# Patient Record
Sex: MALE | Race: WHITE | NOT HISPANIC OR LATINO | Employment: FULL TIME | URBAN - METROPOLITAN AREA
[De-identification: names, ages, dates, MRNs, and addresses within clinical notes are randomized per-mention and may not be internally consistent; named-entity substitution may affect disease eponyms.]

---

## 2017-09-14 ENCOUNTER — OFFICE VISIT (OUTPATIENT)
Dept: FAMILY MEDICINE CLINIC | Facility: CLINIC | Age: 32
End: 2017-09-14

## 2017-09-14 VITALS
TEMPERATURE: 98.7 F | RESPIRATION RATE: 16 BRPM | DIASTOLIC BLOOD PRESSURE: 98 MMHG | HEIGHT: 70 IN | BODY MASS INDEX: 32.21 KG/M2 | WEIGHT: 225 LBS | SYSTOLIC BLOOD PRESSURE: 150 MMHG | HEART RATE: 99 BPM

## 2017-09-14 DIAGNOSIS — K27.9 PEPTIC ULCER: ICD-10-CM

## 2017-09-14 DIAGNOSIS — J01.00 ACUTE NON-RECURRENT MAXILLARY SINUSITIS: ICD-10-CM

## 2017-09-14 DIAGNOSIS — I10 ESSENTIAL HYPERTENSION: Primary | ICD-10-CM

## 2017-09-14 PROCEDURE — 99203 OFFICE O/P NEW LOW 30 MIN: CPT | Performed by: FAMILY MEDICINE

## 2017-09-14 RX ORDER — AMLODIPINE BESYLATE 5 MG/1
5 TABLET ORAL DAILY
Qty: 30 TABLET | Refills: 5 | Status: SHIPPED | OUTPATIENT
Start: 2017-09-14 | End: 2018-03-12 | Stop reason: SDUPTHER

## 2017-09-14 RX ORDER — IBUPROFEN 800 MG/1
800 TABLET ORAL
Status: ON HOLD | COMMUNITY
Start: 2017-09-10 | End: 2022-06-14 | Stop reason: SDDI

## 2017-09-14 RX ORDER — AMOXICILLIN AND CLAVULANATE POTASSIUM 875; 125 MG/1; MG/1
1 TABLET, FILM COATED ORAL 2 TIMES DAILY
Qty: 20 TABLET | Refills: 0 | Status: SHIPPED | OUTPATIENT
Start: 2017-09-14 | End: 2018-03-12

## 2017-09-14 RX ORDER — AMLODIPINE BESYLATE 5 MG/1
5 TABLET ORAL
COMMUNITY
Start: 2017-03-15 | End: 2017-09-14 | Stop reason: SDUPTHER

## 2017-09-14 NOTE — PROGRESS NOTES
"Subjective   Tramaine Gates is a 32 y.o. male.     CC: Establishment of Care for HTN/Hospital F/U for Abdominal Pain    History of Present Illness     Chief Complaint:   Chief Complaint   Patient presents with   • Hypertension     out of meds   • Sinusitis   • Nasal Congestion   • right ear pressure   • Nausea   • rt abd pain     [facility] er follow up - records requested        Tramaine Gates 32 y.o. male who presents today to establish care for Medical Management of the below listed issues and medication refills.  he has a problem list of   Patient Active Problem List   Diagnosis   • Hypertension   • Peptic ulcer   .  Since the last visit with his prior MD, he has been to the [facility] ED for some abdominal pain. Had CT Scan and then a HIDA (negative) and then saw GI for EGD that showed Peptic Ulcer and was treated for H.Pylori (per pt). Got some better but is out of PPI.  he has NOT been compliant with his BP meds due to finances.   Current Outpatient Prescriptions:   •  amLODIPine (NORVASC) 5 MG tablet, Take 1 tablet by mouth Daily., Disp: 30 tablet, Rfl: 5  •  ibuprofen (ADVIL,MOTRIN) 800 MG tablet, Take 800 mg by mouth., Disp: , Rfl:   •  amoxicillin-clavulanate (AUGMENTIN) 875-125 MG per tablet, Take 1 tablet by mouth 2 (Two) Times a Day., Disp: 20 tablet, Rfl: 0.  he denies medication side effects.    He reports ST, congestion, right ear pressure worsening x 9 days. Went to an [facility] Sunday and had negative Strep.screen and was otherwise not treated.    All of the chronic condition(s) listed above are stable w/o issues.    /98  Pulse 99  Temp 98.7 °F (37.1 °C) (Oral)   Resp 16  Ht 70\" (177.8 cm)  Wt 225 lb (102 kg)  BMI 32.28 kg/m2    No results found for this or any previous visit.      The following portions of the patient's history were reviewed and updated as appropriate: allergies, current medications, past family history, past medical history, past social history, past surgical " "history and problem list.    Review of Systems   Constitutional: Negative for activity change, chills, fatigue and fever.   HENT: Positive for congestion, ear pain and sinus pressure.    Respiratory: Positive for cough. Negative for shortness of breath.    Cardiovascular: Negative for chest pain and palpitations.   Gastrointestinal: Positive for abdominal pain.   Endocrine: Negative for cold intolerance.   Psychiatric/Behavioral: Negative for behavioral problems and dysphoric mood. The patient is not nervous/anxious.      /98  Pulse 99  Temp 98.7 °F (37.1 °C) (Oral)   Resp 16  Ht 70\" (177.8 cm)  Wt 225 lb (102 kg)  BMI 32.28 kg/m2    Objective   Physical Exam   Constitutional: He appears well-developed and well-nourished.   HENT:   Right Ear: Tympanic membrane is injected.   Neck: Neck supple. No thyromegaly present.   Cardiovascular: Normal rate and regular rhythm.    No murmur heard.  Pulmonary/Chest: Effort normal and breath sounds normal.   Abdominal: Bowel sounds are normal.   Psychiatric: He has a normal mood and affect. His behavior is normal.   Nursing note and vitals reviewed.  Hospital records reviewed with pt confirming HPI.      Assessment/Plan   Tramaine was seen today for hypertension, sinusitis, nasal congestion, right ear pressure, nausea and rt abd pain.    Diagnoses and all orders for this visit:    Essential hypertension  Comments:  uncontrolled  Orders:  -     Comprehensive metabolic panel  -     Lipid panel  -     CBC and Differential  -     TSH  -     amLODIPine (NORVASC) 5 MG tablet; Take 1 tablet by mouth Daily.    Acute non-recurrent maxillary sinusitis  -     amoxicillin-clavulanate (AUGMENTIN) 875-125 MG per tablet; Take 1 tablet by mouth 2 (Two) Times a Day.    Peptic ulcer    Pt finished the Abx treatment of this and is to get some Omeprazole 20mg QD and take for 3-6 months.           "

## 2018-03-12 ENCOUNTER — OFFICE VISIT (OUTPATIENT)
Dept: FAMILY MEDICINE CLINIC | Facility: CLINIC | Age: 33
End: 2018-03-12

## 2018-03-12 VITALS
SYSTOLIC BLOOD PRESSURE: 152 MMHG | BODY MASS INDEX: 32.35 KG/M2 | RESPIRATION RATE: 18 BRPM | DIASTOLIC BLOOD PRESSURE: 100 MMHG | HEIGHT: 70 IN | WEIGHT: 226 LBS | HEART RATE: 96 BPM | TEMPERATURE: 98.4 F

## 2018-03-12 DIAGNOSIS — I10 ESSENTIAL HYPERTENSION: ICD-10-CM

## 2018-03-12 PROCEDURE — 99213 OFFICE O/P EST LOW 20 MIN: CPT | Performed by: FAMILY MEDICINE

## 2018-03-12 RX ORDER — AMLODIPINE BESYLATE 5 MG/1
5 TABLET ORAL DAILY
Qty: 30 TABLET | Refills: 5 | Status: ON HOLD | OUTPATIENT
Start: 2018-03-12 | End: 2022-06-14

## 2018-03-12 RX ORDER — LISINOPRIL 10 MG/1
10 TABLET ORAL DAILY
Qty: 30 TABLET | Refills: 5 | Status: ON HOLD | OUTPATIENT
Start: 2018-03-12 | End: 2022-06-14 | Stop reason: SDDI

## 2018-03-12 NOTE — PROGRESS NOTES
"Subjective   Tramaine Gates is a 32 y.o. male.     History of Present Illness     Chief Complaint:   Chief Complaint   Patient presents with   • Hypertension     med refill - SMOKER - NO LABS        Tramaine Gates 32 y.o. male who presents today for Medical Management of the below listed issues and medication refills.  he has a problem list of   Patient Active Problem List   Diagnosis   • Hypertension   • Peptic ulcer   .  Since the last visit, he has overall felt well, although BP continues to run high and pt continues to smoke.  he has been compliant with   Current Outpatient Prescriptions:   •  amLODIPine (NORVASC) 5 MG tablet, Take 1 tablet by mouth Daily., Disp: 30 tablet, Rfl: 5  •  ibuprofen (ADVIL,MOTRIN) 800 MG tablet, Take 800 mg by mouth., Disp: , Rfl:   •  lisinopril (PRINIVIL,ZESTRIL) 10 MG tablet, Take 1 tablet by mouth Daily., Disp: 30 tablet, Rfl: 5.  he denies medication side effects.    All of the chronic condition(s) listed above are stable w/o issues.    /100   Pulse 96   Temp 98.4 °F (36.9 °C) (Oral)   Resp 18   Ht 177.8 cm (70\")   Wt 103 kg (226 lb)   BMI 32.43 kg/m²     No results found for this or any previous visit.        The following portions of the patient's history were reviewed and updated as appropriate: allergies, current medications, past family history, past medical history, past social history, past surgical history and problem list.    Review of Systems   Constitutional: Negative for activity change, chills, fatigue and fever.   Respiratory: Negative for cough and shortness of breath.    Cardiovascular: Negative for chest pain and palpitations.   Gastrointestinal: Negative for abdominal pain.   Endocrine: Negative for cold intolerance.   Psychiatric/Behavioral: Negative for behavioral problems and dysphoric mood. The patient is not nervous/anxious.        Objective   Physical Exam   Constitutional: He appears well-developed and well-nourished.   Neck: Neck supple. " No thyromegaly present.   Cardiovascular: Normal rate and regular rhythm.    No murmur heard.  Pulmonary/Chest: Effort normal and breath sounds normal.   Abdominal: Bowel sounds are normal.   Psychiatric: He has a normal mood and affect. His behavior is normal.   Nursing note and vitals reviewed.      Assessment/Plan   Tramaine was seen today for hypertension.    Diagnoses and all orders for this visit:    Essential hypertension  Comments:  uncontrolled  Orders:  -     amLODIPine (NORVASC) 5 MG tablet; Take 1 tablet by mouth Daily.  -     lisinopril (PRINIVIL,ZESTRIL) 10 MG tablet; Take 1 tablet by mouth Daily.

## 2018-10-26 ENCOUNTER — OFFICE (OUTPATIENT)
Dept: URBAN - METROPOLITAN AREA CLINIC 64 | Facility: CLINIC | Age: 33
End: 2018-10-26

## 2018-10-26 VITALS
DIASTOLIC BLOOD PRESSURE: 110 MMHG | HEIGHT: 70 IN | SYSTOLIC BLOOD PRESSURE: 155 MMHG | HEART RATE: 79 BPM | WEIGHT: 239 LBS

## 2018-10-26 DIAGNOSIS — K62.5 HEMORRHAGE OF ANUS AND RECTUM: ICD-10-CM

## 2018-10-26 DIAGNOSIS — Z72.0 TOBACCO USE: ICD-10-CM

## 2018-10-26 DIAGNOSIS — R19.7 DIARRHEA, UNSPECIFIED: ICD-10-CM

## 2018-10-26 DIAGNOSIS — Z87.11 PERSONAL HISTORY OF PEPTIC ULCER DISEASE: ICD-10-CM

## 2018-10-26 DIAGNOSIS — R10.31 RIGHT LOWER QUADRANT PAIN: ICD-10-CM

## 2018-10-26 DIAGNOSIS — R11.2 NAUSEA WITH VOMITING, UNSPECIFIED: ICD-10-CM

## 2018-10-26 PROCEDURE — 99203 OFFICE O/P NEW LOW 30 MIN: CPT | Performed by: NURSE PRACTITIONER

## 2018-10-26 RX ORDER — OMEPRAZOLE 40 MG/1
40 CAPSULE, DELAYED RELEASE ORAL
Qty: 90 | Refills: 3 | Status: ACTIVE
Start: 2018-10-26

## 2018-10-26 RX ORDER — SUCRALFATE 1 G/10ML
800 SUSPENSION ORAL
Qty: 1600 | Refills: 0 | Status: ACTIVE
Start: 2018-10-26

## 2018-10-26 RX ORDER — FAMOTIDINE 20 MG/1
TABLET, FILM COATED ORAL
Qty: 60 | Refills: 0 | Status: COMPLETED
End: 2018-10-26

## 2018-12-31 ENCOUNTER — ON CAMPUS - OUTPATIENT (OUTPATIENT)
Dept: URBAN - METROPOLITAN AREA HOSPITAL 85 | Facility: HOSPITAL | Age: 33
End: 2018-12-31

## 2018-12-31 DIAGNOSIS — Z87.11 PERSONAL HISTORY OF PEPTIC ULCER DISEASE: ICD-10-CM

## 2018-12-31 DIAGNOSIS — R11.0 NAUSEA: ICD-10-CM

## 2018-12-31 DIAGNOSIS — R10.11 RIGHT UPPER QUADRANT PAIN: ICD-10-CM

## 2018-12-31 DIAGNOSIS — R93.3 ABNORMAL FINDINGS ON DIAGNOSTIC IMAGING OF OTHER PARTS OF DI: ICD-10-CM

## 2018-12-31 PROCEDURE — 99213 OFFICE O/P EST LOW 20 MIN: CPT | Performed by: NURSE PRACTITIONER

## 2022-01-07 ENCOUNTER — APPOINTMENT (OUTPATIENT)
Dept: CT IMAGING | Facility: HOSPITAL | Age: 37
End: 2022-01-07

## 2022-01-07 ENCOUNTER — APPOINTMENT (OUTPATIENT)
Dept: GENERAL RADIOLOGY | Facility: HOSPITAL | Age: 37
End: 2022-01-07

## 2022-01-07 ENCOUNTER — HOSPITAL ENCOUNTER (EMERGENCY)
Facility: HOSPITAL | Age: 37
Discharge: HOME | End: 2022-01-07
Admitting: EMERGENCY MEDICINE

## 2022-01-07 VITALS
SYSTOLIC BLOOD PRESSURE: 157 MMHG | WEIGHT: 258.6 LBS | BODY MASS INDEX: 37.02 KG/M2 | HEART RATE: 76 BPM | HEIGHT: 70 IN | TEMPERATURE: 99.1 F | DIASTOLIC BLOOD PRESSURE: 101 MMHG | OXYGEN SATURATION: 99 % | RESPIRATION RATE: 20 BRPM

## 2022-01-07 DIAGNOSIS — R05.9 COUGH: ICD-10-CM

## 2022-01-07 DIAGNOSIS — R06.02 SHORTNESS OF BREATH: ICD-10-CM

## 2022-01-07 DIAGNOSIS — U07.1 COVID-19: Primary | ICD-10-CM

## 2022-01-07 LAB
ALBUMIN SERPL-MCNC: 4.4 G/DL (ref 3.5–5.2)
ALBUMIN/GLOB SERPL: 1.5 G/DL
ALP SERPL-CCNC: 94 U/L (ref 39–117)
ALT SERPL W P-5'-P-CCNC: 27 U/L (ref 1–41)
ANION GAP SERPL CALCULATED.3IONS-SCNC: 15 MMOL/L (ref 5–15)
APTT PPP: 34.8 SECONDS (ref 24–31)
AST SERPL-CCNC: 25 U/L (ref 1–40)
B PARAPERT DNA SPEC QL NAA+PROBE: NOT DETECTED
B PERT DNA SPEC QL NAA+PROBE: NOT DETECTED
BASOPHILS # BLD AUTO: 0 10*3/MM3 (ref 0–0.2)
BASOPHILS NFR BLD AUTO: 0.6 % (ref 0–1.5)
BILIRUB SERPL-MCNC: 0.2 MG/DL (ref 0–1.2)
BUN SERPL-MCNC: 9 MG/DL (ref 6–20)
BUN/CREAT SERPL: 9.9 (ref 7–25)
C PNEUM DNA NPH QL NAA+NON-PROBE: NOT DETECTED
CALCIUM SPEC-SCNC: 9 MG/DL (ref 8.6–10.5)
CHLORIDE SERPL-SCNC: 99 MMOL/L (ref 98–107)
CO2 SERPL-SCNC: 22 MMOL/L (ref 22–29)
CREAT SERPL-MCNC: 0.91 MG/DL (ref 0.76–1.27)
D DIMER PPP FEU-MCNC: 1.35 MG/L (FEU) (ref 0–0.59)
DEPRECATED RDW RBC AUTO: 39.8 FL (ref 37–54)
EOSINOPHIL # BLD AUTO: 0 10*3/MM3 (ref 0–0.4)
EOSINOPHIL NFR BLD AUTO: 0.1 % (ref 0.3–6.2)
ERYTHROCYTE [DISTWIDTH] IN BLOOD BY AUTOMATED COUNT: 13.2 % (ref 12.3–15.4)
FLUAV SUBTYP SPEC NAA+PROBE: NOT DETECTED
FLUBV RNA ISLT QL NAA+PROBE: NOT DETECTED
GFR SERPL CREATININE-BSD FRML MDRD: 93 ML/MIN/1.73
GLOBULIN UR ELPH-MCNC: 2.9 GM/DL
GLUCOSE SERPL-MCNC: 86 MG/DL (ref 65–99)
HADV DNA SPEC NAA+PROBE: NOT DETECTED
HCOV 229E RNA SPEC QL NAA+PROBE: NOT DETECTED
HCOV HKU1 RNA SPEC QL NAA+PROBE: NOT DETECTED
HCOV NL63 RNA SPEC QL NAA+PROBE: NOT DETECTED
HCOV OC43 RNA SPEC QL NAA+PROBE: NOT DETECTED
HCT VFR BLD AUTO: 42.8 % (ref 37.5–51)
HGB BLD-MCNC: 15.2 G/DL (ref 13–17.7)
HMPV RNA NPH QL NAA+NON-PROBE: NOT DETECTED
HOLD SPECIMEN: NORMAL
HPIV1 RNA ISLT QL NAA+PROBE: NOT DETECTED
HPIV2 RNA SPEC QL NAA+PROBE: NOT DETECTED
HPIV3 RNA NPH QL NAA+PROBE: NOT DETECTED
HPIV4 P GENE NPH QL NAA+PROBE: NOT DETECTED
INR PPP: 1.03 (ref 0.93–1.1)
LYMPHOCYTES # BLD AUTO: 1.3 10*3/MM3 (ref 0.7–3.1)
LYMPHOCYTES NFR BLD AUTO: 31.6 % (ref 19.6–45.3)
M PNEUMO IGG SER IA-ACNC: NOT DETECTED
MCH RBC QN AUTO: 29.5 PG (ref 26.6–33)
MCHC RBC AUTO-ENTMCNC: 35.5 G/DL (ref 31.5–35.7)
MCV RBC AUTO: 83.1 FL (ref 79–97)
MONOCYTES # BLD AUTO: 0.4 10*3/MM3 (ref 0.1–0.9)
MONOCYTES NFR BLD AUTO: 9.2 % (ref 5–12)
NEUTROPHILS NFR BLD AUTO: 2.3 10*3/MM3 (ref 1.7–7)
NEUTROPHILS NFR BLD AUTO: 58.5 % (ref 42.7–76)
NRBC BLD AUTO-RTO: 0.2 /100 WBC (ref 0–0.2)
NT-PROBNP SERPL-MCNC: 88.1 PG/ML (ref 0–450)
PLATELET # BLD AUTO: 214 10*3/MM3 (ref 140–450)
PMV BLD AUTO: 7.1 FL (ref 6–12)
POTASSIUM SERPL-SCNC: 3.5 MMOL/L (ref 3.5–5.2)
PROT SERPL-MCNC: 7.3 G/DL (ref 6–8.5)
PROTHROMBIN TIME: 11.4 SECONDS (ref 9.6–11.7)
RBC # BLD AUTO: 5.15 10*6/MM3 (ref 4.14–5.8)
RHINOVIRUS RNA SPEC NAA+PROBE: NOT DETECTED
RSV RNA NPH QL NAA+NON-PROBE: NOT DETECTED
SARS-COV-2 RNA NPH QL NAA+NON-PROBE: DETECTED
SODIUM SERPL-SCNC: 136 MMOL/L (ref 136–145)
TROPONIN T SERPL-MCNC: <0.01 NG/ML (ref 0–0.03)
WBC NRBC COR # BLD: 4 10*3/MM3 (ref 3.4–10.8)

## 2022-01-07 PROCEDURE — 0202U NFCT DS 22 TRGT SARS-COV-2: CPT | Performed by: PHYSICIAN ASSISTANT

## 2022-01-07 PROCEDURE — 83880 ASSAY OF NATRIURETIC PEPTIDE: CPT | Performed by: PHYSICIAN ASSISTANT

## 2022-01-07 PROCEDURE — 93005 ELECTROCARDIOGRAM TRACING: CPT

## 2022-01-07 PROCEDURE — 99284 EMERGENCY DEPT VISIT MOD MDM: CPT

## 2022-01-07 PROCEDURE — 85610 PROTHROMBIN TIME: CPT | Performed by: PHYSICIAN ASSISTANT

## 2022-01-07 PROCEDURE — 80053 COMPREHEN METABOLIC PANEL: CPT | Performed by: PHYSICIAN ASSISTANT

## 2022-01-07 PROCEDURE — 0 IOPAMIDOL PER 1 ML: Performed by: PHYSICIAN ASSISTANT

## 2022-01-07 PROCEDURE — 85025 COMPLETE CBC W/AUTO DIFF WBC: CPT | Performed by: PHYSICIAN ASSISTANT

## 2022-01-07 PROCEDURE — 85730 THROMBOPLASTIN TIME PARTIAL: CPT | Performed by: PHYSICIAN ASSISTANT

## 2022-01-07 PROCEDURE — 84484 ASSAY OF TROPONIN QUANT: CPT | Performed by: PHYSICIAN ASSISTANT

## 2022-01-07 PROCEDURE — 71045 X-RAY EXAM CHEST 1 VIEW: CPT

## 2022-01-07 PROCEDURE — 71275 CT ANGIOGRAPHY CHEST: CPT

## 2022-01-07 PROCEDURE — 93005 ELECTROCARDIOGRAM TRACING: CPT | Performed by: PHYSICIAN ASSISTANT

## 2022-01-07 PROCEDURE — 85379 FIBRIN DEGRADATION QUANT: CPT | Performed by: PHYSICIAN ASSISTANT

## 2022-01-07 RX ORDER — IBUPROFEN 400 MG/1
800 TABLET ORAL ONCE
Status: COMPLETED | OUTPATIENT
Start: 2022-01-07 | End: 2022-01-07

## 2022-01-07 RX ORDER — SODIUM CHLORIDE 0.9 % (FLUSH) 0.9 %
10 SYRINGE (ML) INJECTION AS NEEDED
Status: DISCONTINUED | OUTPATIENT
Start: 2022-01-07 | End: 2022-01-08 | Stop reason: HOSPADM

## 2022-01-07 RX ADMIN — IOPAMIDOL 100 ML: 755 INJECTION, SOLUTION INTRAVENOUS at 22:00

## 2022-01-07 RX ADMIN — IBUPROFEN 800 MG: 400 TABLET, FILM COATED ORAL at 19:36

## 2022-01-07 NOTE — ED PROVIDER NOTES
Subjective     Patient is a 36-year-old male comes in complaining of shortness of breath fever and cough for the past 2 days.  Patient states he has had body aches and headache.  Patient states that he is not vaccinated for COVID-19.  Patient states he has not been around anyone sick.  Patient reports his cough is mildly productive with clear sputum.  Patient states he feels short of breath especially with exertion better with rest.  Patient states he did not take any Tylenol or ibuprofen today.  Patient reports he has chest tightness and pain when he coughs or takes a deep breath.  Patient otherwise denies any chest pain currently or abdominal pain.  Patient denies any vomiting, diarrhea, urinary symptoms or swelling in his legs bilaterally.          Review of Systems   Constitutional: Positive for chills, fatigue and fever.   HENT: Negative for congestion, sore throat, tinnitus and trouble swallowing.    Eyes: Negative for photophobia, discharge and visual disturbance.   Respiratory: Positive for cough, chest tightness and shortness of breath. Negative for wheezing.    Cardiovascular: Positive for chest pain (only with cough and deep breathing). Negative for palpitations and leg swelling.   Gastrointestinal: Positive for nausea. Negative for abdominal pain, blood in stool, diarrhea and vomiting.   Genitourinary: Negative for dysuria, flank pain, frequency and urgency.   Musculoskeletal: Negative for arthralgias and myalgias.   Skin: Negative for rash.   Neurological: Positive for light-headedness. Negative for dizziness, syncope and headaches.   Psychiatric/Behavioral: Negative for confusion.       Past Medical History:   Diagnosis Date   • Allergic    • Hx of complete eye exam 2016   • Hypertension    • Peptic ulcer 9/14/2017       Allergies   Allergen Reactions   • Codeine Itching   • Hydrocodone Itching       Past Surgical History:   Procedure Laterality Date   • MANDIBLE SURGERY         History reviewed. No  pertinent family history.    Social History     Socioeconomic History   • Marital status: Single   Tobacco Use   • Smoking status: Current Every Day Smoker   • Smokeless tobacco: Never Used           Objective   Physical Exam  Vitals and nursing note reviewed.   Constitutional:       General: He is not in acute distress.     Appearance: He is well-developed. He is not diaphoretic.   HENT:      Head: Normocephalic and atraumatic.      Nose: Nose normal.      Mouth/Throat:      Pharynx: No oropharyngeal exudate.   Eyes:      Extraocular Movements: Extraocular movements intact.      Conjunctiva/sclera: Conjunctivae normal.      Pupils: Pupils are equal, round, and reactive to light.   Cardiovascular:      Rate and Rhythm: Normal rate and regular rhythm.      Heart sounds: Normal heart sounds.      Comments: S1, S2 audible.  Pulmonary:      Effort: Pulmonary effort is normal. No respiratory distress.      Breath sounds: Normal breath sounds. No decreased breath sounds, wheezing, rhonchi or rales.      Comments: On room air.  Abdominal:      General: Bowel sounds are normal. There is no distension.      Palpations: Abdomen is soft.      Tenderness: There is no abdominal tenderness.   Musculoskeletal:         General: No tenderness or deformity. Normal range of motion.      Cervical back: Normal range of motion.      Right lower leg: No edema.      Left lower leg: No edema.   Skin:     General: Skin is warm.      Capillary Refill: Capillary refill takes less than 2 seconds.      Findings: No erythema or rash.   Neurological:      Mental Status: He is alert and oriented to person, place, and time.      Cranial Nerves: No cranial nerve deficit.   Psychiatric:         Mood and Affect: Mood normal.         Behavior: Behavior normal.         Procedures           ED Course  ED Course as of 01/07/22 2256 Fri Jan 07, 2022 2234 Patient was given fact sheet regarding monoclonal antibody therapy and patient did not want this  "therapy at this time.  All questions were answered thoroughly regarding this. [RL]      ED Course User Index  [RL] Ike Matos PA      BP (!) 145/102 (BP Location: Left arm, Patient Position: Lying)   Pulse 73   Temp 99.1 °F (37.3 °C) (Oral)   Resp 14   Ht 177.8 cm (70\")   Wt 117 kg (258 lb 9.6 oz)   SpO2 98%   BMI 37.11 kg/m²   Labs Reviewed   RESPIRATORY PANEL PCR W/ COVID-19 (SARS-COV-2) FLASH/GONZALEZ/KELSEY/PAD/COR/MAD/YA IN-HOUSE, NP SWAB IN [place], 3-4 HR TAT - Abnormal; Notable for the following components:       Result Value    COVID19 Detected (*)     All other components within normal limits    Narrative:     In the setting of a positive respiratory panel with a viral infection PLUS a negative procalcitonin without other underlying concern for bacterial infection, consider observing off antibiotics or discontinuation of antibiotics and continue supportive care. If the respiratory panel is positive for atypical bacterial infection (Bordetella pertussis, Chlamydophila pneumoniae, or Mycoplasma pneumoniae), consider antibiotic de-escalation to target atypical bacterial infection.   APTT - Abnormal; Notable for the following components:    PTT 34.8 (*)     All other components within normal limits   D-DIMER, QUANTITATIVE - Abnormal; Notable for the following components:    D-Dimer, Quantitative 1.35 (*)     All other components within normal limits    Narrative:     Reference Range  --------------------------------------------------------------------     < 0.50   Negative Predictive Value  0.50-0.59   Indeterminate    >= 0.60   Probable VTE             A very low percentage of patients with DVT may yield D-Dimer results   below the cut-off of 0.50 mg/L FEU.  This is known to be more   prevalent in patients with distal DVT.             Results of this test should always be interpreted in conjunction with   the patient's medical history, clinical presentation and other   findings.  Clinical diagnosis should not " be based on the result of   INNOVANCE D-Dimer alone.   CBC WITH AUTO DIFFERENTIAL - Abnormal; Notable for the following components:    Eosinophil % 0.1 (*)     All other components within normal limits   BNP (IN-HOUSE) - Normal    Narrative:     Among patients with dyspnea, NT-proBNP is highly sensitive for the detection of acute congestive heart failure. In addition NT-proBNP of <300 pg/ml effectively rules out acute congestive heart failure with 99% negative predictive value.    Results may be falsely decreased if patient taking Biotin.     TROPONIN (IN-HOUSE) - Normal    Narrative:     Troponin T Reference Range:  <= 0.03 ng/mL-   Negative for AMI  >0.03 ng/mL-     Abnormal for myocardial necrosis.  Clinicians would have to utilize clinical acumen, EKG, Troponin and serial changes to determine if it is an Acute Myocardial Infarction or myocardial injury due to an underlying chronic condition.       Results may be falsely decreased if patient taking Biotin.     PROTIME-INR - Normal   COMPREHENSIVE METABOLIC PANEL    Narrative:     GFR Normal >60  Chronic Kidney Disease <60  Kidney Failure <15     CBC AND DIFFERENTIAL    Narrative:     The following orders were created for panel order CBC & Differential.  Procedure                               Abnormality         Status                     ---------                               -----------         ------                     CBC Auto Differential[[id number]]        Abnormal            Final result                 Please view results for these tests on the individual orders.   EXTRA TUBES    Narrative:     The following orders were created for panel order Extra Tubes.  Procedure                               Abnormality         Status                     ---------                               -----------         ------                     Gold Top - SST[[id number]]                                   Final result                 Please view results for these tests on  the individual orders.   [facility]     XR Chest 1 View    Result Date: 1/7/2022  No active cardiac pulmonary disease  Electronically Signed ByÁlvaro Juarez On:1/7/2022 7:11 PM This report was finalized on [id number] by  Tere Gann    CT Chest Pulmonary Embolism    Result Date: 1/7/2022   1. No evidence of pulmonary embolism 2. No pulmonary infiltrate 3. Trace bilateral pleural effusions 4. Multiple pulmonary nodules up to 7 mm. Recommend initial follow-up chest CT in 3-6 months  Electronically Signed ByÁlvaro Juarez On:1/7/2022 10:22 PM This report was finalized on [id number] by  Ozzie Juarez .                                               MDM  Number of Diagnoses or Management Options     Amount and/or Complexity of Data Reviewed  Clinical lab tests: reviewed  Tests in the radiology section of CPT®: reviewed  Tests in the medicine section of CPT®: reviewed    Risk of Complications, Morbidity, and/or Mortality  Presenting problems: low  Diagnostic procedures: low  Management options: low    Patient Progress  Patient progress: stable       Chart review:   EKG: EKG reviewed by myself interpreted by Dr. Staples shows sinus rhythm 71 bpm, no ST elevation apparent.  Repeat EKG was obtained that showed similar findings.    Imaging:    CT Chest Pulmonary Embolism   Final Result       1. No evidence of pulmonary embolism   2. No pulmonary infiltrate   3. Trace bilateral pleural effusions   4. Multiple pulmonary nodules up to 7 mm. Recommend initial follow-up   chest CT in 3-6 months       Electronically Signed ByÁlvaro Juarez On:1/7/2022 10:22 PM   This report was finalized on [id number] by  Tere Gann      XR Chest 1 View   Final Result   No active cardiac pulmonary disease       Electronically Signed ByÁlvaro Juarez On:1/7/2022 7:11 PM   This report was finalized on [id number] by  Ozzie Juarez, Tere          Labs: Patient tested positive for COVID-19 otherwise unremarkable  "respiratory viral panel.  Initial troponin negative, BNP normal, D-dimer elevated 1.35, coags otherwise unremarkable.  CBC and CMP largely unremarkable.    Vitals:  BP (!) 145/102 (BP Location: Left arm, Patient Position: Lying)   Pulse 73   Temp 99.1 °F (37.3 °C) (Oral)   Resp 14   Ht 177.8 cm (70\")   Wt 117 kg (258 lb 9.6 oz)   SpO2 98%   BMI 37.11 kg/m²     Medications given:    Medications   sodium chloride 0.9 % flush 10 mL (has no administration in time range)   ibuprofen (ADVIL,MOTRIN) tablet 800 mg (800 mg Oral Given 1/7/22 1936)   iopamidol (ISOVUE-370) 76 % injection 100 mL (100 mL Intravenous Given 1/7/22 2200)       Procedures:    MDM: Patient is a 36-year-old male comes in complaining of cough, chest pain shortness of breath.  Patient is unvaccinated for COVID-19.Patient tested positive for COVID-19 otherwise unremarkable respiratory viral panel.  Initial troponin negative, BNP normal, D-dimer elevated 1.35, coags otherwise unremarkable.  CBC and CMP largely unremarkable.  CT PE protocol is negative for pulmonary embolism.  This did show multiple pulmonary nodules up to 7 mm.  Recommended CT follow-up in 3 to 6 months.  When I discussed this with patient, patient is aware that he has had nodule in the past.  Patient also has a family history of lung cancer in the family.  Chest x-ray unremarkable for acute findings.  I spoke at length regarding monoclonal antibody therapy but patient declined this.  See full discharge instructions for further details.  Results and plan discussed with patient and is agreeable with plan.    Final diagnoses:   COVID-19   Shortness of breath   Cough       ED Disposition  ED Disposition     None          No follow-up provider specified.       Medication List      No changes were made to your prescriptions during this visit.          Ike Matos PA  01/07/22 1806    "

## 2022-01-08 NOTE — ED NOTES
Pt reports having body aches, headache as a cap on his head, cough, and soa at times for 2 days. Pt works at [employer].      Verna Seo RN  01/07/22 1942

## 2022-01-08 NOTE — DISCHARGE INSTRUCTIONS
Please follow quarantine precautions above.  Please monitor your oxygen at home by purchasing a pulse oximeter at [facility], [facility], etc. and if you are oxygen falls below 90% this would be reason to come back to the ER.  Please come back to the ER if you have severe chest pain or shortness of breath as you will need reevaluation at time.  Please follow-up with your primary care provider in 1 week's time.  Please follow-up with oncology regarding multiple pulmonary nodules as you will at the very least need a repeat CT scan of your chest in 3 to 6 months but may require further evaluation prior to this.  Please call  above.

## 2022-01-09 LAB
QT INTERVAL: 353 MS
QT INTERVAL: 354 MS

## 2022-01-18 ENCOUNTER — TELEPHONE (OUTPATIENT)
Dept: ONCOLOGY | Facility: CLINIC | Age: 37
End: 2022-01-18

## 2022-01-18 ENCOUNTER — TELEPHONE (OUTPATIENT)
Dept: ONCOLOGY | Facility: OTHER | Age: 37
End: 2022-01-18

## 2022-01-18 NOTE — TELEPHONE ENCOUNTER
PATIENT STATES HE WAS IN [facility] ER 11 DAYS AGO.  CT SCAN REVEALED SEVERAL NODULES.  ER  RECOMMENDED PATIENT FOLLOWING UP WITH OUR OFFICE.  PATIENT STATES HE IS GETTING OVER COVID.  APPT SCHEDULED FOR 1-27-22 AT 2:45PM.  PATIENT V/U.

## 2022-01-18 NOTE — TELEPHONE ENCOUNTER
PATIENT CALLED WANTING TO SCHEDULE AN APPT.  HE WAS IN THE ER APPROXIMATELY 11 DAYS AGO WITH COVID AND HAD A CT SCAN TO CHECK HIS LUNGS.  SEVERAL NODULES WERE FOUND AND ER DOC RECOMMENDED FU WITH ONCOLOGY AS PATIENT HAS A FAMILY HISTORY OF LUNCH CANCER. PATIENT DOES NOT CURRENTLY HAVE A PCT AND HAS JUST REC'D NEW INSURANCE.  SPOKE WITH CASANDRA WHO WILL CONTACT PATIENT TO DISCUSS PROTOCOL FOR GETTING HIM SCHEDULED.

## 2022-01-25 NOTE — PROGRESS NOTES
HEMATOLOGY ONCOLOGY OUTPATIENT CONSULTATION       Patient name: Tramaine Gates  : 1985  MRN: [id number]  Primary Care Physician: Provider, No Known  Referring Physician: Provider, No Known  Reason For Consult:     Chief Complaint   Patient presents with   • Appointment     lung nodules          HPI:   History of Present Illness:  Tramaine Gates is 36 y.o. male who presented to our office on 22 for consultation regarding lung nodules    2022 Mr. Gates was referred to the office after he was noted to have small nodules in both lungs, incidentally, on a CT scan that was done when he presented with SARS-COV 2 pneumonia.  He recalled that in the past he had been told that he had lung nodules that did not seem to represent a significant problem.  They had been stable for some time.  Following the above mentioned infection he had continued to have some dyspnea and cough, but he was free of hemoptysis.  He had not lost any weight.  He had been afebrile.  He was eating well and his appetite was adequate.  He admitted to being a heavy smoker and had been smoking cigarettes since his early teenage years.  The scans were compared with the available scans and the images were reviewed with radiology.  He was felt to have stable nodules and only follow-up was suggested.    Subjective:  • 22.  Mr. Gates is in the office for initial evaluation.  He reports he has been feeling poorly.  His main complaint is fatigue but is also dyspneic with some exertion and he coughs frequently.  While he has had these symptoms for some time, they became more intense after he developed SARS-COV 2 infection and they have not returned to their baseline.  He continues to work however and has not had major limitations.  He reports he walks long distances at work.  He has been eating well and his weight is stable.  He has no loss of appetite.  He has been afebrile.  He has maintained regular bowel activity  and has not noted melena or hematochezia.  No dysuria or hematuria and no peripheral edema.  No skin rash.  No neurologic symptoms including seizures or headaches.    The following portions of the patient's history were reviewed and updated as appropriate: allergies, current medications, past family history, past medical history, past social history, past surgical history and problem list.    Past Medical History:   Diagnosis Date   • Allergic    • Asthma 1/27/2022   • Gastroesophageal reflux disease 1/27/2022   • Hx of complete eye exam 2016   • Hyperlipidemia 1/27/2022   • Hypertension    • Migraine headache 1/27/2022   • Panic attack 1/27/2022   • Peptic ulcer 9/14/2017     Past Surgical History:   Procedure Laterality Date   • CHOLECYSTECTOMY  2019   • MANDIBLE SURGERY         Current Outpatient Medications:   •  omeprazole (priLOSEC) 40 MG capsule, omeprazole 40 mg capsule,delayed release  Take 1 capsule every day by oral route., Disp: , Rfl:   •  amLODIPine (NORVASC) 5 MG tablet, Take 1 tablet by mouth Daily., Disp: 30 tablet, Rfl: 5  •  atorvastatin (LIPITOR) 20 MG tablet, atorvastatin 20 mg tablet  Take 1 tablet every day by oral route., Disp: , Rfl:   •  erythromycin (ROMYCIN) 5 MG/GM ophthalmic ointment, 0.5 inches., Disp: , Rfl:   •  ibuprofen (ADVIL,MOTRIN) 800 MG tablet, Take 800 mg by mouth., Disp: , Rfl:   •  lisinopril (PRINIVIL,ZESTRIL) 10 MG tablet, Take 1 tablet by mouth Daily., Disp: 30 tablet, Rfl: 5    Allergies   Allergen Reactions   • Shellfish-Derived Products Hives   • Codeine Itching   • Hydrocodone Itching   • Hydrocodone-Acetaminophen Other (See Comments)     Family History   Problem Relation Age of Onset   • Heart disease Mother    • Heart failure Father      Cancer-related family history is not on file.    Social History     Tobacco Use   • Smoking status: Current Every Day Smoker     Packs/day: 1.00     Types: Cigarettes     Start date: 1999   • Smokeless tobacco: Never Used   Vaping  Use   • Vaping Use: Never used   Substance Use Topics   • Alcohol use: Not Currently   • Drug use: Yes     Frequency: 7.0 times per week     Types: Marijuana     Social History     Social History Narrative   • Not on file      ROS:     Review of Systems   Constitutional: Positive for fatigue. Negative for activity change, appetite change, chills, diaphoresis, fever and unexpected weight change.   HENT: Negative for congestion, dental problem, drooling, ear discharge, ear pain, facial swelling, hearing loss, mouth sores, nosebleeds, postnasal drip, rhinorrhea, sinus pressure, sinus pain, sneezing, sore throat, tinnitus, trouble swallowing and voice change.    Eyes: Negative for photophobia, pain, discharge, redness, itching and visual disturbance.   Respiratory: Positive for cough, shortness of breath and wheezing. Negative for apnea, choking, chest tightness and stridor.    Cardiovascular: Negative for chest pain, palpitations and leg swelling.   Gastrointestinal: Negative for abdominal distention, abdominal pain, anal bleeding, blood in stool, constipation, diarrhea, nausea, rectal pain and vomiting.   Endocrine: Negative for cold intolerance, heat intolerance, polydipsia and polyuria.   Genitourinary: Negative for decreased urine volume, difficulty urinating, dysuria, flank pain, frequency, genital sores, hematuria and urgency.   Musculoskeletal: Negative for arthralgias, back pain, gait problem, joint swelling, myalgias, neck pain and neck stiffness.   Skin: Negative for color change, pallor and rash.   Neurological: Negative for dizziness, tremors, seizures, syncope, facial asymmetry, speech difficulty, weakness, light-headedness, numbness and headaches.   Hematological: Negative for adenopathy. Does not bruise/bleed easily.   Psychiatric/Behavioral: Negative for agitation, behavioral problems, confusion, decreased concentration, hallucinations, self-injury, sleep disturbance and suicidal ideas. The patient is  "not nervous/anxious.                Objective:    Vitals:    01/27/22 1448   BP: 149/100   Pulse: 116   Resp: 20   Temp: 98.8 °F (37.1 °C)   Weight: 122 kg (268 lb)   Height: 177.8 cm (70\")   PainSc:   6   PainLoc: Chest  Comment: right side and lungs     Body mass index is 38.45 kg/m².  ECOG  (1) Restricted in physically strenuous activity, ambulatory and able to do work of light nature    Physical Exam:     Physical Exam  Constitutional:       General: He is not in acute distress.     Appearance: He is obese. He is ill-appearing. He is not toxic-appearing or diaphoretic.   HENT:      Head: Normocephalic and atraumatic.      Right Ear: External ear normal.      Left Ear: External ear normal.      Nose: Congestion present. No rhinorrhea.      Mouth/Throat:      Mouth: Mucous membranes are moist.      Pharynx: Oropharynx is clear. No oropharyngeal exudate or posterior oropharyngeal erythema.   Eyes:      General: No scleral icterus.        Right eye: No discharge.         Left eye: No discharge.      Conjunctiva/sclera: Conjunctivae normal.      Pupils: Pupils are equal, round, and reactive to light.   Cardiovascular:      Rate and Rhythm: Normal rate and regular rhythm.      Pulses: Normal pulses.      Heart sounds: Normal heart sounds. No murmur heard.  No friction rub. No gallop.    Pulmonary:      Effort: No respiratory distress.      Breath sounds: No stridor. No wheezing, rhonchi or rales.      Comments: The lungs are markedly diminished bilaterally.  There are fine crackles in both bases.  Chest:      Chest wall: No tenderness.   Abdominal:      General: Abdomen is flat. Bowel sounds are normal. There is no distension.      Palpations: Abdomen is soft. There is no mass.      Tenderness: There is no abdominal tenderness. There is no right CVA tenderness, left CVA tenderness, guarding or rebound.   Musculoskeletal:         General: No swelling, tenderness, deformity or signs of injury.      Cervical back: No " rigidity.      Right lower leg: No edema.      Left lower leg: No edema.   Lymphadenopathy:      Cervical: No cervical adenopathy.   Skin:     General: Skin is warm and dry.      Coloration: Skin is not jaundiced or pale.      Findings: No bruising, erythema or rash.   Neurological:      General: No focal deficit present.      Mental Status: He is alert and oriented to person, place, and time.      Cranial Nerves: No cranial nerve deficit.      Coordination: Coordination normal.      Gait: Gait normal.   Psychiatric:         Mood and Affect: Mood normal.         Behavior: Behavior normal.         Thought Content: Thought content normal.         Judgment: Judgment normal.     Mango Plata MD performed a physical exam on January 27, 2022 as documented above    Lab Results - Last 18 Months   Lab Units 01/07/22  1935   WBC 10*3/mm3 4.00   HEMOGLOBIN g/dL 15.2   HEMATOCRIT % 42.8   PLATELETS 10*3/mm3 214   MCV fL 83.1     Lab Results - Last 18 Months   Lab Units 01/07/22  1935   SODIUM mmol/L 136   POTASSIUM mmol/L 3.5   CHLORIDE mmol/L 99   CO2 mmol/L 22.0   BUN mg/dL 9   CREATININE mg/dL 0.91   CALCIUM mg/dL 9.0   BILIRUBIN mg/dL 0.2   ALK PHOS U/L 94   ALT (SGPT) U/L 27   AST (SGOT) U/L 25   GLUCOSE mg/dL 86     Lab Results   Component Value Date    GLUCOSE 86 01/07/2022    BUN 9 01/07/2022    CREATININE 0.91 01/07/2022    EGFRIFNONA 93 01/07/2022    BCR 9.9 01/07/2022    K 3.5 01/07/2022    CO2 22.0 01/07/2022    CALCIUM 9.0 01/07/2022    ALBUMIN 4.40 01/07/2022    LABIL2 1.1 01/01/2019    AST 25 01/07/2022    ALT 27 01/07/2022     Lab Results - Last 18 Months   Lab Units 01/07/22  1935   INR  1.03   APTT seconds 34.8*     Lab Results   Component Value Date    SEDRATE 10 01/01/2019     No results found for: FIBRINOGEN, HAPTOGLOBIN  Lab Results   Component Value Date    PTT 34.8 (H) 01/07/2022    INR 1.03 01/07/2022     Assessment/Plan     Assessment:  1. Lung nodules I have carefully reviewed the images of the  scans done most recently with a CT scan of the abdomen that was done in 2018.  The nodules that can be compared are essentially the same.  I had a telephone conversation with Dr. Selma Jones in radiology.  We compared the scans together and she came to the same conclusion.  An addendum will be placed on the report of the most recent scan.  On the basis of this I believe the nodules Mr. Gates has are benign and probably granulomatous.  No intervention is necessary at this time.  I have asked him to let me see him again with another scan in about 3 months.  This mainly because some of the nodules were not comparable on the scan of the abdomen of 2018.  The images of previous scans were not available for comparison.  If indeed there is no change in the nodules then I will probably see him a year later with another scan.  And if at that time there is no change then I would not need to follow him anymore.  Discussed at length with him.  2. Cigarette smoker Mr. Delgado has been a smoker for a long time and he consumes a large amount of cigarettes.  At times, he tells me, he has smoked upwards of 3 packs of cigarettes per day.  Discussed with him carefully the importance of cessation.  The many diseases that are associated to chronic cigarette smoking were described.  At his age, he still has a long time to create a lot of damage to his body and cessation would be in his best interest.  Offered him treatment with any clean or nicotine replacement.  He declined both.  3. SARS-COV 2 vaccination Mr. Gates has not received any vaccination.  Explained to him the known safety of the vaccines and the potentially serious complications that can arise as a result of infection with a virus in unvaccinated people.  The natural infection does not seem to come for adequate immunity and repeated infections are possible.  Some reports have suggested that subsequent infections resulted in more severe symptoms than the initial 1.  He was  not convinced.  4. I will see him again in approximately 3 months with another scan.    Plan:  1. As above    Mango Plata MD on January 27, 2022 at 5 PM

## 2022-01-27 ENCOUNTER — APPOINTMENT (OUTPATIENT)
Dept: LAB | Facility: HOSPITAL | Age: 37
End: 2022-01-27

## 2022-01-27 ENCOUNTER — CONSULT (OUTPATIENT)
Dept: ONCOLOGY | Facility: CLINIC | Age: 37
End: 2022-01-27

## 2022-01-27 VITALS
DIASTOLIC BLOOD PRESSURE: 100 MMHG | BODY MASS INDEX: 38.37 KG/M2 | HEART RATE: 116 BPM | HEIGHT: 70 IN | SYSTOLIC BLOOD PRESSURE: 149 MMHG | RESPIRATION RATE: 20 BRPM | WEIGHT: 268 LBS | TEMPERATURE: 98.8 F

## 2022-01-27 DIAGNOSIS — R91.8 LUNG NODULES: ICD-10-CM

## 2022-01-27 DIAGNOSIS — Z85.118 HX OF CANCER OF LUNG: Primary | ICD-10-CM

## 2022-01-27 PROBLEM — F41.0 PANIC ATTACK: Status: ACTIVE | Noted: 2022-01-27

## 2022-01-27 PROBLEM — Z20.822 CONTACT WITH AND (SUSPECTED) EXPOSURE TO COVID-19: Status: ACTIVE | Noted: 2021-01-10

## 2022-01-27 PROBLEM — M94.0 COSTOCHONDRITIS: Status: ACTIVE | Noted: 2021-01-10

## 2022-01-27 PROBLEM — H01.009 BLEPHARITIS: Status: ACTIVE | Noted: 2020-09-22

## 2022-01-27 PROBLEM — H16.009 CORNEAL ULCER: Status: ACTIVE | Noted: 2020-09-22

## 2022-01-27 PROBLEM — R07.9 CHEST PAIN: Status: ACTIVE | Noted: 2020-09-22

## 2022-01-27 PROBLEM — E78.5 HYPERLIPIDEMIA: Status: ACTIVE | Noted: 2022-01-27

## 2022-01-27 PROBLEM — R05.9 COUGH: Status: ACTIVE | Noted: 2021-01-10

## 2022-01-27 PROBLEM — K21.9 GASTROESOPHAGEAL REFLUX DISEASE: Status: ACTIVE | Noted: 2022-01-27

## 2022-01-27 PROBLEM — J45.909 ASTHMA: Status: ACTIVE | Noted: 2022-01-27

## 2022-01-27 PROBLEM — K81.1 CHRONIC CHOLECYSTITIS: Status: ACTIVE | Noted: 2019-01-16

## 2022-01-27 PROBLEM — G43.909 MIGRAINE HEADACHE: Status: ACTIVE | Noted: 2022-01-27

## 2022-01-27 PROBLEM — E07.9 DISORDER OF THYROID: Status: ACTIVE | Noted: 2022-01-27

## 2022-01-27 PROCEDURE — 99214 OFFICE O/P EST MOD 30 MIN: CPT | Performed by: INTERNAL MEDICINE

## 2022-01-27 RX ORDER — OMEPRAZOLE 40 MG/1
CAPSULE, DELAYED RELEASE ORAL
Status: ON HOLD | COMMUNITY
End: 2022-06-14

## 2022-01-27 RX ORDER — ERYTHROMYCIN 5 MG/G
0.5 OINTMENT OPHTHALMIC
Status: ON HOLD | COMMUNITY
Start: 2020-09-22 | End: 2022-06-14 | Stop reason: SDDI

## 2022-01-27 RX ORDER — ATORVASTATIN CALCIUM 20 MG/1
TABLET, FILM COATED ORAL
Status: ON HOLD | COMMUNITY
End: 2022-06-14 | Stop reason: SDDI

## 2022-03-19 PROBLEM — E66.01 SEVERE OBESITY (HCC): Status: ACTIVE | Noted: 2021-05-11

## 2022-03-19 PROBLEM — N52.9 ERECTILE DYSFUNCTION: Status: ACTIVE | Noted: 2021-12-16

## 2022-04-28 ENCOUNTER — APPOINTMENT (OUTPATIENT)
Dept: LAB | Facility: HOSPITAL | Age: 37
End: 2022-04-28

## 2022-05-26 DIAGNOSIS — F90.9 ATTENTION DEFICIT HYPERACTIVITY DISORDER (ADHD), UNSPECIFIED ADHD TYPE: Primary | ICD-10-CM

## 2022-05-26 RX ORDER — DEXTROAMPHETAMINE SACCHARATE, AMPHETAMINE ASPARTATE, DEXTROAMPHETAMINE SULFATE AND AMPHETAMINE SULFATE 2.5; 2.5; 2.5; 2.5 MG/1; MG/1; MG/1; MG/1
10 TABLET ORAL 2 TIMES DAILY
Qty: 60 TABLET | Refills: 0 | Status: SHIPPED | OUTPATIENT
Start: 2022-05-26 | End: 2022-07-06 | Stop reason: SDUPTHER

## 2022-05-26 NOTE — TELEPHONE ENCOUNTER
Last OV 12-16-21  Follow up 6-16-22  Last refill 4/26/22  I have reviewed the patients controlled substance prescription history, as maintained in the [place] prescription monitoring program, so that the prescription(s) for a  controlled substance can be given.

## 2022-05-31 ENCOUNTER — TRANSCRIBE ORDERS (OUTPATIENT)
Dept: ADMINISTRATIVE | Facility: HOSPITAL | Age: 37
End: 2022-05-31

## 2022-05-31 DIAGNOSIS — R06.02 SHORTNESS OF BREATH: ICD-10-CM

## 2022-05-31 DIAGNOSIS — R07.9 CHEST PAIN, UNSPECIFIED TYPE: Primary | ICD-10-CM

## 2022-06-07 PROCEDURE — 96374 THER/PROPH/DIAG INJ IV PUSH: CPT

## 2022-06-07 PROCEDURE — 93005 ELECTROCARDIOGRAM TRACING: CPT

## 2022-06-07 PROCEDURE — 96376 TX/PRO/DX INJ SAME DRUG ADON: CPT

## 2022-06-07 PROCEDURE — 93005 ELECTROCARDIOGRAM TRACING: CPT | Performed by: EMERGENCY MEDICINE

## 2022-06-07 PROCEDURE — 99283 EMERGENCY DEPT VISIT LOW MDM: CPT

## 2022-06-08 ENCOUNTER — APPOINTMENT (OUTPATIENT)
Dept: GENERAL RADIOLOGY | Facility: HOSPITAL | Age: 37
End: 2022-06-08

## 2022-06-08 ENCOUNTER — HOSPITAL ENCOUNTER (EMERGENCY)
Facility: HOSPITAL | Age: 37
Discharge: HOME | End: 2022-06-08
Attending: EMERGENCY MEDICINE | Admitting: EMERGENCY MEDICINE

## 2022-06-08 VITALS
WEIGHT: 268 LBS | DIASTOLIC BLOOD PRESSURE: 80 MMHG | RESPIRATION RATE: 19 BRPM | BODY MASS INDEX: 38.37 KG/M2 | SYSTOLIC BLOOD PRESSURE: 116 MMHG | HEART RATE: 73 BPM | HEIGHT: 70 IN | OXYGEN SATURATION: 97 % | TEMPERATURE: 98 F

## 2022-06-08 DIAGNOSIS — R07.9 CHEST PAIN, UNSPECIFIED TYPE: Primary | ICD-10-CM

## 2022-06-08 LAB
ANION GAP SERPL CALCULATED.3IONS-SCNC: 13 MMOL/L (ref 5–15)
BASOPHILS # BLD AUTO: 0.1 10*3/MM3 (ref 0–0.2)
BASOPHILS NFR BLD AUTO: 1.3 % (ref 0–1.5)
BUN SERPL-MCNC: 9 MG/DL (ref 6–20)
BUN/CREAT SERPL: 10 (ref 7–25)
CALCIUM SPEC-SCNC: 9.1 MG/DL (ref 8.6–10.5)
CHLORIDE SERPL-SCNC: 99 MMOL/L (ref 98–107)
CO2 SERPL-SCNC: 21 MMOL/L (ref 22–29)
CREAT SERPL-MCNC: 0.9 MG/DL (ref 0.76–1.27)
DEPRECATED RDW RBC AUTO: 38.9 FL (ref 37–54)
EGFRCR SERPLBLD CKD-EPI 2021: 112.1 ML/MIN/1.73
EOSINOPHIL # BLD AUTO: 0.1 10*3/MM3 (ref 0–0.4)
EOSINOPHIL NFR BLD AUTO: 0.8 % (ref 0.3–6.2)
ERYTHROCYTE [DISTWIDTH] IN BLOOD BY AUTOMATED COUNT: 13.1 % (ref 12.3–15.4)
GLUCOSE SERPL-MCNC: 107 MG/DL (ref 65–99)
HCT VFR BLD AUTO: 47.6 % (ref 37.5–51)
HGB BLD-MCNC: 16.4 G/DL (ref 13–17.7)
HOLD SPECIMEN: NORMAL
HOLD SPECIMEN: NORMAL
LYMPHOCYTES # BLD AUTO: 2.5 10*3/MM3 (ref 0.7–3.1)
LYMPHOCYTES NFR BLD AUTO: 28.9 % (ref 19.6–45.3)
MCH RBC QN AUTO: 29.3 PG (ref 26.6–33)
MCHC RBC AUTO-ENTMCNC: 34.5 G/DL (ref 31.5–35.7)
MCV RBC AUTO: 84.8 FL (ref 79–97)
MONOCYTES # BLD AUTO: 0.4 10*3/MM3 (ref 0.1–0.9)
MONOCYTES NFR BLD AUTO: 4.5 % (ref 5–12)
NEUTROPHILS NFR BLD AUTO: 5.7 10*3/MM3 (ref 1.7–7)
NEUTROPHILS NFR BLD AUTO: 64.5 % (ref 42.7–76)
NRBC BLD AUTO-RTO: 0 /100 WBC (ref 0–0.2)
PLATELET # BLD AUTO: 338 10*3/MM3 (ref 140–450)
PMV BLD AUTO: 6.5 FL (ref 6–12)
POTASSIUM SERPL-SCNC: 3.7 MMOL/L (ref 3.5–5.2)
RBC # BLD AUTO: 5.61 10*6/MM3 (ref 4.14–5.8)
SODIUM SERPL-SCNC: 133 MMOL/L (ref 136–145)
TROPONIN T SERPL-MCNC: <0.01 NG/ML (ref 0–0.03)
WBC NRBC COR # BLD: 8.8 10*3/MM3 (ref 3.4–10.8)
WHOLE BLOOD HOLD COAG: NORMAL
WHOLE BLOOD HOLD SPECIMEN: NORMAL

## 2022-06-08 PROCEDURE — 84484 ASSAY OF TROPONIN QUANT: CPT | Performed by: EMERGENCY MEDICINE

## 2022-06-08 PROCEDURE — 71045 X-RAY EXAM CHEST 1 VIEW: CPT

## 2022-06-08 PROCEDURE — 25010000002 KETOROLAC TROMETHAMINE PER 15 MG: Performed by: EMERGENCY MEDICINE

## 2022-06-08 PROCEDURE — 85025 COMPLETE CBC W/AUTO DIFF WBC: CPT | Performed by: EMERGENCY MEDICINE

## 2022-06-08 PROCEDURE — 80048 BASIC METABOLIC PNL TOTAL CA: CPT | Performed by: EMERGENCY MEDICINE

## 2022-06-08 RX ORDER — SODIUM CHLORIDE 0.9 % (FLUSH) 0.9 %
10 SYRINGE (ML) INJECTION AS NEEDED
Status: DISCONTINUED | OUTPATIENT
Start: 2022-06-08 | End: 2022-06-08 | Stop reason: HOSPADM

## 2022-06-08 RX ORDER — KETOROLAC TROMETHAMINE 15 MG/ML
15 INJECTION, SOLUTION INTRAMUSCULAR; INTRAVENOUS ONCE
Status: COMPLETED | OUTPATIENT
Start: 2022-06-08 | End: 2022-06-08

## 2022-06-08 RX ORDER — NAPROXEN 375 MG/1
375 TABLET ORAL 2 TIMES DAILY PRN
Qty: 14 TABLET | Refills: 0 | Status: SHIPPED | OUTPATIENT
Start: 2022-06-08 | End: 2022-06-22

## 2022-06-08 RX ADMIN — KETOROLAC TROMETHAMINE 15 MG: 15 INJECTION, SOLUTION INTRAMUSCULAR; INTRAVENOUS at 02:49

## 2022-06-08 RX ADMIN — KETOROLAC TROMETHAMINE 15 MG: 15 INJECTION, SOLUTION INTRAMUSCULAR; INTRAVENOUS at 01:24

## 2022-06-08 NOTE — ED PROVIDER NOTES
Subjective   Patient is a 36-year-old male complaining of sharp chest pain for the past several hours.  The pain is constant and moderate in intensity without radiation.  The pain is worse on deep inspiration coughing and certain motions.          Review of Systems   Constitutional: Negative for chills and fever.   HENT: Negative for congestion.    Eyes: Negative.    Respiratory: Negative for cough, chest tightness and shortness of breath.    Cardiovascular: Positive for chest pain. Negative for palpitations.   Gastrointestinal: Negative for abdominal pain, diarrhea and vomiting.   Endocrine: Negative for polyuria.   Genitourinary: Negative for dysuria and flank pain.   Musculoskeletal: Negative for back pain.   Neurological: Negative for dizziness and headaches.     A complete review of system was obtained and is otherwise negative  Past Medical History:   Diagnosis Date   • Allergic    • Asthma 1/27/2022   • Gastroesophageal reflux disease 1/27/2022   • Hx of complete eye exam 2016   • Hyperlipidemia 1/27/2022   • Hypertension    • Migraine headache 1/27/2022   • Panic attack 1/27/2022   • Peptic ulcer 9/14/2017       Allergies   Allergen Reactions   • Shellfish-Derived Products Hives   • Codeine Itching   • Hydrocodone Itching   • Hydrocodone-Acetaminophen Other (See Comments)       Past Surgical History:   Procedure Laterality Date   • CHOLECYSTECTOMY  2019   • MANDIBLE SURGERY         Family History   Problem Relation Age of Onset   • Heart disease Mother    • Heart failure Father        Social History     Socioeconomic History   • Marital status: Single   Tobacco Use   • Smoking status: Current Every Day Smoker     Packs/day: 1.00     Types: Cigarettes     Start date: 1999   • Smokeless tobacco: Never Used   Vaping Use   • Vaping Use: Never used   Substance and Sexual Activity   • Alcohol use: Not Currently   • Drug use: Yes     Frequency: 7.0 times per week     Types: Marijuana           Objective   Physical  Exam  HEENT exam shows TMs to be clear.  Oropharynx comers but sclera is nonicteric.  Neck has no adenopathy JVD or bruits.  Lungs are clear.  Heart has regular rhythm without murmur rub or gallop.  Chest is tender on palpation of his bilateral chest wall laterally.  There is no crepitus or subcu air.  Abdomen is soft nontender.  Patient has normal bowel sounds without rebound or guarding.  Back has no CVA tenderness.  Extremity exam is unremarkable.  Procedures     My EKG interpretation shows normal sinus rhythm at a rate of 100 with no acute ST change      ED Course      Results for orders placed or performed during the hospital encounter of 06/08/22   Basic Metabolic Panel    Specimen: Blood   Result Value Ref Range    Glucose 107 (H) 65 - 99 mg/dL    BUN 9 6 - 20 mg/dL    Creatinine 0.90 0.76 - 1.27 mg/dL    Sodium 133 (L) 136 - 145 mmol/L    Potassium 3.7 3.5 - 5.2 mmol/L    Chloride 99 98 - 107 mmol/L    CO2 21.0 (L) 22.0 - 29.0 mmol/L    Calcium 9.1 8.6 - 10.5 mg/dL    BUN/Creatinine Ratio 10.0 7.0 - 25.0    Anion Gap 13.0 5.0 - 15.0 mmol/L    eGFR 112.1 >60.0 mL/min/1.73   Troponin    Specimen: Blood   Result Value Ref Range    Troponin T <0.010 0.000 - 0.030 ng/mL   CBC Auto Differential    Specimen: Blood   Result Value Ref Range    WBC 8.80 3.40 - 10.80 10*3/mm3    RBC 5.61 4.14 - 5.80 10*6/mm3    Hemoglobin 16.4 13.0 - 17.7 g/dL    Hematocrit 47.6 37.5 - 51.0 %    MCV 84.8 79.0 - 97.0 fL    MCH 29.3 26.6 - 33.0 pg    MCHC 34.5 31.5 - 35.7 g/dL    RDW 13.1 12.3 - 15.4 %    RDW-SD 38.9 37.0 - 54.0 fl    MPV 6.5 6.0 - 12.0 fL    Platelets 338 140 - 450 10*3/mm3    Neutrophil % 64.5 42.7 - 76.0 %    Lymphocyte % 28.9 19.6 - 45.3 %    Monocyte % 4.5 (L) 5.0 - 12.0 %    Eosinophil % 0.8 0.3 - 6.2 %    Basophil % 1.3 0.0 - 1.5 %    Neutrophils, Absolute 5.70 1.70 - 7.00 10*3/mm3    Lymphocytes, Absolute 2.50 0.70 - 3.10 10*3/mm3    Monocytes, Absolute 0.40 0.10 - 0.90 10*3/mm3    Eosinophils, Absolute 0.10 0.00  - 0.40 10*3/mm3    Basophils, Absolute 0.10 0.00 - 0.20 10*3/mm3    nRBC 0.0 0.0 - 0.2 /100 WBC   ECG 12 Lead   Result Value Ref Range    QT Interval 338 ms     No radiology results for the last day                                             MDM  Number of Diagnoses or Management Options  Diagnosis management comments: My chest x-ray interpretation shows no cardiomegaly effusion or infiltrate.  Patient has no evidence of acute coronary syndrome based on EKG and troponin.  Metabolic panel is at baseline.  There is no evidence infectious process.  Patient does have reproducible pain on palpation.  He was given Toradol IV with partial relief.  Will be discharged with a prescription for Naprosyn.  Use a heating pad and see his MD for recheck as needed.       Amount and/or Complexity of Data Reviewed  Clinical lab tests: reviewed  Tests in the medicine section of CPT®: reviewed    Risk of Complications, Morbidity, and/or Mortality  Presenting problems: high  Diagnostic procedures: high  Management options: high    Patient Progress  Patient progress: stable      Final diagnoses:   Chest pain, unspecified type       ED Disposition  ED Disposition     ED Disposition   Discharge    Condition   Stable    Comment   --             No follow-up provider specified.       Medication List      New Prescriptions    naproxen 375 MG tablet  Commonly known as: NAPROSYN  Take 1 tablet by mouth 2 (Two) Times a Day As Needed for Moderate Pain .           Where to Get Your Medications      Information about where to get these medications is not yet available    Ask your nurse or doctor about these medications  · naproxen 375 MG tablet          Marcus Mandujano MD  06/08/22 0204

## 2022-06-10 LAB — QT INTERVAL: 338 MS

## 2022-06-14 ENCOUNTER — HOSPITAL ENCOUNTER (INPATIENT)
Facility: HOSPITAL | Age: 37
LOS: 3 days | Discharge: HOME | End: 2022-06-17
Attending: INTERNAL MEDICINE | Admitting: INTERNAL MEDICINE

## 2022-06-14 DIAGNOSIS — F41.1 GENERALIZED ANXIETY DISORDER WITH PANIC ATTACKS: Chronic | ICD-10-CM

## 2022-06-14 DIAGNOSIS — F41.0 GENERALIZED ANXIETY DISORDER WITH PANIC ATTACKS: Chronic | ICD-10-CM

## 2022-06-14 DIAGNOSIS — I25.10 CAD, MULTIPLE VESSEL: Primary | ICD-10-CM

## 2022-06-14 PROBLEM — I21.19 ACUTE INFERIOR MYOCARDIAL INFARCTION: Status: ACTIVE | Noted: 2022-06-14

## 2022-06-14 LAB
ACT BLD: 150 SECONDS (ref 89–137)
ALBUMIN SERPL-MCNC: 4 G/DL (ref 3.5–5.2)
ALBUMIN/GLOB SERPL: 1.4 G/DL
ALP SERPL-CCNC: 101 U/L (ref 39–117)
ALT SERPL W P-5'-P-CCNC: 38 U/L (ref 1–41)
ANION GAP SERPL CALCULATED.3IONS-SCNC: 14 MMOL/L (ref 5–15)
APTT PPP: 138 SECONDS (ref 61–76.5)
AST SERPL-CCNC: 24 U/L (ref 1–40)
BASOPHILS # BLD AUTO: 0 10*3/MM3 (ref 0–0.2)
BASOPHILS # BLD AUTO: NORMAL 10*3/UL
BASOPHILS NFR BLD AUTO: 0.1 % (ref 0–1.5)
BASOPHILS NFR BLD AUTO: NORMAL %
BILIRUB SERPL-MCNC: 0.3 MG/DL (ref 0–1.2)
BUN SERPL-MCNC: 14 MG/DL (ref 6–20)
BUN/CREAT SERPL: 11.8 (ref 7–25)
CALCIUM SPEC-SCNC: 8.5 MG/DL (ref 8.6–10.5)
CHLORIDE SERPL-SCNC: 100 MMOL/L (ref 98–107)
CO2 SERPL-SCNC: 18 MMOL/L (ref 22–29)
CREAT SERPL-MCNC: 1.19 MG/DL (ref 0.76–1.27)
DEPRECATED RDW RBC AUTO: 39.4 FL (ref 37–54)
DEPRECATED RDW RBC AUTO: 40.7 FL (ref 37–54)
DEPRECATED RDW RBC AUTO: NORMAL FL
EGFRCR SERPLBLD CKD-EPI 2021: 80.2 ML/MIN/1.73
EOSINOPHIL # BLD AUTO: 0 10*3/MM3 (ref 0–0.4)
EOSINOPHIL # BLD AUTO: NORMAL 10*3/UL
EOSINOPHIL NFR BLD AUTO: 0.1 % (ref 0.3–6.2)
EOSINOPHIL NFR BLD AUTO: NORMAL %
ERYTHROCYTE [DISTWIDTH] IN BLOOD BY AUTOMATED COUNT: 13.1 % (ref 12.3–15.4)
ERYTHROCYTE [DISTWIDTH] IN BLOOD BY AUTOMATED COUNT: 13.5 % (ref 12.3–15.4)
ERYTHROCYTE [DISTWIDTH] IN BLOOD BY AUTOMATED COUNT: NORMAL %
GLOBULIN UR ELPH-MCNC: 2.8 GM/DL
GLUCOSE SERPL-MCNC: 173 MG/DL (ref 65–99)
HCT VFR BLD AUTO: 42.9 % (ref 37.5–51)
HCT VFR BLD AUTO: 45.3 % (ref 37.5–51)
HCT VFR BLD AUTO: NORMAL %
HGB BLD-MCNC: 14.6 G/DL (ref 13–17.7)
HGB BLD-MCNC: 15.2 G/DL (ref 13–17.7)
HGB BLD-MCNC: NORMAL G/DL
INR PPP: 1.08 (ref 0.93–1.1)
LYMPHOCYTES # BLD AUTO: 0.6 10*3/MM3 (ref 0.7–3.1)
LYMPHOCYTES # BLD AUTO: NORMAL 10*3/UL
LYMPHOCYTES NFR BLD AUTO: 5.2 % (ref 19.6–45.3)
LYMPHOCYTES NFR BLD AUTO: NORMAL %
MAGNESIUM SERPL-MCNC: 1.8 MG/DL (ref 1.6–2.6)
MCH RBC QN AUTO: 28.8 PG (ref 26.6–33)
MCH RBC QN AUTO: 29.1 PG (ref 26.6–33)
MCH RBC QN AUTO: NORMAL PG
MCHC RBC AUTO-ENTMCNC: 33.5 G/DL (ref 31.5–35.7)
MCHC RBC AUTO-ENTMCNC: 34.1 G/DL (ref 31.5–35.7)
MCHC RBC AUTO-ENTMCNC: NORMAL G/DL
MCV RBC AUTO: 85.3 FL (ref 79–97)
MCV RBC AUTO: 85.9 FL (ref 79–97)
MCV RBC AUTO: NORMAL FL
MONOCYTES # BLD AUTO: 0.1 10*3/MM3 (ref 0.1–0.9)
MONOCYTES # BLD AUTO: NORMAL 10*3/UL
MONOCYTES NFR BLD AUTO: 0.8 % (ref 5–12)
MONOCYTES NFR BLD AUTO: NORMAL %
NEUTROPHILS NFR BLD AUTO: 11.6 10*3/MM3 (ref 1.7–7)
NEUTROPHILS NFR BLD AUTO: 93.8 % (ref 42.7–76)
NEUTROPHILS NFR BLD AUTO: NORMAL %
NEUTROPHILS NFR BLD AUTO: NORMAL %
NRBC BLD AUTO-RTO: 0.1 /100 WBC (ref 0–0.2)
PHOSPHATE SERPL-MCNC: 2.8 MG/DL (ref 2.5–4.5)
PLATELET # BLD AUTO: 328 10*3/MM3 (ref 140–450)
PLATELET # BLD AUTO: 358 10*3/MM3 (ref 140–450)
PLATELET # BLD AUTO: NORMAL 10*3/UL
PMV BLD AUTO: 6.6 FL (ref 6–12)
PMV BLD AUTO: 6.8 FL (ref 6–12)
PMV BLD AUTO: NORMAL FL
POTASSIUM SERPL-SCNC: 4.6 MMOL/L (ref 3.5–5.2)
PROT SERPL-MCNC: 6.8 G/DL (ref 6–8.5)
PROTHROMBIN TIME: 11.1 SECONDS (ref 9.6–11.7)
RBC # BLD AUTO: 5.03 10*6/MM3 (ref 4.14–5.8)
RBC # BLD AUTO: 5.27 10*6/MM3 (ref 4.14–5.8)
RBC # BLD AUTO: NORMAL 10*6/UL
SARS-COV-2 AG RESP QL IA.RAPID: NORMAL
SARS-COV-2 RNA RESP QL NAA+PROBE: DETECTED
SODIUM SERPL-SCNC: 132 MMOL/L (ref 136–145)
TROPONIN T SERPL-MCNC: 0.05 NG/ML (ref 0–0.03)
TROPONIN T SERPL-MCNC: 0.06 NG/ML (ref 0–0.03)
WBC NRBC COR # BLD: 10.7 10*3/MM3 (ref 3.4–10.8)
WBC NRBC COR # BLD: 12.3 10*3/MM3 (ref 3.4–10.8)
WBC NRBC COR # BLD: NORMAL 10*3/UL

## 2022-06-14 PROCEDURE — 25010000002 EPTIFIBATIDE PER 5 MG: Performed by: INTERNAL MEDICINE

## 2022-06-14 PROCEDURE — 85610 PROTHROMBIN TIME: CPT | Performed by: INTERNAL MEDICINE

## 2022-06-14 PROCEDURE — 85025 COMPLETE CBC W/AUTO DIFF WBC: CPT | Performed by: INTERNAL MEDICINE

## 2022-06-14 PROCEDURE — C1769 GUIDE WIRE: HCPCS | Performed by: INTERNAL MEDICINE

## 2022-06-14 PROCEDURE — 25010000002 FENTANYL CITRATE (PF) 100 MCG/2ML SOLUTION: Performed by: INTERNAL MEDICINE

## 2022-06-14 PROCEDURE — 93005 ELECTROCARDIOGRAM TRACING: CPT

## 2022-06-14 PROCEDURE — 93458 L HRT ARTERY/VENTRICLE ANGIO: CPT | Performed by: INTERNAL MEDICINE

## 2022-06-14 PROCEDURE — 80053 COMPREHEN METABOLIC PANEL: CPT | Performed by: INTERNAL MEDICINE

## 2022-06-14 PROCEDURE — C1874 STENT, COATED/COV W/DEL SYS: HCPCS | Performed by: INTERNAL MEDICINE

## 2022-06-14 PROCEDURE — 92929 PR PRQ TRLUML CORONARY STENT W/ANGIO ADDL ART/BRNCH: CPT | Performed by: INTERNAL MEDICINE

## 2022-06-14 PROCEDURE — 4A023N7 MEASUREMENT OF CARDIAC SAMPLING AND PRESSURE, LEFT HEART, PERCUTANEOUS APPROACH: ICD-10-PCS | Performed by: INTERNAL MEDICINE

## 2022-06-14 PROCEDURE — 80053 COMPREHEN METABOLIC PANEL: CPT

## 2022-06-14 PROCEDURE — C1887 CATHETER, GUIDING: HCPCS | Performed by: INTERNAL MEDICINE

## 2022-06-14 PROCEDURE — B2111ZZ FLUOROSCOPY OF MULTIPLE CORONARY ARTERIES USING LOW OSMOLAR CONTRAST: ICD-10-PCS | Performed by: INTERNAL MEDICINE

## 2022-06-14 PROCEDURE — 93005 ELECTROCARDIOGRAM TRACING: CPT | Performed by: INTERNAL MEDICINE

## 2022-06-14 PROCEDURE — C1894 INTRO/SHEATH, NON-LASER: HCPCS | Performed by: INTERNAL MEDICINE

## 2022-06-14 PROCEDURE — 25010000002 DOPAMINE PER 40 MG: Performed by: INTERNAL MEDICINE

## 2022-06-14 PROCEDURE — 027036Z DILATION OF CORONARY ARTERY, ONE ARTERY WITH THREE DRUG-ELUTING INTRALUMINAL DEVICES, PERCUTANEOUS APPROACH: ICD-10-PCS | Performed by: INTERNAL MEDICINE

## 2022-06-14 PROCEDURE — 92941 PRQ TRLML REVSC TOT OCCL AMI: CPT | Performed by: INTERNAL MEDICINE

## 2022-06-14 PROCEDURE — C9601 PERC DRUG-EL COR STENT BRAN: HCPCS | Performed by: INTERNAL MEDICINE

## 2022-06-14 PROCEDURE — U0003 INFECTIOUS AGENT DETECTION BY NUCLEIC ACID (DNA OR RNA); SEVERE ACUTE RESPIRATORY SYNDROME CORONAVIRUS 2 (SARS-COV-2) (CORONAVIRUS DISEASE [COVID-19]), AMPLIFIED PROBE TECHNIQUE, MAKING USE OF HIGH THROUGHPUT TECHNOLOGIES AS DESCRIBED BY CMS-2020-01-R: HCPCS | Performed by: INTERNAL MEDICINE

## 2022-06-14 PROCEDURE — 87426 SARSCOV CORONAVIRUS AG IA: CPT | Performed by: NURSE PRACTITIONER

## 2022-06-14 PROCEDURE — 84484 ASSAY OF TROPONIN QUANT: CPT | Performed by: INTERNAL MEDICINE

## 2022-06-14 PROCEDURE — 99152 MOD SED SAME PHYS/QHP 5/>YRS: CPT | Performed by: INTERNAL MEDICINE

## 2022-06-14 PROCEDURE — C1725 CATH, TRANSLUMIN NON-LASER: HCPCS | Performed by: INTERNAL MEDICINE

## 2022-06-14 PROCEDURE — 85027 COMPLETE CBC AUTOMATED: CPT | Performed by: INTERNAL MEDICINE

## 2022-06-14 PROCEDURE — C9606 PERC D-E COR REVASC W AMI S: HCPCS | Performed by: INTERNAL MEDICINE

## 2022-06-14 PROCEDURE — 85730 THROMBOPLASTIN TIME PARTIAL: CPT | Performed by: INTERNAL MEDICINE

## 2022-06-14 PROCEDURE — B2151ZZ FLUOROSCOPY OF LEFT HEART USING LOW OSMOLAR CONTRAST: ICD-10-PCS | Performed by: INTERNAL MEDICINE

## 2022-06-14 PROCEDURE — 83735 ASSAY OF MAGNESIUM: CPT

## 2022-06-14 PROCEDURE — 63710000001 DIPHENHYDRAMINE PER 50 MG: Performed by: INTERNAL MEDICINE

## 2022-06-14 PROCEDURE — 93010 ELECTROCARDIOGRAM REPORT: CPT | Performed by: INTERNAL MEDICINE

## 2022-06-14 PROCEDURE — 85347 COAGULATION TIME ACTIVATED: CPT

## 2022-06-14 PROCEDURE — 99153 MOD SED SAME PHYS/QHP EA: CPT | Performed by: INTERNAL MEDICINE

## 2022-06-14 PROCEDURE — 84100 ASSAY OF PHOSPHORUS: CPT | Performed by: NURSE PRACTITIONER

## 2022-06-14 PROCEDURE — 99222 1ST HOSP IP/OBS MODERATE 55: CPT | Performed by: INTERNAL MEDICINE

## 2022-06-14 PROCEDURE — 25010000002 ONDANSETRON PER 1 MG: Performed by: INTERNAL MEDICINE

## 2022-06-14 PROCEDURE — 25010000002 DIPHENHYDRAMINE PER 50 MG: Performed by: INTERNAL MEDICINE

## 2022-06-14 PROCEDURE — 25010000002 METHYLPREDNISOLONE PER 125 MG: Performed by: INTERNAL MEDICINE

## 2022-06-14 PROCEDURE — 25010000002 HEPARIN (PORCINE) PER 1000 UNITS: Performed by: INTERNAL MEDICINE

## 2022-06-14 PROCEDURE — 25010000002 PHENYLEPHRINE 10 MG/ML SOLUTION: Performed by: INTERNAL MEDICINE

## 2022-06-14 PROCEDURE — 0 IOPAMIDOL PER 1 ML: Performed by: INTERNAL MEDICINE

## 2022-06-14 PROCEDURE — 25010000002 EPTIFIBATIDE 20 MG/10ML SOLUTION: Performed by: INTERNAL MEDICINE

## 2022-06-14 PROCEDURE — 84100 ASSAY OF PHOSPHORUS: CPT

## 2022-06-14 PROCEDURE — 83735 ASSAY OF MAGNESIUM: CPT | Performed by: NURSE PRACTITIONER

## 2022-06-14 PROCEDURE — 84484 ASSAY OF TROPONIN QUANT: CPT

## 2022-06-14 PROCEDURE — 25010000002 MIDAZOLAM PER 1 MG: Performed by: INTERNAL MEDICINE

## 2022-06-14 DEVICE — XIENCE SKYPOINT™ EVEROLIMUS ELUTING CORONARY STENT SYSTEM 2.25 MM X 15 MM / RAPID-EXCHANGE
Type: IMPLANTABLE DEVICE | Site: CORONARY | Status: FUNCTIONAL
Brand: XIENCE SKYPOINT™

## 2022-06-14 DEVICE — STNT CORNRY RESOLUTE ONYX RX 2X22MM: Type: IMPLANTABLE DEVICE | Site: CORONARY | Status: FUNCTIONAL

## 2022-06-14 DEVICE — STNT CORNRY RESOLUTE ONYX RX 2X12MM: Type: IMPLANTABLE DEVICE | Site: CORONARY | Status: FUNCTIONAL

## 2022-06-14 DEVICE — XIENCE SKYPOINT™ EVEROLIMUS ELUTING CORONARY STENT SYSTEM 2.50 MM X 23 MM / RAPID-EXCHANGE
Type: IMPLANTABLE DEVICE | Site: CORONARY | Status: FUNCTIONAL
Brand: XIENCE SKYPOINT™

## 2022-06-14 DEVICE — XIENCE SKYPOINT™ EVEROLIMUS ELUTING CORONARY STENT SYSTEM 2.25 MM X 12 MM / RAPID-EXCHANGE
Type: IMPLANTABLE DEVICE | Site: CORONARY | Status: FUNCTIONAL
Brand: XIENCE SKYPOINT™

## 2022-06-14 RX ORDER — PHENYLEPHRINE HYDROCHLORIDE 10 MG/ML
INJECTION INTRAVENOUS AS NEEDED
Status: DISCONTINUED | OUTPATIENT
Start: 2022-06-14 | End: 2022-06-14 | Stop reason: HOSPADM

## 2022-06-14 RX ORDER — ATORVASTATIN CALCIUM 40 MG/1
40 TABLET, FILM COATED ORAL DAILY
Status: DISCONTINUED | OUTPATIENT
Start: 2022-06-14 | End: 2022-06-15

## 2022-06-14 RX ORDER — ALUMINA, MAGNESIA, AND SIMETHICONE 2400; 2400; 240 MG/30ML; MG/30ML; MG/30ML
15 SUSPENSION ORAL EVERY 6 HOURS PRN
Status: DISCONTINUED | OUTPATIENT
Start: 2022-06-14 | End: 2022-06-17 | Stop reason: HOSPADM

## 2022-06-14 RX ORDER — LIDOCAINE HYDROCHLORIDE 20 MG/ML
INJECTION, SOLUTION INFILTRATION; PERINEURAL AS NEEDED
Status: DISCONTINUED | OUTPATIENT
Start: 2022-06-14 | End: 2022-06-14 | Stop reason: HOSPADM

## 2022-06-14 RX ORDER — NITROGLYCERIN 0.4 MG/1
0.4 TABLET SUBLINGUAL
Status: DISCONTINUED | OUTPATIENT
Start: 2022-06-14 | End: 2022-06-17 | Stop reason: HOSPADM

## 2022-06-14 RX ORDER — BUDESONIDE 0.5 MG/2ML
1 INHALANT ORAL
Status: DISCONTINUED | OUTPATIENT
Start: 2022-06-14 | End: 2022-06-16

## 2022-06-14 RX ORDER — ONDANSETRON 2 MG/ML
INJECTION INTRAMUSCULAR; INTRAVENOUS AS NEEDED
Status: DISCONTINUED | OUTPATIENT
Start: 2022-06-14 | End: 2022-06-14 | Stop reason: HOSPADM

## 2022-06-14 RX ORDER — CLOPIDOGREL BISULFATE 75 MG/1
75 TABLET ORAL DAILY
Status: DISCONTINUED | OUTPATIENT
Start: 2022-06-15 | End: 2022-06-17 | Stop reason: HOSPADM

## 2022-06-14 RX ORDER — SODIUM CHLORIDE 0.9 % (FLUSH) 0.9 %
10 SYRINGE (ML) INJECTION AS NEEDED
Status: DISCONTINUED | OUTPATIENT
Start: 2022-06-14 | End: 2022-06-17 | Stop reason: HOSPADM

## 2022-06-14 RX ORDER — NITROGLYCERIN 5 MG/ML
INJECTION, SOLUTION INTRAVENOUS AS NEEDED
Status: DISCONTINUED | OUTPATIENT
Start: 2022-06-14 | End: 2022-06-14 | Stop reason: HOSPADM

## 2022-06-14 RX ORDER — ASPIRIN 81 MG/1
81 TABLET ORAL DAILY
Status: DISCONTINUED | OUTPATIENT
Start: 2022-06-14 | End: 2022-06-17 | Stop reason: HOSPADM

## 2022-06-14 RX ORDER — ACETAMINOPHEN 650 MG/1
650 SUPPOSITORY RECTAL EVERY 4 HOURS PRN
Status: DISCONTINUED | OUTPATIENT
Start: 2022-06-14 | End: 2022-06-17 | Stop reason: HOSPADM

## 2022-06-14 RX ORDER — DIPHENHYDRAMINE HYDROCHLORIDE 50 MG/ML
INJECTION INTRAMUSCULAR; INTRAVENOUS AS NEEDED
Status: DISCONTINUED | OUTPATIENT
Start: 2022-06-14 | End: 2022-06-14 | Stop reason: HOSPADM

## 2022-06-14 RX ORDER — CLOPIDOGREL 300 MG/1
600 TABLET, FILM COATED ORAL ONCE
Status: COMPLETED | OUTPATIENT
Start: 2022-06-14 | End: 2022-06-14

## 2022-06-14 RX ORDER — NITROGLYCERIN 20 MG/100ML
5-200 INJECTION INTRAVENOUS
Status: DISCONTINUED | OUTPATIENT
Start: 2022-06-14 | End: 2022-06-17 | Stop reason: HOSPADM

## 2022-06-14 RX ORDER — ONDANSETRON 2 MG/ML
4 INJECTION INTRAMUSCULAR; INTRAVENOUS EVERY 6 HOURS PRN
Status: DISCONTINUED | OUTPATIENT
Start: 2022-06-14 | End: 2022-06-14 | Stop reason: SDUPTHER

## 2022-06-14 RX ORDER — DOPAMINE HYDROCHLORIDE 160 MG/100ML
3 INJECTION, SOLUTION INTRAVENOUS CONTINUOUS
Status: DISCONTINUED | OUTPATIENT
Start: 2022-06-14 | End: 2022-06-17 | Stop reason: HOSPADM

## 2022-06-14 RX ORDER — SODIUM CHLORIDE 9 MG/ML
50 INJECTION, SOLUTION INTRAVENOUS CONTINUOUS
Status: DISCONTINUED | OUTPATIENT
Start: 2022-06-14 | End: 2022-06-15

## 2022-06-14 RX ORDER — SODIUM CHLORIDE 0.9 % (FLUSH) 0.9 %
10 SYRINGE (ML) INJECTION EVERY 12 HOURS SCHEDULED
Status: DISCONTINUED | OUTPATIENT
Start: 2022-06-14 | End: 2022-06-17 | Stop reason: HOSPADM

## 2022-06-14 RX ORDER — ACETAMINOPHEN 325 MG/1
650 TABLET ORAL EVERY 4 HOURS PRN
Status: DISCONTINUED | OUTPATIENT
Start: 2022-06-14 | End: 2022-06-17 | Stop reason: HOSPADM

## 2022-06-14 RX ORDER — SODIUM CHLORIDE 9 MG/ML
250 INJECTION, SOLUTION INTRAVENOUS ONCE AS NEEDED
Status: DISCONTINUED | OUTPATIENT
Start: 2022-06-14 | End: 2022-06-17 | Stop reason: HOSPADM

## 2022-06-14 RX ORDER — ONDANSETRON 4 MG/1
4 TABLET, FILM COATED ORAL EVERY 6 HOURS PRN
Status: DISCONTINUED | OUTPATIENT
Start: 2022-06-14 | End: 2022-06-17 | Stop reason: HOSPADM

## 2022-06-14 RX ORDER — ARFORMOTEROL TARTRATE 15 UG/2ML
15 SOLUTION RESPIRATORY (INHALATION)
Status: DISCONTINUED | OUTPATIENT
Start: 2022-06-14 | End: 2022-06-16

## 2022-06-14 RX ORDER — DOPAMINE HYDROCHLORIDE 160 MG/100ML
INJECTION, SOLUTION INTRAVENOUS CONTINUOUS PRN
Status: COMPLETED | OUTPATIENT
Start: 2022-06-14 | End: 2022-06-14

## 2022-06-14 RX ORDER — CLOPIDOGREL BISULFATE 75 MG/1
TABLET ORAL AS NEEDED
Status: DISCONTINUED | OUTPATIENT
Start: 2022-06-14 | End: 2022-06-14 | Stop reason: HOSPADM

## 2022-06-14 RX ORDER — ONDANSETRON 4 MG/1
4 TABLET, FILM COATED ORAL EVERY 6 HOURS PRN
Status: DISCONTINUED | OUTPATIENT
Start: 2022-06-14 | End: 2022-06-14 | Stop reason: SDUPTHER

## 2022-06-14 RX ORDER — EPTIFIBATIDE 0.75 MG/ML
2 INJECTION, SOLUTION INTRAVENOUS CONTINUOUS
Status: DISPENSED | OUTPATIENT
Start: 2022-06-14 | End: 2022-06-15

## 2022-06-14 RX ORDER — EPTIFIBATIDE 2 MG/ML
INJECTION, SOLUTION INTRAVENOUS AS NEEDED
Status: DISCONTINUED | OUTPATIENT
Start: 2022-06-14 | End: 2022-06-14 | Stop reason: HOSPADM

## 2022-06-14 RX ORDER — HEPARIN SODIUM 1000 [USP'U]/ML
INJECTION, SOLUTION INTRAVENOUS; SUBCUTANEOUS AS NEEDED
Status: DISCONTINUED | OUTPATIENT
Start: 2022-06-14 | End: 2022-06-14 | Stop reason: HOSPADM

## 2022-06-14 RX ORDER — DIPHENHYDRAMINE HCL 25 MG
25 CAPSULE ORAL EVERY 6 HOURS PRN
Status: DISCONTINUED | OUTPATIENT
Start: 2022-06-14 | End: 2022-06-17 | Stop reason: HOSPADM

## 2022-06-14 RX ORDER — METHYLPREDNISOLONE SODIUM SUCCINATE 125 MG/2ML
INJECTION, POWDER, LYOPHILIZED, FOR SOLUTION INTRAMUSCULAR; INTRAVENOUS AS NEEDED
Status: DISCONTINUED | OUTPATIENT
Start: 2022-06-14 | End: 2022-06-14 | Stop reason: HOSPADM

## 2022-06-14 RX ORDER — MIDAZOLAM HYDROCHLORIDE 1 MG/ML
INJECTION INTRAMUSCULAR; INTRAVENOUS AS NEEDED
Status: DISCONTINUED | OUTPATIENT
Start: 2022-06-14 | End: 2022-06-14 | Stop reason: HOSPADM

## 2022-06-14 RX ORDER — EPTIFIBATIDE 0.75 MG/ML
INJECTION, SOLUTION INTRAVENOUS CONTINUOUS PRN
Status: COMPLETED | OUTPATIENT
Start: 2022-06-14 | End: 2022-06-14

## 2022-06-14 RX ORDER — ONDANSETRON 2 MG/ML
4 INJECTION INTRAMUSCULAR; INTRAVENOUS EVERY 6 HOURS PRN
Status: DISCONTINUED | OUTPATIENT
Start: 2022-06-14 | End: 2022-06-17 | Stop reason: HOSPADM

## 2022-06-14 RX ORDER — FENTANYL CITRATE 50 UG/ML
INJECTION, SOLUTION INTRAMUSCULAR; INTRAVENOUS AS NEEDED
Status: DISCONTINUED | OUTPATIENT
Start: 2022-06-14 | End: 2022-06-14 | Stop reason: HOSPADM

## 2022-06-14 RX ADMIN — ONDANSETRON 4 MG: 2 INJECTION INTRAMUSCULAR; INTRAVENOUS at 14:41

## 2022-06-14 RX ADMIN — CLOPIDOGREL BISULFATE 600 MG: 300 TABLET, FILM COATED ORAL at 17:18

## 2022-06-14 RX ADMIN — EPTIFIBATIDE 2 MCG/KG/MIN: 0.75 INJECTION INTRAVENOUS at 18:02

## 2022-06-14 RX ADMIN — ATORVASTATIN CALCIUM 40 MG: 40 TABLET, FILM COATED ORAL at 17:10

## 2022-06-14 RX ADMIN — DIPHENHYDRAMINE HYDROCHLORIDE 25 MG: 25 CAPSULE ORAL at 23:59

## 2022-06-14 RX ADMIN — NITROGLYCERIN 5 MCG/MIN: 20 INJECTION INTRAVENOUS at 14:05

## 2022-06-14 RX ADMIN — Medication 10 ML: at 20:24

## 2022-06-14 RX ADMIN — EPTIFIBATIDE 2 MCG/KG/MIN: 0.75 INJECTION INTRAVENOUS at 14:05

## 2022-06-14 RX ADMIN — EPTIFIBATIDE 2 MCG/KG/MIN: 0.75 INJECTION INTRAVENOUS at 23:22

## 2022-06-14 RX ADMIN — ASPIRIN 81 MG: 81 TABLET, COATED ORAL at 17:10

## 2022-06-14 RX ADMIN — MORPHINE SULFATE 4 MG: 4 INJECTION INTRAVENOUS at 23:59

## 2022-06-14 RX ADMIN — METOPROLOL TARTRATE 12.5 MG: 25 TABLET, FILM COATED ORAL at 20:05

## 2022-06-14 NOTE — CONSULTS
Acknowledge Cardiac Rehab evaluation. AMI/stent. Information left. Will follow and call after discharge.

## 2022-06-14 NOTE — CONSULTS
Referring Provider: Dr. Scanlon    Attending: Deanna Braun MD    Reason for Consultation:    Chest pain  Shortness of breath  Acute inferior myocardial infarction  Hypertension  Hyperlipidemia    Chief complaint:    Chest pain  Shortness of breath       History of present illness:     Patient is 36-year-old white gentleman whose past medical history is significant for hypertension, hyperlipidemia, who is admitted with acute inferior myocardial infarction.    Patient started having sudden chest pain.  He called 911.  He was noted to have ST elevation of inferior leads.  Patient was brought to the [facility].  Patient was brought to the Cath Lab directly from the ER.  No intravenous IV was placed by EMS.  Patient was having intense chest pain 10 out of 10 chest pain.  Blood pressure initially was stable but subsequently decreased to 90 mmHg.    Patient does not have previous history of CAD, PCI or MI.  He had a chest pain at the start of the month and was evaluated in the emergency room but was discharged.        Review of Systems  Review of Systems   Constitutional: Negative for chills and fever.   HENT: Negative for ear discharge and nosebleeds.    Eyes: Negative for discharge and redness.   Cardiovascular: Positive for chest pain. Negative for orthopnea, palpitations, paroxysmal nocturnal dyspnea and syncope.   Respiratory: Positive for shortness of breath. Negative for cough and wheezing.    Endocrine: Negative for heat intolerance.   Skin: Negative for rash.   Musculoskeletal: Negative for arthritis and myalgias.   Gastrointestinal: Negative for abdominal pain, melena, nausea and vomiting.   Genitourinary: Negative for dysuria and hematuria.   Neurological: Negative for dizziness, light-headedness, numbness and tremors.   Psychiatric/Behavioral: Negative for depression. The patient is not nervous/anxious.        Past Medical History  Past Medical History:   Diagnosis Date   • Acute inferior  "myocardial infarction (HCC) 6/14/2022   • Allergic    • Asthma 1/27/2022   • Gastroesophageal reflux disease 1/27/2022   • Hx of complete eye exam 2016   • Hyperlipidemia 1/27/2022   • Hypertension    • Migraine headache 1/27/2022   • Panic attack 1/27/2022   • Peptic ulcer 9/14/2017    and   Past Surgical History:   Procedure Laterality Date   • CHOLECYSTECTOMY  2019   • MANDIBLE SURGERY         Family History  Family History   Problem Relation Age of Onset   • Heart disease Mother    • Heart failure Father        Social History  Social History     Socioeconomic History   • Marital status: Single   Tobacco Use   • Smoking status: Current Every Day Smoker     Packs/day: 1.00     Types: Cigarettes     Start date: 1999   • Smokeless tobacco: Never Used   Vaping Use   • Vaping Use: Never used   Substance and Sexual Activity   • Alcohol use: Not Currently   • Drug use: Yes     Frequency: 7.0 times per week     Types: Marijuana       Objective     Physical Exam:    Vital Signs  Vitals:    06/14/22 1233 06/14/22 1245 06/14/22 1300 06/14/22 1500   BP: 127/69 116/64 118/67    BP Location: Right arm      Patient Position: Lying      Pulse: 84 85 84    Resp: 20  24    Temp: 97.1 °F (36.2 °C)  98.2 °F (36.8 °C) 95.2 °F (35.1 °C)   TempSrc: Oral  Oral    SpO2: 93% 91% 94%    Weight: 123 kg (270 lb 1 oz)      Height: 177.8 cm (70\")          Weight       Constitutional:       Appearance: Well-developed.   Eyes:      General: No scleral icterus.        Right eye: No discharge.   HENT:      Head: Normocephalic and atraumatic.   Neck:      Thyroid: No thyromegaly.      Lymphadenopathy: No cervical adenopathy.   Pulmonary:      Effort: Pulmonary effort is normal. No respiratory distress.      Breath sounds: Normal breath sounds. No wheezing. No rales.   Cardiovascular:      Normal rate. Regular rhythm.      No gallop.   Abdominal:      Tenderness: There is no abdominal tenderness.   Skin:     Findings: No erythema or rash. "   Neurological:      Mental Status: Alert and oriented to person, place, and time.         Results Review:  Lab Results (last 24 hours)     Procedure Component Value Units Date/Time    POC Activated Clotting Time [[id number]]  (Abnormal) Collected: 06/14/22 1447    Specimen: Arterial Blood Updated: 06/14/22 1457     Activated Clotting Time  150 Seconds      Comment: Serial Number: [id number]:  [id number]       Comprehensive Metabolic Panel [[id number]] Updated: 06/14/22 1242    Specimen: Blood     Troponin [[id number]] Updated: 06/14/22 1242    Specimen: Blood     Magnesium [[id number]]  (Normal) Collected: 06/14/22 1158    Specimen: Blood Updated: 06/14/22 1239     Magnesium 1.8 mg/dL     Phosphorus [[id number]]  (Normal) Collected: 06/14/22 1158    Specimen: Blood Updated: 06/14/22 1239     Phosphorus 2.8 mg/dL     aPTT [[id number]]  (Abnormal) Collected: 06/14/22 1158    Specimen: Blood Updated: 06/14/22 1237     .0 seconds     Protime-INR [[id number]]  (Normal) Collected: 06/14/22 1158    Specimen: Blood Updated: 06/14/22 1237     Protime 11.1 Seconds      INR 1.08    CBC & Differential [[id number]] Collected: 06/14/22 1121    Specimen: Blood Updated: 06/14/22 1217    Narrative:      The following orders were created for panel order CBC & Differential.  Procedure                               Abnormality         Status                     ---------                               -----------         ------                     CBC Auto Differential[[id number]]                            Edited Result - FINAL        Please view results for these tests on the individual orders.    CBC Auto Differential [[id number]] Collected: 06/14/22 1121    Specimen: Blood Updated: 06/14/22 1217     WBC --     Comment: Previous results were contaminated. CBC auto validated before it was caught. Notified Frank in cath lab.  Corrected result. Previous result was 6.50 10*3/mm3 on 6/14/2022 at 1130 EDT.        RBC --     Comment: Previous  results were contaminated. CBC auto validated before it was caught. Notified Frank in cath lab.  Corrected result. Previous result was 3.21 10*6/mm3 on 6/14/2022 at 1130 EDT.        Hemoglobin --     Comment: Previous results were contaminated. CBC auto validated before it was caught. Notified Frank in cath lab.  Corrected result. Previous result was 9.2 g/dL on 6/14/2022 at 1130 EDT.        Hematocrit --     Comment: Previous results were contaminated. CBC auto validated before it was caught. Notified Frank in cath lab.  Corrected result. Previous result was 27.1 % on 6/14/2022 at 1130 EDT.        MCV --     Comment: Previous results were contaminated. CBC auto validated before it was caught. Notified Frank in cath lab.  Corrected result. Previous result was 84.5 fL on 6/14/2022 at 1130 EDT.        MCH --     Comment: Previous results were contaminated. CBC auto validated before it was caught. Notified Frank in cath lab.  Corrected result. Previous result was 28.6 pg on 6/14/2022 at 1130 EDT.        MCHC --     Comment: Previous results were contaminated. CBC auto validated before it was caught. Notified Frank in cath lab.  Corrected result. Previous result was 33.8 g/dL on 6/14/2022 at 1130 EDT.        RDW --     Comment: Previous results were contaminated. CBC auto validated before it was caught. Notified Frank in cath lab.  Corrected result. Previous result was 12.9 % on 6/14/2022 at 1130 EDT.        RDW-SD --     Comment: Previous results were contaminated. CBC auto validated before it was caught. Notified Frank in cath lab.  Corrected result. Previous result was 38.5 fl on 6/14/2022 at 1130 EDT.        MPV --     Comment: Previous results were contaminated. CBC auto validated before it was caught. Notified Frank in cath lab.  Corrected result. Previous result was 6.7 fL on 6/14/2022 at 1130 EDT.        Platelets --     Comment: Previous results were contaminated. CBC auto validated before it was caught.  Notified Frank in cath lab.  Corrected result. Previous result was 243 10*3/mm3 on 6/14/2022 at 1130 EDT.        Neutrophil % --     Comment: Previous results were contaminated. CBC auto validated before it was caught. Notified Frank in cath lab.  Corrected result. Previous result was 55.4 % on 6/14/2022 at 1130 EDT.        Lymphocyte % --     Comment: Previous results were contaminated. CBC auto validated before it was caught. Notified Frank in cath lab.  Corrected result. Previous result was 35.8 % on 6/14/2022 at 1130 EDT.        Monocyte % --     Comment: Previous results were contaminated. CBC auto validated before it was caught. Notified Frank in cath lab.  Corrected result. Previous result was 6.5 % on 6/14/2022 at 1130 EDT.        Eosinophil % --     Comment: Previous results were contaminated. CBC auto validated before it was caught. Notified Frank in cath lab.  Corrected result. Previous result was 1.6 % on 6/14/2022 at 1130 EDT.        Basophil % --     Comment: Previous results were contaminated. CBC auto validated before it was caught. Notified Frank in cath lab.  Corrected result. Previous result was 0.7 % on 6/14/2022 at 1130 EDT.        Neutrophils, Absolute --     Comment: Previous results were contaminated. CBC auto validated before it was caught. Notified Frank in cath lab.  Corrected result. Previous result was 3.60 10*3/mm3 on 6/14/2022 at 1130 EDT.        Lymphocytes, Absolute --     Comment: Previous results were contaminated. CBC auto validated before it was caught. Notified Frank in cath lab.  Corrected result. Previous result was 2.30 10*3/mm3 on 6/14/2022 at 1130 EDT.        Monocytes, Absolute --     Comment: Previous results were contaminated. CBC auto validated before it was caught. Notified Frank in cath lab.  Corrected result. Previous result was 0.40 10*3/mm3 on 6/14/2022 at 1130 EDT.        Eosinophils, Absolute --     Comment: Previous results were contaminated. CBC auto validated  before it was caught. Notified Frank in cath lab.  Corrected result. Previous result was 0.10 10*3/mm3 on 6/14/2022 at 1130 EDT.        Basophils, Absolute --     Comment: Previous results were contaminated. CBC auto validated before it was caught. Notified Frank in cath lab.  Corrected result. Previous result was 0.00 10*3/mm3 on 6/14/2022 at 1130 EDT.       Narrative:      The previously reported component NRBC is no longer being reported. Previous result was 0.3 /100 WBC (Reference Range: 0.0-0.2 /100 WBC) on 6/14/2022 at 1130 EDT.    COVID PRE-OP / PRE-PROCEDURE SCREENING ORDER (NO ISOLATION) - Swab, Nasopharynx [[id number]]  (Abnormal) Collected: 06/14/22 1052    Specimen: Swab from Nasopharynx Updated: 06/14/22 1215    Narrative:      The following orders were created for panel order COVID PRE-OP / PRE-PROCEDURE SCREENING ORDER (NO ISOLATION) - Swab, Nasopharynx.  Procedure                               Abnormality         Status                     ---------                               -----------         ------                     COVID-19,CEPHEID/JESSE,CO...[[id number]]  Abnormal            Final result                 Please view results for these tests on the individual orders.    COVID-19,CEPHEID/JESSE,COR/KELSEY/PAD/CLARISA IN-HOUSE(OR EMERGENT/ADD-ON),NP SWAB IN TRANSPORT MEDIA 3-4 HR TAT, RT-PCR - Swab, Nasopharynx [[id number]]  (Abnormal) Collected: 06/14/22 1052    Specimen: Swab from Nasopharynx Updated: 06/14/22 1215     COVID19 Detected    Narrative:      Fact sheet for providers: https://www.fda.gov/media/570877/download     Fact sheet for patients: https://www.fda.gov/media/491062/download  Fact sheet for providers: https://www.fda.gov/media/248116/download     Fact sheet for patients: https://www.fda.gov/media/080547/download    CBC (No Diff) [[id number]]  (Normal) Collected: 06/14/22 1158    Specimen: Blood Updated: 06/14/22 1210     WBC 10.70 10*3/mm3      RBC 5.03 10*6/mm3      Hemoglobin 14.6 g/dL       Comment: Result checked        Hematocrit 42.9 %      Comment: Result checked        MCV 85.3 fL      MCH 29.1 pg      MCHC 34.1 g/dL      RDW 13.1 %      RDW-SD 39.4 fl      MPV 6.8 fL      Platelets 328 10*3/mm3         Imaging Results (Last 72 Hours)     ** No results found for the last 72 hours. **        ECG 12 Lead   Preliminary Result   HEART RATE= 81  bpm   RR Interval= 736  ms   NE Interval= 123  ms   P Horizontal Axis= 18  deg   P Front Axis= 74  deg   QRSD Interval= 92  ms   QT Interval= 374  ms   QRS Axis= 29  deg   T Wave Axis= 48  deg   - NORMAL ECG -   Sinus rhythm   Electronically Signed By:    Date and Time of Study: 2022-06-14 12:25:46      ECG 12 Lead    (Results Pending)     CBC    Results from last 7 days   Lab Units 06/14/22  1158 06/08/22  0121   WBC 10*3/mm3 10.70 8.80   HEMOGLOBIN g/dL 14.6 16.4   PLATELETS 10*3/mm3 328 338     BMP   Results from last 7 days   Lab Units 06/14/22  1158 06/08/22  0121   SODIUM mmol/L  --  133*   POTASSIUM mmol/L  --  3.7   CHLORIDE mmol/L  --  99   CO2 mmol/L  --  21.0*   BUN mg/dL  --  9   CREATININE mg/dL  --  0.90   GLUCOSE mg/dL  --  107*   MAGNESIUM mg/dL 1.8  --    PHOSPHORUS mg/dL 2.8  --      CMP   Results from last 7 days   Lab Units 06/08/22  0121   SODIUM mmol/L 133*   POTASSIUM mmol/L 3.7   CHLORIDE mmol/L 99   CO2 mmol/L 21.0*   BUN mg/dL 9   CREATININE mg/dL 0.90   GLUCOSE mg/dL 107*     Medication Review  Scheduled Meds:aspirin, 81 mg, Oral, Daily  atorvastatin, 40 mg, Oral, Daily  clopidogrel, 600 mg, Oral, Once  [START ON 6/15/2022] clopidogrel, 75 mg, Oral, Daily  metoprolol tartrate, 12.5 mg, Oral, Q12H      Continuous Infusions:DOPamine, 3 mcg/kg/min, Last Rate: 3 mcg/kg/min (06/14/22 1416)  eptifibatide, 2 mcg/kg/min, Last Rate: 2 mcg/kg/min (06/14/22 1416)  nitroglycerin, 5-200 mcg/min, Last Rate: 10 mcg/min (06/14/22 5613)  sodium chloride, 50 mL/hr, Last Rate: 50 mL/hr (06/14/22 1417)      PRN Meds:.atropine  •  diphenhydrAMINE  •   ondansetron **OR** ondansetron  •  sodium chloride    Assessment:      Acute inferior myocardial infarction (HCC)        Plan:    MDM:    1.  Acute inferior myocardial infarction    Patient underwent cardiac catheterization and was noted to have 80% stenosis of mid LAD and 80% stenosis of proximal RCA followed by 99% stenosis of mid RCA and 90 to 95% stenosis of PDA.  Patient underwent multiple stenting of RCA.  Desirable results were achieved.  Patient would need intervention of LAD in future which can be done as an outpatient.    2.  CAD:    Patient had multivessel disease.  Patient will need LAD stenting in future.    3.  Hypertension:    Blood pressure is stable.  At present lisinopril would be on hold.  Start Lopressor 12.5 mg twice daily    4.  Hyperlipidemia:    Start Lipitor.    I discussed the patients findings and my recommendations with patient    Matthieu Del Toro MD  06/14/22  17:14 EDT

## 2022-06-14 NOTE — H&P
History and physical    PATIENT IDENTIFICATION:  Name: Tramaine Gates  MRN: [id number]  :  1985     Age: 36 y.o.  Sex: male      DATE OF PROCEDURE:  2022                   CHIEF COMPLAINT: Chest pain    History of Present Illness:   Tramaine Gates is a 36 y.o. male who has history of heavy smoking, moderate daily obesity, most likely underlying struct sleep apnea, was presented to the emergency room with secondary complaint from sharp chest pain this time was started like few hours ago and is still in the middle of the chest constant dull 6 out of 10 was evaluated for possible ST elevation MI he did have a cardiac cath today required multiple stents  Post cardiac cath patient still having some discomfort in his chest feeling weak tired and anxious no nausea no vomiting no fever no chills      Review of Systems:   Constitutional:  As above   Eyes: negative   ENT/oropharynx: negative   Cardiovascular:  As above   Respiratory:  As above   Gastrointestinal: negative   Genitourinary: negative   Neurological: negative   Musculoskeletal: negative   Integument/breast: negative   Endocrine: negative   Allergic/Immunologic: negative     Past Medical History:  Past Medical History:   Diagnosis Date   • Acute inferior myocardial infarction (HCC) 2022   • Allergic    • Asthma 2022   • Gastroesophageal reflux disease 2022   • Hx of complete eye exam 2016   • Hyperlipidemia 2022   • Hypertension    • Migraine headache 2022   • Panic attack 2022   • Peptic ulcer 2017       Past Surgical History:  Past Surgical History:   Procedure Laterality Date   • CHOLECYSTECTOMY  2019   • MANDIBLE SURGERY          Family History:  Family History   Problem Relation Age of Onset   • Heart disease Mother    • Heart failure Father         Social History:   Social History     Tobacco Use   • Smoking status: Current Every Day Smoker     Packs/day: 1.00     Types: Cigarettes     Start date:    •  "Smokeless tobacco: Never Used   Substance Use Topics   • Alcohol use: Not Currently        Allergies:  Allergies   Allergen Reactions   • Codeine Itching   • Hydrocodone Itching   • Hydrocodone-Acetaminophen Other (See Comments)   • Shellfish-Derived Products Unknown - High Severity       Home Meds:  Medications Prior to Admission   Medication Sig Dispense Refill Last Dose   • naproxen (NAPROSYN) 375 MG tablet Take 1 tablet by mouth 2 (Two) Times a Day As Needed for Moderate Pain . 14 tablet 0        Objective:  tMax 24 hrs: Temp (24hrs), Av.8 °F (36 °C), Min:95.2 °F (35.1 °C), Max:98.2 °F (36.8 °C)      Vitals Ranges:   Temp:  [95.2 °F (35.1 °C)-98.2 °F (36.8 °C)] 95.2 °F (35.1 °C)  Heart Rate:  [78-94] 84  Resp:  [16-24] 24  BP: (116-145)/(62-97) 118/67  Arterial Line BP: (117-125)/(60-64) 125/64    Intake and Output Last 3 Shifts:   No intake/output data recorded.    Exam:  /67   Pulse 84   Temp 95.2 °F (35.1 °C)   Resp 24   Ht 177.8 cm (70\")   Wt 123 kg (270 lb 1 oz)   SpO2 94%   BMI 38.75 kg/m²     General Appearance: Alert awake and anxious  HEENT:  Normocephalic, without obvious abnormality, atraumatic.  Conjunctivae/corneas clear,.  Normal external ear canals. Nares normal, no drainage  Neck:  Supple, symmetrical, trachea midline. No JVD.  Lungs /Chest wall:   Bilateral basal rhonchi, respirations unlabored, symmetrical wall movement.     Heart:  Regular rate and rhythm, systolic murmur PMI left sternal border  Abdomen: Soft, nontender, no masses, no organomegaly.    Extremities: Trace edema, no clubbing or cyanosis        Data Review:  All labs (24hrs):   Recent Results (from the past 24 hour(s))   COVID-19,CEPHEID/JESSE,COR/KELSEY/PAD/[facility]ISA IN-HOUSE(OR EMERGENT/ADD-ON),NP SWAB IN TRANSPORT MEDIA 3-4 HR TAT, RT-PCR - Swab, Nasopharynx    Collection Time: 22 10:52 AM    Specimen: Nasopharynx; Swab   Result Value Ref Range    COVID19 Detected (C) Not Detected - Ref. Range   CBC Auto " Differential    Collection Time: 06/14/22 11:21 AM    Specimen: Blood   Result Value Ref Range    WBC      RBC      Hemoglobin      Hematocrit      MCV      MCH      MCHC      RDW      RDW-SD      MPV      Platelets      Neutrophil %      Lymphocyte %      Monocyte %      Eosinophil %      Basophil %      Neutrophils, Absolute      Lymphocytes, Absolute      Monocytes, Absolute      Eosinophils, Absolute      Basophils, Absolute     Protime-INR    Collection Time: 06/14/22 11:58 AM    Specimen: Blood   Result Value Ref Range    Protime 11.1 9.6 - 11.7 Seconds    INR 1.08 0.93 - 1.10   aPTT    Collection Time: 06/14/22 11:58 AM    Specimen: Blood   Result Value Ref Range    .0 (C) 61.0 - 76.5 seconds   CBC (No Diff)    Collection Time: 06/14/22 11:58 AM    Specimen: Blood   Result Value Ref Range    WBC 10.70 3.40 - 10.80 10*3/mm3    RBC 5.03 4.14 - 5.80 10*6/mm3    Hemoglobin 14.6 13.0 - 17.7 g/dL    Hematocrit 42.9 37.5 - 51.0 %    MCV 85.3 79.0 - 97.0 fL    MCH 29.1 26.6 - 33.0 pg    MCHC 34.1 31.5 - 35.7 g/dL    RDW 13.1 12.3 - 15.4 %    RDW-SD 39.4 37.0 - 54.0 fl    MPV 6.8 6.0 - 12.0 fL    Platelets 328 140 - 450 10*3/mm3   Magnesium    Collection Time: 06/14/22 11:58 AM    Specimen: Blood   Result Value Ref Range    Magnesium 1.8 1.6 - 2.6 mg/dL   Phosphorus    Collection Time: 06/14/22 11:58 AM    Specimen: Blood   Result Value Ref Range    Phosphorus 2.8 2.5 - 4.5 mg/dL   ECG 12 Lead    Collection Time: 06/14/22 12:25 PM   Result Value Ref Range    QT Interval 374 ms   POC Activated Clotting Time    Collection Time: 06/14/22  2:47 PM    Specimen: Arterial Blood   Result Value Ref Range    Activated Clotting Time  150 (H) 89 - 137 Seconds        Imaging:  Cardiac Catheterization/Vascular Study  Cardiac catheterization/PCI report    DATE OF PROCEDURE: 6/14/2022    INDICATION FOR PROCEDURE:    Acute inferior myocardial infarction  Hypertension  Hyperlipidemia    PROCEDURE PERFORMED:    Left heart  catheterization  coronary angiography  left ventriculography  Balloon angioplasty and stenting of RCA coronary artery    FINDINGS:    1. HEMODYNAMICS:      Aortic pressure: 90/60 mmHg    LVEDP: 5 to 10 mmHg    Gradient across aortic valve on pullback: No gradient across aortic valve    2. LEFT VENTRICULOGRAPHY: 65%    3. CORONARY ANGIOGRAPHY:           A: Left main coronary artery: Normal           B: Left anterior descending artery: 80% to 90% proximal LAD   stenosis           C: Left circumflex coronary artery: 40 to 50% distal LCx/OM plaque.    20 to 25% plaque in the proximal and mid segment of LCx           D: Right coronary artery: 70 to 80% stenosis in the proximal   segment of LCx, 99% stenosis in mid segment of RCA and 90 to 95% stenosis   in the proximal PDA extending into the midsegment.  DARRYL I flow was   present prior to the intervention and DARRYL-3 flow was present after the   intervention.  RCA was dominant vessel    PROCEDURE COMMENTS:     After informed consent was obtained, the patient was prepped and draped in   the usual sterile manner.  Mild to moderate sedation was administered.    Right femoral artery was accessed without difficulty and 6 Faroese arterial   sheath was inserted.  Sheath was flushed with heparinized saline.    Using 6 Faroese Cristal catheters, first left coronary artery and the right   coronary was electively engaged and appropriate views were taken.  A 6   Faroese JR4 catheter was used to cross aortic valve and left heart   catheterization was performed.  Left ventriculography was done in a   review.    After diagnostic catheterization, we proceeded to the intervention of the   RCA coronary artery.    We advanced a 6 Faroese JR4 with sideholes interventional guide and   selective engagement of the right coronary artery was achieved.  We   advanced interventional guidewire 0.014 BMW and crossed the lesion and   placed in the distal part of the PDA branch of RCA coronary  artery.    We advanced 2.0x12 compliant balloon and the pre-dilatation at nominal   pressure.  After the predilatation, this balloon was removed and we   advanced 2.25x12 drug-eluting stent and advanced and placed over the   lesion in the mid segment of RCA and deployed at nominal pressure.  We   advanced 2.25x18 stent and covering the first and deployed in the proximal   segment.  We took 2.5x23 stent covering the second stent all the way up to   the ostium and deployed at nominal pressure.  Stenosis decreased from 99%   to less than 10%.  No dissection, perforation or no reflow phenomena was   noted.    After intervention of mid and proximal segment of RCA we saw there was a   high-grade stenosis of PDA.  We did predilatation with 2.0x20 NC balloon   and followed by 2.0x22 Nicholson stent and deployed in the mid and proximal   segment of the PDA.  We had a second stent 2.0x12 Nicholson stent and deployed   covering the first stent in the proximal segment of PDA and distal RCA.    Stenosis decreased from 90 to 95% to less than 10%.  DARRYL-3 flow was   present.    Patient was given aspirin, Plavix and heparin during the procedure.  ACT   at the end of procedure was 243    Patient tolerated the procedure well.    SUMMARY:     1.  Three-vessel coronary artery disease  2.  High-grade stenosis of LAD and RCA  3.  Successful stenting of RCA with multiple drug-eluting stent which was   because of acute inferior myocardial infarction    RECOMMENDATIONS:     DAPT with aspirin and Plavix.  Patient would need LAD stenting in future.       ASSESSMENT:    Acute inferior myocardial infarction (HCC)  Coronary artery disease  COVID-19 positive  Chronic obstructive airway disease  Obstructive sleep apnea  Morbid obesity  Hypertension  Gastroesophageal flux disorder  Hyperlipidemia    PLAN:  Continue antiplatelets  Beta-blocker  Record his positive COVID-19 we are going to monitor for now no signs of pulmonary  involvement  Bronchodilator  Inhaled corticosteroids  Steroids  Electrolytes/ glycemic control  DVT and GI prophylaxis.    Total Critical care time in direct medical management (  33 ) minutes. This time specifically excludes time spent performing procedures.    Deanna Braun MD   6/14/2022  17:17 EDT

## 2022-06-15 ENCOUNTER — APPOINTMENT (OUTPATIENT)
Dept: GENERAL RADIOLOGY | Facility: HOSPITAL | Age: 37
End: 2022-06-15

## 2022-06-15 ENCOUNTER — APPOINTMENT (OUTPATIENT)
Dept: CARDIOLOGY | Facility: HOSPITAL | Age: 37
End: 2022-06-15

## 2022-06-15 PROBLEM — I25.10 CAD, MULTIPLE VESSEL: Status: ACTIVE | Noted: 2022-06-14

## 2022-06-15 LAB
ALBUMIN SERPL-MCNC: 3.9 G/DL (ref 3.5–5.2)
ALBUMIN SERPL-MCNC: 4 G/DL (ref 3.5–5.2)
ALBUMIN/GLOB SERPL: 1.5 G/DL
ALBUMIN/GLOB SERPL: 1.6 G/DL
ALP SERPL-CCNC: 94 U/L (ref 39–117)
ALP SERPL-CCNC: 97 U/L (ref 39–117)
ALT SERPL W P-5'-P-CCNC: 35 U/L (ref 1–41)
ALT SERPL W P-5'-P-CCNC: 37 U/L (ref 1–41)
ANION GAP SERPL CALCULATED.3IONS-SCNC: 11 MMOL/L (ref 5–15)
ANION GAP SERPL CALCULATED.3IONS-SCNC: 15 MMOL/L (ref 5–15)
APTT PPP: 28.8 SECONDS (ref 24–31)
AST SERPL-CCNC: 25 U/L (ref 1–40)
AST SERPL-CCNC: 27 U/L (ref 1–40)
BASOPHILS # BLD AUTO: 0.1 10*3/MM3 (ref 0–0.2)
BASOPHILS NFR BLD AUTO: 0.8 % (ref 0–1.5)
BILIRUB SERPL-MCNC: 0.3 MG/DL (ref 0–1.2)
BILIRUB SERPL-MCNC: 0.4 MG/DL (ref 0–1.2)
BUN SERPL-MCNC: 10 MG/DL (ref 6–20)
BUN SERPL-MCNC: 11 MG/DL (ref 6–20)
BUN/CREAT SERPL: 10.2 (ref 7–25)
BUN/CREAT SERPL: 10.2 (ref 7–25)
CALCIUM SPEC-SCNC: 8.5 MG/DL (ref 8.6–10.5)
CALCIUM SPEC-SCNC: 8.9 MG/DL (ref 8.6–10.5)
CHLORIDE SERPL-SCNC: 101 MMOL/L (ref 98–107)
CHLORIDE SERPL-SCNC: 101 MMOL/L (ref 98–107)
CHOLEST SERPL-MCNC: 283 MG/DL (ref 0–200)
CO2 SERPL-SCNC: 19 MMOL/L (ref 22–29)
CO2 SERPL-SCNC: 21 MMOL/L (ref 22–29)
CREAT SERPL-MCNC: 0.98 MG/DL (ref 0.76–1.27)
CREAT SERPL-MCNC: 1.08 MG/DL (ref 0.76–1.27)
DEPRECATED RDW RBC AUTO: 39.8 FL (ref 37–54)
EGFRCR SERPLBLD CKD-EPI 2021: 101.2 ML/MIN/1.73
EGFRCR SERPLBLD CKD-EPI 2021: 90.1 ML/MIN/1.73
EOSINOPHIL # BLD AUTO: 0 10*3/MM3 (ref 0–0.4)
EOSINOPHIL NFR BLD AUTO: 0 % (ref 0.3–6.2)
ERYTHROCYTE [DISTWIDTH] IN BLOOD BY AUTOMATED COUNT: 13.3 % (ref 12.3–15.4)
GLOBULIN UR ELPH-MCNC: 2.4 GM/DL
GLOBULIN UR ELPH-MCNC: 2.7 GM/DL
GLUCOSE SERPL-MCNC: 119 MG/DL (ref 65–99)
GLUCOSE SERPL-MCNC: 126 MG/DL (ref 65–99)
HCT VFR BLD AUTO: 40.7 % (ref 37.5–51)
HDLC SERPL-MCNC: 40 MG/DL (ref 40–60)
HGB BLD-MCNC: 13.6 G/DL (ref 13–17.7)
INR PPP: 1 (ref 0.93–1.1)
LDLC SERPL CALC-MCNC: 219 MG/DL (ref 0–100)
LDLC/HDLC SERPL: 5.43 {RATIO}
LV EF 2D ECHO EST: 60 %
LYMPHOCYTES # BLD AUTO: 1 10*3/MM3 (ref 0.7–3.1)
LYMPHOCYTES NFR BLD AUTO: 8.8 % (ref 19.6–45.3)
MAGNESIUM SERPL-MCNC: 2 MG/DL (ref 1.6–2.6)
MAGNESIUM SERPL-MCNC: 2 MG/DL (ref 1.6–2.6)
MAXIMAL PREDICTED HEART RATE: 182 BPM
MCH RBC QN AUTO: 28.5 PG (ref 26.6–33)
MCHC RBC AUTO-ENTMCNC: 33.3 G/DL (ref 31.5–35.7)
MCV RBC AUTO: 85.6 FL (ref 79–97)
MONOCYTES # BLD AUTO: 0.4 10*3/MM3 (ref 0.1–0.9)
MONOCYTES NFR BLD AUTO: 3.5 % (ref 5–12)
NEUTROPHILS NFR BLD AUTO: 10.1 10*3/MM3 (ref 1.7–7)
NEUTROPHILS NFR BLD AUTO: 86.9 % (ref 42.7–76)
NRBC BLD AUTO-RTO: 0 /100 WBC (ref 0–0.2)
PHOSPHATE SERPL-MCNC: 1.9 MG/DL (ref 2.5–4.5)
PHOSPHATE SERPL-MCNC: 2.4 MG/DL (ref 2.5–4.5)
PHOSPHATE SERPL-MCNC: 3 MG/DL (ref 2.5–4.5)
PLATELET # BLD AUTO: 336 10*3/MM3 (ref 140–450)
PMV BLD AUTO: 6.5 FL (ref 6–12)
POTASSIUM SERPL-SCNC: 3.9 MMOL/L (ref 3.5–5.2)
POTASSIUM SERPL-SCNC: 4.3 MMOL/L (ref 3.5–5.2)
POTASSIUM SERPL-SCNC: 4.7 MMOL/L (ref 3.5–5.2)
PROT SERPL-MCNC: 6.3 G/DL (ref 6–8.5)
PROT SERPL-MCNC: 6.7 G/DL (ref 6–8.5)
PROTHROMBIN TIME: 10.3 SECONDS (ref 9.6–11.7)
RBC # BLD AUTO: 4.76 10*6/MM3 (ref 4.14–5.8)
SARS-COV-2 AG RESP QL IA.RAPID: NORMAL
SODIUM SERPL-SCNC: 133 MMOL/L (ref 136–145)
SODIUM SERPL-SCNC: 135 MMOL/L (ref 136–145)
STRESS TARGET HR: 155 BPM
TRIGL SERPL-MCNC: 130 MG/DL (ref 0–150)
TROPONIN T SERPL-MCNC: 0.08 NG/ML (ref 0–0.03)
VLDLC SERPL-MCNC: 24 MG/DL (ref 5–40)
WBC NRBC COR # BLD: 11.6 10*3/MM3 (ref 3.4–10.8)

## 2022-06-15 PROCEDURE — 93325 DOPPLER ECHO COLOR FLOW MAPG: CPT | Performed by: INTERNAL MEDICINE

## 2022-06-15 PROCEDURE — 99223 1ST HOSP IP/OBS HIGH 75: CPT | Performed by: INTERNAL MEDICINE

## 2022-06-15 PROCEDURE — 80053 COMPREHEN METABOLIC PANEL: CPT | Performed by: INTERNAL MEDICINE

## 2022-06-15 PROCEDURE — 93308 TTE F-UP OR LMTD: CPT

## 2022-06-15 PROCEDURE — 83735 ASSAY OF MAGNESIUM: CPT | Performed by: NURSE PRACTITIONER

## 2022-06-15 PROCEDURE — 80061 LIPID PANEL: CPT | Performed by: INTERNAL MEDICINE

## 2022-06-15 PROCEDURE — 84100 ASSAY OF PHOSPHORUS: CPT | Performed by: INTERNAL MEDICINE

## 2022-06-15 PROCEDURE — 99232 SBSQ HOSP IP/OBS MODERATE 35: CPT | Performed by: INTERNAL MEDICINE

## 2022-06-15 PROCEDURE — 85730 THROMBOPLASTIN TIME PARTIAL: CPT

## 2022-06-15 PROCEDURE — 93010 ELECTROCARDIOGRAM REPORT: CPT | Performed by: INTERNAL MEDICINE

## 2022-06-15 PROCEDURE — 84132 ASSAY OF SERUM POTASSIUM: CPT | Performed by: INTERNAL MEDICINE

## 2022-06-15 PROCEDURE — 25010000002 ONDANSETRON PER 1 MG: Performed by: NURSE PRACTITIONER

## 2022-06-15 PROCEDURE — 93325 DOPPLER ECHO COLOR FLOW MAPG: CPT

## 2022-06-15 PROCEDURE — 93005 ELECTROCARDIOGRAM TRACING: CPT | Performed by: INTERNAL MEDICINE

## 2022-06-15 PROCEDURE — 71045 X-RAY EXAM CHEST 1 VIEW: CPT

## 2022-06-15 PROCEDURE — 93308 TTE F-UP OR LMTD: CPT | Performed by: INTERNAL MEDICINE

## 2022-06-15 PROCEDURE — 85025 COMPLETE CBC W/AUTO DIFF WBC: CPT | Performed by: INTERNAL MEDICINE

## 2022-06-15 PROCEDURE — 87426 SARSCOV CORONAVIRUS AG IA: CPT | Performed by: NURSE PRACTITIONER

## 2022-06-15 PROCEDURE — 84484 ASSAY OF TROPONIN QUANT: CPT | Performed by: INTERNAL MEDICINE

## 2022-06-15 PROCEDURE — 25010000002 EPTIFIBATIDE PER 5 MG: Performed by: INTERNAL MEDICINE

## 2022-06-15 PROCEDURE — 25010000002 MORPHINE PER 10 MG: Performed by: INTERNAL MEDICINE

## 2022-06-15 PROCEDURE — 63710000001 PREDNISONE PER 5 MG: Performed by: NURSE PRACTITIONER

## 2022-06-15 PROCEDURE — 84100 ASSAY OF PHOSPHORUS: CPT | Performed by: NURSE PRACTITIONER

## 2022-06-15 PROCEDURE — 85610 PROTHROMBIN TIME: CPT

## 2022-06-15 RX ORDER — DIPHENHYDRAMINE HCL 25 MG
50 CAPSULE ORAL ONCE
Status: COMPLETED | OUTPATIENT
Start: 2022-06-16 | End: 2022-06-16

## 2022-06-15 RX ORDER — PREDNISONE 50 MG/1
50 TABLET ORAL EVERY 6 HOURS
Status: COMPLETED | OUTPATIENT
Start: 2022-06-15 | End: 2022-06-16

## 2022-06-15 RX ORDER — LEVOTHYROXINE SODIUM 0.05 MG/1
50 TABLET ORAL DAILY
Status: ON HOLD | COMMUNITY
End: 2023-03-01

## 2022-06-15 RX ORDER — LEVOTHYROXINE SODIUM 0.05 MG/1
50 TABLET ORAL
Status: DISCONTINUED | OUTPATIENT
Start: 2022-06-16 | End: 2022-06-17 | Stop reason: HOSPADM

## 2022-06-15 RX ORDER — ALBUTEROL SULFATE 90 UG/1
2 AEROSOL, METERED RESPIRATORY (INHALATION) EVERY 4 HOURS PRN
COMMUNITY

## 2022-06-15 RX ORDER — ATORVASTATIN CALCIUM 40 MG/1
80 TABLET, FILM COATED ORAL NIGHTLY
Status: DISCONTINUED | OUTPATIENT
Start: 2022-06-15 | End: 2022-06-17 | Stop reason: HOSPADM

## 2022-06-15 RX ORDER — AMLODIPINE BESYLATE 5 MG/1
5 TABLET ORAL DAILY
COMMUNITY
End: 2022-07-04 | Stop reason: HOSPADM

## 2022-06-15 RX ORDER — ATORVASTATIN CALCIUM 40 MG/1
40 TABLET, FILM COATED ORAL NIGHTLY
COMMUNITY
End: 2022-10-14 | Stop reason: SDUPTHER

## 2022-06-15 RX ORDER — NICOTINE 21 MG/24HR
1 PATCH, TRANSDERMAL 24 HOURS TRANSDERMAL EVERY 24 HOURS
Status: DISCONTINUED | OUTPATIENT
Start: 2022-06-15 | End: 2022-06-17 | Stop reason: HOSPADM

## 2022-06-15 RX ORDER — LISINOPRIL 20 MG/1
20 TABLET ORAL DAILY
COMMUNITY
End: 2022-07-04 | Stop reason: HOSPADM

## 2022-06-15 RX ADMIN — ASPIRIN 81 MG: 81 TABLET, COATED ORAL at 08:45

## 2022-06-15 RX ADMIN — METOPROLOL TARTRATE 25 MG: 25 TABLET, FILM COATED ORAL at 08:45

## 2022-06-15 RX ADMIN — ONDANSETRON 4 MG: 2 INJECTION INTRAMUSCULAR; INTRAVENOUS at 00:16

## 2022-06-15 RX ADMIN — CLOPIDOGREL BISULFATE 75 MG: 75 TABLET ORAL at 08:45

## 2022-06-15 RX ADMIN — SODIUM PHOSPHATE, MONOBASIC, MONOHYDRATE 30 MMOL: 276; 142 INJECTION, SOLUTION INTRAVENOUS at 00:38

## 2022-06-15 RX ADMIN — METOPROLOL TARTRATE 25 MG: 25 TABLET, FILM COATED ORAL at 20:22

## 2022-06-15 RX ADMIN — ATORVASTATIN CALCIUM 80 MG: 40 TABLET, FILM COATED ORAL at 20:22

## 2022-06-15 RX ADMIN — Medication 10 ML: at 20:23

## 2022-06-15 RX ADMIN — EPTIFIBATIDE 2 MCG/KG/MIN: 0.75 INJECTION INTRAVENOUS at 04:37

## 2022-06-15 RX ADMIN — NICOTINE 1 PATCH: 14 PATCH, EXTENDED RELEASE TRANSDERMAL at 04:39

## 2022-06-15 RX ADMIN — ACETAMINOPHEN 650 MG: 325 TABLET ORAL at 03:29

## 2022-06-15 RX ADMIN — PREDNISONE 50 MG: 50 TABLET ORAL at 20:22

## 2022-06-15 RX ADMIN — Medication 10 ML: at 08:46

## 2022-06-15 RX ADMIN — SODIUM CHLORIDE 50 ML/HR: 9 INJECTION, SOLUTION INTRAVENOUS at 00:03

## 2022-06-15 NOTE — PROGRESS NOTES
CARDIOLOGY FOLLOW-UP PROGRESS NOTE      Reason for follow-up:    STEMI  CAD  S/p PC to RCA      Attending: Deanna Braun MD      Subjective .     C/o aching and sore all over chest but improved from yesterday     Review of Systems   Unable to perform ROS: acuity of condition       Allergies: Codeine, Hydrocodone, Hydrocodone-acetaminophen, and Shellfish-derived products    Scheduled Meds:arformoterol, 15 mcg, Nebulization, BID - RT  aspirin, 81 mg, Oral, Daily  atorvastatin, 80 mg, Oral, Nightly  budesonide, 1 mg, Nebulization, BID - RT  clopidogrel, 75 mg, Oral, Daily  [START ON 6/16/2022] diphenhydrAMINE, 50 mg, Oral, Once  [START ON 6/16/2022] levothyroxine, 50 mcg, Oral, Q AM  metoprolol tartrate, 25 mg, Oral, Q12H  nicotine, 1 patch, Transdermal, Q24H  predniSONE, 50 mg, Oral, Q6H  sodium chloride, 1,000 mL, Intravenous, Once  sodium chloride, 10 mL, Intravenous, Q12H        Continuous Infusions:DOPamine, 3 mcg/kg/min, Last Rate: Stopped (06/14/22 2130)  nitroglycerin, 5-200 mcg/min, Last Rate: Stopped (06/15/22 0914)        PRN Meds:.•  acetaminophen **OR** acetaminophen  •  aluminum-magnesium hydroxide-simethicone  •  atropine  •  diphenhydrAMINE  •  nitroglycerin  •  ondansetron **OR** ondansetron  •  sodium chloride  •  sodium chloride    Objective     VITAL SIGNS  Patient Vitals for the past 24 hrs:   BP Temp Temp src Pulse Resp SpO2   06/15/22 1700 -- 98.7 °F (37.1 °C) Axillary -- 15 --   06/15/22 1600 98/54 -- -- 65 -- 93 %   06/15/22 1400 138/96 -- -- 83 -- 94 %   06/15/22 1300 112/75 -- -- 65 -- 96 %   06/15/22 1200 94/54 -- -- 70 -- 94 %   06/15/22 1100 118/63 98 °F (36.7 °C) Axillary 68 13 95 %   06/15/22 1000 122/75 -- -- 82 -- 94 %   06/15/22 0900 124/71 -- -- 71 -- 96 %   06/15/22 0800 133/81 -- -- 78 -- 95 %   06/15/22 0700 103/67 94.1 °F (34.5 °C) Axillary 76 18 90 %   06/15/22 0030 118/81 -- -- 82 -- 92 %   06/15/22 0000 123/85 96.6 °F (35.9 °C) Axillary 89 20 92 %   06/14/22 2330 124/79 --  "-- 81 -- 94 %   06/14/22 2300 120/78 -- -- 73 -- 92 %   06/14/22 2230 122/80 -- -- 76 -- 94 %   06/14/22 2200 121/65 -- -- 86 -- 91 %   06/14/22 2130 126/81 -- -- 97 -- 92 %   06/14/22 2100 107/63 -- -- 82 -- (!) 88 %   06/14/22 2030 125/64 -- -- 89 -- 90 %   06/14/22 2000 108/59 -- -- 82 -- (!) 88 %   06/14/22 1930 102/52 95.7 °F (35.4 °C) Axillary 92 18 93 %   06/14/22 1900 104/54 -- -- 82 -- --        Flowsheet Rows    Flowsheet Row First Filed Value   Admission Height 177.8 cm (70\") Documented at 06/14/2022 1233   Admission Weight 123 kg (270 lb 1 oz) Documented at 06/14/2022 1233          Body mass index is 38.75 kg/m².      Intake/Output Summary (Last 24 hours) at 6/15/2022 1800  Last data filed at 6/15/2022 1600  Gross per 24 hour   Intake 3703.6 ml   Output 5250 ml   Net -1546.4 ml        TELEMETRY:     SR    Physical Exam:  Physical Exam    Physical exam is deferred to primary team because of COVID isolation      Results Review:   I reviewed the patient's new clinical results.    CBC    Results from last 7 days   Lab Units 06/15/22  0340 06/14/22 1845 06/14/22  1158   WBC 10*3/mm3 11.60* 12.30* 10.70   HEMOGLOBIN g/dL 13.6 15.2 14.6   PLATELETS 10*3/mm3 336 358 328     BMP   Results from last 7 days   Lab Units 06/15/22  0340 06/14/22  2310 06/14/22 1845 06/14/22  1158   SODIUM mmol/L 133* 135* 132*  --    POTASSIUM mmol/L 4.3 4.7 4.6  --    CHLORIDE mmol/L 101 101 100  --    CO2 mmol/L 21.0* 19.0* 18.0*  --    BUN mg/dL 10 11 14  --    CREATININE mg/dL 0.98 1.08 1.19  --    GLUCOSE mg/dL 119* 126* 173*  --    MAGNESIUM mg/dL 2.0 2.0  --  1.8   PHOSPHORUS mg/dL 3.0 1.9*  --  2.8     Cr Clearance Estimated Creatinine Clearance: 134.4 mL/min (by C-G formula based on SCr of 0.98 mg/dL).  Coag   Results from last 7 days   Lab Units 06/15/22  0340 06/14/22  1158   INR  1.00 1.08   APTT seconds 28.8 138.0*     HbA1C No results found for: HGBA1C  Blood Glucose No results found for: POCGLU  Infection     CMP "   Results from last 7 days   Lab Units 06/15/22  0340 06/14/22  2310 06/14/22  1845   SODIUM mmol/L 133* 135* 132*   POTASSIUM mmol/L 4.3 4.7 4.6   CHLORIDE mmol/L 101 101 100   CO2 mmol/L 21.0* 19.0* 18.0*   BUN mg/dL 10 11 14   CREATININE mg/dL 0.98 1.08 1.19   GLUCOSE mg/dL 119* 126* 173*   ALBUMIN g/dL 3.90 4.00 4.00   BILIRUBIN mg/dL 0.4 0.3 0.3   ALK PHOS U/L 94 97 101   AST (SGOT) U/L 27 25 24   ALT (SGPT) U/L 35 37 38     ABG      UA      FABIOLA  No results found for: POCMETH, POCAMPHET, POCBARBITUR, POCBENZO, POCCOCAINE, POCOPIATES, POCOXYCODO, POCPHENCYC, POCPROPOXY, POCTHC, POCTRICYC  Lysis Labs   Results from last 7 days   Lab Units 06/15/22  0340 06/14/22  2310 06/14/22  1845 06/14/22  1158   INR  1.00  --   --  1.08   APTT seconds 28.8  --   --  138.0*   HEMOGLOBIN g/dL 13.6  --  15.2 14.6   PLATELETS 10*3/mm3 336  --  358 328   CREATININE mg/dL 0.98 1.08 1.19  --      Radiology(recent) XR Chest 1 View    Result Date: 6/15/2022  No acute disease in the chest and no significant change since previous study performed on June 8, 2022 Electronically Signed: Nael Goodman MD 6/15/2022 8:56 EDT      Imaging Results (Last 24 Hours)     Procedure Component Value Units Date/Time    XR Chest 1 View [[id number]] Collected: 06/15/22 0854     Updated: 06/15/22 0858    Narrative:      Examination: XR CHEST 1 VW    Date of Exam: 6/15/2022 1:35 EDT    Indication: Pain with respirations..  Chest pain today     Comparison: Single frontal view chest performed on June 8, 2022    Technique: Single frontal view chest    Findings:  Today's study reveals heart and mediastinum are normal. No evidence of infiltrate or effusion or pneumothorax or mass the right or left lungs. There is mild dextroscoliosis in the thoracic spine      Impression:      No acute disease in the chest and no significant change since previous study performed on June 8, 2022    Electronically Signed: Nael Goodman MD 6/15/2022 8:56 EDT           Results from last 7 days   Lab Units 06/15/22  0340   TROPONIN T ng/mL 0.081*       EKG              I personally viewed and interpreted the patient's EKG/Telemetry data:        ECHOCARDIOGRAM:     Results for orders placed during the hospital encounter of 06/14/22    Adult Transthoracic Echo Limited W/ Cont if Necessary Per Protocol    Interpretation Summary  · Estimated left ventricular EF = 60% Left ventricular systolic function is normal.    Indications  Recent ACS    Technically difficult study.  (COVID protocol)  Mitral valve is structurally normal.  Tricuspid valve is structurally normal.  Aortic valve is structurally normal.  Pulmonic valve could not be well visualized.  No evidence for mitral tricuspid or aortic regurgitation is seen by Doppler study.  Left atrium is normal in size.  Right atrium is normal in size.  Left ventricle is normal in size and contractility with ejection fraction of 60%.  Right ventricle is normal in size.  No pericardial effusion or intracardiac thrombus is seen.    Impression  Technically difficult and limited study (COVID protocol.  In the views obtained,  Structurally and functionally normal cardiac valves.  Left ventricular size and contractility is normal with ejection fraction of 60%.  No pericardial effusion or intracardiac thrombus is present.        STRESS MYOVIEW:      CARDIAC CATHETERIZATION:    Cardiac catheterization/PCI report     DATE OF PROCEDURE: 6/14/2022     INDICATION FOR PROCEDURE:     Acute inferior myocardial infarction  Hypertension  Hyperlipidemia     PROCEDURE PERFORMED:     Left heart catheterization  coronary angiography  left ventriculography  Balloon angioplasty and stenting of RCA coronary artery     FINDINGS:     1. HEMODYNAMICS:       Aortic pressure: 90/60 mmHg     LVEDP: 5 to 10 mmHg     Gradient across aortic valve on pullback: No gradient across aortic valve        2. LEFT VENTRICULOGRAPHY: 65%        3. CORONARY ANGIOGRAPHY:             A: Left main coronary artery: Normal            B: Left anterior descending artery: 80% to 90% proximal LAD stenosis            C: Left circumflex coronary artery: 40 to 50% distal LCx/OM plaque.  20 to 25% plaque in the proximal and mid segment of LCx            D: Right coronary artery: 70 to 80% stenosis in the proximal segment of LCx, 99% stenosis in mid segment of RCA and 90 to 95% stenosis in the proximal PDA extending into the midsegment.  DARRYL I flow was present prior to the intervention and DARRYL-3 flow was present after the intervention.  RCA was dominant vessel            OTHER:         Assessment & Plan            CAD, multiple vessel    Acute inferior myocardial infarction (HCC)        ASSESSMENT:    STEMI  CAD: s/p PCI to RCA; Residual disease LAD 80-90%, LCx 40-50% mid, 20-25% prox  EF 65%  HTN  Dyslipidemia  Covid-19 Postive  Suspected BUZZ  Tobacco abuse    PLAN:    Continue dual antiplatelet therapy  Continue beta-blocker, statin, echocardiogram is pending  Does not appear to have any pulmonary involvement from his COVID-19 positive status.    Continue current medications and supportive care.    Patient can transfer to PCU    Timing of PCI to LAD to be determined    Additional recommendations per Dr. Del Toro    Patient is seen and examined and findings are verified.  All data is reviewed by me personally.  Assessment and plan formulated by APC was done after discussion with attending.  I spent more than 50% of time in taking care of the patient.    Patient was complaining of chest pain he was on IV nitro.    Hemodynamics are stable    Patient has minimal elevation of troponins after the RCA intervention.  Patient complain of chest pain.  He has significant lesion of the LAD.  Patient is still in isolation because of COVID-19 infection.  At this stage, I would recommend that patient should continue supportive therapy I would increase Lopressor.  Patient may need intervention of LAD prior to discharge.   However because of his COVID 19 isolation that will be complicated.  We will reevaluate the patient in the morning.        I discussed the patients findings and my recommendations with patient and RN    Electronically signed by Matthieu Del Toro MD, 06/15/22, 6:00 PM EDT.          Matthieu Del Toro MD  06/15/22  18:00 EDT

## 2022-06-15 NOTE — NURSING NOTE
Patient complaining of 8/10 crushing chest pain at 2253. YOUSIF Parsons made aware. Stat troponin and EKG ordered. EKG revealing ST elevation. Cardiologist on call paged. MD Carvajal made aware of patient condition and increasing chest pain since heart cath. MD compared EKGs. IV morphine ordered and given per MAR. Nitro gtt increased, patient beginning to have slight relief of chest pain

## 2022-06-15 NOTE — CONSULTS
[facility]      Patient Name: Tramaine Gates  : 1985  MRN: [id number]  Primary Care Physician:  Daniela Hernandez FNP  Date of admission: 2022      Subjective      Chief Complaint: Chest pain    History of Present Illness: Tramaine Gates is a 36 y.o. male HTN, HLD, tobacco abuse who presented to [facility] on 2022 complaining of chest pain, found to have a STEMI, underwent cardiac cath with stent placements.  He incidentally was found to be COVID-positive.      Currently he reports nausea, generalized fatigue.  No shortness of breath or cough.  No fever or chills.  Plan is for another cardiac cath for stent to LAD tomorrow.      ROS full review of systems was performed, pertinent as noted above, other systems negative    Personal History     Past Medical History:   Diagnosis Date   • Acute inferior myocardial infarction (HCC) 2022   • Allergic    • Asthma 2022   • CAD, multiple vessel 2022   • Gastroesophageal reflux disease 2022   • Hx of complete eye exam    • Hyperlipidemia 2022   • Hypertension    • Migraine headache 2022   • Panic attack 2022   • Peptic ulcer 2017       Past Surgical History:   Procedure Laterality Date   • CARDIAC CATHETERIZATION N/A 2022    Procedure: Left Heart Cath;  Surgeon: Matthieu Del Toro MD;  Location: [facility] INVASIVE LOCATION;  Service: Cardiology;  Laterality: N/A;   • CHOLECYSTECTOMY     • MANDIBLE SURGERY         Family History: family history includes Heart disease in his mother; Heart failure in his father. Otherwise pertinent FHx was reviewed and not pertinent to current issue.    Social History:  reports that he has been smoking cigarettes. He started smoking about 23 years ago. He has been smoking about 1.00 pack per day. He has never used smokeless tobacco. He reports previous alcohol use. He reports current drug use. Frequency: 7.00 times per  week. Drug: Marijuana.    Home Medications:  Prior to Admission Medications     Prescriptions Last Dose Informant Patient Reported? Taking?    albuterol sulfate  (90 Base) MCG/ACT inhaler   Yes No    Inhale 2 puffs Every 4 (Four) Hours As Needed for Wheezing.    amLODIPine (NORVASC) 5 MG tablet  Pharmacy Yes No    Take 5 mg by mouth Daily.    atorvastatin (LIPITOR) 40 MG tablet  Pharmacy Yes No    Take 40 mg by mouth Every Night.    levothyroxine (SYNTHROID, LEVOTHROID) 50 MCG tablet   Yes No    Take 50 mcg by mouth Daily.    lisinopril (PRINIVIL,ZESTRIL) 20 MG tablet  Pharmacy Yes No    Take 20 mg by mouth Daily.    naproxen (NAPROSYN) 375 MG tablet   No Yes    Take 1 tablet by mouth 2 (Two) Times a Day As Needed for Moderate Pain .            Allergies:  Allergies   Allergen Reactions   • Codeine Itching   • Hydrocodone Itching   • Hydrocodone-Acetaminophen Other (See Comments)   • Shellfish-Derived Products Unknown - High Severity       Objective      Vitals:   Temp:  [94.1 °F (34.5 °C)-98 °F (36.7 °C)] 98 °F (36.7 °C)  Heart Rate:  [65-97] 65  Resp:  [13-20] 13  BP: ()/(52-96) 98/54  Flow (L/min):  [2-4] 2    Physical Exam     Constitutional:      Awake, alert, no acute distress  HENT:      Normocephalic and atraumatic. Nose normal. Mucous membranes are moist. Oropharynx is clear.   Eyes:      No icterus, EOM intact. Pupils are equal, round, and reactive to light.   Neck:     No LAD.  No JVD.  No thyromegaly  Cardiovascular:      Regular rate and regular rhythm.  No murmur.  No edema  Pulmonary:      Clear to auscultation bilaterally.  Normal effort  Abdominal:      Soft, NTND.  BS + all 4 quadrants  Musculoskeletal:         No joint effusions.  No atrophy   Skin:     Warm, dry, no rashes.  No lesions.  Neurological:      Alert and oriented x3, no focal neurologic deficits could be appreciated  Psychiatric:         Normal mood and affect, normal thought process and judgment      Result Review     Result Review:  I have personally reviewed the results from the time of this admission to 6/15/2022 17:32 EDT and agree with these findings:  [x]  Laboratory  []  Microbiology  [x]  Radiology  [x]  EKG/Telemetry   [x]  Cardiology/Vascular   []  Pathology  []  Old records  []  Other:  Most notable findings include: Echo with preserved EF, WBC 11.6      Assessment & Plan        Active Hospital Problems:  Active Hospital Problems    Diagnosis    • **CAD, multiple vessel      Added automatically from request for surgery [id number]     • Acute inferior myocardial infarction (HCC)    COVID-19 positive  Essential hypertension  Hyperlipidemia  Tobacco abuse    Plan:     1.  CAD with STEMI on admission  -Management per cardiology, plan for cardiac cath with stent to LAD tomorrow per nursing  -ASA, Plavix, Lipitor, Lopressor    2.  COVID-19 positive  -Possibly contributing to underlying nausea but otherwise no pulmonary involvement at this time, continue to monitor    3.  COPD  -On oral steroids, inhalers per pulmonology    4.  Essential hypertension  -Blood pressure currently stable on current medications, continue to monitor    5.  Hyperlipidemia  -Continue statin    6.  Tobacco abuse  -Nicotine patch    DVT prophylaxis:  Mechanical DVT prophylaxis orders are present.    CODE STATUS:    Level Of Support Discussed With: Patient  Code Status (Patient has no pulse and is not breathing): CPR (Attempt to Resuscitate)  Medical Interventions (Patient has pulse or is breathing): Full Support      I discussed the patient's findings and my recommendations with patient and nursing staff.    This patient has been examined wearing appropriate Personal Protective Equipment . 06/15/22      Signature: Electronically signed by Janna Nicholas DO, 06/15/22, 5:58 PM EDT.

## 2022-06-15 NOTE — PAYOR COMM NOTE
"AUTHORIZATION PENDING:   PLEASE CALL OR FAX DETERMINATION TO CONTACT BELOW. THANK YOU.        Sri Kemp RN MSN  /UR  [facility]  [phone] office  [phone] fax  [email or web address]    [facility]  NPI: [id number]  Tax: [phone]              Tramaine Gates (36 y.o. Male)             Date of Birth   1985    Social Security Number       Address   [address] [place] [address] [place] [address]    Home Phone   [phone]    MRN   [id number]       Confucianism   None    Marital Status   Single                            Admission Date   6/14/22    Admission Type   Urgent    Admitting Provider   Deanna Braun MD    Attending Provider   Deanna Braun MD    Department, Room/Bed   [facility] INTENSIVE CARE UNIT, 2301/1       Discharge Date       Discharge Disposition       Discharge Destination                               Attending Provider: Deanna Braun MD    Allergies: Codeine, Hydrocodone, Hydrocodone-acetaminophen, Shellfish-derived Products    Isolation: Enh Drop/Con   Infection: COVID (confirmed) (06/14/22)   Code Status: CPR   Advance Care Planning Activity    Ht: 177.8 cm (70\")   Wt: 123 kg (270 lb 1 oz)    Admission Cmt: None   Principal Problem: None                Active Insurance as of 6/14/2022     Primary Coverage     Payor Plan Insurance Group Employer/Plan Group    [facility] MEDICAID [facility] [id number]     Payor Plan Address Payor Plan Phone Number Payor Plan Fax Number Effective Dates    MAIL STOP:   3/1/2022 - None Entered    [address] [address]       [facility] [id number]       Subscriber Name Subscriber Birth Date Member ID       TRAMAINE GATES 1985 [id number]                 Emergency Contacts      (Rel.) Home Phone Work Phone Mobile Phone    Magi Oliver (Mother) [phone] -- [phone]    ZACHARYCATRACHO (Significant Other) [phone] -- --        06/14/22 1148  " Inpatient Admission  Once     Completed     Level of Care: Critical Care    Diagnosis: Acute inferior myocardial infarction (HCC) [097334]    Admitting Physician: DEANNA RIVERA [[id number]]    Isolate for COVID?: No    Certification: I Certify That Inpatient Hospital Services Are Medically Necessary For Greater Than 2 Midnights                   History & Physical      Deanna Rivera MD at [phone]          History and physical    PATIENT IDENTIFICATION:  Name: Tramaine Gates  MRN: [id number]  :  1985     Age: 36 y.o.  Sex: male      DATE OF PROCEDURE:  2022                   CHIEF COMPLAINT: Chest pain    History of Present Illness:   Tramaine Gates is a 36 y.o. male who has history of heavy smoking, moderate daily obesity, most likely underlying struct sleep apnea, was presented to the emergency room with secondary complaint from sharp chest pain this time was started like few hours ago and is still in the middle of the chest constant dull 6 out of 10 was evaluated for possible ST elevation MI he did have a cardiac cath today required multiple stents  Post cardiac cath patient still having some discomfort in his chest feeling weak tired and anxious no nausea no vomiting no fever no chills      Review of Systems:   Constitutional:  As above   Eyes: negative   ENT/oropharynx: negative   Cardiovascular:  As above   Respiratory:  As above   Gastrointestinal: negative   Genitourinary: negative   Neurological: negative   Musculoskeletal: negative   Integument/breast: negative   Endocrine: negative   Allergic/Immunologic: negative     Past Medical History:  Past Medical History:   Diagnosis Date   • Acute inferior myocardial infarction (HCC) 2022   • Allergic    • Asthma 2022   • Gastroesophageal reflux disease 2022   • Hx of complete eye exam 2016   • Hyperlipidemia 2022   • Hypertension    • Migraine headache 2022   • Panic attack 2022   • Peptic ulcer 2017  "      Past Surgical History:  Past Surgical History:   Procedure Laterality Date   • CHOLECYSTECTOMY  2019   • MANDIBLE SURGERY          Family History:  Family History   Problem Relation Age of Onset   • Heart disease Mother    • Heart failure Father         Social History:   Social History     Tobacco Use   • Smoking status: Current Every Day Smoker     Packs/day: 1.00     Types: Cigarettes     Start date:    • Smokeless tobacco: Never Used   Substance Use Topics   • Alcohol use: Not Currently        Allergies:  Allergies   Allergen Reactions   • Codeine Itching   • Hydrocodone Itching   • Hydrocodone-Acetaminophen Other (See Comments)   • Shellfish-Derived Products Unknown - High Severity       Home Meds:  Medications Prior to Admission   Medication Sig Dispense Refill Last Dose   • naproxen (NAPROSYN) 375 MG tablet Take 1 tablet by mouth 2 (Two) Times a Day As Needed for Moderate Pain . 14 tablet 0        Objective:  tMax 24 hrs: Temp (24hrs), Av.8 °F (36 °C), Min:95.2 °F (35.1 °C), Max:98.2 °F (36.8 °C)      Vitals Ranges:   Temp:  [95.2 °F (35.1 °C)-98.2 °F (36.8 °C)] 95.2 °F (35.1 °C)  Heart Rate:  [78-94] 84  Resp:  [16-24] 24  BP: (116-145)/(62-97) 118/67  Arterial Line BP: (117-125)/(60-64) 125/64    Intake and Output Last 3 Shifts:   No intake/output data recorded.    Exam:  /67   Pulse 84   Temp 95.2 °F (35.1 °C)   Resp 24   Ht 177.8 cm (70\")   Wt 123 kg (270 lb 1 oz)   SpO2 94%   BMI 38.75 kg/m²     General Appearance: Alert awake and anxious  HEENT:  Normocephalic, without obvious abnormality, atraumatic.  Conjunctivae/corneas clear,.  Normal external ear canals. Nares normal, no drainage  Neck:  Supple, symmetrical, trachea midline. No JVD.  Lungs /Chest wall:   Bilateral basal rhonchi, respirations unlabored, symmetrical wall movement.     Heart:  Regular rate and rhythm, systolic murmur PMI left sternal border  Abdomen: Soft, nontender, no masses, no organomegaly.  "   Extremities: Trace edema, no clubbing or cyanosis        Data Review:  All labs (24hrs):   Recent Results (from the past 24 hour(s))   COVID-19,CEPHEID/JESSE,COR/KELSEY/PAD/CLARISA IN-HOUSE(OR EMERGENT/ADD-ON),NP SWAB IN TRANSPORT MEDIA 3-4 HR TAT, RT-PCR - Swab, Nasopharynx    Collection Time: 06/14/22 10:52 AM    Specimen: Nasopharynx; Swab   Result Value Ref Range    COVID19 Detected (C) Not Detected - Ref. Range   CBC Auto Differential    Collection Time: 06/14/22 11:21 AM    Specimen: Blood   Result Value Ref Range    WBC      RBC      Hemoglobin      Hematocrit      MCV      MCH      MCHC      RDW      RDW-SD      MPV      Platelets      Neutrophil %      Lymphocyte %      Monocyte %      Eosinophil %      Basophil %      Neutrophils, Absolute      Lymphocytes, Absolute      Monocytes, Absolute      Eosinophils, Absolute      Basophils, Absolute     Protime-INR    Collection Time: 06/14/22 11:58 AM    Specimen: Blood   Result Value Ref Range    Protime 11.1 9.6 - 11.7 Seconds    INR 1.08 0.93 - 1.10   aPTT    Collection Time: 06/14/22 11:58 AM    Specimen: Blood   Result Value Ref Range    .0 (C) 61.0 - 76.5 seconds   CBC (No Diff)    Collection Time: 06/14/22 11:58 AM    Specimen: Blood   Result Value Ref Range    WBC 10.70 3.40 - 10.80 10*3/mm3    RBC 5.03 4.14 - 5.80 10*6/mm3    Hemoglobin 14.6 13.0 - 17.7 g/dL    Hematocrit 42.9 37.5 - 51.0 %    MCV 85.3 79.0 - 97.0 fL    MCH 29.1 26.6 - 33.0 pg    MCHC 34.1 31.5 - 35.7 g/dL    RDW 13.1 12.3 - 15.4 %    RDW-SD 39.4 37.0 - 54.0 fl    MPV 6.8 6.0 - 12.0 fL    Platelets 328 140 - 450 10*3/mm3   Magnesium    Collection Time: 06/14/22 11:58 AM    Specimen: Blood   Result Value Ref Range    Magnesium 1.8 1.6 - 2.6 mg/dL   Phosphorus    Collection Time: 06/14/22 11:58 AM    Specimen: Blood   Result Value Ref Range    Phosphorus 2.8 2.5 - 4.5 mg/dL   ECG 12 Lead    Collection Time: 06/14/22 12:25 PM   Result Value Ref Range    QT Interval 374 ms   POC Activated  Clotting Time    Collection Time: 06/14/22  2:47 PM    Specimen: Arterial Blood   Result Value Ref Range    Activated Clotting Time  150 (H) 89 - 137 Seconds        Imaging:  Cardiac Catheterization/Vascular Study  Cardiac catheterization/PCI report    DATE OF PROCEDURE: 6/14/2022    INDICATION FOR PROCEDURE:    Acute inferior myocardial infarction  Hypertension  Hyperlipidemia    PROCEDURE PERFORMED:    Left heart catheterization  coronary angiography  left ventriculography  Balloon angioplasty and stenting of RCA coronary artery    FINDINGS:    1. HEMODYNAMICS:      Aortic pressure: 90/60 mmHg    LVEDP: 5 to 10 mmHg    Gradient across aortic valve on pullback: No gradient across aortic valve    2. LEFT VENTRICULOGRAPHY: 65%    3. CORONARY ANGIOGRAPHY:           A: Left main coronary artery: Normal           B: Left anterior descending artery: 80% to 90% proximal LAD   stenosis           C: Left circumflex coronary artery: 40 to 50% distal LCx/OM plaque.    20 to 25% plaque in the proximal and mid segment of LCx           D: Right coronary artery: 70 to 80% stenosis in the proximal   segment of LCx, 99% stenosis in mid segment of RCA and 90 to 95% stenosis   in the proximal PDA extending into the midsegment.  DARRYL I flow was   present prior to the intervention and DARRYL-3 flow was present after the   intervention.  RCA was dominant vessel    PROCEDURE COMMENTS:     After informed consent was obtained, the patient was prepped and draped in   the usual sterile manner.  Mild to moderate sedation was administered.    Right femoral artery was accessed without difficulty and 6 Dutch arterial   sheath was inserted.  Sheath was flushed with heparinized saline.    Using 6 Dutch Cristal catheters, first left coronary artery and the right   coronary was electively engaged and appropriate views were taken.  A 6   Dutch JR4 catheter was used to cross aortic valve and left heart   catheterization was performed.  Left  ventriculography was done in a   review.    After diagnostic catheterization, we proceeded to the intervention of the   RCA coronary artery.    We advanced a 6 Romansh JR4 with sideholes interventional guide and   selective engagement of the right coronary artery was achieved.  We   advanced interventional guidewire 0.014 BMW and crossed the lesion and   placed in the distal part of the PDA branch of RCA coronary artery.    We advanced 2.0x12 compliant balloon and the pre-dilatation at nominal   pressure.  After the predilatation, this balloon was removed and we   advanced 2.25x12 drug-eluting stent and advanced and placed over the   lesion in the mid segment of RCA and deployed at nominal pressure.  We   advanced 2.25x18 stent and covering the first and deployed in the proximal   segment.  We took 2.5x23 stent covering the second stent all the way up to   the ostium and deployed at nominal pressure.  Stenosis decreased from 99%   to less than 10%.  No dissection, perforation or no reflow phenomena was   noted.    After intervention of mid and proximal segment of RCA we saw there was a   high-grade stenosis of PDA.  We did predilatation with 2.0x20 NC balloon   and followed by 2.0x22 Rosendo stent and deployed in the mid and proximal   segment of the PDA.  We had a second stent 2.0x12 Sawyer stent and deployed   covering the first stent in the proximal segment of PDA and distal RCA.    Stenosis decreased from 90 to 95% to less than 10%.  DARRYL-3 flow was   present.    Patient was given aspirin, Plavix and heparin during the procedure.  ACT   at the end of procedure was 243    Patient tolerated the procedure well.    SUMMARY:     1.  Three-vessel coronary artery disease  2.  High-grade stenosis of LAD and RCA  3.  Successful stenting of RCA with multiple drug-eluting stent which was   because of acute inferior myocardial infarction    RECOMMENDATIONS:     DAPT with aspirin and Plavix.  Patient would need LAD stenting in  future.       ASSESSMENT:    Acute inferior myocardial infarction (HCC)  Coronary artery disease  COVID-19 positive  Chronic obstructive airway disease  Obstructive sleep apnea  Morbid obesity  Hypertension  Gastroesophageal flux disorder  Hyperlipidemia    PLAN:  Continue antiplatelets  Beta-blocker  Record his positive COVID-19 we are going to monitor for now no signs of pulmonary involvement  Bronchodilator  Inhaled corticosteroids  Steroids  Electrolytes/ glycemic control  DVT and GI prophylaxis.    Total Critical care time in direct medical management (  33 ) minutes. This time specifically excludes time spent performing procedures.    Deanna Braun MD   6/14/2022  17:17 EDT       Electronically signed by Deanna Braun MD at 06/14/22 6588       Emergency Department Notes    No notes of this type exist for this encounter.         Physician Progress Notes (all)    No notes of this type exist for this encounter.            Consult Notes (all)      Matthieu Del Toro MD at 06/14/22 1218          Referring Provider: Dr. Scanlon    Attending: Deanna Braun MD    Reason for Consultation:    Chest pain  Shortness of breath  Acute inferior myocardial infarction  Hypertension  Hyperlipidemia    Chief complaint:    Chest pain  Shortness of breath       History of present illness:     Patient is 36-year-old white gentleman whose past medical history is significant for hypertension, hyperlipidemia, who is admitted with acute inferior myocardial infarction.    Patient started having sudden chest pain.  He called 911.  He was noted to have ST elevation of inferior leads.  Patient was brought to the [facility].  Patient was brought to the Cath Lab directly from the ER.  No intravenous IV was placed by EMS.  Patient was having intense chest pain 10 out of 10 chest pain.  Blood pressure initially was stable but subsequently decreased to 90 mmHg.    Patient does not have previous history of CAD, PCI or MI.  He had a  chest pain at the start of the month and was evaluated in the emergency room but was discharged.        Review of Systems  Review of Systems   Constitutional: Negative for chills and fever.   HENT: Negative for ear discharge and nosebleeds.    Eyes: Negative for discharge and redness.   Cardiovascular: Positive for chest pain. Negative for orthopnea, palpitations, paroxysmal nocturnal dyspnea and syncope.   Respiratory: Positive for shortness of breath. Negative for cough and wheezing.    Endocrine: Negative for heat intolerance.   Skin: Negative for rash.   Musculoskeletal: Negative for arthritis and myalgias.   Gastrointestinal: Negative for abdominal pain, melena, nausea and vomiting.   Genitourinary: Negative for dysuria and hematuria.   Neurological: Negative for dizziness, light-headedness, numbness and tremors.   Psychiatric/Behavioral: Negative for depression. The patient is not nervous/anxious.        Past Medical History  Past Medical History:   Diagnosis Date   • Acute inferior myocardial infarction (HCC) 6/14/2022   • Allergic    • Asthma 1/27/2022   • Gastroesophageal reflux disease 1/27/2022   • Hx of complete eye exam 2016   • Hyperlipidemia 1/27/2022   • Hypertension    • Migraine headache 1/27/2022   • Panic attack 1/27/2022   • Peptic ulcer 9/14/2017    and   Past Surgical History:   Procedure Laterality Date   • CHOLECYSTECTOMY  2019   • MANDIBLE SURGERY         Family History  Family History   Problem Relation Age of Onset   • Heart disease Mother    • Heart failure Father        Social History  Social History     Socioeconomic History   • Marital status: Single   Tobacco Use   • Smoking status: Current Every Day Smoker     Packs/day: 1.00     Types: Cigarettes     Start date: 1999   • Smokeless tobacco: Never Used   Vaping Use   • Vaping Use: Never used   Substance and Sexual Activity   • Alcohol use: Not Currently   • Drug use: Yes     Frequency: 7.0 times per week     Types: Marijuana  "      Objective     Physical Exam:    Vital Signs  Vitals:    06/14/22 1233 06/14/22 1245 06/14/22 1300 06/14/22 1500   BP: 127/69 116/64 118/67    BP Location: Right arm      Patient Position: Lying      Pulse: 84 85 84    Resp: 20  24    Temp: 97.1 °F (36.2 °C)  98.2 °F (36.8 °C) 95.2 °F (35.1 °C)   TempSrc: Oral  Oral    SpO2: 93% 91% 94%    Weight: 123 kg (270 lb 1 oz)      Height: 177.8 cm (70\")          Weight       Constitutional:       Appearance: Well-developed.   Eyes:      General: No scleral icterus.        Right eye: No discharge.   HENT:      Head: Normocephalic and atraumatic.   Neck:      Thyroid: No thyromegaly.      Lymphadenopathy: No cervical adenopathy.   Pulmonary:      Effort: Pulmonary effort is normal. No respiratory distress.      Breath sounds: Normal breath sounds. No wheezing. No rales.   Cardiovascular:      Normal rate. Regular rhythm.      No gallop.   Abdominal:      Tenderness: There is no abdominal tenderness.   Skin:     Findings: No erythema or rash.   Neurological:      Mental Status: Alert and oriented to person, place, and time.         Results Review:  Lab Results (last 24 hours)     Procedure Component Value Units Date/Time    POC Activated Clotting Time [[id number]]  (Abnormal) Collected: 06/14/22 1447    Specimen: Arterial Blood Updated: 06/14/22 1457     Activated Clotting Time  150 Seconds      Comment: Serial Number: [id number]:  [id number]       Comprehensive Metabolic Panel [[id number]] Updated: 06/14/22 1242    Specimen: Blood     Troponin [[id number]] Updated: 06/14/22 1242    Specimen: Blood     Magnesium [[id number]]  (Normal) Collected: 06/14/22 1158    Specimen: Blood Updated: 06/14/22 1239     Magnesium 1.8 mg/dL     Phosphorus [[id number]]  (Normal) Collected: 06/14/22 1158    Specimen: Blood Updated: 06/14/22 1239     Phosphorus 2.8 mg/dL     aPTT [[id number]]  (Abnormal) Collected: 06/14/22 1158    Specimen: Blood Updated: 06/14/22 1237     .0 seconds     " Protime-INR [[id number]]  (Normal) Collected: 06/14/22 1158    Specimen: Blood Updated: 06/14/22 1237     Protime 11.1 Seconds      INR 1.08    CBC & Differential [[id number]] Collected: 06/14/22 1121    Specimen: Blood Updated: 06/14/22 1217    Narrative:      The following orders were created for panel order CBC & Differential.  Procedure                               Abnormality         Status                     ---------                               -----------         ------                     CBC Auto Differential[[id number]]                            Edited Result - FINAL        Please view results for these tests on the individual orders.    CBC Auto Differential [[id number]] Collected: 06/14/22 1121    Specimen: Blood Updated: 06/14/22 1217     WBC --     Comment: Previous results were contaminated. CBC auto validated before it was caught. Notified Frank in cath lab.  Corrected result. Previous result was 6.50 10*3/mm3 on 6/14/2022 at 1130 EDT.        RBC --     Comment: Previous results were contaminated. CBC auto validated before it was caught. Notified Frank in cath lab.  Corrected result. Previous result was 3.21 10*6/mm3 on 6/14/2022 at 1130 EDT.        Hemoglobin --     Comment: Previous results were contaminated. CBC auto validated before it was caught. Notified Frank in cath lab.  Corrected result. Previous result was 9.2 g/dL on 6/14/2022 at 1130 EDT.        Hematocrit --     Comment: Previous results were contaminated. CBC auto validated before it was caught. Notified Frank in cath lab.  Corrected result. Previous result was 27.1 % on 6/14/2022 at 1130 EDT.        MCV --     Comment: Previous results were contaminated. CBC auto validated before it was caught. Notified Frank in cath lab.  Corrected result. Previous result was 84.5 fL on 6/14/2022 at 1130 EDT.        MCH --     Comment: Previous results were contaminated. CBC auto validated before it was caught. Notified Frank in cath  lab.  Corrected result. Previous result was 28.6 pg on 6/14/2022 at 1130 EDT.        MCHC --     Comment: Previous results were contaminated. CBC auto validated before it was caught. Notified Frank in cath lab.  Corrected result. Previous result was 33.8 g/dL on 6/14/2022 at 1130 EDT.        RDW --     Comment: Previous results were contaminated. CBC auto validated before it was caught. Notified Frank in cath lab.  Corrected result. Previous result was 12.9 % on 6/14/2022 at 1130 EDT.        RDW-SD --     Comment: Previous results were contaminated. CBC auto validated before it was caught. Notified Frank in cath lab.  Corrected result. Previous result was 38.5 fl on 6/14/2022 at 1130 EDT.        MPV --     Comment: Previous results were contaminated. CBC auto validated before it was caught. Notified Frank in cath lab.  Corrected result. Previous result was 6.7 fL on 6/14/2022 at 1130 EDT.        Platelets --     Comment: Previous results were contaminated. CBC auto validated before it was caught. Notified Frank in cath lab.  Corrected result. Previous result was 243 10*3/mm3 on 6/14/2022 at 1130 EDT.        Neutrophil % --     Comment: Previous results were contaminated. CBC auto validated before it was caught. Notified Frank in cath lab.  Corrected result. Previous result was 55.4 % on 6/14/2022 at 1130 EDT.        Lymphocyte % --     Comment: Previous results were contaminated. CBC auto validated before it was caught. Notified Frank in cath lab.  Corrected result. Previous result was 35.8 % on 6/14/2022 at 1130 EDT.        Monocyte % --     Comment: Previous results were contaminated. CBC auto validated before it was caught. Notified Frank in cath lab.  Corrected result. Previous result was 6.5 % on 6/14/2022 at 1130 EDT.        Eosinophil % --     Comment: Previous results were contaminated. CBC auto validated before it was caught. Notified Frank in cath lab.  Corrected result. Previous result was 1.6 % on  6/14/2022 at 1130 EDT.        Basophil % --     Comment: Previous results were contaminated. CBC auto validated before it was caught. Notified Frank in cath lab.  Corrected result. Previous result was 0.7 % on 6/14/2022 at 1130 EDT.        Neutrophils, Absolute --     Comment: Previous results were contaminated. CBC auto validated before it was caught. Notified Frank in cath lab.  Corrected result. Previous result was 3.60 10*3/mm3 on 6/14/2022 at 1130 EDT.        Lymphocytes, Absolute --     Comment: Previous results were contaminated. CBC auto validated before it was caught. Notified Frank in cath lab.  Corrected result. Previous result was 2.30 10*3/mm3 on 6/14/2022 at 1130 EDT.        Monocytes, Absolute --     Comment: Previous results were contaminated. CBC auto validated before it was caught. Notified Frank in cath lab.  Corrected result. Previous result was 0.40 10*3/mm3 on 6/14/2022 at 1130 EDT.        Eosinophils, Absolute --     Comment: Previous results were contaminated. CBC auto validated before it was caught. Notified Frank in cath lab.  Corrected result. Previous result was 0.10 10*3/mm3 on 6/14/2022 at 1130 EDT.        Basophils, Absolute --     Comment: Previous results were contaminated. CBC auto validated before it was caught. Notified Frank in cath lab.  Corrected result. Previous result was 0.00 10*3/mm3 on 6/14/2022 at 1130 EDT.       Narrative:      The previously reported component NRBC is no longer being reported. Previous result was 0.3 /100 WBC (Reference Range: 0.0-0.2 /100 WBC) on 6/14/2022 at 1130 EDT.    COVID PRE-OP / PRE-PROCEDURE SCREENING ORDER (NO ISOLATION) - Swab, Nasopharynx [[id number]]  (Abnormal) Collected: 06/14/22 1052    Specimen: Swab from Nasopharynx Updated: 06/14/22 1215    Narrative:      The following orders were created for panel order COVID PRE-OP / PRE-PROCEDURE SCREENING ORDER (NO ISOLATION) - Swab, Nasopharynx.  Procedure                                Abnormality         Status                     ---------                               -----------         ------                     COVID-19,CEPHEID/JESSE,CO...[[id number]]  Abnormal            Final result                 Please view results for these tests on the individual orders.    COVID-19,CEPHEID/JESSE,COR/KELSEY/PAD/CLARISA IN-HOUSE(OR EMERGENT/ADD-ON),NP SWAB IN TRANSPORT MEDIA 3-4 HR TAT, RT-PCR - Swab, Nasopharynx [[id number]]  (Abnormal) Collected: 06/14/22 1052    Specimen: Swab from Nasopharynx Updated: 06/14/22 1215     COVID19 Detected    Narrative:      Fact sheet for providers: https://www.fda.gov/media/042643/download     Fact sheet for patients: https://www.fda.gov/media/146011/download  Fact sheet for providers: https://www.fda.gov/media/161677/download     Fact sheet for patients: https://www.fda.gov/media/758959/download    CBC (No Diff) [[id number]]  (Normal) Collected: 06/14/22 1158    Specimen: Blood Updated: 06/14/22 1210     WBC 10.70 10*3/mm3      RBC 5.03 10*6/mm3      Hemoglobin 14.6 g/dL      Comment: Result checked        Hematocrit 42.9 %      Comment: Result checked        MCV 85.3 fL      MCH 29.1 pg      MCHC 34.1 g/dL      RDW 13.1 %      RDW-SD 39.4 fl      MPV 6.8 fL      Platelets 328 10*3/mm3         Imaging Results (Last 72 Hours)     ** No results found for the last 72 hours. **        ECG 12 Lead   Preliminary Result   HEART RATE= 81  bpm   RR Interval= 736  ms   NE Interval= 123  ms   P Horizontal Axis= 18  deg   P Front Axis= 74  deg   QRSD Interval= 92  ms   QT Interval= 374  ms   QRS Axis= 29  deg   T Wave Axis= 48  deg   - NORMAL ECG -   Sinus rhythm   Electronically Signed By:    Date and Time of Study: 2022-06-14 12:25:46      ECG 12 Lead    (Results Pending)     CBC    Results from last 7 days   Lab Units 06/14/22  1158 06/08/22  0121   WBC 10*3/mm3 10.70 8.80   HEMOGLOBIN g/dL 14.6 16.4   PLATELETS 10*3/mm3 328 338     BMP   Results from last 7 days   Lab Units  06/14/22  1158 06/08/22  0121   SODIUM mmol/L  --  133*   POTASSIUM mmol/L  --  3.7   CHLORIDE mmol/L  --  99   CO2 mmol/L  --  21.0*   BUN mg/dL  --  9   CREATININE mg/dL  --  0.90   GLUCOSE mg/dL  --  107*   MAGNESIUM mg/dL 1.8  --    PHOSPHORUS mg/dL 2.8  --      CMP   Results from last 7 days   Lab Units 06/08/22  0121   SODIUM mmol/L 133*   POTASSIUM mmol/L 3.7   CHLORIDE mmol/L 99   CO2 mmol/L 21.0*   BUN mg/dL 9   CREATININE mg/dL 0.90   GLUCOSE mg/dL 107*     Medication Review  Scheduled Meds:aspirin, 81 mg, Oral, Daily  atorvastatin, 40 mg, Oral, Daily  clopidogrel, 600 mg, Oral, Once  [START ON 6/15/2022] clopidogrel, 75 mg, Oral, Daily  metoprolol tartrate, 12.5 mg, Oral, Q12H      Continuous Infusions:DOPamine, 3 mcg/kg/min, Last Rate: 3 mcg/kg/min (06/14/22 1416)  eptifibatide, 2 mcg/kg/min, Last Rate: 2 mcg/kg/min (06/14/22 1416)  nitroglycerin, 5-200 mcg/min, Last Rate: 10 mcg/min (06/14/22 1713)  sodium chloride, 50 mL/hr, Last Rate: 50 mL/hr (06/14/22 1417)      PRN Meds:.atropine  •  diphenhydrAMINE  •  ondansetron **OR** ondansetron  •  sodium chloride    Assessment:      Acute inferior myocardial infarction (HCC)        Plan:    MDM:    1.  Acute inferior myocardial infarction    Patient underwent cardiac catheterization and was noted to have 80% stenosis of mid LAD and 80% stenosis of proximal RCA followed by 99% stenosis of mid RCA and 90 to 95% stenosis of PDA.  Patient underwent multiple stenting of RCA.  Desirable results were achieved.  Patient would need intervention of LAD in future which can be done as an outpatient.    2.  CAD:    Patient had multivessel disease.  Patient will need LAD stenting in future.    3.  Hypertension:    Blood pressure is stable.  At present lisinopril would be on hold.  Start Lopressor 12.5 mg twice daily    4.  Hyperlipidemia:    Start Lipitor.    I discussed the patients findings and my recommendations with patient    Matthieu Del Toro MD  06/14/22  17:14  EDT              Electronically signed by Matthieu Del Toro MD at 06/14/22 2544

## 2022-06-15 NOTE — CASE MANAGEMENT/SOCIAL WORK
Discharge Planning Assessment  [facility]     Patient Name: Tramaine Gates  MRN: [id number]  Today's Date: 6/15/2022    Admit Date: 6/14/2022     Discharge Needs Assessment     Row Name 06/15/22 1640       Living Environment    People in Home significant other    Name(s) of People in Home S.O. - Ariana Wolf    Current Living Arrangements home    Primary Care Provided by self    Provides Primary Care For no one    Family Caregiver if Needed significant other    Family Caregiver Names Ariana Wolf    Quality of Family Relationships involved;helpful;supportive    Able to Return to Prior Arrangements yes       Resource/Environmental Concerns    Resource/Environmental Concerns other (see comments)  inhaler medication cost    Transportation Concerns none       Transition Planning    Patient/Family Anticipates Transition to home with family    Patient/Family Anticipated Services at Transition none    Transportation Anticipated family or friend will provide       Discharge Needs Assessment    Readmission Within the Last 30 Days no previous admission in last 30 days    Equipment Currently Used at Home none    Concerns to be Addressed financial/insurance    Concerns Comments Patient unable to afford certain medications secondary to [employer] insurance still active and [place] Medicaid interfering with eachother.    Anticipated Changes Related to Illness none    Equipment Needed After Discharge none    Provided Post Acute Provider List? N/A    Provided Post Acute Provider Quality & Resource List? N/A               Discharge Plan     Row Name 06/15/22 1643       Plan    Plan DC Plan: Anticipate Routine Home with Family    Patient/Family in Agreement with Plan yes    Provided Post Acute Provider List? N/A    Provided Post Acute Provider Quality & Resource List? N/A    Plan Comments ILDEFONSO spoke with patient significant other Ariana via telephone at bedside to discuss admission assessment and discharge planning. Ariana garcia  PCP and pharmacy. PCP updated in Epic. CM informed Ariana of meds to bed program and Ariana would like for patient to be enrolled. Pharmacy updated in [facility]. Ariana states they do have some difficulty affording one of his needed inhalers but is unable to recall the name of it at this time. CM requested for Ariana to find out what it is and inform CM and we may be able to assist with cost until insurance difficulties are resolved. Ariana denies any additional needs for services or DME at this time.CM will continue to follow for any additional needs that may develop and adjust discharge plan accordingly. DC Barriers: Enhanced Precautions, elevated troponin with chest pain post heart catheterization on 6/14/22.                 Demographic Summary     Row Name 06/15/22 1639       General Information    Admission Type inpatient    Arrived From emergency department;home    Referral Source admission list    Reason for Consult discharge planning    Preferred Language English       Contact Information    Permission Granted to Share Info With                Functional Status     Row Name 06/15/22 1639       Functional Status    Usual Activity Tolerance excellent    Current Activity Tolerance moderate    Functional Status Comments All information provided by significant other Ariana via telephone from patient room. Patient in Enhanced precautions.       Assessment of Health Literacy    How often do you have someone help you read hospital materials? Never    How often do you have problems learning about your medical condition because of difficulty understanding written information? Never    How often do you have a problem understanding what is told to you about your medical condition? Never    How confident are you filling out medical forms by yourself? Extremely    Health Literacy Good       Functional Status, IADL    Medications independent    Meal Preparation independent    Housekeeping independent     Laundry independent    Shopping independent       Mental Status    General Appearance WDL WDL       Mental Status Summary    Recent Changes in Mental Status/Cognitive Functioning no changes       Employment/    Employment Status unemployed    Current or Previous Occupation other (see comments)  previously employed at [employer] until January of this year.              Phone communication or documentation only- no physical contact with patient or family.        Amaris Welch RN      Office Phone: [phone]  Office Cell:     [phone]

## 2022-06-15 NOTE — PROGRESS NOTES
"PULMONARY CRITICAL CARE PROGRESS  NOTE      PATIENT IDENTIFICATION:  Name: Tramaine Gates  MRN: [id number]  :  1985     Age: 36 y.o.  Sex: male    DATE OF Note:  6/15/2022   Referring Physician: Deanna Braun MD                  Subjective:   Feeling better, on 2l , no SOB,   Off nitro no chest pain, no nausea or vomiting, no change in bowel habit, no dysuria,  no new  skin rash or itching.      Objective:  tMax 24 hrs: Temp (24hrs), Av.2 °F (35.7 °C), Min:94.1 °F (34.5 °C), Max:98.2 °F (36.8 °C)      Vitals Ranges:   Temp:  [94.1 °F (34.5 °C)-98.2 °F (36.8 °C)] 94.1 °F (34.5 °C)  Heart Rate:  [71-97] 71  Resp:  [16-24] 18  BP: (102-145)/(52-97) 124/71  Arterial Line BP: (117-125)/(60-64) 125/64    Intake and Output Last 3 Shifts:   I/O last 3 completed shifts:  In: 3181.6 [P.O.:2040; I.V.:1141.6]  Out: 4000 [Urine:3600; Emesis/NG output:400]    Exam:  /71   Pulse 71   Temp 94.1 °F (34.5 °C) (Axillary)   Resp 18   Ht 177.8 cm (70\")   Wt 123 kg (270 lb 1 oz)   SpO2 96%   BMI 38.75 kg/m²     General Appearance:   AA  HEENT:  Normocephalic, without obvious abnormality. Conjunctivae/corneas clear.  Normal external ear canals. Nares normal, no drainage     Neck:  Supple, symmetrical, trachea midline. No JVD.  Lungs /Chest wall:   Bilateral basal rhonchi, respirations unlabored, symmetrical wall movement.     Heart:  Regular rate and rhythm, systolic murmur PMI left sternal border  Abdomen: Soft, nontender, no masses, no organomegaly.    Extremities: Trace edema, no clubbing or cyanosis        Medications:    Current Facility-Administered Medications:   •  acetaminophen (TYLENOL) tablet 650 mg, 650 mg, Oral, Q4H PRN, 650 mg at 06/15/22 0329 **OR** acetaminophen (TYLENOL) suppository 650 mg, 650 mg, Rectal, Q4H PRN, Nathaniel Dean APRN  •  aluminum-magnesium hydroxide-simethicone (MAALOX MAX) 400-400-40 MG/5ML suspension 15 mL, 15 mL, Oral, Q6H PRN, Nathaniel Dean APRN  •  arformoterol " (BROVANA) nebulizer solution 15 mcg, 15 mcg, Nebulization, BID - RT, Deanna Braun MD  •  aspirin EC tablet 81 mg, 81 mg, Oral, Daily, Matthieu Del Toro MD, 81 mg at 06/15/22 0845  •  atorvastatin (LIPITOR) tablet 80 mg, 80 mg, Oral, Nightly, Matthieu Del Toro MD  •  atropine sulfate injection 0.5-1 mg, 0.5-1 mg, Intravenous, Q5 Min PRN, Matthieu Del Toro MD  •  budesonide (PULMICORT) nebulizer solution 1 mg, 1 mg, Nebulization, BID - RT, Deanna Braun MD  •  clopidogrel (PLAVIX) tablet 75 mg, 75 mg, Oral, Daily, Matthieu Del Toro MD, 75 mg at 06/15/22 0845  •  diphenhydrAMINE (BENADRYL) capsule 25 mg, 25 mg, Oral, Q6H PRN, Matthieu Del Toro MD, 25 mg at 06/14/22 2359  •  DOPamine 400 mg in 250 mL D5W infusion, 3 mcg/kg/min, Intravenous, Continuous, Matthieu Del Toro MD, Held at 06/14/22 2130  •  metoprolol tartrate (LOPRESSOR) tablet 25 mg, 25 mg, Oral, Q12H, Matthieu Del Toro MD, 25 mg at 06/15/22 0845  •  nicotine (NICODERM CQ) 14 MG/24HR patch 1 patch, 1 patch, Transdermal, Q24H, Sonia Parsons APRN, 1 patch at 06/15/22 0439  •  nitroglycerin (NITROSTAT) SL tablet 0.4 mg, 0.4 mg, Sublingual, Q5 Min PRN, Nathaniel Dean APRN  •  nitroglycerin (TRIDIL) 200 mcg/ml infusion, 5-200 mcg/min, Intravenous, Titrated, Pat Ruiz APRN, Stopped at 06/15/22 0914  •  ondansetron (ZOFRAN) tablet 4 mg, 4 mg, Oral, Q6H PRN **OR** ondansetron (ZOFRAN) injection 4 mg, 4 mg, Intravenous, Q6H PRN, Nathaniel Dean APRN, 4 mg at 06/15/22 0016  •  sodium chloride 0.9 % flush 10 mL, 10 mL, Intravenous, Q12H, Nathaniel Dean APRN, 10 mL at 06/15/22 0846  •  sodium chloride 0.9 % flush 10 mL, 10 mL, Intravenous, PRN, Nathaniel Dean, APRN  •  sodium chloride 0.9 % infusion 250 mL, 250 mL, Intravenous, Once PRN, Matthieu Del Toro MD    Data Review:  All labs (24hrs):   Recent Results (from the past 24 hour(s))   COVID-19,CEPHEID/JESSE,COR/KELSEY/PAD/CLARISA IN-HOUSE(OR EMERGENT/ADD-ON),NP SWAB IN TRANSPORT MEDIA 3-4 HR TAT, RT-PCR - Swab, Nasopharynx     Collection Time: 06/14/22 10:52 AM    Specimen: Nasopharynx; Swab   Result Value Ref Range    COVID19 Detected (C) Not Detected - Ref. Range   CBC Auto Differential    Collection Time: 06/14/22 11:21 AM    Specimen: Blood   Result Value Ref Range    WBC      RBC      Hemoglobin      Hematocrit      MCV      MCH      MCHC      RDW      RDW-SD      MPV      Platelets      Neutrophil %      Lymphocyte %      Monocyte %      Eosinophil %      Basophil %      Neutrophils, Absolute      Lymphocytes, Absolute      Monocytes, Absolute      Eosinophils, Absolute      Basophils, Absolute     Protime-INR    Collection Time: 06/14/22 11:58 AM    Specimen: Blood   Result Value Ref Range    Protime 11.1 9.6 - 11.7 Seconds    INR 1.08 0.93 - 1.10   aPTT    Collection Time: 06/14/22 11:58 AM    Specimen: Blood   Result Value Ref Range    .0 (C) 61.0 - 76.5 seconds   CBC (No Diff)    Collection Time: 06/14/22 11:58 AM    Specimen: Blood   Result Value Ref Range    WBC 10.70 3.40 - 10.80 10*3/mm3    RBC 5.03 4.14 - 5.80 10*6/mm3    Hemoglobin 14.6 13.0 - 17.7 g/dL    Hematocrit 42.9 37.5 - 51.0 %    MCV 85.3 79.0 - 97.0 fL    MCH 29.1 26.6 - 33.0 pg    MCHC 34.1 31.5 - 35.7 g/dL    RDW 13.1 12.3 - 15.4 %    RDW-SD 39.4 37.0 - 54.0 fl    MPV 6.8 6.0 - 12.0 fL    Platelets 328 140 - 450 10*3/mm3   Magnesium    Collection Time: 06/14/22 11:58 AM    Specimen: Blood   Result Value Ref Range    Magnesium 1.8 1.6 - 2.6 mg/dL   Phosphorus    Collection Time: 06/14/22 11:58 AM    Specimen: Blood   Result Value Ref Range    Phosphorus 2.8 2.5 - 4.5 mg/dL   ECG 12 Lead    Collection Time: 06/14/22 12:25 PM   Result Value Ref Range    QT Interval 374 ms   POC Activated Clotting Time    Collection Time: 06/14/22  2:47 PM    Specimen: Arterial Blood   Result Value Ref Range    Activated Clotting Time  150 (H) 89 - 137 Seconds   COVID-19 RAPID AG,VERITOR,COR/KELSEY/PAD/GONZALEZ/MAD/FLASH/LAG/YA/ IN-HOUSE,DRY SWAB, 1-2 HR TAT - Swab, Nasal Cavity     Collection Time: 06/14/22  5:31 PM    Specimen: Nasal Cavity; Swab   Result Value Ref Range    COVID19 Presumptive Negative Presumptive Negative - Ref. Range   Comprehensive Metabolic Panel    Collection Time: 06/14/22  6:45 PM    Specimen: Blood   Result Value Ref Range    Glucose 173 (H) 65 - 99 mg/dL    BUN 14 6 - 20 mg/dL    Creatinine 1.19 0.76 - 1.27 mg/dL    Sodium 132 (L) 136 - 145 mmol/L    Potassium 4.6 3.5 - 5.2 mmol/L    Chloride 100 98 - 107 mmol/L    CO2 18.0 (L) 22.0 - 29.0 mmol/L    Calcium 8.5 (L) 8.6 - 10.5 mg/dL    Total Protein 6.8 6.0 - 8.5 g/dL    Albumin 4.00 3.50 - 5.20 g/dL    ALT (SGPT) 38 1 - 41 U/L    AST (SGOT) 24 1 - 40 U/L    Alkaline Phosphatase 101 39 - 117 U/L    Total Bilirubin 0.3 0.0 - 1.2 mg/dL    Globulin 2.8 gm/dL    A/G Ratio 1.4 g/dL    BUN/Creatinine Ratio 11.8 7.0 - 25.0    Anion Gap 14.0 5.0 - 15.0 mmol/L    eGFR 80.2 >60.0 mL/min/1.73   Troponin    Collection Time: 06/14/22  6:45 PM    Specimen: Blood   Result Value Ref Range    Troponin T 0.050 (C) 0.000 - 0.030 ng/mL   CBC Auto Differential    Collection Time: 06/14/22  6:45 PM    Specimen: Blood   Result Value Ref Range    WBC 12.30 (H) 3.40 - 10.80 10*3/mm3    RBC 5.27 4.14 - 5.80 10*6/mm3    Hemoglobin 15.2 13.0 - 17.7 g/dL    Hematocrit 45.3 37.5 - 51.0 %    MCV 85.9 79.0 - 97.0 fL    MCH 28.8 26.6 - 33.0 pg    MCHC 33.5 31.5 - 35.7 g/dL    RDW 13.5 12.3 - 15.4 %    RDW-SD 40.7 37.0 - 54.0 fl    MPV 6.6 6.0 - 12.0 fL    Platelets 358 140 - 450 10*3/mm3    Neutrophil % 93.8 (H) 42.7 - 76.0 %    Lymphocyte % 5.2 (L) 19.6 - 45.3 %    Monocyte % 0.8 (L) 5.0 - 12.0 %    Eosinophil % 0.1 (L) 0.3 - 6.2 %    Basophil % 0.1 0.0 - 1.5 %    Neutrophils, Absolute 11.60 (H) 1.70 - 7.00 10*3/mm3    Lymphocytes, Absolute 0.60 (L) 0.70 - 3.10 10*3/mm3    Monocytes, Absolute 0.10 0.10 - 0.90 10*3/mm3    Eosinophils, Absolute 0.00 0.00 - 0.40 10*3/mm3    Basophils, Absolute 0.00 0.00 - 0.20 10*3/mm3    nRBC 0.1 0.0 - 0.2 /100 WBC    ECG 12 Lead    Collection Time: 06/14/22 11:08 PM   Result Value Ref Range    QT Interval 370 ms   Troponin    Collection Time: 06/14/22 11:10 PM    Specimen: Blood   Result Value Ref Range    Troponin T 0.063 (C) 0.000 - 0.030 ng/mL   Comprehensive Metabolic Panel    Collection Time: 06/14/22 11:10 PM    Specimen: Blood   Result Value Ref Range    Glucose 126 (H) 65 - 99 mg/dL    BUN 11 6 - 20 mg/dL    Creatinine 1.08 0.76 - 1.27 mg/dL    Sodium 135 (L) 136 - 145 mmol/L    Potassium 4.7 3.5 - 5.2 mmol/L    Chloride 101 98 - 107 mmol/L    CO2 19.0 (L) 22.0 - 29.0 mmol/L    Calcium 8.9 8.6 - 10.5 mg/dL    Total Protein 6.7 6.0 - 8.5 g/dL    Albumin 4.00 3.50 - 5.20 g/dL    ALT (SGPT) 37 1 - 41 U/L    AST (SGOT) 25 1 - 40 U/L    Alkaline Phosphatase 97 39 - 117 U/L    Total Bilirubin 0.3 0.0 - 1.2 mg/dL    Globulin 2.7 gm/dL    A/G Ratio 1.5 g/dL    BUN/Creatinine Ratio 10.2 7.0 - 25.0    Anion Gap 15.0 5.0 - 15.0 mmol/L    eGFR 90.1 >60.0 mL/min/1.73   Magnesium    Collection Time: 06/14/22 11:10 PM    Specimen: Blood   Result Value Ref Range    Magnesium 2.0 1.6 - 2.6 mg/dL   Phosphorus    Collection Time: 06/14/22 11:10 PM    Specimen: Blood   Result Value Ref Range    Phosphorus 1.9 (C) 2.5 - 4.5 mg/dL   Lipid Panel    Collection Time: 06/15/22  3:40 AM    Specimen: Blood   Result Value Ref Range    Total Cholesterol 283 (H) 0 - 200 mg/dL    Triglycerides 130 0 - 150 mg/dL    HDL Cholesterol 40 40 - 60 mg/dL    LDL Cholesterol  219 (H) 0 - 100 mg/dL    VLDL Cholesterol 24 5 - 40 mg/dL    LDL/HDL Ratio 5.43    Magnesium    Collection Time: 06/15/22  3:40 AM    Specimen: Blood   Result Value Ref Range    Magnesium 2.0 1.6 - 2.6 mg/dL   Phosphorus    Collection Time: 06/15/22  3:40 AM    Specimen: Blood   Result Value Ref Range    Phosphorus 3.0 2.5 - 4.5 mg/dL   Troponin    Collection Time: 06/15/22  3:40 AM    Specimen: Blood   Result Value Ref Range    Troponin T 0.081 (C) 0.000 - 0.030 ng/mL   Comprehensive  Metabolic Panel    Collection Time: 06/15/22  3:40 AM    Specimen: Blood   Result Value Ref Range    Glucose 119 (H) 65 - 99 mg/dL    BUN 10 6 - 20 mg/dL    Creatinine 0.98 0.76 - 1.27 mg/dL    Sodium 133 (L) 136 - 145 mmol/L    Potassium 4.3 3.5 - 5.2 mmol/L    Chloride 101 98 - 107 mmol/L    CO2 21.0 (L) 22.0 - 29.0 mmol/L    Calcium 8.5 (L) 8.6 - 10.5 mg/dL    Total Protein 6.3 6.0 - 8.5 g/dL    Albumin 3.90 3.50 - 5.20 g/dL    ALT (SGPT) 35 1 - 41 U/L    AST (SGOT) 27 1 - 40 U/L    Alkaline Phosphatase 94 39 - 117 U/L    Total Bilirubin 0.4 0.0 - 1.2 mg/dL    Globulin 2.4 gm/dL    A/G Ratio 1.6 g/dL    BUN/Creatinine Ratio 10.2 7.0 - 25.0    Anion Gap 11.0 5.0 - 15.0 mmol/L    eGFR 101.2 >60.0 mL/min/1.73   CBC Auto Differential    Collection Time: 06/15/22  3:40 AM    Specimen: Blood   Result Value Ref Range    WBC 11.60 (H) 3.40 - 10.80 10*3/mm3    RBC 4.76 4.14 - 5.80 10*6/mm3    Hemoglobin 13.6 13.0 - 17.7 g/dL    Hematocrit 40.7 37.5 - 51.0 %    MCV 85.6 79.0 - 97.0 fL    MCH 28.5 26.6 - 33.0 pg    MCHC 33.3 31.5 - 35.7 g/dL    RDW 13.3 12.3 - 15.4 %    RDW-SD 39.8 37.0 - 54.0 fl    MPV 6.5 6.0 - 12.0 fL    Platelets 336 140 - 450 10*3/mm3    Neutrophil % 86.9 (H) 42.7 - 76.0 %    Lymphocyte % 8.8 (L) 19.6 - 45.3 %    Monocyte % 3.5 (L) 5.0 - 12.0 %    Eosinophil % 0.0 (L) 0.3 - 6.2 %    Basophil % 0.8 0.0 - 1.5 %    Neutrophils, Absolute 10.10 (H) 1.70 - 7.00 10*3/mm3    Lymphocytes, Absolute 1.00 0.70 - 3.10 10*3/mm3    Monocytes, Absolute 0.40 0.10 - 0.90 10*3/mm3    Eosinophils, Absolute 0.00 0.00 - 0.40 10*3/mm3    Basophils, Absolute 0.10 0.00 - 0.20 10*3/mm3    nRBC 0.0 0.0 - 0.2 /100 WBC   Protime-INR    Collection Time: 06/15/22  3:40 AM    Specimen: Blood   Result Value Ref Range    Protime 10.3 9.6 - 11.7 Seconds    INR 1.00 0.93 - 1.10   aPTT    Collection Time: 06/15/22  3:40 AM    Specimen: Blood   Result Value Ref Range    PTT 28.8 24.0 - 31.0 seconds   ECG 12 Lead    Collection Time:  06/15/22  7:39 AM   Result Value Ref Range    QT Interval 384 ms        Imaging:  Cardiac Catheterization/Vascular Study  Cardiac catheterization/PCI report    DATE OF PROCEDURE: 6/14/2022    INDICATION FOR PROCEDURE:    Acute inferior myocardial infarction  Hypertension  Hyperlipidemia    PROCEDURE PERFORMED:    Left heart catheterization  coronary angiography  left ventriculography  Balloon angioplasty and stenting of RCA coronary artery    FINDINGS:    1. HEMODYNAMICS:      Aortic pressure: 90/60 mmHg    LVEDP: 5 to 10 mmHg    Gradient across aortic valve on pullback: No gradient across aortic valve    2. LEFT VENTRICULOGRAPHY: 65%    3. CORONARY ANGIOGRAPHY:           A: Left main coronary artery: Normal           B: Left anterior descending artery: 80% to 90% proximal LAD   stenosis           C: Left circumflex coronary artery: 40 to 50% distal LCx/OM plaque.    20 to 25% plaque in the proximal and mid segment of LCx           D: Right coronary artery: 70 to 80% stenosis in the proximal   segment of LCx, 99% stenosis in mid segment of RCA and 90 to 95% stenosis   in the proximal PDA extending into the midsegment.  DARRYL I flow was   present prior to the intervention and DARRYL-3 flow was present after the   intervention.  RCA was dominant vessel    PROCEDURE COMMENTS:     After informed consent was obtained, the patient was prepped and draped in   the usual sterile manner.  Mild to moderate sedation was administered.    Right femoral artery was accessed without difficulty and 6 [place] arterial   sheath was inserted.  Sheath was flushed with heparinized saline.    Using 6 [place] Cristal catheters, first left coronary artery and the right   coronary was electively engaged and appropriate views were taken.  A 6   [place]acedonian JR4 catheter was used to cross aortic valve and left heart   catheterization was performed.  Left ventriculography was done in a   review.    After diagnostic catheterization, we proceeded to the  intervention of the   RCA coronary artery.    We advanced a 6 [place] JR4 with sideholes interventional guide and   selective engagement of the right coronary artery was achieved.  We   advanced interventional guidewire 0.014 [employer] and crossed the lesion and   placed in the distal part of the PDA branch of RCA coronary artery.    We advanced 2.0x12 compliant balloon and the pre-dilatation at nominal   pressure.  After the predilatation, this balloon was removed and we   advanced 2.25x12 drug-eluting stent and advanced and placed over the   lesion in the mid segment of RCA and deployed at nominal pressure.  We   advanced 2.25x18 stent and covering the first and deployed in the proximal   segment.  We took 2.5x23 stent covering the second stent all the way up to   the ostium and deployed at nominal pressure.  Stenosis decreased from 99%   to less than 10%.  No dissection, perforation or no reflow phenomena was   noted.    After intervention of mid and proximal segment of RCA we saw there was a   high-grade stenosis of PDA.  We did predilatation with 2.0x20 NC balloon   and followed by 2.0x22 [place] stent and deployed in the mid and proximal   segment of the PDA.  We had a second stent 2.0x12 [place] stent and deployed   covering the first stent in the proximal segment of PDA and distal RCA.    Stenosis decreased from 90 to 95% to less than 10%.  DARRYL-3 flow was   present.    Patient was given aspirin, Plavix and heparin during the procedure.  ACT   at the end of procedure was 243    Patient tolerated the procedure well.    SUMMARY:     1.  Three-vessel coronary artery disease  2.  High-grade stenosis of LAD and RCA  3.  Successful stenting of RCA with multiple drug-eluting stent which was   because of acute inferior myocardial infarction    RECOMMENDATIONS:     DAPT with aspirin and Plavix.  Patient would need LAD stenting in future.  XR Chest 1 View  Narrative: Examination: XR CHEST 1 VW    Date of Exam: 6/15/2022 1:35  EDT    Indication: Pain with respirations..  Chest pain today     Comparison: Single frontal view chest performed on June 8, 2022    Technique: Single frontal view chest    Findings:  Today's study reveals heart and mediastinum are normal. No evidence of infiltrate or effusion or pneumothorax or mass the right or left lungs. There is mild dextroscoliosis in the thoracic spine  Impression: No acute disease in the chest and no significant change since previous study performed on June 8, 2022    Electronically Signed: Nael Goodman MD 6/15/2022 8:56 EDT       ASSESSMENT:  Acute inferior myocardial infarction (HCC)  Coronary artery disease  COVID-19 positive  Chronic obstructive airway disease  Obstructive sleep apnea  Morbid obesity  Hypertension  Gastroesophageal flux disorder  Hyperlipidemia     PLAN:  Continue antiplatelets  Beta-blocker  Record his positive COVID-19 we are going to monitor for now no signs of pulmonary involvement  Bronchodilator  Inhaled corticosteroids  Electrolytes/ glycemic control  DVT and GI prophylaxis.       Total Critical care time in direct medical management (   ) minutes. This time specifically excludes time spent performing procedures.  Deanna Braun MD. D, ABSM.     6/15/2022  10:15 EDT

## 2022-06-16 ENCOUNTER — APPOINTMENT (OUTPATIENT)
Dept: GENERAL RADIOLOGY | Facility: HOSPITAL | Age: 37
End: 2022-06-16

## 2022-06-16 PROBLEM — F69 RAGE ATTACKS: Chronic | Status: ACTIVE | Noted: 2022-06-16

## 2022-06-16 PROBLEM — F41.0 GENERALIZED ANXIETY DISORDER WITH PANIC ATTACKS: Chronic | Status: ACTIVE | Noted: 2022-06-16

## 2022-06-16 PROBLEM — F31.81 BIPOLAR II DISORDER: Chronic | Status: ACTIVE | Noted: 2022-06-16

## 2022-06-16 PROBLEM — F41.1 GENERALIZED ANXIETY DISORDER WITH PANIC ATTACKS: Chronic | Status: ACTIVE | Noted: 2022-06-16

## 2022-06-16 PROBLEM — Z86.59: Chronic | Status: ACTIVE | Noted: 2022-06-16

## 2022-06-16 LAB
ACT BLD: 132 SECONDS (ref 89–137)
ALBUMIN SERPL-MCNC: 4.3 G/DL (ref 3.5–5.2)
ALBUMIN/GLOB SERPL: 1.5 G/DL
ALP SERPL-CCNC: 89 U/L (ref 39–117)
ALT SERPL W P-5'-P-CCNC: 42 U/L (ref 1–41)
ANION GAP SERPL CALCULATED.3IONS-SCNC: 13 MMOL/L (ref 5–15)
AST SERPL-CCNC: 26 U/L (ref 1–40)
BASOPHILS # BLD AUTO: 0.1 10*3/MM3 (ref 0–0.2)
BASOPHILS NFR BLD AUTO: 0.7 % (ref 0–1.5)
BILIRUB SERPL-MCNC: 0.3 MG/DL (ref 0–1.2)
BUN SERPL-MCNC: 14 MG/DL (ref 6–20)
BUN/CREAT SERPL: 14.4 (ref 7–25)
CALCIUM SPEC-SCNC: 8.9 MG/DL (ref 8.6–10.5)
CHLORIDE SERPL-SCNC: 103 MMOL/L (ref 98–107)
CO2 SERPL-SCNC: 20 MMOL/L (ref 22–29)
CREAT SERPL-MCNC: 0.97 MG/DL (ref 0.76–1.27)
DEPRECATED RDW RBC AUTO: 40.7 FL (ref 37–54)
EGFRCR SERPLBLD CKD-EPI 2021: 102.5 ML/MIN/1.73
EOSINOPHIL # BLD AUTO: 0.1 10*3/MM3 (ref 0–0.4)
EOSINOPHIL NFR BLD AUTO: 0.9 % (ref 0.3–6.2)
ERYTHROCYTE [DISTWIDTH] IN BLOOD BY AUTOMATED COUNT: 13.4 % (ref 12.3–15.4)
GLOBULIN UR ELPH-MCNC: 2.8 GM/DL
GLUCOSE SERPL-MCNC: 106 MG/DL (ref 65–99)
HCT VFR BLD AUTO: 43.5 % (ref 37.5–51)
HGB BLD-MCNC: 14.6 G/DL (ref 13–17.7)
INR PPP: 0.99 (ref 0.93–1.1)
LYMPHOCYTES # BLD AUTO: 1.4 10*3/MM3 (ref 0.7–3.1)
LYMPHOCYTES NFR BLD AUTO: 11.6 % (ref 19.6–45.3)
MAGNESIUM SERPL-MCNC: 2.2 MG/DL (ref 1.6–2.6)
MCH RBC QN AUTO: 28.8 PG (ref 26.6–33)
MCHC RBC AUTO-ENTMCNC: 33.5 G/DL (ref 31.5–35.7)
MCV RBC AUTO: 85.9 FL (ref 79–97)
MONOCYTES # BLD AUTO: 0.3 10*3/MM3 (ref 0.1–0.9)
MONOCYTES NFR BLD AUTO: 2.7 % (ref 5–12)
NEUTROPHILS NFR BLD AUTO: 10.2 10*3/MM3 (ref 1.7–7)
NEUTROPHILS NFR BLD AUTO: 84.1 % (ref 42.7–76)
NRBC BLD AUTO-RTO: 0.3 /100 WBC (ref 0–0.2)
PHOSPHATE SERPL-MCNC: 2 MG/DL (ref 2.5–4.5)
PLATELET # BLD AUTO: 329 10*3/MM3 (ref 140–450)
PMV BLD AUTO: 7.2 FL (ref 6–12)
POTASSIUM SERPL-SCNC: 4.6 MMOL/L (ref 3.5–5.2)
PROT SERPL-MCNC: 7.1 G/DL (ref 6–8.5)
PROTHROMBIN TIME: 10.2 SECONDS (ref 9.6–11.7)
RBC # BLD AUTO: 5.06 10*6/MM3 (ref 4.14–5.8)
SODIUM SERPL-SCNC: 136 MMOL/L (ref 136–145)
WBC NRBC COR # BLD: 12.1 10*3/MM3 (ref 3.4–10.8)

## 2022-06-16 PROCEDURE — 85025 COMPLETE CBC W/AUTO DIFF WBC: CPT | Performed by: INTERNAL MEDICINE

## 2022-06-16 PROCEDURE — 63710000001 DIPHENHYDRAMINE PER 50 MG: Performed by: NURSE PRACTITIONER

## 2022-06-16 PROCEDURE — C1894 INTRO/SHEATH, NON-LASER: HCPCS | Performed by: INTERNAL MEDICINE

## 2022-06-16 PROCEDURE — C1769 GUIDE WIRE: HCPCS | Performed by: INTERNAL MEDICINE

## 2022-06-16 PROCEDURE — 99153 MOD SED SAME PHYS/QHP EA: CPT | Performed by: INTERNAL MEDICINE

## 2022-06-16 PROCEDURE — 85610 PROTHROMBIN TIME: CPT | Performed by: NURSE PRACTITIONER

## 2022-06-16 PROCEDURE — 99152 MOD SED SAME PHYS/QHP 5/>YRS: CPT | Performed by: INTERNAL MEDICINE

## 2022-06-16 PROCEDURE — 25010000002 ONDANSETRON PER 1 MG: Performed by: INTERNAL MEDICINE

## 2022-06-16 PROCEDURE — 25010000002 FENTANYL CITRATE (PF) 50 MCG/ML SOLUTION: Performed by: INTERNAL MEDICINE

## 2022-06-16 PROCEDURE — C1874 STENT, COATED/COV W/DEL SYS: HCPCS | Performed by: INTERNAL MEDICINE

## 2022-06-16 PROCEDURE — C1725 CATH, TRANSLUMIN NON-LASER: HCPCS | Performed by: INTERNAL MEDICINE

## 2022-06-16 PROCEDURE — C9600 PERC DRUG-EL COR STENT SING: HCPCS | Performed by: INTERNAL MEDICINE

## 2022-06-16 PROCEDURE — 63710000001 PREDNISONE PER 5 MG: Performed by: NURSE PRACTITIONER

## 2022-06-16 PROCEDURE — C1887 CATHETER, GUIDING: HCPCS | Performed by: INTERNAL MEDICINE

## 2022-06-16 PROCEDURE — 99221 1ST HOSP IP/OBS SF/LOW 40: CPT

## 2022-06-16 PROCEDURE — 99233 SBSQ HOSP IP/OBS HIGH 50: CPT | Performed by: INTERNAL MEDICINE

## 2022-06-16 PROCEDURE — 92928 PRQ TCAT PLMT NTRAC ST 1 LES: CPT | Performed by: INTERNAL MEDICINE

## 2022-06-16 PROCEDURE — 84100 ASSAY OF PHOSPHORUS: CPT | Performed by: NURSE PRACTITIONER

## 2022-06-16 PROCEDURE — 71045 X-RAY EXAM CHEST 1 VIEW: CPT

## 2022-06-16 PROCEDURE — 93005 ELECTROCARDIOGRAM TRACING: CPT | Performed by: INTERNAL MEDICINE

## 2022-06-16 PROCEDURE — 80053 COMPREHEN METABOLIC PANEL: CPT | Performed by: INTERNAL MEDICINE

## 2022-06-16 PROCEDURE — 027034Z DILATION OF CORONARY ARTERY, ONE ARTERY WITH DRUG-ELUTING INTRALUMINAL DEVICE, PERCUTANEOUS APPROACH: ICD-10-PCS | Performed by: INTERNAL MEDICINE

## 2022-06-16 PROCEDURE — 25010000002 HEPARIN (PORCINE) PER 1000 UNITS: Performed by: INTERNAL MEDICINE

## 2022-06-16 PROCEDURE — 25010000002 LORAZEPAM PER 2 MG: Performed by: INTERNAL MEDICINE

## 2022-06-16 PROCEDURE — 99232 SBSQ HOSP IP/OBS MODERATE 35: CPT | Performed by: INTERNAL MEDICINE

## 2022-06-16 PROCEDURE — 85347 COAGULATION TIME ACTIVATED: CPT

## 2022-06-16 PROCEDURE — 0 IOPAMIDOL PER 1 ML: Performed by: INTERNAL MEDICINE

## 2022-06-16 PROCEDURE — 25010000002 MIDAZOLAM PER 1 MG: Performed by: INTERNAL MEDICINE

## 2022-06-16 PROCEDURE — 83735 ASSAY OF MAGNESIUM: CPT | Performed by: NURSE PRACTITIONER

## 2022-06-16 DEVICE — XIENCE SKYPOINT™ EVEROLIMUS ELUTING CORONARY STENT SYSTEM 2.50 MM X 28 MM / RAPID-EXCHANGE
Type: IMPLANTABLE DEVICE | Site: HEART | Status: FUNCTIONAL
Brand: XIENCE SKYPOINT™

## 2022-06-16 RX ORDER — SODIUM CHLORIDE 9 MG/ML
250 INJECTION, SOLUTION INTRAVENOUS ONCE AS NEEDED
Status: DISCONTINUED | OUTPATIENT
Start: 2022-06-16 | End: 2022-06-17 | Stop reason: HOSPADM

## 2022-06-16 RX ORDER — CLOPIDOGREL BISULFATE 75 MG/1
TABLET ORAL AS NEEDED
Status: DISCONTINUED | OUTPATIENT
Start: 2022-06-16 | End: 2022-06-16 | Stop reason: HOSPADM

## 2022-06-16 RX ORDER — ONDANSETRON 2 MG/ML
INJECTION INTRAMUSCULAR; INTRAVENOUS AS NEEDED
Status: DISCONTINUED | OUTPATIENT
Start: 2022-06-16 | End: 2022-06-16 | Stop reason: HOSPADM

## 2022-06-16 RX ORDER — SODIUM CHLORIDE 9 MG/ML
INJECTION, SOLUTION INTRAVENOUS CONTINUOUS PRN
Status: COMPLETED | OUTPATIENT
Start: 2022-06-16 | End: 2022-06-16

## 2022-06-16 RX ORDER — NITROGLYCERIN 5 MG/ML
INJECTION, SOLUTION INTRAVENOUS AS NEEDED
Status: DISCONTINUED | OUTPATIENT
Start: 2022-06-16 | End: 2022-06-16 | Stop reason: HOSPADM

## 2022-06-16 RX ORDER — HEPARIN SODIUM 1000 [USP'U]/ML
INJECTION, SOLUTION INTRAVENOUS; SUBCUTANEOUS AS NEEDED
Status: DISCONTINUED | OUTPATIENT
Start: 2022-06-16 | End: 2022-06-16 | Stop reason: HOSPADM

## 2022-06-16 RX ORDER — LIDOCAINE HYDROCHLORIDE 20 MG/ML
INJECTION, SOLUTION INFILTRATION; PERINEURAL AS NEEDED
Status: DISCONTINUED | OUTPATIENT
Start: 2022-06-16 | End: 2022-06-16 | Stop reason: HOSPADM

## 2022-06-16 RX ORDER — LISINOPRIL 5 MG/1
10 TABLET ORAL
Status: DISCONTINUED | OUTPATIENT
Start: 2022-06-16 | End: 2022-06-17 | Stop reason: HOSPADM

## 2022-06-16 RX ORDER — MIDAZOLAM HYDROCHLORIDE 1 MG/ML
INJECTION INTRAMUSCULAR; INTRAVENOUS AS NEEDED
Status: DISCONTINUED | OUTPATIENT
Start: 2022-06-16 | End: 2022-06-16 | Stop reason: HOSPADM

## 2022-06-16 RX ORDER — LORAZEPAM 2 MG/ML
INJECTION INTRAMUSCULAR AS NEEDED
Status: DISCONTINUED | OUTPATIENT
Start: 2022-06-16 | End: 2022-06-16 | Stop reason: HOSPADM

## 2022-06-16 RX ORDER — FAMOTIDINE 20 MG/1
20 TABLET, FILM COATED ORAL
Status: DISCONTINUED | OUTPATIENT
Start: 2022-06-16 | End: 2022-06-17 | Stop reason: HOSPADM

## 2022-06-16 RX ORDER — DIAZEPAM 5 MG/1
5 TABLET ORAL EVERY 6 HOURS PRN
Status: DISCONTINUED | OUTPATIENT
Start: 2022-06-16 | End: 2022-06-17 | Stop reason: HOSPADM

## 2022-06-16 RX ORDER — BUSPIRONE HYDROCHLORIDE 5 MG/1
10 TABLET ORAL EVERY 12 HOURS SCHEDULED
Status: DISCONTINUED | OUTPATIENT
Start: 2022-06-16 | End: 2022-06-17

## 2022-06-16 RX ORDER — FENTANYL CITRATE 50 UG/ML
INJECTION, SOLUTION INTRAMUSCULAR; INTRAVENOUS AS NEEDED
Status: DISCONTINUED | OUTPATIENT
Start: 2022-06-16 | End: 2022-06-16 | Stop reason: HOSPADM

## 2022-06-16 RX ADMIN — METOPROLOL TARTRATE 25 MG: 25 TABLET, FILM COATED ORAL at 08:11

## 2022-06-16 RX ADMIN — ATORVASTATIN CALCIUM 80 MG: 40 TABLET, FILM COATED ORAL at 21:34

## 2022-06-16 RX ADMIN — CLOPIDOGREL BISULFATE 75 MG: 75 TABLET ORAL at 08:09

## 2022-06-16 RX ADMIN — PREDNISONE 50 MG: 50 TABLET ORAL at 04:16

## 2022-06-16 RX ADMIN — PREDNISONE 50 MG: 50 TABLET ORAL at 08:10

## 2022-06-16 RX ADMIN — ASPIRIN 81 MG: 81 TABLET, COATED ORAL at 08:10

## 2022-06-16 RX ADMIN — Medication 10 ML: at 08:10

## 2022-06-16 RX ADMIN — LISINOPRIL 10 MG: 5 TABLET ORAL at 12:18

## 2022-06-16 RX ADMIN — METOPROLOL TARTRATE 25 MG: 25 TABLET, FILM COATED ORAL at 21:34

## 2022-06-16 RX ADMIN — FAMOTIDINE 20 MG: 20 TABLET ORAL at 17:56

## 2022-06-16 RX ADMIN — DIPHENHYDRAMINE HYDROCHLORIDE 50 MG: 25 CAPSULE ORAL at 08:10

## 2022-06-16 RX ADMIN — DIAZEPAM 5 MG: 5 TABLET ORAL at 22:01

## 2022-06-16 RX ADMIN — Medication 10 ML: at 21:34

## 2022-06-16 RX ADMIN — NICOTINE 1 PATCH: 14 PATCH, EXTENDED RELEASE TRANSDERMAL at 06:44

## 2022-06-16 RX ADMIN — BUSPIRONE HYDROCHLORIDE 10 MG: 5 TABLET ORAL at 21:34

## 2022-06-16 RX ADMIN — ALUMINA, MAGNESIA, AND SIMETHICONE: 2400; 2400; 240 SUSPENSION ORAL at 17:56

## 2022-06-16 NOTE — CASE MANAGEMENT/SOCIAL WORK
Continued Stay Note  [facility]     Patient Name: Tramaine Gates  MRN: [id number]  Today's Date: 6/16/2022    Admit Date: 6/14/2022     Discharge Plan     Row Name 06/16/22 1300       Plan    Plan DC Plan: Anticipate Routine Home with Family    Patient/Family in Agreement with Plan yes    Provided Post Acute Provider List? N/A    Provided Post Acute Provider Quality & Resource List? N/A    Plan Comments CM spoke with patient’s nurse and Intensivist NP to obtain clinical updates. Plan to downgrade to PCU. Patient continues to have chest pain. DC Barrier: Heart Catheterization 6/16/22 with plan for stent to LAD.              Phone communication or documentation only- no physical contact with patient or family.        Amaris Welch RN     Office Phone: [phone]  Office Cell:     [phone]

## 2022-06-16 NOTE — PLAN OF CARE
Goal Outcome Evaluation:      Pt came from cath lab, post cath. Pt is stable at this time, awaiting sheath pull for ACT levels to come down within acceptable range to pull arterial sheath. Pt still nauseous at this time.   Problem: Adult Inpatient Plan of Care  Goal: Plan of Care Review  Outcome: Ongoing, Progressing  Goal: Patient-Specific Goal (Individualized)  Outcome: Ongoing, Progressing  Goal: Absence of Hospital-Acquired Illness or Injury  Outcome: Ongoing, Progressing  Intervention: Prevent and Manage VTE (Venous Thromboembolism) Risk  Goal: Optimal Comfort and Wellbeing  Outcome: Ongoing, Progressing  Intervention: Provide Person-Centered Care  Goal: Readiness for Transition of Care  Outcome: Ongoing, Progressing     Problem: Skin Injury Risk Increased  Goal: Skin Health and Integrity  Outcome: Ongoing, Progressing

## 2022-06-16 NOTE — PLAN OF CARE
Problem: Adult Inpatient Plan of Care  Goal: Plan of Care Review  Outcome: Ongoing, Progressing  Flowsheets (Taken 6/16/2022 0228)  Progress: improving  Plan of Care Reviewed With: patient  Goal: Patient-Specific Goal (Individualized)  Outcome: Ongoing, Progressing  Goal: Absence of Hospital-Acquired Illness or Injury  Outcome: Ongoing, Progressing  Intervention: Identify and Manage Fall Risk  Recent Flowsheet Documentation  Taken 6/16/2022 0000 by Ashley Gibbons RN  Safety Promotion/Fall Prevention:   activity supervised   assistive device/personal items within reach   clutter free environment maintained   patient off unit   safety round/check completed  Taken 6/15/2022 2000 by Ashley Gibbons RN  Safety Promotion/Fall Prevention:   activity supervised   assistive device/personal items within reach   clutter free environment maintained   nonskid shoes/slippers when out of bed   safety round/check completed   toileting scheduled  Intervention: Prevent Skin Injury  Recent Flowsheet Documentation  Taken 6/16/2022 0000 by Ashley Gibbons RN  Body Position: position changed independently  Skin Protection: tubing/devices free from skin contact  Taken 6/15/2022 2000 by Ashley Gibbons RN  Body Position: position changed independently  Skin Protection: tubing/devices free from skin contact  Intervention: Prevent and Manage VTE (Venous Thromboembolism) Risk  Recent Flowsheet Documentation  Taken 6/16/2022 0000 by Ashley Gibbons RN  Activity Management: bedrest  Taken 6/15/2022 2000 by Ashley Gibbons RN  Activity Management: bedrest  Range of Motion: active ROM (range of motion) encouraged  Intervention: Prevent Infection  Recent Flowsheet Documentation  Taken 6/16/2022 0000 by Ashley Gibbons RN  Infection Prevention: equipment surfaces disinfected  Taken 6/15/2022 2000 by Ashley Gibbons RN  Infection Prevention: equipment surfaces disinfected  Goal: Optimal Comfort and Wellbeing  Outcome: Ongoing, Progressing  Goal:  Readiness for Transition of Care  Outcome: Ongoing, Progressing     Problem: Skin Injury Risk Increased  Goal: Skin Health and Integrity  Outcome: Ongoing, Progressing  Intervention: Optimize Skin Protection  Recent Flowsheet Documentation  Taken 6/16/2022 0000 by Ashley Gibbons RN  Pressure Reduction Techniques: frequent weight shift encouraged  Head of Bed (HOB) Positioning: HOB at 30-45 degrees  Pressure Reduction Devices: specialty bed utilized  Skin Protection: tubing/devices free from skin contact  Taken 6/15/2022 2000 by Ashley Gibbons RN  Pressure Reduction Techniques: frequent weight shift encouraged  Head of Bed (HOB) Positioning: HOB at 30-45 degrees  Pressure Reduction Devices: specialty bed utilized  Skin Protection: tubing/devices free from skin contact   Goal Outcome Evaluation:  Plan of Care Reviewed With: patient        Progress: improving    Patients V/S stable throughout shift, patient is able to get up and ambulate safely in room.  Patient continues to complain of left sided chest pain, MD is aware, patient is scheduled to go to cath lab today for stent procedure, patient is aware.

## 2022-06-16 NOTE — PAYOR COMM NOTE
"CLINICAL UPDATE 06/16/2022:        Tramaine Gates (36 y.o. Male) 1985  AUTH # [id number]            AUTHORIZATION PENDING:   PLEASE CALL OR FAX DETERMINATION TO CONTACT BELOW. THANK YOU.        Sri Kemp, RN MSN  /UR  [facility]  [phone] office  [phone] fax  [email or web address]    [facility]  NPI: [id number]  Tax: [id number]            Tramaine Gates (36 y.o. Male)             Date of Birth   1985    Social Security Number       Address   [address] [place] [address] [place] [facility]    Home Phone   [phone]    MRN   [id number]       Yazidi   None    Marital Status   Single                            Admission Date   6/14/22    Admission Type   Urgent    Admitting Provider   Deanna Braun MD    Attending Provider   Janna Nicholas DO    Department, Room/Bed   [facility] INTENSIVE CARE UNIT, 2301/1       Discharge Date       Discharge Disposition       Discharge Destination                               Attending Provider: Janna Nicholas DO    Allergies: Codeine, Hydrocodone, Hydrocodone-acetaminophen, Shellfish-derived Products    Isolation: None   Infection: None   Code Status: CPR   Advance Care Planning Activity    Ht: 177.8 cm (70\")   Wt: 123 kg (270 lb 1 oz)    Admission Cmt: None   Principal Problem: CAD, multiple vessel [I25.10] More...                 Active Insurance as of 6/14/2022     Primary Coverage     Payor Plan Insurance Group Employer/Plan Group    [id number]     Payor Plan Address Payor Plan Phone Number Payor Plan Fax Number Effective Dates    MAIL STOP:   3/1/2022 - None Entered    [address] [address]       [facility] [address]       Subscriber Name Subscriber Birth Date Member ID       TRAMAINE GATES 1985 [id number]                 Emergency Contacts      (Rel.) Home Phone Work Phone Mobile Phone    BenitoMagi (Mother) " [phone] -- [phone]    CATRACHO SHARMA (Significant Other) [phone] -- --               Physician Progress Notes (all)      Matthieu Del Toro MD at 06/15/22 1046          CARDIOLOGY FOLLOW-UP PROGRESS NOTE      Reason for follow-up:    STEMI  CAD  S/p PC to RCA      Attending: Deanna Braun MD      Subjective .     C/o aching and sore all over chest but improved from yesterday     Review of Systems   Unable to perform ROS: acuity of condition       Allergies: Codeine, Hydrocodone, Hydrocodone-acetaminophen, and Shellfish-derived products    Scheduled Meds:arformoterol, 15 mcg, Nebulization, BID - RT  aspirin, 81 mg, Oral, Daily  atorvastatin, 80 mg, Oral, Nightly  budesonide, 1 mg, Nebulization, BID - RT  clopidogrel, 75 mg, Oral, Daily  [START ON 6/16/2022] diphenhydrAMINE, 50 mg, Oral, Once  [START ON 6/16/2022] levothyroxine, 50 mcg, Oral, Q AM  metoprolol tartrate, 25 mg, Oral, Q12H  nicotine, 1 patch, Transdermal, Q24H  predniSONE, 50 mg, Oral, Q6H  sodium chloride, 1,000 mL, Intravenous, Once  sodium chloride, 10 mL, Intravenous, Q12H        Continuous Infusions:DOPamine, 3 mcg/kg/min, Last Rate: Stopped (06/14/22 2130)  nitroglycerin, 5-200 mcg/min, Last Rate: Stopped (06/15/22 0914)        PRN Meds:.•  acetaminophen **OR** acetaminophen  •  aluminum-magnesium hydroxide-simethicone  •  atropine  •  diphenhydrAMINE  •  nitroglycerin  •  ondansetron **OR** ondansetron  •  sodium chloride  •  sodium chloride    Objective     VITAL SIGNS  Patient Vitals for the past 24 hrs:   BP Temp Temp src Pulse Resp SpO2   06/15/22 1700 -- 98.7 °F (37.1 °C) Axillary -- 15 --   06/15/22 1600 98/54 -- -- 65 -- 93 %   06/15/22 1400 138/96 -- -- 83 -- 94 %   06/15/22 1300 112/75 -- -- 65 -- 96 %   06/15/22 1200 94/54 -- -- 70 -- 94 %   06/15/22 1100 118/63 98 °F (36.7 °C) Axillary 68 13 95 %   06/15/22 1000 122/75 -- -- 82 -- 94 %   06/15/22 0900 124/71 -- -- 71 -- 96 %   06/15/22 0800 133/81 -- -- 78 -- 95 %  "  06/15/22 0700 103/67 94.1 °F (34.5 °C) Axillary 76 18 90 %   06/15/22 0030 118/81 -- -- 82 -- 92 %   06/15/22 0000 123/85 96.6 °F (35.9 °C) Axillary 89 20 92 %   06/14/22 2330 124/79 -- -- 81 -- 94 %   06/14/22 2300 120/78 -- -- 73 -- 92 %   06/14/22 2230 122/80 -- -- 76 -- 94 %   06/14/22 2200 121/65 -- -- 86 -- 91 %   06/14/22 2130 126/81 -- -- 97 -- 92 %   06/14/22 2100 107/63 -- -- 82 -- (!) 88 %   06/14/22 2030 125/64 -- -- 89 -- 90 %   06/14/22 2000 108/59 -- -- 82 -- (!) 88 %   06/14/22 1930 102/52 95.7 °F (35.4 °C) Axillary 92 18 93 %   06/14/22 1900 104/54 -- -- 82 -- --        Flowsheet Rows    Flowsheet Row First Filed Value   Admission Height 177.8 cm (70\") Documented at 06/14/2022 1233   Admission Weight 123 kg (270 lb 1 oz) Documented at 06/14/2022 1233          Body mass index is 38.75 kg/m².      Intake/Output Summary (Last 24 hours) at 6/15/2022 1800  Last data filed at 6/15/2022 1600  Gross per 24 hour   Intake 3703.6 ml   Output 5250 ml   Net -1546.4 ml        TELEMETRY:     SR    Physical Exam:  Physical Exam    Physical exam is deferred to primary team because of COVID isolation      Results Review:   I reviewed the patient's new clinical results.    CBC    Results from last 7 days   Lab Units 06/15/22  0340 06/14/22  1845 06/14/22  1158   WBC 10*3/mm3 11.60* 12.30* 10.70   HEMOGLOBIN g/dL 13.6 15.2 14.6   PLATELETS 10*3/mm3 336 358 328     BMP   Results from last 7 days   Lab Units 06/15/22  0340 06/14/22  2310 06/14/22  1845 06/14/22  1158   SODIUM mmol/L 133* 135* 132*  --    POTASSIUM mmol/L 4.3 4.7 4.6  --    CHLORIDE mmol/L 101 101 100  --    CO2 mmol/L 21.0* 19.0* 18.0*  --    BUN mg/dL 10 11 14  --    CREATININE mg/dL 0.98 1.08 1.19  --    GLUCOSE mg/dL 119* 126* 173*  --    MAGNESIUM mg/dL 2.0 2.0  --  1.8   PHOSPHORUS mg/dL 3.0 1.9*  --  2.8     Cr Clearance Estimated Creatinine Clearance: 134.4 mL/min (by C-G formula based on SCr of 0.98 mg/dL).  Coag   Results from last 7 days "   Lab Units 06/15/22  0340 06/14/22  1158   INR  1.00 1.08   APTT seconds 28.8 138.0*     HbA1C No results found for: HGBA1C  Blood Glucose No results found for: POCGLU  Infection     CMP   Results from last 7 days   Lab Units 06/15/22  0340 06/14/22 2310 06/14/22  1845   SODIUM mmol/L 133* 135* 132*   POTASSIUM mmol/L 4.3 4.7 4.6   CHLORIDE mmol/L 101 101 100   CO2 mmol/L 21.0* 19.0* 18.0*   BUN mg/dL 10 11 14   CREATININE mg/dL 0.98 1.08 1.19   GLUCOSE mg/dL 119* 126* 173*   ALBUMIN g/dL 3.90 4.00 4.00   BILIRUBIN mg/dL 0.4 0.3 0.3   ALK PHOS U/L 94 97 101   AST (SGOT) U/L 27 25 24   ALT (SGPT) U/L 35 37 38     ABG      UA      FABIOLA  No results found for: POCMETH, POCAMPHET, POCBARBITUR, POCBENZO, POCCOCAINE, POCOPIATES, POCOXYCODO, POCPHENCYC, POCPROPOXY, POCTHC, POCTRICYC  Lysis Labs   Results from last 7 days   Lab Units 06/15/22  0340 06/14/22 2310 06/14/22  1845 06/14/22  1158   INR  1.00  --   --  1.08   APTT seconds 28.8  --   --  138.0*   HEMOGLOBIN g/dL 13.6  --  15.2 14.6   PLATELETS 10*3/mm3 336  --  358 328   CREATININE mg/dL 0.98 1.08 1.19  --      Radiology(recent) XR Chest 1 View    Result Date: 6/15/2022  No acute disease in the chest and no significant change since previous study performed on June 8, 2022 Electronically Signed: Nael Goodman MD 6/15/2022 8:56 EDT      Imaging Results (Last 24 Hours)     Procedure Component Value Units Date/Time    XR Chest 1 View [[id number]] Collected: 06/15/22 0854     Updated: 06/15/22 0858    Narrative:      Examination: XR CHEST 1 VW    Date of Exam: 6/15/2022 1:35 EDT    Indication: Pain with respirations..  Chest pain today     Comparison: Single frontal view chest performed on June 8, 2022    Technique: Single frontal view chest    Findings:  Today's study reveals heart and mediastinum are normal. No evidence of infiltrate or effusion or pneumothorax or mass the right or left lungs. There is mild dextroscoliosis in the thoracic spine       Impression:      No acute disease in the chest and no significant change since previous study performed on June 8, 2022    Electronically Signed: Nael Goodman MD 6/15/2022 8:56 EDT          Results from last 7 days   Lab Units 06/15/22  0340   TROPONIN T ng/mL 0.081*       EKG              I personally viewed and interpreted the patient's EKG/Telemetry data:        ECHOCARDIOGRAM:     Results for orders placed during the hospital encounter of 06/14/22    Adult Transthoracic Echo Limited W/ Cont if Necessary Per Protocol    Interpretation Summary  · Estimated left ventricular EF = 60% Left ventricular systolic function is normal.    Indications  Recent ACS    Technically difficult study.  (COVID protocol)  Mitral valve is structurally normal.  Tricuspid valve is structurally normal.  Aortic valve is structurally normal.  Pulmonic valve could not be well visualized.  No evidence for mitral tricuspid or aortic regurgitation is seen by Doppler study.  Left atrium is normal in size.  Right atrium is normal in size.  Left ventricle is normal in size and contractility with ejection fraction of 60%.  Right ventricle is normal in size.  No pericardial effusion or intracardiac thrombus is seen.    Impression  Technically difficult and limited study (COVID protocol.  In the views obtained,  Structurally and functionally normal cardiac valves.  Left ventricular size and contractility is normal with ejection fraction of 60%.  No pericardial effusion or intracardiac thrombus is present.        STRESS MYOVIEW:      CARDIAC CATHETERIZATION:    Cardiac catheterization/PCI report     DATE OF PROCEDURE: 6/14/2022     INDICATION FOR PROCEDURE:     Acute inferior myocardial infarction  Hypertension  Hyperlipidemia     PROCEDURE PERFORMED:     Left heart catheterization  coronary angiography  left ventriculography  Balloon angioplasty and stenting of RCA coronary artery     FINDINGS:     1. HEMODYNAMICS:       Aortic pressure:  90/60 mmHg     LVEDP: 5 to 10 mmHg     Gradient across aortic valve on pullback: No gradient across aortic valve        2. LEFT VENTRICULOGRAPHY: 65%        3. CORONARY ANGIOGRAPHY:            A: Left main coronary artery: Normal            B: Left anterior descending artery: 80% to 90% proximal LAD stenosis            C: Left circumflex coronary artery: 40 to 50% distal LCx/OM plaque.  20 to 25% plaque in the proximal and mid segment of LCx            D: Right coronary artery: 70 to 80% stenosis in the proximal segment of LCx, 99% stenosis in mid segment of RCA and 90 to 95% stenosis in the proximal PDA extending into the midsegment.  DARRYL I flow was present prior to the intervention and DARRYL-3 flow was present after the intervention.  RCA was dominant vessel            OTHER:         Assessment & Plan            CAD, multiple vessel    Acute inferior myocardial infarction (HCC)        ASSESSMENT:    STEMI  CAD: s/p PCI to RCA; Residual disease LAD 80-90%, LCx 40-50% mid, 20-25% prox  EF 65%  HTN  Dyslipidemia  Covid-19 Postive  Suspected BUZZ  Tobacco abuse    PLAN:    Continue dual antiplatelet therapy  Continue beta-blocker, statin, echocardiogram is pending  Does not appear to have any pulmonary involvement from his COVID-19 positive status.    Continue current medications and supportive care.    Patient can transfer to PCU    Timing of PCI to LAD to be determined    Additional recommendations per Dr. Del Toro    Patient is seen and examined and findings are verified.  All data is reviewed by me personally.  Assessment and plan formulated by APC was done after discussion with attending.  I spent more than 50% of time in taking care of the patient.    Patient was complaining of chest pain he was on IV nitro.    Hemodynamics are stable    Patient has minimal elevation of troponins after the RCA intervention.  Patient complain of chest pain.  He has significant lesion of the LAD.  Patient is still in isolation because  "of COVID-19 infection.  At this stage, I would recommend that patient should continue supportive therapy I would increase Lopressor.  Patient may need intervention of LAD prior to discharge.  However because of his COVID 19 isolation that will be complicated.  We will reevaluate the patient in the morning.        I discussed the patients findings and my recommendations with patient and RN    Electronically signed by Matthieu Del Toro MD, 06/15/22, 6:00 PM EDT.          Matthieu Del Toro MD  06/15/22  18:00 EDT              Electronically signed by Matthieu Del Toro MD at 06/15/22 1800     Deanna Braun MD at 06/15/22 1015          PULMONARY CRITICAL CARE PROGRESS  NOTE      PATIENT IDENTIFICATION:  Name: Tramaine Gates  MRN: [id number]  :  1985     Age: 36 y.o.  Sex: male    DATE OF Note:  6/15/2022   Referring Physician: Deanna Braun MD                  Subjective:   Feeling better, on 2l , no SOB,   Off nitro no chest pain, no nausea or vomiting, no change in bowel habit, no dysuria,  no new  skin rash or itching.      Objective:  tMax 24 hrs: Temp (24hrs), Av.2 °F (35.7 °C), Min:94.1 °F (34.5 °C), Max:98.2 °F (36.8 °C)      Vitals Ranges:   Temp:  [94.1 °F (34.5 °C)-98.2 °F (36.8 °C)] 94.1 °F (34.5 °C)  Heart Rate:  [71-97] 71  Resp:  [16-24] 18  BP: (102-145)/(52-97) 124/71  Arterial Line BP: (117-125)/(60-64) 125/64    Intake and Output Last 3 Shifts:   I/O last 3 completed shifts:  In: 3181.6 [P.O.:2040; I.V.:1141.6]  Out: 4000 [Urine:3600; Emesis/NG output:400]    Exam:  /71   Pulse 71   Temp 94.1 °F (34.5 °C) (Axillary)   Resp 18   Ht 177.8 cm (70\")   Wt 123 kg (270 lb 1 oz)   SpO2 96%   BMI 38.75 kg/m²     General Appearance:   AA  HEENT:  Normocephalic, without obvious abnormality. Conjunctivae/corneas clear.  Normal external ear canals. Nares normal, no drainage     Neck:  Supple, symmetrical, trachea midline. No JVD.  Lungs /Chest wall:   Bilateral basal rhonchi, respirations " unlabored, symmetrical wall movement.     Heart:  Regular rate and rhythm, systolic murmur PMI left sternal border  Abdomen: Soft, nontender, no masses, no organomegaly.    Extremities: Trace edema, no clubbing or cyanosis        Medications:    Current Facility-Administered Medications:   •  acetaminophen (TYLENOL) tablet 650 mg, 650 mg, Oral, Q4H PRN, 650 mg at 06/15/22 0329 **OR** acetaminophen (TYLENOL) suppository 650 mg, 650 mg, Rectal, Q4H PRN, Nathaniel Dean APRN  •  aluminum-magnesium hydroxide-simethicone (MAALOX MAX) 400-400-40 MG/5ML suspension 15 mL, 15 mL, Oral, Q6H PRN, Nathaniel Dean APRN  •  arformoterol (BROVANA) nebulizer solution 15 mcg, 15 mcg, Nebulization, BID - RT, Deanna Braun MD  •  aspirin EC tablet 81 mg, 81 mg, Oral, Daily, Matthieu Del Toro MD, 81 mg at 06/15/22 0845  •  atorvastatin (LIPITOR) tablet 80 mg, 80 mg, Oral, Nightly, Matthieu Del Toro MD  •  atropine sulfate injection 0.5-1 mg, 0.5-1 mg, Intravenous, Q5 Min PRN, Matthieu Del Toro MD  •  budesonide (PULMICORT) nebulizer solution 1 mg, 1 mg, Nebulization, BID - RT, Deanna Braun MD  •  clopidogrel (PLAVIX) tablet 75 mg, 75 mg, Oral, Daily, Matthieu Del Toro MD, 75 mg at 06/15/22 0845  •  diphenhydrAMINE (BENADRYL) capsule 25 mg, 25 mg, Oral, Q6H PRN, Matthieu Del Toro MD, 25 mg at 06/14/22 1749  •  DOPamine 400 mg in 250 mL D5W infusion, 3 mcg/kg/min, Intravenous, Continuous, Matthieu Del Toro MD, Held at 06/14/22 2130  •  metoprolol tartrate (LOPRESSOR) tablet 25 mg, 25 mg, Oral, Q12H, Matthieu Del Toro MD, 25 mg at 06/15/22 0845  •  nicotine (NICODERM CQ) 14 MG/24HR patch 1 patch, 1 patch, Transdermal, Q24H, Sonia Parsons APRN, 1 patch at 06/15/22 0439  •  nitroglycerin (NITROSTAT) SL tablet 0.4 mg, 0.4 mg, Sublingual, Q5 Min PRN, Nathaniel Dean APRN  •  nitroglycerin (TRIDIL) 200 mcg/ml infusion, 5-200 mcg/min, Intravenous, Titrated, Pat Ruiz APRN, Stopped at 06/15/22 0914  •  ondansetron (ZOFRAN) tablet 4 mg, 4 mg, Oral,  Q6H PRN **OR** ondansetron (ZOFRAN) injection 4 mg, 4 mg, Intravenous, Q6H PRN, Nathaniel eDan E, APRN, 4 mg at 06/15/22 0016  •  sodium chloride 0.9 % flush 10 mL, 10 mL, Intravenous, Q12H, Dianne Nathaniel E, APRN, 10 mL at 06/15/22 0846  •  sodium chloride 0.9 % flush 10 mL, 10 mL, Intravenous, PRN, Love, Nathaniel E, APRN  •  sodium chloride 0.9 % infusion 250 mL, 250 mL, Intravenous, Once PRN, Matthieu Del Toro MD    Data Review:  All labs (24hrs):   Recent Results (from the past 24 hour(s))   COVID-19,CEPHEID/JESSE,COR/KELSEY/PAD/CLARISA IN-HOUSE(OR EMERGENT/ADD-ON),NP SWAB IN TRANSPORT MEDIA 3-4 HR TAT, RT-PCR - Swab, Nasopharynx    Collection Time: 06/14/22 10:52 AM    Specimen: Nasopharynx; Swab   Result Value Ref Range    COVID19 Detected (C) Not Detected - Ref. Range   CBC Auto Differential    Collection Time: 06/14/22 11:21 AM    Specimen: Blood   Result Value Ref Range    WBC      RBC      Hemoglobin      Hematocrit      MCV      MCH      MCHC      RDW      RDW-SD      MPV      Platelets      Neutrophil %      Lymphocyte %      Monocyte %      Eosinophil %      Basophil %      Neutrophils, Absolute      Lymphocytes, Absolute      Monocytes, Absolute      Eosinophils, Absolute      Basophils, Absolute     Protime-INR    Collection Time: 06/14/22 11:58 AM    Specimen: Blood   Result Value Ref Range    Protime 11.1 9.6 - 11.7 Seconds    INR 1.08 0.93 - 1.10   aPTT    Collection Time: 06/14/22 11:58 AM    Specimen: Blood   Result Value Ref Range    .0 (C) 61.0 - 76.5 seconds   CBC (No Diff)    Collection Time: 06/14/22 11:58 AM    Specimen: Blood   Result Value Ref Range    WBC 10.70 3.40 - 10.80 10*3/mm3    RBC 5.03 4.14 - 5.80 10*6/mm3    Hemoglobin 14.6 13.0 - 17.7 g/dL    Hematocrit 42.9 37.5 - 51.0 %    MCV 85.3 79.0 - 97.0 fL    MCH 29.1 26.6 - 33.0 pg    MCHC 34.1 31.5 - 35.7 g/dL    RDW 13.1 12.3 - 15.4 %    RDW-SD 39.4 37.0 - 54.0 fl    MPV 6.8 6.0 - 12.0 fL    Platelets 328 140 - 450 10*3/mm3   Magnesium     Collection Time: 06/14/22 11:58 AM    Specimen: Blood   Result Value Ref Range    Magnesium 1.8 1.6 - 2.6 mg/dL   Phosphorus    Collection Time: 06/14/22 11:58 AM    Specimen: Blood   Result Value Ref Range    Phosphorus 2.8 2.5 - 4.5 mg/dL   ECG 12 Lead    Collection Time: 06/14/22 12:25 PM   Result Value Ref Range    QT Interval 374 ms   POC Activated Clotting Time    Collection Time: 06/14/22  2:47 PM    Specimen: Arterial Blood   Result Value Ref Range    Activated Clotting Time  150 (H) 89 - 137 Seconds   COVID-19 RAPID AG,VERITOR,COR/KELSEY/PAD/GONZALEZ/MAD/FLASH/LAG/YA/ IN-HOUSE,DRY SWAB, 1-2 HR TAT - Swab, Nasal Cavity    Collection Time: 06/14/22  5:31 PM    Specimen: Nasal Cavity; Swab   Result Value Ref Range    COVID19 Presumptive Negative Presumptive Negative - Ref. Range   Comprehensive Metabolic Panel    Collection Time: 06/14/22  6:45 PM    Specimen: Blood   Result Value Ref Range    Glucose 173 (H) 65 - 99 mg/dL    BUN 14 6 - 20 mg/dL    Creatinine 1.19 0.76 - 1.27 mg/dL    Sodium 132 (L) 136 - 145 mmol/L    Potassium 4.6 3.5 - 5.2 mmol/L    Chloride 100 98 - 107 mmol/L    CO2 18.0 (L) 22.0 - 29.0 mmol/L    Calcium 8.5 (L) 8.6 - 10.5 mg/dL    Total Protein 6.8 6.0 - 8.5 g/dL    Albumin 4.00 3.50 - 5.20 g/dL    ALT (SGPT) 38 1 - 41 U/L    AST (SGOT) 24 1 - 40 U/L    Alkaline Phosphatase 101 39 - 117 U/L    Total Bilirubin 0.3 0.0 - 1.2 mg/dL    Globulin 2.8 gm/dL    A/G Ratio 1.4 g/dL    BUN/Creatinine Ratio 11.8 7.0 - 25.0    Anion Gap 14.0 5.0 - 15.0 mmol/L    eGFR 80.2 >60.0 mL/min/1.73   Troponin    Collection Time: 06/14/22  6:45 PM    Specimen: Blood   Result Value Ref Range    Troponin T 0.050 (C) 0.000 - 0.030 ng/mL   CBC Auto Differential    Collection Time: 06/14/22  6:45 PM    Specimen: Blood   Result Value Ref Range    WBC 12.30 (H) 3.40 - 10.80 10*3/mm3    RBC 5.27 4.14 - 5.80 10*6/mm3    Hemoglobin 15.2 13.0 - 17.7 g/dL    Hematocrit 45.3 37.5 - 51.0 %    MCV 85.9 79.0 - 97.0 fL    MCH 28.8 26.6  - 33.0 pg    MCHC 33.5 31.5 - 35.7 g/dL    RDW 13.5 12.3 - 15.4 %    RDW-SD 40.7 37.0 - 54.0 fl    MPV 6.6 6.0 - 12.0 fL    Platelets 358 140 - 450 10*3/mm3    Neutrophil % 93.8 (H) 42.7 - 76.0 %    Lymphocyte % 5.2 (L) 19.6 - 45.3 %    Monocyte % 0.8 (L) 5.0 - 12.0 %    Eosinophil % 0.1 (L) 0.3 - 6.2 %    Basophil % 0.1 0.0 - 1.5 %    Neutrophils, Absolute 11.60 (H) 1.70 - 7.00 10*3/mm3    Lymphocytes, Absolute 0.60 (L) 0.70 - 3.10 10*3/mm3    Monocytes, Absolute 0.10 0.10 - 0.90 10*3/mm3    Eosinophils, Absolute 0.00 0.00 - 0.40 10*3/mm3    Basophils, Absolute 0.00 0.00 - 0.20 10*3/mm3    nRBC 0.1 0.0 - 0.2 /100 WBC   ECG 12 Lead    Collection Time: 06/14/22 11:08 PM   Result Value Ref Range    QT Interval 370 ms   Troponin    Collection Time: 06/14/22 11:10 PM    Specimen: Blood   Result Value Ref Range    Troponin T 0.063 (C) 0.000 - 0.030 ng/mL   Comprehensive Metabolic Panel    Collection Time: 06/14/22 11:10 PM    Specimen: Blood   Result Value Ref Range    Glucose 126 (H) 65 - 99 mg/dL    BUN 11 6 - 20 mg/dL    Creatinine 1.08 0.76 - 1.27 mg/dL    Sodium 135 (L) 136 - 145 mmol/L    Potassium 4.7 3.5 - 5.2 mmol/L    Chloride 101 98 - 107 mmol/L    CO2 19.0 (L) 22.0 - 29.0 mmol/L    Calcium 8.9 8.6 - 10.5 mg/dL    Total Protein 6.7 6.0 - 8.5 g/dL    Albumin 4.00 3.50 - 5.20 g/dL    ALT (SGPT) 37 1 - 41 U/L    AST (SGOT) 25 1 - 40 U/L    Alkaline Phosphatase 97 39 - 117 U/L    Total Bilirubin 0.3 0.0 - 1.2 mg/dL    Globulin 2.7 gm/dL    A/G Ratio 1.5 g/dL    BUN/Creatinine Ratio 10.2 7.0 - 25.0    Anion Gap 15.0 5.0 - 15.0 mmol/L    eGFR 90.1 >60.0 mL/min/1.73   Magnesium    Collection Time: 06/14/22 11:10 PM    Specimen: Blood   Result Value Ref Range    Magnesium 2.0 1.6 - 2.6 mg/dL   Phosphorus    Collection Time: 06/14/22 11:10 PM    Specimen: Blood   Result Value Ref Range    Phosphorus 1.9 (C) 2.5 - 4.5 mg/dL   Lipid Panel    Collection Time: 06/15/22  3:40 AM    Specimen: Blood   Result Value Ref Range     Total Cholesterol 283 (H) 0 - 200 mg/dL    Triglycerides 130 0 - 150 mg/dL    HDL Cholesterol 40 40 - 60 mg/dL    LDL Cholesterol  219 (H) 0 - 100 mg/dL    VLDL Cholesterol 24 5 - 40 mg/dL    LDL/HDL Ratio 5.43    Magnesium    Collection Time: 06/15/22  3:40 AM    Specimen: Blood   Result Value Ref Range    Magnesium 2.0 1.6 - 2.6 mg/dL   Phosphorus    Collection Time: 06/15/22  3:40 AM    Specimen: Blood   Result Value Ref Range    Phosphorus 3.0 2.5 - 4.5 mg/dL   Troponin    Collection Time: 06/15/22  3:40 AM    Specimen: Blood   Result Value Ref Range    Troponin T 0.081 (C) 0.000 - 0.030 ng/mL   Comprehensive Metabolic Panel    Collection Time: 06/15/22  3:40 AM    Specimen: Blood   Result Value Ref Range    Glucose 119 (H) 65 - 99 mg/dL    BUN 10 6 - 20 mg/dL    Creatinine 0.98 0.76 - 1.27 mg/dL    Sodium 133 (L) 136 - 145 mmol/L    Potassium 4.3 3.5 - 5.2 mmol/L    Chloride 101 98 - 107 mmol/L    CO2 21.0 (L) 22.0 - 29.0 mmol/L    Calcium 8.5 (L) 8.6 - 10.5 mg/dL    Total Protein 6.3 6.0 - 8.5 g/dL    Albumin 3.90 3.50 - 5.20 g/dL    ALT (SGPT) 35 1 - 41 U/L    AST (SGOT) 27 1 - 40 U/L    Alkaline Phosphatase 94 39 - 117 U/L    Total Bilirubin 0.4 0.0 - 1.2 mg/dL    Globulin 2.4 gm/dL    A/G Ratio 1.6 g/dL    BUN/Creatinine Ratio 10.2 7.0 - 25.0    Anion Gap 11.0 5.0 - 15.0 mmol/L    eGFR 101.2 >60.0 mL/min/1.73   CBC Auto Differential    Collection Time: 06/15/22  3:40 AM    Specimen: Blood   Result Value Ref Range    WBC 11.60 (H) 3.40 - 10.80 10*3/mm3    RBC 4.76 4.14 - 5.80 10*6/mm3    Hemoglobin 13.6 13.0 - 17.7 g/dL    Hematocrit 40.7 37.5 - 51.0 %    MCV 85.6 79.0 - 97.0 fL    MCH 28.5 26.6 - 33.0 pg    MCHC 33.3 31.5 - 35.7 g/dL    RDW 13.3 12.3 - 15.4 %    RDW-SD 39.8 37.0 - 54.0 fl    MPV 6.5 6.0 - 12.0 fL    Platelets 336 140 - 450 10*3/mm3    Neutrophil % 86.9 (H) 42.7 - 76.0 %    Lymphocyte % 8.8 (L) 19.6 - 45.3 %    Monocyte % 3.5 (L) 5.0 - 12.0 %    Eosinophil % 0.0 (L) 0.3 - 6.2 %     Basophil % 0.8 0.0 - 1.5 %    Neutrophils, Absolute 10.10 (H) 1.70 - 7.00 10*3/mm3    Lymphocytes, Absolute 1.00 0.70 - 3.10 10*3/mm3    Monocytes, Absolute 0.40 0.10 - 0.90 10*3/mm3    Eosinophils, Absolute 0.00 0.00 - 0.40 10*3/mm3    Basophils, Absolute 0.10 0.00 - 0.20 10*3/mm3    nRBC 0.0 0.0 - 0.2 /100 WBC   Protime-INR    Collection Time: 06/15/22  3:40 AM    Specimen: Blood   Result Value Ref Range    Protime 10.3 9.6 - 11.7 Seconds    INR 1.00 0.93 - 1.10   aPTT    Collection Time: 06/15/22  3:40 AM    Specimen: Blood   Result Value Ref Range    PTT 28.8 24.0 - 31.0 seconds   ECG 12 Lead    Collection Time: 06/15/22  7:39 AM   Result Value Ref Range    QT Interval 384 ms        Imaging:  Cardiac Catheterization/Vascular Study  Cardiac catheterization/PCI report    DATE OF PROCEDURE: 6/14/2022    INDICATION FOR PROCEDURE:    Acute inferior myocardial infarction  Hypertension  Hyperlipidemia    PROCEDURE PERFORMED:    Left heart catheterization  coronary angiography  left ventriculography  Balloon angioplasty and stenting of RCA coronary artery    FINDINGS:    1. HEMODYNAMICS:      Aortic pressure: 90/60 mmHg    LVEDP: 5 to 10 mmHg    Gradient across aortic valve on pullback: No gradient across aortic valve    2. LEFT VENTRICULOGRAPHY: 65%    3. CORONARY ANGIOGRAPHY:           A: Left main coronary artery: Normal           B: Left anterior descending artery: 80% to 90% proximal LAD   stenosis           C: Left circumflex coronary artery: 40 to 50% distal LCx/OM plaque.    20 to 25% plaque in the proximal and mid segment of LCx           D: Right coronary artery: 70 to 80% stenosis in the proximal   segment of LCx, 99% stenosis in mid segment of RCA and 90 to 95% stenosis   in the proximal PDA extending into the midsegment.  DARRYL I flow was   present prior to the intervention and DARRYL-3 flow was present after the   intervention.  RCA was dominant vessel    PROCEDURE COMMENTS:     After informed consent was  obtained, the patient was prepped and draped in   the usual sterile manner.  Mild to moderate sedation was administered.    Right femoral artery was accessed without difficulty and 6 Turkish arterial   sheath was inserted.  Sheath was flushed with heparinized saline.    Using 6 Turkish Cristal catheters, first left coronary artery and the right   coronary was electively engaged and appropriate views were taken.  A 6   Turkish JR4 catheter was used to cross aortic valve and left heart   catheterization was performed.  Left ventriculography was done in a   review.    After diagnostic catheterization, we proceeded to the intervention of the   RCA coronary artery.    We advanced a 6 Turkish JR4 with sideholes interventional guide and   selective engagement of the right coronary artery was achieved.  We   advanced interventional guidewire 0.014 BMW and crossed the lesion and   placed in the distal part of the PDA branch of RCA coronary artery.    We advanced 2.0x12 compliant balloon and the pre-dilatation at nominal   pressure.  After the predilatation, this balloon was removed and we   advanced 2.25x12 drug-eluting stent and advanced and placed over the   lesion in the mid segment of RCA and deployed at nominal pressure.  We   advanced 2.25x18 stent and covering the first and deployed in the proximal   segment.  We took 2.5x23 stent covering the second stent all the way up to   the ostium and deployed at nominal pressure.  Stenosis decreased from 99%   to less than 10%.  No dissection, perforation or no reflow phenomena was   noted.    After intervention of mid and proximal segment of RCA we saw there was a   high-grade stenosis of PDA.  We did predilatation with 2.0x20 NC balloon   and followed by 2.0x22 [place] stent and deployed in the mid and proximal   segment of the PDA.  We had a second stent 2.0x12 [place] stent and deployed   covering the first stent in the proximal segment of PDA and distal RCA.    Stenosis decreased  from 90 to 95% to less than 10%.  DARRYL-3 flow was   present.    Patient was given aspirin, Plavix and heparin during the procedure.  ACT   at the end of procedure was 243    Patient tolerated the procedure well.    SUMMARY:     1.  Three-vessel coronary artery disease  2.  High-grade stenosis of LAD and RCA  3.  Successful stenting of RCA with multiple drug-eluting stent which was   because of acute inferior myocardial infarction    RECOMMENDATIONS:     DAPT with aspirin and Plavix.  Patient would need LAD stenting in future.  XR Chest 1 View  Narrative: Examination: XR CHEST 1 VW    Date of Exam: 6/15/2022 1:35 EDT    Indication: Pain with respirations..  Chest pain today     Comparison: Single frontal view chest performed on June 8, 2022    Technique: Single frontal view chest    Findings:  Today's study reveals heart and mediastinum are normal. No evidence of infiltrate or effusion or pneumothorax or mass the right or left lungs. There is mild dextroscoliosis in the thoracic spine  Impression: No acute disease in the chest and no significant change since previous study performed on June 8, 2022    Electronically Signed: Nael Goodman MD 6/15/2022 8:56 EDT       ASSESSMENT:  Acute inferior myocardial infarction (HCC)  Coronary artery disease  COVID-19 positive  Chronic obstructive airway disease  Obstructive sleep apnea  Morbid obesity  Hypertension  Gastroesophageal flux disorder  Hyperlipidemia     PLAN:  Continue antiplatelets  Beta-blocker  Record his positive COVID-19 we are going to monitor for now no signs of pulmonary involvement  Bronchodilator  Inhaled corticosteroids  Steroids  Electrolytes/ glycemic control  DVT and GI prophylaxis.       Total Critical care time in direct medical management (   ) minutes. This time specifically excludes time spent performing procedures.  Deanna Braun MD. D, ABSM.     6/15/2022  10:15 EDT     Electronically signed by Deanna Braun MD at 06/15/22 2518           Consult Notes (all)      Janna Nicholas DO at 06/15/22 1732      Consult Orders    1. Inpatient Hospitalist Consult [[id number]] ordered by Dolores Bryant APRN at 06/15/22 1600                   [facility] Medicine Services      Patient Name: Tramaine Gates  : 1985  MRN: [id number]  Primary Care Physician:  Daniela Hernandez FNP  Date of admission: 2022      Subjective      Chief Complaint: Chest pain    History of Present Illness: Tramaine Gates is a 36 y.o. male HTN, HLD, tobacco abuse who presented to [facility] on 2022 complaining of chest pain, found to have a STEMI, underwent cardiac cath with stent placements.  He incidentally was found to be COVID-positive.      Currently he reports nausea, generalized fatigue.  No shortness of breath or cough.  No fever or chills.  Plan is for another cardiac cath for stent to LAD tomorrow.      ROS full review of systems was performed, pertinent as noted above, other systems negative    Personal History     Past Medical History:   Diagnosis Date   • Acute inferior myocardial infarction (HCC) 2022   • Allergic    • Asthma 2022   • CAD, multiple vessel 2022   • Gastroesophageal reflux disease 2022   • Hx of complete eye exam    • Hyperlipidemia 2022   • Hypertension    • Migraine headache 2022   • Panic attack 2022   • Peptic ulcer 2017       Past Surgical History:   Procedure Laterality Date   • CARDIAC CATHETERIZATION N/A 2022    Procedure: Left Heart Cath;  Surgeon: Matthieu Del Toro MD;  Location: [facility] INVASIVE LOCATION;  Service: Cardiology;  Laterality: N/A;   • CHOLECYSTECTOMY     • MANDIBLE SURGERY         Family History: family history includes Heart disease in his mother; Heart failure in his father. Otherwise pertinent FHx was reviewed and not pertinent to current issue.    Social History:  reports that he has been smoking cigarettes. He started smoking  about 23 years ago. He has been smoking about 1.00 pack per day. He has never used smokeless tobacco. He reports previous alcohol use. He reports current drug use. Frequency: 7.00 times per week. Drug: Marijuana.    Home Medications:  Prior to Admission Medications     Prescriptions Last Dose Informant Patient Reported? Taking?    albuterol sulfate  (90 Base) MCG/ACT inhaler   Yes No    Inhale 2 puffs Every 4 (Four) Hours As Needed for Wheezing.    amLODIPine (NORVASC) 5 MG tablet  Pharmacy Yes No    Take 5 mg by mouth Daily.    atorvastatin (LIPITOR) 40 MG tablet  Pharmacy Yes No    Take 40 mg by mouth Every Night.    levothyroxine (SYNTHROID, LEVOTHROID) 50 MCG tablet   Yes No    Take 50 mcg by mouth Daily.    lisinopril (PRINIVIL,ZESTRIL) 20 MG tablet  Pharmacy Yes No    Take 20 mg by mouth Daily.    naproxen (NAPROSYN) 375 MG tablet   No Yes    Take 1 tablet by mouth 2 (Two) Times a Day As Needed for Moderate Pain .            Allergies:  Allergies   Allergen Reactions   • Codeine Itching   • Hydrocodone Itching   • Hydrocodone-Acetaminophen Other (See Comments)   • Shellfish-Derived Products Unknown - High Severity       Objective      Vitals:   Temp:  [94.1 °F (34.5 °C)-98 °F (36.7 °C)] 98 °F (36.7 °C)  Heart Rate:  [65-97] 65  Resp:  [13-20] 13  BP: ()/(52-96) 98/54  Flow (L/min):  [2-4] 2    Physical Exam     Constitutional:      Awake, alert, no acute distress  HENT:      Normocephalic and atraumatic. Nose normal. Mucous membranes are moist. Oropharynx is clear.   Eyes:      No icterus, EOM intact. Pupils are equal, round, and reactive to light.   Neck:     No LAD.  No JVD.  No thyromegaly  Cardiovascular:      Regular rate and regular rhythm.  No murmur.  No edema  Pulmonary:      Clear to auscultation bilaterally.  Normal effort  Abdominal:      Soft, NTND.  BS + all 4 quadrants  Musculoskeletal:         No joint effusions.  No atrophy   Skin:     Warm, dry, no rashes.  No  lesions.  Neurological:      Alert and oriented x3, no focal neurologic deficits could be appreciated  Psychiatric:         Normal mood and affect, normal thought process and judgment      Result Review    Result Review:  I have personally reviewed the results from the time of this admission to 6/15/2022 17:32 EDT and agree with these findings:  [x]  Laboratory  []  Microbiology  [x]  Radiology  [x]  EKG/Telemetry   [x]  Cardiology/Vascular   []  Pathology  []  Old records  []  Other:  Most notable findings include: Echo with preserved EF, WBC 11.6      Assessment & Plan        Active Hospital Problems:  Active Hospital Problems    Diagnosis    • **CAD, multiple vessel      Added automatically from request for surgery [id number]     • Acute inferior myocardial infarction (HCC)    COVID-19 positive  Essential hypertension  Hyperlipidemia  Tobacco abuse    Plan:     1.  CAD with STEMI on admission  -Management per cardiology, plan for cardiac cath with stent to LAD tomorrow per nursing  -ASA, Plavix, Lipitor, Lopressor    2.  COVID-19 positive  -Possibly contributing to underlying nausea but otherwise no pulmonary involvement at this time, continue to monitor    3.  COPD  -On oral steroids, inhalers per pulmonology    4.  Essential hypertension  -Blood pressure currently stable on current medications, continue to monitor    5.  Hyperlipidemia  -Continue statin    6.  Tobacco abuse  -Nicotine patch    DVT prophylaxis:  Mechanical DVT prophylaxis orders are present.    CODE STATUS:    Level Of Support Discussed With: Patient  Code Status (Patient has no pulse and is not breathing): CPR (Attempt to Resuscitate)  Medical Interventions (Patient has pulse or is breathing): Full Support      I discussed the patient's findings and my recommendations with patient and nursing staff.    This patient has been examined wearing appropriate Personal Protective Equipment . 06/15/22      Signature: Electronically signed by Janna Nicholas  , 06/15/22, 5:58 PM EDT.        Electronically signed by Janna Nicholas DO at 06/15/22 6338     Matthieu Del Toro MD at 06/14/22 1213          Referring Provider: Dr. Scanlon    Attending: Deanna Braun MD    Reason for Consultation:    Chest pain  Shortness of breath  Acute inferior myocardial infarction  Hypertension  Hyperlipidemia    Chief complaint:    Chest pain  Shortness of breath       History of present illness:     Patient is 36-year-old white gentleman whose past medical history is significant for hypertension, hyperlipidemia, who is admitted with acute inferior myocardial infarction.    Patient started having sudden chest pain.  He called 911.  He was noted to have ST elevation of inferior leads.  Patient was brought to the [facility].  Patient was brought to the Cath Lab directly from the ER.  No intravenous IV was placed by EMS.  Patient was having intense chest pain 10 out of 10 chest pain.  Blood pressure initially was stable but subsequently decreased to 90 mmHg.    Patient does not have previous history of CAD, PCI or MI.  He had a chest pain at the start of the month and was evaluated in the emergency room but was discharged.        Review of Systems  Review of Systems   Constitutional: Negative for chills and fever.   HENT: Negative for ear discharge and nosebleeds.    Eyes: Negative for discharge and redness.   Cardiovascular: Positive for chest pain. Negative for orthopnea, palpitations, paroxysmal nocturnal dyspnea and syncope.   Respiratory: Positive for shortness of breath. Negative for cough and wheezing.    Endocrine: Negative for heat intolerance.   Skin: Negative for rash.   Musculoskeletal: Negative for arthritis and myalgias.   Gastrointestinal: Negative for abdominal pain, melena, nausea and vomiting.   Genitourinary: Negative for dysuria and hematuria.   Neurological: Negative for dizziness, light-headedness, numbness and tremors.   Psychiatric/Behavioral: Negative for  "depression. The patient is not nervous/anxious.        Past Medical History  Past Medical History:   Diagnosis Date   • Acute inferior myocardial infarction (HCC) 6/14/2022   • Allergic    • Asthma 1/27/2022   • Gastroesophageal reflux disease 1/27/2022   • Hx of complete eye exam 2016   • Hyperlipidemia 1/27/2022   • Hypertension    • Migraine headache 1/27/2022   • Panic attack 1/27/2022   • Peptic ulcer 9/14/2017    and   Past Surgical History:   Procedure Laterality Date   • CHOLECYSTECTOMY  2019   • MANDIBLE SURGERY         Family History  Family History   Problem Relation Age of Onset   • Heart disease Mother    • Heart failure Father        Social History  Social History     Socioeconomic History   • Marital status: Single   Tobacco Use   • Smoking status: Current Every Day Smoker     Packs/day: 1.00     Types: Cigarettes     Start date: 1999   • Smokeless tobacco: Never Used   Vaping Use   • Vaping Use: Never used   Substance and Sexual Activity   • Alcohol use: Not Currently   • Drug use: Yes     Frequency: 7.0 times per week     Types: Marijuana       Objective     Physical Exam:    Vital Signs  Vitals:    06/14/22 1233 06/14/22 1245 06/14/22 1300 06/14/22 1500   BP: 127/69 116/64 118/67    BP Location: Right arm      Patient Position: Lying      Pulse: 84 85 84    Resp: 20  24    Temp: 97.1 °F (36.2 °C)  98.2 °F (36.8 °C) 95.2 °F (35.1 °C)   TempSrc: Oral  Oral    SpO2: 93% 91% 94%    Weight: 123 kg (270 lb 1 oz)      Height: 177.8 cm (70\")          Weight       Constitutional:       Appearance: Well-developed.   Eyes:      General: No scleral icterus.        Right eye: No discharge.   HENT:      Head: Normocephalic and atraumatic.   Neck:      Thyroid: No thyromegaly.      Lymphadenopathy: No cervical adenopathy.   Pulmonary:      Effort: Pulmonary effort is normal. No respiratory distress.      Breath sounds: Normal breath sounds. No wheezing. No rales.   Cardiovascular:      Normal rate. Regular " rhythm.      No gallop.   Abdominal:      Tenderness: There is no abdominal tenderness.   Skin:     Findings: No erythema or rash.   Neurological:      Mental Status: Alert and oriented to person, place, and time.         Results Review:  Lab Results (last 24 hours)     Procedure Component Value Units Date/Time    POC Activated Clotting Time [[id number]]  (Abnormal) Collected: 06/14/22 1447    Specimen: Arterial Blood Updated: 06/14/22 1457     Activated Clotting Time  150 Seconds      Comment: Serial Number: [id number]:  [id number]       Comprehensive Metabolic Panel [[id number]] Updated: 06/14/22 1242    Specimen: Blood     Troponin [[id number]] Updated: 06/14/22 1242    Specimen: Blood     Magnesium [[id number]]  (Normal) Collected: 06/14/22 1158    Specimen: Blood Updated: 06/14/22 1239     Magnesium 1.8 mg/dL     Phosphorus [[id number]]  (Normal) Collected: 06/14/22 1158    Specimen: Blood Updated: 06/14/22 1239     Phosphorus 2.8 mg/dL     aPTT [[id number]]  (Abnormal) Collected: 06/14/22 1158    Specimen: Blood Updated: 06/14/22 1237     .0 seconds     Protime-INR [[id number]]  (Normal) Collected: 06/14/22 1158    Specimen: Blood Updated: 06/14/22 1237     Protime 11.1 Seconds      INR 1.08    CBC & Differential [[id number]] Collected: 06/14/22 1121    Specimen: Blood Updated: 06/14/22 1217    Narrative:      The following orders were created for panel order CBC & Differential.  Procedure                               Abnormality         Status                     ---------                               -----------         ------                     CBC Auto Differential[[id number]]                            Edited Result - FINAL        Please view results for these tests on the individual orders.    CBC Auto Differential [[id number]] Collected: 06/14/22 1121    Specimen: Blood Updated: 06/14/22 1217     WBC --     Comment: Previous results were contaminated. CBC auto validated before it was caught. Notified  Frank in cath lab.  Corrected result. Previous result was 6.50 10*3/mm3 on 6/14/2022 at 1130 EDT.        RBC --     Comment: Previous results were contaminated. CBC auto validated before it was caught. Notified Frank in cath lab.  Corrected result. Previous result was 3.21 10*6/mm3 on 6/14/2022 at 1130 EDT.        Hemoglobin --     Comment: Previous results were contaminated. CBC auto validated before it was caught. Notified Frank in cath lab.  Corrected result. Previous result was 9.2 g/dL on 6/14/2022 at 1130 EDT.        Hematocrit --     Comment: Previous results were contaminated. CBC auto validated before it was caught. Notified Frank in cath lab.  Corrected result. Previous result was 27.1 % on 6/14/2022 at 1130 EDT.        MCV --     Comment: Previous results were contaminated. CBC auto validated before it was caught. Notified Frank in cath lab.  Corrected result. Previous result was 84.5 fL on 6/14/2022 at 1130 EDT.        MCH --     Comment: Previous results were contaminated. CBC auto validated before it was caught. Notified Frank in cath lab.  Corrected result. Previous result was 28.6 pg on 6/14/2022 at 1130 EDT.        MCHC --     Comment: Previous results were contaminated. CBC auto validated before it was caught. Notified Frank in cath lab.  Corrected result. Previous result was 33.8 g/dL on 6/14/2022 at 1130 EDT.        RDW --     Comment: Previous results were contaminated. CBC auto validated before it was caught. Notified Frank in cath lab.  Corrected result. Previous result was 12.9 % on 6/14/2022 at 1130 EDT.        RDW-SD --     Comment: Previous results were contaminated. CBC auto validated before it was caught. Notified Frank in cath lab.  Corrected result. Previous result was 38.5 fl on 6/14/2022 at 1130 EDT.        MPV --     Comment: Previous results were contaminated. CBC auto validated before it was caught. Notified Frank in cath lab.  Corrected result. Previous result was 6.7 fL on  6/14/2022 at 1130 EDT.        Platelets --     Comment: Previous results were contaminated. CBC auto validated before it was caught. Notified Frank in cath lab.  Corrected result. Previous result was 243 10*3/mm3 on 6/14/2022 at 1130 EDT.        Neutrophil % --     Comment: Previous results were contaminated. CBC auto validated before it was caught. Notified Frank in cath lab.  Corrected result. Previous result was 55.4 % on 6/14/2022 at 1130 EDT.        Lymphocyte % --     Comment: Previous results were contaminated. CBC auto validated before it was caught. Notified Frank in cath lab.  Corrected result. Previous result was 35.8 % on 6/14/2022 at 1130 EDT.        Monocyte % --     Comment: Previous results were contaminated. CBC auto validated before it was caught. Notified Frank in cath lab.  Corrected result. Previous result was 6.5 % on 6/14/2022 at 1130 EDT.        Eosinophil % --     Comment: Previous results were contaminated. CBC auto validated before it was caught. Notified Frank in cath lab.  Corrected result. Previous result was 1.6 % on 6/14/2022 at 1130 EDT.        Basophil % --     Comment: Previous results were contaminated. CBC auto validated before it was caught. Notified Frank in cath lab.  Corrected result. Previous result was 0.7 % on 6/14/2022 at 1130 EDT.        Neutrophils, Absolute --     Comment: Previous results were contaminated. CBC auto validated before it was caught. Notified Frank in cath lab.  Corrected result. Previous result was 3.60 10*3/mm3 on 6/14/2022 at 1130 EDT.        Lymphocytes, Absolute --     Comment: Previous results were contaminated. CBC auto validated before it was caught. Notified Frank in cath lab.  Corrected result. Previous result was 2.30 10*3/mm3 on 6/14/2022 at 1130 EDT.        Monocytes, Absolute --     Comment: Previous results were contaminated. CBC auto validated before it was caught. Notified Frank in cath lab.  Corrected result. Previous result was 0.40  10*3/mm3 on 6/14/2022 at 1130 EDT.        Eosinophils, Absolute --     Comment: Previous results were contaminated. CBC auto validated before it was caught. Notified Frank in cath lab.  Corrected result. Previous result was 0.10 10*3/mm3 on 6/14/2022 at 1130 EDT.        Basophils, Absolute --     Comment: Previous results were contaminated. CBC auto validated before it was caught. Notified Frank in cath lab.  Corrected result. Previous result was 0.00 10*3/mm3 on 6/14/2022 at 1130 EDT.       Narrative:      The previously reported component NRBC is no longer being reported. Previous result was 0.3 /100 WBC (Reference Range: 0.0-0.2 /100 WBC) on 6/14/2022 at 1130 EDT.    COVID PRE-OP / PRE-PROCEDURE SCREENING ORDER (NO ISOLATION) - Swab, Nasopharynx [[id number]]  (Abnormal) Collected: 06/14/22 1052    Specimen: Swab from Nasopharynx Updated: 06/14/22 1215    Narrative:      The following orders were created for panel order COVID PRE-OP / PRE-PROCEDURE SCREENING ORDER (NO ISOLATION) - Swab, Nasopharynx.  Procedure                               Abnormality         Status                     ---------                               -----------         ------                     COVID-19,CEPHEID/JESSE,CO...[[id number]]  Abnormal            Final result                 Please view results for these tests on the individual orders.    COVID-19,CEPHEID/JESSE,COR/KELSEY/PAD/CLARISA IN-HOUSE(OR EMERGENT/ADD-ON),NP SWAB IN TRANSPORT MEDIA 3-4 HR TAT, RT-PCR - Swab, Nasopharynx [[id number]]  (Abnormal) Collected: 06/14/22 1052    Specimen: Swab from Nasopharynx Updated: 06/14/22 1215     COVID19 Detected    Narrative:      Fact sheet for providers: https://www.fda.gov/media/824701/download     Fact sheet for patients: https://www.fda.gov/media/148805/download  Fact sheet for providers: https://www.fda.gov/media/736201/download     Fact sheet for patients: https://www.fda.gov/media/192195/download    CBC (No Diff) [[id number]]  (Normal)  Collected: 06/14/22 1158    Specimen: Blood Updated: 06/14/22 1210     WBC 10.70 10*3/mm3      RBC 5.03 10*6/mm3      Hemoglobin 14.6 g/dL      Comment: Result checked        Hematocrit 42.9 %      Comment: Result checked        MCV 85.3 fL      MCH 29.1 pg      MCHC 34.1 g/dL      RDW 13.1 %      RDW-SD 39.4 fl      MPV 6.8 fL      Platelets 328 10*3/mm3         Imaging Results (Last 72 Hours)     ** No results found for the last 72 hours. **        ECG 12 Lead   Preliminary Result   HEART RATE= 81  bpm   RR Interval= 736  ms   MN Interval= 123  ms   P Horizontal Axis= 18  deg   P Front Axis= 74  deg   QRSD Interval= 92  ms   QT Interval= 374  ms   QRS Axis= 29  deg   T Wave Axis= 48  deg   - NORMAL ECG -   Sinus rhythm   Electronically Signed By:    Date and Time of Study: 2022-06-14 12:25:46      ECG 12 Lead    (Results Pending)     CBC    Results from last 7 days   Lab Units 06/14/22  1158 06/08/22  0121   WBC 10*3/mm3 10.70 8.80   HEMOGLOBIN g/dL 14.6 16.4   PLATELETS 10*3/mm3 328 338     BMP   Results from last 7 days   Lab Units 06/14/22  1158 06/08/22  0121   SODIUM mmol/L  --  133*   POTASSIUM mmol/L  --  3.7   CHLORIDE mmol/L  --  99   CO2 mmol/L  --  21.0*   BUN mg/dL  --  9   CREATININE mg/dL  --  0.90   GLUCOSE mg/dL  --  107*   MAGNESIUM mg/dL 1.8  --    PHOSPHORUS mg/dL 2.8  --      CMP   Results from last 7 days   Lab Units 06/08/22  0121   SODIUM mmol/L 133*   POTASSIUM mmol/L 3.7   CHLORIDE mmol/L 99   CO2 mmol/L 21.0*   BUN mg/dL 9   CREATININE mg/dL 0.90   GLUCOSE mg/dL 107*     Medication Review  Scheduled Meds:aspirin, 81 mg, Oral, Daily  atorvastatin, 40 mg, Oral, Daily  clopidogrel, 600 mg, Oral, Once  [START ON 6/15/2022] clopidogrel, 75 mg, Oral, Daily  metoprolol tartrate, 12.5 mg, Oral, Q12H      Continuous Infusions:DOPamine, 3 mcg/kg/min, Last Rate: 3 mcg/kg/min (06/14/22 1416)  eptifibatide, 2 mcg/kg/min, Last Rate: 2 mcg/kg/min (06/14/22 1416)  nitroglycerin, 5-200 mcg/min, Last Rate:  10 mcg/min (06/14/22 4558)  sodium chloride, 50 mL/hr, Last Rate: 50 mL/hr (06/14/22 1417)      PRN Meds:.atropine  •  diphenhydrAMINE  •  ondansetron **OR** ondansetron  •  sodium chloride    Assessment:      Acute inferior myocardial infarction (HCC)        Plan:    MDM:    1.  Acute inferior myocardial infarction    Patient underwent cardiac catheterization and was noted to have 80% stenosis of mid LAD and 80% stenosis of proximal RCA followed by 99% stenosis of mid RCA and 90 to 95% stenosis of PDA.  Patient underwent multiple stenting of RCA.  Desirable results were achieved.  Patient would need intervention of LAD in future which can be done as an outpatient.    2.  CAD:    Patient had multivessel disease.  Patient will need LAD stenting in future.    3.  Hypertension:    Blood pressure is stable.  At present lisinopril would be on hold.  Start Lopressor 12.5 mg twice daily    4.  Hyperlipidemia:    Start Lipitor.    I discussed the patients findings and my recommendations with patient    Matthieu CARR. MD [place]  06/14/22  17:14 EDT              Electronically signed by Matthieu Del Toro MD at 06/14/22 7526

## 2022-06-16 NOTE — CONSULTS
"Referring Provider: Janna Nicholas DO  Reason for Consultation: Situational Depression    Chief Complaint: Depression and irritability    Subjective .     History of Present Illness:  The patient is a 36 y.o. male who was admitted secondary to STEMI with 5 stints placed earlier today due to discovered CAD. Psychiatry was consulted due to concern for situational depression. Nursing reports a 22 year smoking hx with no sign of anx/dep since he was transferred to the unit. The pt presents with his wife who reports concern for depression and anxiety not otherwise being addressed. Pt reports significant family hx of mental illness. Including known bipolar d/o and schizophrenia in his grandmother and uncle's family, along with mental health concerns with uncertain bipolar d/o in more immediate family. Pt reports depression and anxiety going back to childhood. Trauma from being \"locked up\" in a psychiatric facility by his mother at a young age, where he remembers being treated with thorazine and PRN xanax for his episodes of rage he had then and hallucinations. He still reports episodes of explosive anger, and says he found xanax useful for his rage in adolescence, but has not had any benzodiazepines in years. Does remember once being on clonazepam and not liking it because it took a very long time to kick in and it left him feeling hung over the next day. The pt reports a hx of alcohol misuse that has not continued for the past 3 years, but admits to using cigarettes to cope with anxiety. Has not been on medication of any kind in many years, but remember trying Zoloft and it worsening his anger in the past. He has a bipolar pattern, admits to mood variations that are largely depressed with few significantly elevated periods. Reports an episode of hallucinations in adolescence where he saw images in textbook dancing on the page, and that led to him being placed in psychiatric facility. Denies continued hallucinations since " that time. Currently reports unknown problem with anxiety and depression, but admits to low energy, hypersomnia, low motivation, apathy, anhedonia, irritability, mind racing, episodes of crying and hostility. Denies SI/HI.    Review of Systems   Pertinent items are noted in HPI, all other systems reviewed and negative except for fatigue.    History  -Past Psychiatric History: Unclear diagnoses in adulthood.  -Extensive family hx of mental illness including bipolar and schizophrenia.  -Psychiatric Hospitalizations: Once in adolescence.  -Suicide Attempts: Unknown  Past Medical History:   Diagnosis Date   • Acute inferior myocardial infarction (HCC) 6/14/2022   • Allergic    • Asthma 1/27/2022   • CAD, multiple vessel 6/14/2022   • Gastroesophageal reflux disease 1/27/2022   • Hx of complete eye exam 2016   • Hyperlipidemia 1/27/2022   • Hypertension    • Migraine headache 1/27/2022   • Panic attack 1/27/2022   • Peptic ulcer 9/14/2017     Family History   Problem Relation Age of Onset   • Heart disease Mother    • Heart failure Father      Social History     Tobacco Use   • Smoking status: Current Every Day Smoker     Packs/day: 1.00     Types: Cigarettes     Start date: 1999   • Smokeless tobacco: Never Used   Vaping Use   • Vaping Use: Never used   Substance Use Topics   • Alcohol use: Not Currently   • Drug use: Yes     Frequency: 7.0 times per week     Types: Marijuana     Medications Prior to Admission   Medication Sig Dispense Refill Last Dose   • naproxen (NAPROSYN) 375 MG tablet Take 1 tablet by mouth 2 (Two) Times a Day As Needed for Moderate Pain . 14 tablet 0    • albuterol sulfate  (90 Base) MCG/ACT inhaler Inhale 2 puffs Every 4 (Four) Hours As Needed for Wheezing.      • amLODIPine (NORVASC) 5 MG tablet Take 5 mg by mouth Daily.      • atorvastatin (LIPITOR) 40 MG tablet Take 40 mg by mouth Every Night.      • levothyroxine (SYNTHROID, LEVOTHROID) 50 MCG tablet Take 50 mcg by mouth Daily.      •  "lisinopril (PRINIVIL,ZESTRIL) 20 MG tablet Take 20 mg by mouth Daily.         Scheduled Meds:  aspirin, 81 mg, Oral, Daily  atorvastatin, 80 mg, Oral, Nightly  busPIRone, 10 mg, Oral, Q12H  [START ON 6/17/2022] Cariprazine HCl, 1.5 mg, Oral, QAM  clopidogrel, 75 mg, Oral, Daily  famotidine, 20 mg, Oral, BID AC  levothyroxine, 50 mcg, Oral, Q AM  lisinopril, 10 mg, Oral, Q24H  metoprolol tartrate, 25 mg, Oral, Q12H  nicotine, 1 patch, Transdermal, Q24H  sodium chloride, 1,000 mL, Intravenous, Once  sodium chloride, 10 mL, Intravenous, Q12H      Continuous Infusions:  DOPamine, 3 mcg/kg/min, Last Rate: Stopped (06/14/22 2130)  nitroglycerin, 5-200 mcg/min, Last Rate: Stopped (06/15/22 0914)      PRN Meds:  •  acetaminophen **OR** acetaminophen  •  aluminum-magnesium hydroxide-simethicone  •  atropine  •  diazePAM  •  diphenhydrAMINE  •  nitroglycerin  •  ondansetron **OR** ondansetron  •  sodium chloride  •  sodium chloride  •  sodium chloride   Allergies:  Codeine, Hydrocodone, Hydrocodone-acetaminophen, and Shellfish-derived products  Objective     Physical Exam:  Vital Signs:   /79 (BP Location: Right arm, Patient Position: Sitting)   Pulse 76   Temp 97.2 °F (36.2 °C) (Axillary)   Resp 18   Ht 177.8 cm (70\")   Wt 123 kg (270 lb 1 oz)   SpO2 97%   BMI 38.75 kg/m²     General Appearance:  Well-developed, well-nourished, obese, with NAD, but appears depressed, mildly labile, and tearful.   Head:  Normocephalic, without obvious deformity, atraumatic.   Eyes:          Lids and lashes normal, conjunctivae and sclerae normal, no   icterus, no pallor, corneas clear.   Skin:  Neurologic:  Musculoskeletal: No bleeding, bruising or rash.  Cranial nerves 2 - 12 grossly intact, sensation intact.  Muscle strength/tone: WNL  Abnormal Movements: No tremors or abnormal involuntary movements  Gait: Deferred, in bed     Mental Status Exam:   Orientation:  To person, Place, Time and Situation  Memory: Recent and remote " memory Intact  Mood/Affect: Depressed, Anxious, Labile and Restricted  Hopelessness: 5  Suicidal Ideations: None  Homicidal Ideations:  None  Hallucinations: None, with hx.  Delusions:  None  Obsessions: None  Behavior and Psychomotor Activity: Appropriate  Speech:  Normal and occasional rambling.  Thought Process: Goal directed, Linear and Circum  Thought Content: Normal  Associations: Intact  Language: name objects and repeat phrases  Concentration and computation: Fair  Attention Span: Fair  Fund of Knowledge: Fair  Reliability: fair  Insight into mental health: Poor  Judgement: Poor  Impulse Control:  Poor  Hygiene: fair  Cooperation:  Guarded  Eye Contact:  Fair  Physical/Medical Issues:  Yes CAD, STEMI, tobacco abuse     Medications and allergies reviewed.    Lab Results   Component Value Date    GLUCOSE 106 (H) 06/16/2022    CALCIUM 8.9 06/16/2022     06/16/2022    K 4.6 06/16/2022    CO2 20.0 (L) 06/16/2022     06/16/2022    BUN 14 06/16/2022    CREATININE 0.97 06/16/2022    EGFRIFNONA 93 01/07/2022    BCR 14.4 06/16/2022    ANIONGAP 13.0 06/16/2022     Lab Results   Component Value Date     06/16/2022    BUN 14 06/16/2022    CREATININE 0.97 06/16/2022    WBC 12.10 (H) 06/16/2022     Assessment & Plan     CAD, multiple vessel    Acute inferior myocardial infarction (HCC)    Bipolar II disorder (HCC)    Hx of brief reactive psychosis    Generalized anxiety disorder with panic attacks    Rage attacks    EKG Results: Reviewed.  QTc: 428 (6/15/22)    LABS: Reviewed.  Na: 136 (6/16/22)    Assessment:   Bipolar II Disorder; Depressed, Severe  Generalized Anxiety Disorder  Panic Disorder with rage attacks    Treatment Plan: Prior rxn to Zoloft, pattern of depression with mood variation, episodes of irritability and impulsivity, and strong fmhx and likely last psychotic episode that has not persisted are supportive of bipolar dx. Pattern currently seems to be depressive or at least since  adolescence, and suggests bipolar II, but bipolar I remains on the differential, including intermittent explosive d/o instead of panic disorder. Severe anxiety and depression are apparent. Pt is willing to start medication to get better, and is agreeable to tx plans discussed.    -Start 1.5mg Vraylar for at least 2 weeks, before increasing to 3mg. 1 month may be more appropriate depending on tolerability per EKG given recent STEMI and stent placements.  -AM EKG for after Vraylar administration.  -Start 10mg busprione BID for improved anxiety control.  -Start 5mg diazepam q6hrs PRN for anxiety or rage.  -Will consider adding lamotrigine, but may be best to wait until outpatient setting to assure knowing complete effects of monotherapy first.  -My card was given to the pt to establish outpatient psychiatric management. PCP is at [facility], and counseling was recommended in outpatient setting to help with medication efficacy.  -Will follow and provide support.    Treatment Plan discussed with: Patient, Family and nursing    I discussed the patients findings and my recommendations with patient, family and nursing staff    I have reviewed and approved the behavioral health treatment plans and problem list. Yes    Thank you for the consult  Referring MD has access to consult report and progress notes in EMR  Patient was examined wearing appropriate PPE.    Moises Vasquez PA-C  06/16/22  22:58 EDT    EMR Dragon transcription disclaimer:  Part of this note may be an electronic transcription/translation of spoken language to printed text using the Dragon Dictation System.

## 2022-06-16 NOTE — PROGRESS NOTES
LOS: 2 days   Admiting Physician- Janna Nicholas DO    Reason For Followup:    Acute inferior myocardial infarction  CAD  Hyperlipidemia  Covid 19 infection    Subjective     Patient is lying comfortably.  No complaint    Objective     Patient has been off isolation.    Review of Systems:   Review of Systems   Constitutional: Negative for chills and fever.   HENT: Negative for ear discharge and nosebleeds.    Eyes: Negative for discharge and redness.   Cardiovascular: Positive for chest pain. Negative for orthopnea, palpitations, paroxysmal nocturnal dyspnea and syncope.   Respiratory: Negative for cough, shortness of breath and wheezing.    Endocrine: Negative for heat intolerance.   Skin: Negative for rash.   Musculoskeletal: Negative for arthritis and myalgias.   Gastrointestinal: Negative for abdominal pain, melena, nausea and vomiting.   Genitourinary: Negative for dysuria and hematuria.   Neurological: Negative for dizziness, light-headedness, numbness and tremors.   Psychiatric/Behavioral: Negative for depression. The patient is nervous/anxious.          Vital Signs  Vitals:    06/16/22 0300 06/16/22 0400 06/16/22 0700 06/16/22 0800   BP: 143/76 145/90 143/85 145/78   BP Location:  Right arm     Patient Position:  Lying     Pulse: 76 65 65 62   Resp:  20     Temp:  97.5 °F (36.4 °C)  96.3 °F (35.7 °C)   TempSrc:  Axillary  Axillary   SpO2: 95% 96% 96% 95%   Weight:       Height:         Wt Readings from Last 1 Encounters:   06/14/22 123 kg (270 lb 1 oz)       Intake/Output Summary (Last 24 hours) at 6/16/2022 0946  Last data filed at 6/15/2022 1600  Gross per 24 hour   Intake 862 ml   Output 850 ml   Net 12 ml     Physical Exam:  Constitutional:       Appearance: Well-developed.   Eyes:      General: No scleral icterus.        Right eye: No discharge.   HENT:      Head: Normocephalic and atraumatic.   Neck:      Thyroid: No thyromegaly.      Lymphadenopathy: No cervical adenopathy.   Pulmonary:       Effort: Pulmonary effort is normal. No respiratory distress.      Breath sounds: Normal breath sounds. No wheezing. No rales.   Cardiovascular:      Normal rate. Regular rhythm.      No gallop.   Edema:     Peripheral edema absent.   Abdominal:      Tenderness: There is no abdominal tenderness.   Skin:     Findings: No erythema or rash.   Neurological:      Mental Status: Alert and oriented to person, place, and time.         Results Review:   Lab Results (last 24 hours)     Procedure Component Value Units Date/Time    Comprehensive Metabolic Panel [[id number]]  (Abnormal) Collected: 06/16/22 0414    Specimen: Blood Updated: 06/16/22 0501     Glucose 106 mg/dL      BUN 14 mg/dL      Creatinine 0.97 mg/dL      Sodium 136 mmol/L      Potassium 4.6 mmol/L      Chloride 103 mmol/L      CO2 20.0 mmol/L      Calcium 8.9 mg/dL      Total Protein 7.1 g/dL      Albumin 4.30 g/dL      ALT (SGPT) 42 U/L      AST (SGOT) 26 U/L      Alkaline Phosphatase 89 U/L      Total Bilirubin 0.3 mg/dL      Globulin 2.8 gm/dL      A/G Ratio 1.5 g/dL      BUN/Creatinine Ratio 14.4     Anion Gap 13.0 mmol/L      eGFR 102.5 mL/min/1.73      Comment: National Kidney Foundation and American Society of Nephrology (ASN) Task Force recommended calculation based on the Chronic Kidney Disease Epidemiology Collaboration (CKD-EPI) equation refit without adjustment for race.       Narrative:      GFR Normal >60  Chronic Kidney Disease <60  Kidney Failure <15      Magnesium [[id number]]  (Normal) Collected: 06/16/22 0414    Specimen: Blood Updated: 06/16/22 0501     Magnesium 2.2 mg/dL     Phosphorus [[id number]]  (Abnormal) Collected: 06/16/22 0414    Specimen: Blood Updated: 06/16/22 0501     Phosphorus 2.0 mg/dL     Protime-INR [[id number]]  (Normal) Collected: 06/16/22 0414    Specimen: Blood Updated: 06/16/22 0450     Protime 10.2 Seconds      INR 0.99    CBC & Differential [[id number]]  (Abnormal) Collected: 06/16/22 0414    Specimen: Blood Updated:  06/16/22 0448    Narrative:      The following orders were created for panel order CBC & Differential.  Procedure                               Abnormality         Status                     ---------                               -----------         ------                     CBC Auto Differential[[id number]]        Abnormal            Final result                 Please view results for these tests on the individual orders.    CBC Auto Differential [[id number]]  (Abnormal) Collected: 06/16/22 0414    Specimen: Blood Updated: 06/16/22 0448     WBC 12.10 10*3/mm3      RBC 5.06 10*6/mm3      Hemoglobin 14.6 g/dL      Hematocrit 43.5 %      MCV 85.9 fL      MCH 28.8 pg      MCHC 33.5 g/dL      RDW 13.4 %      RDW-SD 40.7 fl      MPV 7.2 fL      Platelets 329 10*3/mm3      Neutrophil % 84.1 %      Lymphocyte % 11.6 %      Monocyte % 2.7 %      Eosinophil % 0.9 %      Basophil % 0.7 %      Neutrophils, Absolute 10.20 10*3/mm3      Lymphocytes, Absolute 1.40 10*3/mm3      Monocytes, Absolute 0.30 10*3/mm3      Eosinophils, Absolute 0.10 10*3/mm3      Basophils, Absolute 0.10 10*3/mm3      nRBC 0.3 /100 WBC     Potassium [[id number]]  (Normal) Collected: 06/15/22 2046    Specimen: Blood Updated: 06/15/22 2110     Potassium 3.9 mmol/L     Phosphorus [[id number]]  (Abnormal) Collected: 06/15/22 2046    Specimen: Blood Updated: 06/15/22 2110     Phosphorus 2.4 mg/dL     COVID-19 RAPID AG,VERITOR,COR/KELSEY/PAD/GONZALEZ/MAD/FLASH/LAG/YA/ IN-HOUSE,DRY SWAB, 1-2 HR TAT - Swab, Nasal Cavity [[id number]]  (Normal) Collected: 06/15/22 1733    Specimen: Swab from Nasal Cavity Updated: 06/15/22 1755     COVID19 Presumptive Negative    Narrative:      Fact sheets for providers: https://www.fda.gov/media/946833/download    Fact sheets for patients: https://www.fda.gov/media/202005/download        Imaging Results (Last 72 Hours)     Procedure Component Value Units Date/Time    XR Chest 1 View [[id number]] Collected: 06/16/22 0818     Updated:  06/16/22 0821    Narrative:      DATE OF EXAM:  6/16/2022 5:56 AM     PROCEDURE:  XR CHEST 1 VW-     INDICATIONS:  Shortness of breath; I25.10-Atherosclerotic heart disease of native  coronary artery without angina pectoris     COMPARISON:  6/15/2022     TECHNIQUE:   Single radiographic view of the chest was obtained.     FINDINGS:  Lungs are normally expanded. Heart size is normal. No pneumothorax or  pleural effusion or focal pulmonary parenchymal opacity. Pulmonary  vessels are distinct. Bones and soft tissues are normal.       Impression:      No acute cardiopulmonary abnormality.     Electronically Signed By-Leobardo Watson MD On:6/16/2022 8:19 AM  This report was finalized on [id number] by  Leobardo Watson MD.    XR Chest 1 View [[id number]] Collected: 06/15/22 0854     Updated: 06/15/22 0858    Narrative:      Examination: XR CHEST 1 VW    Date of Exam: 6/15/2022 1:35 EDT    Indication: Pain with respirations..  Chest pain today     Comparison: Single frontal view chest performed on June 8, 2022    Technique: Single frontal view chest    Findings:  Today's study reveals heart and mediastinum are normal. No evidence of infiltrate or effusion or pneumothorax or mass the right or left lungs. There is mild dextroscoliosis in the thoracic spine      Impression:      No acute disease in the chest and no significant change since previous study performed on June 8, 2022    Electronically Signed: Nael Goodman MD 6/15/2022 8:56 EDT        ECG/EMG Results (most recent)     Procedure Component Value Units Date/Time    ECG 12 Lead [[id number]] Collected: 06/14/22 1225     Updated: 06/14/22 1227     QT Interval 374 ms     Narrative:      HEART RATE= 81  bpm  RR Interval= 736  ms  MA Interval= 123  ms  P Horizontal Axis= 18  deg  P Front Axis= 74  deg  QRSD Interval= 92  ms  QT Interval= 374  ms  QRS Axis= 29  deg  T Wave Axis= 48  deg  - NORMAL ECG -  Sinus rhythm  Electronically Signed By:   Date and Time of Study:  2022-06-14 12:25:46    ECG 12 Lead [[id number]] Collected: 06/14/22 2308     Updated: 06/14/22 2309     QT Interval 370 ms     Narrative:      HEART RATE= 70  bpm  RR Interval= 856  ms  UT Interval= 131  ms  P Horizontal Axis= 17  deg  P Front Axis= 47  deg  QRSD Interval= 84  ms  QT Interval= 370  ms  QRS Axis= 6  deg  T Wave Axis= 24  deg  - NORMAL ECG -  Sinus rhythm  ST elev, probable normal early repol pattern  When compared with ECG of 14-Jun-2022 12:25:46,  No significant change  Electronically Signed By:   Date and Time of Study: 2022-06-14 23:08:45    ECG 12 Lead [[id number]] Collected: 06/15/22 0739     Updated: 06/15/22 0740     QT Interval 384 ms     Narrative:      HEART RATE= 74  bpm  RR Interval= 804  ms  UT Interval= 133  ms  P Horizontal Axis= 44  deg  P Front Axis= 39  deg  QRSD Interval= 92  ms  QT Interval= 384  ms  QRS Axis= 8  deg  T Wave Axis= 14  deg  - NORMAL ECG -  Sinus rhythm  ST elev, probable normal early repol pattern  Electronically Signed By:   Date and Time of Study: 2022-06-15 07:39:52    Adult Transthoracic Echo Limited W/ Cont if Necessary Per Protocol [[id number]] Resulted: 06/15/22 1111     Updated: 06/15/22 1114     Target HR (85%) 155 bpm      Max. Pred. HR (100%) 182 bpm      Echo EF Estimated 60 %     Narrative:      · Estimated left ventricular EF = 60% Left ventricular systolic function   is normal.     Indications  Recent ACS    Technically difficult study.  (COVID protocol)  Mitral valve is structurally normal.  Tricuspid valve is structurally normal.  Aortic valve is structurally normal.  Pulmonic valve could not be well visualized.  No evidence for mitral tricuspid or aortic regurgitation is seen by   Doppler study.  Left atrium is normal in size.  Right atrium is normal in size.  Left ventricle is normal in size and contractility with ejection fraction   of 60%.  Right ventricle is normal in size.  No pericardial effusion or intracardiac thrombus is  seen.    Impression  Technically difficult and limited study (COVID protocol.  In the views obtained,  Structurally and functionally normal cardiac valves.  Left ventricular size and contractility is normal with ejection fraction   of 60%.  No pericardial effusion or intracardiac thrombus is present.        ECG 12 Lead [[id number]] Collected: 06/15/22 1432     Updated: 06/16/22 0830     QT Interval 396 ms     Narrative:      HEART RATE= 65  bpm  RR Interval= 924  ms  WA Interval= 129  ms  P Horizontal Axis= 14  deg  P Front Axis= 24  deg  QRSD Interval= 92  ms  QT Interval= 396  ms  QRS Axis= -6  deg  T Wave Axis= 1  deg  - BORDERLINE ECG -  Incomplete analysis due to missing data in precordial lead(s)  Sinus rhythm  Borderline ST elevation, lateral leads  Baseline wander in lead(s) V2  Missing lead(s): V6  Electronically Signed By:   Date and Time of Study: 2022-06-15 14:32:12        CBC    Results from last 7 days   Lab Units 06/16/22  0414 06/15/22  0340 06/14/22  1845 06/14/22  1158   WBC 10*3/mm3 12.10* 11.60* 12.30* 10.70   HEMOGLOBIN g/dL 14.6 13.6 15.2 14.6   PLATELETS 10*3/mm3 329 336 358 328     BMP   Results from last 7 days   Lab Units 06/16/22  0414 06/15/22  2046 06/15/22  0340 06/14/22  2310 06/14/22 1845 06/14/22  1158   SODIUM mmol/L 136  --  133* 135* 132*  --    POTASSIUM mmol/L 4.6 3.9 4.3 4.7 4.6  --    CHLORIDE mmol/L 103  --  101 101 100  --    CO2 mmol/L 20.0*  --  21.0* 19.0* 18.0*  --    BUN mg/dL 14  --  10 11 14  --    CREATININE mg/dL 0.97  --  0.98 1.08 1.19  --    GLUCOSE mg/dL 106*  --  119* 126* 173*  --    MAGNESIUM mg/dL 2.2  --  2.0 2.0  --  1.8   PHOSPHORUS mg/dL 2.0* 2.4* 3.0 1.9*  --  2.8     CMP   Results from last 7 days   Lab Units 06/16/22 0414 06/15/22  2046 06/15/22  0340 06/14/22  2310 06/14/22  1845   SODIUM mmol/L 136  --  133* 135* 132*   POTASSIUM mmol/L 4.6 3.9 4.3 4.7 4.6   CHLORIDE mmol/L 103  --  101 101 100   CO2 mmol/L 20.0*  --  21.0* 19.0* 18.0*   BUN mg/dL  14  --  10 11 14   CREATININE mg/dL 0.97  --  0.98 1.08 1.19   GLUCOSE mg/dL 106*  --  119* 126* 173*   ALBUMIN g/dL 4.30  --  3.90 4.00 4.00   BILIRUBIN mg/dL 0.3  --  0.4 0.3 0.3   ALK PHOS U/L 89  --  94 97 101   AST (SGOT) U/L 26  --  27 25 24   ALT (SGPT) U/L 42*  --  35 37 38     Cardiac Studies:  Echo- Results for orders placed during the hospital encounter of 06/14/22    Adult Transthoracic Echo Limited W/ Cont if Necessary Per Protocol    Interpretation Summary  · Estimated left ventricular EF = 60% Left ventricular systolic function is normal.    Indications  Recent ACS    Technically difficult study.  (COVID protocol)  Mitral valve is structurally normal.  Tricuspid valve is structurally normal.  Aortic valve is structurally normal.  Pulmonic valve could not be well visualized.  No evidence for mitral tricuspid or aortic regurgitation is seen by Doppler study.  Left atrium is normal in size.  Right atrium is normal in size.  Left ventricle is normal in size and contractility with ejection fraction of 60%.  Right ventricle is normal in size.  No pericardial effusion or intracardiac thrombus is seen.    Impression  Technically difficult and limited study (COVID protocol.  In the views obtained,  Structurally and functionally normal cardiac valves.  Left ventricular size and contractility is normal with ejection fraction of 60%.  No pericardial effusion or intracardiac thrombus is present.    Stress Myoview-  Cath-      Medication Review:   Scheduled Meds:aspirin, 81 mg, Oral, Daily  atorvastatin, 80 mg, Oral, Nightly  clopidogrel, 75 mg, Oral, Daily  levothyroxine, 50 mcg, Oral, Q AM  metoprolol tartrate, 25 mg, Oral, Q12H  nicotine, 1 patch, Transdermal, Q24H  sodium chloride, 1,000 mL, Intravenous, Once  sodium chloride, 10 mL, Intravenous, Q12H      Continuous Infusions:DOPamine, 3 mcg/kg/min, Last Rate: Stopped (06/14/22 2130)  nitroglycerin, 5-200 mcg/min, Last Rate: Stopped (06/15/22 0914)      PRN  Meds:.•  acetaminophen **OR** acetaminophen  •  aluminum-magnesium hydroxide-simethicone  •  atropine  •  diphenhydrAMINE  •  nitroglycerin  •  ondansetron **OR** ondansetron  •  sodium chloride  •  sodium chloride      Assessment & Plan   Patient Active Problem List   Diagnosis   • Hypertension   • Peptic ulcer   • Asthma   • Blepharitis   • Chest pain   • Chronic cholecystitis   • Contact with and (suspected) exposure to covid-19   • Corneal ulcer   • Costochondritis   • Cough   • Disorder of thyroid   • Gastroesophageal reflux disease   • Hyperlipidemia   • Migraine headache   • Panic attack   • Acute inferior myocardial infarction (HCC)   • CAD, multiple vessel     MDM:    1.  Acute inferior myocardial infarction:    Patient underwent successful stenting of RCA.  Continue aspirin and Plavix    2.  CAD:    Patient had high-grade stenosis of LAD I would proceed with intervention today    3.  Hypertension:    I would recommend to start lisinopril back.  Blood pressure is elevated    4.  Hyperlipidemia:    Patient is on Lipitor.    Matthieu Del Toro MD  06/16/22  09:46 EDT

## 2022-06-16 NOTE — CASE MANAGEMENT/SOCIAL WORK
Social Work Assessment  [facility]     Patient Name: Tramaine Gates  MRN: [id number]  Today's Date: 6/16/2022    Admit Date: 6/14/2022     Substance Abuse     Row Name 06/16/22 1010       Substance Use    Substance Use Status current tobacco use    Substance Use Comment LATE ENTRY FROM 06.15.2022: KELL reviewed chart and saw pt continues to smoke 1ppd in the setting of MI, CAD, Asthma, COPD and HTN. Pt is COVID (+), so SW called into room 2301 to discuss further. Pt stated he has been smoking for 22 years, but hasn’t had a cig for 2 days (since his admission). SW provided education on nicotine’s effect on the cardiopulmonary systems, and offered a free smoking cessation resource. Pt stated he isn’t sure if he is ready to quit yet, but accepted the resource for future use. KELL provided a Quit Now card to pt’s bedside RN to be given to pt. Pt denies further needs at this time.              Phone communication or documentation only - no physical contact with patient or family.    YING Hua, [facility]  Medical Social Worker  Ph [phone]  Fax [phone]  [email or web address]

## 2022-06-16 NOTE — PROGRESS NOTES
"    [facility] Medicine Services Daily Progress Note    Patient Name: Tramaine Gates  : 1985  MRN: [id number]  Primary Care Physician:  Daniela Hernandez FNP  Date of admission: 2022      Subjective      Chief Complaint: Chest pain      Patient Reports significant nausea after cardiac cath due to the requirement of laying down flat.  He also states he takes antacids at home and feels his reflux is out of control.  He denies any chest pain or shortness of breath currently.  Shortness of breath.  Significant other is at bedside and reports patient noted \"blood clots\" in his stool yesterday.  His hemoglobin remained stable.  No bowel movements or bright red blood per rectum today.    ROS for review of systems was performed, pertinent as noted above, other systems negative      Objective      Vitals:   Temp:  [96.3 °F (35.7 °C)-98.7 °F (37.1 °C)] 96.3 °F (35.7 °C)  Heart Rate:  [62-96] 69  Resp:  [15-20] 17  BP: ()/() 137/94  Flow (L/min):  [2] 2    Physical Exam     Constitutional:      Awake, alert, no acute distress  HENT:      Normocephalic and atraumatic. Nose normal. Mucous membranes are moist. Oropharynx is clear.   Eyes:      No icterus, EOM intact. Pupils are equal, round, and reactive to light.   Neck:     No LAD.  No JVD.  No thyromegaly  Cardiovascular:      Regular rate and regular rhythm.  No murmur.  No edema  Pulmonary:      Clear to auscultation bilaterally.  Normal effort  Abdominal:      Soft, NTND.  BS + all 4 quadrants  Musculoskeletal:         No joint effusions.  No atrophy   Skin:     Warm, dry, no rashes.  No lesions.  Neurological:      Alert and oriented x3, no focal neurologic deficits could be appreciated  Psychiatric:         Normal mood and affect, normal thought process and judgment         Result Review    Result Review:  I have personally reviewed the results from the time of this admission to 2022 13:50 EDT and agree with these " findings:  [x]  Laboratory  []  Microbiology  []  Radiology  []  EKG/Telemetry   []  Cardiology/Vascular   []  Pathology  []  Old records  []  Other:  Most notable findings include: Hemoglobin 14.6, glucose 106          Assessment & Plan      Brief Patient Summary:  Tramaine Gates is a 36 y.o. male with a past medical history ofHTN, HLD, tobacco abuse who [facility]14[facility]22 complaining of chest pain, found to have a STEMI, underwent cardiac cath with stent placements.  He incidentally was found to be COVID-positive.  He has undergone a second cardiac cath for stenting of his LAD.  He remains asymptomatic from his COVID positive PCR      [MAR Hold] aspirin, 81 mg, Oral, Daily  [MAR Hold] atorvastatin, 80 mg, Oral, Nightly  [MAR Hold] clopidogrel, 75 mg, Oral, Daily  famotidine, 20 mg, Oral, BID AC  GI cocktail, , Oral, Once  [MAR Hold] levothyroxine, 50 mcg, Oral, Q AM  lisinopril, 10 mg, Oral, Q24H  metoprolol tartrate, 25 mg, Oral, Q12H  [MAR Hold] nicotine, 1 patch, Transdermal, Q24H  [MAR Hold] sodium chloride, 1,000 mL, Intravenous, Once  [MAR Hold] sodium chloride, 10 mL, Intravenous, Q12H       DOPamine, 3 mcg/kg/min, Last Rate: Stopped (06/14/22 2130)  nitroglycerin, 5-200 mcg/min, Last Rate: Stopped (06/15/22 0914)         Active Hospital Problems:  Active Hospital Problems    Diagnosis    • **CAD, multiple vessel      Added automatically from request for surgery [id number]     • Acute inferior myocardial infarction (HCC)      Plan:     1.  CAD with STEMI on admission  -Management per cardiology,  s/p 2 separate cardiac cath with stent placements  -ASA, Plavix, Lipitor, Lopressor     2.  COVID-19 positive  -Possibly contributing to underlying nausea but otherwise no pulmonary involvement at this time, continue to monitor     3.  COPD  -On oral steroids, inhalers per pulmonology     4.  Essential hypertension  -Blood pressure currently stable on current medications, continue to  monitor     5.  Hyperlipidemia  -Continue statin     6.  Tobacco abuse  -Nicotine patch, counseled on smoking cessation at length with patient, spouse in the room.  Total time for counseling approximately 4 minutes    DVT prophylaxis:  Mechanical DVT prophylaxis orders are present.    CODE STATUS:    Level Of Support Discussed With: Patient  Code Status (Patient has no pulse and is not breathing): CPR (Attempt to Resuscitate)  Medical Interventions (Patient has pulse or is breathing): Full Support      Disposition:  I expect patient to be discharged when medically stable.    This patient has been examined wearing appropriate Personal Protective Equipment . 06/16/22      I have discussed the plan of care with the patient, significant other, nurse.  Answered all questions    Electronically signed by Janna Nicholas DO, 06/16/22, 13:50 EDT.  [facility] Hospitalist Team

## 2022-06-16 NOTE — PROGRESS NOTES
"PULMONARY CRITICAL CARE PROGRESS  NOTE      PATIENT IDENTIFICATION:  Name: Tramaine Gates  MRN: [id number]  :  1985     Age: 36 y.o.  Sex: male    DATE OF Note:  2022   Referring Physician: Deanna Braun MD                  Subjective:     on 2l , no SOB,   Off nitro no chest pain, no nausea or vomiting, no change in bowel habit, no dysuria,  no new  skin rash or itching.      Objective:  tMax 24 hrs: Temp (24hrs), Av.5 °F (36.4 °C), Min:96.3 °F (35.7 °C), Max:98.7 °F (37.1 °C)      Vitals Ranges:   Temp:  [96.3 °F (35.7 °C)-98.7 °F (37.1 °C)] 96.3 °F (35.7 °C)  Heart Rate:  [62-83] 62  Resp:  [13-20] 20  BP: ()/(54-96) 145/78    Intake and Output Last 3 Shifts:   I/O last 3 completed shifts:  In: 3408.6 [P.O.:1700; I.V.:1708.6]  Out: 5250 [Urine:5250]    Exam:  /78   Pulse 62   Temp 96.3 °F (35.7 °C) (Axillary)   Resp 20   Ht 177.8 cm (70\")   Wt 123 kg (270 lb 1 oz)   SpO2 95%   BMI 38.75 kg/m²     General Appearance:   AA  HEENT:  Normocephalic, without obvious abnormality. Conjunctivae/corneas clear.  Normal external ear canals. Nares normal, no drainage     Neck:  Supple, symmetrical, trachea midline. No JVD.  Lungs /Chest wall:   Bilateral basal rhonchi, respirations unlabored, symmetrical wall movement.     Heart:  Regular rate and rhythm, systolic murmur PMI left sternal border  Abdomen: Soft, nontender, no masses, no organomegaly.    Extremities: Trace edema, no clubbing or cyanosis        Medications:    Current Facility-Administered Medications:   •  acetaminophen (TYLENOL) tablet 650 mg, 650 mg, Oral, Q4H PRN, 650 mg at 06/15/22 0329 **OR** acetaminophen (TYLENOL) suppository 650 mg, 650 mg, Rectal, Q4H PRN, Nathaniel Dean APRN  •  aluminum-magnesium hydroxide-simethicone (MAALOX MAX) 400-400-40 MG/5ML suspension 15 mL, 15 mL, Oral, Q6H PRN, Nathaniel Dean APRN  •  aspirin EC tablet 81 mg, 81 mg, Oral, Daily, Matthieu Del Toro MD, 81 mg at 22 0810  •  " atorvastatin (LIPITOR) tablet 80 mg, 80 mg, Oral, Nightly, Matthieu Del Toro MD, 80 mg at 06/15/22 2022  •  atropine sulfate injection 0.5-1 mg, 0.5-1 mg, Intravenous, Q5 Min PRN, Matthieu Del Toro MD  •  clopidogrel (PLAVIX) tablet 75 mg, 75 mg, Oral, Daily, Matthieu Del Toro MD, 75 mg at 06/16/22 0809  •  diphenhydrAMINE (BENADRYL) capsule 25 mg, 25 mg, Oral, Q6H PRN, Matthieu Del Toro MD, 25 mg at 06/14/22 2359  •  DOPamine 400 mg in 250 mL D5W infusion, 3 mcg/kg/min, Intravenous, Continuous, Matthieu Del Toro MD, Held at 06/14/22 2130  •  levothyroxine (SYNTHROID, LEVOTHROID) tablet 50 mcg, 50 mcg, Oral, Q AM, Day, Dolores, APRN  •  metoprolol tartrate (LOPRESSOR) tablet 25 mg, 25 mg, Oral, Q12H, Matthieu Del Toro MD, 25 mg at 06/16/22 0811  •  nicotine (NICODERM CQ) 14 MG/24HR patch 1 patch, 1 patch, Transdermal, Q24H, Sonia Parsons APRN, 1 patch at 06/16/22 0644  •  nitroglycerin (NITROSTAT) SL tablet 0.4 mg, 0.4 mg, Sublingual, Q5 Min PRN, Nathaniel Dean APRN  •  nitroglycerin (TRIDIL) 200 mcg/ml infusion, 5-200 mcg/min, Intravenous, Titrated, Pat Ruiz APRN, Stopped at 06/15/22 0914  •  ondansetron (ZOFRAN) tablet 4 mg, 4 mg, Oral, Q6H PRN **OR** ondansetron (ZOFRAN) injection 4 mg, 4 mg, Intravenous, Q6H PRN, Nathaniel Dean APRN, 4 mg at 06/15/22 0016  •  sodium chloride 0.9 % bolus 1,000 mL, 1,000 mL, Intravenous, Once, Day, Dolores, APRN  •  sodium chloride 0.9 % flush 10 mL, 10 mL, Intravenous, Q12H, Nathaniel Dean, APRN, 10 mL at 06/16/22 0810  •  sodium chloride 0.9 % flush 10 mL, 10 mL, Intravenous, PRN, Nathaniel Dean, APRN  •  sodium chloride 0.9 % infusion 250 mL, 250 mL, Intravenous, Once PRN, Matthieu Del Toro MD    Data Review:  All labs (24hrs):   Recent Results (from the past 24 hour(s))   Adult Transthoracic Echo Limited W/ Cont if Necessary Per Protocol    Collection Time: 06/15/22 10:57 AM   Result Value Ref Range    Target HR (85%) 155 bpm    Max. Pred. HR (100%) 182 bpm    Echo EF Estimated 60 %   ECG  12 Lead    Collection Time: 06/15/22  2:32 PM   Result Value Ref Range    QT Interval 396 ms   COVID-19 RAPID AG,VERITOR,COR/KELSEY/PAD/GONZALEZ/MAD/FLASH/LAG/YA/ IN-HOUSE,DRY SWAB, 1-2 HR TAT - Swab, Nasal Cavity    Collection Time: 06/15/22  5:33 PM    Specimen: Nasal Cavity; Swab   Result Value Ref Range    COVID19 Presumptive Negative Presumptive Negative - Ref. Range   Phosphorus    Collection Time: 06/15/22  8:46 PM    Specimen: Blood   Result Value Ref Range    Phosphorus 2.4 (L) 2.5 - 4.5 mg/dL   Potassium    Collection Time: 06/15/22  8:46 PM    Specimen: Blood   Result Value Ref Range    Potassium 3.9 3.5 - 5.2 mmol/L   Magnesium    Collection Time: 06/16/22  4:14 AM    Specimen: Blood   Result Value Ref Range    Magnesium 2.2 1.6 - 2.6 mg/dL   Phosphorus    Collection Time: 06/16/22  4:14 AM    Specimen: Blood   Result Value Ref Range    Phosphorus 2.0 (L) 2.5 - 4.5 mg/dL   Comprehensive Metabolic Panel    Collection Time: 06/16/22  4:14 AM    Specimen: Blood   Result Value Ref Range    Glucose 106 (H) 65 - 99 mg/dL    BUN 14 6 - 20 mg/dL    Creatinine 0.97 0.76 - 1.27 mg/dL    Sodium 136 136 - 145 mmol/L    Potassium 4.6 3.5 - 5.2 mmol/L    Chloride 103 98 - 107 mmol/L    CO2 20.0 (L) 22.0 - 29.0 mmol/L    Calcium 8.9 8.6 - 10.5 mg/dL    Total Protein 7.1 6.0 - 8.5 g/dL    Albumin 4.30 3.50 - 5.20 g/dL    ALT (SGPT) 42 (H) 1 - 41 U/L    AST (SGOT) 26 1 - 40 U/L    Alkaline Phosphatase 89 39 - 117 U/L    Total Bilirubin 0.3 0.0 - 1.2 mg/dL    Globulin 2.8 gm/dL    A/G Ratio 1.5 g/dL    BUN/Creatinine Ratio 14.4 7.0 - 25.0    Anion Gap 13.0 5.0 - 15.0 mmol/L    eGFR 102.5 >60.0 mL/min/1.73   Protime-INR    Collection Time: 06/16/22  4:14 AM    Specimen: Blood   Result Value Ref Range    Protime 10.2 9.6 - 11.7 Seconds    INR 0.99 0.93 - 1.10   CBC Auto Differential    Collection Time: 06/16/22  4:14 AM    Specimen: Blood   Result Value Ref Range    WBC 12.10 (H) 3.40 - 10.80 10*3/mm3    RBC 5.06 4.14 - 5.80  10*6/mm3    Hemoglobin 14.6 13.0 - 17.7 g/dL    Hematocrit 43.5 37.5 - 51.0 %    MCV 85.9 79.0 - 97.0 fL    MCH 28.8 26.6 - 33.0 pg    MCHC 33.5 31.5 - 35.7 g/dL    RDW 13.4 12.3 - 15.4 %    RDW-SD 40.7 37.0 - 54.0 fl    MPV 7.2 6.0 - 12.0 fL    Platelets 329 140 - 450 10*3/mm3    Neutrophil % 84.1 (H) 42.7 - 76.0 %    Lymphocyte % 11.6 (L) 19.6 - 45.3 %    Monocyte % 2.7 (L) 5.0 - 12.0 %    Eosinophil % 0.9 0.3 - 6.2 %    Basophil % 0.7 0.0 - 1.5 %    Neutrophils, Absolute 10.20 (H) 1.70 - 7.00 10*3/mm3    Lymphocytes, Absolute 1.40 0.70 - 3.10 10*3/mm3    Monocytes, Absolute 0.30 0.10 - 0.90 10*3/mm3    Eosinophils, Absolute 0.10 0.00 - 0.40 10*3/mm3    Basophils, Absolute 0.10 0.00 - 0.20 10*3/mm3    nRBC 0.3 (H) 0.0 - 0.2 /100 WBC        Imaging:  XR Chest 1 View  Narrative: DATE OF EXAM:  6/16/2022 5:56 AM     PROCEDURE:  XR CHEST 1 VW-     INDICATIONS:  Shortness of breath; I25.10-Atherosclerotic heart disease of native  coronary artery without angina pectoris     COMPARISON:  6/15/2022     TECHNIQUE:   Single radiographic view of the chest was obtained.     FINDINGS:  Lungs are normally expanded. Heart size is normal. No pneumothorax or  pleural effusion or focal pulmonary parenchymal opacity. Pulmonary  vessels are distinct. Bones and soft tissues are normal.     Impression: No acute cardiopulmonary abnormality.     Electronically Signed By-Leobardo Watson MD On:6/16/2022 8:19 AM  This report was finalized on [id number] by  Leobardo Watson MD.       ASSESSMENT:  Acute inferior myocardial infarction (HCC)  Coronary artery disease  COVID-19 positive  Chronic obstructive airway disease  Obstructive sleep apnea  Morbid obesity  Hypertension  Gastroesophageal flux disorder  Hyperlipidemia     PLAN:  Continue antiplatelets  Beta-blocker  Record his positive COVID-19 we are going to monitor for now no signs of pulmonary involvement  Bronchodilator  Inhaled corticosteroids  Electrolytes/ glycemic control  DVT and GI  prophylaxis.       Total Critical care time in direct medical management (   ) minutes. This time specifically excludes time spent performing procedures.  Deanna Braun MD. D, ABSM.     6/16/2022  09:25 EDT

## 2022-06-17 VITALS
TEMPERATURE: 98.5 F | RESPIRATION RATE: 18 BRPM | SYSTOLIC BLOOD PRESSURE: 119 MMHG | HEIGHT: 70 IN | BODY MASS INDEX: 38.98 KG/M2 | OXYGEN SATURATION: 98 % | WEIGHT: 272.27 LBS | HEART RATE: 69 BPM | DIASTOLIC BLOOD PRESSURE: 77 MMHG

## 2022-06-17 LAB
ACT BLD: 219 SECONDS (ref 89–137)
ACT BLD: 468 SECONDS (ref 89–137)
ALBUMIN SERPL-MCNC: 3.9 G/DL (ref 3.5–5.2)
ALBUMIN/GLOB SERPL: 1.3 G/DL
ALP SERPL-CCNC: 85 U/L (ref 39–117)
ALT SERPL W P-5'-P-CCNC: 31 U/L (ref 1–41)
ANION GAP SERPL CALCULATED.3IONS-SCNC: 13 MMOL/L (ref 5–15)
AST SERPL-CCNC: 19 U/L (ref 1–40)
BASOPHILS # BLD AUTO: 0.1 10*3/MM3 (ref 0–0.2)
BASOPHILS NFR BLD AUTO: 1 % (ref 0–1.5)
BILIRUB SERPL-MCNC: 0.4 MG/DL (ref 0–1.2)
BUN SERPL-MCNC: 12 MG/DL (ref 6–20)
BUN/CREAT SERPL: 11.7 (ref 7–25)
CALCIUM SPEC-SCNC: 8.5 MG/DL (ref 8.6–10.5)
CHLORIDE SERPL-SCNC: 101 MMOL/L (ref 98–107)
CHOLEST SERPL-MCNC: 251 MG/DL (ref 0–200)
CO2 SERPL-SCNC: 20 MMOL/L (ref 22–29)
CREAT SERPL-MCNC: 1.03 MG/DL (ref 0.76–1.27)
DEPRECATED RDW RBC AUTO: 39.4 FL (ref 37–54)
EGFRCR SERPLBLD CKD-EPI 2021: 95.4 ML/MIN/1.73
EOSINOPHIL # BLD AUTO: 0 10*3/MM3 (ref 0–0.4)
EOSINOPHIL NFR BLD AUTO: 0.1 % (ref 0.3–6.2)
ERYTHROCYTE [DISTWIDTH] IN BLOOD BY AUTOMATED COUNT: 13.3 % (ref 12.3–15.4)
GLOBULIN UR ELPH-MCNC: 3 GM/DL
GLUCOSE SERPL-MCNC: 92 MG/DL (ref 65–99)
HCT VFR BLD AUTO: 42.6 % (ref 37.5–51)
HDLC SERPL-MCNC: 37 MG/DL (ref 40–60)
HGB BLD-MCNC: 14.3 G/DL (ref 13–17.7)
LDLC SERPL CALC-MCNC: 172 MG/DL (ref 0–100)
LDLC/HDLC SERPL: 4.58 {RATIO}
LYMPHOCYTES # BLD AUTO: 3.2 10*3/MM3 (ref 0.7–3.1)
LYMPHOCYTES NFR BLD AUTO: 25.5 % (ref 19.6–45.3)
MAGNESIUM SERPL-MCNC: 2.3 MG/DL (ref 1.6–2.6)
MCH RBC QN AUTO: 28.8 PG (ref 26.6–33)
MCHC RBC AUTO-ENTMCNC: 33.5 G/DL (ref 31.5–35.7)
MCV RBC AUTO: 86.1 FL (ref 79–97)
MONOCYTES # BLD AUTO: 0.6 10*3/MM3 (ref 0.1–0.9)
MONOCYTES NFR BLD AUTO: 5 % (ref 5–12)
NEUTROPHILS NFR BLD AUTO: 68.4 % (ref 42.7–76)
NEUTROPHILS NFR BLD AUTO: 8.6 10*3/MM3 (ref 1.7–7)
NRBC BLD AUTO-RTO: 0 /100 WBC (ref 0–0.2)
PHOSPHATE SERPL-MCNC: 2.4 MG/DL (ref 2.5–4.5)
PLATELET # BLD AUTO: 369 10*3/MM3 (ref 140–450)
PMV BLD AUTO: 6.8 FL (ref 6–12)
POTASSIUM SERPL-SCNC: 3.8 MMOL/L (ref 3.5–5.2)
PROT SERPL-MCNC: 6.9 G/DL (ref 6–8.5)
QT INTERVAL: 396 MS
RBC # BLD AUTO: 4.95 10*6/MM3 (ref 4.14–5.8)
SODIUM SERPL-SCNC: 134 MMOL/L (ref 136–145)
TRIGL SERPL-MCNC: 223 MG/DL (ref 0–150)
VLDLC SERPL-MCNC: 42 MG/DL (ref 5–40)
WBC NRBC COR # BLD: 12.6 10*3/MM3 (ref 3.4–10.8)

## 2022-06-17 PROCEDURE — 80061 LIPID PANEL: CPT | Performed by: INTERNAL MEDICINE

## 2022-06-17 PROCEDURE — 85025 COMPLETE CBC W/AUTO DIFF WBC: CPT | Performed by: INTERNAL MEDICINE

## 2022-06-17 PROCEDURE — 93010 ELECTROCARDIOGRAM REPORT: CPT | Performed by: INTERNAL MEDICINE

## 2022-06-17 PROCEDURE — 25010000002 ONDANSETRON PER 1 MG: Performed by: INTERNAL MEDICINE

## 2022-06-17 PROCEDURE — 84100 ASSAY OF PHOSPHORUS: CPT | Performed by: INTERNAL MEDICINE

## 2022-06-17 PROCEDURE — 99232 SBSQ HOSP IP/OBS MODERATE 35: CPT | Performed by: INTERNAL MEDICINE

## 2022-06-17 PROCEDURE — 99239 HOSP IP/OBS DSCHRG MGMT >30: CPT | Performed by: INTERNAL MEDICINE

## 2022-06-17 PROCEDURE — 83735 ASSAY OF MAGNESIUM: CPT | Performed by: INTERNAL MEDICINE

## 2022-06-17 PROCEDURE — 80053 COMPREHEN METABOLIC PANEL: CPT | Performed by: INTERNAL MEDICINE

## 2022-06-17 PROCEDURE — 93005 ELECTROCARDIOGRAM TRACING: CPT | Performed by: INTERNAL MEDICINE

## 2022-06-17 PROCEDURE — 93005 ELECTROCARDIOGRAM TRACING: CPT

## 2022-06-17 RX ORDER — BUSPIRONE HYDROCHLORIDE 5 MG/1
5 TABLET ORAL DAILY
Status: DISCONTINUED | OUTPATIENT
Start: 2022-06-18 | End: 2022-06-17 | Stop reason: HOSPADM

## 2022-06-17 RX ORDER — CLOPIDOGREL BISULFATE 75 MG/1
75 TABLET ORAL DAILY
Qty: 30 TABLET | Refills: 1 | Status: SHIPPED | OUTPATIENT
Start: 2022-06-18 | End: 2022-07-04 | Stop reason: HOSPADM

## 2022-06-17 RX ORDER — DIAZEPAM 5 MG/1
5 TABLET ORAL EVERY 6 HOURS PRN
Qty: 10 TABLET | Refills: 0 | Status: SHIPPED | OUTPATIENT
Start: 2022-06-17 | End: 2022-07-04 | Stop reason: HOSPADM

## 2022-06-17 RX ORDER — BUSPIRONE HYDROCHLORIDE 5 MG/1
5 TABLET ORAL DAILY
Qty: 30 TABLET | Refills: 0 | Status: SHIPPED | OUTPATIENT
Start: 2022-06-18 | End: 2022-07-14

## 2022-06-17 RX ORDER — ASPIRIN 81 MG/1
81 TABLET ORAL DAILY
Qty: 160 TABLET | Refills: 0 | Status: SHIPPED | OUTPATIENT
Start: 2022-06-18 | End: 2022-10-14 | Stop reason: SDUPTHER

## 2022-06-17 RX ADMIN — DIAZEPAM 5 MG: 5 TABLET ORAL at 03:55

## 2022-06-17 RX ADMIN — METOPROLOL TARTRATE 25 MG: 25 TABLET, FILM COATED ORAL at 09:19

## 2022-06-17 RX ADMIN — NICOTINE 1 PATCH: 14 PATCH, EXTENDED RELEASE TRANSDERMAL at 05:21

## 2022-06-17 RX ADMIN — ASPIRIN 81 MG: 81 TABLET, COATED ORAL at 09:19

## 2022-06-17 RX ADMIN — ONDANSETRON 4 MG: 2 INJECTION INTRAMUSCULAR; INTRAVENOUS at 12:35

## 2022-06-17 RX ADMIN — FAMOTIDINE 20 MG: 20 TABLET ORAL at 09:19

## 2022-06-17 RX ADMIN — LEVOTHYROXINE SODIUM 50 MCG: 0.05 TABLET ORAL at 05:20

## 2022-06-17 RX ADMIN — ONDANSETRON 4 MG: 2 INJECTION INTRAMUSCULAR; INTRAVENOUS at 03:51

## 2022-06-17 RX ADMIN — CLOPIDOGREL BISULFATE 75 MG: 75 TABLET ORAL at 09:19

## 2022-06-17 RX ADMIN — BUSPIRONE HYDROCHLORIDE 10 MG: 5 TABLET ORAL at 09:19

## 2022-06-17 RX ADMIN — LISINOPRIL 10 MG: 5 TABLET ORAL at 09:19

## 2022-06-17 RX ADMIN — Medication 10 ML: at 09:19

## 2022-06-17 RX ADMIN — ACETAMINOPHEN 650 MG: 325 TABLET ORAL at 10:39

## 2022-06-17 RX ADMIN — CARIPRAZINE 1.5 MG: 1.5 CAPSULE, GELATIN COATED ORAL at 09:18

## 2022-06-17 NOTE — DISCHARGE SUMMARY
[facility] Medicine Services  DISCHARGE SUMMARY  Patient Name: Tramaine Gates  : 1985  MRN: [id number]    Date of Admission: 2022  Discharge Diagnosis:  1.  Chest pain, resolved  2.Inferior myocardial infarction, status post LHC () with stent placement to RCA/LAD  3.  Coronary disease  4.  Depression/bipolar disorder  5.  Hypertensive cardiovascular disease  6.  Hyperlipidemia  7.  Tobacco abuse  8.  COPD  9.  COVID-19 positive, mostly asymptomatic during this admission    Date of Discharge:  2022  Primary Care Physician: Daniela Hernandez FNP      Presenting Problem:   Acute inferior myocardial infarction (HCC) [I21.19]    Active and Resolved Hospital Problems:  Active Hospital Problems    Diagnosis POA   • **CAD, multiple vessel [I25.10] Unknown   • Bipolar II disorder (HCC) [F31.81] Yes   • Hx of brief reactive psychosis [Z86.59] Not Applicable   • Generalized anxiety disorder with panic attacks [F41.1, F41.0] Yes   • Rage attacks [F69] Yes   • Acute inferior myocardial infarction (HCC) [I21.19] Yes      Resolved Hospital Problems   No resolved problems to display.         Hospital Course     Hospital Course:    36-year-old male with past medical history significant for hypertension, CAD, hyperlipidemia, depression/bipolar and tobacco abuse.  Patient was admitted on 2022, presented to [facility] ED, complaining of chest pain, rated 6/10.  EKG concerning for ST elevation involving the inferior wall.  Patient was seen in consultation by cardiologist and underwent LHC () with successful PCI/stent placement to RCA/LAD.  He is  maintained on dual antiplatelet with aspirin/Plavix and continue on statin as recommended.  Patient was also seen in consultation by psychiatrist regarding bipolar/depression and started on BuSpar 10 mg twice daily. However, patient reported excessive sedation and medication was decreased to 5 mg daily for now, and  to follow-up  with his psychiatrist for further titration.  The rest of his hospital course was unremarkable.  Prior to discharge home, he is chest pain-free with stable hemodynamics and oxygenating well on room air.  Recommended smoking cessation and patient was offered nicotine patch.  Poorly controlled hypertension is also much improved and to continue on cardiac medication as outlined in discharge summary.      DISCHARGE Follow Up Recommendations for labs and diagnostics:   1.  Follow with PCP, psychiatrist, and cardiologist as scheduled    Reasons For Change In Medications and Indications for New Medications:      Day of Discharge     Vital Signs:  Temp:  [97 °F (36.1 °C)-98.5 °F (36.9 °C)] 98.5 °F (36.9 °C)  Heart Rate:  [69-85] 69  Resp:  [17-18] 18  BP: (118-142)/() 119/77    Physical Exam   Constitutional:      Awake, alert, no acute distress  HENT:      Normocephalic and atraumatic. Nose normal. Mucous membranes are moist. Oropharynx is clear.   Eyes:      No icterus, EOM intact. Pupils are equal, round, and reactive to light.   Neck:     No LAD.  No JVD.  No thyromegaly  Cardiovascular:      Regular rate and regular rhythm.  No murmur.  No edema  Pulmonary:      Clear to auscultation bilaterally.  Normal effort  Abdominal:      Soft, NTND.  BS + all 4 quadrants  Musculoskeletal:         No joint effusions.  No atrophy   Skin:     Warm, dry, no rashes.  No lesions.  Neurological:      Alert and oriented x3, no focal neurologic deficits could be appreciated  Psychiatric:         Normal mood and affect, normal thought process and judgment       Pertinent  and/or Most Recent Results     LAB RESULTS:      Lab 06/17/22  0333 06/16/22  0414 06/15/22  0340 06/14/22  1845 06/14/22  1158   WBC 12.60* 12.10* 11.60* 12.30* 10.70   HEMOGLOBIN 14.3 14.6 13.6 15.2 14.6   HEMATOCRIT 42.6 43.5 40.7 45.3 42.9   PLATELETS 369 329 336 358 328   NEUTROS ABS 8.60* 10.20* 10.10* 11.60*  --    LYMPHS ABS 3.20* 1.40 1.00 0.60*  --     MONOS ABS 0.60 0.30 0.40 0.10  --    EOS ABS 0.00 0.10 0.00 0.00  --    MCV 86.1 85.9 85.6 85.9 85.3   PROTIME  --  10.2 10.3  --  11.1   APTT  --   --  28.8  --  138.0*         Lab 06/17/22  0333 06/16/22  0414 06/15/22  2046 06/15/22  0340 06/14/22  2310 06/14/22  1845 06/14/22  1158 06/14/22  1158   SODIUM 134* 136  --  133* 135* 132*  --   --    POTASSIUM 3.8 4.6 3.9 4.3 4.7 4.6   < >  --    CHLORIDE 101 103  --  101 101 100  --   --    CO2 20.0* 20.0*  --  21.0* 19.0* 18.0*  --   --    ANION GAP 13.0 13.0  --  11.0 15.0 14.0  --   --    BUN 12 14  --  10 11 14  --   --    CREATININE 1.03 0.97  --  0.98 1.08 1.19  --   --    EGFR 95.4 102.5  --  101.2 90.1 80.2  --   --    GLUCOSE 92 106*  --  119* 126* 173*  --   --    CALCIUM 8.5* 8.9  --  8.5* 8.9 8.5*  --   --    MAGNESIUM 2.3 2.2  --  2.0 2.0  --   --  1.8   PHOSPHORUS 2.4* 2.0* 2.4* 3.0 1.9*  --   --  2.8    < > = values in this interval not displayed.         Lab 06/17/22  0333 06/16/22  0414 06/15/22  0340 06/14/22  2310 06/14/22  1845   TOTAL PROTEIN 6.9 7.1 6.3 6.7 6.8   ALBUMIN 3.90 4.30 3.90 4.00 4.00   GLOBULIN 3.0 2.8 2.4 2.7 2.8   ALT (SGPT) 31 42* 35 37 38   AST (SGOT) 19 26 27 25 24   BILIRUBIN 0.4 0.3 0.4 0.3 0.3   ALK PHOS 85 89 94 97 101         Lab 06/16/22  0414 06/15/22  0340 06/14/22  2310 06/14/22  1845 06/14/22  1158   TROPONIN T  --  0.081* 0.063* 0.050*  --    PROTIME 10.2 10.3  --   --  11.1   INR 0.99 1.00  --   --  1.08         Lab 06/17/22  0333 06/15/22  0340   CHOLESTEROL 251* 283*   LDL CHOL 172* 219*   HDL CHOL 37* 40   TRIGLYCERIDES 223* 130             Brief Urine Lab Results     None        Microbiology Results (last 10 days)     Procedure Component Value - Date/Time    COVID-19 RAPID AG,VERITOR,COR/KELSEY/PAD/GONZALEZ/MAD/FLASH/LAG/YA/ IN-HOUSE,DRY SWAB, 1-2 HR TAT - Swab, Nasal Cavity [[id number]]  (Normal) Collected: 06/15/22 1739    Lab Status: Final result Specimen: Swab from Nasal Cavity Updated: 06/15/22 7468     COVID19  Presumptive Negative    Narrative:      Fact sheets for providers: https://www.fda.gov/media/293648/download    Fact sheets for patients: https://www.fda.gov/media/790140/download    COVID-19 RAPID AG,VERITOR,COR/KELSEY/PAD/GONZALEZ/MAD/FLASH/LAG/YA/ IN-HOUSE,DRY SWAB, 1-2 HR TAT - Swab, Nasal Cavity [[id number]]  (Normal) Collected: 06/14/22 1731    Lab Status: Final result Specimen: Swab from Nasal Cavity Updated: 06/14/22 1858     COVID19 Presumptive Negative    Narrative:      Fact sheets for providers: https://www.fda.gov/media/085362/download    Fact sheets for patients: https://www.fda.gov/media/498911/download    COVID PRE-OP / PRE-PROCEDURE SCREENING ORDER (NO ISOLATION) - Swab, Nasopharynx [[id number]]  (Abnormal) Collected: 06/14/22 1052    Lab Status: Final result Specimen: Swab from Nasopharynx Updated: 06/14/22 1215    Narrative:      The following orders were created for panel order COVID PRE-OP / PRE-PROCEDURE SCREENING ORDER (NO ISOLATION) - Swab, Nasopharynx.  Procedure                               Abnormality         Status                     ---------                               -----------         ------                     COVID-19,CEPHEID/JESSE,CO...[[id number]]  Abnormal            Final result                 Please view results for these tests on the individual orders.    COVID-19,CEPHEID/JESSE,COR/KELSEY/PAD/CLARISA IN-HOUSE(OR EMERGENT/ADD-ON),NP SWAB IN TRANSPORT MEDIA 3-4 HR TAT, RT-PCR - Swab, Nasopharynx [[id number]]  (Abnormal) Collected: 06/14/22 1052    Lab Status: Final result Specimen: Swab from Nasopharynx Updated: 06/14/22 1215     COVID19 Detected    Narrative:      Fact sheet for providers: https://www.fda.gov/media/057515/download     Fact sheet for patients: https://www.fda.gov/media/293138/download  Fact sheet for providers: https://www.fda.gov/media/670431/download     Fact sheet for patients: https://www.fda.gov/media/168871/download          XR Chest 1 View    Result Date:  6/16/2022  Impression: No acute cardiopulmonary abnormality.  Electronically Signed By-Leobardo Watson MD On:6/16/2022 8:19 AM This report was finalized on [id number] by  Leobardo Watson MD.    XR Chest 1 View    Result Date: 6/15/2022  Impression: No acute disease in the chest and no significant change since previous study performed on June 8, 2022 Electronically Signed: Nael Goodman MD 6/15/2022 8:56 EDT    XR Chest 1 View    Result Date: 6/8/2022  Impression: No acute process.  Electronically Signed By-Oli Gaffney MD On:6/8/2022 7:32 AM This report was finalized on [id number] by  Oli Gaffney MD.          Results for orders placed during the hospital encounter of 06/14/22    Adult Transthoracic Echo Limited W/ Cont if Necessary Per Protocol    Interpretation Summary  · Estimated left ventricular EF = 60% Left ventricular systolic function is normal.    Indications  Recent ACS    Technically difficult study.  (COVID protocol)  Mitral valve is structurally normal.  Tricuspid valve is structurally normal.  Aortic valve is structurally normal.  Pulmonic valve could not be well visualized.  No evidence for mitral tricuspid or aortic regurgitation is seen by Doppler study.  Left atrium is normal in size.  Right atrium is normal in size.  Left ventricle is normal in size and contractility with ejection fraction of 60%.  Right ventricle is normal in size.  No pericardial effusion or intracardiac thrombus is seen.    Impression  Technically difficult and limited study (COVID protocol.  In the views obtained,  Structurally and functionally normal cardiac valves.  Left ventricular size and contractility is normal with ejection fraction of 60%.  No pericardial effusion or intracardiac thrombus is present.      Labs Pending at Discharge:      Procedures Performed  Procedure(s):  Percutaneous Coronary Intervention         Consults:   Consults     Date and Time Order Name Status Description    6/15/2022  4:14 PM  Inpatient Psychiatrist Consult Completed     6/15/2022  4:03 PM Inpatient Hospitalist Consult Completed             Discharge Details        Discharge Medications      New Medications      Instructions Start Date   aspirin 81 MG EC tablet   81 mg, Oral, Daily   Start Date: June 18, 2022     busPIRone 5 MG tablet  Commonly known as: BUSPAR   5 mg, Oral, Daily, Follow up with PCP/Psychiatrist to titrate up   Start Date: June 18, 2022     Cariprazine HCl 1.5 MG capsule capsule  Commonly known as: VRAYLAR   1.5 mg, Oral, Every Morning   Start Date: June 18, 2022     clopidogrel 75 MG tablet  Commonly known as: PLAVIX   75 mg, Oral, Daily   Start Date: June 18, 2022     diazePAM 5 MG tablet  Commonly known as: VALIUM   5 mg, Oral, Every 6 Hours PRN      metoprolol tartrate 25 MG tablet  Commonly known as: LOPRESSOR   25 mg, Oral, Every 12 Hours Scheduled         Continue These Medications      Instructions Start Date   albuterol sulfate  (90 Base) MCG/ACT inhaler  Commonly known as: PROVENTIL HFA;VENTOLIN HFA;PROAIR HFA   2 puffs, Inhalation, Every 4 Hours PRN      amLODIPine 5 MG tablet  Commonly known as: NORVASC   5 mg, Oral, Daily      atorvastatin 40 MG tablet  Commonly known as: LIPITOR   40 mg, Oral, Nightly      levothyroxine 50 MCG tablet  Commonly known as: SYNTHROID, LEVOTHROID   50 mcg, Oral, Daily      lisinopril 20 MG tablet  Commonly known as: PRINIVIL,ZESTRIL   20 mg, Oral, Daily      naproxen 375 MG tablet  Commonly known as: NAPROSYN   375 mg, Oral, 2 Times Daily PRN             Allergies   Allergen Reactions   • Codeine Itching   • Hydrocodone Itching   • Hydrocodone-Acetaminophen Other (See Comments)   • Shellfish-Derived Products Unknown - High Severity         Discharge Disposition:   Home or Self Care    Diet:  Hospital:  Diet Order   Procedures   • Diet Cardiac; Healthy Heart         Discharge Activity: Ad harley. as tolerated        CODE STATUS:  Code Status and Medical Interventions:    Ordered at: 06/14/22 1954     Level Of Support Discussed With:    Patient     Code Status (Patient has no pulse and is not breathing):    CPR (Attempt to Resuscitate)     Medical Interventions (Patient has pulse or is breathing):    Full Support         Future Appointments   Date Time Provider Department Center   6/29/2022  1:00 PM [facility] INJECTION [facility]  [facility]   6/29/2022  1:45 PM [facility] CAMERA ROOM 1  [facility]   6/29/2022  2:00 PM [facility] TREADMILL 1  [facility]   6/29/2022  3:00 PM [facility] CAMERA ROOM 1  [facility]   6/29/2022  3:15 PM [facility] ECHO 1/OUTPATIENT  [facility]   7/7/2022  2:00 PM Thelma Welsh, APRN [facility]           Time spent on Discharge including face to face service:  > 30 min

## 2022-06-17 NOTE — PROGRESS NOTES
LOS: 3 days   Admiting Physician- Sorin Yap MD    Reason For Followup:    Acute inferior myocardial infarction  CAD  S/p RCA stenting  S/p LAD stenting  Hyperlipidemia    Subjective     Patient complaining of right groin pain.  No hematoma noted.    Objective     Hemodynamics are stable blood pressure is improved with addition of lisinopril    Review of Systems:   Review of Systems   Constitutional: Negative for chills and fever.   HENT: Negative for ear discharge and nosebleeds.    Eyes: Negative for discharge and redness.   Cardiovascular: Negative for chest pain, orthopnea, palpitations, paroxysmal nocturnal dyspnea and syncope.   Respiratory: Negative for cough, shortness of breath and wheezing.    Endocrine: Negative for heat intolerance.   Skin: Negative for rash.   Musculoskeletal: Negative for arthritis and myalgias.   Gastrointestinal: Negative for abdominal pain, melena, nausea and vomiting.   Genitourinary: Negative for dysuria and hematuria.   Neurological: Negative for dizziness, light-headedness, numbness and tremors.   Psychiatric/Behavioral: Negative for depression. The patient is not nervous/anxious.          Vital Signs  Vitals:    06/16/22 1656 06/16/22 2126 06/17/22 0213 06/17/22 0518   BP: (!) 142/101 125/79 118/68 121/69   BP Location:  Right arm Right arm Right arm   Patient Position:  Sitting Lying Lying   Pulse: 80 76 72 85   Resp: 17 18 18 18   Temp: 97 °F (36.1 °C) 97.2 °F (36.2 °C) 98.2 °F (36.8 °C) 98.5 °F (36.9 °C)   TempSrc: Oral Axillary Oral Oral   SpO2: 98% 97% 98% 97%   Weight:   124 kg (272 lb 4.3 oz) 124 kg (272 lb 4.3 oz)   Height:         Wt Readings from Last 1 Encounters:   06/17/22 124 kg (272 lb 4.3 oz)       Intake/Output Summary (Last 24 hours) at 6/17/2022 0804  Last data filed at 6/17/2022 0407  Gross per 24 hour   Intake 720 ml   Output 800 ml   Net -80 ml     Physical Exam:  Constitutional:       Appearance: Well-developed.   Eyes:      General: No  scleral icterus.        Right eye: No discharge.   HENT:      Head: Normocephalic and atraumatic.   Neck:      Thyroid: No thyromegaly.      Lymphadenopathy: No cervical adenopathy.   Pulmonary:      Effort: Pulmonary effort is normal. No respiratory distress.      Breath sounds: Normal breath sounds. No wheezing. No rales.   Cardiovascular:      Normal rate. Regular rhythm.      No gallop.   Edema:     Peripheral edema absent.   Abdominal:      Tenderness: There is no abdominal tenderness.   Skin:     Findings: No erythema or rash.   Neurological:      Mental Status: Alert and oriented to person, place, and time.         Results Review:   Lab Results (last 24 hours)     Procedure Component Value Units Date/Time    Phosphorus [[id number]]  (Abnormal) Collected: 06/17/22 0333    Specimen: Blood Updated: 06/17/22 0418     Phosphorus 2.4 mg/dL     Comprehensive Metabolic Panel [[id number]]  (Abnormal) Collected: 06/17/22 0333    Specimen: Blood Updated: 06/17/22 0418     Glucose 92 mg/dL      BUN 12 mg/dL      Creatinine 1.03 mg/dL      Sodium 134 mmol/L      Potassium 3.8 mmol/L      Chloride 101 mmol/L      CO2 20.0 mmol/L      Calcium 8.5 mg/dL      Total Protein 6.9 g/dL      Albumin 3.90 g/dL      ALT (SGPT) 31 U/L      AST (SGOT) 19 U/L      Alkaline Phosphatase 85 U/L      Total Bilirubin 0.4 mg/dL      Globulin 3.0 gm/dL      A/G Ratio 1.3 g/dL      BUN/Creatinine Ratio 11.7     Anion Gap 13.0 mmol/L      eGFR 95.4 mL/min/1.73      Comment: National Kidney Foundation and American Society of Nephrology (ASN) Task Force recommended calculation based on the Chronic Kidney Disease Epidemiology Collaboration (CKD-EPI) equation refit without adjustment for race.       Narrative:      GFR Normal >60  Chronic Kidney Disease <60  Kidney Failure <15      Magnesium [[id number]]  (Normal) Collected: 06/17/22 0333    Specimen: Blood Updated: 06/17/22 0418     Magnesium 2.3 mg/dL     Lipid Panel [[id number]]  (Abnormal)  Collected: 06/17/22 0333    Specimen: Blood Updated: 06/17/22 0418     Total Cholesterol 251 mg/dL      Triglycerides 223 mg/dL      HDL Cholesterol 37 mg/dL      LDL Cholesterol  172 mg/dL      VLDL Cholesterol 42 mg/dL      LDL/HDL Ratio 4.58    Narrative:      Cholesterol Reference Ranges  (U.S. Department of Health and Human Services ATP III Classifications)    Desirable          <200 mg/dL  Borderline High    200-239 mg/dL  High Risk          >240 mg/dL      Triglyceride Reference Ranges  (U.S. Department of Health and Human Services ATP III Classifications)    Normal           <150 mg/dL  Borderline High  150-199 mg/dL  High             200-499 mg/dL  Very High        >500 mg/dL    HDL Reference Ranges  (U.S. Department of Health and Human Services ATP III Classifications)    Low     <40 mg/dl (major risk factor for CHD)  High    >60 mg/dl ('negative' risk factor for CHD)        LDL Reference Ranges  (U.S. Department of Health and Human Services ATP III Classifications)    Optimal          <100 mg/dL  Near Optimal     100-129 mg/dL  Borderline High  130-159 mg/dL  High             160-189 mg/dL  Very High        >189 mg/dL    CBC & Differential [[id number]]  (Abnormal) Collected: 06/17/22 0333    Specimen: Blood Updated: 06/17/22 0346    Narrative:      The following orders were created for panel order CBC & Differential.  Procedure                               Abnormality         Status                     ---------                               -----------         ------                     CBC Auto Differential[[id number]]        Abnormal            Final result                 Please view results for these tests on the individual orders.    CBC Auto Differential [[id number]]  (Abnormal) Collected: 06/17/22 0333    Specimen: Blood Updated: 06/17/22 0346     WBC 12.60 10*3/mm3      RBC 4.95 10*6/mm3      Hemoglobin 14.3 g/dL      Hematocrit 42.6 %      MCV 86.1 fL      MCH 28.8 pg      MCHC 33.5 g/dL      RDW  13.3 %      RDW-SD 39.4 fl      MPV 6.8 fL      Platelets 369 10*3/mm3      Neutrophil % 68.4 %      Lymphocyte % 25.5 %      Monocyte % 5.0 %      Eosinophil % 0.1 %      Basophil % 1.0 %      Neutrophils, Absolute 8.60 10*3/mm3      Lymphocytes, Absolute 3.20 10*3/mm3      Monocytes, Absolute 0.60 10*3/mm3      Eosinophils, Absolute 0.00 10*3/mm3      Basophils, Absolute 0.10 10*3/mm3      nRBC 0.0 /100 WBC     POC Activated Clotting Time [[id number]]  (Normal) Collected: 06/16/22 1341    Specimen: Arterial Blood Updated: 06/16/22 1412     Activated Clotting Time  132 Seconds      Comment: Serial Number: [id number]:  [id number]           Imaging Results (Last 72 Hours)     Procedure Component Value Units Date/Time    XR Chest 1 View [[id number]] Collected: 06/16/22 0818     Updated: 06/16/22 0821    Narrative:      DATE OF EXAM:  6/16/2022 5:56 AM     PROCEDURE:  XR CHEST 1 VW-     INDICATIONS:  Shortness of breath; I25.10-Atherosclerotic heart disease of native  coronary artery without angina pectoris     COMPARISON:  6/15/2022     TECHNIQUE:   Single radiographic view of the chest was obtained.     FINDINGS:  Lungs are normally expanded. Heart size is normal. No pneumothorax or  pleural effusion or focal pulmonary parenchymal opacity. Pulmonary  vessels are distinct. Bones and soft tissues are normal.       Impression:      No acute cardiopulmonary abnormality.     Electronically Signed By-Leobardo Watson MD On:6/16/2022 8:19 AM  This report was finalized on [id number] by  Leobardo Watson MD.    XR Chest 1 View [[id number]] Collected: 06/15/22 0854     Updated: 06/15/22 0858    Narrative:      Examination: XR CHEST 1 VW    Date of Exam: 6/15/2022 1:35 EDT    Indication: Pain with respirations..  Chest pain today     Comparison: Single frontal view chest performed on June 8, 2022    Technique: Single frontal view chest    Findings:  Today's study reveals heart and mediastinum are normal. No evidence of infiltrate or  effusion or pneumothorax or mass the right or left lungs. There is mild dextroscoliosis in the thoracic spine      Impression:      No acute disease in the chest and no significant change since previous study performed on June 8, 2022    Electronically Signed: Nael Goodman MD 6/15/2022 8:56 EDT        ECG/EMG Results (most recent)     Procedure Component Value Units Date/Time    ECG 12 Lead [[id number]] Collected: 06/14/22 1225     Updated: 06/14/22 1227     QT Interval 374 ms     Narrative:      HEART RATE= 81  bpm  RR Interval= 736  ms  OK Interval= 123  ms  P Horizontal Axis= 18  deg  P Front Axis= 74  deg  QRSD Interval= 92  ms  QT Interval= 374  ms  QRS Axis= 29  deg  T Wave Axis= 48  deg  - NORMAL ECG -  Sinus rhythm  Electronically Signed By:   Date and Time of Study: 2022-06-14 12:25:46    ECG 12 Lead [[id number]] Collected: 06/14/22 2308     Updated: 06/14/22 2309     QT Interval 370 ms     Narrative:      HEART RATE= 70  bpm  RR Interval= 856  ms  OK Interval= 131  ms  P Horizontal Axis= 17  deg  P Front Axis= 47  deg  QRSD Interval= 84  ms  QT Interval= 370  ms  QRS Axis= 6  deg  T Wave Axis= 24  deg  - NORMAL ECG -  Sinus rhythm  ST elev, probable normal early repol pattern  When compared with ECG of 14-Jun-2022 12:25:46,  No significant change  Electronically Signed By:   Date and Time of Study: 2022-06-14 23:08:45    ECG 12 Lead [[id number]] Collected: 06/15/22 0739     Updated: 06/15/22 0740     QT Interval 384 ms     Narrative:      HEART RATE= 74  bpm  RR Interval= 804  ms  OK Interval= 133  ms  P Horizontal Axis= 44  deg  P Front Axis= 39  deg  QRSD Interval= 92  ms  QT Interval= 384  ms  QRS Axis= 8  deg  T Wave Axis= 14  deg  - NORMAL ECG -  Sinus rhythm  ST elev, probable normal early repol pattern  Electronically Signed By:   Date and Time of Study: 2022-06-15 07:39:52    Adult Transthoracic Echo Limited W/ Cont if Necessary Per Protocol [[id number]] Resulted: 06/15/22 1111     Updated:  06/15/22 1114     Target HR (85%) 155 bpm      Max. Pred. HR (100%) 182 bpm      Echo EF Estimated 60 %     Narrative:      · Estimated left ventricular EF = 60% Left ventricular systolic function   is normal.     Indications  Recent ACS    Technically difficult study.  (COVID protocol)  Mitral valve is structurally normal.  Tricuspid valve is structurally normal.  Aortic valve is structurally normal.  Pulmonic valve could not be well visualized.  No evidence for mitral tricuspid or aortic regurgitation is seen by   Doppler study.  Left atrium is normal in size.  Right atrium is normal in size.  Left ventricle is normal in size and contractility with ejection fraction   of 60%.  Right ventricle is normal in size.  No pericardial effusion or intracardiac thrombus is seen.    Impression  Technically difficult and limited study (COVID protocol.  In the views obtained,  Structurally and functionally normal cardiac valves.  Left ventricular size and contractility is normal with ejection fraction   of 60%.  No pericardial effusion or intracardiac thrombus is present.        ECG 12 Lead [[id number]] Collected: 06/15/22 1432     Updated: 06/16/22 0830     QT Interval 396 ms     Narrative:      HEART RATE= 65  bpm  RR Interval= 924  ms  NM Interval= 129  ms  P Horizontal Axis= 14  deg  P Front Axis= 24  deg  QRSD Interval= 92  ms  QT Interval= 396  ms  QRS Axis= -6  deg  T Wave Axis= 1  deg  - BORDERLINE ECG -  Incomplete analysis due to missing data in precordial lead(s)  Sinus rhythm  Borderline ST elevation, lateral leads  Baseline wander in lead(s) V2  Missing lead(s): V6  Electronically Signed By:   Date and Time of Study: 2022-06-15 14:32:12    ECG 12 Lead [[id number]] Collected: 06/16/22 1633     Updated: 06/16/22 1633     QT Interval 370 ms     Narrative:      HEART RATE= 76  bpm  RR Interval= 792  ms  NM Interval= 141  ms  P Horizontal Axis= 22  deg  P Front Axis= 26  deg  QRSD Interval= 91  ms  QT Interval= 370   ms  QRS Axis= 4  deg  T Wave Axis= -4  deg  - NORMAL ECG -  Sinus rhythm  When compared with ECG of 15-Leon-2022 14:32:12,  Significant repolarization change  Electronically Signed By:   Date and Time of Study: 2022-06-16 16:33:00    ECG 12 Lead [[id number]] Collected: 06/17/22 0544     Updated: 06/17/22 0545     QT Interval 386 ms     Narrative:      HEART RATE= 62  bpm  RR Interval= 968  ms  FL Interval= 128  ms  P Horizontal Axis= 49  deg  P Front Axis= 30  deg  QRSD Interval= 94  ms  QT Interval= 386  ms  QRS Axis= 6  deg  T Wave Axis= 4  deg  - NORMAL ECG -  Sinus rhythm  ST elev, probable normal early repol pattern  Electronically Signed By:   Date and Time of Study: 2022-06-17 05:44:38    SCANNED - TELEMETRY   [[id number]] Resulted: 06/14/22     Updated: 06/17/22 0719        CBC    Results from last 7 days   Lab Units 06/17/22  0333 06/16/22 0414 06/15/22  0340 06/14/22  1845 06/14/22  1158   WBC 10*3/mm3 12.60* 12.10* 11.60* 12.30* 10.70   HEMOGLOBIN g/dL 14.3 14.6 13.6 15.2 14.6   PLATELETS 10*3/mm3 369 329 336 358 328     BMP   Results from last 7 days   Lab Units 06/17/22  0333 06/16/22  0414 06/15/22  2046 06/15/22  0340 06/14/22  2310 06/14/22  1845 06/14/22  1158   SODIUM mmol/L 134* 136  --  133* 135* 132*  --    POTASSIUM mmol/L 3.8 4.6 3.9 4.3 4.7 4.6  --    CHLORIDE mmol/L 101 103  --  101 101 100  --    CO2 mmol/L 20.0* 20.0*  --  21.0* 19.0* 18.0*  --    BUN mg/dL 12 14  --  10 11 14  --    CREATININE mg/dL 1.03 0.97  --  0.98 1.08 1.19  --    GLUCOSE mg/dL 92 106*  --  119* 126* 173*  --    MAGNESIUM mg/dL 2.3 2.2  --  2.0 2.0  --  1.8   PHOSPHORUS mg/dL 2.4* 2.0* 2.4* 3.0 1.9*  --  2.8     CMP   Results from last 7 days   Lab Units 06/17/22  0333 06/16/22  0414 06/15/22  2046 06/15/22  0340 06/14/22  2310 06/14/22  1845   SODIUM mmol/L 134* 136  --  133* 135* 132*   POTASSIUM mmol/L 3.8 4.6 3.9 4.3 4.7 4.6   CHLORIDE mmol/L 101 103  --  101 101 100   CO2 mmol/L 20.0* 20.0*  --  21.0* 19.0* 18.0*    BUN mg/dL 12 14  --  10 11 14   CREATININE mg/dL 1.03 0.97  --  0.98 1.08 1.19   GLUCOSE mg/dL 92 106*  --  119* 126* 173*   ALBUMIN g/dL 3.90 4.30  --  3.90 4.00 4.00   BILIRUBIN mg/dL 0.4 0.3  --  0.4 0.3 0.3   ALK PHOS U/L 85 89  --  94 97 101   AST (SGOT) U/L 19 26  --  27 25 24   ALT (SGPT) U/L 31 42*  --  35 37 38     Cardiac Studies:  Echo- Results for orders placed during the hospital encounter of 06/14/22    Adult Transthoracic Echo Limited W/ Cont if Necessary Per Protocol    Interpretation Summary  · Estimated left ventricular EF = 60% Left ventricular systolic function is normal.    Indications  Recent ACS    Technically difficult study.  (COVID protocol)  Mitral valve is structurally normal.  Tricuspid valve is structurally normal.  Aortic valve is structurally normal.  Pulmonic valve could not be well visualized.  No evidence for mitral tricuspid or aortic regurgitation is seen by Doppler study.  Left atrium is normal in size.  Right atrium is normal in size.  Left ventricle is normal in size and contractility with ejection fraction of 60%.  Right ventricle is normal in size.  No pericardial effusion or intracardiac thrombus is seen.    Impression  Technically difficult and limited study (COVID protocol.  In the views obtained,  Structurally and functionally normal cardiac valves.  Left ventricular size and contractility is normal with ejection fraction of 60%.  No pericardial effusion or intracardiac thrombus is present.    Stress Myoview-  Cath-      Medication Review:   Scheduled Meds:aspirin, 81 mg, Oral, Daily  atorvastatin, 80 mg, Oral, Nightly  busPIRone, 10 mg, Oral, Q12H  Cariprazine HCl, 1.5 mg, Oral, QAM  clopidogrel, 75 mg, Oral, Daily  famotidine, 20 mg, Oral, BID AC  levothyroxine, 50 mcg, Oral, Q AM  lisinopril, 10 mg, Oral, Q24H  metoprolol tartrate, 25 mg, Oral, Q12H  nicotine, 1 patch, Transdermal, Q24H  sodium chloride, 1,000 mL, Intravenous, Once  sodium chloride, 10 mL,  Intravenous, Q12H      Continuous Infusions:DOPamine, 3 mcg/kg/min, Last Rate: Stopped (06/14/22 2130)  nitroglycerin, 5-200 mcg/min, Last Rate: Stopped (06/15/22 0914)      PRN Meds:.•  acetaminophen **OR** acetaminophen  •  aluminum-magnesium hydroxide-simethicone  •  atropine  •  diazePAM  •  diphenhydrAMINE  •  nitroglycerin  •  ondansetron **OR** ondansetron  •  sodium chloride  •  sodium chloride  •  sodium chloride      Assessment & Plan   Patient Active Problem List   Diagnosis   • Hypertension   • Peptic ulcer   • Asthma   • Blepharitis   • Chest pain   • Chronic cholecystitis   • Contact with and (suspected) exposure to covid-19   • Corneal ulcer   • Costochondritis   • Cough   • Disorder of thyroid   • Gastroesophageal reflux disease   • Hyperlipidemia   • Migraine headache   • Panic attack   • Acute inferior myocardial infarction (HCC)   • CAD, multiple vessel   • Bipolar II disorder (HCC)   • Hx of brief reactive psychosis   • Generalized anxiety disorder with panic attacks   • Rage attacks     MDM:    1.  Inferior myocardial infarction:    Patient is doing well.    2.  CAD:    Patient underwent RCA and LAD stenting.  Continue aspirin and Plavix    3.  Hyperlipidemia:    Patient is on Lipitor continue that.  Diet modification discussed    4.  Hypertension:    Blood pressure has been better with addition of lisinopril.    Matthieu Del Toro MD  06/17/22  08:04 EDT

## 2022-06-17 NOTE — CASE MANAGEMENT/SOCIAL WORK
Continued Stay Note  [facility]     Patient Name: Tramaine Gates  MRN: [id number]  Today's Date: 6/17/2022    Admit Date: 6/14/2022     Discharge Plan     Row Name 06/17/22 1056       Plan    Plan D/C Plan: Anticipate routine home with family.    Plan Comments Barrier to D/C: anticipate d/c once cleared by cardiology, s/p heart cath and PCI.                    Expected Discharge Date and Time     Expected Discharge Date Expected Discharge Time    Jun 17, 2022         Phone communication or documentation only - no physical contact with patient or family.    HODA AllredN, RN    [address] [address]    Office: [phone]  Fax: [phone]

## 2022-06-17 NOTE — SIGNIFICANT NOTE
06/17/22 1138   Discharge of Care   Discharge Mode wheel chair   Discharged Accompanied by family member/friend   Discharge Contact Information if Applicable Magi Oliver [phone]   Discharge Teaching Done  Yes   Learning Method Explanation

## 2022-06-17 NOTE — PAYOR COMM NOTE
"This is discharge notification for Tramaine Gates   Reference/Auth # [id number]  Pt discharged routine to home on 6/17/22.    Mary Gutierrez RN, BSN  Utilization Review Nurse  [facility]  Direct & confidential phone # [phone]  Fax # [phone]      Tramaine Gates (36 y.o. Male)             Date of Birth   1985    Social Security Number       Address   [address] [place] [address] [place] [address]    Home Phone   [phone]    MRN   [id number]       [place]   None    Marital Status   Single                            Admission Date   6/14/22    Admission Type   Urgent    Admitting Provider   Deanna Braun MD    Attending Provider       Department, Room/Bed   [facility], 2119/1       Discharge Date   6/17/2022    Discharge Disposition   Home or Self Care    Discharge Destination                               Attending Provider: (none)   Allergies: Codeine, Hydrocodone, Hydrocodone-acetaminophen, Shellfish-derived Products    Isolation: Enh Drop/Con   Infection: COVID (confirmed) (06/16/22)   Code Status: CPR   Advance Care Planning Activity    Ht: 177.8 cm (70\")   Wt: 124 kg (272 lb 4.3 oz)    Admission Cmt: None   Principal Problem: CAD, multiple vessel [I25.10] More...                 Active Insurance as of 6/14/2022     Primary Coverage     Payor Plan Insurance Group Employer/Plan Group    [id number]     Payor Plan Address Payor Plan Phone Number Payor Plan Fax Number Effective Dates    MAIL STOP:   3/1/2022 - None Entered    [address] [address]       [facility] [address]       Subscriber Name Subscriber Birth Date Member ID       TRAMAINE GATES 1985 [id number]                 Emergency Contacts      (Rel.) Home Phone Work Phone Mobile Phone    Magi Oliver (Mother) [phone] -- [phone]    CATRACHO SHARMA (Significant Other) [phone] -- --               Discharge Summary    "   Sorin Yap MD at 22 1108                       [facility]  DISCHARGE SUMMARY  Patient Name: Tramaine Gates  : 1985  MRN: [id number]    Date of Admission: 2022  Discharge Diagnosis:  1.  Chest pain, resolved  2.Inferior myocardial infarction, status post LHC () with stent placement to RCA/LAD  3.  Coronary disease  4.  Depression/bipolar disorder  5.  Hypertensive cardiovascular disease  6.  Hyperlipidemia  7.  Tobacco abuse  8.  COPD  9.  COVID-19 positive, mostly asymptomatic during this admission    Date of Discharge:  2022  Primary Care Physician: Daniela Hernandez FNP      Presenting Problem:   Acute inferior myocardial infarction (HCC) [I21.19]    Active and Resolved Hospital Problems:  Active Hospital Problems    Diagnosis POA   • **CAD, multiple vessel [I25.10] Unknown   • Bipolar II disorder (HCC) [F31.81] Yes   • Hx of brief reactive psychosis [Z86.59] Not Applicable   • Generalized anxiety disorder with panic attacks [F41.1, F41.0] Yes   • Rage attacks [F69] Yes   • Acute inferior myocardial infarction (HCC) [I21.19] Yes      Resolved Hospital Problems   No resolved problems to display.         Hospital Course     Hospital Course:    36-year-old male with past medical history significant for hypertension, CAD, hyperlipidemia, depression/bipolar and tobacco abuse.  Patient was admitted on 2022, presented to [facility] ED, complaining of chest pain, rated 6/10.  EKG concerning for ST elevation involving the inferior wall.  Patient was seen in consultation by cardiologist and underwent LHC () with successful PCI/stent placement to RCA/LAD.  He is  maintained on dual antiplatelet with aspirin/Plavix and continue on statin as recommended.  Patient was also seen in consultation by psychiatrist regarding bipolar/depression and started on BuSpar 10 mg twice daily. However, patient reported excessive sedation and medication  was decreased to 5 mg daily for now, and  to follow-up with his psychiatrist for further titration.  The rest of his hospital course was unremarkable.  Prior to discharge home, he is chest pain-free with stable hemodynamics and oxygenating well on room air.  Recommended smoking cessation and patient was offered nicotine patch.  Poorly controlled hypertension is also much improved and to continue on cardiac medication as outlined in discharge summary.      DISCHARGE Follow Up Recommendations for labs and diagnostics:   1.  Follow with PCP, psychiatrist, and cardiologist as scheduled    Reasons For Change In Medications and Indications for New Medications:      Day of Discharge     Vital Signs:  Temp:  [97 °F (36.1 °C)-98.5 °F (36.9 °C)] 98.5 °F (36.9 °C)  Heart Rate:  [69-85] 69  Resp:  [17-18] 18  BP: (118-142)/() 119/77    Physical Exam   Constitutional:      Awake, alert, no acute distress  HENT:      Normocephalic and atraumatic. Nose normal. Mucous membranes are moist. Oropharynx is clear.   Eyes:      No icterus, EOM intact. Pupils are equal, round, and reactive to light.   Neck:     No LAD.  No JVD.  No thyromegaly  Cardiovascular:      Regular rate and regular rhythm.  No murmur.  No edema  Pulmonary:      Clear to auscultation bilaterally.  Normal effort  Abdominal:      Soft, NTND.  BS + all 4 quadrants  Musculoskeletal:         No joint effusions.  No atrophy   Skin:     Warm, dry, no rashes.  No lesions.  Neurological:      Alert and oriented x3, no focal neurologic deficits could be appreciated  Psychiatric:         Normal mood and affect, normal thought process and judgment       Pertinent  and/or Most Recent Results     LAB RESULTS:      Lab 06/17/22  0333 06/16/22  0414 06/15/22  0340 06/14/22  1845 06/14/22  1158   WBC 12.60* 12.10* 11.60* 12.30* 10.70   HEMOGLOBIN 14.3 14.6 13.6 15.2 14.6   HEMATOCRIT 42.6 43.5 40.7 45.3 42.9   PLATELETS 369 329 336 358 328   NEUTROS ABS 8.60* 10.20* 10.10*  11.60*  --    LYMPHS ABS 3.20* 1.40 1.00 0.60*  --    MONOS ABS 0.60 0.30 0.40 0.10  --    EOS ABS 0.00 0.10 0.00 0.00  --    MCV 86.1 85.9 85.6 85.9 85.3   PROTIME  --  10.2 10.3  --  11.1   APTT  --   --  28.8  --  138.0*         Lab 06/17/22  0333 06/16/22  0414 06/15/22  2046 06/15/22  0340 06/14/22  2310 06/14/22  1845 06/14/22  1158 06/14/22  1158   SODIUM 134* 136  --  133* 135* 132*  --   --    POTASSIUM 3.8 4.6 3.9 4.3 4.7 4.6   < >  --    CHLORIDE 101 103  --  101 101 100  --   --    CO2 20.0* 20.0*  --  21.0* 19.0* 18.0*  --   --    ANION GAP 13.0 13.0  --  11.0 15.0 14.0  --   --    BUN 12 14  --  10 11 14  --   --    CREATININE 1.03 0.97  --  0.98 1.08 1.19  --   --    EGFR 95.4 102.5  --  101.2 90.1 80.2  --   --    GLUCOSE 92 106*  --  119* 126* 173*  --   --    CALCIUM 8.5* 8.9  --  8.5* 8.9 8.5*  --   --    MAGNESIUM 2.3 2.2  --  2.0 2.0  --   --  1.8   PHOSPHORUS 2.4* 2.0* 2.4* 3.0 1.9*  --   --  2.8    < > = values in this interval not displayed.         Lab 06/17/22  0333 06/16/22  0414 06/15/22  0340 06/14/22  2310 06/14/22  1845   TOTAL PROTEIN 6.9 7.1 6.3 6.7 6.8   ALBUMIN 3.90 4.30 3.90 4.00 4.00   GLOBULIN 3.0 2.8 2.4 2.7 2.8   ALT (SGPT) 31 42* 35 37 38   AST (SGOT) 19 26 27 25 24   BILIRUBIN 0.4 0.3 0.4 0.3 0.3   ALK PHOS 85 89 94 97 101         Lab 06/16/22  0414 06/15/22  0340 06/14/22  2310 06/14/22  1845 06/14/22  1158   TROPONIN T  --  0.081* 0.063* 0.050*  --    PROTIME 10.2 10.3  --   --  11.1   INR 0.99 1.00  --   --  1.08         Lab 06/17/22  0333 06/15/22  0340   CHOLESTEROL 251* 283*   LDL CHOL 172* 219*   HDL CHOL 37* 40   TRIGLYCERIDES 223* 130             Brief Urine Lab Results     None        Microbiology Results (last 10 days)     Procedure Component Value - Date/Time    COVID-19 RAPID AG,VERITOR,COR/KELSEY/PAD/GONZALEZ/MAD/FLASH/LAG/YA/ IN-HOUSE,DRY SWAB, 1-2 HR TAT - Swab, Nasal Cavity [[id number]]  (Normal) Collected: 06/15/22 8760    Lab Status: Final result Specimen: Swab from  Nasal Cavity Updated: 06/15/22 1755     COVID19 Presumptive Negative    Narrative:      Fact sheets for providers: https://www.fda.gov/media/746236/download    Fact sheets for patients: https://www.fda.gov/media/514069/download    COVID-19 RAPID AG,VERITOR,COR/KELSEY/PAD/GONZALEZ/MAD/FLASH/LAG/YA/ IN-HOUSE,DRY SWAB, 1-2 HR TAT - Swab, Nasal Cavity [[id number]]  (Normal) Collected: 06/14/22 1731    Lab Status: Final result Specimen: Swab from Nasal Cavity Updated: 06/14/22 1858     COVID19 Presumptive Negative    Narrative:      Fact sheets for providers: https://www.fda.gov/media/762926/download    Fact sheets for patients: https://www.fda.gov/media/982343/download    COVID PRE-OP / PRE-PROCEDURE SCREENING ORDER (NO ISOLATION) - Swab, Nasopharynx [[id number]]  (Abnormal) Collected: 06/14/22 1052    Lab Status: Final result Specimen: Swab from Nasopharynx Updated: 06/14/22 1215    Narrative:      The following orders were created for panel order COVID PRE-OP / PRE-PROCEDURE SCREENING ORDER (NO ISOLATION) - Swab, Nasopharynx.  Procedure                               Abnormality         Status                     ---------                               -----------         ------                     COVID-19,CEPHEID/JESSE,CO...[[id number]]  Abnormal            Final result                 Please view results for these tests on the individual orders.    COVID-19,CEPHEID/JESSE,COR/KELSEY/PAD/CLARISA IN-HOUSE(OR EMERGENT/ADD-ON),NP SWAB IN TRANSPORT MEDIA 3-4 HR TAT, RT-PCR - Swab, Nasopharynx [[id number]]  (Abnormal) Collected: 06/14/22 1052    Lab Status: Final result Specimen: Swab from Nasopharynx Updated: 06/14/22 1215     COVID19 Detected    Narrative:      Fact sheet for providers: https://www.fda.gov/media/279224/download     Fact sheet for patients: https://www.fda.gov/media/426317/download  Fact sheet for providers: https://www.fda.gov/media/506713/download     Fact sheet for patients: https://www.fda.gov/media/664772/download           XR Chest 1 View    Result Date: 6/16/2022  Impression: No acute cardiopulmonary abnormality.  Electronically Signed By-Leobardo Watson MD On:6/16/2022 8:19 AM This report was finalized on [id number] by  Leobardo Watson MD.    XR Chest 1 View    Result Date: 6/15/2022  Impression: No acute disease in the chest and no significant change since previous study performed on June 8, 2022 Electronically Signed: Nael Goodman MD 6/15/2022 8:56 EDT    XR Chest 1 View    Result Date: 6/8/2022  Impression: No acute process.  Electronically Signed By-Oli Gaffney MD On:6/8/2022 7:32 AM This report was finalized on [id number] by  Oli Gaffney MD.          Results for orders placed during the hospital encounter of 06/14/22    Adult Transthoracic Echo Limited W/ Cont if Necessary Per Protocol    Interpretation Summary  · Estimated left ventricular EF = 60% Left ventricular systolic function is normal.    Indications  Recent ACS    Technically difficult study.  (COVID protocol)  Mitral valve is structurally normal.  Tricuspid valve is structurally normal.  Aortic valve is structurally normal.  Pulmonic valve could not be well visualized.  No evidence for mitral tricuspid or aortic regurgitation is seen by Doppler study.  Left atrium is normal in size.  Right atrium is normal in size.  Left ventricle is normal in size and contractility with ejection fraction of 60%.  Right ventricle is normal in size.  No pericardial effusion or intracardiac thrombus is seen.    Impression  Technically difficult and limited study (COVID protocol.  In the views obtained,  Structurally and functionally normal cardiac valves.  Left ventricular size and contractility is normal with ejection fraction of 60%.  No pericardial effusion or intracardiac thrombus is present.      Labs Pending at Discharge:      Procedures Performed  Procedure(s):  Percutaneous Coronary Intervention         Consults:   Consults     Date and Time Order Name  Status Description    6/15/2022  4:14 PM Inpatient Psychiatrist Consult Completed     6/15/2022  4:03 PM Inpatient Hospitalist Consult Completed             Discharge Details        Discharge Medications      New Medications      Instructions Start Date   aspirin 81 MG EC tablet   81 mg, Oral, Daily   Start Date: June 18, 2022     busPIRone 5 MG tablet  Commonly known as: BUSPAR   5 mg, Oral, Daily, Follow up with PCP/Psychiatrist to titrate up   Start Date: June 18, 2022     Cariprazine HCl 1.5 MG capsule capsule  Commonly known as: VRAYLAR   1.5 mg, Oral, Every Morning   Start Date: June 18, 2022     clopidogrel 75 MG tablet  Commonly known as: PLAVIX   75 mg, Oral, Daily   Start Date: June 18, 2022     diazePAM 5 MG tablet  Commonly known as: VALIUM   5 mg, Oral, Every 6 Hours PRN      metoprolol tartrate 25 MG tablet  Commonly known as: LOPRESSOR   25 mg, Oral, Every 12 Hours Scheduled         Continue These Medications      Instructions Start Date   albuterol sulfate  (90 Base) MCG/ACT inhaler  Commonly known as: PROVENTIL HFA;VENTOLIN HFA;PROAIR HFA   2 puffs, Inhalation, Every 4 Hours PRN      amLODIPine 5 MG tablet  Commonly known as: NORVASC   5 mg, Oral, Daily      atorvastatin 40 MG tablet  Commonly known as: LIPITOR   40 mg, Oral, Nightly      levothyroxine 50 MCG tablet  Commonly known as: SYNTHROID, LEVOTHROID   50 mcg, Oral, Daily      lisinopril 20 MG tablet  Commonly known as: PRINIVIL,ZESTRIL   20 mg, Oral, Daily      naproxen 375 MG tablet  Commonly known as: NAPROSYN   375 mg, Oral, 2 Times Daily PRN             Allergies   Allergen Reactions   • Codeine Itching   • Hydrocodone Itching   • Hydrocodone-Acetaminophen Other (See Comments)   • Shellfish-Derived Products Unknown - High Severity         Discharge Disposition:   Home or Self Care    Diet:  Hospital:  Diet Order   Procedures   • Diet Cardiac; Healthy Heart         Discharge Activity: Ad harley. as tolerated        CODE STATUS:  Code  Status and Medical Interventions:   Ordered at: 06/14/22 1954     Level Of Support Discussed With:    Patient     Code Status (Patient has no pulse and is not breathing):    CPR (Attempt to Resuscitate)     Medical Interventions (Patient has pulse or is breathing):    Full Support         Future Appointments   Date Time Provider Department Center   6/29/2022  1:00 PM [facility] INJECTION ROOM  [facility]   6/29/2022  1:45 PM [facility] CAMERA ROOM 1  [facility]   6/29/2022  2:00 PM [facility] TREADMILL 1  [facility]   6/29/2022  3:00 PM [facility] CAMERA ROOM 1  [facility]   6/29/2022  3:15 PM [facility] ECHO 1/OUTPATIENT  [facility]   7/7/2022  2:00 PM Thelma Welsh APRN [facility]           Time spent on Discharge including face to face service:  > 30 min    Electronically signed by Sorin Yap MD at 06/17/22 7168

## 2022-06-17 NOTE — CASE MANAGEMENT/SOCIAL WORK
Case Management Discharge Note      Final Note: Home    Provided Post Acute Provider List?: N/A  Provided Post Acute Provider Quality & Resource List?: N/A    Selected Continued Care - Admitted Since 6/14/2022          Transportation Services  Private: Car    Final Discharge Disposition Code: 01 - home or self-care

## 2022-06-17 NOTE — PROGRESS NOTES
"PULMONARY CRITICAL CARE PROGRESS  NOTE      PATIENT IDENTIFICATION:  Name: Tramaine Gates  MRN: [id number]  :  1985     Age: 36 y.o.  Sex: male    DATE OF Note:  2022   Referring Physician: Deanna Braun MD                  Subjective:   Feeling better, off O2 now, no SOB, no chest or abd pain, no bowel or bladder issues reported         Objective:  tMax 24 hrs: Temp (24hrs), Av.9 °F (36.6 °C), Min:97 °F (36.1 °C), Max:98.5 °F (36.9 °C)      Vitals Ranges:   Temp:  [97 °F (36.1 °C)-98.5 °F (36.9 °C)] 98.5 °F (36.9 °C)  Heart Rate:  [69-85] 69  Resp:  [17-18] 18  BP: (118-142)/() 119/77    Intake and Output Last 3 Shifts:   I/O last 3 completed shifts:  In: 720 [P.O.:720]  Out: 800 [Urine:800]    Exam:  /77   Pulse 69   Temp 98.5 °F (36.9 °C) (Oral)   Resp 18   Ht 177.8 cm (70\")   Wt 124 kg (272 lb 4.3 oz)   SpO2 98%   BMI 39.07 kg/m²     General Appearance:   AA  HEENT:  Normocephalic, without obvious abnormality. Conjunctivae/corneas clear.  Normal external ear canals. Nares normal, no drainage     Neck:  Supple, symmetrical, trachea midline. No JVD.  Lungs /Chest wall:   Bilateral basal rhonchi, respirations unlabored, symmetrical wall movement.     Heart:  Regular rate and rhythm, systolic murmur PMI left sternal border  Abdomen: Soft, nontender, no masses, no organomegaly.    Extremities: Trace edema, no clubbing or cyanosis        Medications:    Current Facility-Administered Medications:   •  acetaminophen (TYLENOL) tablet 650 mg, 650 mg, Oral, Q4H PRN, 650 mg at 22 1039 **OR** acetaminophen (TYLENOL) suppository 650 mg, 650 mg, Rectal, Q4H PRN, Matthieu Del Toro MD  •  aluminum-magnesium hydroxide-simethicone (MAALOX MAX) 400-400-40 MG/5ML suspension 15 mL, 15 mL, Oral, Q6H PRN, Matthieu Del Toro MD  •  aspirin EC tablet 81 mg, 81 mg, Oral, Daily, Matthieu Del Toro MD, 81 mg at 22 0919  •  atorvastatin (LIPITOR) tablet 80 mg, 80 mg, Oral, Nightly, Matthieu Del Toro MD, 80 " mg at 06/16/22 2134  •  atropine sulfate injection 0.5-1 mg, 0.5-1 mg, Intravenous, Q5 Min PRN, Matthieu Del Toro MD  •  [START ON 6/18/2022] busPIRone (BUSPAR) tablet 5 mg, 5 mg, Oral, Daily, Sorin Yap MD  •  Cariprazine HCl (VRAYLAR) capsule capsule 1.5 mg, 1.5 mg, Oral, QAROBBY, Moises Vasquez PA-C, 1.5 mg at 06/17/22 0918  •  clopidogrel (PLAVIX) tablet 75 mg, 75 mg, Oral, Daily, Matthieu Del Toro MD, 75 mg at 06/17/22 0919  •  diazePAM (VALIUM) tablet 5 mg, 5 mg, Oral, Q6H PRN, Moises Vasquez PA-C, 5 mg at 06/17/22 0355  •  diphenhydrAMINE (BENADRYL) capsule 25 mg, 25 mg, Oral, Q6H PRN, Matthieu Del Toro MD, 25 mg at 06/14/22 2359  •  DOPamine 400 mg in 250 mL D5W infusion, 3 mcg/kg/min, Intravenous, Continuous, Matthieu Del Toro MD, Held at 06/14/22 2130  •  famotidine (PEPCID) tablet 20 mg, 20 mg, Oral, BID AC, Janna Nicholas DO, 20 mg at 06/17/22 0919  •  levothyroxine (SYNTHROID, LEVOTHROID) tablet 50 mcg, 50 mcg, Oral, Q AM, Matthieu Del Toro MD, 50 mcg at 06/17/22 0520  •  lisinopril (PRINIVIL,ZESTRIL) tablet 10 mg, 10 mg, Oral, Q24H, Matthieu Del Toro MD, 10 mg at 06/17/22 0919  •  metoprolol tartrate (LOPRESSOR) tablet 25 mg, 25 mg, Oral, Q12H, Matthieu Del Toro MD, 25 mg at 06/17/22 0919  •  nicotine (NICODERM CQ) 14 MG/24HR patch 1 patch, 1 patch, Transdermal, Q24H, Matthieu Del Toro MD, 1 patch at 06/17/22 0521  •  nitroglycerin (NITROSTAT) SL tablet 0.4 mg, 0.4 mg, Sublingual, Q5 Min PRN, Matthieu Del Toro MD  •  nitroglycerin (TRIDIL) 200 mcg/ml infusion, 5-200 mcg/min, Intravenous, Titrated, Matthieu Del Toro MD, Stopped at 06/15/22 0914  •  ondansetron (ZOFRAN) tablet 4 mg, 4 mg, Oral, Q6H PRN **OR** ondansetron (ZOFRAN) injection 4 mg, 4 mg, Intravenous, Q6H PRN, Matthieu Del Toro MD, 4 mg at 06/17/22 0351  •  sodium chloride 0.9 % bolus 1,000 mL, 1,000 mL, Intravenous, Once, Matthieu Del Troo MD  •  sodium chloride 0.9 % flush 10 mL, 10 mL, Intravenous, Q12H, Matthieu Del Toro MD, 10 mL at 06/17/22 0919  •  sodium  chloride 0.9 % flush 10 mL, 10 mL, Intravenous, Alverto GARDINER Syed T, MD  •  sodium chloride 0.9 % infusion 250 mL, 250 mL, Intravenous, Once Alverto GARDINER Syed T, MD  •  sodium chloride 0.9 % infusion 250 mL, 250 mL, Intravenous, Once Alverto GARDINER Syed T, MD    Data Review:  All labs (24hrs):   Recent Results (from the past 24 hour(s))   POC Activated Clotting Time    Collection Time: 06/16/22  1:41 PM    Specimen: Arterial Blood   Result Value Ref Range    Activated Clotting Time  132 89 - 137 Seconds   ECG 12 Lead    Collection Time: 06/16/22  4:33 PM   Result Value Ref Range    QT Interval 370 ms   Magnesium    Collection Time: 06/17/22  3:33 AM    Specimen: Blood   Result Value Ref Range    Magnesium 2.3 1.6 - 2.6 mg/dL   Phosphorus    Collection Time: 06/17/22  3:33 AM    Specimen: Blood   Result Value Ref Range    Phosphorus 2.4 (L) 2.5 - 4.5 mg/dL   Comprehensive Metabolic Panel    Collection Time: 06/17/22  3:33 AM    Specimen: Blood   Result Value Ref Range    Glucose 92 65 - 99 mg/dL    BUN 12 6 - 20 mg/dL    Creatinine 1.03 0.76 - 1.27 mg/dL    Sodium 134 (L) 136 - 145 mmol/L    Potassium 3.8 3.5 - 5.2 mmol/L    Chloride 101 98 - 107 mmol/L    CO2 20.0 (L) 22.0 - 29.0 mmol/L    Calcium 8.5 (L) 8.6 - 10.5 mg/dL    Total Protein 6.9 6.0 - 8.5 g/dL    Albumin 3.90 3.50 - 5.20 g/dL    ALT (SGPT) 31 1 - 41 U/L    AST (SGOT) 19 1 - 40 U/L    Alkaline Phosphatase 85 39 - 117 U/L    Total Bilirubin 0.4 0.0 - 1.2 mg/dL    Globulin 3.0 gm/dL    A/G Ratio 1.3 g/dL    BUN/Creatinine Ratio 11.7 7.0 - 25.0    Anion Gap 13.0 5.0 - 15.0 mmol/L    eGFR 95.4 >60.0 mL/min/1.73   Lipid Panel    Collection Time: 06/17/22  3:33 AM    Specimen: Blood   Result Value Ref Range    Total Cholesterol 251 (H) 0 - 200 mg/dL    Triglycerides 223 (H) 0 - 150 mg/dL    HDL Cholesterol 37 (L) 40 - 60 mg/dL    LDL Cholesterol  172 (H) 0 - 100 mg/dL    VLDL Cholesterol 42 (H) 5 - 40 mg/dL    LDL/HDL Ratio 4.58    CBC Auto Differential    Collection  Time: 06/17/22  3:33 AM    Specimen: Blood   Result Value Ref Range    WBC 12.60 (H) 3.40 - 10.80 10*3/mm3    RBC 4.95 4.14 - 5.80 10*6/mm3    Hemoglobin 14.3 13.0 - 17.7 g/dL    Hematocrit 42.6 37.5 - 51.0 %    MCV 86.1 79.0 - 97.0 fL    MCH 28.8 26.6 - 33.0 pg    MCHC 33.5 31.5 - 35.7 g/dL    RDW 13.3 12.3 - 15.4 %    RDW-SD 39.4 37.0 - 54.0 fl    MPV 6.8 6.0 - 12.0 fL    Platelets 369 140 - 450 10*3/mm3    Neutrophil % 68.4 42.7 - 76.0 %    Lymphocyte % 25.5 19.6 - 45.3 %    Monocyte % 5.0 5.0 - 12.0 %    Eosinophil % 0.1 (L) 0.3 - 6.2 %    Basophil % 1.0 0.0 - 1.5 %    Neutrophils, Absolute 8.60 (H) 1.70 - 7.00 10*3/mm3    Lymphocytes, Absolute 3.20 (H) 0.70 - 3.10 10*3/mm3    Monocytes, Absolute 0.60 0.10 - 0.90 10*3/mm3    Eosinophils, Absolute 0.00 0.00 - 0.40 10*3/mm3    Basophils, Absolute 0.10 0.00 - 0.20 10*3/mm3    nRBC 0.0 0.0 - 0.2 /100 WBC   ECG 12 Lead    Collection Time: 06/17/22  5:44 AM   Result Value Ref Range    QT Interval 386 ms   ECG 12 Lead    Collection Time: 06/17/22  8:22 AM   Result Value Ref Range    QT Interval 396 ms        Imaging:  Cardiac Catheterization/Vascular Study  Percutaneous coronary intervention report    DATE OF PROCEDURE: 6/16/2022    INDICATION FOR PROCEDURE:    Unstable angina  Inferior acute myocardial infarction  CAD    PROCEDURE PERFORMED:    Balloon angioplasty and stenting of LAD coronary artery    PROCEDURE COMMENTS:     Patient was brought to the Cath Lab.  He was placed on the table in the   supine position.  Right groin was cleaned draped and prepped in the usual   sterile fashion.  Right femoral artery was accessed without difficulty and   6 [place] arterial sheath was inserted.  Sheath was flushed with   heparinized saline.    We advanced a 6 [place] XB 3.5 LAD interventional guide and selective   engagement of the left coronary artery was achieved.  We advanced   interventional guidewire 0.014 whisper and crossed the lesion and placed   in the distal part of  the LAD coronary artery.    We advanced 2.25x12 compliant balloon and the pre-dilatation at nominal   pressure.  After the predilatation, this balloon was removed and we   advanced 2.5x28 drug-eluting stent and advanced and placed over the lesion   and deployed at nominal pressure.  Postdilatation was done with 3.0x15 NC   balloon.  Stenosis decreased from 80 to 90% to less than 10%.  No   dissection, perforation or no reflow phenomena was noted.    RCA was dominant vessel.  DARRYL-3 flow was present before and after the   intervention    Patient was given aspirin, Plavix and heparin during the procedure.  ACT   at the end of procedure was 219    Patient tolerated the procedure well.    SUMMARY:     1.  Successful stenting of LAD  2.  Preserved left ventricular function  3.  Hypertensive heart disease    RECOMMENDATIONS:     Recommend DAPT  XR Chest 1 View  Narrative: DATE OF EXAM:  6/16/2022 5:56 AM     PROCEDURE:  XR CHEST 1 VW-     INDICATIONS:  Shortness of breath; I25.10-Atherosclerotic heart disease of native  coronary artery without angina pectoris     COMPARISON:  6/15/2022     TECHNIQUE:   Single radiographic view of the chest was obtained.     FINDINGS:  Lungs are normally expanded. Heart size is normal. No pneumothorax or  pleural effusion or focal pulmonary parenchymal opacity. Pulmonary  vessels are distinct. Bones and soft tissues are normal.     Impression: No acute cardiopulmonary abnormality.     Electronically Signed By-Leobardo Watson MD On:6/16/2022 8:19 AM  This report was finalized on [id number] by  Leobardo Watson MD.       ASSESSMENT:  Acute inferior myocardial infarction (HCC)  Coronary artery disease  COVID-19 positive  Chronic obstructive airway disease  Obstructive sleep apnea  Morbid obesity  Hypertension  Gastroesophageal flux disorder  Hyperlipidemia     PLAN:  May discharge home and follow up with our service in 2 weeks   Continue antiplatelets  Beta-blocker  Record his positive  COVID-19 we are going to monitor for now no signs of pulmonary involvement  Bronchodilator  Inhaled corticosteroids  Electrolytes/ glycemic control  DVT and GI prophylaxis.       Discussed with Dr Kade Carreon, APRN    6/17/2022  12:10 EDT     I personally have examined  and interviewed the patient. I have reviewed the history, data, problems, assessment and plan with our NP.  Critical care time in direct medical management (   ) minutes  Electronically signed by Deanna Braun MD. D, ABSM.

## 2022-06-18 ENCOUNTER — READMISSION MANAGEMENT (OUTPATIENT)
Dept: CALL CENTER | Facility: HOSPITAL | Age: 37
End: 2022-06-18

## 2022-06-18 NOTE — OUTREACH NOTE
Prep Survey    Flowsheet Row Responses   [facility]? [facility]   Is LACE score < 7 ? No   Emergency Room discharge w/ pulse ox? No   Eligibility Readm Mgmt   Discharge diagnosis  Chest pain, Inferior myocardial infarction, covid-19   Does the patient have one of the following disease processes/diagnoses(primary or secondary)? COVID-19   Does the patient have Home health ordered? No   Is there a DME ordered? No   Prep survey completed? Yes          BRIANNA BUSTAMANTE - Registered Nurse

## 2022-06-19 LAB — QT INTERVAL: 396 MS

## 2022-06-20 ENCOUNTER — READMISSION MANAGEMENT (OUTPATIENT)
Dept: CALL CENTER | Facility: HOSPITAL | Age: 37
End: 2022-06-20

## 2022-06-20 NOTE — OUTREACH NOTE
COVID-19 Week 1 Survey    Flowsheet Row Responses   [facility] patient discharged from? [facility]   Does the patient have one of the following disease processes/diagnoses(primary or secondary)? COVID-19   COVID-19 underlying condition? AMI   Call Number Call 1   Week 1 Call successful? Yes   Call start time [phone]   Call end time [phone]   Discharge diagnosis Chest pain, Inferior myocardial infarction, covid-19   Person spoke with today (if not patient) and relationship Ariana-SO    Meds reviewed with patient/caregiver? Yes   Is the patient having any side effects they believe may be caused by any medication additions or changes? Yes   Side effects comments  Sleepy   Does the patient have all medications ordered at discharge? Yes   Is the patient taking all medications as directed (includes completed medication regime)? Yes   Comments regarding appointments Appt with cards is on 7/7/22   Does the patient have a primary care provider?  Yes   Comments regarding PCP 6/22/22   Does the patient have an appointment with their PCP or specialist within 7 days of discharge? Yes   Has the patient kept scheduled appointments due by today? N/A   Psychosocial issues? No   Did the patient receive a copy of their discharge instructions? Yes   Did the patient receive a copy of COVID-19 specific instructions? Yes   Nursing interventions Reviewed instructions with patient   What is the patient's perception of their health status since discharge? Improving   Does the patient have any of the following symptoms? None   Nursing Interventions Nurse provided patient education   Pulse Ox monitoring None   Is the patient/caregiver able to teach back steps to recovery at home? Rest and rebuild strength, gradually increase activity   If the patient is a current smoker, are they able to teach back resources for cessation? Smoking cessation medications, [id number]  [Not smoking much]   Is the patient/caregiver able to teach back the hierarchy  of who to call/visit for symptoms/problems? PCP, Specialist, Home health nurse, Urgent Care, ED, 911 Yes   Is the patient/caregiver able to teach back ways to prevent a second heart attack: Take medications, Follow up with MD, Manage risk factors   Is the patient/caregiver able to teach back lifestyle changes to help prevent MIs Quit smoking, Regular exercise as approved by provider, Limiting alcohol intake, Reducing stress   Is the patient/caregiver able to teach back signs and symptoms of when to call for help immediately: Sudden chest discomfort, Sudden discomfort in arms, back, neck or jaw, Shortness of breath at any time, Sudden sweating or clammy skin   Nursing interventions Nurse provided patient education   COVID-19 call completed? Yes   Revoked No further contact(revokes)-requires comment   Is the patient interested in additional calls from an ambulatory ?  NOTE:  applies to high risk patients requiring additional follow-up. No          CHRIS ALEMAN - Registered Nurse

## 2022-06-22 ENCOUNTER — APPOINTMENT (OUTPATIENT)
Dept: GENERAL RADIOLOGY | Facility: HOSPITAL | Age: 37
End: 2022-06-22

## 2022-06-22 ENCOUNTER — TELEPHONE (OUTPATIENT)
Dept: CARDIAC REHAB | Facility: HOSPITAL | Age: 37
End: 2022-06-22

## 2022-06-22 ENCOUNTER — APPOINTMENT (OUTPATIENT)
Dept: CT IMAGING | Facility: HOSPITAL | Age: 37
End: 2022-06-22

## 2022-06-22 ENCOUNTER — APPOINTMENT (OUTPATIENT)
Dept: CARDIOLOGY | Facility: HOSPITAL | Age: 37
End: 2022-06-22

## 2022-06-22 ENCOUNTER — HOSPITAL ENCOUNTER (INPATIENT)
Facility: HOSPITAL | Age: 37
LOS: 12 days | Discharge: HOME | End: 2022-07-04
Attending: EMERGENCY MEDICINE

## 2022-06-22 DIAGNOSIS — R07.9 CHEST PAIN, UNSPECIFIED TYPE: Primary | ICD-10-CM

## 2022-06-22 DIAGNOSIS — I25.10 CAD, MULTIPLE VESSEL: ICD-10-CM

## 2022-06-22 DIAGNOSIS — Z95.1 S/P CABG X 3: ICD-10-CM

## 2022-06-22 LAB
ALBUMIN SERPL-MCNC: 4.3 G/DL (ref 3.5–5.2)
ALBUMIN/GLOB SERPL: 1.4 G/DL
ALP SERPL-CCNC: 97 U/L (ref 39–117)
ALPHA1 GLOB MFR UR ELPH: 169 MG/DL (ref 90–200)
ALT SERPL W P-5'-P-CCNC: 32 U/L (ref 1–41)
ANION GAP SERPL CALCULATED.3IONS-SCNC: 15 MMOL/L (ref 5–15)
APTT PPP: 31.6 SECONDS (ref 61–76.5)
AST SERPL-CCNC: 18 U/L (ref 1–40)
BASOPHILS # BLD AUTO: 0.1 10*3/MM3 (ref 0–0.2)
BASOPHILS NFR BLD AUTO: 1.4 % (ref 0–1.5)
BH CV RIGHT GROIN PSA PROCEDURE SCRIPTING LRR: 1
BH CV XLRA MEAS EXT ILIAC A PSV RIGHT: 199 CM/SEC
BILIRUB SERPL-MCNC: 0.4 MG/DL (ref 0–1.2)
BUN SERPL-MCNC: 12 MG/DL (ref 6–20)
BUN/CREAT SERPL: 12.8 (ref 7–25)
CALCIUM SPEC-SCNC: 9.2 MG/DL (ref 8.6–10.5)
CHLORIDE SERPL-SCNC: 101 MMOL/L (ref 98–107)
CO2 SERPL-SCNC: 20 MMOL/L (ref 22–29)
CREAT SERPL-MCNC: 0.94 MG/DL (ref 0.76–1.27)
D DIMER PPP FEU-MCNC: 0.38 MG/L (FEU) (ref 0–0.59)
DEPRECATED RDW RBC AUTO: 39.8 FL (ref 37–54)
EGFRCR SERPLBLD CKD-EPI 2021: 106.4 ML/MIN/1.73
EOSINOPHIL # BLD AUTO: 0.1 10*3/MM3 (ref 0–0.4)
EOSINOPHIL NFR BLD AUTO: 1 % (ref 0.3–6.2)
ERYTHROCYTE [DISTWIDTH] IN BLOOD BY AUTOMATED COUNT: 13.4 % (ref 12.3–15.4)
GLOBULIN UR ELPH-MCNC: 3.1 GM/DL
GLUCOSE SERPL-MCNC: 88 MG/DL (ref 65–99)
HCT VFR BLD AUTO: 46.8 % (ref 37.5–51)
HGB BLD-MCNC: 16 G/DL (ref 13–17.7)
HOLD SPECIMEN: NORMAL
HOLD SPECIMEN: NORMAL
INR PPP: 1 (ref 0.93–1.1)
LYMPHOCYTES # BLD AUTO: 2.6 10*3/MM3 (ref 0.7–3.1)
LYMPHOCYTES NFR BLD AUTO: 30.1 % (ref 19.6–45.3)
MAXIMAL PREDICTED HEART RATE: 182 BPM
MCH RBC QN AUTO: 28.8 PG (ref 26.6–33)
MCHC RBC AUTO-ENTMCNC: 34.2 G/DL (ref 31.5–35.7)
MCV RBC AUTO: 84.1 FL (ref 79–97)
MONOCYTES # BLD AUTO: 0.7 10*3/MM3 (ref 0.1–0.9)
MONOCYTES NFR BLD AUTO: 7.7 % (ref 5–12)
NEUTROPHILS NFR BLD AUTO: 5.1 10*3/MM3 (ref 1.7–7)
NEUTROPHILS NFR BLD AUTO: 59.8 % (ref 42.7–76)
NRBC BLD AUTO-RTO: 0.1 /100 WBC (ref 0–0.2)
NT-PROBNP SERPL-MCNC: 44.9 PG/ML (ref 0–450)
PLATELET # BLD AUTO: 353 10*3/MM3 (ref 140–450)
PMV BLD AUTO: 6.7 FL (ref 6–12)
POTASSIUM SERPL-SCNC: 3.9 MMOL/L (ref 3.5–5.2)
PROT SERPL-MCNC: 7.4 G/DL (ref 6–8.5)
PROTHROMBIN TIME: 10.3 SECONDS (ref 9.6–11.7)
PROX PFA PSV RIGHT: 120 CM/SEC
PROX SFA PSV RIGHT: 120 CM/SEC
RBC # BLD AUTO: 5.57 10*6/MM3 (ref 4.14–5.8)
RIGHT GROIN CFA SYS: 202 CM/SEC
SODIUM SERPL-SCNC: 136 MMOL/L (ref 136–145)
STRESS TARGET HR: 155 BPM
TROPONIN T SERPL-MCNC: 0.03 NG/ML (ref 0–0.03)
TROPONIN T SERPL-MCNC: 0.04 NG/ML (ref 0–0.03)
WBC NRBC COR # BLD: 8.5 10*3/MM3 (ref 3.4–10.8)
WHOLE BLOOD HOLD COAG: NORMAL
WHOLE BLOOD HOLD SPECIMEN: NORMAL

## 2022-06-22 PROCEDURE — 25010000002 MORPHINE PER 10 MG: Performed by: FAMILY MEDICINE

## 2022-06-22 PROCEDURE — 85379 FIBRIN DEGRADATION QUANT: CPT | Performed by: EMERGENCY MEDICINE

## 2022-06-22 PROCEDURE — 0 MORPHINE SULFATE (PF) 4 MG/ML SOLUTION: Performed by: EMERGENCY MEDICINE

## 2022-06-22 PROCEDURE — 99285 EMERGENCY DEPT VISIT HI MDM: CPT

## 2022-06-22 PROCEDURE — 71045 X-RAY EXAM CHEST 1 VIEW: CPT

## 2022-06-22 PROCEDURE — 85610 PROTHROMBIN TIME: CPT | Performed by: EMERGENCY MEDICINE

## 2022-06-22 PROCEDURE — 93005 ELECTROCARDIOGRAM TRACING: CPT

## 2022-06-22 PROCEDURE — 82103 ALPHA-1-ANTITRYPSIN TOTAL: CPT | Performed by: NURSE PRACTITIONER

## 2022-06-22 PROCEDURE — 93926 LOWER EXTREMITY STUDY: CPT

## 2022-06-22 PROCEDURE — 85025 COMPLETE CBC W/AUTO DIFF WBC: CPT | Performed by: EMERGENCY MEDICINE

## 2022-06-22 PROCEDURE — 63710000001 ONDANSETRON PER 8 MG: Performed by: NURSE PRACTITIONER

## 2022-06-22 PROCEDURE — 99223 1ST HOSP IP/OBS HIGH 75: CPT | Performed by: FAMILY MEDICINE

## 2022-06-22 PROCEDURE — 25010000002 ENOXAPARIN PER 10 MG: Performed by: NURSE PRACTITIONER

## 2022-06-22 PROCEDURE — 25010000002 MORPHINE PER 10 MG: Performed by: NURSE PRACTITIONER

## 2022-06-22 PROCEDURE — 83880 ASSAY OF NATRIURETIC PEPTIDE: CPT | Performed by: EMERGENCY MEDICINE

## 2022-06-22 PROCEDURE — 84484 ASSAY OF TROPONIN QUANT: CPT | Performed by: EMERGENCY MEDICINE

## 2022-06-22 PROCEDURE — 93005 ELECTROCARDIOGRAM TRACING: CPT | Performed by: FAMILY MEDICINE

## 2022-06-22 PROCEDURE — 25010000002 ONDANSETRON PER 1 MG: Performed by: EMERGENCY MEDICINE

## 2022-06-22 PROCEDURE — 80053 COMPREHEN METABOLIC PANEL: CPT | Performed by: EMERGENCY MEDICINE

## 2022-06-22 PROCEDURE — 85730 THROMBOPLASTIN TIME PARTIAL: CPT | Performed by: EMERGENCY MEDICINE

## 2022-06-22 PROCEDURE — 71250 CT THORAX DX C-: CPT

## 2022-06-22 PROCEDURE — 84484 ASSAY OF TROPONIN QUANT: CPT | Performed by: FAMILY MEDICINE

## 2022-06-22 PROCEDURE — 93010 ELECTROCARDIOGRAM REPORT: CPT | Performed by: INTERNAL MEDICINE

## 2022-06-22 RX ORDER — HEPARIN SODIUM 10000 [USP'U]/100ML
8.13 INJECTION, SOLUTION INTRAVENOUS
Status: DISCONTINUED | OUTPATIENT
Start: 2022-06-22 | End: 2022-06-23

## 2022-06-22 RX ORDER — ALBUTEROL SULFATE 2.5 MG/3ML
2.5 SOLUTION RESPIRATORY (INHALATION) EVERY 6 HOURS PRN
Status: DISCONTINUED | OUTPATIENT
Start: 2022-06-22 | End: 2022-06-29

## 2022-06-22 RX ORDER — ASPIRIN 81 MG/1
81 TABLET ORAL DAILY
Status: CANCELLED | OUTPATIENT
Start: 2022-06-22

## 2022-06-22 RX ORDER — MORPHINE SULFATE 4 MG/ML
2 INJECTION, SOLUTION INTRAMUSCULAR; INTRAVENOUS
Status: DISCONTINUED | OUTPATIENT
Start: 2022-06-22 | End: 2022-06-29

## 2022-06-22 RX ORDER — MORPHINE SULFATE 4 MG/ML
4 INJECTION, SOLUTION INTRAMUSCULAR; INTRAVENOUS ONCE
Status: COMPLETED | OUTPATIENT
Start: 2022-06-22 | End: 2022-06-22

## 2022-06-22 RX ORDER — SODIUM CHLORIDE 0.9 % (FLUSH) 0.9 %
10 SYRINGE (ML) INJECTION AS NEEDED
Status: DISCONTINUED | OUTPATIENT
Start: 2022-06-22 | End: 2022-06-29

## 2022-06-22 RX ORDER — SODIUM CHLORIDE 0.9 % (FLUSH) 0.9 %
10 SYRINGE (ML) INJECTION AS NEEDED
Status: DISCONTINUED | OUTPATIENT
Start: 2022-06-22 | End: 2022-06-28

## 2022-06-22 RX ORDER — BUSPIRONE HYDROCHLORIDE 5 MG/1
5 TABLET ORAL DAILY
Status: CANCELLED | OUTPATIENT
Start: 2022-06-22

## 2022-06-22 RX ORDER — ATORVASTATIN CALCIUM 40 MG/1
40 TABLET, FILM COATED ORAL NIGHTLY
Status: CANCELLED | OUTPATIENT
Start: 2022-06-22

## 2022-06-22 RX ORDER — ONDANSETRON 2 MG/ML
4 INJECTION INTRAMUSCULAR; INTRAVENOUS ONCE
Status: COMPLETED | OUTPATIENT
Start: 2022-06-22 | End: 2022-06-22

## 2022-06-22 RX ORDER — ONDANSETRON 4 MG/1
4 TABLET, FILM COATED ORAL EVERY 6 HOURS PRN
Status: DISCONTINUED | OUTPATIENT
Start: 2022-06-22 | End: 2022-06-28

## 2022-06-22 RX ORDER — SODIUM CHLORIDE 0.9 % (FLUSH) 0.9 %
10 SYRINGE (ML) INJECTION EVERY 12 HOURS SCHEDULED
Status: DISCONTINUED | OUTPATIENT
Start: 2022-06-22 | End: 2022-06-28

## 2022-06-22 RX ORDER — AMLODIPINE BESYLATE 5 MG/1
5 TABLET ORAL DAILY
Status: CANCELLED | OUTPATIENT
Start: 2022-06-22

## 2022-06-22 RX ORDER — CLOPIDOGREL BISULFATE 75 MG/1
75 TABLET ORAL DAILY
Status: CANCELLED | OUTPATIENT
Start: 2022-06-22

## 2022-06-22 RX ORDER — MORPHINE SULFATE 4 MG/ML
2 INJECTION, SOLUTION INTRAMUSCULAR; INTRAVENOUS ONCE
Status: DISCONTINUED | OUTPATIENT
Start: 2022-06-22 | End: 2022-06-23

## 2022-06-22 RX ORDER — CLOPIDOGREL BISULFATE 75 MG/1
75 TABLET ORAL ONCE
Status: COMPLETED | OUTPATIENT
Start: 2022-06-22 | End: 2022-06-22

## 2022-06-22 RX ORDER — ENOXAPARIN SODIUM 100 MG/ML
40 INJECTION SUBCUTANEOUS EVERY 24 HOURS
Status: DISCONTINUED | OUTPATIENT
Start: 2022-06-22 | End: 2022-06-22

## 2022-06-22 RX ORDER — NICOTINE 21 MG/24HR
1 PATCH, TRANSDERMAL 24 HOURS TRANSDERMAL DAILY
Status: DISCONTINUED | OUTPATIENT
Start: 2022-06-22 | End: 2022-06-29

## 2022-06-22 RX ORDER — NITROGLYCERIN 20 MG/100ML
10-50 INJECTION INTRAVENOUS
Status: DISCONTINUED | OUTPATIENT
Start: 2022-06-22 | End: 2022-06-27

## 2022-06-22 RX ORDER — LEVOTHYROXINE SODIUM 0.05 MG/1
50 TABLET ORAL
Status: CANCELLED | OUTPATIENT
Start: 2022-06-23

## 2022-06-22 RX ORDER — DIAZEPAM 5 MG/1
5 TABLET ORAL EVERY 6 HOURS PRN
Status: CANCELLED | OUTPATIENT
Start: 2022-06-22 | End: 2022-06-23

## 2022-06-22 RX ADMIN — ONDANSETRON HYDROCHLORIDE 4 MG: 4 TABLET, FILM COATED ORAL at 17:55

## 2022-06-22 RX ADMIN — ONDANSETRON 4 MG: 2 INJECTION INTRAMUSCULAR; INTRAVENOUS at 13:51

## 2022-06-22 RX ADMIN — Medication 10 ML: at 20:00

## 2022-06-22 RX ADMIN — ENOXAPARIN SODIUM 40 MG: 100 INJECTION SUBCUTANEOUS at 15:43

## 2022-06-22 RX ADMIN — NITROGLYCERIN 5 MCG/MIN: 20 INJECTION INTRAVENOUS at 15:43

## 2022-06-22 RX ADMIN — NICOTINE 1 PATCH: 21 PATCH, EXTENDED RELEASE TRANSDERMAL at 15:43

## 2022-06-22 RX ADMIN — MORPHINE SULFATE 2 MG: 4 INJECTION, SOLUTION INTRAMUSCULAR; INTRAVENOUS at 17:55

## 2022-06-22 RX ADMIN — MORPHINE SULFATE 4 MG: 4 INJECTION, SOLUTION INTRAMUSCULAR; INTRAVENOUS at 13:50

## 2022-06-22 RX ADMIN — CLOPIDOGREL BISULFATE 75 MG: 75 TABLET ORAL at 15:43

## 2022-06-22 RX ADMIN — MORPHINE SULFATE 4 MG: 4 INJECTION INTRAVENOUS at 19:59

## 2022-06-22 RX ADMIN — NITROGLYCERIN 1 INCH: 20 OINTMENT TOPICAL at 13:51

## 2022-06-23 ENCOUNTER — APPOINTMENT (OUTPATIENT)
Dept: CARDIOLOGY | Facility: HOSPITAL | Age: 37
End: 2022-06-23

## 2022-06-23 ENCOUNTER — APPOINTMENT (OUTPATIENT)
Dept: MRI IMAGING | Facility: HOSPITAL | Age: 37
End: 2022-06-23

## 2022-06-23 LAB
APTT PPP: 33.8 SECONDS (ref 61–76.5)
APTT PPP: 43.3 SECONDS (ref 61–76.5)
APTT PPP: 68 SECONDS (ref 61–76.5)
B PARAPERT DNA SPEC QL NAA+PROBE: NOT DETECTED
B PERT DNA SPEC QL NAA+PROBE: NOT DETECTED
BASOPHILS # BLD AUTO: 0 10*3/MM3 (ref 0–0.2)
BASOPHILS NFR BLD AUTO: 0.6 % (ref 0–1.5)
C PNEUM DNA NPH QL NAA+NON-PROBE: NOT DETECTED
CRP SERPL-MCNC: 1.61 MG/DL (ref 0–0.5)
DEPRECATED RDW RBC AUTO: 40.3 FL (ref 37–54)
EOSINOPHIL # BLD AUTO: 0 10*3/MM3 (ref 0–0.4)
EOSINOPHIL NFR BLD AUTO: 0.6 % (ref 0.3–6.2)
ERYTHROCYTE [DISTWIDTH] IN BLOOD BY AUTOMATED COUNT: 13.3 % (ref 12.3–15.4)
ERYTHROCYTE [SEDIMENTATION RATE] IN BLOOD: 25 MM/HR (ref 0–15)
FLUAV SUBTYP SPEC NAA+PROBE: NOT DETECTED
FLUBV RNA ISLT QL NAA+PROBE: NOT DETECTED
GLUCOSE BLDC GLUCOMTR-MCNC: 81 MG/DL (ref 70–105)
GLUCOSE BLDC GLUCOMTR-MCNC: 92 MG/DL (ref 70–105)
HADV DNA SPEC NAA+PROBE: NOT DETECTED
HBA1C MFR BLD: 5.4 % (ref 3.5–5.6)
HCOV 229E RNA SPEC QL NAA+PROBE: NOT DETECTED
HCOV HKU1 RNA SPEC QL NAA+PROBE: NOT DETECTED
HCOV NL63 RNA SPEC QL NAA+PROBE: NOT DETECTED
HCOV OC43 RNA SPEC QL NAA+PROBE: NOT DETECTED
HCT VFR BLD AUTO: 43 % (ref 37.5–51)
HGB BLD-MCNC: 14.3 G/DL (ref 13–17.7)
HMPV RNA NPH QL NAA+NON-PROBE: NOT DETECTED
HPIV1 RNA ISLT QL NAA+PROBE: NOT DETECTED
HPIV2 RNA SPEC QL NAA+PROBE: NOT DETECTED
HPIV3 RNA NPH QL NAA+PROBE: NOT DETECTED
HPIV4 P GENE NPH QL NAA+PROBE: NOT DETECTED
INR PPP: 1.02 (ref 0.93–1.1)
LYMPHOCYTES # BLD AUTO: 1.7 10*3/MM3 (ref 0.7–3.1)
LYMPHOCYTES NFR BLD AUTO: 24.6 % (ref 19.6–45.3)
M PNEUMO IGG SER IA-ACNC: NOT DETECTED
MCH RBC QN AUTO: 28.5 PG (ref 26.6–33)
MCHC RBC AUTO-ENTMCNC: 33.3 G/DL (ref 31.5–35.7)
MCV RBC AUTO: 85.6 FL (ref 79–97)
MONOCYTES # BLD AUTO: 0.5 10*3/MM3 (ref 0.1–0.9)
MONOCYTES NFR BLD AUTO: 6.6 % (ref 5–12)
NEUTROPHILS NFR BLD AUTO: 4.8 10*3/MM3 (ref 1.7–7)
NEUTROPHILS NFR BLD AUTO: 67.6 % (ref 42.7–76)
NRBC BLD AUTO-RTO: 0.3 /100 WBC (ref 0–0.2)
PLATELET # BLD AUTO: 287 10*3/MM3 (ref 140–450)
PMV BLD AUTO: 7 FL (ref 6–12)
PROTHROMBIN TIME: 10.5 SECONDS (ref 9.6–11.7)
QT INTERVAL: 373 MS
RBC # BLD AUTO: 5.02 10*6/MM3 (ref 4.14–5.8)
RHINOVIRUS RNA SPEC NAA+PROBE: NOT DETECTED
RSV RNA NPH QL NAA+NON-PROBE: NOT DETECTED
SARS-COV-2 RNA NPH QL NAA+NON-PROBE: NOT DETECTED
TROPONIN T SERPL-MCNC: 0.02 NG/ML (ref 0–0.03)
TROPONIN T SERPL-MCNC: 0.03 NG/ML (ref 0–0.03)
TROPONIN T SERPL-MCNC: 0.05 NG/ML (ref 0–0.03)
TROPONIN T SERPL-MCNC: <0.01 NG/ML (ref 0–0.03)
WBC NRBC COR # BLD: 7 10*3/MM3 (ref 3.4–10.8)

## 2022-06-23 PROCEDURE — 93458 L HRT ARTERY/VENTRICLE ANGIO: CPT | Performed by: INTERNAL MEDICINE

## 2022-06-23 PROCEDURE — 25010000002 ONDANSETRON PER 1 MG: Performed by: INTERNAL MEDICINE

## 2022-06-23 PROCEDURE — 99222 1ST HOSP IP/OBS MODERATE 55: CPT | Performed by: PSYCHIATRY & NEUROLOGY

## 2022-06-23 PROCEDURE — 85730 THROMBOPLASTIN TIME PARTIAL: CPT | Performed by: FAMILY MEDICINE

## 2022-06-23 PROCEDURE — 99233 SBSQ HOSP IP/OBS HIGH 50: CPT | Performed by: FAMILY MEDICINE

## 2022-06-23 PROCEDURE — 25010000002 HEPARIN (PORCINE) PER 1000 UNITS: Performed by: INTERNAL MEDICINE

## 2022-06-23 PROCEDURE — 85610 PROTHROMBIN TIME: CPT | Performed by: INTERNAL MEDICINE

## 2022-06-23 PROCEDURE — C1894 INTRO/SHEATH, NON-LASER: HCPCS | Performed by: INTERNAL MEDICINE

## 2022-06-23 PROCEDURE — 0 IOPAMIDOL PER 1 ML: Performed by: INTERNAL MEDICINE

## 2022-06-23 PROCEDURE — 25010000002 HEPARIN (PORCINE) 25000-0.45 UT/250ML-% SOLUTION: Performed by: INTERNAL MEDICINE

## 2022-06-23 PROCEDURE — 85652 RBC SED RATE AUTOMATED: CPT | Performed by: FAMILY MEDICINE

## 2022-06-23 PROCEDURE — 85025 COMPLETE CBC W/AUTO DIFF WBC: CPT | Performed by: INTERNAL MEDICINE

## 2022-06-23 PROCEDURE — 84484 ASSAY OF TROPONIN QUANT: CPT | Performed by: NURSE PRACTITIONER

## 2022-06-23 PROCEDURE — 4A023N7 MEASUREMENT OF CARDIAC SAMPLING AND PRESSURE, LEFT HEART, PERCUTANEOUS APPROACH: ICD-10-PCS | Performed by: INTERNAL MEDICINE

## 2022-06-23 PROCEDURE — 36415 COLL VENOUS BLD VENIPUNCTURE: CPT | Performed by: NURSE PRACTITIONER

## 2022-06-23 PROCEDURE — 25010000002 HYDROCORTISONE SODIUM SUCCINATE 100 MG RECONSTITUTED SOLUTION: Performed by: NURSE PRACTITIONER

## 2022-06-23 PROCEDURE — B2151ZZ FLUOROSCOPY OF LEFT HEART USING LOW OSMOLAR CONTRAST: ICD-10-PCS | Performed by: INTERNAL MEDICINE

## 2022-06-23 PROCEDURE — 85347 COAGULATION TIME ACTIVATED: CPT

## 2022-06-23 PROCEDURE — 93010 ELECTROCARDIOGRAM REPORT: CPT | Performed by: INTERNAL MEDICINE

## 2022-06-23 PROCEDURE — 99152 MOD SED SAME PHYS/QHP 5/>YRS: CPT | Performed by: INTERNAL MEDICINE

## 2022-06-23 PROCEDURE — 25010000002 MORPHINE PER 10 MG: Performed by: NURSE PRACTITIONER

## 2022-06-23 PROCEDURE — 3E033PZ INTRODUCTION OF PLATELET INHIBITOR INTO PERIPHERAL VEIN, PERCUTANEOUS APPROACH: ICD-10-PCS | Performed by: INTERNAL MEDICINE

## 2022-06-23 PROCEDURE — 83036 HEMOGLOBIN GLYCOSYLATED A1C: CPT | Performed by: NURSE PRACTITIONER

## 2022-06-23 PROCEDURE — B2111ZZ FLUOROSCOPY OF MULTIPLE CORONARY ARTERIES USING LOW OSMOLAR CONTRAST: ICD-10-PCS | Performed by: INTERNAL MEDICINE

## 2022-06-23 PROCEDURE — 84484 ASSAY OF TROPONIN QUANT: CPT | Performed by: FAMILY MEDICINE

## 2022-06-23 PROCEDURE — 25010000002 DIPHENHYDRAMINE PER 50 MG: Performed by: NURSE PRACTITIONER

## 2022-06-23 PROCEDURE — 25010000002 EPTIFIBATIDE PER 5 MG: Performed by: INTERNAL MEDICINE

## 2022-06-23 PROCEDURE — 0202U NFCT DS 22 TRGT SARS-COV-2: CPT | Performed by: FAMILY MEDICINE

## 2022-06-23 PROCEDURE — 25010000002 ONDANSETRON PER 1 MG: Performed by: NURSE PRACTITIONER

## 2022-06-23 PROCEDURE — 82962 GLUCOSE BLOOD TEST: CPT

## 2022-06-23 PROCEDURE — 99153 MOD SED SAME PHYS/QHP EA: CPT | Performed by: INTERNAL MEDICINE

## 2022-06-23 PROCEDURE — 25010000002 EPTIFIBATIDE 20 MG/10ML SOLUTION: Performed by: INTERNAL MEDICINE

## 2022-06-23 PROCEDURE — 25010000002 MORPHINE PER 10 MG: Performed by: INTERNAL MEDICINE

## 2022-06-23 PROCEDURE — 25010000002 DIPHENHYDRAMINE PER 50 MG: Performed by: INTERNAL MEDICINE

## 2022-06-23 PROCEDURE — 93005 ELECTROCARDIOGRAM TRACING: CPT | Performed by: FAMILY MEDICINE

## 2022-06-23 PROCEDURE — 86140 C-REACTIVE PROTEIN: CPT | Performed by: FAMILY MEDICINE

## 2022-06-23 PROCEDURE — 99223 1ST HOSP IP/OBS HIGH 75: CPT | Performed by: INTERNAL MEDICINE

## 2022-06-23 PROCEDURE — 70551 MRI BRAIN STEM W/O DYE: CPT

## 2022-06-23 PROCEDURE — C1769 GUIDE WIRE: HCPCS | Performed by: INTERNAL MEDICINE

## 2022-06-23 RX ORDER — LEVOTHYROXINE SODIUM 0.05 MG/1
50 TABLET ORAL
Status: DISCONTINUED | OUTPATIENT
Start: 2022-06-24 | End: 2022-06-29

## 2022-06-23 RX ORDER — EPTIFIBATIDE 0.75 MG/ML
2 INJECTION, SOLUTION INTRAVENOUS CONTINUOUS
Status: DISCONTINUED | OUTPATIENT
Start: 2022-06-23 | End: 2022-06-29

## 2022-06-23 RX ORDER — DIPHENHYDRAMINE HYDROCHLORIDE 50 MG/ML
INJECTION INTRAMUSCULAR; INTRAVENOUS AS NEEDED
Status: DISCONTINUED | OUTPATIENT
Start: 2022-06-23 | End: 2022-06-23 | Stop reason: HOSPADM

## 2022-06-23 RX ORDER — BUTALBITAL, ACETAMINOPHEN AND CAFFEINE 50; 325; 40 MG/1; MG/1; MG/1
2 TABLET ORAL EVERY 4 HOURS PRN
Status: DISCONTINUED | OUTPATIENT
Start: 2022-06-23 | End: 2022-06-29

## 2022-06-23 RX ORDER — DIPHENHYDRAMINE HYDROCHLORIDE 50 MG/ML
50 INJECTION INTRAMUSCULAR; INTRAVENOUS ONCE
Status: COMPLETED | OUTPATIENT
Start: 2022-06-23 | End: 2022-06-23

## 2022-06-23 RX ORDER — LISINOPRIL 5 MG/1
10 TABLET ORAL DAILY
Status: DISCONTINUED | OUTPATIENT
Start: 2022-06-24 | End: 2022-06-24

## 2022-06-23 RX ORDER — ATORVASTATIN CALCIUM 40 MG/1
40 TABLET, FILM COATED ORAL NIGHTLY
Status: DISCONTINUED | OUTPATIENT
Start: 2022-06-23 | End: 2022-06-27

## 2022-06-23 RX ORDER — DIAZEPAM 2 MG/1
5 TABLET ORAL EVERY 6 HOURS PRN
Status: DISCONTINUED | OUTPATIENT
Start: 2022-06-23 | End: 2022-06-25

## 2022-06-23 RX ORDER — EPTIFIBATIDE 2 MG/ML
INJECTION, SOLUTION INTRAVENOUS AS NEEDED
Status: DISCONTINUED | OUTPATIENT
Start: 2022-06-23 | End: 2022-06-23 | Stop reason: HOSPADM

## 2022-06-23 RX ORDER — METOPROLOL TARTRATE 5 MG/5ML
INJECTION INTRAVENOUS AS NEEDED
Status: DISCONTINUED | OUTPATIENT
Start: 2022-06-23 | End: 2022-06-23 | Stop reason: HOSPADM

## 2022-06-23 RX ORDER — HEPARIN SODIUM 10000 [USP'U]/100ML
8.13 INJECTION, SOLUTION INTRAVENOUS
Status: DISCONTINUED | OUTPATIENT
Start: 2022-06-23 | End: 2022-06-27

## 2022-06-23 RX ORDER — ONDANSETRON 2 MG/ML
4 INJECTION INTRAMUSCULAR; INTRAVENOUS EVERY 6 HOURS PRN
Status: DISCONTINUED | OUTPATIENT
Start: 2022-06-23 | End: 2022-06-29

## 2022-06-23 RX ORDER — EPTIFIBATIDE 0.75 MG/ML
2 INJECTION, SOLUTION INTRAVENOUS CONTINUOUS
Status: DISCONTINUED | OUTPATIENT
Start: 2022-06-23 | End: 2022-06-23

## 2022-06-23 RX ORDER — FAMOTIDINE 20 MG/1
20 TABLET, FILM COATED ORAL ONCE
Status: COMPLETED | OUTPATIENT
Start: 2022-06-23 | End: 2022-06-23

## 2022-06-23 RX ORDER — AMLODIPINE BESYLATE 5 MG/1
5 TABLET ORAL DAILY
Status: DISCONTINUED | OUTPATIENT
Start: 2022-06-23 | End: 2022-06-23

## 2022-06-23 RX ORDER — HEPARIN SODIUM 1000 [USP'U]/ML
INJECTION, SOLUTION INTRAVENOUS; SUBCUTANEOUS AS NEEDED
Status: DISCONTINUED | OUTPATIENT
Start: 2022-06-23 | End: 2022-06-23 | Stop reason: HOSPADM

## 2022-06-23 RX ORDER — LIDOCAINE HYDROCHLORIDE 20 MG/ML
INJECTION, SOLUTION INFILTRATION; PERINEURAL AS NEEDED
Status: DISCONTINUED | OUTPATIENT
Start: 2022-06-23 | End: 2022-06-23 | Stop reason: HOSPADM

## 2022-06-23 RX ORDER — LISINOPRIL 20 MG/1
20 TABLET ORAL DAILY
Status: DISCONTINUED | OUTPATIENT
Start: 2022-06-23 | End: 2022-06-23

## 2022-06-23 RX ORDER — ONDANSETRON 2 MG/ML
INJECTION INTRAMUSCULAR; INTRAVENOUS AS NEEDED
Status: DISCONTINUED | OUTPATIENT
Start: 2022-06-23 | End: 2022-06-23 | Stop reason: HOSPADM

## 2022-06-23 RX ORDER — SODIUM CHLORIDE 0.9 % (FLUSH) 0.9 %
10 SYRINGE (ML) INJECTION AS NEEDED
Status: DISCONTINUED | OUTPATIENT
Start: 2022-06-23 | End: 2022-06-23

## 2022-06-23 RX ORDER — SODIUM CHLORIDE 0.9 % (FLUSH) 0.9 %
10 SYRINGE (ML) INJECTION EVERY 12 HOURS SCHEDULED
Status: DISCONTINUED | OUTPATIENT
Start: 2022-06-23 | End: 2022-06-23

## 2022-06-23 RX ORDER — CLOPIDOGREL BISULFATE 75 MG/1
75 TABLET ORAL DAILY
Status: DISCONTINUED | OUTPATIENT
Start: 2022-06-23 | End: 2022-06-23

## 2022-06-23 RX ORDER — BUSPIRONE HYDROCHLORIDE 5 MG/1
5 TABLET ORAL DAILY
Status: DISCONTINUED | OUTPATIENT
Start: 2022-06-23 | End: 2022-06-25

## 2022-06-23 RX ORDER — ALBUTEROL SULFATE 2.5 MG/3ML
2.5 SOLUTION RESPIRATORY (INHALATION) EVERY 4 HOURS PRN
Status: DISCONTINUED | OUTPATIENT
Start: 2022-06-23 | End: 2022-06-29

## 2022-06-23 RX ORDER — AMLODIPINE BESYLATE 2.5 MG/1
2.5 TABLET ORAL DAILY
Status: DISCONTINUED | OUTPATIENT
Start: 2022-06-24 | End: 2022-06-24

## 2022-06-23 RX ORDER — ASPIRIN 81 MG/1
81 TABLET ORAL DAILY
Status: DISCONTINUED | OUTPATIENT
Start: 2022-06-23 | End: 2022-06-29

## 2022-06-23 RX ORDER — NITROGLYCERIN 5 MG/ML
INJECTION, SOLUTION INTRAVENOUS AS NEEDED
Status: DISCONTINUED | OUTPATIENT
Start: 2022-06-23 | End: 2022-06-23 | Stop reason: HOSPADM

## 2022-06-23 RX ADMIN — EPTIFIBATIDE 2 MCG/KG/MIN: 0.75 INJECTION INTRAVENOUS at 23:54

## 2022-06-23 RX ADMIN — MORPHINE SULFATE 2 MG: 4 INJECTION, SOLUTION INTRAMUSCULAR; INTRAVENOUS at 20:36

## 2022-06-23 RX ADMIN — Medication 10 ML: at 20:38

## 2022-06-23 RX ADMIN — DIAZEPAM 5 MG: 5 TABLET ORAL at 18:41

## 2022-06-23 RX ADMIN — NICOTINE 1 PATCH: 21 PATCH, EXTENDED RELEASE TRANSDERMAL at 08:15

## 2022-06-23 RX ADMIN — EPTIFIBATIDE 2 MCG/KG/MIN: 0.75 INJECTION INTRAVENOUS at 18:44

## 2022-06-23 RX ADMIN — ONDANSETRON 4 MG: 2 INJECTION INTRAMUSCULAR; INTRAVENOUS at 04:51

## 2022-06-23 RX ADMIN — CLOPIDOGREL BISULFATE 75 MG: 75 TABLET ORAL at 12:49

## 2022-06-23 RX ADMIN — NITROGLYCERIN 20 MCG/MIN: 20 INJECTION INTRAVENOUS at 02:20

## 2022-06-23 RX ADMIN — AMLODIPINE BESYLATE 5 MG: 5 TABLET ORAL at 12:49

## 2022-06-23 RX ADMIN — MORPHINE SULFATE 2 MG: 4 INJECTION, SOLUTION INTRAMUSCULAR; INTRAVENOUS at 17:29

## 2022-06-23 RX ADMIN — METOPROLOL TARTRATE 25 MG: 25 TABLET, FILM COATED ORAL at 20:36

## 2022-06-23 RX ADMIN — HEPARIN SODIUM 8.13 UNITS/KG/HR: 10000 INJECTION, SOLUTION INTRAVENOUS at 15:56

## 2022-06-23 RX ADMIN — HYDROCORTISONE SODIUM SUCCINATE 100 MG: 100 INJECTION, POWDER, FOR SOLUTION INTRAMUSCULAR; INTRAVENOUS at 14:01

## 2022-06-23 RX ADMIN — DIAZEPAM 5 MG: 5 TABLET ORAL at 12:49

## 2022-06-23 RX ADMIN — DIPHENHYDRAMINE HYDROCHLORIDE 50 MG: 50 INJECTION, SOLUTION INTRAMUSCULAR; INTRAVENOUS at 14:01

## 2022-06-23 RX ADMIN — EPTIFIBATIDE 2 MCG/KG/MIN: 0.75 INJECTION INTRAVENOUS at 15:59

## 2022-06-23 RX ADMIN — ATORVASTATIN CALCIUM 40 MG: 40 TABLET, FILM COATED ORAL at 20:36

## 2022-06-23 RX ADMIN — FAMOTIDINE 20 MG: 20 TABLET ORAL at 14:01

## 2022-06-23 RX ADMIN — ASPIRIN 81 MG: 81 TABLET, COATED ORAL at 12:49

## 2022-06-23 RX ADMIN — HEPARIN SODIUM 8.13 UNITS/KG/HR: 10000 INJECTION, SOLUTION INTRAVENOUS at 01:37

## 2022-06-23 RX ADMIN — MORPHINE SULFATE 2 MG: 4 INJECTION, SOLUTION INTRAMUSCULAR; INTRAVENOUS at 01:56

## 2022-06-23 RX ADMIN — Medication 10 ML: at 08:14

## 2022-06-24 ENCOUNTER — APPOINTMENT (OUTPATIENT)
Dept: CARDIOLOGY | Facility: HOSPITAL | Age: 37
End: 2022-06-24

## 2022-06-24 LAB
ABO GROUP BLD: NORMAL
ACT BLD: 144 SECONDS (ref 89–137)
ACT BLD: 156 SECONDS (ref 89–137)
ANION GAP SERPL CALCULATED.3IONS-SCNC: 15 MMOL/L (ref 5–15)
APTT PPP: 28.2 SECONDS (ref 61–76.5)
APTT PPP: 58.9 SECONDS (ref 61–76.5)
ARTERIAL PATENCY WRIST A: ABNORMAL
ATMOSPHERIC PRESS: ABNORMAL MM[HG]
BASE EXCESS BLDA CALC-SCNC: 0.5 MMOL/L (ref 0–3)
BASOPHILS # BLD AUTO: 0.1 10*3/MM3 (ref 0–0.2)
BASOPHILS NFR BLD AUTO: 1.4 % (ref 0–1.5)
BDY SITE: ABNORMAL
BLD GP AB SCN SERPL QL: NEGATIVE
BUN SERPL-MCNC: 10 MG/DL (ref 6–20)
BUN/CREAT SERPL: 10.8 (ref 7–25)
CALCIUM SPEC-SCNC: 9 MG/DL (ref 8.6–10.5)
CHLORIDE SERPL-SCNC: 98 MMOL/L (ref 98–107)
CHOLEST SERPL-MCNC: 192 MG/DL (ref 0–200)
CO2 BLDA-SCNC: 24.6 MMOL/L (ref 22–29)
CO2 SERPL-SCNC: 22 MMOL/L (ref 22–29)
CREAT SERPL-MCNC: 0.93 MG/DL (ref 0.76–1.27)
DEPRECATED RDW RBC AUTO: 39.4 FL (ref 37–54)
EGFRCR SERPLBLD CKD-EPI 2021: 107.8 ML/MIN/1.73
EOSINOPHIL # BLD AUTO: 0 10*3/MM3 (ref 0–0.4)
EOSINOPHIL NFR BLD AUTO: 0.5 % (ref 0.3–6.2)
ERYTHROCYTE [DISTWIDTH] IN BLOOD BY AUTOMATED COUNT: 13.2 % (ref 12.3–15.4)
GLUCOSE SERPL-MCNC: 126 MG/DL (ref 65–99)
HCO3 BLDA-SCNC: 23.6 MMOL/L (ref 21–28)
HCT VFR BLD AUTO: 41.9 % (ref 37.5–51)
HDLC SERPL-MCNC: 38 MG/DL (ref 40–60)
HEMODILUTION: NO
HGB BLD-MCNC: 14.3 G/DL (ref 13–17.7)
HOLD SPECIMEN: NORMAL
INHALED O2 CONCENTRATION: 21 %
LDLC SERPL CALC-MCNC: 93 MG/DL (ref 0–100)
LDLC/HDLC SERPL: 2.13 {RATIO}
LYMPHOCYTES # BLD AUTO: 2.3 10*3/MM3 (ref 0.7–3.1)
LYMPHOCYTES NFR BLD AUTO: 28.5 % (ref 19.6–45.3)
MCH RBC QN AUTO: 28.9 PG (ref 26.6–33)
MCHC RBC AUTO-ENTMCNC: 34.2 G/DL (ref 31.5–35.7)
MCV RBC AUTO: 84.5 FL (ref 79–97)
MODALITY: ABNORMAL
MONOCYTES # BLD AUTO: 0.6 10*3/MM3 (ref 0.1–0.9)
MONOCYTES NFR BLD AUTO: 7.1 % (ref 5–12)
NEUTROPHILS NFR BLD AUTO: 4.9 10*3/MM3 (ref 1.7–7)
NEUTROPHILS NFR BLD AUTO: 62.5 % (ref 42.7–76)
NRBC BLD AUTO-RTO: 0 /100 WBC (ref 0–0.2)
PCO2 BLDA: 32.9 MM HG (ref 35–48)
PH BLDA: 7.46 PH UNITS (ref 7.35–7.45)
PLATELET # BLD AUTO: 306 10*3/MM3 (ref 140–450)
PMV BLD AUTO: 6.7 FL (ref 6–12)
PO2 BLDA: 90.4 MM HG (ref 83–108)
POTASSIUM SERPL-SCNC: 4.1 MMOL/L (ref 3.5–5.2)
QT INTERVAL: 387 MS
RBC # BLD AUTO: 4.95 10*6/MM3 (ref 4.14–5.8)
RH BLD: POSITIVE
SAO2 % BLDCOA: 97.6 % (ref 94–98)
SODIUM SERPL-SCNC: 135 MMOL/L (ref 136–145)
T&S EXPIRATION DATE: NORMAL
TRIGL SERPL-MCNC: 365 MG/DL (ref 0–150)
TROPONIN T SERPL-MCNC: <0.01 NG/ML (ref 0–0.03)
VLDLC SERPL-MCNC: 61 MG/DL (ref 5–40)
WBC NRBC COR # BLD: 7.9 10*3/MM3 (ref 3.4–10.8)

## 2022-06-24 PROCEDURE — 85730 THROMBOPLASTIN TIME PARTIAL: CPT | Performed by: INTERNAL MEDICINE

## 2022-06-24 PROCEDURE — 82803 BLOOD GASES ANY COMBINATION: CPT

## 2022-06-24 PROCEDURE — 25010000002 HEPARIN (PORCINE) 25000-0.45 UT/250ML-% SOLUTION: Performed by: INTERNAL MEDICINE

## 2022-06-24 PROCEDURE — 80061 LIPID PANEL: CPT | Performed by: INTERNAL MEDICINE

## 2022-06-24 PROCEDURE — 86923 COMPATIBILITY TEST ELECTRIC: CPT

## 2022-06-24 PROCEDURE — 99233 SBSQ HOSP IP/OBS HIGH 50: CPT | Performed by: NURSE PRACTITIONER

## 2022-06-24 PROCEDURE — 84484 ASSAY OF TROPONIN QUANT: CPT | Performed by: NURSE PRACTITIONER

## 2022-06-24 PROCEDURE — 25010000002 EPTIFIBATIDE PER 5 MG: Performed by: INTERNAL MEDICINE

## 2022-06-24 PROCEDURE — 25010000002 ONDANSETRON PER 1 MG: Performed by: INTERNAL MEDICINE

## 2022-06-24 PROCEDURE — 25010000002 HYDRALAZINE PER 20 MG: Performed by: INTERNAL MEDICINE

## 2022-06-24 PROCEDURE — 99233 SBSQ HOSP IP/OBS HIGH 50: CPT | Performed by: INTERNAL MEDICINE

## 2022-06-24 PROCEDURE — 85025 COMPLETE CBC W/AUTO DIFF WBC: CPT | Performed by: INTERNAL MEDICINE

## 2022-06-24 PROCEDURE — 86901 BLOOD TYPING SEROLOGIC RH(D): CPT

## 2022-06-24 PROCEDURE — 93005 ELECTROCARDIOGRAM TRACING: CPT | Performed by: INTERNAL MEDICINE

## 2022-06-24 PROCEDURE — 25010000002 SULFUR HEXAFLUORIDE MICROSPH 60.7-25 MG RECONSTITUTED SUSPENSION: Performed by: FAMILY MEDICINE

## 2022-06-24 PROCEDURE — 93010 ELECTROCARDIOGRAM REPORT: CPT | Performed by: INTERNAL MEDICINE

## 2022-06-24 PROCEDURE — 85347 COAGULATION TIME ACTIVATED: CPT

## 2022-06-24 PROCEDURE — 86900 BLOOD TYPING SEROLOGIC ABO: CPT

## 2022-06-24 PROCEDURE — 25010000002 MORPHINE PER 10 MG: Performed by: INTERNAL MEDICINE

## 2022-06-24 PROCEDURE — 93308 TTE F-UP OR LMTD: CPT | Performed by: INTERNAL MEDICINE

## 2022-06-24 PROCEDURE — 25010000002 ATROPINE SULFATE

## 2022-06-24 PROCEDURE — 86850 RBC ANTIBODY SCREEN: CPT | Performed by: REGISTERED NURSE

## 2022-06-24 PROCEDURE — 80048 BASIC METABOLIC PNL TOTAL CA: CPT | Performed by: INTERNAL MEDICINE

## 2022-06-24 PROCEDURE — 93308 TTE F-UP OR LMTD: CPT

## 2022-06-24 PROCEDURE — 86901 BLOOD TYPING SEROLOGIC RH(D): CPT | Performed by: REGISTERED NURSE

## 2022-06-24 PROCEDURE — 86900 BLOOD TYPING SEROLOGIC ABO: CPT | Performed by: REGISTERED NURSE

## 2022-06-24 RX ORDER — DEXTROSE MONOHYDRATE 25 G/50ML
10-50 INJECTION, SOLUTION INTRAVENOUS
Status: DISCONTINUED | OUTPATIENT
Start: 2022-06-24 | End: 2022-06-28

## 2022-06-24 RX ORDER — LISINOPRIL 20 MG/1
20 TABLET ORAL DAILY
Status: DISCONTINUED | OUTPATIENT
Start: 2022-06-25 | End: 2022-06-27

## 2022-06-24 RX ORDER — SODIUM CHLORIDE 9 MG/ML
30 INJECTION, SOLUTION INTRAVENOUS CONTINUOUS PRN
Status: DISCONTINUED | OUTPATIENT
Start: 2022-06-24 | End: 2022-06-29

## 2022-06-24 RX ORDER — BACLOFEN 10 MG/1
10 TABLET ORAL 2 TIMES DAILY PRN
Status: DISCONTINUED | OUTPATIENT
Start: 2022-06-24 | End: 2022-06-29

## 2022-06-24 RX ORDER — DIPHENHYDRAMINE HCL 25 MG
25 CAPSULE ORAL NIGHTLY PRN
Status: DISCONTINUED | OUTPATIENT
Start: 2022-06-24 | End: 2022-06-29 | Stop reason: HOSPADM

## 2022-06-24 RX ORDER — OLANZAPINE 10 MG/2ML
1 INJECTION, POWDER, LYOPHILIZED, FOR SOLUTION INTRAMUSCULAR
Status: DISCONTINUED | OUTPATIENT
Start: 2022-06-24 | End: 2022-06-28

## 2022-06-24 RX ORDER — ACETAMINOPHEN 325 MG/1
650 TABLET ORAL EVERY 4 HOURS PRN
Status: DISCONTINUED | OUTPATIENT
Start: 2022-06-24 | End: 2022-06-29 | Stop reason: HOSPADM

## 2022-06-24 RX ORDER — CHLORHEXIDINE GLUCONATE 500 MG/1
1 CLOTH TOPICAL EVERY 12 HOURS
Status: ACTIVE | OUTPATIENT
Start: 2022-06-27 | End: 2022-06-27

## 2022-06-24 RX ORDER — AMLODIPINE BESYLATE 5 MG/1
5 TABLET ORAL DAILY
Status: DISCONTINUED | OUTPATIENT
Start: 2022-06-25 | End: 2022-06-29

## 2022-06-24 RX ORDER — CHLORHEXIDINE GLUCONATE 0.12 MG/ML
15 RINSE ORAL EVERY 12 HOURS SCHEDULED
Status: COMPLETED | OUTPATIENT
Start: 2022-06-27 | End: 2022-06-27

## 2022-06-24 RX ORDER — CEFAZOLIN SODIUM IN 0.9 % NACL 3 G/100 ML
3 INTRAVENOUS SOLUTION, PIGGYBACK (ML) INTRAVENOUS ONCE
Status: DISCONTINUED | OUTPATIENT
Start: 2022-06-24 | End: 2022-06-27

## 2022-06-24 RX ORDER — HYDRALAZINE HYDROCHLORIDE 20 MG/ML
10 INJECTION INTRAMUSCULAR; INTRAVENOUS EVERY 6 HOURS PRN
Status: DISCONTINUED | OUTPATIENT
Start: 2022-06-24 | End: 2022-06-29

## 2022-06-24 RX ORDER — SODIUM CHLORIDE 0.9 % (FLUSH) 0.9 %
10 SYRINGE (ML) INJECTION EVERY 12 HOURS SCHEDULED
Status: DISCONTINUED | OUTPATIENT
Start: 2022-06-24 | End: 2022-06-28

## 2022-06-24 RX ORDER — DOPAMINE HYDROCHLORIDE 160 MG/100ML
2-20 INJECTION, SOLUTION INTRAVENOUS
Status: DISCONTINUED | OUTPATIENT
Start: 2022-06-24 | End: 2022-06-27

## 2022-06-24 RX ORDER — SODIUM CHLORIDE 0.9 % (FLUSH) 0.9 %
10 SYRINGE (ML) INJECTION AS NEEDED
Status: DISCONTINUED | OUTPATIENT
Start: 2022-06-24 | End: 2022-06-28

## 2022-06-24 RX ORDER — ALPRAZOLAM 0.25 MG/1
0.25 TABLET ORAL EVERY 8 HOURS PRN
Status: DISCONTINUED | OUTPATIENT
Start: 2022-06-24 | End: 2022-06-27

## 2022-06-24 RX ORDER — SODIUM CHLORIDE 0.9 % (FLUSH) 0.9 %
30 SYRINGE (ML) INJECTION ONCE AS NEEDED
Status: DISCONTINUED | OUTPATIENT
Start: 2022-06-24 | End: 2022-06-29 | Stop reason: HOSPADM

## 2022-06-24 RX ORDER — NICOTINE POLACRILEX 4 MG
15 LOZENGE BUCCAL
Status: DISCONTINUED | OUTPATIENT
Start: 2022-06-24 | End: 2022-06-28

## 2022-06-24 RX ADMIN — HEPARIN SODIUM 11.1 UNITS/KG/HR: 10000 INJECTION, SOLUTION INTRAVENOUS at 01:06

## 2022-06-24 RX ADMIN — LISINOPRIL 10 MG: 5 TABLET ORAL at 08:51

## 2022-06-24 RX ADMIN — CARIPRAZINE 1.5 MG: 1.5 CAPSULE, GELATIN COATED ORAL at 20:36

## 2022-06-24 RX ADMIN — ATORVASTATIN CALCIUM 40 MG: 40 TABLET, FILM COATED ORAL at 20:23

## 2022-06-24 RX ADMIN — MORPHINE SULFATE 2 MG: 4 INJECTION, SOLUTION INTRAMUSCULAR; INTRAVENOUS at 12:43

## 2022-06-24 RX ADMIN — MORPHINE SULFATE 2 MG: 4 INJECTION, SOLUTION INTRAMUSCULAR; INTRAVENOUS at 02:53

## 2022-06-24 RX ADMIN — EPTIFIBATIDE 2 MCG/KG/MIN: 0.75 INJECTION INTRAVENOUS at 05:33

## 2022-06-24 RX ADMIN — METOPROLOL TARTRATE 25 MG: 25 TABLET, FILM COATED ORAL at 08:49

## 2022-06-24 RX ADMIN — MORPHINE SULFATE 2 MG: 4 INJECTION, SOLUTION INTRAMUSCULAR; INTRAVENOUS at 21:25

## 2022-06-24 RX ADMIN — BACLOFEN 10 MG: 10 TABLET ORAL at 16:08

## 2022-06-24 RX ADMIN — EPTIFIBATIDE 2 MCG/KG/MIN: 0.75 INJECTION INTRAVENOUS at 10:06

## 2022-06-24 RX ADMIN — ASPIRIN 81 MG: 81 TABLET, COATED ORAL at 08:51

## 2022-06-24 RX ADMIN — ALPRAZOLAM 0.25 MG: 0.25 TABLET ORAL at 22:21

## 2022-06-24 RX ADMIN — Medication 10 ML: at 08:53

## 2022-06-24 RX ADMIN — NICOTINE 1 PATCH: 21 PATCH, EXTENDED RELEASE TRANSDERMAL at 08:50

## 2022-06-24 RX ADMIN — HYDRALAZINE HYDROCHLORIDE 10 MG: 20 INJECTION INTRAMUSCULAR; INTRAVENOUS at 09:58

## 2022-06-24 RX ADMIN — SODIUM CHLORIDE 30 ML/HR: 9 INJECTION, SOLUTION INTRAVENOUS at 22:22

## 2022-06-24 RX ADMIN — METOPROLOL TARTRATE 25 MG: 25 TABLET, FILM COATED ORAL at 20:33

## 2022-06-24 RX ADMIN — DIAZEPAM 5 MG: 5 TABLET ORAL at 05:56

## 2022-06-24 RX ADMIN — MORPHINE SULFATE 2 MG: 4 INJECTION, SOLUTION INTRAMUSCULAR; INTRAVENOUS at 18:44

## 2022-06-24 RX ADMIN — SULFUR HEXAFLUORIDE 2 ML: KIT at 11:19

## 2022-06-24 RX ADMIN — DIAZEPAM 5 MG: 5 TABLET ORAL at 20:27

## 2022-06-24 RX ADMIN — ONDANSETRON 4 MG: 2 INJECTION INTRAMUSCULAR; INTRAVENOUS at 15:28

## 2022-06-24 RX ADMIN — MORPHINE SULFATE 2 MG: 4 INJECTION, SOLUTION INTRAMUSCULAR; INTRAVENOUS at 08:49

## 2022-06-24 RX ADMIN — HEPARIN SODIUM 12.1 UNITS/KG/HR: 10000 INJECTION, SOLUTION INTRAVENOUS at 09:29

## 2022-06-24 RX ADMIN — AMLODIPINE BESYLATE 2.5 MG: 2.5 TABLET ORAL at 08:50

## 2022-06-24 RX ADMIN — DIAZEPAM 5 MG: 5 TABLET ORAL at 12:15

## 2022-06-24 RX ADMIN — BUSPIRONE HYDROCHLORIDE 5 MG: 5 TABLET ORAL at 08:49

## 2022-06-24 RX ADMIN — ATROPINE SULFATE 1 MG: 0.1 INJECTION INTRAVENOUS at 13:10

## 2022-06-24 RX ADMIN — DIAZEPAM 5 MG: 5 TABLET ORAL at 00:13

## 2022-06-24 RX ADMIN — LEVOTHYROXINE SODIUM 50 MCG: 0.05 TABLET ORAL at 05:56

## 2022-06-25 ENCOUNTER — APPOINTMENT (OUTPATIENT)
Dept: CARDIOLOGY | Facility: HOSPITAL | Age: 37
End: 2022-06-25

## 2022-06-25 LAB
AMPHET+METHAMPHET UR QL: NEGATIVE
APTT PPP: 29.7 SECONDS (ref 61–76.5)
BARBITURATES UR QL SCN: NEGATIVE
BASOPHILS # BLD AUTO: 0.1 10*3/MM3 (ref 0–0.2)
BASOPHILS NFR BLD AUTO: 0.8 % (ref 0–1.5)
BENZODIAZ UR QL SCN: POSITIVE
BILIRUB UR QL STRIP: NEGATIVE
CANNABINOIDS SERPL QL: POSITIVE
CLARITY UR: CLEAR
COCAINE UR QL: NEGATIVE
COLOR UR: YELLOW
DEPRECATED RDW RBC AUTO: 40.3 FL (ref 37–54)
EOSINOPHIL # BLD AUTO: 0.1 10*3/MM3 (ref 0–0.4)
EOSINOPHIL NFR BLD AUTO: 1 % (ref 0.3–6.2)
ERYTHROCYTE [DISTWIDTH] IN BLOOD BY AUTOMATED COUNT: 13.4 % (ref 12.3–15.4)
GLUCOSE UR STRIP-MCNC: NEGATIVE MG/DL
HCT VFR BLD AUTO: 38 % (ref 37.5–51)
HGB BLD-MCNC: 12.7 G/DL (ref 13–17.7)
HGB UR QL STRIP.AUTO: NEGATIVE
KETONES UR QL STRIP: NEGATIVE
LEUKOCYTE ESTERASE UR QL STRIP.AUTO: NEGATIVE
LYMPHOCYTES # BLD AUTO: 2.3 10*3/MM3 (ref 0.7–3.1)
LYMPHOCYTES NFR BLD AUTO: 26.2 % (ref 19.6–45.3)
MCH RBC QN AUTO: 28.8 PG (ref 26.6–33)
MCHC RBC AUTO-ENTMCNC: 33.5 G/DL (ref 31.5–35.7)
MCV RBC AUTO: 85.9 FL (ref 79–97)
METHADONE UR QL SCN: NEGATIVE
MONOCYTES # BLD AUTO: 0.6 10*3/MM3 (ref 0.1–0.9)
MONOCYTES NFR BLD AUTO: 6.9 % (ref 5–12)
MRSA DNA SPEC QL NAA+PROBE: NORMAL
NEUTROPHILS NFR BLD AUTO: 5.7 10*3/MM3 (ref 1.7–7)
NEUTROPHILS NFR BLD AUTO: 65.1 % (ref 42.7–76)
NITRITE UR QL STRIP: NEGATIVE
NRBC BLD AUTO-RTO: 0 /100 WBC (ref 0–0.2)
OPIATES UR QL: POSITIVE
OXYCODONE UR QL SCN: NEGATIVE
PH UR STRIP.AUTO: 7.5 [PH] (ref 5–8)
PLATELET # BLD AUTO: 285 10*3/MM3 (ref 140–450)
PMV BLD AUTO: 6.9 FL (ref 6–12)
PROT UR QL STRIP: NEGATIVE
RBC # BLD AUTO: 4.42 10*6/MM3 (ref 4.14–5.8)
SP GR UR STRIP: 1.01 (ref 1–1.03)
UROBILINOGEN UR QL STRIP: NORMAL
WBC NRBC COR # BLD: 8.8 10*3/MM3 (ref 3.4–10.8)

## 2022-06-25 PROCEDURE — 85025 COMPLETE CBC W/AUTO DIFF WBC: CPT | Performed by: INTERNAL MEDICINE

## 2022-06-25 PROCEDURE — 80307 DRUG TEST PRSMV CHEM ANLYZR: CPT | Performed by: INTERNAL MEDICINE

## 2022-06-25 PROCEDURE — 25010000002 ONDANSETRON PER 1 MG: Performed by: INTERNAL MEDICINE

## 2022-06-25 PROCEDURE — 85730 THROMBOPLASTIN TIME PARTIAL: CPT | Performed by: INTERNAL MEDICINE

## 2022-06-25 PROCEDURE — 99222 1ST HOSP IP/OBS MODERATE 55: CPT | Performed by: PSYCHIATRY & NEUROLOGY

## 2022-06-25 PROCEDURE — 81003 URINALYSIS AUTO W/O SCOPE: CPT | Performed by: NURSE PRACTITIONER

## 2022-06-25 PROCEDURE — 25010000002 MORPHINE PER 10 MG: Performed by: INTERNAL MEDICINE

## 2022-06-25 PROCEDURE — 99233 SBSQ HOSP IP/OBS HIGH 50: CPT | Performed by: INTERNAL MEDICINE

## 2022-06-25 PROCEDURE — 93970 EXTREMITY STUDY: CPT

## 2022-06-25 PROCEDURE — 25010000002 EPTIFIBATIDE PER 5 MG: Performed by: INTERNAL MEDICINE

## 2022-06-25 PROCEDURE — 93880 EXTRACRANIAL BILAT STUDY: CPT

## 2022-06-25 PROCEDURE — 87641 MR-STAPH DNA AMP PROBE: CPT | Performed by: REGISTERED NURSE

## 2022-06-25 RX ORDER — PANTOPRAZOLE SODIUM 40 MG/1
40 TABLET, DELAYED RELEASE ORAL
Status: DISCONTINUED | OUTPATIENT
Start: 2022-06-25 | End: 2022-06-29

## 2022-06-25 RX ORDER — EPTIFIBATIDE 0.75 MG/ML
180 INJECTION, SOLUTION INTRAVENOUS ONCE
Status: COMPLETED | OUTPATIENT
Start: 2022-06-25 | End: 2022-06-25

## 2022-06-25 RX ORDER — ALUMINA, MAGNESIA, AND SIMETHICONE 2400; 2400; 240 MG/30ML; MG/30ML; MG/30ML
15 SUSPENSION ORAL EVERY 6 HOURS PRN
Status: DISCONTINUED | OUTPATIENT
Start: 2022-06-25 | End: 2022-06-29

## 2022-06-25 RX ORDER — BUSPIRONE HYDROCHLORIDE 5 MG/1
5 TABLET ORAL 2 TIMES DAILY
Status: DISCONTINUED | OUTPATIENT
Start: 2022-06-25 | End: 2022-06-26

## 2022-06-25 RX ORDER — DIAZEPAM 5 MG/1
10 TABLET ORAL EVERY 8 HOURS PRN
Status: DISCONTINUED | OUTPATIENT
Start: 2022-06-25 | End: 2022-06-26

## 2022-06-25 RX ADMIN — PANTOPRAZOLE SODIUM 40 MG: 40 TABLET, DELAYED RELEASE ORAL at 10:57

## 2022-06-25 RX ADMIN — DIAZEPAM 5 MG: 5 TABLET ORAL at 04:17

## 2022-06-25 RX ADMIN — MAGNESIUM HYDROXIDE,ALUMINUM HYDROXICE,SIMETHICONE 15 ML: 240; 2400; 2400 SUSPENSION ORAL at 23:14

## 2022-06-25 RX ADMIN — EPTIFIBATIDE 2 MCG/KG/MIN: 0.75 INJECTION INTRAVENOUS at 21:47

## 2022-06-25 RX ADMIN — BUTALBITAL, ACETAMINOPHEN AND CAFFEINE 2 TABLET: 50; 325; 40 TABLET ORAL at 08:37

## 2022-06-25 RX ADMIN — ACETAMINOPHEN 650 MG: 325 TABLET, FILM COATED ORAL at 10:57

## 2022-06-25 RX ADMIN — ASPIRIN 81 MG: 81 TABLET, COATED ORAL at 08:48

## 2022-06-25 RX ADMIN — CARIPRAZINE 1.5 MG: 1.5 CAPSULE, GELATIN COATED ORAL at 21:47

## 2022-06-25 RX ADMIN — SODIUM CHLORIDE 30 ML/HR: 9 INJECTION, SOLUTION INTRAVENOUS at 06:05

## 2022-06-25 RX ADMIN — EPTIFIBATIDE 2 MCG/KG/MIN: 0.75 INJECTION INTRAVENOUS at 12:41

## 2022-06-25 RX ADMIN — ONDANSETRON 4 MG: 2 INJECTION INTRAMUSCULAR; INTRAVENOUS at 08:02

## 2022-06-25 RX ADMIN — EPTIFIBATIDE 2 MCG/KG/MIN: 0.75 INJECTION INTRAVENOUS at 14:57

## 2022-06-25 RX ADMIN — EPTIFIBATIDE 22140 MCG: 0.75 INJECTION INTRAVENOUS at 12:50

## 2022-06-25 RX ADMIN — ATORVASTATIN CALCIUM 40 MG: 40 TABLET, FILM COATED ORAL at 21:47

## 2022-06-25 RX ADMIN — DIAZEPAM 5 MG: 5 TABLET ORAL at 10:57

## 2022-06-25 RX ADMIN — DIAZEPAM 10 MG: 5 TABLET ORAL at 17:09

## 2022-06-25 RX ADMIN — LEVOTHYROXINE SODIUM 50 MCG: 0.05 TABLET ORAL at 06:05

## 2022-06-25 RX ADMIN — AMLODIPINE BESYLATE 5 MG: 5 TABLET ORAL at 08:37

## 2022-06-25 RX ADMIN — MORPHINE SULFATE 2 MG: 4 INJECTION, SOLUTION INTRAMUSCULAR; INTRAVENOUS at 04:06

## 2022-06-25 RX ADMIN — METOPROLOL TARTRATE 25 MG: 25 TABLET, FILM COATED ORAL at 08:37

## 2022-06-25 RX ADMIN — NICOTINE 1 PATCH: 21 PATCH, EXTENDED RELEASE TRANSDERMAL at 08:36

## 2022-06-25 RX ADMIN — BUSPIRONE HYDROCHLORIDE 5 MG: 5 TABLET ORAL at 21:47

## 2022-06-25 RX ADMIN — METOPROLOL TARTRATE 25 MG: 25 TABLET, FILM COATED ORAL at 21:47

## 2022-06-25 RX ADMIN — MORPHINE SULFATE 2 MG: 4 INJECTION, SOLUTION INTRAMUSCULAR; INTRAVENOUS at 23:24

## 2022-06-25 RX ADMIN — LISINOPRIL 20 MG: 20 TABLET ORAL at 08:37

## 2022-06-26 LAB
ANISOCYTOSIS BLD QL: ABNORMAL
APTT PPP: 29.7 SECONDS (ref 61–76.5)
BH CV XLRA MEAS - DIST GSV CALF DIST LEFT: 0.27 CM
BH CV XLRA MEAS - DIST GSV CALF DIST RIGHT: 0.21 CM
BH CV XLRA MEAS - DIST GSV THIGH DIST LEFT: 0.29 CM
BH CV XLRA MEAS - DIST GSV THIGH DIST RIGHT: 0.17 CM
BH CV XLRA MEAS - MID GSV CALF LEFT: 0.23 CM
BH CV XLRA MEAS - MID GSV CALF RIGHT: 0.17 CM
BH CV XLRA MEAS - MID GSV THIGH  LEFT: 0.32 CM
BH CV XLRA MEAS - MID GSV THIGH  RIGHT: 0.31 CM
BH CV XLRA MEAS - PROX GSV CALF DIST LEFT: 0.24 CM
BH CV XLRA MEAS - PROX GSV CALF DIST RIGHT: 0.15 CM
BH CV XLRA MEAS - PROX GSV THIGH  LEFT: 0.44 CM
BH CV XLRA MEAS - PROX GSV THIGH  RIGHT: 0.27 CM
BH CV XLRA MEAS LEFT DIST CCA EDV: 25.2 CM/SEC
BH CV XLRA MEAS LEFT DIST CCA PSV: 99.5 CM/SEC
BH CV XLRA MEAS LEFT DIST ICA EDV: -30.7 CM/SEC
BH CV XLRA MEAS LEFT DIST ICA PSV: -96.6 CM/SEC
BH CV XLRA MEAS LEFT ICA/CCA RATIO: -0.64
BH CV XLRA MEAS LEFT PROX CCA EDV: 26.1 CM/SEC
BH CV XLRA MEAS LEFT PROX CCA PSV: 150 CM/SEC
BH CV XLRA MEAS LEFT PROX ECA PSV: -173 CM/SEC
BH CV XLRA MEAS LEFT PROX ICA EDV: -23.6 CM/SEC
BH CV XLRA MEAS LEFT PROX ICA PSV: -82.9 CM/SEC
BH CV XLRA MEAS LEFT PROX SCLA PSV: 215 CM/SEC
BH CV XLRA MEAS LEFT VERTEBRAL A EDV: 9.1 CM/SEC
BH CV XLRA MEAS LEFT VERTEBRAL A PSV: 35.7 CM/SEC
BH CV XLRA MEAS RIGHT DIST CCA EDV: 22 CM/SEC
BH CV XLRA MEAS RIGHT DIST CCA PSV: 109 CM/SEC
BH CV XLRA MEAS RIGHT DIST ICA EDV: -24.4 CM/SEC
BH CV XLRA MEAS RIGHT DIST ICA PSV: -67.3 CM/SEC
BH CV XLRA MEAS RIGHT ICA/CCA RATIO: -0.6
BH CV XLRA MEAS RIGHT PROX CCA EDV: 22.4 CM/SEC
BH CV XLRA MEAS RIGHT PROX CCA PSV: 119 CM/SEC
BH CV XLRA MEAS RIGHT PROX ECA PSV: -113 CM/SEC
BH CV XLRA MEAS RIGHT PROX ICA EDV: -25.8 CM/SEC
BH CV XLRA MEAS RIGHT PROX ICA PSV: -71.9 CM/SEC
BH CV XLRA MEAS RIGHT PROX SCLA PSV: 135 CM/SEC
BH CV XLRA MEAS RIGHT VERTEBRAL A EDV: 11 CM/SEC
BH CV XLRA MEAS RIGHT VERTEBRAL A PSV: 49.4 CM/SEC
DEPRECATED RDW RBC AUTO: 39.8 FL (ref 37–54)
EOSINOPHIL # BLD MANUAL: 0.26 10*3/MM3 (ref 0–0.4)
EOSINOPHIL NFR BLD MANUAL: 3 % (ref 0.3–6.2)
ERYTHROCYTE [DISTWIDTH] IN BLOOD BY AUTOMATED COUNT: 13.3 % (ref 12.3–15.4)
HCT VFR BLD AUTO: 37.5 % (ref 37.5–51)
HGB BLD-MCNC: 12.7 G/DL (ref 13–17.7)
LYMPHOCYTES # BLD MANUAL: 2.06 10*3/MM3 (ref 0.7–3.1)
LYMPHOCYTES NFR BLD MANUAL: 1 % (ref 5–12)
MAXIMAL PREDICTED HEART RATE: 182 BPM
MAXIMAL PREDICTED HEART RATE: 182 BPM
MCH RBC QN AUTO: 29 PG (ref 26.6–33)
MCHC RBC AUTO-ENTMCNC: 33.7 G/DL (ref 31.5–35.7)
MCV RBC AUTO: 85.8 FL (ref 79–97)
MONOCYTES # BLD: 0.09 10*3/MM3 (ref 0.1–0.9)
NEUTROPHILS # BLD AUTO: 6.19 10*3/MM3 (ref 1.7–7)
NEUTROPHILS NFR BLD MANUAL: 67 % (ref 42.7–76)
NEUTS BAND NFR BLD MANUAL: 5 % (ref 0–5)
PLAT MORPH BLD: NORMAL
PLATELET # BLD AUTO: 304 10*3/MM3 (ref 140–450)
PMV BLD AUTO: 6.9 FL (ref 6–12)
QT INTERVAL: 352 MS
RBC # BLD AUTO: 4.37 10*6/MM3 (ref 4.14–5.8)
SCAN SLIDE: NORMAL
STRESS TARGET HR: 155 BPM
STRESS TARGET HR: 155 BPM
TOXIC GRANULATION: ABNORMAL
VARIANT LYMPHS NFR BLD MANUAL: 1 % (ref 0–5)
VARIANT LYMPHS NFR BLD MANUAL: 23 % (ref 19.6–45.3)
WBC NRBC COR # BLD: 8.6 10*3/MM3 (ref 3.4–10.8)

## 2022-06-26 PROCEDURE — 85025 COMPLETE CBC W/AUTO DIFF WBC: CPT | Performed by: INTERNAL MEDICINE

## 2022-06-26 PROCEDURE — 25010000002 MORPHINE PER 10 MG: Performed by: INTERNAL MEDICINE

## 2022-06-26 PROCEDURE — 99232 SBSQ HOSP IP/OBS MODERATE 35: CPT

## 2022-06-26 PROCEDURE — 25010000002 EPTIFIBATIDE PER 5 MG: Performed by: INTERNAL MEDICINE

## 2022-06-26 PROCEDURE — 85007 BL SMEAR W/DIFF WBC COUNT: CPT | Performed by: INTERNAL MEDICINE

## 2022-06-26 PROCEDURE — 85730 THROMBOPLASTIN TIME PARTIAL: CPT | Performed by: INTERNAL MEDICINE

## 2022-06-26 PROCEDURE — 99232 SBSQ HOSP IP/OBS MODERATE 35: CPT | Performed by: INTERNAL MEDICINE

## 2022-06-26 PROCEDURE — 25010000002 ONDANSETRON PER 1 MG: Performed by: INTERNAL MEDICINE

## 2022-06-26 RX ORDER — BUSPIRONE HYDROCHLORIDE 5 MG/1
5 TABLET ORAL NIGHTLY
Status: DISCONTINUED | OUTPATIENT
Start: 2022-06-26 | End: 2022-06-26

## 2022-06-26 RX ORDER — DIAZEPAM 2 MG/1
5 TABLET ORAL EVERY 8 HOURS
Status: DISCONTINUED | OUTPATIENT
Start: 2022-06-26 | End: 2022-06-27

## 2022-06-26 RX ORDER — DIAZEPAM 2 MG/1
5 TABLET ORAL EVERY 8 HOURS PRN
Status: DISCONTINUED | OUTPATIENT
Start: 2022-06-26 | End: 2022-07-03

## 2022-06-26 RX ADMIN — EPTIFIBATIDE 2 MCG/KG/MIN: 0.75 INJECTION INTRAVENOUS at 22:34

## 2022-06-26 RX ADMIN — ACETAMINOPHEN 650 MG: 325 TABLET, FILM COATED ORAL at 22:38

## 2022-06-26 RX ADMIN — BACLOFEN 10 MG: 10 TABLET ORAL at 08:45

## 2022-06-26 RX ADMIN — EPTIFIBATIDE 2 MCG/KG/MIN: 0.75 INJECTION INTRAVENOUS at 07:54

## 2022-06-26 RX ADMIN — LEVOTHYROXINE SODIUM 50 MCG: 0.05 TABLET ORAL at 07:13

## 2022-06-26 RX ADMIN — EPTIFIBATIDE 2 MCG/KG/MIN: 0.75 INJECTION INTRAVENOUS at 01:58

## 2022-06-26 RX ADMIN — ASPIRIN 81 MG: 81 TABLET, COATED ORAL at 08:45

## 2022-06-26 RX ADMIN — MORPHINE SULFATE 2 MG: 4 INJECTION, SOLUTION INTRAMUSCULAR; INTRAVENOUS at 02:38

## 2022-06-26 RX ADMIN — ATORVASTATIN CALCIUM 40 MG: 40 TABLET, FILM COATED ORAL at 19:39

## 2022-06-26 RX ADMIN — Medication 10 ML: at 08:48

## 2022-06-26 RX ADMIN — PANTOPRAZOLE SODIUM 40 MG: 40 TABLET, DELAYED RELEASE ORAL at 07:13

## 2022-06-26 RX ADMIN — LISINOPRIL 20 MG: 20 TABLET ORAL at 08:45

## 2022-06-26 RX ADMIN — METOPROLOL TARTRATE 25 MG: 25 TABLET, FILM COATED ORAL at 19:40

## 2022-06-26 RX ADMIN — DIAZEPAM 5 MG: 2 TABLET ORAL at 19:40

## 2022-06-26 RX ADMIN — BUTALBITAL, ACETAMINOPHEN AND CAFFEINE 2 TABLET: 50; 325; 40 TABLET ORAL at 08:46

## 2022-06-26 RX ADMIN — ONDANSETRON 4 MG: 2 INJECTION INTRAMUSCULAR; INTRAVENOUS at 06:32

## 2022-06-26 RX ADMIN — NICOTINE 1 PATCH: 21 PATCH, EXTENDED RELEASE TRANSDERMAL at 08:47

## 2022-06-26 RX ADMIN — DIAZEPAM 10 MG: 5 TABLET ORAL at 01:53

## 2022-06-26 RX ADMIN — Medication 10 ML: at 08:47

## 2022-06-26 RX ADMIN — EPTIFIBATIDE 2 MCG/KG/MIN: 0.75 INJECTION INTRAVENOUS at 13:41

## 2022-06-26 RX ADMIN — EPTIFIBATIDE 2 MCG/KG/MIN: 0.75 INJECTION INTRAVENOUS at 18:23

## 2022-06-26 RX ADMIN — AMLODIPINE BESYLATE 5 MG: 5 TABLET ORAL at 08:45

## 2022-06-27 ENCOUNTER — APPOINTMENT (OUTPATIENT)
Dept: RESPIRATORY THERAPY | Facility: HOSPITAL | Age: 37
End: 2022-06-27

## 2022-06-27 ENCOUNTER — APPOINTMENT (OUTPATIENT)
Dept: GENERAL RADIOLOGY | Facility: HOSPITAL | Age: 37
End: 2022-06-27

## 2022-06-27 LAB
ALBUMIN SERPL-MCNC: 3.6 G/DL (ref 3.5–5.2)
ALBUMIN/GLOB SERPL: 1.2 G/DL
ALP SERPL-CCNC: 99 U/L (ref 39–117)
ALT SERPL W P-5'-P-CCNC: 62 U/L (ref 1–41)
ANION GAP SERPL CALCULATED.3IONS-SCNC: 11 MMOL/L (ref 5–15)
APTT PPP: 29.8 SECONDS (ref 61–76.5)
AST SERPL-CCNC: 30 U/L (ref 1–40)
BASOPHILS # BLD AUTO: 0.1 10*3/MM3 (ref 0–0.2)
BASOPHILS NFR BLD AUTO: 0.9 % (ref 0–1.5)
BILIRUB SERPL-MCNC: 0.4 MG/DL (ref 0–1.2)
BUN SERPL-MCNC: 7 MG/DL (ref 6–20)
BUN/CREAT SERPL: 5.3 (ref 7–25)
CALCIUM SPEC-SCNC: 9 MG/DL (ref 8.6–10.5)
CHLORIDE SERPL-SCNC: 100 MMOL/L (ref 98–107)
CHOLEST SERPL-MCNC: 168 MG/DL (ref 0–200)
CLOSE TME COLL+ADP + EPINEP PNL BLD: <0 % (ref 86–100)
CO2 SERPL-SCNC: 26 MMOL/L (ref 22–29)
CREAT SERPL-MCNC: 1.31 MG/DL (ref 0.76–1.27)
DEPRECATED RDW RBC AUTO: 40.7 FL (ref 37–54)
EGFRCR SERPLBLD CKD-EPI 2021: 71.5 ML/MIN/1.73
EOSINOPHIL # BLD AUTO: 0.1 10*3/MM3 (ref 0–0.4)
EOSINOPHIL NFR BLD AUTO: 1 % (ref 0.3–6.2)
ERYTHROCYTE [DISTWIDTH] IN BLOOD BY AUTOMATED COUNT: 13.4 % (ref 12.3–15.4)
GLOBULIN UR ELPH-MCNC: 2.9 GM/DL
GLUCOSE SERPL-MCNC: 102 MG/DL (ref 65–99)
HCT VFR BLD AUTO: 37.2 % (ref 37.5–51)
HDLC SERPL-MCNC: 35 MG/DL (ref 40–60)
HGB BLD-MCNC: 12.8 G/DL (ref 13–17.7)
INR PPP: 0.99 (ref 0.93–1.1)
LDLC SERPL CALC-MCNC: 109 MG/DL (ref 0–100)
LDLC/HDLC SERPL: 3.03 {RATIO}
LYMPHOCYTES # BLD AUTO: 2 10*3/MM3 (ref 0.7–3.1)
LYMPHOCYTES NFR BLD AUTO: 21.9 % (ref 19.6–45.3)
MAGNESIUM SERPL-MCNC: 2 MG/DL (ref 1.6–2.6)
MCH RBC QN AUTO: 29.2 PG (ref 26.6–33)
MCHC RBC AUTO-ENTMCNC: 34.4 G/DL (ref 31.5–35.7)
MCV RBC AUTO: 85.1 FL (ref 79–97)
MONOCYTES # BLD AUTO: 0.5 10*3/MM3 (ref 0.1–0.9)
MONOCYTES NFR BLD AUTO: 5.1 % (ref 5–12)
NEUTROPHILS NFR BLD AUTO: 6.4 10*3/MM3 (ref 1.7–7)
NEUTROPHILS NFR BLD AUTO: 71.1 % (ref 42.7–76)
NRBC BLD AUTO-RTO: 0 /100 WBC (ref 0–0.2)
NT-PROBNP SERPL-MCNC: 44 PG/ML (ref 0–450)
PLATELET # BLD AUTO: 300 10*3/MM3 (ref 140–450)
PMV BLD AUTO: 6.9 FL (ref 6–12)
POTASSIUM SERPL-SCNC: 4.3 MMOL/L (ref 3.5–5.2)
PROT SERPL-MCNC: 6.5 G/DL (ref 6–8.5)
PROTHROMBIN TIME: 10.2 SECONDS (ref 9.6–11.7)
RBC # BLD AUTO: 4.38 10*6/MM3 (ref 4.14–5.8)
SODIUM SERPL-SCNC: 137 MMOL/L (ref 136–145)
TRIGL SERPL-MCNC: 135 MG/DL (ref 0–150)
VLDLC SERPL-MCNC: 24 MG/DL (ref 5–40)
WBC NRBC COR # BLD: 9 10*3/MM3 (ref 3.4–10.8)

## 2022-06-27 PROCEDURE — 94060 EVALUATION OF WHEEZING: CPT

## 2022-06-27 PROCEDURE — 85730 THROMBOPLASTIN TIME PARTIAL: CPT | Performed by: INTERNAL MEDICINE

## 2022-06-27 PROCEDURE — 86923 COMPATIBILITY TEST ELECTRIC: CPT

## 2022-06-27 PROCEDURE — 99232 SBSQ HOSP IP/OBS MODERATE 35: CPT | Performed by: INTERNAL MEDICINE

## 2022-06-27 PROCEDURE — 85576 BLOOD PLATELET AGGREGATION: CPT | Performed by: REGISTERED NURSE

## 2022-06-27 PROCEDURE — 80061 LIPID PANEL: CPT | Performed by: REGISTERED NURSE

## 2022-06-27 PROCEDURE — 99232 SBSQ HOSP IP/OBS MODERATE 35: CPT | Performed by: PHYSICIAN ASSISTANT

## 2022-06-27 PROCEDURE — 83735 ASSAY OF MAGNESIUM: CPT | Performed by: REGISTERED NURSE

## 2022-06-27 PROCEDURE — 83880 ASSAY OF NATRIURETIC PEPTIDE: CPT | Performed by: REGISTERED NURSE

## 2022-06-27 PROCEDURE — 85025 COMPLETE CBC W/AUTO DIFF WBC: CPT | Performed by: INTERNAL MEDICINE

## 2022-06-27 PROCEDURE — 71046 X-RAY EXAM CHEST 2 VIEWS: CPT

## 2022-06-27 PROCEDURE — 80053 COMPREHEN METABOLIC PANEL: CPT | Performed by: REGISTERED NURSE

## 2022-06-27 PROCEDURE — 25010000002 EPTIFIBATIDE PER 5 MG: Performed by: NURSE PRACTITIONER

## 2022-06-27 PROCEDURE — 85610 PROTHROMBIN TIME: CPT | Performed by: REGISTERED NURSE

## 2022-06-27 PROCEDURE — 25010000002 EPTIFIBATIDE PER 5 MG: Performed by: INTERNAL MEDICINE

## 2022-06-27 RX ORDER — CHLORHEXIDINE GLUCONATE 500 MG/1
1 CLOTH TOPICAL EVERY 12 HOURS
Status: ACTIVE | OUTPATIENT
Start: 2022-06-28 | End: 2022-06-28

## 2022-06-27 RX ORDER — CEFAZOLIN SODIUM IN 0.9 % NACL 3 G/100 ML
3 INTRAVENOUS SOLUTION, PIGGYBACK (ML) INTRAVENOUS ONCE
Status: COMPLETED | OUTPATIENT
Start: 2022-06-29 | End: 2022-06-29

## 2022-06-27 RX ORDER — ALBUTEROL SULFATE 90 UG/1
2 AEROSOL, METERED RESPIRATORY (INHALATION) ONCE
Status: COMPLETED | OUTPATIENT
Start: 2022-06-27 | End: 2022-06-27

## 2022-06-27 RX ORDER — HYDROXYZINE HYDROCHLORIDE 50 MG/ML
25 INJECTION, SOLUTION INTRAMUSCULAR EVERY 8 HOURS PRN
Status: DISCONTINUED | OUTPATIENT
Start: 2022-06-27 | End: 2022-06-29

## 2022-06-27 RX ORDER — ALPRAZOLAM 1 MG/1
1 TABLET ORAL 3 TIMES DAILY PRN
Status: DISCONTINUED | OUTPATIENT
Start: 2022-06-27 | End: 2022-06-29

## 2022-06-27 RX ORDER — ATORVASTATIN CALCIUM 40 MG/1
80 TABLET, FILM COATED ORAL NIGHTLY
Status: DISCONTINUED | OUTPATIENT
Start: 2022-06-27 | End: 2022-06-29

## 2022-06-27 RX ADMIN — Medication 10 ML: at 09:29

## 2022-06-27 RX ADMIN — ALPRAZOLAM 0.25 MG: 0.25 TABLET ORAL at 09:27

## 2022-06-27 RX ADMIN — CHLORHEXIDINE GLUCONATE 15 ML: 1.2 SOLUTION ORAL at 09:29

## 2022-06-27 RX ADMIN — PANTOPRAZOLE SODIUM 40 MG: 40 TABLET, DELAYED RELEASE ORAL at 09:27

## 2022-06-27 RX ADMIN — LISINOPRIL 20 MG: 20 TABLET ORAL at 09:50

## 2022-06-27 RX ADMIN — ACETAMINOPHEN 650 MG: 325 TABLET, FILM COATED ORAL at 23:01

## 2022-06-27 RX ADMIN — METOPROLOL TARTRATE 25 MG: 25 TABLET, FILM COATED ORAL at 21:04

## 2022-06-27 RX ADMIN — DIAZEPAM 5 MG: 2 TABLET ORAL at 00:42

## 2022-06-27 RX ADMIN — CHLORHEXIDINE GLUCONATE 1 APPLICATION: 500 CLOTH TOPICAL at 03:49

## 2022-06-27 RX ADMIN — LEVOTHYROXINE SODIUM 50 MCG: 0.05 TABLET ORAL at 04:44

## 2022-06-27 RX ADMIN — EPTIFIBATIDE 2 MCG/KG/MIN: 0.75 INJECTION INTRAVENOUS at 10:21

## 2022-06-27 RX ADMIN — AMLODIPINE BESYLATE 5 MG: 5 TABLET ORAL at 09:27

## 2022-06-27 RX ADMIN — Medication 10 ML: at 21:05

## 2022-06-27 RX ADMIN — METOPROLOL TARTRATE 25 MG: 25 TABLET, FILM COATED ORAL at 09:27

## 2022-06-27 RX ADMIN — NICOTINE 1 PATCH: 21 PATCH, EXTENDED RELEASE TRANSDERMAL at 09:30

## 2022-06-27 RX ADMIN — ALBUTEROL SULFATE 2 PUFF: 108 INHALANT RESPIRATORY (INHALATION) at 10:06

## 2022-06-27 RX ADMIN — DIAZEPAM 5 MG: 2 TABLET ORAL at 04:44

## 2022-06-27 RX ADMIN — MAGNESIUM HYDROXIDE,ALUMINUM HYDROXICE,SIMETHICONE 15 ML: 240; 2400; 2400 SUSPENSION ORAL at 04:45

## 2022-06-27 RX ADMIN — ASPIRIN 81 MG: 81 TABLET, COATED ORAL at 09:27

## 2022-06-27 RX ADMIN — CARIPRAZINE 3 MG: 3 CAPSULE, GELATIN COATED ORAL at 09:28

## 2022-06-27 RX ADMIN — ALPRAZOLAM 1 MG: 1 TABLET ORAL at 23:04

## 2022-06-27 RX ADMIN — ATORVASTATIN CALCIUM 80 MG: 40 TABLET, FILM COATED ORAL at 21:04

## 2022-06-27 RX ADMIN — EPTIFIBATIDE 2 MCG/KG/MIN: 0.75 INJECTION INTRAVENOUS at 21:10

## 2022-06-27 RX ADMIN — DIAZEPAM 5 MG: 2 TABLET ORAL at 05:08

## 2022-06-27 RX ADMIN — CHLORHEXIDINE GLUCONATE 15 ML: 1.2 SOLUTION ORAL at 21:05

## 2022-06-27 RX ADMIN — BACLOFEN 10 MG: 10 TABLET ORAL at 23:01

## 2022-06-27 RX ADMIN — EPTIFIBATIDE 2 MCG/KG/MIN: 0.75 INJECTION INTRAVENOUS at 04:44

## 2022-06-27 RX ADMIN — EPTIFIBATIDE 2 MCG/KG/MIN: 0.75 INJECTION INTRAVENOUS at 15:49

## 2022-06-28 ENCOUNTER — ANESTHESIA EVENT (OUTPATIENT)
Dept: PERIOP | Facility: HOSPITAL | Age: 37
End: 2022-06-28

## 2022-06-28 LAB
ANION GAP SERPL CALCULATED.3IONS-SCNC: 14 MMOL/L (ref 5–15)
BH BB BLOOD EXPIRATION DATE: NORMAL
BH BB BLOOD EXPIRATION DATE: NORMAL
BH BB BLOOD TYPE BARCODE: 5100
BH BB BLOOD TYPE BARCODE: 5100
BH BB DISPENSE STATUS: NORMAL
BH BB DISPENSE STATUS: NORMAL
BH BB PRODUCT CODE: NORMAL
BH BB PRODUCT CODE: NORMAL
BH BB UNIT NUMBER: NORMAL
BH BB UNIT NUMBER: NORMAL
BH CV ECHO MEAS - EDV(CUBED): 50.3 ML
BH CV ECHO MEAS - EDV(MOD-SP4): 100.1 ML
BH CV ECHO MEAS - EF(MOD-SP4): 64.3 %
BH CV ECHO MEAS - ESV(CUBED): 22 ML
BH CV ECHO MEAS - ESV(MOD-SP4): 35.8 ML
BH CV ECHO MEAS - FS: 24.1 %
BH CV ECHO MEAS - IVS/LVPW: 1.05 CM
BH CV ECHO MEAS - IVSD: 1.03 CM
BH CV ECHO MEAS - LV DIASTOLIC VOL/BSA (35-75): 41.3 CM2
BH CV ECHO MEAS - LV MASS(C)D: 113.5 GRAMS
BH CV ECHO MEAS - LV SYSTOLIC VOL/BSA (12-30): 14.8 CM2
BH CV ECHO MEAS - LVIDD: 3.7 CM
BH CV ECHO MEAS - LVIDS: 2.8 CM
BH CV ECHO MEAS - LVPWD: 0.98 CM
BH CV ECHO MEAS - RVDD: 3.3 CM
BH CV ECHO MEAS - SI(MOD-SP4): 26.5 ML/M2
BH CV ECHO MEAS - SV(MOD-SP4): 64.3 ML
BUN SERPL-MCNC: 12 MG/DL (ref 6–20)
BUN/CREAT SERPL: 14.3 (ref 7–25)
CALCIUM SPEC-SCNC: 9.1 MG/DL (ref 8.6–10.5)
CHLORIDE SERPL-SCNC: 100 MMOL/L (ref 98–107)
CLOSE TME COLL+ADP + EPINEP PNL BLD: 0 % (ref 86–100)
CO2 SERPL-SCNC: 23 MMOL/L (ref 22–29)
CREAT SERPL-MCNC: 0.84 MG/DL (ref 0.76–1.27)
CROSSMATCH INTERPRETATION: NORMAL
CROSSMATCH INTERPRETATION: NORMAL
EGFRCR SERPLBLD CKD-EPI 2021: 114.5 ML/MIN/1.73
GLUCOSE SERPL-MCNC: 92 MG/DL (ref 65–99)
MAXIMAL PREDICTED HEART RATE: 182 BPM
POTASSIUM SERPL-SCNC: 3.9 MMOL/L (ref 3.5–5.2)
SARS-COV-2 RNA RESP QL NAA+PROBE: NOT DETECTED
SODIUM SERPL-SCNC: 137 MMOL/L (ref 136–145)
STRESS TARGET HR: 155 BPM
UNIT  ABO: NORMAL
UNIT  ABO: NORMAL
UNIT  RH: NORMAL
UNIT  RH: NORMAL

## 2022-06-28 PROCEDURE — 05HY33Z INSERTION OF INFUSION DEVICE INTO UPPER VEIN, PERCUTANEOUS APPROACH: ICD-10-PCS | Performed by: INTERNAL MEDICINE

## 2022-06-28 PROCEDURE — 80048 BASIC METABOLIC PNL TOTAL CA: CPT | Performed by: NURSE PRACTITIONER

## 2022-06-28 PROCEDURE — 99232 SBSQ HOSP IP/OBS MODERATE 35: CPT | Performed by: INTERNAL MEDICINE

## 2022-06-28 PROCEDURE — 36410 VNPNXR 3YR/> PHY/QHP DX/THER: CPT

## 2022-06-28 PROCEDURE — 85576 BLOOD PLATELET AGGREGATION: CPT

## 2022-06-28 PROCEDURE — U0003 INFECTIOUS AGENT DETECTION BY NUCLEIC ACID (DNA OR RNA); SEVERE ACUTE RESPIRATORY SYNDROME CORONAVIRUS 2 (SARS-COV-2) (CORONAVIRUS DISEASE [COVID-19]), AMPLIFIED PROBE TECHNIQUE, MAKING USE OF HIGH THROUGHPUT TECHNOLOGIES AS DESCRIBED BY CMS-2020-01-R: HCPCS | Performed by: NURSE PRACTITIONER

## 2022-06-28 PROCEDURE — C1751 CATH, INF, PER/CENT/MIDLINE: HCPCS

## 2022-06-28 PROCEDURE — 25010000002 EPTIFIBATIDE PER 5 MG: Performed by: NURSE PRACTITIONER

## 2022-06-28 PROCEDURE — 25010000002 ONDANSETRON PER 1 MG: Performed by: INTERNAL MEDICINE

## 2022-06-28 RX ORDER — SODIUM CHLORIDE 9 MG/ML
9 INJECTION, SOLUTION INTRAVENOUS CONTINUOUS PRN
Status: DISCONTINUED | OUTPATIENT
Start: 2022-06-28 | End: 2022-06-29

## 2022-06-28 RX ORDER — CHLORHEXIDINE GLUCONATE 0.12 MG/ML
15 RINSE ORAL EVERY 12 HOURS SCHEDULED
Status: DISCONTINUED | OUTPATIENT
Start: 2022-06-28 | End: 2022-06-29

## 2022-06-28 RX ORDER — NICOTINE POLACRILEX 4 MG
15 LOZENGE BUCCAL
Status: DISCONTINUED | OUTPATIENT
Start: 2022-06-28 | End: 2022-06-29 | Stop reason: HOSPADM

## 2022-06-28 RX ORDER — SODIUM CHLORIDE 0.9 % (FLUSH) 0.9 %
10 SYRINGE (ML) INJECTION EVERY 12 HOURS SCHEDULED
Status: DISCONTINUED | OUTPATIENT
Start: 2022-06-28 | End: 2022-06-29 | Stop reason: HOSPADM

## 2022-06-28 RX ORDER — OLANZAPINE 10 MG/2ML
1 INJECTION, POWDER, LYOPHILIZED, FOR SOLUTION INTRAMUSCULAR
Status: DISCONTINUED | OUTPATIENT
Start: 2022-06-28 | End: 2022-06-29 | Stop reason: HOSPADM

## 2022-06-28 RX ORDER — SODIUM CHLORIDE 0.9 % (FLUSH) 0.9 %
30 SYRINGE (ML) INJECTION ONCE AS NEEDED
Status: DISCONTINUED | OUTPATIENT
Start: 2022-06-28 | End: 2022-06-29 | Stop reason: HOSPADM

## 2022-06-28 RX ORDER — DEXTROSE MONOHYDRATE 25 G/50ML
10-50 INJECTION, SOLUTION INTRAVENOUS
Status: DISCONTINUED | OUTPATIENT
Start: 2022-06-28 | End: 2022-06-29 | Stop reason: HOSPADM

## 2022-06-28 RX ORDER — MIDAZOLAM HYDROCHLORIDE 1 MG/ML
1 INJECTION INTRAMUSCULAR; INTRAVENOUS
Status: DISCONTINUED | OUTPATIENT
Start: 2022-06-28 | End: 2022-06-28

## 2022-06-28 RX ORDER — AMLODIPINE BESYLATE 5 MG/1
5 TABLET ORAL ONCE
Status: COMPLETED | OUTPATIENT
Start: 2022-06-28 | End: 2022-06-28

## 2022-06-28 RX ORDER — SODIUM CHLORIDE 9 MG/ML
30 INJECTION, SOLUTION INTRAVENOUS CONTINUOUS PRN
Status: DISCONTINUED | OUTPATIENT
Start: 2022-06-28 | End: 2022-06-29

## 2022-06-28 RX ORDER — SODIUM CHLORIDE 0.9 % (FLUSH) 0.9 %
10 SYRINGE (ML) INJECTION AS NEEDED
Status: DISCONTINUED | OUTPATIENT
Start: 2022-06-28 | End: 2022-06-29 | Stop reason: HOSPADM

## 2022-06-28 RX ORDER — CHLORHEXIDINE GLUCONATE 500 MG/1
1 CLOTH TOPICAL EVERY 12 HOURS
Status: DISCONTINUED | OUTPATIENT
Start: 2022-06-28 | End: 2022-06-29

## 2022-06-28 RX ADMIN — LEVOTHYROXINE SODIUM 50 MCG: 0.05 TABLET ORAL at 06:11

## 2022-06-28 RX ADMIN — EPTIFIBATIDE 2 MCG/KG/MIN: 0.75 INJECTION INTRAVENOUS at 07:41

## 2022-06-28 RX ADMIN — NICOTINE 1 PATCH: 21 PATCH, EXTENDED RELEASE TRANSDERMAL at 08:43

## 2022-06-28 RX ADMIN — MUPIROCIN 1 APPLICATION: 20 OINTMENT TOPICAL at 10:22

## 2022-06-28 RX ADMIN — Medication 10 ML: at 10:14

## 2022-06-28 RX ADMIN — MUPIROCIN 1 APPLICATION: 20 OINTMENT TOPICAL at 17:58

## 2022-06-28 RX ADMIN — DIAZEPAM 5 MG: 2 TABLET ORAL at 22:46

## 2022-06-28 RX ADMIN — EPTIFIBATIDE 2 MCG/KG/MIN: 0.75 INJECTION INTRAVENOUS at 02:21

## 2022-06-28 RX ADMIN — ONDANSETRON 4 MG: 2 INJECTION INTRAMUSCULAR; INTRAVENOUS at 18:37

## 2022-06-28 RX ADMIN — CHLORHEXIDINE GLUCONATE 15 ML: 1.2 SOLUTION ORAL at 17:58

## 2022-06-28 RX ADMIN — AMLODIPINE BESYLATE 5 MG: 5 TABLET ORAL at 11:19

## 2022-06-28 RX ADMIN — CARIPRAZINE 3 MG: 3 CAPSULE, GELATIN COATED ORAL at 06:11

## 2022-06-28 RX ADMIN — BACLOFEN 10 MG: 10 TABLET ORAL at 22:46

## 2022-06-28 RX ADMIN — ALPRAZOLAM 1 MG: 1 TABLET ORAL at 17:58

## 2022-06-28 RX ADMIN — BUTALBITAL, ACETAMINOPHEN AND CAFFEINE 2 TABLET: 50; 325; 40 TABLET ORAL at 08:43

## 2022-06-28 RX ADMIN — ATORVASTATIN CALCIUM 80 MG: 40 TABLET, FILM COATED ORAL at 20:41

## 2022-06-28 RX ADMIN — ASPIRIN 81 MG: 81 TABLET, COATED ORAL at 08:43

## 2022-06-28 RX ADMIN — AMLODIPINE BESYLATE 5 MG: 5 TABLET ORAL at 08:43

## 2022-06-28 RX ADMIN — EPTIFIBATIDE 2 MCG/KG/MIN: 0.75 INJECTION INTRAVENOUS at 13:07

## 2022-06-28 RX ADMIN — PANTOPRAZOLE SODIUM 40 MG: 40 TABLET, DELAYED RELEASE ORAL at 06:11

## 2022-06-28 RX ADMIN — Medication 10 ML: at 10:23

## 2022-06-28 RX ADMIN — EPTIFIBATIDE 2 MCG/KG/MIN: 0.75 INJECTION INTRAVENOUS at 18:23

## 2022-06-28 RX ADMIN — METOPROLOL TARTRATE 25 MG: 25 TABLET, FILM COATED ORAL at 20:41

## 2022-06-28 RX ADMIN — METOPROLOL TARTRATE 25 MG: 25 TABLET, FILM COATED ORAL at 08:43

## 2022-06-29 ENCOUNTER — APPOINTMENT (OUTPATIENT)
Dept: NUCLEAR MEDICINE | Facility: HOSPITAL | Age: 37
End: 2022-06-29

## 2022-06-29 ENCOUNTER — ANESTHESIA (OUTPATIENT)
Dept: PERIOP | Facility: HOSPITAL | Age: 37
End: 2022-06-29

## 2022-06-29 ENCOUNTER — APPOINTMENT (OUTPATIENT)
Dept: CARDIOLOGY | Facility: HOSPITAL | Age: 37
End: 2022-06-29

## 2022-06-29 ENCOUNTER — APPOINTMENT (OUTPATIENT)
Dept: GENERAL RADIOLOGY | Facility: HOSPITAL | Age: 37
End: 2022-06-29

## 2022-06-29 DIAGNOSIS — F90.9 ATTENTION DEFICIT HYPERACTIVITY DISORDER (ADHD), UNSPECIFIED ADHD TYPE: ICD-10-CM

## 2022-06-29 LAB
ACT BLD: 115 SECONDS (ref 89–137)
ACT BLD: 132 SECONDS (ref 89–137)
ACT BLD: 410 SECONDS (ref 89–137)
ACT BLD: 486 SECONDS (ref 89–137)
ACT BLD: 555 SECONDS (ref 89–137)
ALBUMIN SERPL-MCNC: 4.4 G/DL (ref 3.5–5.2)
ANION GAP SERPL CALCULATED.3IONS-SCNC: 10 MMOL/L (ref 5–15)
APTT PPP: 27.5 SECONDS (ref 24–31)
ARTERIAL PATENCY WRIST A: ABNORMAL
ATMOSPHERIC PRESS: ABNORMAL MM[HG]
BASE DEFICIT: ABNORMAL
BASE EXCESS BLDA CALC-SCNC: -0.9 MMOL/L (ref 0–3)
BASE EXCESS BLDA CALC-SCNC: -1.5 MMOL/L (ref 0–3)
BASE EXCESS BLDA CALC-SCNC: -1.6 MMOL/L (ref 0–3)
BASE EXCESS BLDA CALC-SCNC: -1.7 MMOL/L (ref 0–3)
BASE EXCESS BLDA CALC-SCNC: -1.9 MMOL/L (ref 0–3)
BASE EXCESS BLDA CALC-SCNC: -1.9 MMOL/L (ref 0–3)
BASE EXCESS BLDA CALC-SCNC: 1 MMOL/L (ref 0–3)
BASE EXCESS BLDA CALC-SCNC: 2 MMOL/L (ref 0–3)
BASE EXCESS BLDA CALC-SCNC: 3 MMOL/L (ref 0–3)
BASE EXCESS BLDA CALC-SCNC: <0 MMOL/L (ref 0–3)
BASE EXCESS BLDV CALC-SCNC: ABNORMAL MMOL/L
BASOPHILS # BLD AUTO: 0 10*3/MM3 (ref 0–0.2)
BASOPHILS NFR BLD AUTO: 0.3 % (ref 0–1.5)
BDY SITE: ABNORMAL
BH BB BLOOD EXPIRATION DATE: NORMAL
BH BB BLOOD EXPIRATION DATE: NORMAL
BH BB BLOOD TYPE BARCODE: 5100
BH BB BLOOD TYPE BARCODE: 5100
BH BB DISPENSE STATUS: NORMAL
BH BB DISPENSE STATUS: NORMAL
BH BB PRODUCT CODE: NORMAL
BH BB PRODUCT CODE: NORMAL
BH BB UNIT NUMBER: NORMAL
BH BB UNIT NUMBER: NORMAL
BODY TEMPERATURE: 94.1 C
BODY TEMPERATURE: 95.1 C
BUN SERPL-MCNC: 12 MG/DL (ref 6–20)
BUN/CREAT SERPL: 9.5 (ref 7–25)
CA-I BLDA-SCNC: 1.04 MMOL/L (ref 1.12–1.32)
CA-I BLDA-SCNC: 1.07 MMOL/L (ref 1.12–1.32)
CA-I BLDA-SCNC: 1.08 MMOL/L (ref 1.12–1.32)
CA-I BLDA-SCNC: 1.2 MMOL/L (ref 1.12–1.32)
CA-I BLDA-SCNC: 1.22 MMOL/L (ref 1.15–1.33)
CA-I BLDA-SCNC: 1.23 MMOL/L (ref 1.15–1.33)
CA-I BLDA-SCNC: 1.23 MMOL/L (ref 1.15–1.33)
CA-I BLDA-SCNC: 1.24 MMOL/L (ref 1.15–1.33)
CA-I BLDA-SCNC: 1.25 MMOL/L (ref 1.15–1.33)
CA-I BLDA-SCNC: 1.39 MMOL/L (ref 1.12–1.32)
CA-I SERPL ISE-MCNC: 1.27 MMOL/L (ref 1.2–1.3)
CALCIUM SPEC-SCNC: 8.9 MG/DL (ref 8.6–10.5)
CHLORIDE SERPL-SCNC: 104 MMOL/L (ref 98–107)
CO2 BLDA-SCNC: 23.7 MMOL/L (ref 22–29)
CO2 BLDA-SCNC: 23.9 MMOL/L (ref 22–29)
CO2 BLDA-SCNC: 24 MMOL/L (ref 22–29)
CO2 BLDA-SCNC: 24.2 MMOL/L (ref 22–29)
CO2 BLDA-SCNC: 24.3 MMOL/L (ref 22–29)
CO2 BLDA-SCNC: 24.5 MMOL/L (ref 22–29)
CO2 BLDA-SCNC: 24.7 MMOL/L (ref 22–29)
CO2 BLDA-SCNC: 24.7 MMOL/L (ref 22–29)
CO2 BLDA-SCNC: 27 MMOL/L (ref 23–27)
CO2 BLDA-SCNC: 29 MMOL/L (ref 23–27)
CO2 BLDA-SCNC: 30 MMOL/L (ref 23–27)
CO2 BLDA-SCNC: 30 MMOL/L (ref 23–27)
CO2 CONTENT VENOUS: ABNORMAL
CO2 SERPL-SCNC: 23 MMOL/L (ref 22–29)
CREAT SERPL-MCNC: 1.26 MG/DL (ref 0.76–1.27)
CROSSMATCH INTERPRETATION: NORMAL
CROSSMATCH INTERPRETATION: NORMAL
DEPRECATED RDW RBC AUTO: 40.7 FL (ref 37–54)
EGFRCR SERPLBLD CKD-EPI 2021: 74.9 ML/MIN/1.73
EOSINOPHIL # BLD AUTO: 0.1 10*3/MM3 (ref 0–0.4)
EOSINOPHIL NFR BLD AUTO: 0.8 % (ref 0.3–6.2)
ERYTHROCYTE [DISTWIDTH] IN BLOOD BY AUTOMATED COUNT: 13.5 % (ref 12.3–15.4)
GLUCOSE BLDC GLUCOMTR-MCNC: 105 MG/DL (ref 70–105)
GLUCOSE BLDC GLUCOMTR-MCNC: 106 MG/DL (ref 70–105)
GLUCOSE BLDC GLUCOMTR-MCNC: 110 MG/DL (ref 70–105)
GLUCOSE BLDC GLUCOMTR-MCNC: 113 MG/DL (ref 70–105)
GLUCOSE BLDC GLUCOMTR-MCNC: 116 MG/DL (ref 70–105)
GLUCOSE BLDC GLUCOMTR-MCNC: 119 MG/DL (ref 74–100)
GLUCOSE BLDC GLUCOMTR-MCNC: 119 MG/DL (ref 74–100)
GLUCOSE BLDC GLUCOMTR-MCNC: 126 MG/DL (ref 74–100)
GLUCOSE BLDC GLUCOMTR-MCNC: 126 MG/DL (ref 74–100)
GLUCOSE BLDC GLUCOMTR-MCNC: 129 MG/DL (ref 70–105)
GLUCOSE BLDC GLUCOMTR-MCNC: 132 MG/DL (ref 70–105)
GLUCOSE BLDC GLUCOMTR-MCNC: 132 MG/DL (ref 74–100)
GLUCOSE BLDC GLUCOMTR-MCNC: 132 MG/DL (ref 74–100)
GLUCOSE BLDC GLUCOMTR-MCNC: 134 MG/DL (ref 74–100)
GLUCOSE BLDC GLUCOMTR-MCNC: 136 MG/DL (ref 74–100)
GLUCOSE BLDC GLUCOMTR-MCNC: 137 MG/DL (ref 74–100)
GLUCOSE BLDC GLUCOMTR-MCNC: 137 MG/DL (ref 74–100)
GLUCOSE BLDC GLUCOMTR-MCNC: 139 MG/DL (ref 74–100)
GLUCOSE BLDC GLUCOMTR-MCNC: 139 MG/DL (ref 74–100)
GLUCOSE BLDC GLUCOMTR-MCNC: 144 MG/DL (ref 70–105)
GLUCOSE BLDC GLUCOMTR-MCNC: 93 MG/DL (ref 70–105)
GLUCOSE SERPL-MCNC: 114 MG/DL (ref 65–99)
HCO3 BLDA-SCNC: 22.6 MMOL/L (ref 21–28)
HCO3 BLDA-SCNC: 22.7 MMOL/L (ref 21–28)
HCO3 BLDA-SCNC: 22.9 MMOL/L (ref 21–28)
HCO3 BLDA-SCNC: 23.1 MMOL/L (ref 21–28)
HCO3 BLDA-SCNC: 23.1 MMOL/L (ref 21–28)
HCO3 BLDA-SCNC: 23.3 MMOL/L (ref 21–28)
HCO3 BLDA-SCNC: 23.4 MMOL/L (ref 21–28)
HCO3 BLDA-SCNC: 23.5 MMOL/L (ref 21–28)
HCO3 BLDA-SCNC: 25.7 MMOL/L (ref 22–26)
HCO3 BLDA-SCNC: 27.3 MMOL/L (ref 22–26)
HCO3 BLDA-SCNC: 28.4 MMOL/L (ref 22–26)
HCO3 BLDA-SCNC: 28.8 MMOL/L (ref 22–26)
HCO3 BLDV-SCNC: 24.3 MMOL/L (ref 23–28)
HCT VFR BLD AUTO: 31.8 % (ref 37.5–51)
HCT VFR BLDA CALC: 26 % (ref 38–51)
HCT VFR BLDA CALC: 26 % (ref 38–51)
HCT VFR BLDA CALC: 28 % (ref 38–51)
HCT VFR BLDA CALC: 29 % (ref 38–51)
HCT VFR BLDA CALC: 31 % (ref 38–51)
HCT VFR BLDA CALC: 32 % (ref 38–51)
HEMODILUTION: NO
HEMODILUTION: YES
HEMODILUTION: YES
HGB BLD-MCNC: 10.8 G/DL (ref 13–17.7)
HGB BLDA-MCNC: 10.4 G/DL (ref 12–17)
HGB BLDA-MCNC: 10.5 G/DL (ref 12–17)
HGB BLDA-MCNC: 10.5 G/DL (ref 12–17)
HGB BLDA-MCNC: 10.7 G/DL (ref 12–17)
HGB BLDA-MCNC: 10.8 G/DL (ref 12–17)
HGB BLDA-MCNC: 10.9 G/DL (ref 12–17)
HGB BLDA-MCNC: 8.8 G/DL (ref 12–17)
HGB BLDA-MCNC: 8.8 G/DL (ref 12–17)
HGB BLDA-MCNC: 9.5 G/DL (ref 12–17)
HGB BLDA-MCNC: 9.9 G/DL (ref 12–17)
INHALED O2 CONCENTRATION: 40 %
INHALED O2 CONCENTRATION: 50 %
INHALED O2 CONCENTRATION: 50 %
INHALED O2 CONCENTRATION: 60 %
INHALED O2 CONCENTRATION: 70 %
INHALED O2 CONCENTRATION: <21 %
INR PPP: 1.06 (ref 0.93–1.1)
LYMPHOCYTES # BLD AUTO: 1.4 10*3/MM3 (ref 0.7–3.1)
LYMPHOCYTES NFR BLD AUTO: 11.3 % (ref 19.6–45.3)
MCH RBC QN AUTO: 29.2 PG (ref 26.6–33)
MCHC RBC AUTO-ENTMCNC: 33.9 G/DL (ref 31.5–35.7)
MCV RBC AUTO: 86.1 FL (ref 79–97)
MODALITY: ABNORMAL
MONOCYTES # BLD AUTO: 0.4 10*3/MM3 (ref 0.1–0.9)
MONOCYTES NFR BLD AUTO: 3.3 % (ref 5–12)
NEUTROPHILS NFR BLD AUTO: 10.5 10*3/MM3 (ref 1.7–7)
NEUTROPHILS NFR BLD AUTO: 84.3 % (ref 42.7–76)
NRBC BLD AUTO-RTO: 0 /100 WBC (ref 0–0.2)
PCO2 BLDA: 35.4 MM HG (ref 35–48)
PCO2 BLDA: 36 MM HG (ref 35–48)
PCO2 BLDA: 36.7 MM HG (ref 35–48)
PCO2 BLDA: 37.4 MM HG (ref 35–48)
PCO2 BLDA: 38 MM HG (ref 35–48)
PCO2 BLDA: 39.9 MM HG (ref 35–48)
PCO2 BLDA: 40.2 MM HG (ref 35–48)
PCO2 BLDA: 40.3 MM HG (ref 35–48)
PCO2 BLDA: 51.2 MM HG (ref 35–45)
PCO2 BLDA: 53.8 MM HG (ref 35–45)
PCO2 BLDA: 55.1 MM HG (ref 35–45)
PCO2 BLDA: 56.1 MM HG (ref 35–45)
PCO2 BLDV: 51.2 MM HG (ref 41–51)
PCO2 TEMP ADJ BLD: 32.5 MM HG (ref 35–48)
PCO2 TEMP ADJ BLD: 33.6 MM HG (ref 35–48)
PEEP RESPIRATORY: 5 CM[H2O]
PEEP RESPIRATORY: 5 CM[H2O]
PEEP RESPIRATORY: 8 CM[H2O]
PH BLDA: 7.29 PH UNITS (ref 7.35–7.45)
PH BLDA: 7.31 PH UNITS (ref 7.35–7.45)
PH BLDA: 7.32 PH UNITS (ref 7.35–7.45)
PH BLDA: 7.34 PH UNITS (ref 7.35–7.45)
PH BLDA: 7.37 PH UNITS (ref 7.35–7.45)
PH BLDA: 7.38 PH UNITS (ref 7.35–7.45)
PH BLDA: 7.38 PH UNITS (ref 7.35–7.45)
PH BLDA: 7.39 PH UNITS (ref 7.35–7.45)
PH BLDA: 7.39 PH UNITS (ref 7.35–7.45)
PH BLDA: 7.4 PH UNITS (ref 7.35–7.45)
PH BLDA: 7.41 PH UNITS (ref 7.35–7.45)
PH BLDA: 7.42 PH UNITS (ref 7.35–7.45)
PH BLDV: 7.28 PH UNITS (ref 7.31–7.41)
PH, TEMP CORRECTED: 7.43 PH UNITS (ref 7.35–7.45)
PH, TEMP CORRECTED: 7.45 PH UNITS (ref 7.35–7.45)
PHOSPHATE SERPL-MCNC: 4.6 MG/DL (ref 2.5–4.5)
PLATELET # BLD AUTO: 231 10*3/MM3 (ref 140–450)
PMV BLD AUTO: 6.9 FL (ref 6–12)
PO2 BLDA: 100.5 MM HG (ref 83–108)
PO2 BLDA: 106 MM HG (ref 83–108)
PO2 BLDA: 122 MM HG (ref 80–105)
PO2 BLDA: 369 MM HG (ref 80–105)
PO2 BLDA: 409 MM HG (ref 80–105)
PO2 BLDA: 420 MM HG (ref 80–105)
PO2 BLDA: 74.4 MM HG (ref 83–108)
PO2 BLDA: 91.2 MM HG (ref 83–108)
PO2 BLDA: 93.9 MM HG (ref 83–108)
PO2 BLDA: 94.9 MM HG (ref 83–108)
PO2 BLDA: 95.3 MM HG (ref 83–108)
PO2 BLDA: 96.2 MM HG (ref 83–108)
PO2 BLDV: 42 MM HG (ref 35–42)
PO2 TEMP ADJ BLD: 65.4 MM HG (ref 83–108)
PO2 TEMP ADJ BLD: 78 MM HG (ref 83–108)
POTASSIUM BLDA-SCNC: 3.9 MMOL/L (ref 3.5–4.5)
POTASSIUM BLDA-SCNC: 3.9 MMOL/L (ref 3.5–4.9)
POTASSIUM BLDA-SCNC: 4 MMOL/L (ref 3.5–4.5)
POTASSIUM BLDA-SCNC: 4.1 MMOL/L (ref 3.5–4.9)
POTASSIUM BLDA-SCNC: 4.2 MMOL/L (ref 3.5–4.5)
POTASSIUM BLDA-SCNC: 4.5 MMOL/L (ref 3.5–4.5)
POTASSIUM BLDA-SCNC: 4.8 MMOL/L (ref 3.5–4.5)
POTASSIUM BLDA-SCNC: 4.9 MMOL/L (ref 3.5–4.9)
POTASSIUM BLDA-SCNC: 5.4 MMOL/L (ref 3.5–4.9)
POTASSIUM BLDA-SCNC: 5.5 MMOL/L (ref 3.5–4.9)
POTASSIUM SERPL-SCNC: 5 MMOL/L (ref 3.5–5.2)
PROTHROMBIN TIME: 10.9 SECONDS (ref 9.6–11.7)
RBC # BLD AUTO: 3.69 10*6/MM3 (ref 4.14–5.8)
RESPIRATORY RATE: 14
SAO2 % BLDCOA: 100 % (ref 95–98)
SAO2 % BLDCOA: 95.2 % (ref 94–98)
SAO2 % BLDCOA: 97 % (ref 94–98)
SAO2 % BLDCOA: 97.1 % (ref 94–98)
SAO2 % BLDCOA: 97.5 % (ref 94–98)
SAO2 % BLDCOA: 97.6 % (ref 94–98)
SAO2 % BLDCOA: 98 % (ref 94–98)
SAO2 % BLDCOA: 98 % (ref 95–98)
SAO2 % BLDCOV: 26 % (ref 45–75)
SODIUM BLD-SCNC: 131 MMOL/L (ref 138–146)
SODIUM BLD-SCNC: 131 MMOL/L (ref 138–146)
SODIUM BLD-SCNC: 133 MMOL/L (ref 138–146)
SODIUM BLD-SCNC: 133 MMOL/L (ref 138–146)
SODIUM BLD-SCNC: 137 MMOL/L (ref 138–146)
SODIUM BLD-SCNC: 138 MMOL/L (ref 138–146)
SODIUM BLD-SCNC: 139 MMOL/L (ref 138–146)
SODIUM BLD-SCNC: 141 MMOL/L (ref 138–146)
SODIUM BLD-SCNC: 142 MMOL/L (ref 138–146)
SODIUM SERPL-SCNC: 137 MMOL/L (ref 136–145)
UNIT  ABO: NORMAL
UNIT  ABO: NORMAL
UNIT  RH: NORMAL
UNIT  RH: NORMAL
VENTILATOR MODE: ABNORMAL
VT ON VENT VENT: 770 ML
WBC NRBC COR # BLD: 12.4 10*3/MM3 (ref 3.4–10.8)

## 2022-06-29 PROCEDURE — 25010000002 PAPAVERINE PER 60 MG

## 2022-06-29 PROCEDURE — 33533 CABG ARTERIAL SINGLE: CPT

## 2022-06-29 PROCEDURE — C1751 CATH, INF, PER/CENT/MIDLINE: HCPCS | Performed by: ANESTHESIOLOGY

## 2022-06-29 PROCEDURE — 25010000002 HEPARIN (PORCINE) PER 1000 UNITS: Performed by: ANESTHESIOLOGY

## 2022-06-29 PROCEDURE — 25010000002 MAGNESIUM SULFATE IN D5W 1G/100ML (PREMIX) 1-5 GM/100ML-% SOLUTION: Performed by: NURSE PRACTITIONER

## 2022-06-29 PROCEDURE — P9041 ALBUMIN (HUMAN),5%, 50ML: HCPCS

## 2022-06-29 PROCEDURE — 06BQ4ZZ EXCISION OF LEFT SAPHENOUS VEIN, PERCUTANEOUS ENDOSCOPIC APPROACH: ICD-10-PCS

## 2022-06-29 PROCEDURE — 25010000002 ALBUMIN HUMAN 5% PER 50 ML: Performed by: NURSE PRACTITIONER

## 2022-06-29 PROCEDURE — 33508 ENDOSCOPIC VEIN HARVEST: CPT

## 2022-06-29 PROCEDURE — 94761 N-INVAS EAR/PLS OXIMETRY MLT: CPT

## 2022-06-29 PROCEDURE — 84132 ASSAY OF SERUM POTASSIUM: CPT

## 2022-06-29 PROCEDURE — 021109W BYPASS CORONARY ARTERY, TWO ARTERIES FROM AORTA WITH AUTOLOGOUS VENOUS TISSUE, OPEN APPROACH: ICD-10-PCS

## 2022-06-29 PROCEDURE — C1729 CATH, DRAINAGE: HCPCS

## 2022-06-29 PROCEDURE — 33518 CABG ARTERY-VEIN TWO: CPT

## 2022-06-29 PROCEDURE — 82803 BLOOD GASES ANY COMBINATION: CPT

## 2022-06-29 PROCEDURE — 25010000002 MORPHINE PER 10 MG: Performed by: NURSE PRACTITIONER

## 2022-06-29 PROCEDURE — 94799 UNLISTED PULMONARY SVC/PX: CPT

## 2022-06-29 PROCEDURE — 80051 ELECTROLYTE PANEL: CPT

## 2022-06-29 PROCEDURE — 94664 DEMO&/EVAL PT USE INHALER: CPT

## 2022-06-29 PROCEDURE — C1713 ANCHOR/SCREW BN/BN,TIS/BN: HCPCS

## 2022-06-29 PROCEDURE — 25010000002 CEFAZOLIN PER 500 MG: Performed by: ANESTHESIOLOGY

## 2022-06-29 PROCEDURE — 25010000002 ALBUMIN HUMAN 5% PER 50 ML

## 2022-06-29 PROCEDURE — 85018 HEMOGLOBIN: CPT

## 2022-06-29 PROCEDURE — 82962 GLUCOSE BLOOD TEST: CPT

## 2022-06-29 PROCEDURE — 25010000002 PROTAMINE SULFATE PER 10 MG: Performed by: ANESTHESIOLOGY

## 2022-06-29 PROCEDURE — 84295 ASSAY OF SERUM SODIUM: CPT

## 2022-06-29 PROCEDURE — 82330 ASSAY OF CALCIUM: CPT

## 2022-06-29 PROCEDURE — 63710000001 INSULIN REGULAR HUMAN PER 5 UNITS: Performed by: ANESTHESIOLOGY

## 2022-06-29 PROCEDURE — 71045 X-RAY EXAM CHEST 1 VIEW: CPT

## 2022-06-29 PROCEDURE — 85347 COAGULATION TIME ACTIVATED: CPT

## 2022-06-29 PROCEDURE — P9041 ALBUMIN (HUMAN),5%, 50ML: HCPCS | Performed by: NURSE PRACTITIONER

## 2022-06-29 PROCEDURE — 82947 ASSAY GLUCOSE BLOOD QUANT: CPT

## 2022-06-29 PROCEDURE — 85025 COMPLETE CBC W/AUTO DIFF WBC: CPT | Performed by: NURSE PRACTITIONER

## 2022-06-29 PROCEDURE — 93318 ECHO TRANSESOPHAGEAL INTRAOP: CPT | Performed by: ANESTHESIOLOGY

## 2022-06-29 PROCEDURE — 80069 RENAL FUNCTION PANEL: CPT | Performed by: NURSE PRACTITIONER

## 2022-06-29 PROCEDURE — B24BZZ4 ULTRASONOGRAPHY OF HEART WITH AORTA, TRANSESOPHAGEAL: ICD-10-PCS | Performed by: ANESTHESIOLOGY

## 2022-06-29 PROCEDURE — 25010000002 CEFAZOLIN PER 500 MG: Performed by: NURSE PRACTITIONER

## 2022-06-29 PROCEDURE — 25010000002 HEPARIN (PORCINE) PER 1000 UNITS

## 2022-06-29 PROCEDURE — 85730 THROMBOPLASTIN TIME PARTIAL: CPT | Performed by: NURSE PRACTITIONER

## 2022-06-29 PROCEDURE — 85014 HEMATOCRIT: CPT

## 2022-06-29 PROCEDURE — 25010000002 FENTANYL CITRATE (PF) 250 MCG/5ML SOLUTION: Performed by: ANESTHESIOLOGY

## 2022-06-29 PROCEDURE — 25010000002 MIDAZOLAM PER 1 MG: Performed by: ANESTHESIOLOGY

## 2022-06-29 PROCEDURE — 02100Z9 BYPASS CORONARY ARTERY, ONE ARTERY FROM LEFT INTERNAL MAMMARY, OPEN APPROACH: ICD-10-PCS

## 2022-06-29 PROCEDURE — 85610 PROTHROMBIN TIME: CPT | Performed by: NURSE PRACTITIONER

## 2022-06-29 PROCEDURE — 5A1221Z PERFORMANCE OF CARDIAC OUTPUT, CONTINUOUS: ICD-10-PCS

## 2022-06-29 PROCEDURE — 94002 VENT MGMT INPAT INIT DAY: CPT

## 2022-06-29 PROCEDURE — A4648 IMPLANTABLE TISSUE MARKER: HCPCS

## 2022-06-29 PROCEDURE — 25010000002 MAGNESIUM SULFATE PER 500 MG OF MAGNESIUM: Performed by: ANESTHESIOLOGY

## 2022-06-29 PROCEDURE — 25010000002 ONDANSETRON PER 1 MG: Performed by: NURSE PRACTITIONER

## 2022-06-29 PROCEDURE — 82330 ASSAY OF CALCIUM: CPT | Performed by: NURSE PRACTITIONER

## 2022-06-29 DEVICE — DEV CONTRL TISS STRATAFIXSPIRALMNCRYL PLSPS2 REV3/0 15CM: Type: IMPLANTABLE DEVICE | Site: LEG | Status: FUNCTIONAL

## 2022-06-29 DEVICE — ABSORBABLE HEMOSTAT (OXIDIZED REGENERATED CELLULOSE, U.S.P.)
Type: IMPLANTABLE DEVICE | Site: HEART | Status: FUNCTIONAL
Brand: SURGICEL

## 2022-06-29 DEVICE — WAX,BONE,NATURAL
Type: IMPLANTABLE DEVICE | Site: STERNUM | Status: FUNCTIONAL
Brand: MEDLINE INDUSTRIES

## 2022-06-29 DEVICE — DEV CONTRL TISS STRATAFIX SPIRAL MNCRYL UD 3/0 PLS 30CM: Type: IMPLANTABLE DEVICE | Site: CHEST | Status: FUNCTIONAL

## 2022-06-29 DEVICE — CLIP LIGAT VASC HORIZON TI MD BLU 6CT: Type: IMPLANTABLE DEVICE | Site: CHEST | Status: FUNCTIONAL

## 2022-06-29 DEVICE — SS SUTURE, 3 PER SLEEVE
Type: IMPLANTABLE DEVICE | Site: STERNUM | Status: FUNCTIONAL
Brand: MYO/WIRE II

## 2022-06-29 DEVICE — SS SUTURE, 6 PER SLEEVE
Type: IMPLANTABLE DEVICE | Site: STERNUM | Status: FUNCTIONAL
Brand: MYO/WIRE II

## 2022-06-29 RX ORDER — ACETAMINOPHEN 650 MG/1
650 SUPPOSITORY RECTAL EVERY 6 HOURS
Status: COMPLETED | OUTPATIENT
Start: 2022-06-29 | End: 2022-06-30

## 2022-06-29 RX ORDER — AMINOCAPROIC ACID 250 MG/ML
INJECTION, SOLUTION INTRAVENOUS AS NEEDED
Status: DISCONTINUED | OUTPATIENT
Start: 2022-06-29 | End: 2022-06-29 | Stop reason: SURG

## 2022-06-29 RX ORDER — PANTOPRAZOLE SODIUM 40 MG/1
40 TABLET, DELAYED RELEASE ORAL
Status: DISCONTINUED | OUTPATIENT
Start: 2022-06-30 | End: 2022-07-04 | Stop reason: HOSPADM

## 2022-06-29 RX ORDER — MAGNESIUM HYDROXIDE 1200 MG/15ML
LIQUID ORAL AS NEEDED
Status: DISCONTINUED | OUTPATIENT
Start: 2022-06-29 | End: 2022-06-29 | Stop reason: HOSPADM

## 2022-06-29 RX ORDER — PANTOPRAZOLE SODIUM 40 MG/10ML
40 INJECTION, POWDER, LYOPHILIZED, FOR SOLUTION INTRAVENOUS ONCE
Status: COMPLETED | OUTPATIENT
Start: 2022-06-29 | End: 2022-06-29

## 2022-06-29 RX ORDER — DEXTROSE MONOHYDRATE 25 G/50ML
10-50 INJECTION, SOLUTION INTRAVENOUS
Status: DISCONTINUED | OUTPATIENT
Start: 2022-06-29 | End: 2022-07-04 | Stop reason: HOSPADM

## 2022-06-29 RX ORDER — HYDROCODONE BITARTRATE AND ACETAMINOPHEN 5; 325 MG/1; MG/1
1 TABLET ORAL EVERY 4 HOURS PRN
Status: DISCONTINUED | OUTPATIENT
Start: 2022-06-29 | End: 2022-07-01

## 2022-06-29 RX ORDER — NALOXONE HCL 0.4 MG/ML
0.4 VIAL (ML) INJECTION
Status: DISCONTINUED | OUTPATIENT
Start: 2022-06-29 | End: 2022-07-04 | Stop reason: HOSPADM

## 2022-06-29 RX ORDER — CEFAZOLIN 2 G/1
INJECTION, POWDER, FOR SOLUTION INTRAMUSCULAR; INTRAVENOUS AS NEEDED
Status: DISCONTINUED | OUTPATIENT
Start: 2022-06-29 | End: 2022-06-29 | Stop reason: SURG

## 2022-06-29 RX ORDER — POTASSIUM CHLORIDE 7.45 MG/ML
10 INJECTION INTRAVENOUS
Status: DISCONTINUED | OUTPATIENT
Start: 2022-06-29 | End: 2022-06-30

## 2022-06-29 RX ORDER — IPRATROPIUM BROMIDE AND ALBUTEROL SULFATE 2.5; .5 MG/3ML; MG/3ML
3 SOLUTION RESPIRATORY (INHALATION)
Status: DISCONTINUED | OUTPATIENT
Start: 2022-06-29 | End: 2022-07-04 | Stop reason: HOSPADM

## 2022-06-29 RX ORDER — AMOXICILLIN 250 MG
2 CAPSULE ORAL 2 TIMES DAILY
Status: DISCONTINUED | OUTPATIENT
Start: 2022-06-30 | End: 2022-07-04 | Stop reason: HOSPADM

## 2022-06-29 RX ORDER — ONDANSETRON 2 MG/ML
4 INJECTION INTRAMUSCULAR; INTRAVENOUS EVERY 6 HOURS PRN
Status: DISCONTINUED | OUTPATIENT
Start: 2022-06-29 | End: 2022-07-04 | Stop reason: HOSPADM

## 2022-06-29 RX ORDER — FENTANYL CITRATE 50 UG/ML
INJECTION, SOLUTION INTRAMUSCULAR; INTRAVENOUS AS NEEDED
Status: DISCONTINUED | OUTPATIENT
Start: 2022-06-29 | End: 2022-06-29 | Stop reason: SURG

## 2022-06-29 RX ORDER — MAGNESIUM SULFATE HEPTAHYDRATE 500 MG/ML
INJECTION, SOLUTION INTRAMUSCULAR; INTRAVENOUS AS NEEDED
Status: DISCONTINUED | OUTPATIENT
Start: 2022-06-29 | End: 2022-06-29 | Stop reason: SURG

## 2022-06-29 RX ORDER — NITROGLYCERIN 20 MG/100ML
5-50 INJECTION INTRAVENOUS CONTINUOUS PRN
Status: DISCONTINUED | OUTPATIENT
Start: 2022-06-29 | End: 2022-06-30

## 2022-06-29 RX ORDER — POLYETHYLENE GLYCOL 3350 17 G/17G
17 POWDER, FOR SOLUTION ORAL 2 TIMES DAILY
Status: DISCONTINUED | OUTPATIENT
Start: 2022-06-30 | End: 2022-07-04 | Stop reason: HOSPADM

## 2022-06-29 RX ORDER — CEFAZOLIN SODIUM IN 0.9 % NACL 3 G/100 ML
3 INTRAVENOUS SOLUTION, PIGGYBACK (ML) INTRAVENOUS EVERY 8 HOURS
Status: COMPLETED | OUTPATIENT
Start: 2022-06-29 | End: 2022-07-01

## 2022-06-29 RX ORDER — NITROGLYCERIN 20 MG/100ML
INJECTION INTRAVENOUS CONTINUOUS PRN
Status: DISCONTINUED | OUTPATIENT
Start: 2022-06-29 | End: 2022-06-29 | Stop reason: SURG

## 2022-06-29 RX ORDER — IPRATROPIUM BROMIDE AND ALBUTEROL SULFATE 2.5; .5 MG/3ML; MG/3ML
3 SOLUTION RESPIRATORY (INHALATION) EVERY 4 HOURS PRN
Status: DISCONTINUED | OUTPATIENT
Start: 2022-06-29 | End: 2022-07-04 | Stop reason: HOSPADM

## 2022-06-29 RX ORDER — CHLORHEXIDINE GLUCONATE 0.12 MG/ML
15 RINSE ORAL EVERY 12 HOURS
Status: DISCONTINUED | OUTPATIENT
Start: 2022-06-29 | End: 2022-07-04 | Stop reason: HOSPADM

## 2022-06-29 RX ORDER — ETOMIDATE 2 MG/ML
INJECTION INTRAVENOUS AS NEEDED
Status: DISCONTINUED | OUTPATIENT
Start: 2022-06-29 | End: 2022-06-29 | Stop reason: SURG

## 2022-06-29 RX ORDER — ACETAMINOPHEN 325 MG/1
650 TABLET ORAL EVERY 4 HOURS PRN
Status: DISCONTINUED | OUTPATIENT
Start: 2022-06-30 | End: 2022-07-04 | Stop reason: HOSPADM

## 2022-06-29 RX ORDER — POTASSIUM CHLORIDE 1.5 G/1.77G
20 POWDER, FOR SOLUTION ORAL AS NEEDED
Status: DISCONTINUED | OUTPATIENT
Start: 2022-06-30 | End: 2022-07-04 | Stop reason: HOSPADM

## 2022-06-29 RX ORDER — POTASSIUM CHLORIDE 20 MEQ/1
20 TABLET, EXTENDED RELEASE ORAL AS NEEDED
Status: DISCONTINUED | OUTPATIENT
Start: 2022-06-30 | End: 2022-07-04 | Stop reason: HOSPADM

## 2022-06-29 RX ORDER — DEXMEDETOMIDINE HYDROCHLORIDE 4 UG/ML
.2-1.5 INJECTION, SOLUTION INTRAVENOUS
Status: DISCONTINUED | OUTPATIENT
Start: 2022-06-29 | End: 2022-06-30

## 2022-06-29 RX ORDER — MORPHINE SULFATE 2 MG/ML
2 INJECTION, SOLUTION INTRAMUSCULAR; INTRAVENOUS
Status: DISCONTINUED | OUTPATIENT
Start: 2022-06-29 | End: 2022-07-02

## 2022-06-29 RX ORDER — SODIUM CHLORIDE 9 MG/ML
30 INJECTION, SOLUTION INTRAVENOUS CONTINUOUS
Status: DISCONTINUED | OUTPATIENT
Start: 2022-06-29 | End: 2022-07-02

## 2022-06-29 RX ORDER — BUDESONIDE 0.5 MG/2ML
0.5 INHALANT ORAL
Status: DISCONTINUED | OUTPATIENT
Start: 2022-06-29 | End: 2022-07-04 | Stop reason: HOSPADM

## 2022-06-29 RX ORDER — NOREPINEPHRINE BIT/0.9 % NACL 8 MG/250ML
INFUSION BOTTLE (ML) INTRAVENOUS CONTINUOUS PRN
Status: DISCONTINUED | OUTPATIENT
Start: 2022-06-29 | End: 2022-06-29 | Stop reason: SURG

## 2022-06-29 RX ORDER — OLANZAPINE 10 MG/2ML
1 INJECTION, POWDER, LYOPHILIZED, FOR SOLUTION INTRAMUSCULAR
Status: DISCONTINUED | OUTPATIENT
Start: 2022-06-29 | End: 2022-07-04 | Stop reason: HOSPADM

## 2022-06-29 RX ORDER — ATORVASTATIN CALCIUM 40 MG/1
40 TABLET, FILM COATED ORAL NIGHTLY
Status: DISCONTINUED | OUTPATIENT
Start: 2022-06-30 | End: 2022-07-04 | Stop reason: HOSPADM

## 2022-06-29 RX ORDER — ASPIRIN 81 MG/1
81 TABLET ORAL DAILY
Status: DISCONTINUED | OUTPATIENT
Start: 2022-06-30 | End: 2022-07-04 | Stop reason: HOSPADM

## 2022-06-29 RX ORDER — GUAIFENESIN 600 MG/1
600 TABLET, EXTENDED RELEASE ORAL EVERY 12 HOURS SCHEDULED
Status: DISCONTINUED | OUTPATIENT
Start: 2022-06-29 | End: 2022-07-02

## 2022-06-29 RX ORDER — ASPIRIN 325 MG
325 TABLET ORAL ONCE
Status: COMPLETED | OUTPATIENT
Start: 2022-06-29 | End: 2022-06-29

## 2022-06-29 RX ORDER — NICOTINE POLACRILEX 4 MG
15 LOZENGE BUCCAL
Status: DISCONTINUED | OUTPATIENT
Start: 2022-06-29 | End: 2022-07-04 | Stop reason: HOSPADM

## 2022-06-29 RX ORDER — NICARDIPINE HYDROCHLORIDE 2.5 MG/ML
INJECTION INTRAVENOUS AS NEEDED
Status: DISCONTINUED | OUTPATIENT
Start: 2022-06-29 | End: 2022-06-29 | Stop reason: SURG

## 2022-06-29 RX ORDER — ENOXAPARIN SODIUM 100 MG/ML
40 INJECTION SUBCUTANEOUS DAILY
Status: DISCONTINUED | OUTPATIENT
Start: 2022-07-01 | End: 2022-07-04 | Stop reason: HOSPADM

## 2022-06-29 RX ORDER — ALBUMIN, HUMAN INJ 5% 5 %
12.5 SOLUTION INTRAVENOUS AS NEEDED
Status: DISCONTINUED | OUTPATIENT
Start: 2022-06-29 | End: 2022-06-30

## 2022-06-29 RX ORDER — NOREPINEPHRINE BIT/0.9 % NACL 8 MG/250ML
.02-.06 INFUSION BOTTLE (ML) INTRAVENOUS CONTINUOUS PRN
Status: DISCONTINUED | OUTPATIENT
Start: 2022-06-29 | End: 2022-06-30

## 2022-06-29 RX ORDER — ACETAMINOPHEN 160 MG/5ML
650 SOLUTION ORAL EVERY 6 HOURS
Status: COMPLETED | OUTPATIENT
Start: 2022-06-29 | End: 2022-06-30

## 2022-06-29 RX ORDER — POTASSIUM CHLORIDE 7.45 MG/ML
INJECTION INTRAVENOUS
Status: DISPENSED
Start: 2022-06-29 | End: 2022-06-29

## 2022-06-29 RX ORDER — MIDAZOLAM HYDROCHLORIDE 1 MG/ML
INJECTION INTRAMUSCULAR; INTRAVENOUS AS NEEDED
Status: DISCONTINUED | OUTPATIENT
Start: 2022-06-29 | End: 2022-06-29 | Stop reason: SURG

## 2022-06-29 RX ORDER — XYLITOL/YERBA SANTA
2 AEROSOL, SPRAY WITH PUMP (ML) MUCOUS MEMBRANE EVERY 4 HOURS PRN
Status: DISCONTINUED | OUTPATIENT
Start: 2022-06-29 | End: 2022-06-30

## 2022-06-29 RX ORDER — HEPARIN SODIUM 1000 [USP'U]/ML
INJECTION, SOLUTION INTRAVENOUS; SUBCUTANEOUS AS NEEDED
Status: DISCONTINUED | OUTPATIENT
Start: 2022-06-29 | End: 2022-06-29 | Stop reason: SURG

## 2022-06-29 RX ORDER — ACETAMINOPHEN 160 MG/5ML
650 SOLUTION ORAL EVERY 4 HOURS PRN
Status: DISCONTINUED | OUTPATIENT
Start: 2022-06-30 | End: 2022-07-02

## 2022-06-29 RX ORDER — KETAMINE HCL IN NACL, ISO-OSM 100MG/10ML
SYRINGE (ML) INJECTION AS NEEDED
Status: DISCONTINUED | OUTPATIENT
Start: 2022-06-29 | End: 2022-06-29 | Stop reason: SURG

## 2022-06-29 RX ORDER — ACETAMINOPHEN 650 MG/1
650 SUPPOSITORY RECTAL EVERY 4 HOURS PRN
Status: DISCONTINUED | OUTPATIENT
Start: 2022-06-30 | End: 2022-07-02

## 2022-06-29 RX ORDER — VECURONIUM BROMIDE 1 MG/ML
INJECTION, POWDER, LYOPHILIZED, FOR SOLUTION INTRAVENOUS AS NEEDED
Status: DISCONTINUED | OUTPATIENT
Start: 2022-06-29 | End: 2022-06-29 | Stop reason: SURG

## 2022-06-29 RX ORDER — ALBUMIN, HUMAN INJ 5% 5 %
SOLUTION INTRAVENOUS
Status: COMPLETED
Start: 2022-06-29 | End: 2022-06-29

## 2022-06-29 RX ORDER — MAGNESIUM SULFATE 1 G/100ML
1 INJECTION INTRAVENOUS EVERY 8 HOURS
Status: COMPLETED | OUTPATIENT
Start: 2022-06-29 | End: 2022-06-30

## 2022-06-29 RX ORDER — DEXTROAMPHETAMINE SACCHARATE, AMPHETAMINE ASPARTATE, DEXTROAMPHETAMINE SULFATE AND AMPHETAMINE SULFATE 2.5; 2.5; 2.5; 2.5 MG/1; MG/1; MG/1; MG/1
10 TABLET ORAL 2 TIMES DAILY
Qty: 60 TABLET | Refills: 0 | Status: CANCELLED | OUTPATIENT
Start: 2022-06-29 | End: 2022-07-29

## 2022-06-29 RX ORDER — MEPERIDINE HYDROCHLORIDE 25 MG/ML
25 INJECTION INTRAMUSCULAR; INTRAVENOUS; SUBCUTANEOUS ONCE AS NEEDED
Status: DISCONTINUED | OUTPATIENT
Start: 2022-06-29 | End: 2022-06-30

## 2022-06-29 RX ORDER — ACETAMINOPHEN 325 MG/1
650 TABLET ORAL EVERY 6 HOURS
Status: COMPLETED | OUTPATIENT
Start: 2022-06-29 | End: 2022-06-30

## 2022-06-29 RX ADMIN — DIAZEPAM 5 MG: 2 TABLET ORAL at 16:54

## 2022-06-29 RX ADMIN — MUPIROCIN 1 APPLICATION: 20 OINTMENT TOPICAL at 20:18

## 2022-06-29 RX ADMIN — CHLORHEXIDINE GLUCONATE 1 APPLICATION: 500 CLOTH TOPICAL at 05:47

## 2022-06-29 RX ADMIN — BUDESONIDE 0.5 MG: 0.5 INHALANT RESPIRATORY (INHALATION) at 14:30

## 2022-06-29 RX ADMIN — ALBUMIN HUMAN 12.5 G: 0.05 INJECTION, SOLUTION INTRAVENOUS at 13:37

## 2022-06-29 RX ADMIN — SODIUM CHLORIDE 30 ML/HR: 0.9 INJECTION, SOLUTION INTRAVENOUS at 13:33

## 2022-06-29 RX ADMIN — SODIUM CHLORIDE 5 MG/HR: 9 INJECTION, SOLUTION INTRAVENOUS at 11:22

## 2022-06-29 RX ADMIN — HYDROCODONE BITARTRATE AND ACETAMINOPHEN 1 TABLET: 5; 325 TABLET ORAL at 19:24

## 2022-06-29 RX ADMIN — CHLORHEXIDINE GLUCONATE 15 ML: 1.2 SOLUTION ORAL at 20:18

## 2022-06-29 RX ADMIN — MAGNESIUM SULFATE HEPTAHYDRATE 2 G: 500 INJECTION, SOLUTION INTRAMUSCULAR; INTRAVENOUS at 10:41

## 2022-06-29 RX ADMIN — MORPHINE SULFATE 2 MG: 2 INJECTION, SOLUTION INTRAMUSCULAR; INTRAVENOUS at 22:21

## 2022-06-29 RX ADMIN — IPRATROPIUM BROMIDE AND ALBUTEROL SULFATE 3 ML: .5; 3 SOLUTION RESPIRATORY (INHALATION) at 14:31

## 2022-06-29 RX ADMIN — PANTOPRAZOLE SODIUM 40 MG: 40 INJECTION, POWDER, FOR SOLUTION INTRAVENOUS at 16:53

## 2022-06-29 RX ADMIN — MORPHINE SULFATE 2 MG: 2 INJECTION, SOLUTION INTRAMUSCULAR; INTRAVENOUS at 17:24

## 2022-06-29 RX ADMIN — VECURONIUM BROMIDE 4 MG: 10 INJECTION, POWDER, LYOPHILIZED, FOR SOLUTION INTRAVENOUS at 09:41

## 2022-06-29 RX ADMIN — PANTOPRAZOLE SODIUM 40 MG: 40 TABLET, DELAYED RELEASE ORAL at 05:46

## 2022-06-29 RX ADMIN — Medication 3 G: at 07:40

## 2022-06-29 RX ADMIN — FENTANYL CITRATE 100 MCG: 0.05 INJECTION, SOLUTION INTRAMUSCULAR; INTRAVENOUS at 11:05

## 2022-06-29 RX ADMIN — VECURONIUM BROMIDE 6 MG: 10 INJECTION, POWDER, LYOPHILIZED, FOR SOLUTION INTRAVENOUS at 09:24

## 2022-06-29 RX ADMIN — ONDANSETRON 4 MG: 2 INJECTION INTRAMUSCULAR; INTRAVENOUS at 18:05

## 2022-06-29 RX ADMIN — AMINOCAPROIC ACID 10 G: 250 INJECTION, SOLUTION INTRAVENOUS at 07:12

## 2022-06-29 RX ADMIN — CEFAZOLIN SODIUM 3 G: 10 INJECTION, POWDER, FOR SOLUTION INTRAVENOUS at 17:42

## 2022-06-29 RX ADMIN — ALBUMIN HUMAN 12.5 G: 0.05 INJECTION, SOLUTION INTRAVENOUS at 16:21

## 2022-06-29 RX ADMIN — Medication 0.01 MCG/KG/MIN: at 10:45

## 2022-06-29 RX ADMIN — ALBUMIN HUMAN 12.5 G: 0.05 INJECTION, SOLUTION INTRAVENOUS at 12:30

## 2022-06-29 RX ADMIN — NICARDIPINE HYDROCHLORIDE 1 MG: 25 INJECTION, SOLUTION INTRAVENOUS at 11:04

## 2022-06-29 RX ADMIN — ASPIRIN 325 MG ORAL TABLET 325 MG: 325 PILL ORAL at 16:53

## 2022-06-29 RX ADMIN — SODIUM CHLORIDE 6 MG/HR: 9 INJECTION, SOLUTION INTRAVENOUS at 14:27

## 2022-06-29 RX ADMIN — MORPHINE SULFATE 2 MG: 2 INJECTION, SOLUTION INTRAMUSCULAR; INTRAVENOUS at 19:25

## 2022-06-29 RX ADMIN — ETOMIDATE 14 MG: 40 INJECTION, SOLUTION INTRAVENOUS at 07:12

## 2022-06-29 RX ADMIN — NICARDIPINE HYDROCHLORIDE 1 MG: 25 INJECTION, SOLUTION INTRAVENOUS at 11:09

## 2022-06-29 RX ADMIN — MAGNESIUM SULFATE 1 G: 1 INJECTION INTRAVENOUS at 16:54

## 2022-06-29 RX ADMIN — ALPRAZOLAM 1 MG: 1 TABLET ORAL at 05:54

## 2022-06-29 RX ADMIN — MORPHINE SULFATE 2 MG: 2 INJECTION, SOLUTION INTRAMUSCULAR; INTRAVENOUS at 15:43

## 2022-06-29 RX ADMIN — ACETAMINOPHEN ORAL SOLUTION 649.6 MG: 650 SOLUTION ORAL at 16:53

## 2022-06-29 RX ADMIN — PROTAMINE SULFATE 350 MG: 10 INJECTION, SOLUTION INTRAVENOUS at 10:55

## 2022-06-29 RX ADMIN — LEVOTHYROXINE SODIUM 50 MCG: 0.05 TABLET ORAL at 05:46

## 2022-06-29 RX ADMIN — FENTANYL CITRATE 1900 MCG: 0.05 INJECTION, SOLUTION INTRAMUSCULAR; INTRAVENOUS at 07:12

## 2022-06-29 RX ADMIN — METOPROLOL TARTRATE 12.5 MG: 25 TABLET, FILM COATED ORAL at 05:45

## 2022-06-29 RX ADMIN — NICARDIPINE HYDROCHLORIDE 1 MG: 25 INJECTION, SOLUTION INTRAVENOUS at 11:14

## 2022-06-29 RX ADMIN — DEXMEDETOMIDINE HYDROCHLORIDE 0.5 MCG/KG/HR: 100 INJECTION, SOLUTION INTRAVENOUS at 07:12

## 2022-06-29 RX ADMIN — SODIUM CHLORIDE 1 UNITS/HR: 9 INJECTION, SOLUTION INTRAVENOUS at 07:12

## 2022-06-29 RX ADMIN — DEXMEDETOMIDINE HYDROCHLORIDE 0.5 MCG/KG/HR: 4 INJECTION, SOLUTION INTRAVENOUS at 13:33

## 2022-06-29 RX ADMIN — AMINOCAPROIC ACID 10 G: 250 INJECTION, SOLUTION INTRAVENOUS at 10:59

## 2022-06-29 RX ADMIN — NITROGLYCERIN 20 MCG/MIN: 20 INJECTION INTRAVENOUS at 10:57

## 2022-06-29 RX ADMIN — MIDAZOLAM 6 MG: 1 INJECTION, SOLUTION INTRAMUSCULAR; INTRAVENOUS at 07:01

## 2022-06-29 RX ADMIN — VECURONIUM BROMIDE 14 MG: 10 INJECTION, POWDER, LYOPHILIZED, FOR SOLUTION INTRAVENOUS at 07:12

## 2022-06-29 RX ADMIN — SODIUM CHLORIDE 2.5 MG/HR: 9 INJECTION, SOLUTION INTRAVENOUS at 11:15

## 2022-06-29 RX ADMIN — ACETAMINOPHEN 325 MG: 325 TABLET ORAL at 23:43

## 2022-06-29 RX ADMIN — HYDROCODONE BITARTRATE AND ACETAMINOPHEN 1 TABLET: 5; 325 TABLET ORAL at 23:42

## 2022-06-29 RX ADMIN — VECURONIUM BROMIDE 4 MG: 10 INJECTION, POWDER, LYOPHILIZED, FOR SOLUTION INTRAVENOUS at 10:26

## 2022-06-29 RX ADMIN — MORPHINE SULFATE 2 MG: 2 INJECTION, SOLUTION INTRAMUSCULAR; INTRAVENOUS at 14:28

## 2022-06-29 RX ADMIN — CEFAZOLIN 3 G: 2 INJECTION, POWDER, FOR SOLUTION INTRAMUSCULAR; INTRAVENOUS at 10:59

## 2022-06-29 RX ADMIN — SODIUM CHLORIDE: 0.9 INJECTION, SOLUTION INTRAVENOUS at 06:02

## 2022-06-29 RX ADMIN — GUAIFENESIN 600 MG: 600 TABLET, EXTENDED RELEASE ORAL at 21:10

## 2022-06-29 RX ADMIN — Medication 30 MG: at 07:01

## 2022-06-29 RX ADMIN — HEPARIN SODIUM 30000 UNITS: 1000 INJECTION, SOLUTION INTRAVENOUS; SUBCUTANEOUS at 09:18

## 2022-06-29 RX ADMIN — VECURONIUM BROMIDE 6 MG: 10 INJECTION, POWDER, LYOPHILIZED, FOR SOLUTION INTRAVENOUS at 10:59

## 2022-06-29 RX ADMIN — HYDROCODONE BITARTRATE AND ACETAMINOPHEN 1 TABLET: 5; 325 TABLET ORAL at 15:43

## 2022-06-29 RX ADMIN — Medication 20 MG: at 07:06

## 2022-06-29 RX ADMIN — VECURONIUM BROMIDE 6 MG: 10 INJECTION, POWDER, LYOPHILIZED, FOR SOLUTION INTRAVENOUS at 11:52

## 2022-06-29 NOTE — ANESTHESIA PROCEDURE NOTES
Central Line      Central Line Procedure  Catheter Type:[place]-Tabitha  Additional Notes  [place] Placed at time of Cordis

## 2022-06-29 NOTE — ANESTHESIA PROCEDURE NOTES
Emergent/Open-Heart Anesthesia NIELS    Procedure Performed: Emergent/Open-Heart Anesthesia NIELS     Start Time:        End Time:        General Procedure Information  NIELS Placed for monitoring purposes only -- This is not a diagnostic NIELS  Location performed:  OR  Intubated  Bite block placed  Heart visualized  Probe Insertion:  Easy  Probe Type:  Biplane  Modalities:  Color flow mapping, continuous wave Doppler and 2D only    Echocardiographic and Doppler Measurements    Ventricles    Left Ventricle:  Global Function normal.  Ejection Fraction 55%.                                  Anesthesia Information  Performed Personally  Anesthesiologist:  Jesu Watson MD      Echocardiogram Comments:       Pre CPB:         55  Post CPB: no changes

## 2022-06-29 NOTE — ANESTHESIA PROCEDURE NOTES
Arterial Line      Patient reassessed immediately prior to procedure    Patient location during procedure: OR  Start time: 6/29/2022 7:11 AM  Stop Time:6/29/2022 7:15 AM       Line placed for hemodynamic monitoring.  Performed By   Anesthesiologist: Jesu Watson MD  Preanesthetic Checklist  Completed: patient identified, IV checked, site marked, risks and benefits discussed, surgical consent, monitors and equipment checked, pre-op evaluation and timeout performed  Arterial Line Prep   Sterile Tech: gloves, mask and cap  Prep: ChloraPrep  Patient monitoring: continuous pulse oximetry, EKG and blood pressure monitoring  Arterial Line Procedure   Laterality:left  Location:  radial artery  Catheter size: 22 G   Guidance: palpation technique  Number of attempts: 1  Successful placement: yes  Post Assessment   Dressing Type: occlusive dressing applied, secured with tape and wrist guard applied.   Complications no  Circ/Move/Sens Assessment: normal.   Patient Tolerance: patient tolerated the procedure well with no apparent complications

## 2022-06-29 NOTE — ANESTHESIA PROCEDURE NOTES
Central Line      Patient reassessed immediately prior to procedure    Patient location during procedure: OR  Start time: 6/29/2022 7:20 AM  Stop Time:6/29/2022 7:30 AM  Indications: central pressure monitoring, vascular access and MD/Surgeon request  Staff  Anesthesiologist: Jesu Watson MD  Preanesthetic Checklist  Completed: patient identified, IV checked, site marked, risks and benefits discussed, surgical consent, monitors and equipment checked, pre-op evaluation and timeout performed  Central Line Prep  Sterile Tech:cap, gloves, gown, mask and sterile barriers  Prep: chloraprep  Patient monitoring: blood pressure monitoring, continuous pulse oximetry and EKG  Central Line Procedure  Laterality:right  Location:internal jugular  Catheter Type:Cordis and double lumen  Catheter Size:9 Fr  Guidance:ultrasound guided  PROCEDURE NOTE/ULTRASOUND INTERPRETATION.  Using ultrasound guidance the potential vascular sites for insertion of the catheter were visualized to determine the patency of the vessel to be used for vascular access.  After selecting the appropriate site for insertion, the needle was visualized under ultrasound being inserted into the internal jugular vein, followed by ultrasound confirmation of wire and catheter placement. There were no abnormalities seen on ultrasound; an image was taken; and the patient tolerated the procedure with no complications. Images: still images obtained, printed/placed on chart  Assessment  Post procedure:biopatch applied, line sutured and occlusive dressing applied  Assessement:blood return through all ports, free fluid flow and Luke Test  Complications:no  Patient Tolerance:patient tolerated the procedure well with no apparent complications

## 2022-06-29 NOTE — ANESTHESIA POSTPROCEDURE EVALUATION
Patient: Tramaine Gates    Procedure Summary     Date: 06/29/22 Room / Location: [facility] CV[facility] 02 / [facility]    Anesthesia Start: 0700 Anesthesia Stop: 1233    Procedure: CORONARY ARTERY BYPASS GRAFTING (N/A Chest) Diagnosis:       CAD, multiple vessel      (CAD, multiple vessel [I25.10])    Surgeons: Pablo Blal MD Provider: Jesu Watson MD    Anesthesia Type: general ASA Status: 4          Anesthesia Type: general    Vitals  Vitals Value Taken Time   BP 93/57 06/29/22 1601   Temp 98.6 °F (37 °C) 06/29/22 1658   Pulse 89 06/29/22 1658   Resp 14 06/29/22 1435   SpO2 98 % 06/29/22 1658   Vitals shown include unvalidated device data.        Post Anesthesia Care and Evaluation    Patient location during evaluation: ICU  Patient participation: complete - patient cannot participate  Level of consciousness: obtunded/minimal responses  Pain scale: See nurse's notes for pain score.  Pain management: adequate    Airway patency: patent  Anesthetic complications: No anesthetic complications  PONV Status: none  Cardiovascular status: acceptable  Respiratory status: acceptable  Hydration status: acceptable    Comments: Patient seen and examined postoperatively; vital signs stable; SpO2 greater than or equal to 90%; cardiopulmonary status stable; nausea/vomiting adequately controlled; pain adequately controlled; no apparent anesthesia complications; patient discharged from anesthesia care when discharge criteria were met

## 2022-06-29 NOTE — ANESTHESIA PREPROCEDURE EVALUATION
Anesthesia Evaluation     Patient summary reviewed and Nursing notes reviewed   NPO Solid Status: > 8 hours  NPO Liquid Status: > 8 hours           Airway   Mallampati: II  TM distance: >3 FB  Neck ROM: full  No difficulty expected  Dental - normal exam     Pulmonary - normal exam    breath sounds clear to auscultation  (+) asthma,  Cardiovascular - normal exam  Exercise tolerance: unable to assess    ECG reviewed  Rhythm: regular  Rate: normal    (+) hypertension, past MI , CAD, hyperlipidemia,     ROS comment: NL EF, Inf Aneursm    Neuro/Psych- negative ROS  GI/Hepatic/Renal/Endo    (+) morbid obesity, GERD, PUD,  thyroid problem hypothyroidism    Musculoskeletal (-) negative ROS    Abdominal  - normal exam   Substance History - negative use     OB/GYN negative ob/gyn ROS         Other - negative ROS                       Anesthesia Plan    ASA 4     general     intravenous induction     Anesthetic plan, risks, benefits, and alternatives have been provided, discussed and informed consent has been obtained with: patient.        CODE STATUS:    Code Status (Patient has no pulse and is not breathing): CPR (Attempt to Resuscitate)  Medical Interventions (Patient has pulse or is breathing): Full Support

## 2022-06-29 NOTE — ANESTHESIA PROCEDURE NOTES
Airway  Urgency: elective    Date/Time: 6/29/2022 7:11 AM  Airway not difficult    General Information and Staff    Patient location during procedure: OR  Anesthesiologist: Jesu Watson MD    Indications and Patient Condition  Indications for airway management: airway protection    Preoxygenated: yes  Mask difficulty assessment: 1 - vent by mask    Final Airway Details  Final airway type: endotracheal airway      Successful airway: ETT  Cuffed: yes   Successful intubation technique: direct laryngoscopy  Facilitating devices/methods: intubating stylet  Endotracheal tube insertion site: oral  Blade: [place]  Blade size: 3  ETT size (mm): 7.5  Cormack-Lehane Classification: grade I - full view of glottis  Placement verified by: chest auscultation, capnometry and palpation of cuff   Measured from: lips  ETT/EBT  to lips (cm): 24  Number of attempts at approach: 1  Assessment: lips, teeth, and gum same as pre-op and atraumatic intubation

## 2022-06-30 ENCOUNTER — APPOINTMENT (OUTPATIENT)
Dept: GENERAL RADIOLOGY | Facility: HOSPITAL | Age: 37
End: 2022-06-30

## 2022-06-30 LAB
ALBUMIN SERPL-MCNC: 4.7 G/DL (ref 3.5–5.2)
ANION GAP SERPL CALCULATED.3IONS-SCNC: 12 MMOL/L (ref 5–15)
BASOPHILS # BLD AUTO: 0 10*3/MM3 (ref 0–0.2)
BASOPHILS NFR BLD AUTO: 0.3 % (ref 0–1.5)
BUN SERPL-MCNC: 12 MG/DL (ref 6–20)
BUN/CREAT SERPL: 11.2 (ref 7–25)
CA-I SERPL ISE-MCNC: 1.21 MMOL/L (ref 1.2–1.3)
CALCIUM SPEC-SCNC: 8.6 MG/DL (ref 8.6–10.5)
CHLORIDE SERPL-SCNC: 101 MMOL/L (ref 98–107)
CO2 SERPL-SCNC: 22 MMOL/L (ref 22–29)
CREAT SERPL-MCNC: 1.07 MG/DL (ref 0.76–1.27)
DEPRECATED RDW RBC AUTO: 41.1 FL (ref 37–54)
EGFRCR SERPLBLD CKD-EPI 2021: 91.1 ML/MIN/1.73
EOSINOPHIL # BLD AUTO: 0 10*3/MM3 (ref 0–0.4)
EOSINOPHIL NFR BLD AUTO: 0.1 % (ref 0.3–6.2)
ERYTHROCYTE [DISTWIDTH] IN BLOOD BY AUTOMATED COUNT: 13.6 % (ref 12.3–15.4)
GLUCOSE BLDC GLUCOMTR-MCNC: 119 MG/DL (ref 70–105)
GLUCOSE BLDC GLUCOMTR-MCNC: 121 MG/DL (ref 70–105)
GLUCOSE BLDC GLUCOMTR-MCNC: 122 MG/DL (ref 70–105)
GLUCOSE BLDC GLUCOMTR-MCNC: 125 MG/DL (ref 70–105)
GLUCOSE BLDC GLUCOMTR-MCNC: 126 MG/DL (ref 70–105)
GLUCOSE BLDC GLUCOMTR-MCNC: 126 MG/DL (ref 70–105)
GLUCOSE BLDC GLUCOMTR-MCNC: 88 MG/DL (ref 70–105)
GLUCOSE SERPL-MCNC: 133 MG/DL (ref 65–99)
HCT VFR BLD AUTO: 28.8 % (ref 37.5–51)
HGB BLD-MCNC: 9.7 G/DL (ref 13–17.7)
INR PPP: 1.03 (ref 0.93–1.1)
LYMPHOCYTES # BLD AUTO: 0.6 10*3/MM3 (ref 0.7–3.1)
LYMPHOCYTES NFR BLD AUTO: 5.7 % (ref 19.6–45.3)
MAGNESIUM SERPL-MCNC: 2.9 MG/DL (ref 1.6–2.6)
MCH RBC QN AUTO: 29 PG (ref 26.6–33)
MCHC RBC AUTO-ENTMCNC: 33.9 G/DL (ref 31.5–35.7)
MCV RBC AUTO: 85.6 FL (ref 79–97)
MONOCYTES # BLD AUTO: 0.5 10*3/MM3 (ref 0.1–0.9)
MONOCYTES NFR BLD AUTO: 4.4 % (ref 5–12)
NEUTROPHILS NFR BLD AUTO: 10 10*3/MM3 (ref 1.7–7)
NEUTROPHILS NFR BLD AUTO: 89.5 % (ref 42.7–76)
NRBC BLD AUTO-RTO: 0 /100 WBC (ref 0–0.2)
PHOSPHATE SERPL-MCNC: 5.5 MG/DL (ref 2.5–4.5)
PLATELET # BLD AUTO: 217 10*3/MM3 (ref 140–450)
PMV BLD AUTO: 7.2 FL (ref 6–12)
POTASSIUM SERPL-SCNC: 4.4 MMOL/L (ref 3.5–5.2)
PROTHROMBIN TIME: 10.6 SECONDS (ref 9.6–11.7)
QT INTERVAL: 358 MS
RBC # BLD AUTO: 3.36 10*6/MM3 (ref 4.14–5.8)
SODIUM SERPL-SCNC: 135 MMOL/L (ref 136–145)
WBC NRBC COR # BLD: 11.2 10*3/MM3 (ref 3.4–10.8)

## 2022-06-30 PROCEDURE — 25010000002 KETOROLAC TROMETHAMINE PER 15 MG: Performed by: REGISTERED NURSE

## 2022-06-30 PROCEDURE — 82962 GLUCOSE BLOOD TEST: CPT

## 2022-06-30 PROCEDURE — 99232 SBSQ HOSP IP/OBS MODERATE 35: CPT | Performed by: INTERNAL MEDICINE

## 2022-06-30 PROCEDURE — 99024 POSTOP FOLLOW-UP VISIT: CPT | Performed by: NURSE PRACTITIONER

## 2022-06-30 PROCEDURE — 85610 PROTHROMBIN TIME: CPT | Performed by: NURSE PRACTITIONER

## 2022-06-30 PROCEDURE — 82330 ASSAY OF CALCIUM: CPT | Performed by: NURSE PRACTITIONER

## 2022-06-30 PROCEDURE — 25010000002 MAGNESIUM SULFATE IN D5W 1G/100ML (PREMIX) 1-5 GM/100ML-% SOLUTION: Performed by: NURSE PRACTITIONER

## 2022-06-30 PROCEDURE — 80069 RENAL FUNCTION PANEL: CPT | Performed by: NURSE PRACTITIONER

## 2022-06-30 PROCEDURE — 25010000002 MORPHINE PER 10 MG: Performed by: NURSE PRACTITIONER

## 2022-06-30 PROCEDURE — 25010000002 CEFAZOLIN PER 500 MG: Performed by: NURSE PRACTITIONER

## 2022-06-30 PROCEDURE — 97162 PT EVAL MOD COMPLEX 30 MIN: CPT

## 2022-06-30 PROCEDURE — 71045 X-RAY EXAM CHEST 1 VIEW: CPT

## 2022-06-30 PROCEDURE — 25010000002 ONDANSETRON PER 1 MG: Performed by: NURSE PRACTITIONER

## 2022-06-30 PROCEDURE — 85025 COMPLETE CBC W/AUTO DIFF WBC: CPT | Performed by: NURSE PRACTITIONER

## 2022-06-30 PROCEDURE — 25010000002 FUROSEMIDE PER 20 MG: Performed by: NURSE PRACTITIONER

## 2022-06-30 PROCEDURE — 83735 ASSAY OF MAGNESIUM: CPT | Performed by: NURSE PRACTITIONER

## 2022-06-30 PROCEDURE — 93005 ELECTROCARDIOGRAM TRACING: CPT | Performed by: NURSE PRACTITIONER

## 2022-06-30 PROCEDURE — 93010 ELECTROCARDIOGRAM REPORT: CPT | Performed by: INTERNAL MEDICINE

## 2022-06-30 PROCEDURE — 97166 OT EVAL MOD COMPLEX 45 MIN: CPT

## 2022-06-30 RX ORDER — FUROSEMIDE 10 MG/ML
20 INJECTION INTRAMUSCULAR; INTRAVENOUS ONCE
Status: COMPLETED | OUTPATIENT
Start: 2022-06-30 | End: 2022-06-30

## 2022-06-30 RX ORDER — KETOROLAC TROMETHAMINE 30 MG/ML
30 INJECTION, SOLUTION INTRAMUSCULAR; INTRAVENOUS EVERY 6 HOURS PRN
Status: COMPLETED | OUTPATIENT
Start: 2022-06-30 | End: 2022-06-30

## 2022-06-30 RX ORDER — LEVOTHYROXINE SODIUM 0.05 MG/1
50 TABLET ORAL
Status: DISCONTINUED | OUTPATIENT
Start: 2022-06-30 | End: 2022-07-04 | Stop reason: HOSPADM

## 2022-06-30 RX ADMIN — GUAIFENESIN 600 MG: 600 TABLET, EXTENDED RELEASE ORAL at 09:05

## 2022-06-30 RX ADMIN — ATORVASTATIN CALCIUM 40 MG: 40 TABLET, FILM COATED ORAL at 20:54

## 2022-06-30 RX ADMIN — MAGNESIUM SULFATE 1 G: 1 INJECTION INTRAVENOUS at 01:42

## 2022-06-30 RX ADMIN — MORPHINE SULFATE 2 MG: 2 INJECTION, SOLUTION INTRAMUSCULAR; INTRAVENOUS at 05:47

## 2022-06-30 RX ADMIN — KETOROLAC TROMETHAMINE 30 MG: 30 INJECTION, SOLUTION INTRAMUSCULAR at 16:39

## 2022-06-30 RX ADMIN — MUPIROCIN 1 APPLICATION: 20 OINTMENT TOPICAL at 20:54

## 2022-06-30 RX ADMIN — LEVOTHYROXINE SODIUM 50 MCG: 0.05 TABLET ORAL at 11:05

## 2022-06-30 RX ADMIN — CEFAZOLIN SODIUM 3 G: 10 INJECTION, POWDER, FOR SOLUTION INTRAVENOUS at 01:02

## 2022-06-30 RX ADMIN — SENNOSIDES AND DOCUSATE SODIUM 2 TABLET: 50; 8.6 TABLET ORAL at 09:05

## 2022-06-30 RX ADMIN — ASPIRIN 81 MG: 81 TABLET, COATED ORAL at 09:04

## 2022-06-30 RX ADMIN — HYDROCODONE BITARTRATE AND ACETAMINOPHEN 1 TABLET: 5; 325 TABLET ORAL at 23:39

## 2022-06-30 RX ADMIN — HYDROCODONE BITARTRATE AND ACETAMINOPHEN 1 TABLET: 5; 325 TABLET ORAL at 19:40

## 2022-06-30 RX ADMIN — CEFAZOLIN SODIUM 3 G: 10 INJECTION, POWDER, FOR SOLUTION INTRAVENOUS at 19:35

## 2022-06-30 RX ADMIN — HYDROCODONE BITARTRATE AND ACETAMINOPHEN 1 TABLET: 5; 325 TABLET ORAL at 09:05

## 2022-06-30 RX ADMIN — POLYETHYLENE GLYCOL 3350 17 G: 17 POWDER, FOR SOLUTION ORAL at 09:05

## 2022-06-30 RX ADMIN — MORPHINE SULFATE 2 MG: 2 INJECTION, SOLUTION INTRAMUSCULAR; INTRAVENOUS at 09:05

## 2022-06-30 RX ADMIN — DIAZEPAM 5 MG: 2 TABLET ORAL at 00:36

## 2022-06-30 RX ADMIN — METOPROLOL TARTRATE 12.5 MG: 25 TABLET, FILM COATED ORAL at 16:36

## 2022-06-30 RX ADMIN — DIAZEPAM 5 MG: 2 TABLET ORAL at 16:13

## 2022-06-30 RX ADMIN — FUROSEMIDE 20 MG: 10 INJECTION, SOLUTION INTRAMUSCULAR; INTRAVENOUS at 12:15

## 2022-06-30 RX ADMIN — CEFAZOLIN SODIUM 3 G: 10 INJECTION, POWDER, FOR SOLUTION INTRAVENOUS at 11:05

## 2022-06-30 RX ADMIN — SENNOSIDES AND DOCUSATE SODIUM 2 TABLET: 50; 8.6 TABLET ORAL at 20:54

## 2022-06-30 RX ADMIN — HYDROCODONE BITARTRATE AND ACETAMINOPHEN 1 TABLET: 5; 325 TABLET ORAL at 14:27

## 2022-06-30 RX ADMIN — ONDANSETRON 4 MG: 2 INJECTION INTRAMUSCULAR; INTRAVENOUS at 11:39

## 2022-06-30 RX ADMIN — CHLORHEXIDINE GLUCONATE 15 ML: 1.2 SOLUTION ORAL at 21:53

## 2022-06-30 RX ADMIN — CARIPRAZINE 3 MG: 3 CAPSULE, GELATIN COATED ORAL at 09:05

## 2022-06-30 RX ADMIN — PANTOPRAZOLE SODIUM 40 MG: 40 TABLET, DELAYED RELEASE ORAL at 05:32

## 2022-06-30 RX ADMIN — DIAZEPAM 5 MG: 2 TABLET ORAL at 23:39

## 2022-06-30 RX ADMIN — MORPHINE SULFATE 2 MG: 2 INJECTION, SOLUTION INTRAMUSCULAR; INTRAVENOUS at 16:17

## 2022-06-30 RX ADMIN — ACETAMINOPHEN 650 MG: 325 TABLET ORAL at 12:15

## 2022-06-30 RX ADMIN — KETOROLAC TROMETHAMINE 30 MG: 30 INJECTION, SOLUTION INTRAMUSCULAR at 22:33

## 2022-06-30 RX ADMIN — MORPHINE SULFATE 2 MG: 2 INJECTION, SOLUTION INTRAMUSCULAR; INTRAVENOUS at 12:15

## 2022-06-30 RX ADMIN — GUAIFENESIN 600 MG: 600 TABLET, EXTENDED RELEASE ORAL at 20:54

## 2022-06-30 RX ADMIN — POLYETHYLENE GLYCOL 3350 17 G: 17 POWDER, FOR SOLUTION ORAL at 20:54

## 2022-06-30 RX ADMIN — MORPHINE SULFATE 2 MG: 2 INJECTION, SOLUTION INTRAMUSCULAR; INTRAVENOUS at 14:27

## 2022-06-30 RX ADMIN — CHLORHEXIDINE GLUCONATE 15 ML: 1.2 SOLUTION ORAL at 09:04

## 2022-06-30 RX ADMIN — ACETAMINOPHEN 325 MG: 325 TABLET ORAL at 05:32

## 2022-06-30 RX ADMIN — MAGNESIUM SULFATE 1 G: 1 INJECTION INTRAVENOUS at 11:05

## 2022-06-30 RX ADMIN — HYDROCODONE BITARTRATE AND ACETAMINOPHEN 1 TABLET: 5; 325 TABLET ORAL at 04:24

## 2022-06-30 RX ADMIN — MORPHINE SULFATE 2 MG: 2 INJECTION, SOLUTION INTRAMUSCULAR; INTRAVENOUS at 20:54

## 2022-07-01 ENCOUNTER — APPOINTMENT (OUTPATIENT)
Dept: GENERAL RADIOLOGY | Facility: HOSPITAL | Age: 37
End: 2022-07-01

## 2022-07-01 LAB
ANION GAP SERPL CALCULATED.3IONS-SCNC: 12 MMOL/L (ref 5–15)
BUN SERPL-MCNC: 11 MG/DL (ref 6–20)
BUN/CREAT SERPL: 11.7 (ref 7–25)
CALCIUM SPEC-SCNC: 8.5 MG/DL (ref 8.6–10.5)
CHLORIDE SERPL-SCNC: 99 MMOL/L (ref 98–107)
CO2 SERPL-SCNC: 24 MMOL/L (ref 22–29)
CREAT SERPL-MCNC: 0.94 MG/DL (ref 0.76–1.27)
DEPRECATED RDW RBC AUTO: 41.6 FL (ref 37–54)
EGFRCR SERPLBLD CKD-EPI 2021: 106.4 ML/MIN/1.73
ERYTHROCYTE [DISTWIDTH] IN BLOOD BY AUTOMATED COUNT: 13.6 % (ref 12.3–15.4)
GLUCOSE BLDC GLUCOMTR-MCNC: 108 MG/DL (ref 70–105)
GLUCOSE BLDC GLUCOMTR-MCNC: 114 MG/DL (ref 70–105)
GLUCOSE BLDC GLUCOMTR-MCNC: 123 MG/DL (ref 70–105)
GLUCOSE BLDC GLUCOMTR-MCNC: 131 MG/DL (ref 70–105)
GLUCOSE SERPL-MCNC: 101 MG/DL (ref 65–99)
HCT VFR BLD AUTO: 27 % (ref 37.5–51)
HGB BLD-MCNC: 9.1 G/DL (ref 13–17.7)
MCH RBC QN AUTO: 29.1 PG (ref 26.6–33)
MCHC RBC AUTO-ENTMCNC: 33.6 G/DL (ref 31.5–35.7)
MCV RBC AUTO: 86.4 FL (ref 79–97)
PLATELET # BLD AUTO: 226 10*3/MM3 (ref 140–450)
PMV BLD AUTO: 7.5 FL (ref 6–12)
POTASSIUM SERPL-SCNC: 4 MMOL/L (ref 3.5–5.2)
QT INTERVAL: 338 MS
RBC # BLD AUTO: 3.12 10*6/MM3 (ref 4.14–5.8)
SODIUM SERPL-SCNC: 135 MMOL/L (ref 136–145)
WBC NRBC COR # BLD: 11.3 10*3/MM3 (ref 3.4–10.8)

## 2022-07-01 PROCEDURE — 71045 X-RAY EXAM CHEST 1 VIEW: CPT

## 2022-07-01 PROCEDURE — 97530 THERAPEUTIC ACTIVITIES: CPT

## 2022-07-01 PROCEDURE — 25010000002 ENOXAPARIN PER 10 MG: Performed by: NURSE PRACTITIONER

## 2022-07-01 PROCEDURE — 25010000002 FUROSEMIDE PER 20 MG: Performed by: NURSE PRACTITIONER

## 2022-07-01 PROCEDURE — 93010 ELECTROCARDIOGRAM REPORT: CPT | Performed by: INTERNAL MEDICINE

## 2022-07-01 PROCEDURE — 93005 ELECTROCARDIOGRAM TRACING: CPT | Performed by: NURSE PRACTITIONER

## 2022-07-01 PROCEDURE — 85027 COMPLETE CBC AUTOMATED: CPT | Performed by: NURSE PRACTITIONER

## 2022-07-01 PROCEDURE — 82962 GLUCOSE BLOOD TEST: CPT

## 2022-07-01 PROCEDURE — 97116 GAIT TRAINING THERAPY: CPT

## 2022-07-01 PROCEDURE — 99232 SBSQ HOSP IP/OBS MODERATE 35: CPT | Performed by: INTERNAL MEDICINE

## 2022-07-01 PROCEDURE — 99024 POSTOP FOLLOW-UP VISIT: CPT | Performed by: NURSE PRACTITIONER

## 2022-07-01 PROCEDURE — 25010000002 MORPHINE PER 10 MG: Performed by: NURSE PRACTITIONER

## 2022-07-01 PROCEDURE — 25010000002 ONDANSETRON PER 1 MG: Performed by: NURSE PRACTITIONER

## 2022-07-01 PROCEDURE — 80048 BASIC METABOLIC PNL TOTAL CA: CPT | Performed by: NURSE PRACTITIONER

## 2022-07-01 PROCEDURE — 25010000002 CEFAZOLIN PER 500 MG: Performed by: NURSE PRACTITIONER

## 2022-07-01 RX ORDER — CLOPIDOGREL BISULFATE 75 MG/1
75 TABLET ORAL DAILY
Status: DISCONTINUED | OUTPATIENT
Start: 2022-07-01 | End: 2022-07-04 | Stop reason: HOSPADM

## 2022-07-01 RX ORDER — POTASSIUM CHLORIDE 20 MEQ/1
20 TABLET, EXTENDED RELEASE ORAL ONCE
Status: COMPLETED | OUTPATIENT
Start: 2022-07-01 | End: 2022-07-01

## 2022-07-01 RX ORDER — FUROSEMIDE 10 MG/ML
40 INJECTION INTRAMUSCULAR; INTRAVENOUS ONCE
Status: COMPLETED | OUTPATIENT
Start: 2022-07-01 | End: 2022-07-01

## 2022-07-01 RX ORDER — INSULIN LISPRO 100 [IU]/ML
0-7 INJECTION, SOLUTION INTRAVENOUS; SUBCUTANEOUS AS NEEDED
Status: DISCONTINUED | OUTPATIENT
Start: 2022-07-02 | End: 2022-07-04 | Stop reason: HOSPADM

## 2022-07-01 RX ORDER — INSULIN LISPRO 100 [IU]/ML
0-7 INJECTION, SOLUTION INTRAVENOUS; SUBCUTANEOUS
Status: DISCONTINUED | OUTPATIENT
Start: 2022-07-02 | End: 2022-07-04

## 2022-07-01 RX ORDER — TRAMADOL HYDROCHLORIDE 50 MG/1
50 TABLET ORAL EVERY 6 HOURS PRN
Status: DISCONTINUED | OUTPATIENT
Start: 2022-07-01 | End: 2022-07-04 | Stop reason: HOSPADM

## 2022-07-01 RX ADMIN — ENOXAPARIN SODIUM 40 MG: 100 INJECTION SUBCUTANEOUS at 16:19

## 2022-07-01 RX ADMIN — LEVOTHYROXINE SODIUM 50 MCG: 0.05 TABLET ORAL at 05:47

## 2022-07-01 RX ADMIN — SENNOSIDES AND DOCUSATE SODIUM 2 TABLET: 50; 8.6 TABLET ORAL at 21:05

## 2022-07-01 RX ADMIN — HYDROCODONE BITARTRATE AND ACETAMINOPHEN 1 TABLET: 5; 325 TABLET ORAL at 04:11

## 2022-07-01 RX ADMIN — DIAZEPAM 5 MG: 2 TABLET ORAL at 21:05

## 2022-07-01 RX ADMIN — CHLORHEXIDINE GLUCONATE 15 ML: 1.2 SOLUTION ORAL at 09:20

## 2022-07-01 RX ADMIN — GUAIFENESIN 600 MG: 600 TABLET, EXTENDED RELEASE ORAL at 09:20

## 2022-07-01 RX ADMIN — ONDANSETRON 4 MG: 2 INJECTION INTRAMUSCULAR; INTRAVENOUS at 21:09

## 2022-07-01 RX ADMIN — MORPHINE SULFATE 2 MG: 2 INJECTION, SOLUTION INTRAMUSCULAR; INTRAVENOUS at 03:30

## 2022-07-01 RX ADMIN — METOPROLOL TARTRATE 25 MG: 25 TABLET, FILM COATED ORAL at 21:05

## 2022-07-01 RX ADMIN — GUAIFENESIN 600 MG: 600 TABLET, EXTENDED RELEASE ORAL at 21:05

## 2022-07-01 RX ADMIN — POLYETHYLENE GLYCOL 3350 17 G: 17 POWDER, FOR SOLUTION ORAL at 21:05

## 2022-07-01 RX ADMIN — METOPROLOL TARTRATE 25 MG: 25 TABLET, FILM COATED ORAL at 09:27

## 2022-07-01 RX ADMIN — HYDROCODONE BITARTRATE AND ACETAMINOPHEN 1 TABLET: 5; 325 TABLET ORAL at 08:22

## 2022-07-01 RX ADMIN — POTASSIUM CHLORIDE 20 MEQ: 20 TABLET, EXTENDED RELEASE ORAL at 09:27

## 2022-07-01 RX ADMIN — CEFAZOLIN SODIUM 3 G: 10 INJECTION, POWDER, FOR SOLUTION INTRAVENOUS at 01:10

## 2022-07-01 RX ADMIN — FUROSEMIDE 40 MG: 10 INJECTION, SOLUTION INTRAMUSCULAR; INTRAVENOUS at 09:27

## 2022-07-01 RX ADMIN — CHLORHEXIDINE GLUCONATE 15 ML: 1.2 SOLUTION ORAL at 21:05

## 2022-07-01 RX ADMIN — ACETAMINOPHEN 650 MG: 325 TABLET, FILM COATED ORAL at 18:48

## 2022-07-01 RX ADMIN — MUPIROCIN 1 APPLICATION: 20 OINTMENT TOPICAL at 21:05

## 2022-07-01 RX ADMIN — MORPHINE SULFATE 2 MG: 2 INJECTION, SOLUTION INTRAMUSCULAR; INTRAVENOUS at 05:47

## 2022-07-01 RX ADMIN — MORPHINE SULFATE 2 MG: 2 INJECTION, SOLUTION INTRAMUSCULAR; INTRAVENOUS at 23:33

## 2022-07-01 RX ADMIN — ONDANSETRON 4 MG: 2 INJECTION INTRAMUSCULAR; INTRAVENOUS at 05:51

## 2022-07-01 RX ADMIN — ASPIRIN 81 MG: 81 TABLET, COATED ORAL at 09:20

## 2022-07-01 RX ADMIN — TRAMADOL HYDROCHLORIDE 50 MG: 50 TABLET, COATED ORAL at 21:05

## 2022-07-01 RX ADMIN — CARIPRAZINE 3 MG: 3 CAPSULE, GELATIN COATED ORAL at 09:19

## 2022-07-01 RX ADMIN — DIAZEPAM 2 MG: 2 TABLET ORAL at 09:20

## 2022-07-01 RX ADMIN — TRAMADOL HYDROCHLORIDE 50 MG: 50 TABLET, COATED ORAL at 15:04

## 2022-07-01 RX ADMIN — ATORVASTATIN CALCIUM 40 MG: 40 TABLET, FILM COATED ORAL at 21:05

## 2022-07-01 RX ADMIN — MUPIROCIN 1 APPLICATION: 20 OINTMENT TOPICAL at 09:20

## 2022-07-01 RX ADMIN — POLYETHYLENE GLYCOL 3350 17 G: 17 POWDER, FOR SOLUTION ORAL at 09:21

## 2022-07-01 RX ADMIN — SENNOSIDES AND DOCUSATE SODIUM 2 TABLET: 50; 8.6 TABLET ORAL at 09:19

## 2022-07-01 RX ADMIN — PANTOPRAZOLE SODIUM 40 MG: 40 TABLET, DELAYED RELEASE ORAL at 05:47

## 2022-07-01 RX ADMIN — CLOPIDOGREL BISULFATE 75 MG: 75 TABLET ORAL at 16:20

## 2022-07-01 NOTE — CASE MANAGEMENT/SOCIAL WORK
Continued Stay Note  [facility]     Patient Name: Tramaine Gates  MRN: [id number]  Today's Date: 7/1/2022    Admit Date: 6/22/2022     Discharge Plan     Row Name 07/01/22 1344       Plan    Plan DC Plan: Anticipate Routine Home with Family    Patient/Family in Agreement with Plan yes    Provided Post Acute Provider List? N/A    Provided Post Acute Provider Quality & Resource List? N/A    Plan Comments CM spoke with patient’s nurse and [facility] NP Berna Piña to obtain clinical updates. Plans to remove central line and Chest Tubes today.CM will continue to follow for any additional needs that may develop and adjust discharge plan accordingly. DC Barriers: POD 2 OHS, Pacer Wires, and Pain Control.              Expected Discharge Date and Time     Expected Discharge Date Expected Discharge Time    Jul 4, 2022         Phone communication or documentation only- no physical contact with patient or family.      Amaris Welch RN     Office Phone: [phone]  Office Cell:     [phone]

## 2022-07-01 NOTE — CONSULTS
Nutrition Services    Patient Name: Tramaine Gates  YOB: 1985  MRN: [id number]  Admission date: 6/22/2022    Comment:      PPE Documentation        PPE Worn By Provider mask, gloves and eye protection   PPE Worn By Patient  None      CLINICAL NUTRITION ASSESSMENT      Reason for Assessment 7/1: LOS x 9 days, NPO/clear liquid x 3 days prior to assessment      H&P      Past Medical History:   Diagnosis Date   • Acute inferior myocardial infarction (HCC) 6/14/2022   • Allergic    • Asthma 1/27/2022   • CAD, multiple vessel 6/14/2022   • Gastroesophageal reflux disease 1/27/2022   • Hx of complete eye exam 2016   • Hyperlipidemia 1/27/2022   • Hypertension    • Migraine headache 1/27/2022   • Panic attack 1/27/2022   • Peptic ulcer 9/14/2017       Past Surgical History:   Procedure Laterality Date   • CARDIAC CATHETERIZATION N/A 6/14/2022    Procedure: Left Heart Cath;  Surgeon: Matthieu Del Toro MD;  Location: [facility] CATH INVASIVE LOCATION;  Service: Cardiology;  Laterality: N/A;   • CARDIAC CATHETERIZATION N/A 6/16/2022    Procedure: Percutaneous Coronary Intervention;  Surgeon: Matthieu Del Toro MD;  Location: [facility] CATH INVASIVE LOCATION;  Service: Cardiovascular;  Laterality: N/A;   • CARDIAC CATHETERIZATION N/A 6/23/2022    Procedure: Left Heart Cath possible PCI, atherectomy, hemodynamic support;  Surgeon: Moises Haddad MD;  Location: [facility] CATH INVASIVE LOCATION;  Service: Cardiology;  Laterality: N/A;   • CHOLECYSTECTOMY  2019   • MANDIBLE SURGERY          Current Problems   Chest pain  -Cardiology consulted  -cardiac cath on 6/23/2022 showing to have multivessel disease  -S/P CABG x 3 6/29    Headache     Right leg pain     Hypertension/hyperlipidemia/bipolar disorder     Tobacco abuse       Encounter Information        Trending Narrative     7/1: RD visited patient at bedside.  Patient asleep in bed, RD left to rest.  Visually well nourished.       Anthropometrics        Current Height,  "Weight Height: 182.9 cm (72.01\")  Weight: 127 kg (280 lb) (07/01/22 0600)       Ideal Body Weight (IBW) 178#   Usual Body Weight (UBW) Unable to obtain from patient        Trending Weight Hx     This admission: 7/1: 265-280# range              PTA: Weight gain x 6 months     Wt Readings from Last 30 Encounters:   07/01/22 0600 127 kg (280 lb)   06/30/22 0600 124 kg (273 lb 5.9 oz)   06/29/22 0500 120 kg (265 lb 3.4 oz)   06/22/22 1742 123 kg (271 lb 15.7 oz)   06/22/22 1255 123 kg (272 lb)   06/17/22 0518 124 kg (272 lb 4.3 oz)   06/17/22 0213 124 kg (272 lb 4.3 oz)   06/14/22 1233 123 kg (270 lb 1 oz)   06/07/22 2201 122 kg (268 lb)   01/27/22 1448 122 kg (268 lb)   01/07/22 1633 117 kg (258 lb 9.6 oz)   03/12/18 1522 103 kg (226 lb)   09/14/17 1457 102 kg (225 lb)      BMI kg/m2 Body mass index is 37.97 kg/m².       Labs        Pertinent Labs    Results from last 7 days   Lab Units 07/01/22  0337 06/30/22  0403 06/29/22  1231 06/28/22  0856 06/27/22  0437   SODIUM mmol/L 135* 135* 137   < > 137   POTASSIUM mmol/L 4.0 4.4 5.0   < > 4.3   CHLORIDE mmol/L 99 101 104   < > 100   CO2 mmol/L 24.0 22.0 23.0   < > 26.0   BUN mg/dL 11 12 12   < > 7   CREATININE mg/dL 0.94 1.07 1.26   < > 1.31*   CALCIUM mg/dL 8.5* 8.6 8.9   < > 9.0   BILIRUBIN mg/dL  --   --   --   --  0.4   ALK PHOS U/L  --   --   --   --  99   ALT (SGPT) U/L  --   --   --   --  62*   AST (SGOT) U/L  --   --   --   --  30   GLUCOSE mg/dL 101* 133* 114*   < > 102*    < > = values in this interval not displayed.     Results from last 7 days   Lab Units 07/01/22  0337 06/30/22  0403 06/29/22  0755 06/27/22  0437   MAGNESIUM mg/dL  --  2.9*  --  2.0   PHOSPHORUS mg/dL  --  5.5*   < >  --    HEMOGLOBIN g/dL 9.1* 9.7*   < >  --    HEMOGLOBIN, POC   --   --    < >  --    HEMATOCRIT % 27.0* 28.8*   < >  --    HEMATOCRIT POC   --   --    < >  --    TRIGLYCERIDES mg/dL  --   --   --  135    < > = values in this interval not displayed.     COVID19   Date Value Ref " Range Status   06/28/2022 Not Detected Not Detected - Ref. Range Final     Lab Results   Component Value Date    HGBA1C 5.4 06/23/2022        Medications    Scheduled Medications aspirin, 81 mg, Oral, Daily  atorvastatin, 40 mg, Oral, Nightly  budesonide, 0.5 mg, Nebulization, BID - RT  Cariprazine HCl, 3 mg, Oral, QAM  chlorhexidine, 15 mL, Mouth/Throat, Q12H  enoxaparin, 40 mg, Subcutaneous, Daily  guaiFENesin, 600 mg, Oral, Q12H  ipratropium-albuterol, 3 mL, Nebulization, 4x Daily - RT  levothyroxine, 50 mcg, Oral, Q AM  metoprolol tartrate, 12.5 mg, Oral, Q12H  mupirocin, 1 application, Each Nare, BID  pantoprazole, 40 mg, Oral, Q AM  polyethylene glycol, 17 g, Oral, BID  senna-docusate sodium, 2 tablet, Oral, BID        Infusions insulin, 0-100 Units/hr, Last Rate: Stopped (06/29/22 1333)  sodium chloride, 30 mL/hr, Last Rate: 30 mL/hr (06/29/22 1333)        PRN Medications •  acetaminophen **OR** acetaminophen **OR** acetaminophen  •  dextrose  •  dextrose  •  diazePAM  •  glucagon (human recombinant)  •  HYDROcodone-acetaminophen  •  ipratropium-albuterol  •  Morphine **AND** naloxone  •  ondansetron  •  potassium chloride **OR** potassium chloride     Physical Findings        Trending Physical   Appearance, NFPE 7/1: Visually well nourished    --  Edema  1+ dependent   2+ generalized      Bowel Function Last BM 6/26 (x 5 days)      Tubes No feeding tube      Chewing/Swallowing No known issues      Skin 3 incisions      --  Current Nutrition Orders & Evaluation of Intake       Oral Nutrition     Food Allergies Shellfish   Current PO Diet Diet Cardiac; Healthy Heart   Supplement None ordered    PO Evaluation     Trending % PO Intake No intakes documented since surgery  100% PO intake for 1 meal documented prior to surgery    --  Nutritional Risk Screening        NRS-2002 Score          Nutrition Diagnosis         Nutrition Dx Problem 1 No nutritional diagnosis noted at this time, RD will continue to monitor  for any nutritional diagnosis that may arise.        Nutrition Dx Problem 2        Intervention Goal         Intervention Goal(s) PO intakes greater than 75%     Nutrition Intervention        RD Action Monitor PO intakes      Nutrition Prescription          Diet Prescription Heart Healthy    Supplement Prescription    --  Monitor/Evaluation        Monitor Per protocol, I&O, PO intake, Supplement intake, Pertinent labs, Weight, Skin status, GI status, Symptoms, POC/GOC, Hemodynamic stability       Electronically signed by:  Christin Perez RD  07/01/22 07:30 EDT     detailed exam

## 2022-07-01 NOTE — PROGRESS NOTES
LOS: 9 days   Admiting Physician- Pablo Ball, *    Reason For Followup:    Unstable angina  CAD  Coronary artery stenting  S/p CABG    Subjective     Patient is sitting up in chair.  Patient is complaining of sternal pain    Objective     Hemodynamics are stable    Review of Systems:   Review of Systems   Constitutional: Negative for chills and fever.   HENT: Negative for ear discharge and nosebleeds.    Eyes: Negative for discharge and redness.   Cardiovascular: Positive for chest pain. Negative for orthopnea, palpitations, paroxysmal nocturnal dyspnea and syncope.   Respiratory: Negative for cough, shortness of breath and wheezing.    Endocrine: Negative for heat intolerance.   Skin: Negative for rash.   Musculoskeletal: Negative for arthritis and myalgias.   Gastrointestinal: Negative for abdominal pain, melena, nausea and vomiting.   Genitourinary: Negative for dysuria and hematuria.   Neurological: Negative for dizziness, light-headedness, numbness and tremors.   Psychiatric/Behavioral: Negative for depression. The patient is not nervous/anxious.          Vital Signs  Vitals:    07/01/22 1400 07/01/22 1430 07/01/22 1500 07/01/22 1536   BP: 110/68 117/80 136/74 139/72   BP Location:    Right arm   Patient Position:    Sitting   Pulse: 89 91 97 96   Resp:    24   Temp:    98.2 °F (36.8 °C)   TempSrc:    Oral   SpO2: 96% 95% 91% 92%   Weight:       Height:         Wt Readings from Last 1 Encounters:   07/01/22 127 kg (280 lb)       Intake/Output Summary (Last 24 hours) at 7/1/2022 1626  Last data filed at 7/1/2022 1400  Gross per 24 hour   Intake 1020 ml   Output 4360 ml   Net -3340 ml     Physical Exam:  Constitutional:       Appearance: Well-developed.   Eyes:      General: No scleral icterus.        Right eye: No discharge.   HENT:      Head: Normocephalic and atraumatic.   Neck:      Thyroid: No thyromegaly.      Lymphadenopathy: No cervical adenopathy.   Pulmonary:      Effort: Pulmonary effort  is normal. No respiratory distress.      Breath sounds: Normal breath sounds. No wheezing. No rales.   Cardiovascular:      Normal rate. Regular rhythm.      No gallop.   Abdominal:      Tenderness: There is no abdominal tenderness.   Skin:     Findings: No erythema or rash.   Neurological:      Mental Status: Alert and oriented to person, place, and time.         Results Review:   Lab Results (last 24 hours)     Procedure Component Value Units Date/Time    POC Glucose Once [[id number]]  (Abnormal) Collected: 07/01/22 1222    Specimen: Blood Updated: 07/01/22 1227     Glucose 131 mg/dL      Comment: Serial Number: [id number]:  [id number]       Basic Metabolic Panel [[id number]]  (Abnormal) Collected: 07/01/22 0337    Specimen: Blood Updated: 07/01/22 0421     Glucose 101 mg/dL      BUN 11 mg/dL      Creatinine 0.94 mg/dL      Sodium 135 mmol/L      Potassium 4.0 mmol/L      Chloride 99 mmol/L      CO2 24.0 mmol/L      Calcium 8.5 mg/dL      BUN/Creatinine Ratio 11.7     Anion Gap 12.0 mmol/L      eGFR 106.4 mL/min/1.73      Comment: National Kidney Foundation and American Society of Nephrology (ASN) Task Force recommended calculation based on the Chronic Kidney Disease Epidemiology Collaboration (CKD-EPI) equation refit without adjustment for race.       Narrative:      GFR Normal >60  Chronic Kidney Disease <60  Kidney Failure <15      CBC (No Diff) [[id number]]  (Abnormal) Collected: 07/01/22 0337    Specimen: Blood Updated: 07/01/22 0408     WBC 11.30 10*3/mm3      RBC 3.12 10*6/mm3      Hemoglobin 9.1 g/dL      Hematocrit 27.0 %      MCV 86.4 fL      MCH 29.1 pg      MCHC 33.6 g/dL      RDW 13.6 %      RDW-SD 41.6 fl      MPV 7.5 fL      Platelets 226 10*3/mm3     POC Glucose Once [[id number]]  (Abnormal) Collected: 07/01/22 0001    Specimen: Blood Updated: 07/01/22 0002     Glucose 108 mg/dL      Comment: Serial Number: [id number]:  [id number]       POC Glucose Once [[id number]]  (Abnormal) Collected:  06/30/22 1928    Specimen: Blood Updated: 06/30/22 1928     Glucose 125 mg/dL      Comment: Serial Number: [id number]:  [id number]       POC Glucose Once [[id number]]  (Abnormal) Collected: 06/30/22 1648    Specimen: Blood Updated: 06/30/22 1649     Glucose 126 mg/dL      Comment: Serial Number: [id number]:  [id number]           Imaging Results (Last 72 Hours)     Procedure Component Value Units Date/Time    XR Chest 1 View [[id number]] Collected: 07/01/22 1311     Updated: 07/01/22 1316    Narrative:         DATE OF EXAM:   7/1/2022 12:50 PM     PROCEDURE:   XR CHEST 1 VW-     INDICATIONS:   s/p chest tube removal; R07.9-Chest pain, unspecified;  I25.10-Atherosclerotic heart disease of native coronary artery without  angina pectoris     COMPARISON:  Previous same day and prior     TECHNIQUE:   Portable Chest     FINDINGS:      Curvilinear density along the right heart border is no longer visualized  and likely represents removal of chest tube for indication.     Study is limited by overlying support and monitoring apparatus     Patient is status post median sternotomy and CABG     Lung volumes are low. No definite pneumothorax noted. Vascular crowding  and dependent opacities at the lung bases compatible with atelectasis or  edema.     Linear densities overlying the right side of the chest appear to be  connected to wires compatible with pacing leads. Osseous structures  demonstrate no acute change.        Impression:      IMPRESSION :   Interval removal of mediastinal drain.     No acute abnormality noted     Cardiomegaly and pulmonary vascular congestion[     Electronically Signed By-Kojo Mittal On:7/1/2022 1:14 PM  This report was finalized on [id number] by  Kojo Mittal, .    XR Chest 1 View [[id number]] Collected: 07/01/22 0817     Updated: 07/01/22 0829    Narrative:      DATE OF EXAM:  7/1/2022 3:35 AM     PROCEDURE:  XR CHEST 1 VW-     INDICATIONS:  Post-Op Heart Surgery; R07.9-Chest  pain, unspecified;  I25.10-Atherosclerotic heart disease of native coronary artery without  angina pectoris       COMPARISON:  AP portable chest 6/30/2022.     TECHNIQUE:   Single radiographic view of the chest was obtained.     FINDINGS:  Low volume inspiration. New right mid lung linear density favored to  represent subsegmental atelectasis. Mild left basilar subsegmental  atelectasis adjacent to the diaphragm is thought to be stable. Left  basilar chest tube remains in place. There is no visible pneumothorax.  Right IJ introducer sheath is in place. The [place]-Tabitha catheter has been  removed..     A curvilinear catheter-like density projects over the right heart  border, proximal extent of which is not satisfactorily visualized.     Heart size is mildly enlarged but stable with signs of median  sternotomy. There may be mild central vascular congestion. No acute  osseous abnormalities are identified.       Impression:         1. Catheter-like density projects over the right heart border, the  proximal extent of which is not visualized. As such, I cannot exclude  the presence of a catheter fragment. Alternatively, if the patient has a  mediastinal drain, it could represent that entity although not  completely visualized in its extent. CT chest without contrast may prove  helpful in this distinction. I spoke with the patient's nurse regarding  this, at the time of this dictation.  2. [place]-Tabitha catheter removal. No visible pneumothorax.  3. Mild central vascular congestion without overt edema.  4. New right perihilar subsegmental atelectasis. Left basilar  atelectasis is unchanged.     Electronically Signed By-Selma Jones MD On:7/1/2022 8:27 AM  This report was finalized on [id number] by  Selma Jones MD.    XR Chest 1 View [[id number]] Collected: 06/30/22 0740     Updated: 06/30/22 0743    Narrative:      DATE OF EXAM:  6/30/2022 5:59 AM     PROCEDURE:  XR CHEST 1 VW-     INDICATIONS:  Post-Op Heart Surgery;  R07.9-Chest pain, unspecified;  I25.10-Atherosclerotic heart disease of native coronary artery without  angina pectoris     COMPARISON:  06/29/2022     TECHNIQUE:   Single radiographic AP view of the chest was obtained.     FINDINGS:  Interval removal of endotracheal and gastric tubes. Bony artery catheter  unchanged in position. Left basilar chest tube remains in place. Very  low lung volumes. Heart size stable when accounting for differences in  lung volumes. Pulmonary vasculature are within normal limits. Strandy  airspace disease in the lung bases. No pneumothorax        Impression:      Bibasilar atelectasis due to low lung volumes     Electronically Signed By-Ozzie Juarez On:6/30/2022 7:41 AM  This report was finalized on [id number] by  Ozzie Juarez, .    XR Chest 1 View [[id number]] Collected: 06/29/22 1319     Updated: 06/29/22 1325    Narrative:         DATE OF EXAM:   6/29/2022 12:54 PM     PROCEDURE:   XR CHEST 1 VW-     INDICATIONS:   Post-Op Check Line & Tube Placement; R07.9-Chest pain, unspecified;  I25.10-Atherosclerotic heart disease of native coronary artery without  angina pectoris     COMPARISON:  06/27/2022 and prior     TECHNIQUE:   Portable Chest     FINDINGS:      Study limited by overlying support and monitoring apparatus     Patient status post median sternotomy and CABG. ET tube is at the  expected position approximately 3 cm above the allie     [place]-Tabitha catheter is seen extending out the right main pulmonary artery  to the level of the hilum. NG tube extends below the diaphragm.     At least one drain is in place the left lung base   .     Lung volumes are low. Heart size is within normal limits. Osseous  structures are unremarkable.       Impression:      IMPRESSION :   ET tube projects in expected position.     [place]-Tabitha catheter extends to the distal aspect of the expected position  of the right main pulmonary artery.     Surgical drains are in place. NG tube is in place.      Low lung volume exam with mild pulmonary vascular congestion and  dependent atelectasis[     Electronically Signed By-Kojo Mittal On:6/29/2022 1:23 PM  This report was finalized on [id number] by  Kojo Mittal, .        ECG/EMG Results (most recent)     Procedure Component Value Units Date/Time    SCANNED - TELEMETRY   [[id number]] Resulted: 06/22/22     Updated: 06/23/22 0102    SCANNED - TELEMETRY   [[id number]] Resulted: 06/22/22     Updated: 06/23/22 0605    SCANNED - TELEMETRY   [[id number]] Resulted: 06/22/22     Updated: 06/23/22 0937    SCANNED - TELEMETRY   [[id number]] Resulted: 06/22/22     Updated: 06/23/22 1256    ECG 12 Lead [[id number]] Collected: 06/23/22 0512     Updated: 06/23/22 1751     QT Interval 373 ms     Narrative:      HEART RATE= 64  bpm  RR Interval= 944  ms  TN Interval= 111  ms  P Horizontal Axis= 12  deg  P Front Axis= 41  deg  QRSD Interval= 92  ms  QT Interval= 373  ms  QRS Axis= -24  deg  T Wave Axis= -8  deg  - ABNORMAL ECG -  Sinus rhythm  Nonspecific T abnormalities, inferior leads  When compared with ECG of 22-Jun-2022 18:56:11,  Significant repolarization change  Electronically Signed By: Walt Garcia ([facility]) 23-Jun-2022 17:51:28  Date and Time of Study: 2022-06-23 05:12:04    ECG 12 Lead [[id number]] Collected: 06/22/22 1856     Updated: 06/24/22 1807     QT Interval 387 ms     Narrative:      HEART RATE= 61  bpm  RR Interval= 984  ms  TN Interval= 125  ms  P Horizontal Axis= 16  deg  P Front Axis= 24  deg  QRSD Interval= 92  ms  QT Interval= 387  ms  QRS Axis= 0  deg  T Wave Axis= 2  deg  - NORMAL ECG -  Sinus rhythm  ST elev, probable normal early repol pattern  When compared with ECG of 22-Jun-2022 13:06:23,  No significant change  Electronically Signed By: Mariia Carvajal ([facility]) 24-Jun-2022 18:07:41  Date and Time of Study: 2022-06-22 18:56:11    ECG 12 Lead [[id number]] Collected: 06/22/22 1306     Updated: 06/26/22 1313     QT Interval 352 ms     Narrative:       HEART RATE= 74  bpm  RR Interval= 840  ms  CT Interval= 124  ms  P Horizontal Axis=   deg  P Front Axis= 58  deg  QRSD Interval= 85  ms  QT Interval= 352  ms  QRS Axis= 14  deg  T Wave Axis= 20  deg  - ABNORMAL ECG -  Sinus rhythm  Atrial premature complexes  When compared with ECG of 17-Jun-2022 8:22:00,  No significant change  Electronically Signed By: Ellis Rinaldi ([facility]) 26-Jun-2022 13:13:03  Date and Time of Study: 2022-06-22 13:06:23    Adult Transthoracic Echo Limited W/ Cont if Necessary Per Protocol [[id number]] Resulted: 06/28/22 1228     Updated: 06/28/22 1233     Target HR (85%) 155 bpm      Max. Pred. HR (100%) 182 bpm      EDV(cubed) 50.3 ml      EDV(MOD-sp4) 100.1 ml      EF(MOD-sp4) 64.3 %      ESV(cubed) 22.0 ml      ESV(MOD-sp4) 35.8 ml      IVS/LVPW 1.05 cm      LV mass(C)d 113.5 grams      LVPWd 0.98 cm      RVIDd 3.3 cm      SI(MOD-sp4) 26.5 ml/m2      SV(MOD-sp4) 64.3 ml      FS 24.1 %      IVSd 1.03 cm      LVIDd 3.7 cm      LVIDs 2.8 cm      LV [place] Vol (BSA corrected) 41.3 cm2      LV Sys Vol (BSA corrected) 14.8 cm2     Narrative:      · Left ventricular systolic function is normal.  · Left ventricular ejection fraction is 55 to 60%  · Basal inferior wall aneurysm is noted       ECG 12 Lead [[id number]] Collected: 06/30/22 0454     Updated: 06/30/22 0455     QT Interval 362 ms     Narrative:      HEART RATE= 58  bpm  RR Interval= 1040  ms  CT Interval= 173  ms  P Horizontal Axis= 13  deg  P Front Axis= 20  deg  QRSD Interval= 82  ms  QT Interval= 362  ms  QRS Axis= -9  deg  T Wave Axis= -1  deg  - ABNORMAL ECG -  Sinus bradycardia  Probable left atrial enlargement  Inferior infarct, old  Consider anterolateral infarct  Electronically Signed By:   Date and Time of Study: 2022-06-30 04:54:13    ECG 12 Lead [[id number]] Collected: 06/24/22 1533     Updated: 06/30/22 1143     QT Interval 358 ms     Narrative:      HEART RATE= 88  bpm  RR Interval= 680  ms  CT Interval= 132  ms  P Horizontal Axis=  10  deg  P Front Axis= 43  deg  QRSD Interval= 82  ms  QT Interval= 358  ms  QRS Axis= 0  deg  T Wave Axis= 0  deg  - NORMAL ECG -  Sinus rhythm  When compared with ECG of 23-Jun-2022 5:12:04,  Significant rate increase  Significant repolarization change  Electronically Signed By: Moises Haddad ([facility]) 30-Jun-2022 11:42:49  Date and Time of Study: 2022-06-24 15:33:11    ECG 12 Lead [[id number]] Collected: 07/01/22 0520     Updated: 07/01/22 0815     QT Interval 338 ms     Narrative:      HEART RATE= 79  bpm  RR Interval= 760  ms  CO Interval= 139  ms  P Horizontal Axis= 16  deg  P Front Axis= 28  deg  QRSD Interval= 78  ms  QT Interval= 338  ms  QRS Axis= -13  deg  T Wave Axis= 25  deg  - ABNORMAL ECG -  Sinus rhythm  Inferior infarct, old  Anterior Q waves, possibly due to LVH  When compared with ECG of 30-Jun-2022 4:54:13,  Significant rate increase  Significant axis, voltage or hypertrophy change  Electronically Signed By: Samy Meléndez ([facility]) 01-Jul-2022 08:15:00  Date and Time of Study: 2022-07-01 05:20:49        CBC    Results from last 7 days   Lab Units 07/01/22  0337 06/30/22  0403 06/29/22  1755 06/29/22  1718 06/29/22  1640 06/29/22  1553 06/29/22  1507 06/29/22  1319 06/29/22  1231 06/29/22  0755 06/27/22  0436 06/26/22  0538 06/25/22  0620   WBC 10*3/mm3 11.30* 11.20*  --   --   --   --   --   --  12.40*  --  9.00 8.60 8.80   HEMOGLOBIN g/dL 9.1* 9.7*  --   --   --   --   --   --  10.8*  --  12.8* 12.7* 12.7*   HEMOGLOBIN, POC g/dL  --   --  10.7* 10.5* 10.8* 10.4* 10.5*   < >  --    < >  --   --   --    PLATELETS 10*3/mm3 226 217  --   --   --   --   --   --  231  --  300 304 285    < > = values in this interval not displayed.     BMP   Results from last 7 days   Lab Units 07/01/22  0337 06/30/22  0403 06/29/22  1231 06/28/22  0856 06/27/22  0437   SODIUM mmol/L 135* 135* 137 137 137   POTASSIUM mmol/L 4.0 4.4 5.0 3.9 4.3   CHLORIDE mmol/L 99 101 104 100 100   CO2 mmol/L 24.0 22.0 23.0 23.0 26.0    BUN mg/dL 11 12 12 12 7   CREATININE mg/dL 0.94 1.07 1.26 0.84 1.31*   GLUCOSE mg/dL 101* 133* 114* 92 102*   MAGNESIUM mg/dL  --  2.9*  --   --  2.0   PHOSPHORUS mg/dL  --  5.5* 4.6*  --   --      CMP   Results from last 7 days   Lab Units 07/01/22  0337 06/30/22  0403 06/29/22  1231 06/28/22  0856 06/27/22  0437   SODIUM mmol/L 135* 135* 137 137 137   POTASSIUM mmol/L 4.0 4.4 5.0 3.9 4.3   CHLORIDE mmol/L 99 101 104 100 100   CO2 mmol/L 24.0 22.0 23.0 23.0 26.0   BUN mg/dL 11 12 12 12 7   CREATININE mg/dL 0.94 1.07 1.26 0.84 1.31*   GLUCOSE mg/dL 101* 133* 114* 92 102*   ALBUMIN g/dL  --  4.70 4.40  --  3.60   BILIRUBIN mg/dL  --   --   --   --  0.4   ALK PHOS U/L  --   --   --   --  99   AST (SGOT) U/L  --   --   --   --  30   ALT (SGPT) U/L  --   --   --   --  62*     Cardiac Studies:  Echo- Results for orders placed during the hospital encounter of 06/22/22    Adult Transthoracic Echo Limited W/ Cont if Necessary Per Protocol    Interpretation Summary  · Left ventricular systolic function is normal.  · Left ventricular ejection fraction is 55 to 60%  · Basal inferior wall aneurysm is noted    Stress Myoview-  Cath-      Medication Review:   Scheduled Meds:aspirin, 81 mg, Oral, Daily  atorvastatin, 40 mg, Oral, Nightly  budesonide, 0.5 mg, Nebulization, BID - RT  Cariprazine HCl, 3 mg, Oral, QAM  chlorhexidine, 15 mL, Mouth/Throat, Q12H  clopidogrel, 75 mg, Oral, Daily  enoxaparin, 40 mg, Subcutaneous, Daily  guaiFENesin, 600 mg, Oral, Q12H  [START ON 7/2/2022] insulin lispro, 0-7 Units, Subcutaneous, TID AC  ipratropium-albuterol, 3 mL, Nebulization, 4x Daily - RT  levothyroxine, 50 mcg, Oral, Q AM  metoprolol tartrate, 25 mg, Oral, Q12H  mupirocin, 1 application, Each Nare, BID  pantoprazole, 40 mg, Oral, Q AM  polyethylene glycol, 17 g, Oral, BID  senna-docusate sodium, 2 tablet, Oral, BID      Continuous Infusions:insulin, 0-100 Units/hr, Last Rate: Stopped (06/29/22 1333)  sodium chloride, 30 mL/hr, Last  Rate: 30 mL/hr (06/29/22 1333)      PRN Meds:.•  acetaminophen **OR** acetaminophen **OR** acetaminophen  •  dextrose  •  dextrose  •  diazePAM  •  glucagon (human recombinant)  •  [START ON 7/2/2022] insulin lispro **AND** [START ON 7/2/2022] insulin lispro  •  ipratropium-albuterol  •  Morphine **AND** naloxone  •  ondansetron  •  potassium chloride **OR** potassium chloride  •  traMADol      Assessment & Plan   Patient Active Problem List   Diagnosis   • Hypertension   • Peptic ulcer   • Asthma   • Blepharitis   • Chest pain   • Chronic cholecystitis   • Contact with and (suspected) exposure to covid-19   • Corneal ulcer   • Costochondritis   • Cough   • Disorder of thyroid   • Gastroesophageal reflux disease   • Hyperlipidemia   • Migraine headache   • Panic attack   • Acute inferior myocardial infarction (HCC)   • CAD, multiple vessel   • Bipolar II disorder (HCC)   • Hx of brief reactive psychosis   • Generalized anxiety disorder with panic attacks   • Rage attacks   • Chest pain, unspecified type     MDM:    1.  CAD:    Patient underwent LAD and RCA stenting but developed further disease.  Patient underwent CABG x3.  Patient is clinically doing well.  Hemodynamics are stable.  Continue supportive therapy    Patient is feeling much better.  Continue supportive therapy.    2.  Hyperlipidemia:    I would recommend that patient continue Lipitor therapy.    3.  S/p coronary artery stenting:    I would recommend to start Plavix I will discuss with CT surgery.    Continue current Rx and supportive care.   Encourage IS and ambulation  D/W CTS  Rhythm and hemodynamics stable.    Patient is seen and examined and findings are verified.  All data is reviewed by me personally.  Assessment and plan formulated by APC was done after discussion with attending.  I spent more than 50% of time in taking care of the patient.    Patient denies any symptom of chest pain or tightness or heaviness patient is having sternal pain due  to sternotomy    Hemodynamics are stable.    Normal S1 and S2.  No pericardial rub or murmur abdominal exam is benign    Patient had multiple coronary artery stent recently.  I would recommend to start Plavix.  Chest tubes have been removed.  I have discussed with Dr. MANN and he agreed with the plan.  I would also recommend to start beta-blocker.    Electronically signed by Matthieu Del Toro MD, 07/01/22, 4:26 PM EDT.      Matthieu Del Toro MD  07/01/22  16:26 EDT

## 2022-07-01 NOTE — PROGRESS NOTES
LOS: 8 days   Admiting Physician- Pablo Ball, *    Reason For Followup:    Unstable angina  CAD  Coronary artery stenting  S/p CABG    Subjective     Patient is sitting up in chair.  Patient is complaining of sternal pain    Objective     Hemodynamics are stable    Review of Systems:   Review of Systems   Constitutional: Negative for chills and fever.   HENT: Negative for ear discharge and nosebleeds.    Eyes: Negative for discharge and redness.   Cardiovascular: Positive for chest pain. Negative for orthopnea, palpitations, paroxysmal nocturnal dyspnea and syncope.   Respiratory: Positive for shortness of breath. Negative for cough and wheezing.    Endocrine: Negative for heat intolerance.   Skin: Negative for rash.   Musculoskeletal: Negative for arthritis and myalgias.   Gastrointestinal: Negative for abdominal pain, melena, nausea and vomiting.   Genitourinary: Negative for dysuria and hematuria.   Neurological: Negative for dizziness, light-headedness, numbness and tremors.   Psychiatric/Behavioral: Negative for depression. The patient is not nervous/anxious.          Vital Signs  Vitals:    06/30/22 1700 06/30/22 1900 06/30/22 2000 06/30/22 2020   BP:  134/81 132/83    BP Location:       Patient Position:       Pulse:  73 78    Resp: 15      Temp:    98.1 °F (36.7 °C)   TempSrc:    Oral   SpO2:  96% 97%    Weight:       Height:         Wt Readings from Last 1 Encounters:   06/30/22 124 kg (273 lb 5.9 oz)       Intake/Output Summary (Last 24 hours) at 6/30/2022 2020  Last data filed at 6/30/2022 1400  Gross per 24 hour   Intake 1755.46 ml   Output 1830 ml   Net -74.54 ml     Physical Exam:  Constitutional:       Appearance: Well-developed.   Eyes:      General: No scleral icterus.        Right eye: No discharge.   HENT:      Head: Normocephalic and atraumatic.   Neck:      Thyroid: No thyromegaly.      Lymphadenopathy: No cervical adenopathy.   Pulmonary:      Effort: Pulmonary effort is normal.  No respiratory distress.      Breath sounds: Normal breath sounds. No wheezing. No rales.   Cardiovascular:      Normal rate. Regular rhythm.      No gallop.   Edema:     Peripheral edema absent.   Abdominal:      Tenderness: There is no abdominal tenderness.   Skin:     Findings: No erythema or rash.   Neurological:      Mental Status: Alert and oriented to person, place, and time.         Results Review:   Lab Results (last 24 hours)     Procedure Component Value Units Date/Time    POC Glucose Once [[id number]]  (Abnormal) Collected: 06/30/22 1928    Specimen: Blood Updated: 06/30/22 1928     Glucose 125 mg/dL      Comment: Serial Number: [id number]:  [id number]       POC Glucose Once [[id number]]  (Abnormal) Collected: 06/30/22 1648    Specimen: Blood Updated: 06/30/22 1649     Glucose 126 mg/dL      Comment: Serial Number: [id number]:  [id number]       POC Glucose Once [[id number]]  (Abnormal) Collected: 06/30/22 1424    Specimen: Blood Updated: 06/30/22 1425     Glucose 121 mg/dL      Comment: Serial Number: [id number]:  [id number]       POC Glucose Once [[id number]]  (Abnormal) Collected: 06/30/22 1000    Specimen: Blood Updated: 06/30/22 1001     Glucose 122 mg/dL      Comment: Serial Number: [id number]:  [id number]       POC Glucose Once [[id number]]  (Abnormal) Collected: 06/30/22 0859    Specimen: Blood Updated: 06/30/22 0900     Glucose 126 mg/dL      Comment: Serial Number: [id number]:  [id number]       POC Glucose Once [[id number]]  (Normal) Collected: 06/30/22 0559    Specimen: Blood Updated: 06/30/22 0601     Glucose 88 mg/dL      Comment: Serial Number: [id number]:  [id number]       Magnesium [[id number]]  (Abnormal) Collected: 06/30/22 0403    Specimen: Blood Updated: 06/30/22 0453     Magnesium 2.9 mg/dL      Comment: Result checked        Renal Function Panel [[id number]]  (Abnormal) Collected: 06/30/22 0403    Specimen: Blood Updated: 06/30/22 0450     Glucose 133  mg/dL      BUN 12 mg/dL      Creatinine 1.07 mg/dL      Sodium 135 mmol/L      Potassium 4.4 mmol/L      Chloride 101 mmol/L      CO2 22.0 mmol/L      Calcium 8.6 mg/dL      Albumin 4.70 g/dL      Phosphorus 5.5 mg/dL      Anion Gap 12.0 mmol/L      BUN/Creatinine Ratio 11.2     eGFR 91.1 mL/min/1.73      Comment: National Kidney Foundation and American Society of Nephrology (ASN) Task Force recommended calculation based on the Chronic Kidney Disease Epidemiology Collaboration (CKD-EPI) equation refit without adjustment for race.       Narrative:      GFR Normal >60  Chronic Kidney Disease <60  Kidney Failure <15      Protime-INR [[id number]]  (Normal) Collected: 06/30/22 0403    Specimen: Blood Updated: 06/30/22 0442     Protime 10.6 Seconds      INR 1.03    Calcium, Ionized [[id number]]  (Normal) Collected: 06/30/22 0403    Specimen: Blood Updated: 06/30/22 0426     Ionized Calcium 1.21 mmol/L     CBC & Differential [[id number]]  (Abnormal) Collected: 06/30/22 0403    Specimen: Blood Updated: 06/30/22 0425    Narrative:      The following orders were created for panel order CBC & Differential.  Procedure                               Abnormality         Status                     ---------                               -----------         ------                     CBC Auto Differential[[id number]]        Abnormal            Final result                 Please view results for these tests on the individual orders.    CBC Auto Differential [[id number]]  (Abnormal) Collected: 06/30/22 0403    Specimen: Blood Updated: 06/30/22 0425     WBC 11.20 10*3/mm3      RBC 3.36 10*6/mm3      Hemoglobin 9.7 g/dL      Hematocrit 28.8 %      MCV 85.6 fL      MCH 29.0 pg      MCHC 33.9 g/dL      RDW 13.6 %      RDW-SD 41.1 fl      MPV 7.2 fL      Platelets 217 10*3/mm3      Neutrophil % 89.5 %      Lymphocyte % 5.7 %      Monocyte % 4.4 %      Eosinophil % 0.1 %      Basophil % 0.3 %      Neutrophils, Absolute 10.00 10*3/mm3       Lymphocytes, Absolute 0.60 10*3/mm3      Monocytes, Absolute 0.50 10*3/mm3      Eosinophils, Absolute 0.00 10*3/mm3      Basophils, Absolute 0.00 10*3/mm3      nRBC 0.0 /100 WBC     POC Glucose Once [[id number]]  (Abnormal) Collected: 06/30/22 0100    Specimen: Blood Updated: 06/30/22 0101     Glucose 119 mg/dL      Comment: Serial Number: [id number]:  [id number]       POC Glucose Once [[id number]]  (Abnormal) Collected: 06/29/22 2249    Specimen: Blood Updated: 06/29/22 2250     Glucose 144 mg/dL      Comment: Serial Number: [id number]:  [id number]           Imaging Results (Last 72 Hours)     Procedure Component Value Units Date/Time    XR Chest 1 View [[id number]] Collected: 06/30/22 0740     Updated: 06/30/22 0743    Narrative:      DATE OF EXAM:  6/30/2022 5:59 AM     PROCEDURE:  XR CHEST 1 VW-     INDICATIONS:  Post-Op Heart Surgery; R07.9-Chest pain, unspecified;  I25.10-Atherosclerotic heart disease of native coronary artery without  angina pectoris     COMPARISON:  06/29/2022     TECHNIQUE:   Single radiographic AP view of the chest was obtained.     FINDINGS:  Interval removal of endotracheal and gastric tubes. Bony artery catheter  unchanged in position. Left basilar chest tube remains in place. Very  low lung volumes. Heart size stable when accounting for differences in  lung volumes. Pulmonary vasculature are within normal limits. Strandy  airspace disease in the lung bases. No pneumothorax        Impression:      Bibasilar atelectasis due to low lung volumes     Electronically Signed By-Ozzie Juarez On:6/30/2022 7:41 AM  This report was finalized on [id number] by  Ozzie Juarez, .    XR Chest 1 View [[id number]] Collected: 06/29/22 1319     Updated: 06/29/22 1325    Narrative:         DATE OF EXAM:   6/29/2022 12:54 PM     PROCEDURE:   XR CHEST 1 VW-     INDICATIONS:   Post-Op Check Line & Tube Placement; R07.9-Chest pain, unspecified;  I25.10-Atherosclerotic heart disease of native  coronary artery without  angina pectoris     COMPARISON:  06/27/2022 and prior     TECHNIQUE:   Portable Chest     FINDINGS:      Study limited by overlying support and monitoring apparatus     Patient status post median sternotomy and CABG. ET tube is at the  expected position approximately 3 cm above the allie     [place]-Tabitha catheter is seen extending out the right main pulmonary artery  to the level of the hilum. NG tube extends below the diaphragm.     At least one drain is in place the left lung base   .     Lung volumes are low. Heart size is within normal limits. Osseous  structures are unremarkable.       Impression:      IMPRESSION :   ET tube projects in expected position.     [place]-Tabitha catheter extends to the distal aspect of the expected position  of the right main pulmonary artery.     Surgical drains are in place. NG tube is in place.     Low lung volume exam with mild pulmonary vascular congestion and  dependent atelectasis[     Electronically Signed By-Kojo Mittal On:6/29/2022 1:23 PM  This report was finalized on [id number] by  Kojo Mittal, .        ECG/EMG Results (most recent)     Procedure Component Value Units Date/Time    SCANNED - TELEMETRY   [[id number]] Resulted: 06/22/22     Updated: 06/23/22 0102    SCANNED - TELEMETRY   [[id number]] Resulted: 06/22/22     Updated: 06/23/22 0605    SCANNED - TELEMETRY   [[id number]] Resulted: 06/22/22     Updated: 06/23/22 0937    SCANNED - TELEMETRY   [[id number]] Resulted: 06/22/22     Updated: 06/23/22 1256    ECG 12 Lead [[id number]] Collected: 06/23/22 0512     Updated: 06/23/22 1751     QT Interval 373 ms     Narrative:      HEART RATE= 64  bpm  RR Interval= 944  ms  ME Interval= 111  ms  P Horizontal Axis= 12  deg  P Front Axis= 41  deg  QRSD Interval= 92  ms  QT Interval= 373  ms  QRS Axis= -24  deg  T Wave Axis= -8  deg  - ABNORMAL ECG -  Sinus rhythm  Nonspecific T abnormalities, inferior leads  When compared with ECG of 22-Jun-2022  18:56:11,  Significant repolarization change  Electronically Signed By: Walt Garcia ([facility]) 23-Jun-2022 17:51:28  Date and Time of Study: 2022-06-23 05:12:04    ECG 12 Lead [[id number]] Collected: 06/22/22 1856     Updated: 06/24/22 1807     QT Interval 387 ms     Narrative:      HEART RATE= 61  bpm  RR Interval= 984  ms  CT Interval= 125  ms  P Horizontal Axis= 16  deg  P Front Axis= 24  deg  QRSD Interval= 92  ms  QT Interval= 387  ms  QRS Axis= 0  deg  T Wave Axis= 2  deg  - NORMAL ECG -  Sinus rhythm  ST elev, probable normal early repol pattern  When compared with ECG of 22-Jun-2022 13:06:23,  No significant change  Electronically Signed By: Mariia Carvajal ([facility]) 24-Jun-2022 18:07:41  Date and Time of Study: 2022-06-22 18:56:11    ECG 12 Lead [[id number]] Collected: 06/22/22 1306     Updated: 06/26/22 1313     QT Interval 352 ms     Narrative:      HEART RATE= 74  bpm  RR Interval= 840  ms  CT Interval= 124  ms  P Horizontal Axis=   deg  P Front Axis= 58  deg  QRSD Interval= 85  ms  QT Interval= 352  ms  QRS Axis= 14  deg  T Wave Axis= 20  deg  - ABNORMAL ECG -  Sinus rhythm  Atrial premature complexes  When compared with ECG of 17-Jun-2022 8:22:00,  No significant change  Electronically Signed By: Ellis Rinaldi ([facility]) 26-Jun-2022 13:13:03  Date and Time of Study: 2022-06-22 13:06:23    Adult Transthoracic Echo Limited W/ Cont if Necessary Per Protocol [[id number]] Resulted: 06/28/22 1228     Updated: 06/28/22 1233     Target HR (85%) 155 bpm      Max. Pred. HR (100%) 182 bpm      EDV(cubed) 50.3 ml      EDV(MOD-sp4) 100.1 ml      EF(MOD-sp4) 64.3 %      ESV(cubed) 22.0 ml      ESV(MOD-sp4) 35.8 ml      IVS/LVPW 1.05 cm      LV mass(C)d 113.5 grams      LVPWd 0.98 cm      RVIDd 3.3 cm      SI(MOD-sp4) 26.5 ml/m2      SV(MOD-sp4) 64.3 ml      FS 24.1 %      IVSd 1.03 cm      LVIDd 3.7 cm      LVIDs 2.8 cm      LV [place] Vol (BSA corrected) 41.3 cm2      LV Sys Vol (BSA corrected) 14.8 cm2     Narrative:      · Left  ventricular systolic function is normal.  · Left ventricular ejection fraction is 55 to 60%  · Basal inferior wall aneurysm is noted       ECG 12 Lead [[id number]] Collected: 06/30/22 0454     Updated: 06/30/22 0455     QT Interval 362 ms     Narrative:      HEART RATE= 58  bpm  RR Interval= 1040  ms  TN Interval= 173  ms  P Horizontal Axis= 13  deg  P Front Axis= 20  deg  QRSD Interval= 82  ms  QT Interval= 362  ms  QRS Axis= -9  deg  T Wave Axis= -1  deg  - ABNORMAL ECG -  Sinus bradycardia  Probable left atrial enlargement  Inferior infarct, old  Consider anterolateral infarct  Electronically Signed By:   Date and Time of Study: 2022-06-30 04:54:13    ECG 12 Lead [[id number]] Collected: 06/24/22 1533     Updated: 06/30/22 1143     QT Interval 358 ms     Narrative:      HEART RATE= 88  bpm  RR Interval= 680  ms  TN Interval= 132  ms  P Horizontal Axis= 10  deg  P Front Axis= 43  deg  QRSD Interval= 82  ms  QT Interval= 358  ms  QRS Axis= 0  deg  T Wave Axis= 0  deg  - NORMAL ECG -  Sinus rhythm  When compared with ECG of 23-Jun-2022 5:12:04,  Significant rate increase  Significant repolarization change  Electronically Signed By: Moises Haddad ([facility]) 30-Jun-2022 11:42:49  Date and Time of Study: 2022-06-24 15:33:11        CBC    Results from last 7 days   Lab Units 06/30/22  0403 06/29/22  1755 06/29/22  1718 06/29/22  1640 06/29/22  1553 06/29/22  1507 06/29/22  1411 06/29/22  1319 06/29/22  1231 06/29/22  0755 06/27/22  0436 06/26/22  0538 06/25/22  0620 06/24/22  0721   WBC 10*3/mm3 11.20*  --   --   --   --   --   --   --  12.40*  --  9.00 8.60 8.80 7.90   HEMOGLOBIN g/dL 9.7*  --   --   --   --   --   --   --  10.8*  --  12.8* 12.7* 12.7* 14.3   HEMOGLOBIN, POC g/dL  --  10.7* 10.5* 10.8* 10.4* 10.5* 10.8*   < >  --    < >  --   --   --   --    PLATELETS 10*3/mm3 217  --   --   --   --   --   --   --  231  --  300 304 285 306    < > = values in this interval not displayed.     BMP   Results from last 7 days    Lab Units 06/30/22  0403 06/29/22  1231 06/28/22  0856 06/27/22  0437 06/24/22  0015   SODIUM mmol/L 135* 137 137 137 135*   POTASSIUM mmol/L 4.4 5.0 3.9 4.3 4.1   CHLORIDE mmol/L 101 104 100 100 98   CO2 mmol/L 22.0 23.0 23.0 26.0 22.0   BUN mg/dL 12 12 12 7 10   CREATININE mg/dL 1.07 1.26 0.84 1.31* 0.93   GLUCOSE mg/dL 133* 114* 92 102* 126*   MAGNESIUM mg/dL 2.9*  --   --  2.0  --    PHOSPHORUS mg/dL 5.5* 4.6*  --   --   --      CMP   Results from last 7 days   Lab Units 06/30/22  0403 06/29/22  1231 06/28/22  0856 06/27/22  0437 06/24/22  0015   SODIUM mmol/L 135* 137 137 137 135*   POTASSIUM mmol/L 4.4 5.0 3.9 4.3 4.1   CHLORIDE mmol/L 101 104 100 100 98   CO2 mmol/L 22.0 23.0 23.0 26.0 22.0   BUN mg/dL 12 12 12 7 10   CREATININE mg/dL 1.07 1.26 0.84 1.31* 0.93   GLUCOSE mg/dL 133* 114* 92 102* 126*   ALBUMIN g/dL 4.70 4.40  --  3.60  --    BILIRUBIN mg/dL  --   --   --  0.4  --    ALK PHOS U/L  --   --   --  99  --    AST (SGOT) U/L  --   --   --  30  --    ALT (SGPT) U/L  --   --   --  62*  --      Cardiac Studies:  Echo- Results for orders placed during the hospital encounter of 06/22/22    Adult Transthoracic Echo Limited W/ Cont if Necessary Per Protocol    Interpretation Summary  · Left ventricular systolic function is normal.  · Left ventricular ejection fraction is 55 to 60%  · Basal inferior wall aneurysm is noted    Stress Myoview-  Cath-      Medication Review:   Scheduled Meds:aspirin, 81 mg, Oral, Daily  atorvastatin, 40 mg, Oral, Nightly  budesonide, 0.5 mg, Nebulization, BID - RT  Cariprazine HCl, 3 mg, Oral, QAM  ceFAZolin, 3 g, Intravenous, Q8H  chlorhexidine, 15 mL, Mouth/Throat, Q12H  [START ON 7/1/2022] enoxaparin, 40 mg, Subcutaneous, Daily  guaiFENesin, 600 mg, Oral, Q12H  ipratropium-albuterol, 3 mL, Nebulization, 4x Daily - RT  levothyroxine, 50 mcg, Oral, Q AM  metoprolol tartrate, 12.5 mg, Oral, Q12H  mupirocin, 1 application, Each Nare, BID  pantoprazole, 40 mg, Oral, Q  AM  polyethylene glycol, 17 g, Oral, BID  senna-docusate sodium, 2 tablet, Oral, BID      Continuous Infusions:insulin, 0-100 Units/hr, Last Rate: Stopped (06/29/22 1333)  sodium chloride, 30 mL/hr, Last Rate: 30 mL/hr (06/29/22 1333)      PRN Meds:.•  acetaminophen **OR** acetaminophen **OR** acetaminophen  •  dextrose  •  dextrose  •  diazePAM  •  glucagon (human recombinant)  •  HYDROcodone-acetaminophen  •  ipratropium-albuterol  •  ketorolac  •  Morphine **AND** naloxone  •  ondansetron  •  potassium chloride **OR** potassium chloride      Assessment & Plan   Patient Active Problem List   Diagnosis   • Hypertension   • Peptic ulcer   • Asthma   • Blepharitis   • Chest pain   • Chronic cholecystitis   • Contact with and (suspected) exposure to covid-19   • Corneal ulcer   • Costochondritis   • Cough   • Disorder of thyroid   • Gastroesophageal reflux disease   • Hyperlipidemia   • Migraine headache   • Panic attack   • Acute inferior myocardial infarction (HCC)   • CAD, multiple vessel   • Bipolar II disorder (HCC)   • Hx of brief reactive psychosis   • Generalized anxiety disorder with panic attacks   • Rage attacks   • Chest pain, unspecified type     MDM:    1.  CAD:    Patient underwent LAD and RCA stenting but developed further disease.  Patient underwent CABG x3.  Patient is clinically doing well.  Hemodynamics are stable.  Continue supportive therapy    2.  Hyperlipidemia:    I would recommend that patient continue Lipitor therapy.    3.  S/p coronary artery stenting:    I would recommend to start Plavix I will discuss with CT surgery.    Matthieu Del Toro MD  06/30/22  20:20 EDT

## 2022-07-01 NOTE — THERAPY TREATMENT NOTE
"Subjective: Pt agreeable to therapeutic plan of care.    Objective:     Bed mobility - Mod-A and Assist x 2  Transfers - Mod-A  Ambulation - 130 feet Min-A and with rolling walker     Cardiac level III  Pt able to state 2/3 sternal precautions and requiring verbal cues for last precautions.      Vitals: WNL    Pain: 6 VAS  Education: Provided education on importance of mobility and skilled verbal / tactile cueing throughout intervention.     Assessment: Tramaine Gates presents with functional mobility impairments which indicate the need for skilled intervention. Tolerating session today without incident. Pt continues to require verbal cues for motivation due to self limiting actions, possible anxious feelings with mobility, and decreased motivation to mobilize.  Pt requiring Jose Guadalupe-modA for transfers and ambulation.  Pt requiring modAx2 for sit -> supine bed mobility.   Pt educated on importance of mobility to increase strength, endurance, and assist with pain.  PT spoke with pt and pt's significant other of importance of significant other going home to sleep so she is rested and able to assist pt at home when pt is ready for hospital d/c.  Pt will continue to require further cues for encouragement and motivation.  Will continue to follow and progress as tolerated.    Plan/Recommendations:   Low Intensity Therapy recommended post-acute care - This is recommended as therapy feels this patient would require 2-3 visits per week. OP or HH would be the best option depending on patient's home bound status. Consider, if the patient has other  \"skilled\" needs such as wounds, IV antibiotics, etc. Combined with \"low intensity\" could also equate to a SNF. If patient is medically complex, consider LTAC.. Pt requires no DME at discharge.  Possible home health needs pending progress.    Pt desires Home with family assist at discharge. Pt cooperative; agreeable to therapeutic recommendations and plan of care.     Basic Mobility " 6-click:  Rollin = Total, A lot = 2, A little = 3; 4 = None  Supine>Sit:   1 = Total, A lot = 2, A little = 3; 4 = None   Sit>Stand with arms:  1 = Total, A lot = 2, A little = 3; 4 = None  Bed>Chair:   1 = Total, A lot = 2, A little = 3; 4 = None  Ambulate in room:  1 = Total, A lot = 2, A little = 3; 4 = None  3-5 Steps with railin = Total, A lot = 2, A little = 3; 4 = None  Score: 13    Post-Tx Position: Supine with HOB Elevated and Call light and personal items within reach  PPE: gloves, surgical mask, eyewear protection

## 2022-07-01 NOTE — PROGRESS NOTES
S/P POD# 2 CABG x3 with LIMA--Camporrotondo  EF 60% (cath)    Subjective:  Reports surgical pain, slight itching d/t pain med    Pain control issue--changed to Tramadol this morning  No events overnight  CVP 13  Wt is up 4 kg from preop  CXR:  Atel, vasc congestion    Intake/Output Summary (Last 24 hours) at 7/1/2022 0810  Last data filed at 7/1/2022 0600  Gross per 24 hour   Intake 1285.46 ml   Output 2480 ml   Net -1194.54 ml     Temp:  [97.5 °F (36.4 °C)-98.8 °F (37.1 °C)] 97.8 °F (36.6 °C)  Heart Rate:  [71-86] 85  Resp:  [15-31] 19  BP: (118-155)/(71-87) 128/84  /8    Results from last 7 days   Lab Units 07/01/22  0337 06/30/22  0403 06/29/22  1319 06/29/22  1231 06/29/22  0755 06/27/22  0436   WBC 10*3/mm3 11.30* 11.20*  --  12.40*  --  9.00   HEMOGLOBIN g/dL 9.1* 9.7*  --  10.8*  --  12.8*   HEMOGLOBIN, POC   --   --    < >  --    < >  --    HEMATOCRIT % 27.0* 28.8*  --  31.8*  --  37.2*   HEMATOCRIT POC   --   --    < >  --    < >  --    PLATELETS 10*3/mm3 226 217  --  231  --  300   INR   --  1.03  --  1.06  --  0.99    < > = values in this interval not displayed.     Results from last 7 days   Lab Units 07/01/22  0337 06/30/22  0403   CREATININE mg/dL 0.94 1.07   POTASSIUM mmol/L 4.0 4.4   SODIUM mmol/L 135* 135*   MAGNESIUM mg/dL  --  2.9*   PHOSPHORUS mg/dL  --  5.5*       Physical Exam:  Neuro intact, nad, up in chair moaning, drowsy  Tele:  SR 80-90s  Diminished bases, 99% 1L  dsg c/d/i, no drainage  Benign abd, no BM  No edema    Assessment/Plan:  Principal Problem:    Chest pain, unspecified type  Active Problems:    Hypertension    Asthma    Hyperlipidemia    CAD, multiple vessel    Bipolar II disorder (HCC)    - MV CAD, s/p stents RCA/LAD 6/14 patent but noted to have LAD thrombus this adm, EF 60% (cath)--s/p CABG x3 with LIMA (Camporrotondo)  - Preop Plavix--last dose 6/22  - HTN--stable  - HLD--statin  - Bipolar II Disorder/ HOLLY/ Panic disorder--psych following  - Recent COVID-19  infection  - Migraines, recently diagnosed  - Pulm nodules--f/u with pulm as outpt  - Tobacco abuse, current--22 pack year hx--nicotine patch, cessation d/w pt  - Obesity, stage 2--BMI 36.8    POD# 2.  Doing well.  DC CT/drew/cordis.  Psych meds continued.  Will need a lot of motivation to get thru the postop course.  On asa/statin/bb.  Increased metoprolol.  Diurese.  Mobilize.  Dr. Ball reviewed CXR and the questionable cath fragment on film is his mediastinal chest tube.  No need for CT chest at this time.  Will recheck CXR after tube removal.    Routine care--as above  D/w pt/girlfriend, Dr. aBll  Anticipate home at discharge    Berna Sandoval, APRN  7/1/2022  08:10 EDT

## 2022-07-01 NOTE — PLAN OF CARE
"Goal Outcome Evaluation:      Pt up in chair for several hrs this am, back to bed for chest tube removal, back up in chair for lunch/dinner.  Walked in rojas with PT/OT.  Pain med switched to Tramadol, tolerating better than Hydrocodone.  Cortis & FC removed.   Tolerated 25% lunch (\"picky eater\") - encouraged wife to bring food he likes in if need.  Plavix 75 mg started.           "

## 2022-07-01 NOTE — PLAN OF CARE
"Tramaine Gates presents with functional mobility impairments which indicate the need for skilled intervention. Tolerating session today without incident. Pt continues to require verbal cues for motivation due to self limiting actions, possible anxious feelings with mobility, and decreased motivation to mobilize.  Pt requiring Jose Guadalupe-modA for transfers and ambulation.  Pt requiring modAx2 for sit -> supine bed mobility.   Pt educated on importance of mobility to increase strength, endurance, and assist with pain.  PT spoke with pt and pt's significant other of importance of significant other going home to sleep so she is rested and able to assist pt at home when pt is ready for hospital d/c.  Pt will continue to require further cues for encouragement and motivation.  Will continue to follow and progress as tolerated.    Plan/Recommendations:   Low Intensity Therapy recommended post-acute care - This is recommended as therapy feels this patient would require 2-3 visits per week. OP or HH would be the best option depending on patient's home bound status. Consider, if the patient has other  \"skilled\" needs such as wounds, IV antibiotics, etc. Combined with \"low intensity\" could also equate to a SNF. If patient is medically complex, consider LTAC.. Pt requires no DME at discharge.     Pt desires Home with family assist at discharge. Pt cooperative; agreeable to therapeutic recommendations and plan of care.   "

## 2022-07-02 LAB
ANION GAP SERPL CALCULATED.3IONS-SCNC: 11 MMOL/L (ref 5–15)
BUN SERPL-MCNC: 10 MG/DL (ref 6–20)
BUN/CREAT SERPL: 13.3 (ref 7–25)
CALCIUM SPEC-SCNC: 8.8 MG/DL (ref 8.6–10.5)
CHLORIDE SERPL-SCNC: 98 MMOL/L (ref 98–107)
CO2 SERPL-SCNC: 26 MMOL/L (ref 22–29)
CREAT SERPL-MCNC: 0.75 MG/DL (ref 0.76–1.27)
DEPRECATED RDW RBC AUTO: 41.1 FL (ref 37–54)
EGFRCR SERPLBLD CKD-EPI 2021: 118.5 ML/MIN/1.73
ERYTHROCYTE [DISTWIDTH] IN BLOOD BY AUTOMATED COUNT: 13.5 % (ref 12.3–15.4)
GLUCOSE BLDC GLUCOMTR-MCNC: 103 MG/DL (ref 70–105)
GLUCOSE BLDC GLUCOMTR-MCNC: 107 MG/DL (ref 70–105)
GLUCOSE BLDC GLUCOMTR-MCNC: 107 MG/DL (ref 70–105)
GLUCOSE BLDC GLUCOMTR-MCNC: 99 MG/DL (ref 70–105)
GLUCOSE SERPL-MCNC: 103 MG/DL (ref 65–99)
HCT VFR BLD AUTO: 28.5 % (ref 37.5–51)
HGB BLD-MCNC: 9.6 G/DL (ref 13–17.7)
MCH RBC QN AUTO: 29.3 PG (ref 26.6–33)
MCHC RBC AUTO-ENTMCNC: 33.7 G/DL (ref 31.5–35.7)
MCV RBC AUTO: 87.1 FL (ref 79–97)
PLATELET # BLD AUTO: 254 10*3/MM3 (ref 140–450)
PMV BLD AUTO: 7 FL (ref 6–12)
POTASSIUM SERPL-SCNC: 4.2 MMOL/L (ref 3.5–5.2)
RBC # BLD AUTO: 3.27 10*6/MM3 (ref 4.14–5.8)
SODIUM SERPL-SCNC: 135 MMOL/L (ref 136–145)
WBC NRBC COR # BLD: 9.6 10*3/MM3 (ref 3.4–10.8)

## 2022-07-02 PROCEDURE — 99024 POSTOP FOLLOW-UP VISIT: CPT | Performed by: NURSE PRACTITIONER

## 2022-07-02 PROCEDURE — 97530 THERAPEUTIC ACTIVITIES: CPT

## 2022-07-02 PROCEDURE — 94799 UNLISTED PULMONARY SVC/PX: CPT

## 2022-07-02 PROCEDURE — 80048 BASIC METABOLIC PNL TOTAL CA: CPT | Performed by: NURSE PRACTITIONER

## 2022-07-02 PROCEDURE — 99232 SBSQ HOSP IP/OBS MODERATE 35: CPT | Performed by: INTERNAL MEDICINE

## 2022-07-02 PROCEDURE — 85027 COMPLETE CBC AUTOMATED: CPT | Performed by: NURSE PRACTITIONER

## 2022-07-02 PROCEDURE — 94664 DEMO&/EVAL PT USE INHALER: CPT

## 2022-07-02 PROCEDURE — 97116 GAIT TRAINING THERAPY: CPT

## 2022-07-02 PROCEDURE — 94761 N-INVAS EAR/PLS OXIMETRY MLT: CPT

## 2022-07-02 PROCEDURE — 25010000002 MORPHINE PER 10 MG: Performed by: NURSE PRACTITIONER

## 2022-07-02 PROCEDURE — 82962 GLUCOSE BLOOD TEST: CPT

## 2022-07-02 PROCEDURE — 25010000002 ENOXAPARIN PER 10 MG: Performed by: NURSE PRACTITIONER

## 2022-07-02 RX ORDER — POTASSIUM CHLORIDE 20 MEQ/1
20 TABLET, EXTENDED RELEASE ORAL DAILY
Status: DISCONTINUED | OUTPATIENT
Start: 2022-07-02 | End: 2022-07-04 | Stop reason: HOSPADM

## 2022-07-02 RX ORDER — GUAIFENESIN 600 MG/1
600 TABLET, EXTENDED RELEASE ORAL ONCE
Status: COMPLETED | OUTPATIENT
Start: 2022-07-02 | End: 2022-07-02

## 2022-07-02 RX ORDER — GUAIFENESIN 600 MG/1
1200 TABLET, EXTENDED RELEASE ORAL EVERY 12 HOURS SCHEDULED
Status: DISCONTINUED | OUTPATIENT
Start: 2022-07-02 | End: 2022-07-04 | Stop reason: HOSPADM

## 2022-07-02 RX ORDER — LISINOPRIL 5 MG/1
5 TABLET ORAL
Status: DISCONTINUED | OUTPATIENT
Start: 2022-07-02 | End: 2022-07-04 | Stop reason: HOSPADM

## 2022-07-02 RX ORDER — FUROSEMIDE 40 MG/1
40 TABLET ORAL DAILY
Status: DISCONTINUED | OUTPATIENT
Start: 2022-07-02 | End: 2022-07-04 | Stop reason: HOSPADM

## 2022-07-02 RX ADMIN — METOPROLOL TARTRATE 25 MG: 25 TABLET, FILM COATED ORAL at 21:03

## 2022-07-02 RX ADMIN — ATORVASTATIN CALCIUM 40 MG: 40 TABLET, FILM COATED ORAL at 21:03

## 2022-07-02 RX ADMIN — LEVOTHYROXINE SODIUM 50 MCG: 0.05 TABLET ORAL at 05:53

## 2022-07-02 RX ADMIN — LISINOPRIL 5 MG: 5 TABLET ORAL at 09:12

## 2022-07-02 RX ADMIN — CLOPIDOGREL BISULFATE 75 MG: 75 TABLET ORAL at 09:12

## 2022-07-02 RX ADMIN — DIAZEPAM 5 MG: 2 TABLET ORAL at 22:33

## 2022-07-02 RX ADMIN — MUPIROCIN 1 APPLICATION: 20 OINTMENT TOPICAL at 21:03

## 2022-07-02 RX ADMIN — ASPIRIN 81 MG: 81 TABLET, COATED ORAL at 09:12

## 2022-07-02 RX ADMIN — GUAIFENESIN 1200 MG: 600 TABLET, EXTENDED RELEASE ORAL at 21:03

## 2022-07-02 RX ADMIN — ACETAMINOPHEN 650 MG: 325 TABLET, FILM COATED ORAL at 23:07

## 2022-07-02 RX ADMIN — TRAMADOL HYDROCHLORIDE 50 MG: 50 TABLET, COATED ORAL at 15:17

## 2022-07-02 RX ADMIN — CHLORHEXIDINE GLUCONATE 15 ML: 1.2 SOLUTION ORAL at 09:11

## 2022-07-02 RX ADMIN — SENNOSIDES AND DOCUSATE SODIUM 2 TABLET: 50; 8.6 TABLET ORAL at 09:11

## 2022-07-02 RX ADMIN — POLYETHYLENE GLYCOL 3350 17 G: 17 POWDER, FOR SOLUTION ORAL at 09:11

## 2022-07-02 RX ADMIN — ENOXAPARIN SODIUM 40 MG: 100 INJECTION SUBCUTANEOUS at 21:04

## 2022-07-02 RX ADMIN — GUAIFENESIN 600 MG: 600 TABLET, EXTENDED RELEASE ORAL at 11:43

## 2022-07-02 RX ADMIN — METOPROLOL TARTRATE 25 MG: 25 TABLET, FILM COATED ORAL at 09:12

## 2022-07-02 RX ADMIN — TRAMADOL HYDROCHLORIDE 50 MG: 50 TABLET, COATED ORAL at 03:01

## 2022-07-02 RX ADMIN — PANTOPRAZOLE SODIUM 40 MG: 40 TABLET, DELAYED RELEASE ORAL at 05:53

## 2022-07-02 RX ADMIN — DIAZEPAM 5 MG: 2 TABLET ORAL at 09:13

## 2022-07-02 RX ADMIN — MORPHINE SULFATE 2 MG: 2 INJECTION, SOLUTION INTRAMUSCULAR; INTRAVENOUS at 05:53

## 2022-07-02 RX ADMIN — CARIPRAZINE 3 MG: 3 CAPSULE, GELATIN COATED ORAL at 06:31

## 2022-07-02 RX ADMIN — MUPIROCIN 1 APPLICATION: 20 OINTMENT TOPICAL at 09:13

## 2022-07-02 RX ADMIN — ACETAMINOPHEN 650 MG: 325 TABLET, FILM COATED ORAL at 11:43

## 2022-07-02 RX ADMIN — ACETAMINOPHEN 650 MG: 325 TABLET, FILM COATED ORAL at 16:50

## 2022-07-02 RX ADMIN — FUROSEMIDE 40 MG: 40 TABLET ORAL at 09:12

## 2022-07-02 RX ADMIN — TRAMADOL HYDROCHLORIDE 50 MG: 50 TABLET, COATED ORAL at 09:13

## 2022-07-02 RX ADMIN — TRAMADOL HYDROCHLORIDE 50 MG: 50 TABLET, COATED ORAL at 21:03

## 2022-07-02 RX ADMIN — BUDESONIDE 0.5 MG: 0.5 INHALANT RESPIRATORY (INHALATION) at 06:36

## 2022-07-02 RX ADMIN — GUAIFENESIN 600 MG: 600 TABLET, EXTENDED RELEASE ORAL at 09:11

## 2022-07-02 RX ADMIN — ACETAMINOPHEN 650 MG: 325 TABLET, FILM COATED ORAL at 07:44

## 2022-07-02 RX ADMIN — POTASSIUM CHLORIDE 20 MEQ: 20 TABLET, EXTENDED RELEASE ORAL at 09:11

## 2022-07-02 RX ADMIN — IPRATROPIUM BROMIDE AND ALBUTEROL SULFATE 3 ML: .5; 3 SOLUTION RESPIRATORY (INHALATION) at 06:36

## 2022-07-02 NOTE — PROGRESS NOTES
Cardiology Progress Note      Admiting Physician:  Pablo Ball, *   LOS: 10 days       Reason For Followup:  Coronary artery disease      Subjective:    Interval History:  Seen and examined.  Chart and labs reviewed.  Patient reports a significant amount of pain.  Has ambulated around the unit some.  Did take a shower today.    Review of Systems:  A complete review of systems was undertaken with the pertinent cardiovascular findings listed in history of present illness and all other systems being negative.     Assessment & Plan    Impressions:  Coronary artery disease status post three-vessel CABG this admission     LIMA-LAD, SVG- circumflex, SVG-PDA  Hypertension  Hypertensive cardiovascular disease  Hyperlipidemia    Recommendations:  Increase activity as tolerated  Lines per surgery  Continue with diuresis as renal function allows  Monitor fluid status  Monitor rate and rhythm closely.          Objective:    Medication Review:   Scheduled Meds:aspirin, 81 mg, Oral, Daily  atorvastatin, 40 mg, Oral, Nightly  budesonide, 0.5 mg, Nebulization, BID - RT  Cariprazine HCl, 3 mg, Oral, QAM  chlorhexidine, 15 mL, Mouth/Throat, Q12H  clopidogrel, 75 mg, Oral, Daily  enoxaparin, 40 mg, Subcutaneous, Daily  furosemide, 40 mg, Oral, Daily  guaiFENesin, 1,200 mg, Oral, Q12H  insulin lispro, 0-7 Units, Subcutaneous, TID AC  ipratropium-albuterol, 3 mL, Nebulization, 4x Daily - RT  levothyroxine, 50 mcg, Oral, Q AM  lisinopril, 5 mg, Oral, Q24H  metoprolol tartrate, 25 mg, Oral, Q12H  mupirocin, 1 application, Each Nare, BID  pantoprazole, 40 mg, Oral, Q AM  polyethylene glycol, 17 g, Oral, BID  potassium chloride, 20 mEq, Oral, Daily  senna-docusate sodium, 2 tablet, Oral, BID      Continuous Infusions:   PRN Meds:.  acetaminophen **OR** [DISCONTINUED] acetaminophen **OR** [DISCONTINUED] acetaminophen    dextrose    dextrose    diazePAM    glucagon (human recombinant)    insulin lispro **AND** insulin lispro     ipratropium-albuterol    [DISCONTINUED] Morphine **AND** naloxone    ondansetron    potassium chloride **OR** potassium chloride    traMADol    Patient Active Problem List   Diagnosis    Hypertension    Peptic ulcer    Asthma    Blepharitis    Chest pain    Chronic cholecystitis    Contact with and (suspected) exposure to covid-19    Corneal ulcer    Costochondritis    Cough    Disorder of thyroid    Gastroesophageal reflux disease    Hyperlipidemia    Migraine headache    Panic attack    Acute inferior myocardial infarction (HCC)    CAD, multiple vessel    Bipolar II disorder (HCC)    Hx of brief reactive psychosis    Generalized anxiety disorder with panic attacks    Rage attacks    Chest pain, unspecified type         Physical Exam:    General: Alert, cooperative, no distress, appears stated age  Head:  Normocephalic, atraumatic, mucous membranes moist  Eyes:  Conjunctivae/corneas clear, EOM's intact     Neck:  Supple,  no bruit  Lungs:  Clear to auscultation bilaterally, no wheezes rhonchi rales are noted  Chest wall: Extremely tender at incision site  Heart::  Regular rate and rhythm, S1 and S2 normal, no murmurs gallops rubs.  Abdomen: Soft, non-tender, nondistended bowel sounds active.  Obese  Extremities: No cyanosis, clubbing, or edema  Pulses: 2+ and symmetric all extremities  Skin:  Incisions clean dry intact  Neuro/psych: A&O x3. CN II through XII are grossly intact with appropriate affect    Vital Signs:  Vitals:    07/02/22 1113 07/02/22 1115 07/02/22 1145 07/02/22 1200   BP:  125/92 125/92 142/97   BP Location:   Right arm    Patient Position:   Lying    Pulse: 83 82 83 82   Resp: 17  17    Temp:   98.7 °F (37.1 °C)    TempSrc:   Oral    SpO2: 94% 93% 95% 90%   Weight:       Height:         Wt Readings from Last 1 Encounters:   07/01/22 127 kg (280 lb)       Intake/Output Summary (Last 24 hours) at 7/2/2022 1346  Last data filed at 7/2/2022 1000  Gross per 24 hour   Intake 960 ml   Output 3750 ml    Net -2790 ml         Results Review:     CBC    Results from last 7 days   Lab Units 07/02/22  0306 07/01/22  0337 06/30/22  0403 06/29/22  1755 06/29/22  1718 06/29/22  1640 06/29/22  1553 06/29/22  1319 06/29/22  1231 06/29/22  0755 06/27/22  0436 06/26/22  0538   WBC 10*3/mm3 9.60 11.30* 11.20*  --   --   --   --   --  12.40*  --  9.00 8.60   HEMOGLOBIN g/dL 9.6* 9.1* 9.7*  --   --   --   --   --  10.8*  --  12.8* 12.7*   HEMOGLOBIN, POC g/dL  --   --   --  10.7* 10.5* 10.8* 10.4*   < >  --    < >  --   --    PLATELETS 10*3/mm3 254 226 217  --   --   --   --   --  231  --  300 304    < > = values in this interval not displayed.     Cr Clearance Estimated Creatinine Clearance: 184 mL/min (A) (by C-G formula based on SCr of 0.75 mg/dL (L)).  Coag   Results from last 7 days   Lab Units 06/30/22  0403 06/29/22  1231 06/27/22  0436 06/26/22  0538   INR  1.03 1.06 0.99  --    APTT seconds  --  27.5 29.8* 29.7*     HbA1C   Lab Results   Component Value Date    HGBA1C 5.4 06/23/2022     Blood Glucose   Glucose   Date/Time Value Ref Range Status   07/02/2022 1144 99 70 - 105 mg/dL Final     Comment:     Serial Number: [id number]:  [id number]   07/02/2022 0738 103 70 - 105 mg/dL Final     Comment:     Serial Number: [id number]:  [id number]   07/01/2022 2117 114 (H) 70 - 105 mg/dL Final     Comment:     Serial Number: [id number]:  [id number]   07/01/2022 1654 123 (H) 70 - 105 mg/dL Final     Comment:     Serial Number: [id number]:  [id number]   07/01/2022 1222 131 (H) 70 - 105 mg/dL Final     Comment:     Serial Number: [id number]:  [id number]   07/01/2022 0001 108 (H) 70 - 105 mg/dL Final     Comment:     Serial Number: [id number]:  [id number]   06/30/2022 1928 125 (H) 70 - 105 mg/dL Final     Comment:     Serial Number: [id number]:  [id number]   06/30/2022 1648 126 (H) 70 - 105 mg/dL Final     Comment:     Serial Number: [id number]:  [id number]     Infection     CMP    Results from last 7 days   Lab Units 07/02/22  0306 07/01/22  0337 06/30/22  0403 06/29/22  1231 06/28/22  0856 06/27/22  0437   SODIUM mmol/L 135* 135* 135* 137 137 137   POTASSIUM mmol/L 4.2 4.0 4.4 5.0 3.9 4.3   CHLORIDE mmol/L 98 99 101 104 100 100   CO2 mmol/L 26.0 24.0 22.0 23.0 23.0 26.0   BUN mg/dL 10 11 12 12 12 7   CREATININE mg/dL 0.75* 0.94 1.07 1.26 0.84 1.31*   GLUCOSE mg/dL 103* 101* 133* 114* 92 102*   ALBUMIN g/dL  --   --  4.70 4.40  --  3.60   BILIRUBIN mg/dL  --   --   --   --   --  0.4   ALK PHOS U/L  --   --   --   --   --  99   AST (SGOT) U/L  --   --   --   --   --  30   ALT (SGPT) U/L  --   --   --   --   --  62*     ABG    Results from last 7 days   Lab Units 06/29/22  1908 06/29/22  1755 06/29/22  1718 06/29/22  1640 06/29/22  1553 06/29/22  1507 06/29/22  1411   PH, ARTERIAL pH units 7.370 7.377 7.403 7.375 7.393 7.406 7.424   PCO2, ARTERIAL mm Hg 40.3 39.9 36.7 40.2 38.0 36.0 35.4   PO2 ART mm Hg 93.9 106.0 95.3 100.5 96.2 94.9 74.4*   O2 SATURATION ART % 97.1 98.0 97.5 97.6 97.5 97.5 95.2   BASE EXCESS ART mmol/L -1.9* -1.6* -1.6* -1.6* -1.5* -1.7* -0.9*     UA      FABIOLA  No results found for: POCMETH, POCAMPHET, POCBARBITUR, POCBENZO, POCCOCAINE, POCOPIATES, POCOXYCODO, POCPHENCYC, POCPROPOXY, POCTHC, POCTRICYC  Lysis Labs   Results from last 7 days   Lab Units 07/02/22  0306 07/01/22  0337 06/30/22  0403 06/29/22  1755 06/29/22  1718 06/29/22  1640 06/29/22  1553 06/29/22  1319 06/29/22  1231 06/29/22  0755 06/28/22  0856 06/27/22  0437 06/27/22  0436 06/26/22  0538   INR   --   --  1.03  --   --   --   --   --  1.06  --   --   --  0.99  --    APTT seconds  --   --   --   --   --   --   --   --  27.5  --   --   --  29.8* 29.7*   HEMOGLOBIN g/dL 9.6* 9.1* 9.7*  --   --   --   --   --  10.8*  --   --   --  12.8* 12.7*   HEMOGLOBIN, POC g/dL  --   --   --  10.7* 10.5* 10.8* 10.4*   < >  --    < >  --   --   --   --    PLATELETS 10*3/mm3 254 226 217  --   --   --   --   --  231  --   --    --  300 304   CREATININE mg/dL 0.75* 0.94 1.07  --   --   --   --   --  1.26  --  0.84 1.31*  --   --     < > = values in this interval not displayed.     Radiology(recent) XR Chest 1 View    Result Date: 7/1/2022  IMPRESSION : Interval removal of mediastinal drain.  No acute abnormality noted  Cardiomegaly and pulmonary vascular congestion[  Electronically Signed By-Kojo Mittal On:7/1/2022 1:14 PM This report was finalized on [id number] by  Kojo Mittal, .    XR Chest 1 View    Result Date: 7/1/2022   1. Catheter-like density projects over the right heart border, the proximal extent of which is not visualized. As such, I cannot exclude the presence of a catheter fragment. Alternatively, if the patient has a mediastinal drain, it could represent that entity although not completely visualized in its extent. CT chest without contrast may prove helpful in this distinction. I spoke with the patient's nurse regarding this, at the time of this dictation. 2. [place]-Tabitha catheter removal. No visible pneumothorax. 3. Mild central vascular congestion without overt edema. 4. New right perihilar subsegmental atelectasis. Left basilar atelectasis is unchanged.  Electronically Signed By-Selma Jones MD On:7/1/2022 8:27 AM This report was finalized on [id number] by  Selma Jones MD.              Imaging Results (Last 24 Hours)       ** No results found for the last 24 hours. **            Cardiac Studies:  Echo- Results for orders placed during the hospital encounter of 06/22/22    Adult Transthoracic Echo Limited W/ Cont if Necessary Per Protocol    Interpretation Summary  · Left ventricular systolic function is normal.  · Left ventricular ejection fraction is 55 to 60%  · Basal inferior wall aneurysm is noted    Stress Myoview-  Cath-        Keven Skaggs DO  07/02/22  13:46 EDT

## 2022-07-02 NOTE — PLAN OF CARE
Assessment: Tramaine Gates presents with functional mobility impairments which indicate the need for skilled intervention. Tolerating session today without incident.Presented with slowed jenna and very guarded. Needs encouragement to move more. Plans on home with family at [place]. Will continue to follow and progress as tolerated.

## 2022-07-02 NOTE — THERAPY TREATMENT NOTE
Subjective: Pt agreeable to therapeutic plan of care.    Objective:     Bed mobility - Min-A  Transfers - CGA and with rolling walker  Ambulation - 200 feet CGA and with rolling walker    Vitals: WNL Sats 94%, HR 83    Pain: chest, had valium and was a lottle groggy  Education: Provided education on importance of mobility and skilled verbal / tactile cueing throughout intervention.     Assessment: Tramaine Gates presents with functional mobility impairments which indicate the need for skilled intervention. Tolerating session today without incident.Presented with slowed jenna and very guarded. Needs encouragement to move more. Plans on home with family at [place]. Will continue to follow and progress as tolerated.     Plan/Recommendations:   No ongoing therapy recommended post-acute care. No therapy needs.. Pt requires no DME at discharge.     Pt desires Home with family assist at discharge. Pt cooperative; agreeable to therapeutic recommendations and plan of care.         Basic Mobility 6-click:  Rollin = Total, A lot = 2, A little = 3; 4 = None  Supine>Sit:   1 = Total, A lot = 2, A little = 3; 4 = None   Sit>Stand with arms:  1 = Total, A lot = 2, A little = 3; 4 = None  Bed>Chair:   1 = Total, A lot = 2, A little = 3; 4 = None  Ambulate in room:  1 = Total, A lot = 2, A little = 3; 4 = None  3-5 Steps with railin = Total, A lot = 2, A little = 3; 4 = None  Score: 17      Post-Tx Position: Up in Chair, Staff Present and Call light and personal items within reach  PPE: gloves, surgical mask, eyewear protection

## 2022-07-02 NOTE — PROGRESS NOTES
S/P POD# 3 CABG x3 with LIMA--Camporrotondo  EF 60% (cath)    Subjective:  Reports surgical pain with coughing    No events overnight  Wt is up 4 kg from preop  CXR:  vasc congestion 7/1    Intake/Output Summary (Last 24 hours) at 7/2/2022 0837  Last data filed at 7/1/2022 2300  Gross per 24 hour   Intake 1440 ml   Output 3530 ml   Net -2090 ml     Temp:  [96.7 °F (35.9 °C)-98.8 °F (37.1 °C)] 96.7 °F (35.9 °C)  Heart Rate:  [74-98] 98  Resp:  [14-24] 14  BP: (110-166)/(67-93) 145/88      Results from last 7 days   Lab Units 07/02/22  0306 07/01/22  0337 06/30/22  0403 06/29/22  1319 06/29/22  1231 06/29/22  0755 06/27/22  0436   WBC 10*3/mm3 9.60 11.30* 11.20*  --  12.40*  --  9.00   HEMOGLOBIN g/dL 9.6* 9.1* 9.7*  --  10.8*  --  12.8*   HEMOGLOBIN, POC   --   --   --    < >  --    < >  --    HEMATOCRIT % 28.5* 27.0* 28.8*  --  31.8*  --  37.2*   HEMATOCRIT POC   --   --   --    < >  --    < >  --    PLATELETS 10*3/mm3 254 226 217  --  231  --  300   INR   --   --  1.03  --  1.06  --  0.99    < > = values in this interval not displayed.     Results from last 7 days   Lab Units 07/02/22  0306 07/01/22  0337 06/30/22  0403   CREATININE mg/dL 0.75*   < > 1.07   POTASSIUM mmol/L 4.2   < > 4.4   SODIUM mmol/L 135*   < > 135*   MAGNESIUM mg/dL  --   --  2.9*   PHOSPHORUS mg/dL  --   --  5.5*    < > = values in this interval not displayed.       Physical Exam:  Neuro intact, nad, up in chair moaning  Tele:  SR 80-90s  Diminished bases, 95% 2L  Sternotomy/SVHS healing well  Benign abd, no BM, +flatus  No edema    Assessment/Plan:  Principal Problem:    Chest pain, unspecified type  Active Problems:    Hypertension    Asthma    Hyperlipidemia    CAD, multiple vessel    Bipolar II disorder (HCC)    - MV CAD, s/p stents RCA/LAD 6/14 patent but noted to have LAD thrombus this adm, EF 60% (cath)--s/p CABG x3 with LIMA (Camporrotondo)  - Preop Plavix--last dose 6/22  - HTN--stable  - HLD--statin  - Bipolar II Disorder/ [place]/ Panic  disorder--psych following  - Recent COVID-19 infection  - Migraines, recently diagnosed  - Pulm nodules--f/u with pulm as outpt  - Tobacco abuse, current--22 pack year hx--nicotine patch, cessation d/w pt  - Obesity, stage 2--BMI 36.8    POD# 3.  Doing well.  DC wires.  Psych meds continued.  Will need a lot of motivation to get thru the postop course.  On asa/statin/bb/ add low dose ace-i.  Plavix restarted yest.  Daily diuretic.  Mobilize.  Pt needs to shower today and ambulated TID.    Routine care--as above  D/w pt/girlfriend, Dr. Ball  Anticipate home at discharge    Berna Sequeira-Silvia, APRN  7/2/2022  08:37 EDT

## 2022-07-03 LAB
ANION GAP SERPL CALCULATED.3IONS-SCNC: 11 MMOL/L (ref 5–15)
BUN SERPL-MCNC: 9 MG/DL (ref 6–20)
BUN/CREAT SERPL: 11.4 (ref 7–25)
CALCIUM SPEC-SCNC: 8.8 MG/DL (ref 8.6–10.5)
CHLORIDE SERPL-SCNC: 98 MMOL/L (ref 98–107)
CO2 SERPL-SCNC: 27 MMOL/L (ref 22–29)
CREAT SERPL-MCNC: 0.79 MG/DL (ref 0.76–1.27)
DEPRECATED RDW RBC AUTO: 41.6 FL (ref 37–54)
EGFRCR SERPLBLD CKD-EPI 2021: 116.6 ML/MIN/1.73
ERYTHROCYTE [DISTWIDTH] IN BLOOD BY AUTOMATED COUNT: 13.8 % (ref 12.3–15.4)
GLUCOSE BLDC GLUCOMTR-MCNC: 117 MG/DL (ref 70–105)
GLUCOSE BLDC GLUCOMTR-MCNC: 136 MG/DL (ref 70–105)
GLUCOSE BLDC GLUCOMTR-MCNC: 86 MG/DL (ref 70–105)
GLUCOSE BLDC GLUCOMTR-MCNC: 91 MG/DL (ref 70–105)
GLUCOSE SERPL-MCNC: 93 MG/DL (ref 65–99)
HCT VFR BLD AUTO: 27.5 % (ref 37.5–51)
HGB BLD-MCNC: 9.2 G/DL (ref 13–17.7)
MCH RBC QN AUTO: 28.9 PG (ref 26.6–33)
MCHC RBC AUTO-ENTMCNC: 33.3 G/DL (ref 31.5–35.7)
MCV RBC AUTO: 86.8 FL (ref 79–97)
PLATELET # BLD AUTO: 303 10*3/MM3 (ref 140–450)
PMV BLD AUTO: 7 FL (ref 6–12)
POTASSIUM SERPL-SCNC: 4.5 MMOL/L (ref 3.5–5.2)
RBC # BLD AUTO: 3.17 10*6/MM3 (ref 4.14–5.8)
SODIUM SERPL-SCNC: 136 MMOL/L (ref 136–145)
WBC NRBC COR # BLD: 7.4 10*3/MM3 (ref 3.4–10.8)

## 2022-07-03 PROCEDURE — 80048 BASIC METABOLIC PNL TOTAL CA: CPT | Performed by: NURSE PRACTITIONER

## 2022-07-03 PROCEDURE — 25010000002 ENOXAPARIN PER 10 MG: Performed by: NURSE PRACTITIONER

## 2022-07-03 PROCEDURE — 99232 SBSQ HOSP IP/OBS MODERATE 35: CPT | Performed by: INTERNAL MEDICINE

## 2022-07-03 PROCEDURE — 94799 UNLISTED PULMONARY SVC/PX: CPT

## 2022-07-03 PROCEDURE — 82962 GLUCOSE BLOOD TEST: CPT

## 2022-07-03 PROCEDURE — 94762 N-INVAS EAR/PLS OXIMTRY CONT: CPT

## 2022-07-03 PROCEDURE — 85027 COMPLETE CBC AUTOMATED: CPT | Performed by: NURSE PRACTITIONER

## 2022-07-03 PROCEDURE — 99024 POSTOP FOLLOW-UP VISIT: CPT | Performed by: NURSE PRACTITIONER

## 2022-07-03 PROCEDURE — 99232 SBSQ HOSP IP/OBS MODERATE 35: CPT | Performed by: PSYCHIATRY & NEUROLOGY

## 2022-07-03 PROCEDURE — 94664 DEMO&/EVAL PT USE INHALER: CPT

## 2022-07-03 PROCEDURE — 94761 N-INVAS EAR/PLS OXIMETRY MLT: CPT

## 2022-07-03 RX ORDER — DIAZEPAM 5 MG/1
7.5 TABLET ORAL EVERY 8 HOURS PRN
Status: DISCONTINUED | OUTPATIENT
Start: 2022-07-03 | End: 2022-07-04 | Stop reason: HOSPADM

## 2022-07-03 RX ADMIN — TRAMADOL HYDROCHLORIDE 50 MG: 50 TABLET, COATED ORAL at 16:38

## 2022-07-03 RX ADMIN — SENNOSIDES AND DOCUSATE SODIUM 2 TABLET: 50; 8.6 TABLET ORAL at 20:50

## 2022-07-03 RX ADMIN — LISINOPRIL 5 MG: 5 TABLET ORAL at 08:13

## 2022-07-03 RX ADMIN — CLOPIDOGREL BISULFATE 75 MG: 75 TABLET ORAL at 08:14

## 2022-07-03 RX ADMIN — PANTOPRAZOLE SODIUM 40 MG: 40 TABLET, DELAYED RELEASE ORAL at 06:22

## 2022-07-03 RX ADMIN — IPRATROPIUM BROMIDE AND ALBUTEROL SULFATE 3 ML: .5; 3 SOLUTION RESPIRATORY (INHALATION) at 18:20

## 2022-07-03 RX ADMIN — MUPIROCIN 1 APPLICATION: 20 OINTMENT TOPICAL at 20:50

## 2022-07-03 RX ADMIN — CHLORHEXIDINE GLUCONATE 15 ML: 1.2 SOLUTION ORAL at 20:50

## 2022-07-03 RX ADMIN — POTASSIUM CHLORIDE 20 MEQ: 20 TABLET, EXTENDED RELEASE ORAL at 08:13

## 2022-07-03 RX ADMIN — ATORVASTATIN CALCIUM 40 MG: 40 TABLET, FILM COATED ORAL at 20:50

## 2022-07-03 RX ADMIN — IPRATROPIUM BROMIDE AND ALBUTEROL SULFATE 3 ML: .5; 3 SOLUTION RESPIRATORY (INHALATION) at 07:05

## 2022-07-03 RX ADMIN — LEVOTHYROXINE SODIUM 50 MCG: 0.05 TABLET ORAL at 06:21

## 2022-07-03 RX ADMIN — CARIPRAZINE 3 MG: 3 CAPSULE, GELATIN COATED ORAL at 06:22

## 2022-07-03 RX ADMIN — BUDESONIDE 0.5 MG: 0.5 INHALANT RESPIRATORY (INHALATION) at 18:27

## 2022-07-03 RX ADMIN — FUROSEMIDE 40 MG: 40 TABLET ORAL at 08:14

## 2022-07-03 RX ADMIN — ENOXAPARIN SODIUM 40 MG: 100 INJECTION SUBCUTANEOUS at 15:09

## 2022-07-03 RX ADMIN — GUAIFENESIN 1200 MG: 600 TABLET, EXTENDED RELEASE ORAL at 20:50

## 2022-07-03 RX ADMIN — ACETAMINOPHEN 650 MG: 325 TABLET, FILM COATED ORAL at 06:21

## 2022-07-03 RX ADMIN — TRAMADOL HYDROCHLORIDE 50 MG: 50 TABLET, COATED ORAL at 08:26

## 2022-07-03 RX ADMIN — ASPIRIN 81 MG: 81 TABLET, COATED ORAL at 08:14

## 2022-07-03 RX ADMIN — ACETAMINOPHEN 650 MG: 325 TABLET, FILM COATED ORAL at 22:14

## 2022-07-03 RX ADMIN — METOPROLOL TARTRATE 25 MG: 25 TABLET, FILM COATED ORAL at 20:50

## 2022-07-03 RX ADMIN — TRAMADOL HYDROCHLORIDE 50 MG: 50 TABLET, COATED ORAL at 03:03

## 2022-07-03 RX ADMIN — DIAZEPAM 7.5 MG: 5 TABLET ORAL at 20:50

## 2022-07-03 RX ADMIN — GUAIFENESIN 1200 MG: 600 TABLET, EXTENDED RELEASE ORAL at 08:13

## 2022-07-03 RX ADMIN — ACETAMINOPHEN 650 MG: 325 TABLET, FILM COATED ORAL at 13:49

## 2022-07-03 RX ADMIN — ACETAMINOPHEN 650 MG: 325 TABLET, FILM COATED ORAL at 18:17

## 2022-07-03 RX ADMIN — MUPIROCIN 1 APPLICATION: 20 OINTMENT TOPICAL at 08:15

## 2022-07-03 RX ADMIN — BUDESONIDE 0.5 MG: 0.5 INHALANT RESPIRATORY (INHALATION) at 07:10

## 2022-07-03 RX ADMIN — METOPROLOL TARTRATE 25 MG: 25 TABLET, FILM COATED ORAL at 08:14

## 2022-07-03 NOTE — PROGRESS NOTES
Cardiology Progress Note      Admiting Physician:  Pablo Ball, *   LOS: 11 days       Reason For Followup:  Coronary artery disease    Subjective:    Interval History:  Seen and examined.  Chart and labs reviewed.  Patient continues to report a significant amount of pain although it is a little bit better today.  He did ambulate the entire unit today.  No arrhythmias identified    Review of Systems:  A complete review of systems was undertaken with the pertinent cardiovascular findings listed in history of present illness and all other systems being negative.     Assessment & Plan    Impressions:  Coronary artery disease status post three-vessel CABG this admission     LIMA-LAD, SVG- circumflex, SVG-PDA  Hypertension  Hypertensive cardiovascular disease  Hyperlipidemia    Recommendations:  Increase activity as tolerated  Oral diuretics at least for a short period of time after discharge  Monitor fluid status  Monitor rate and rhythm closely.  Cardiology status appears appropriate for discharge when okay with other services.      Objective:    Medication Review:   Scheduled Meds:aspirin, 81 mg, Oral, Daily  atorvastatin, 40 mg, Oral, Nightly  budesonide, 0.5 mg, Nebulization, BID - RT  [START ON 7/4/2022] Cariprazine HCl, 3 mg, Oral, Nightly  chlorhexidine, 15 mL, Mouth/Throat, Q12H  clopidogrel, 75 mg, Oral, Daily  enoxaparin, 40 mg, Subcutaneous, Daily  furosemide, 40 mg, Oral, Daily  guaiFENesin, 1,200 mg, Oral, Q12H  insulin lispro, 0-7 Units, Subcutaneous, TID AC  ipratropium-albuterol, 3 mL, Nebulization, 4x Daily - RT  levothyroxine, 50 mcg, Oral, Q AM  lisinopril, 5 mg, Oral, Q24H  metoprolol tartrate, 25 mg, Oral, Q12H  mupirocin, 1 application, Each Nare, BID  pantoprazole, 40 mg, Oral, Q AM  polyethylene glycol, 17 g, Oral, BID  potassium chloride, 20 mEq, Oral, Daily  senna-docusate sodium, 2 tablet, Oral, BID      Continuous Infusions:   PRN Meds:.•  acetaminophen **OR** [DISCONTINUED]  acetaminophen **OR** [DISCONTINUED] acetaminophen  •  dextrose  •  dextrose  •  diazePAM  •  glucagon (human recombinant)  •  insulin lispro **AND** insulin lispro  •  ipratropium-albuterol  •  [DISCONTINUED] Morphine **AND** naloxone  •  ondansetron  •  potassium chloride **OR** potassium chloride  •  traMADol    Patient Active Problem List   Diagnosis   • Hypertension   • Peptic ulcer   • Asthma   • Blepharitis   • Chest pain   • Chronic cholecystitis   • Contact with and (suspected) exposure to covid-19   • Corneal ulcer   • Costochondritis   • Cough   • Disorder of thyroid   • Gastroesophageal reflux disease   • Hyperlipidemia   • Migraine headache   • Panic attack   • Acute inferior myocardial infarction (HCC)   • CAD, multiple vessel   • Bipolar II disorder (HCC)   • Hx of brief reactive psychosis   • Generalized anxiety disorder with panic attacks   • Rage attacks   • Chest pain, unspecified type         Physical Exam:    General: Alert, cooperative, no distress, appears stated age  Head:  Normocephalic, atraumatic, mucous membranes moist  Eyes:  Conjunctivae/corneas clear, EOM's intact     Neck:  Supple,  no bruit  Lungs:  Clear to auscultation bilaterally, no wheezes rhonchi rales are noted  Chest wall: Extremely tender at incision site  Heart::  Regular rate and rhythm, S1 and S2 normal, no murmurs gallops rubs.  Abdomen: Soft, non-tender, nondistended bowel sounds active.  Obese  Extremities: No cyanosis, clubbing, or edema  Pulses: 2+ and symmetric all extremities  Skin:  Incisions clean dry intact  Neuro/psych: A&O x3. CN II through XII are grossly intact with appropriate affect    Vital Signs:  Vitals:    07/03/22 0710 07/03/22 0712 07/03/22 0746 07/03/22 0807   BP:   156/86 131/96   Pulse: 96 96 95 102   Resp: 18 18 17    Temp:   98.2 °F (36.8 °C)    TempSrc:   Oral    SpO2: 96% 94% 96% 94%   Weight:       Height:         Wt Readings from Last 1 Encounters:   07/03/22 124 kg (273 lb 9.6 oz)        Intake/Output Summary (Last 24 hours) at 7/3/2022 1201  Last data filed at 7/3/2022 0600  Gross per 24 hour   Intake 1080 ml   Output 1600 ml   Net -520 ml         Results Review:     CBC    Results from last 7 days   Lab Units 07/03/22  0309 07/02/22  0306 07/01/22  0337 06/30/22  0403 06/29/22  1755 06/29/22  1718 06/29/22  1640 06/29/22  1319 06/29/22  1231 06/29/22  0755 06/27/22  0436   WBC 10*3/mm3 7.40 9.60 11.30* 11.20*  --   --   --   --  12.40*  --  9.00   HEMOGLOBIN g/dL 9.2* 9.6* 9.1* 9.7*  --   --   --   --  10.8*  --  12.8*   HEMOGLOBIN, POC g/dL  --   --   --   --  10.7* 10.5* 10.8*   < >  --    < >  --    PLATELETS 10*3/mm3 303 254 226 217  --   --   --   --  231  --  300    < > = values in this interval not displayed.     Cr Clearance Estimated Creatinine Clearance: 172.5 mL/min (by C-G formula based on SCr of 0.79 mg/dL).  Coag   Results from last 7 days   Lab Units 06/30/22  0403 06/29/22  1231 06/27/22  0436   INR  1.03 1.06 0.99   APTT seconds  --  27.5 29.8*     HbA1C   Lab Results   Component Value Date    HGBA1C 5.4 06/23/2022     Blood Glucose   Glucose   Date/Time Value Ref Range Status   07/03/2022 1117 86 70 - 105 mg/dL Final     Comment:     Serial Number: [id number]:  [id number]   07/03/2022 0748 91 70 - 105 mg/dL Final     Comment:     Serial Number: [id number]:  [id number]   07/02/2022 2022 107 (H) 70 - 105 mg/dL Final     Comment:     Serial Number: [id number]:  [id number]   07/02/2022 1521 107 (H) 70 - 105 mg/dL Final     Comment:     Serial Number: [id number]:  [id number]   07/02/2022 1144 99 70 - 105 mg/dL Final     Comment:     Serial Number: [id number]:  [id number]   07/02/2022 0738 103 70 - 105 mg/dL Final     Comment:     Serial Number: [id number]:  [id number]   07/01/2022 2117 114 (H) 70 - 105 mg/dL Final     Comment:     Serial Number: [id number]:  [id number]   07/01/2022 1654 123 (H) 70 - 105 mg/dL Final     Comment:      Serial Number: [id number]:  [id number]     Infection     CMP   Results from last 7 days   Lab Units 07/03/22  0309 07/02/22  0306 07/01/22  0337 06/30/22  0403 06/29/22  1231 06/28/22  0856 06/27/22  0437   SODIUM mmol/L 136 135* 135* 135* 137 137 137   POTASSIUM mmol/L 4.5 4.2 4.0 4.4 5.0 3.9 4.3   CHLORIDE mmol/L 98 98 99 101 104 100 100   CO2 mmol/L 27.0 26.0 24.0 22.0 23.0 23.0 26.0   BUN mg/dL 9 10 11 12 12 12 7   CREATININE mg/dL 0.79 0.75* 0.94 1.07 1.26 0.84 1.31*   GLUCOSE mg/dL 93 103* 101* 133* 114* 92 102*   ALBUMIN g/dL  --   --   --  4.70 4.40  --  3.60   BILIRUBIN mg/dL  --   --   --   --   --   --  0.4   ALK PHOS U/L  --   --   --   --   --   --  99   AST (SGOT) U/L  --   --   --   --   --   --  30   ALT (SGPT) U/L  --   --   --   --   --   --  62*     ABG    Results from last 7 days   Lab Units 06/29/22  1908 06/29/22  1755 06/29/22  1718 06/29/22  1640 06/29/22  1553 06/29/22  1507 06/29/22  1411   PH, ARTERIAL pH units 7.370 7.377 7.403 7.375 7.393 7.406 7.424   PCO2, ARTERIAL mm Hg 40.3 39.9 36.7 40.2 38.0 36.0 35.4   PO2 ART mm Hg 93.9 106.0 95.3 100.5 96.2 94.9 74.4*   O2 SATURATION ART % 97.1 98.0 97.5 97.6 97.5 97.5 95.2   BASE EXCESS ART mmol/L -1.9* -1.6* -1.6* -1.6* -1.5* -1.7* -0.9*     UA      FABIOLA  No results found for: POCMETH, POCAMPHET, POCBARBITUR, POCBENZO, POCCOCAINE, POCOPIATES, POCOXYCODO, POCPHENCYC, POCPROPOXY, POCTHC, POCTRICYC  Lysis Labs   Results from last 7 days   Lab Units 07/03/22  0309 07/02/22  0306 07/01/22  0337 06/30/22  0403 06/29/22  1755 06/29/22  1718 06/29/22  1640 06/29/22  1319 06/29/22  1231 06/29/22  0755 06/28/22  0856 06/27/22  0437 06/27/22  0436   INR   --   --   --  1.03  --   --   --   --  1.06  --   --   --  0.99   APTT seconds  --   --   --   --   --   --   --   --  27.5  --   --   --  29.8*   HEMOGLOBIN g/dL 9.2* 9.6* 9.1* 9.7*  --   --   --   --  10.8*  --   --   --  12.8*   HEMOGLOBIN, POC g/dL  --   --   --   --  10.7* 10.5* 10.8*   < >   --    < >  --   --   --    PLATELETS 10*3/mm3 303 254 226 217  --   --   --   --  231  --   --   --  300   CREATININE mg/dL 0.79 0.75* 0.94 1.07  --   --   --   --  1.26  --  0.84 1.31*  --     < > = values in this interval not displayed.     Radiology(recent) XR Chest 1 View    Result Date: 7/1/2022  IMPRESSION : Interval removal of mediastinal drain.  No acute abnormality noted  Cardiomegaly and pulmonary vascular congestion[  Electronically Signed By-Kojo Mittal On:7/1/2022 1:14 PM This report was finalized on [id number] by  Kojo Mittal, .              Imaging Results (Last 24 Hours)     ** No results found for the last 24 hours. **          Cardiac Studies:  Echo- Results for orders placed during the hospital encounter of 06/22/22    Adult Transthoracic Echo Limited W/ Cont if Necessary Per Protocol    Interpretation Summary  · Left ventricular systolic function is normal.  · Left ventricular ejection fraction is 55 to 60%  · Basal inferior wall aneurysm is noted    Stress Myoview-  Cath-        Keven Skaggs DO  07/03/22  12:01 EDT

## 2022-07-03 NOTE — PROGRESS NOTES
S/P POD# 4 CABG x3 with LIMA--[place]  EF 60% (cath)    Subjective:  Reports surgical pain with coughing continues.  Pt thinks Vraylar is causing sleepiness and asking if he can change time to evening.    No events overnight  Wt is up 1 kg from preop      Intake/Output Summary (Last 24 hours) at 7/3/2022 0841  Last data filed at 7/3/2022 0600  Gross per 24 hour   Intake 1560 ml   Output 2150 ml   Net -590 ml     Temp:  [97.7 °F (36.5 °C)-98.7 °F (37.1 °C)] 98.2 °F (36.8 °C)  Heart Rate:  [] 102  Resp:  [17-22] 17  BP: (106-156)/(60-97) 131/96      Results from last 7 days   Lab Units 07/03/22 0309 07/02/22  0306 07/01/22  0337 06/30/22  0403 06/29/22  1319 06/29/22  1231 06/29/22  0755 06/27/22  0436   WBC 10*3/mm3 7.40 9.60   < > 11.20*  --  12.40*  --  9.00   HEMOGLOBIN g/dL 9.2* 9.6*   < > 9.7*  --  10.8*  --  12.8*   HEMOGLOBIN, POC   --   --   --   --    < >  --    < >  --    HEMATOCRIT % 27.5* 28.5*   < > 28.8*  --  31.8*  --  37.2*   HEMATOCRIT POC   --   --   --   --    < >  --    < >  --    PLATELETS 10*3/mm3 303 254   < > 217  --  231  --  300   INR   --   --   --  1.03  --  1.06  --  0.99    < > = values in this interval not displayed.     Results from last 7 days   Lab Units 07/03/22 0309 07/01/22  0337 06/30/22  0403   CREATININE mg/dL 0.79   < > 1.07   POTASSIUM mmol/L 4.5   < > 4.4   SODIUM mmol/L 136   < > 135*   MAGNESIUM mg/dL  --   --  2.9*   PHOSPHORUS mg/dL  --   --  5.5*    < > = values in this interval not displayed.       Physical Exam:  Neuro intact, nad, up in chair, already walked this morning  Tele:  SR 80-90s  Diminished bases, 95% 2L  Sternotomy/SVHS healing well, skin tears from defib pads  Benign abd, +BM  No edema    Assessment/Plan:  Principal Problem:    Chest pain, unspecified type  Active Problems:    Hypertension    Asthma    Hyperlipidemia    CAD, multiple vessel    Bipolar II disorder (HCC)    - MV CAD, s/p stents RCA/LAD 6/14 patent but noted to have LAD  thrombus this adm, EF 60% (cath)--s/p CABG x3 with LIMA ([facility])  - Preop Plavix--last dose 6/22  - HTN--stable  - HLD--statin  - Bipolar II Disorder/ HOLLY/ Panic disorder--psych following  - Recent COVID-19 infection  - Migraines, recently diagnosed  - Pulm nodules--f/u with pulm as outpt  - Tobacco abuse, current--22 pack year hx--nicotine patch, cessation d/w pt  - Obesity, stage 2--BMI 36.8  - Features of BUZZ--check nocturnal oximetry    POD# 4.  Doing well.  Psych meds continued--will consult with psych about timing of Vraylar per pt request.  On asa/statin/bb/ace-i/Plavix Daily diuretic.  Mobilize.  Check exercise oximetry and nocturnal oximetry.  Hopefully home tomorrow.  He will need to get on stairs prior to discharge--lives on 2nd floor.    Routine care--as above  D/w pt/girlfriend, Dr. Ball  Anticipate home at discharge    Berna Sandoval, APRN  7/3/2022  08:41 EDT

## 2022-07-03 NOTE — PROGRESS NOTES
"  Chief complaint Anxiety    Subjective .     History of present illness:  The patient is a 36 y.o. male who was admitted secondary to chest pain and dyspnea. PMHx: acute inferior MI, CAD with stents (6/14/22 and 6/16/22), anxiety, bipolar d/o.   Psych consult was requested by Mary DODD secondary to anxiety.  The patient acknowledged long hx of mood disturbances, anxiety, anger since he was 13 yo, he had negative experience with behavioral health and counseling. He had a few involuntary inpatient hospitalizations as a teen and still feels \"traumatized\"   from being \"locked up\" in a psychiatric hospital. The patient reported anger outbursts, transient hallucinations, was on thorazine and xanax in the past. The patient reported multiple psychosocial stressors , he is engaged and feels guilty for being in the hospital.  The pt underwent CABGx3 , POD # 4 today       Today the pt still feels depressed and anxious, depression is rated as 7/10,    denied AVH/SI/HI, vrayalr was increased but it makes him very sleepy      Past Psychiatric Hx:  anger, bipolar ?, anxiety;  inpt tx at the [facility] as a teen, did not benefit from counseling; denied any suicide attempts  Past meds : thorazine - not sure, xanax effective, was prescribed 1 mg TID and he was using very seldom, usually had up to  600 tabs extra at the end of the month  Clonazepam- groggy feeling and AM sedation      Review of Systems   Constitutional: Positive for fatigue.   HENT: Negative.    Eyes: Negative.    Respiratory: Negative.    Cardiovascular: Positive for chest pain.   Gastrointestinal: Negative.    Endocrine: Negative.    Genitourinary: Negative.    Musculoskeletal: Negative.    Skin: Negative.    Neurological: Negative.    Psychiatric/Behavioral: Positive for sleep disturbance. Negative for agitation, behavioral problems, confusion, decreased concentration, dysphoric mood, hallucinations, self-injury and suicidal ideas. The patient is nervous/anxious. " The patient is not hyperactive.        History       Medications Prior to Admission   Medication Sig Dispense Refill Last Dose   • albuterol sulfate  (90 Base) MCG/ACT inhaler Inhale 2 puffs Every 4 (Four) Hours As Needed for Wheezing.      • amLODIPine (NORVASC) 5 MG tablet Take 5 mg by mouth Daily.      • aspirin 81 MG EC tablet Take 1 tablet by mouth Daily. 160 tablet 0    • atorvastatin (LIPITOR) 40 MG tablet Take 40 mg by mouth Every Night.      • busPIRone (BUSPAR) 5 MG tablet Take 1 tablet by mouth Daily. Follow up with Psychiatrist to titrate up 30 tablet 0    • Cariprazine HCl (VRAYLAR) 1.5 MG capsule capsule Take 1 capsule by mouth Every Morning. 30 capsule 0    • clopidogrel (PLAVIX) 75 MG tablet Take 1 tablet by mouth Daily. 30 tablet 1    • [] diazePAM (VALIUM) 5 MG tablet Take 1 tablet by mouth Every 6 (Six) Hours As Needed for Anxiety 10 tablet 0    • levothyroxine (SYNTHROID, LEVOTHROID) 50 MCG tablet Take 50 mcg by mouth Daily.      • lisinopril (PRINIVIL,ZESTRIL) 20 MG tablet Take 20 mg by mouth Daily.      • metoprolol tartrate (LOPRESSOR) 25 MG tablet Take 1 tablet by mouth Every 12 (Twelve) Hours. 60 tablet 0        Scheduled Meds:aspirin, 81 mg, Oral, Daily  atorvastatin, 40 mg, Oral, Nightly  budesonide, 0.5 mg, Nebulization, BID - RT  [START ON 2022] Cariprazine HCl, 3 mg, Oral, Nightly  chlorhexidine, 15 mL, Mouth/Throat, Q12H  clopidogrel, 75 mg, Oral, Daily  enoxaparin, 40 mg, Subcutaneous, Daily  furosemide, 40 mg, Oral, Daily  guaiFENesin, 1,200 mg, Oral, Q12H  insulin lispro, 0-7 Units, Subcutaneous, TID AC  ipratropium-albuterol, 3 mL, Nebulization, 4x Daily - RT  levothyroxine, 50 mcg, Oral, Q AM  lisinopril, 5 mg, Oral, Q24H  metoprolol tartrate, 25 mg, Oral, Q12H  mupirocin, 1 application, Each Nare, BID  pantoprazole, 40 mg, Oral, Q AM  polyethylene glycol, 17 g, Oral, BID  potassium chloride, 20 mEq, Oral, Daily  senna-docusate sodium, 2 tablet, Oral,  "BID      Continuous Infusions:   PRN Meds:.•  acetaminophen **OR** [DISCONTINUED] acetaminophen **OR** [DISCONTINUED] acetaminophen  •  dextrose  •  dextrose  •  diazePAM  •  glucagon (human recombinant)  •  insulin lispro **AND** insulin lispro  •  ipratropium-albuterol  •  [DISCONTINUED] Morphine **AND** naloxone  •  ondansetron  •  potassium chloride **OR** potassium chloride  •  traMADol     Allergies:  Codeine, Hydrocodone, Hydrocodone-acetaminophen, and Shellfish-derived products      Objective     Vital Signs   /96   Pulse 102   Temp 98.2 °F (36.8 °C) (Oral)   Resp 17   Ht 182.9 cm (72.01\")   Wt 124 kg (273 lb 9.6 oz)   SpO2 94%   BMI 37.10 kg/m²     Physical Exam:    General appearance: NAD  Musculoskeletal:  Muscle tone and strength WNL  Gait: not assessed, supine in bed  Abnormal movements: None     Mental Status Exam:   Hygiene:   good  Cooperation:  Cooperative  Eye Contact:  Good  Behavior and Psychomotor Activity: Appropriate  Speech:  soft voice, Normal rate   Mood: depressed, anxious  Affect:    congruent with mood, blunted   Thought Process:  Goal directed and Linear  Associations: Intact   Thought Content:  Normal and mood congruent   Language: appropriate   Suicidal Ideations:  None  Homicidal:  None  Hallucinations:  None  Delusion:  None  Orientation:  To person, Place, Time and Situation  Memory:  Intact  Concentration and computation: fair   Attention span: fair   Fund of knowledge: appropriate   Reliability:  good  Insight:  Good  Judgement:  Fair  Impulse Control:  Fair  Medications and allergies were reviewed by this provider.    Lab Results   Component Value Date    GLUCOSE 93 07/03/2022    CALCIUM 8.8 07/03/2022     07/03/2022    K 4.5 07/03/2022    CO2 27.0 07/03/2022    CL 98 07/03/2022    BUN 9 07/03/2022    CREATININE 0.79 07/03/2022    EGFRIFNONA 93 01/07/2022    BCR 11.4 07/03/2022    ANIONGAP 11.0 07/03/2022       Last Urine Toxicity     LAST URINE TOXICITY " RESULTS Latest Ref Rng & Units 6/25/2022    BARBITURATES SCREEN Negative Negative    BENZODIAZEPINE SCREEN, URINE Negative Positive(A)    COCAINE SCREEN, URINE Negative Negative    METHADONE SCREEN, URINE Negative Negative          No results found for: PHENYTOIN, PHENOBARB, VALPROATE, CBMZ    Lab Results   Component Value Date     07/03/2022    BUN 9 07/03/2022    CREATININE 0.79 07/03/2022    WBC 7.40 07/03/2022       Brief Urine Lab Results  (Last result in the past 365 days)      Color   Clarity   Blood   Leuk Est   Nitrite   Protein   CREAT   Urine HCG        06/25/22 0407 Yellow   Clear   Negative   Negative   Negative   Negative                 Assessment & Plan       Chest pain, unspecified type    Hypertension    Asthma    Hyperlipidemia    CAD, multiple vessel    Bipolar II disorder (HCC)       LABS: Reviewed    Assessment:   Bipolar II Disorder   Generalized Anxiety Disorder  Panic Disorder      Treatment Plan:   The patient is still very anxious,  multiple psychosocial stressors ,  Continue Vraylar 3 mg , just change to QHS (  2ry to sedation)   The Buspar was discontinued due to low sodium levels.   Change diazepam to 7.5 mg Q8H PRN , the pt was on 10 mg TID in the past and it was decreased to 5 mg TID after surgery    Continue to provide support.  Health related anxiety discussed, emotions validated   After discharge, the patient will f/u with Moises Vasquez on 10/25/2022, the pt can be seen sooner if there are cancellations, the pt and his fiancee were encouraged to call office for cancellations   Will follow     Treatment Plan discussed with: Patient and Family      I discussed the patient's findings and my recommendations with patient and family    I have reviewed and approved the behavioral health treatment plans and problem list. Yes     Referring MD has access to consult report and progress notes in EMR     Shaila Witt MD  07/03/22  11:59 EDT    Patient was seen wearing appropriate  PPE.    EMR Dragon transcription disclaimer:  Part of this note may be an electronic transcription/translation of spoken language to printed text using the Dragon Dictation System.

## 2022-07-04 ENCOUNTER — READMISSION MANAGEMENT (OUTPATIENT)
Dept: CALL CENTER | Facility: HOSPITAL | Age: 37
End: 2022-07-04

## 2022-07-04 VITALS
DIASTOLIC BLOOD PRESSURE: 95 MMHG | RESPIRATION RATE: 15 BRPM | SYSTOLIC BLOOD PRESSURE: 148 MMHG | BODY MASS INDEX: 36.22 KG/M2 | HEIGHT: 72 IN | HEART RATE: 101 BPM | TEMPERATURE: 98.4 F | OXYGEN SATURATION: 94 % | WEIGHT: 267.4 LBS

## 2022-07-04 LAB
ANION GAP SERPL CALCULATED.3IONS-SCNC: 11 MMOL/L (ref 5–15)
BUN SERPL-MCNC: 9 MG/DL (ref 6–20)
BUN/CREAT SERPL: 11.4 (ref 7–25)
CALCIUM SPEC-SCNC: 8.6 MG/DL (ref 8.6–10.5)
CHLORIDE SERPL-SCNC: 98 MMOL/L (ref 98–107)
CO2 SERPL-SCNC: 26 MMOL/L (ref 22–29)
CREAT SERPL-MCNC: 0.79 MG/DL (ref 0.76–1.27)
DEPRECATED RDW RBC AUTO: 41.1 FL (ref 37–54)
EGFRCR SERPLBLD CKD-EPI 2021: 116.6 ML/MIN/1.73
ERYTHROCYTE [DISTWIDTH] IN BLOOD BY AUTOMATED COUNT: 13.5 % (ref 12.3–15.4)
GLUCOSE BLDC GLUCOMTR-MCNC: 95 MG/DL (ref 70–105)
GLUCOSE SERPL-MCNC: 97 MG/DL (ref 65–99)
HCT VFR BLD AUTO: 29 % (ref 37.5–51)
HGB BLD-MCNC: 9.8 G/DL (ref 13–17.7)
MCH RBC QN AUTO: 29 PG (ref 26.6–33)
MCHC RBC AUTO-ENTMCNC: 33.6 G/DL (ref 31.5–35.7)
MCV RBC AUTO: 86.2 FL (ref 79–97)
PLATELET # BLD AUTO: 333 10*3/MM3 (ref 140–450)
PMV BLD AUTO: 6.8 FL (ref 6–12)
POTASSIUM SERPL-SCNC: 4.2 MMOL/L (ref 3.5–5.2)
QT INTERVAL: 362 MS
RBC # BLD AUTO: 3.37 10*6/MM3 (ref 4.14–5.8)
SODIUM SERPL-SCNC: 135 MMOL/L (ref 136–145)
WBC NRBC COR # BLD: 6.3 10*3/MM3 (ref 3.4–10.8)

## 2022-07-04 PROCEDURE — 94618 PULMONARY STRESS TESTING: CPT

## 2022-07-04 PROCEDURE — 99232 SBSQ HOSP IP/OBS MODERATE 35: CPT | Performed by: INTERNAL MEDICINE

## 2022-07-04 PROCEDURE — 80048 BASIC METABOLIC PNL TOTAL CA: CPT | Performed by: NURSE PRACTITIONER

## 2022-07-04 PROCEDURE — 25010000002 ONDANSETRON PER 1 MG: Performed by: NURSE PRACTITIONER

## 2022-07-04 PROCEDURE — 94799 UNLISTED PULMONARY SVC/PX: CPT

## 2022-07-04 PROCEDURE — 85027 COMPLETE CBC AUTOMATED: CPT | Performed by: NURSE PRACTITIONER

## 2022-07-04 PROCEDURE — 94761 N-INVAS EAR/PLS OXIMETRY MLT: CPT

## 2022-07-04 PROCEDURE — 94664 DEMO&/EVAL PT USE INHALER: CPT

## 2022-07-04 PROCEDURE — 82962 GLUCOSE BLOOD TEST: CPT

## 2022-07-04 PROCEDURE — 94762 N-INVAS EAR/PLS OXIMTRY CONT: CPT

## 2022-07-04 RX ORDER — DIAZEPAM 5 MG/1
5 TABLET ORAL EVERY 8 HOURS PRN
Qty: 20 TABLET | Refills: 0 | Status: SHIPPED | OUTPATIENT
Start: 2022-07-04 | End: 2022-07-14 | Stop reason: SDUPTHER

## 2022-07-04 RX ORDER — GUAIFENESIN 600 MG/1
1200 TABLET, EXTENDED RELEASE ORAL EVERY 12 HOURS SCHEDULED
Qty: 56 TABLET | Refills: 0 | Status: SHIPPED | OUTPATIENT
Start: 2022-07-04 | End: 2022-07-18

## 2022-07-04 RX ORDER — LISINOPRIL 5 MG/1
5 TABLET ORAL
Qty: 30 TABLET | Refills: 2 | Status: SHIPPED | OUTPATIENT
Start: 2022-07-05 | End: 2022-10-14 | Stop reason: SDUPTHER

## 2022-07-04 RX ORDER — POTASSIUM CHLORIDE 20 MEQ/1
20 TABLET, EXTENDED RELEASE ORAL DAILY
Qty: 30 TABLET | Refills: 0 | Status: SHIPPED | OUTPATIENT
Start: 2022-07-05 | End: 2022-07-14

## 2022-07-04 RX ORDER — TRAMADOL HYDROCHLORIDE 50 MG/1
50 TABLET ORAL EVERY 6 HOURS PRN
Qty: 40 TABLET | Refills: 0 | Status: SHIPPED | OUTPATIENT
Start: 2022-07-04 | End: 2022-07-14 | Stop reason: SDUPTHER

## 2022-07-04 RX ORDER — FUROSEMIDE 40 MG/1
40 TABLET ORAL DAILY
Qty: 30 TABLET | Refills: 0 | Status: SHIPPED | OUTPATIENT
Start: 2022-07-05 | End: 2022-07-14

## 2022-07-04 RX ORDER — METOPROLOL TARTRATE 50 MG/1
50 TABLET, FILM COATED ORAL 2 TIMES DAILY
Qty: 30 TABLET | Refills: 2 | Status: SHIPPED | OUTPATIENT
Start: 2022-07-04 | End: 2022-08-25 | Stop reason: SDUPTHER

## 2022-07-04 RX ADMIN — ACETAMINOPHEN 650 MG: 325 TABLET, FILM COATED ORAL at 03:13

## 2022-07-04 RX ADMIN — IPRATROPIUM BROMIDE AND ALBUTEROL SULFATE 3 ML: .5; 3 SOLUTION RESPIRATORY (INHALATION) at 06:44

## 2022-07-04 RX ADMIN — LISINOPRIL 5 MG: 5 TABLET ORAL at 08:36

## 2022-07-04 RX ADMIN — CLOPIDOGREL BISULFATE 75 MG: 75 TABLET ORAL at 08:35

## 2022-07-04 RX ADMIN — TRAMADOL HYDROCHLORIDE 50 MG: 50 TABLET, COATED ORAL at 00:30

## 2022-07-04 RX ADMIN — PANTOPRAZOLE SODIUM 40 MG: 40 TABLET, DELAYED RELEASE ORAL at 05:02

## 2022-07-04 RX ADMIN — ONDANSETRON 4 MG: 2 INJECTION INTRAMUSCULAR; INTRAVENOUS at 06:22

## 2022-07-04 RX ADMIN — CHLORHEXIDINE GLUCONATE 15 ML: 1.2 SOLUTION ORAL at 08:35

## 2022-07-04 RX ADMIN — DIAZEPAM 7.5 MG: 5 TABLET ORAL at 05:02

## 2022-07-04 RX ADMIN — ASPIRIN 81 MG: 81 TABLET, COATED ORAL at 08:35

## 2022-07-04 RX ADMIN — METOPROLOL TARTRATE 25 MG: 25 TABLET, FILM COATED ORAL at 08:35

## 2022-07-04 RX ADMIN — LEVOTHYROXINE SODIUM 50 MCG: 0.05 TABLET ORAL at 05:02

## 2022-07-04 RX ADMIN — POTASSIUM CHLORIDE 20 MEQ: 20 TABLET, EXTENDED RELEASE ORAL at 08:36

## 2022-07-04 RX ADMIN — ACETAMINOPHEN 650 MG: 325 TABLET, FILM COATED ORAL at 08:46

## 2022-07-04 RX ADMIN — FUROSEMIDE 40 MG: 40 TABLET ORAL at 08:35

## 2022-07-04 RX ADMIN — MUPIROCIN 1 APPLICATION: 20 OINTMENT TOPICAL at 08:38

## 2022-07-04 RX ADMIN — BUDESONIDE 0.5 MG: 0.5 INHALANT RESPIRATORY (INHALATION) at 06:44

## 2022-07-04 RX ADMIN — SENNOSIDES AND DOCUSATE SODIUM 2 TABLET: 50; 8.6 TABLET ORAL at 08:35

## 2022-07-04 RX ADMIN — GUAIFENESIN 1200 MG: 600 TABLET, EXTENDED RELEASE ORAL at 08:35

## 2022-07-04 RX ADMIN — TRAMADOL HYDROCHLORIDE 50 MG: 50 TABLET, COATED ORAL at 06:22

## 2022-07-04 NOTE — PLAN OF CARE
Goal Outcome Evaluation:      Pt concerns for going home addressed by RN & NP.  24/7 nursing line ph# given & encourage to call for questions.

## 2022-07-04 NOTE — PLAN OF CARE
Goal Outcome Evaluation:              Outcome Evaluation: Patient is POD#5. Continuous oximetry overnight, sats remained above 94% on RA. Complains of unalleviated pain with pain meds.

## 2022-07-04 NOTE — DISCHARGE SUMMARY
Date of Admission:  6/22/2022  Date of Discharge:  7/4/2022    Discharge Diagnosis:   - MV CAD, s/p stents RCA/LAD 6/14 patent but noted to have LAD thrombus this adm, EF 60% (cath)--s/p CABG x3 with LIMA (Lizzy)  - Preop Plavix--last dose 6/22  - HTN--stable  - HLD--statin  - Bipolar II Disorder/ HOLLY/ Panic disorder--psych following  - Recent COVID-19 infection  - Migraines, recently diagnosed  - Pulm nodules--f/u with pulm as outpt  - Tobacco abuse, current--22 pack year hx--nicotine patch, cessation d/w pt  - Obesity, stage 2--BMI 36.8  - Features of BUZZ--check nocturnal oximetry    Presenting Problem/History of Present Illness  Chest pain, unspecified type [R07.9]     Hospital Course  Patient is a 36 y.o. male presented to [facility] ED with c/o SOA/CP radiating into his left shoulder. He recently underwent PCI of RCA on 6/14/2022 due to acute STEMI, he underwent repeat cardiac cath and stenting to LAD during the same admission, he was discharged on 6/17/2022.  He underwent a neurologic work-up with MRI due to reported intermittent periods of confusion, he has been diagnosed with probable migraines and Fioricet was initiated.  His troponin level was mildly elevated at 0.05 this admission, he was taken back to the Cath Lab where area post stent is suggestive of thrombus, OM branch with 80% stenosis, RCA PDA with patent stent with 80% stenosis at the edge of of PDA stent, preserved EF of 60 to 70%.  Plavix was discontinued, patient placed on Integrilin to maintain current vessel patency.  Cardiac surgery was consulted for surgical revascularization.  On 6/29/2022, he underwent CABG x3 per Dr. Ball.  Please see op note for full details.  He was extubated the night of surgery.  He did well except for needing encouragement to move and do aggressive pulmonary toileting.  This resolved and he was ready for DC home on POD#5.  He passed exercise and nocturnal oximetry prior to dc home.  Psychiatry saw pt preop  d/t worsening anxiety.  We continued the regimen set by psychiatry.  He was found to have pulm nodules for which he should follow up with pulm in the outpatient setting.  Smoking cessation was d/w pt, but he is not ready to stop at this time.   Discharge care included:   post procedure instructions given,  discharge care discussed with the nurse and the patient, and  post procedure appointments made.    Procedures Performed  Per Dr. Ball 6/29/2022  1. CABG x 3 (LIMA to LAD, SVG to CX, SVG to PDA)  2. EVH of the left legs    6/23/2022 per Dr. Haddad  Cardiac cath       Consults:   Consults     Date and Time Order Name Status Description    6/29/2022 12:29 PM Inpatient Cardiology Consult      6/24/2022  3:58 PM Inpatient Psychiatrist Consult Completed     6/23/2022  4:05 PM Inpatient Cardiothoracic Surgery Consult Completed     6/23/2022  1:10 PM Inpatient Neurology Consult General Completed     6/22/2022  3:16 PM Inpatient Cardiology Consult Completed     6/15/2022  4:14 PM Inpatient Psychiatrist Consult Completed     6/15/2022  4:03 PM Inpatient Hospitalist Consult Completed           Pertinent Test Results:    Lab Results   Component Value Date    WBC 6.30 07/04/2022    HGB 9.8 (L) 07/04/2022    HCT 29.0 (L) 07/04/2022    MCV 86.2 07/04/2022     07/04/2022      Lab Results   Component Value Date    GLUCOSE 97 07/04/2022    CALCIUM 8.6 07/04/2022     (L) 07/04/2022    K 4.2 07/04/2022    CO2 26.0 07/04/2022    CL 98 07/04/2022    BUN 9 07/04/2022    CREATININE 0.79 07/04/2022    EGFRIFNONA 93 01/07/2022    BCR 11.4 07/04/2022    ANIONGAP 11.0 07/04/2022     Lab Results   Component Value Date    INR 1.03 06/30/2022    PROTIME 10.6 06/30/2022         Condition on Discharge:  Stable for dc home.    Vital Signs  Temp:  [97.6 °F (36.4 °C)-98.8 °F (37.1 °C)] 98.4 °F (36.9 °C)  Heart Rate:  [] 103  Resp:  [12-23] 15  BP: (122-150)/(70-96) 148/95    General:      well developed, well nourished, in  no acute distress.    Head:      normocephalic and atraumatic.    Eyes:      PERRL/EOM intact, conjunctiva and sclera clear with out nystagmus.    Neck:      no masses, thyromegaly,  trachea central with normal respiratory effort and PMI displaced laterally  Lungs:      clear bilaterally to auscultation.    Heart:      SR  Msk:      no deformity or scoliosis noted of thoracic or lumbar spine.    Pulses:      pulses normal in all 4 extremities.    Extremities:       no cyanosis or clubbing, no pretibial edema  Neurologic:      no focal deficits.   alert oriented x3  Skin:      intact without lesions or rashes.  Sternotomy/SVHS surgical sites healing well     Psych:      alert and cooperative; normal mood and affect; normal attention span and concentration.          Discharge Disposition  Home or Self Care    Discharge Medications     Discharge Medications      New Medications      Instructions Start Date   furosemide 40 MG tablet  Commonly known as: LASIX   40 mg, Oral, Daily   Start Date: July 5, 2022     guaiFENesin 600 MG 12 hr tablet  Commonly known as: MUCINEX   1,200 mg, Oral, Every 12 Hours Scheduled      potassium chloride 20 MEQ CR tablet  Commonly known as: K-DUR,KLOR-CON   20 mEq, Oral, Daily   Start Date: July 5, 2022     ticagrelor 90 MG tablet tablet  Commonly known as: BRILINTA   90 mg, Oral, 2 Times Daily      traMADol 50 MG tablet  Commonly known as: ULTRAM   50 mg, Oral, Every 6 Hours PRN         Changes to Medications      Instructions Start Date   Cariprazine HCl 3 MG capsule capsule  Commonly known as: VRAYLAR  What changed:   · medication strength  · how much to take  · when to take this   3 mg, Oral, Nightly      diazePAM 5 MG tablet  Commonly known as: VALIUM  What changed:   · when to take this  · reasons to take this   5 mg, Oral, Every 8 Hours PRN      lisinopril 5 MG tablet  Commonly known as: PRINIVILZESTRIL  What changed:   · medication strength  · how much to take  · when to take  this   5 mg, Oral, Every 24 Hours Scheduled   Start Date: July 5, 2022     metoprolol tartrate 50 MG tablet  Commonly known as: LOPRESSOR  What changed:   · medication strength  · how much to take  · when to take this   50 mg, Oral, 2 Times Daily         Continue These Medications      Instructions Start Date   albuterol sulfate  (90 Base) MCG/ACT inhaler  Commonly known as: PROVENTIL HFA;VENTOLIN HFA;PROAIR HFA   2 puffs, Inhalation, Every 4 Hours PRN      Aspirin Low Dose 81 MG EC tablet  Generic drug: aspirin   81 mg, Oral, Daily      atorvastatin 40 MG tablet  Commonly known as: LIPITOR   40 mg, Oral, Nightly      busPIRone 5 MG tablet  Commonly known as: BUSPAR   Take 1 tablet by mouth Daily. Follow up with Psychiatrist to titrate up      levothyroxine 50 MCG tablet  Commonly known as: SYNTHROID, LEVOTHROID   50 mcg, Oral, Daily         Stop These Medications    amLODIPine 5 MG tablet  Commonly known as: NORVASC     clopidogrel 75 MG tablet  Commonly known as: PLAVIX            Discharge Diet:     Activity at Discharge:     Follow-up Appointments  Future Appointments   Date Time Provider Department Center   10/25/2022  9:00 AM Moises Vasquez PA-C [facility]     Additional Instructions for the Follow-ups that You Need to Schedule     Call MD With Problems / Concerns   As directed      Instructions:  Call office at [phone] for any drainage, increased redness, or fever over 100.5    Order Comments: Instructions:  Call office at [phone] for any drainage, increased redness, or fever over 100.5          Discharge Follow-up with PCP   As directed       Currently Documented PCP:    Daniela Hernandez FNP    PCP Phone Number:    [phone]     Follow Up Details: in 1 week         Discharge Follow-up with Specified Provider: Cardiologist; 1 Month   As directed      To: Cardiologist    Follow Up: 1 Month    Follow Up Details: call for appointment, bring all medication bottles to  appointment         Discharge Follow-up with Specified Provider: Dr. Lyons YOUSIF, our office will call to schedule; 2 Weeks   As directed      To: Dr. Luh DODD, our office will call to schedule    Follow Up: 2 Weeks               Test Results Pending at Discharge:  None    Addendum:  Pt called to stop his Buspar per psych recs d/t hyponatremia.  BMP in one week.  Pulm outpt referral made for f/u on pulm nodules.         YOUSIF Leggett  07/04/22  10:44 EDT

## 2022-07-04 NOTE — PROGRESS NOTES
Exercise Oximetry    Patient Name:Tramaine Gates   MRN: [id number]   Date: 07/04/22             ROOM AIR BASELINE   SpO2%  96   Heart Rate 104   Blood Pressure      EXERCISE ON ROOM AIR SpO2% EXERCISE ON O2 @  LPM SpO2%   1 MINUTE 96 1 MINUTE    2 MINUTES 93 2 MINUTES    3 MINUTES 92 3 MINUTES    4 MINUTES 90 4 MINUTES    5 MINUTES 93 5 MINUTES    6 MINUTES 93 6 MINUTES               Distance Walked   Distance Walked   Dyspnea (Cornelius Scale)   Dyspnea (Cornelius Scale)   Fatigue (Cornelius Scale)   Fatigue (Cornelius Scale)   SpO2% Post Exercise   SpO2% Post Exercise   HR Post Exercise   HR Post Exercise   Time to Recovery   Time to Recovery     Comments: Pt sats 96% on room air at rest.  Pt Sats 90% to 96% On room air with walk.

## 2022-07-04 NOTE — PROGRESS NOTES
Cardiology Progress Note      Admiting Physician:  Pablo Ball, *   LOS: 12 days       Reason For Followup:  Coronary artery disease    Subjective:    Interval History:  Seen and examined.  Chart and labs reviewed.  Patient reports chest discomfort is improving.  Has been very active today.  Is walking around the unit briskly.  Discussed antiplatelets with CT surgery.    Review of Systems:  A complete review of systems was undertaken with the pertinent cardiovascular findings listed in history of present illness and all other systems being negative.     Assessment & Plan    Impressions:  Coronary artery disease status post three-vessel CABG this admission     LIMA-LAD, SVG- circumflex, SVG-PDA  Hypertension  Hypertensive cardiovascular disease  Hyperlipidemia    Recommendations:  Increase activity as tolerated  Oral diuretics at least for a short period of time after discharge  Recommend ticagrelor and aspirin due to suspicion of Plavix nonresponder status     Can do Effient if ticagrelor is cost prohibitive  Monitor fluid status  Monitor rate and rhythm closely.  Cardiology status appears appropriate for discharge when okay with other services.  Follow-up with Dr. Del Toro in 3 to 4 weeks      Objective:    Medication Review:   Scheduled Meds:aspirin, 81 mg, Oral, Daily  atorvastatin, 40 mg, Oral, Nightly  budesonide, 0.5 mg, Nebulization, BID - RT  Cariprazine HCl, 3 mg, Oral, Nightly  chlorhexidine, 15 mL, Mouth/Throat, Q12H  clopidogrel, 75 mg, Oral, Daily  enoxaparin, 40 mg, Subcutaneous, Daily  furosemide, 40 mg, Oral, Daily  guaiFENesin, 1,200 mg, Oral, Q12H  ipratropium-albuterol, 3 mL, Nebulization, 4x Daily - RT  levothyroxine, 50 mcg, Oral, Q AM  lisinopril, 5 mg, Oral, Q24H  metoprolol tartrate, 25 mg, Oral, Q12H  mupirocin, 1 application, Each Nare, BID  pantoprazole, 40 mg, Oral, Q AM  polyethylene glycol, 17 g, Oral, BID  potassium chloride, 20 mEq, Oral, Daily  senna-docusate sodium, 2 tablet,  Oral, BID      Continuous Infusions:   PRN Meds:.•  acetaminophen **OR** [DISCONTINUED] acetaminophen **OR** [DISCONTINUED] acetaminophen  •  dextrose  •  dextrose  •  diazePAM  •  glucagon (human recombinant)  •  [DISCONTINUED] insulin lispro **AND** insulin lispro  •  ipratropium-albuterol  •  [DISCONTINUED] Morphine **AND** naloxone  •  ondansetron  •  potassium chloride **OR** potassium chloride  •  traMADol    Patient Active Problem List   Diagnosis   • Hypertension   • Peptic ulcer   • Asthma   • Blepharitis   • Chest pain   • Chronic cholecystitis   • Contact with and (suspected) exposure to covid-19   • Corneal ulcer   • Costochondritis   • Cough   • Disorder of thyroid   • Gastroesophageal reflux disease   • Hyperlipidemia   • Migraine headache   • Panic attack   • Acute inferior myocardial infarction (HCC)   • CAD, multiple vessel   • Bipolar II disorder (HCC)   • Hx of brief reactive psychosis   • Generalized anxiety disorder with panic attacks   • Rage attacks   • Chest pain, unspecified type         Physical Exam:    General: Alert, cooperative, no distress, appears stated age  Head:  Normocephalic, atraumatic, mucous membranes moist  Eyes:  Conjunctivae/corneas clear, EOM's intact     Neck:  Supple,  no bruit  Lungs:  Clear to auscultation bilaterally, no wheezes rhonchi rales are noted  Chest wall: Extremely tender at incision site  Heart::  Regular rate and rhythm, S1 and S2 normal, no murmurs gallops rubs.  Abdomen: Soft, non-tender, nondistended bowel sounds active.  Obese  Extremities: No cyanosis, clubbing, or edema  Pulses: 2+ and symmetric all extremities  Skin:  Incisions clean dry intact  Neuro/psych: A&O x3. CN II through XII are grossly intact with appropriate affect    Vital Signs:  Vitals:    07/04/22 0656 07/04/22 0723 07/04/22 0730 07/04/22 0900   BP:  133/96 133/96 148/95   BP Location:  Right arm     Patient Position:  Sitting     Pulse: 100 107 107 103   Resp: 18 15     Temp:  98.4  °F (36.9 °C)     TempSrc:  Oral     SpO2: 100% 95% 94% 100%   Weight:       Height:         Wt Readings from Last 1 Encounters:   07/04/22 121 kg (267 lb 6.4 oz)       Intake/Output Summary (Last 24 hours) at 7/4/2022 1050  Last data filed at 7/4/2022 0600  Gross per 24 hour   Intake 600 ml   Output 600 ml   Net 0 ml         Results Review:     CBC    Results from last 7 days   Lab Units 07/04/22  0318 07/03/22  0309 07/02/22  0306 07/01/22  0337 06/30/22  0403 06/29/22  1755 06/29/22  1718 06/29/22  1319 06/29/22  1231   WBC 10*3/mm3 6.30 7.40 9.60 11.30* 11.20*  --   --   --  12.40*   HEMOGLOBIN g/dL 9.8* 9.2* 9.6* 9.1* 9.7*  --   --   --  10.8*   HEMOGLOBIN, POC g/dL  --   --   --   --   --  10.7* 10.5*   < >  --    PLATELETS 10*3/mm3 333 303 254 226 217  --   --   --  231    < > = values in this interval not displayed.     Cr Clearance Estimated Creatinine Clearance: 170.4 mL/min (by C-G formula based on SCr of 0.79 mg/dL).  Coag   Results from last 7 days   Lab Units 06/30/22  0403 06/29/22  1231   INR  1.03 1.06   APTT seconds  --  27.5     HbA1C   Lab Results   Component Value Date    HGBA1C 5.4 06/23/2022     Blood Glucose   Glucose   Date/Time Value Ref Range Status   07/04/2022 0729 95 70 - 105 mg/dL Final     Comment:     Serial Number: [id number]:  [id number]   07/03/2022 1937 117 (H) 70 - 105 mg/dL Final     Comment:     Serial Number: [id number]:  [id number]   07/03/2022 1717 136 (H) 70 - 105 mg/dL Final     Comment:     Serial Number: [id number]:  [id number]   07/03/2022 1117 86 70 - 105 mg/dL Final     Comment:     Serial Number: [id number]:  [id number]   07/03/2022 0748 91 70 - 105 mg/dL Final     Comment:     Serial Number: [id number]:  [id number]   07/02/2022 2022 107 (H) 70 - 105 mg/dL Final     Comment:     Serial Number: [id number]:  [id number]   07/02/2022 1521 107 (H) 70 - 105 mg/dL Final     Comment:     Serial Number: [id number]:  [id number]    07/02/2022 1144 99 70 - 105 mg/dL Final     Comment:     Serial Number: [id number]:  [id number]     Infection     CMP   Results from last 7 days   Lab Units 07/04/22  0318 07/03/22  0309 07/02/22  0306 07/01/22  0337 06/30/22  0403 06/29/22  1231 06/28/22  0856   SODIUM mmol/L 135* 136 135* 135* 135* 137 137   POTASSIUM mmol/L 4.2 4.5 4.2 4.0 4.4 5.0 3.9   CHLORIDE mmol/L 98 98 98 99 101 104 100   CO2 mmol/L 26.0 27.0 26.0 24.0 22.0 23.0 23.0   BUN mg/dL 9 9 10 11 12 12 12   CREATININE mg/dL 0.79 0.79 0.75* 0.94 1.07 1.26 0.84   GLUCOSE mg/dL 97 93 103* 101* 133* 114* 92   ALBUMIN g/dL  --   --   --   --  4.70 4.40  --      ABG    Results from last 7 days   Lab Units 06/29/22  1908 06/29/22  1755 06/29/22  1718 06/29/22  1640 06/29/22  1553 06/29/22  1507 06/29/22  1411   PH, ARTERIAL pH units 7.370 7.377 7.403 7.375 7.393 7.406 7.424   PCO2, ARTERIAL mm Hg 40.3 39.9 36.7 40.2 38.0 36.0 35.4   PO2 ART mm Hg 93.9 106.0 95.3 100.5 96.2 94.9 74.4*   O2 SATURATION ART % 97.1 98.0 97.5 97.6 97.5 97.5 95.2   BASE EXCESS ART mmol/L -1.9* -1.6* -1.6* -1.6* -1.5* -1.7* -0.9*     UA      FABIOLA  No results found for: POCMETH, POCAMPHET, POCBARBITUR, POCBENZO, POCCOCAINE, POCOPIATES, POCOXYCODO, POCPHENCYC, POCPROPOXY, POCTHC, POCTRICYC  Lysis Labs   Results from last 7 days   Lab Units 07/04/22 0318 07/03/22  0309 07/02/22  0306 07/01/22  0337 06/30/22  0403 06/29/22  1755 06/29/22  1718 06/29/22  1319 06/29/22  1231 06/29/22  0755 06/28/22  0856   INR   --   --   --   --  1.03  --   --   --  1.06  --   --    APTT seconds  --   --   --   --   --   --   --   --  27.5  --   --    HEMOGLOBIN g/dL 9.8* 9.2* 9.6* 9.1* 9.7*  --   --   --  10.8*  --   --    HEMOGLOBIN, POC g/dL  --   --   --   --   --  10.7* 10.5*   < >  --    < >  --    PLATELETS 10*3/mm3 333 303 254 226 217  --   --   --  231  --   --    CREATININE mg/dL 0.79 0.79 0.75* 0.94 1.07  --   --   --  1.26  --  0.84    < > = values in this interval not displayed.      Radiology(recent) No radiology results for the last day            Imaging Results (Last 24 Hours)     ** No results found for the last 24 hours. **          Cardiac Studies:  Echo- Results for orders placed during the hospital encounter of 06/22/22    Adult Transthoracic Echo Limited W/ Cont if Necessary Per Protocol    Interpretation Summary  · Left ventricular systolic function is normal.  · Left ventricular ejection fraction is 55 to 60%  · Basal inferior wall aneurysm is noted    Stress Myoview-  Cath-        Keven Skaggs DO  07/04/22  10:50 EDT

## 2022-07-05 ENCOUNTER — APPOINTMENT (OUTPATIENT)
Dept: CT IMAGING | Facility: HOSPITAL | Age: 37
End: 2022-07-05

## 2022-07-05 ENCOUNTER — TELEPHONE (OUTPATIENT)
Dept: CARDIAC SURGERY | Facility: CLINIC | Age: 37
End: 2022-07-05

## 2022-07-05 ENCOUNTER — HOSPITAL ENCOUNTER (EMERGENCY)
Facility: HOSPITAL | Age: 37
Discharge: HOME | End: 2022-07-05
Attending: EMERGENCY MEDICINE | Admitting: EMERGENCY MEDICINE

## 2022-07-05 VITALS
TEMPERATURE: 98.3 F | SYSTOLIC BLOOD PRESSURE: 154 MMHG | OXYGEN SATURATION: 98 % | DIASTOLIC BLOOD PRESSURE: 92 MMHG | HEIGHT: 72 IN | BODY MASS INDEX: 36.16 KG/M2 | HEART RATE: 105 BPM | WEIGHT: 267 LBS | RESPIRATION RATE: 18 BRPM

## 2022-07-05 DIAGNOSIS — R06.02 SHORTNESS OF BREATH: Primary | ICD-10-CM

## 2022-07-05 DIAGNOSIS — Z98.890 RECENT MAJOR SURGERY: ICD-10-CM

## 2022-07-05 LAB
ALBUMIN SERPL-MCNC: 3.9 G/DL (ref 3.5–5.2)
ALBUMIN/GLOB SERPL: 1.3 G/DL
ALP SERPL-CCNC: 115 U/L (ref 39–117)
ALT SERPL W P-5'-P-CCNC: 54 U/L (ref 1–41)
ANION GAP SERPL CALCULATED.3IONS-SCNC: 14 MMOL/L (ref 5–15)
APTT PPP: 30.2 SECONDS (ref 61–76.5)
AST SERPL-CCNC: 36 U/L (ref 1–40)
BASOPHILS # BLD AUTO: 0 10*3/MM3 (ref 0–0.2)
BASOPHILS NFR BLD AUTO: 0.6 % (ref 0–1.5)
BILIRUB SERPL-MCNC: 0.6 MG/DL (ref 0–1.2)
BUN SERPL-MCNC: 10 MG/DL (ref 6–20)
BUN/CREAT SERPL: 11.9 (ref 7–25)
CALCIUM SPEC-SCNC: 9.1 MG/DL (ref 8.6–10.5)
CHLORIDE SERPL-SCNC: 100 MMOL/L (ref 98–107)
CO2 SERPL-SCNC: 21 MMOL/L (ref 22–29)
CREAT SERPL-MCNC: 0.84 MG/DL (ref 0.76–1.27)
D-LACTATE SERPL-SCNC: 0.9 MMOL/L (ref 0.5–2)
DEPRECATED RDW RBC AUTO: 40.3 FL (ref 37–54)
EGFRCR SERPLBLD CKD-EPI 2021: 114.5 ML/MIN/1.73
EOSINOPHIL # BLD AUTO: 0.2 10*3/MM3 (ref 0–0.4)
EOSINOPHIL NFR BLD AUTO: 3 % (ref 0.3–6.2)
ERYTHROCYTE [DISTWIDTH] IN BLOOD BY AUTOMATED COUNT: 13.5 % (ref 12.3–15.4)
GLOBULIN UR ELPH-MCNC: 2.9 GM/DL
GLUCOSE SERPL-MCNC: 109 MG/DL (ref 65–99)
HCT VFR BLD AUTO: 33.3 % (ref 37.5–51)
HGB BLD-MCNC: 11.2 G/DL (ref 13–17.7)
INR PPP: 1.02 (ref 0.93–1.1)
LYMPHOCYTES # BLD AUTO: 0.9 10*3/MM3 (ref 0.7–3.1)
LYMPHOCYTES NFR BLD AUTO: 12.6 % (ref 19.6–45.3)
MCH RBC QN AUTO: 28.7 PG (ref 26.6–33)
MCHC RBC AUTO-ENTMCNC: 33.5 G/DL (ref 31.5–35.7)
MCV RBC AUTO: 85.7 FL (ref 79–97)
MONOCYTES # BLD AUTO: 0.4 10*3/MM3 (ref 0.1–0.9)
MONOCYTES NFR BLD AUTO: 5.8 % (ref 5–12)
NEUTROPHILS NFR BLD AUTO: 5.9 10*3/MM3 (ref 1.7–7)
NEUTROPHILS NFR BLD AUTO: 78 % (ref 42.7–76)
NRBC BLD AUTO-RTO: 0 /100 WBC (ref 0–0.2)
NT-PROBNP SERPL-MCNC: 1184 PG/ML (ref 0–450)
PLATELET # BLD AUTO: 419 10*3/MM3 (ref 140–450)
PMV BLD AUTO: 6.5 FL (ref 6–12)
POTASSIUM SERPL-SCNC: 4 MMOL/L (ref 3.5–5.2)
PROCALCITONIN SERPL-MCNC: 0.1 NG/ML (ref 0–0.25)
PROT SERPL-MCNC: 6.8 G/DL (ref 6–8.5)
PROTHROMBIN TIME: 10.5 SECONDS (ref 9.6–11.7)
QT INTERVAL: 320 MS
RBC # BLD AUTO: 3.89 10*6/MM3 (ref 4.14–5.8)
SODIUM SERPL-SCNC: 135 MMOL/L (ref 136–145)
TROPONIN T SERPL-MCNC: 0.42 NG/ML (ref 0–0.03)
TROPONIN T SERPL-MCNC: 0.42 NG/ML (ref 0–0.03)
WBC NRBC COR # BLD: 7.5 10*3/MM3 (ref 3.4–10.8)

## 2022-07-05 PROCEDURE — 94799 UNLISTED PULMONARY SVC/PX: CPT

## 2022-07-05 PROCEDURE — 85610 PROTHROMBIN TIME: CPT | Performed by: EMERGENCY MEDICINE

## 2022-07-05 PROCEDURE — 84484 ASSAY OF TROPONIN QUANT: CPT | Performed by: EMERGENCY MEDICINE

## 2022-07-05 PROCEDURE — 71275 CT ANGIOGRAPHY CHEST: CPT

## 2022-07-05 PROCEDURE — 25010000002 DROPERIDOL PER 5 MG: Performed by: EMERGENCY MEDICINE

## 2022-07-05 PROCEDURE — 80053 COMPREHEN METABOLIC PANEL: CPT | Performed by: EMERGENCY MEDICINE

## 2022-07-05 PROCEDURE — 93005 ELECTROCARDIOGRAM TRACING: CPT | Performed by: EMERGENCY MEDICINE

## 2022-07-05 PROCEDURE — 0 IOPAMIDOL PER 1 ML: Performed by: EMERGENCY MEDICINE

## 2022-07-05 PROCEDURE — 93005 ELECTROCARDIOGRAM TRACING: CPT

## 2022-07-05 PROCEDURE — 94640 AIRWAY INHALATION TREATMENT: CPT

## 2022-07-05 PROCEDURE — 85025 COMPLETE CBC W/AUTO DIFF WBC: CPT | Performed by: EMERGENCY MEDICINE

## 2022-07-05 PROCEDURE — 99284 EMERGENCY DEPT VISIT MOD MDM: CPT

## 2022-07-05 PROCEDURE — 84145 PROCALCITONIN (PCT): CPT | Performed by: EMERGENCY MEDICINE

## 2022-07-05 PROCEDURE — 96374 THER/PROPH/DIAG INJ IV PUSH: CPT

## 2022-07-05 PROCEDURE — 83605 ASSAY OF LACTIC ACID: CPT

## 2022-07-05 PROCEDURE — 85730 THROMBOPLASTIN TIME PARTIAL: CPT | Performed by: EMERGENCY MEDICINE

## 2022-07-05 PROCEDURE — 83880 ASSAY OF NATRIURETIC PEPTIDE: CPT | Performed by: EMERGENCY MEDICINE

## 2022-07-05 RX ORDER — ASPIRIN 81 MG/1
324 TABLET, CHEWABLE ORAL ONCE
Status: COMPLETED | OUTPATIENT
Start: 2022-07-05 | End: 2022-07-05

## 2022-07-05 RX ORDER — DROPERIDOL 2.5 MG/ML
2.5 INJECTION, SOLUTION INTRAMUSCULAR; INTRAVENOUS ONCE
Status: COMPLETED | OUTPATIENT
Start: 2022-07-05 | End: 2022-07-05

## 2022-07-05 RX ORDER — IPRATROPIUM BROMIDE AND ALBUTEROL SULFATE 2.5; .5 MG/3ML; MG/3ML
3 SOLUTION RESPIRATORY (INHALATION) ONCE
Status: COMPLETED | OUTPATIENT
Start: 2022-07-05 | End: 2022-07-05

## 2022-07-05 RX ORDER — ACETAMINOPHEN 650 MG/1
325 SUPPOSITORY RECTAL ONCE
Status: DISCONTINUED | OUTPATIENT
Start: 2022-07-05 | End: 2022-07-05

## 2022-07-05 RX ORDER — TRAMADOL HYDROCHLORIDE 50 MG/1
50 TABLET ORAL ONCE
Status: COMPLETED | OUTPATIENT
Start: 2022-07-05 | End: 2022-07-05

## 2022-07-05 RX ADMIN — TRAMADOL HYDROCHLORIDE 50 MG: 50 TABLET ORAL at 06:27

## 2022-07-05 RX ADMIN — ASPIRIN 324 MG: 81 TABLET, CHEWABLE ORAL at 06:17

## 2022-07-05 RX ADMIN — IOPAMIDOL 100 ML: 755 INJECTION, SOLUTION INTRAVENOUS at 05:34

## 2022-07-05 RX ADMIN — DROPERIDOL 2.5 MG: 2.5 INJECTION, SOLUTION INTRAMUSCULAR; INTRAVENOUS at 04:50

## 2022-07-05 RX ADMIN — IPRATROPIUM BROMIDE AND ALBUTEROL SULFATE 3 ML: .5; 3 SOLUTION RESPIRATORY (INHALATION) at 04:49

## 2022-07-05 NOTE — OUTREACH NOTE
Prep Survey    Flowsheet Row Responses   Holiness facility patient discharged from? [facility]   Is LACE score < 7 ? No   Emergency Room discharge w/ pulse ox? No   Eligibility Readm Mgmt   Discharge diagnosis Chest pain, unspecified type   Does the patient have one of the following disease processes/diagnoses(primary or secondary)? Cardiothoracic surgery   Does the patient have Home health ordered? No   Is there a DME ordered? No   Medication alerts for this patient see AVS   General alerts for this patient Heart Cath   Prep survey completed? Yes          KIMBERLY BENTON - Registered Nurse

## 2022-07-05 NOTE — DISCHARGE INSTRUCTIONS
If your symptoms return or you change your mind about being observed in the hospital, please return to the emergency department anytime for reevaluation.

## 2022-07-05 NOTE — ED PROVIDER NOTES
Subjective   History of Present Illness  History Provided By: Patient    Chief Complaint: Shortness of breath  Onset: Hours prior to arrival  Timing: Constant  Location: Pulmonary  Quality: Cannot catch breath  Severity: Moderate  Modifying Factors: Feels worse when he is talking.    Other: Patient underwent three-vessel CABG approximately 1 week ago.  Discharged to the hospital yesterday.  Had been doing okay at home per him.  Wife states he had not been getting up, not even reaching over [place] glass of water.  Had shortness of breath that started few hours ago.  No chest pain.  Shortness of breath not worsening.    Review of Systems   Respiratory: Positive for shortness of breath.    Cardiovascular: Negative for chest pain and leg swelling.   Gastrointestinal: Negative for abdominal pain, diarrhea, nausea and vomiting.   All other systems reviewed and are negative.      Past Medical History:   Diagnosis Date   • Acute inferior myocardial infarction (HCC) 6/14/2022   • Allergic    • Asthma 1/27/2022   • CAD, multiple vessel 6/14/2022   • Gastroesophageal reflux disease 1/27/2022   • Hx of complete eye exam 2016   • Hyperlipidemia 1/27/2022   • Hypertension    • Migraine headache 1/27/2022   • Panic attack 1/27/2022   • Peptic ulcer 9/14/2017       Allergies   Allergen Reactions   • Codeine Itching   • Hydrocodone Itching   • Hydrocodone-Acetaminophen Other (See Comments)   • Shellfish-Derived Products Unknown - High Severity       Past Surgical History:   Procedure Laterality Date   • CARDIAC CATHETERIZATION N/A 6/14/2022    Procedure: Left Heart Cath;  Surgeon: Matthieu Del Toro MD;  Location: [facility] INVASIVE LOCATION;  Service: Cardiology;  Laterality: N/A;   • CARDIAC CATHETERIZATION N/A 6/16/2022    Procedure: Percutaneous Coronary Intervention;  Surgeon: Matthieu Del Toro MD;  Location: [facility] INVASIVE LOCATION;  Service: Cardiovascular;  Laterality: N/A;   • CARDIAC CATHETERIZATION N/A 6/23/2022     Procedure: Left Heart Cath possible PCI, atherectomy, hemodynamic support;  Surgeon: Moises Haddad MD;  Location: [facility] CATH INVASIVE LOCATION;  Service: Cardiology;  Laterality: N/A;   • CHOLECYSTECTOMY  2019   • CORONARY ARTERY BYPASS GRAFT N/A 6/29/2022    Procedure: CORONARY ARTERY BYPASS GRAFTING;  Surgeon: Pablo Ball MD;  Location: [facility] CVOR;  Service: Cardiothoracic;  Laterality: N/A;  CABG X 3(2 vein grafts, 1 LIMA graft)   • MANDIBLE SURGERY         Family History   Problem Relation Age of Onset   • Heart disease Mother    • Heart failure Father    • Cirrhosis Maternal Grandfather        Social History     Socioeconomic History   • Marital status: Single   Tobacco Use   • Smoking status: Current Every Day Smoker     Packs/day: 1.00     Types: Cigarettes     Start date: 1999   • Smokeless tobacco: Never Used   Vaping Use   • Vaping Use: Never used   Substance and Sexual Activity   • Alcohol use: Not Currently   • Drug use: Yes     Frequency: 7.0 times per week     Types: Marijuana   • Sexual activity: Defer           Objective   Physical Exam  Constitutional:  No acute distress.  Head:  Atraumatic.  Normocephalic.   Eyes:  No scleral icterus. Normal conjunctiva  ENT:  Moist mucosa.  No nasal discharge present.  Cardiovascular:  Well perfused.  Equal pulses.  Regular rhythm and tachycardic rate.  Normal capillary refill.  Well-healed midline sternal scar without significant erythema or drainage.  Incisions over upper abdomen with very mild erythema, no drainage, no swelling, no induration no fluctuance.  Pulmonary/Chest:  No respiratory distress.  Airway patent.  No tachypnea.  No accessory muscle usage.  Mildly diminished breath sounds bilaterally  Abdominal:  Non-distended. Non-tender.  Elevated BMI  Extremities:  No peripheral edema.  No Deformity  Skin:  Warm, dry  Neurological:  Alert, awake, and appropriate.  Normal speech.      Procedures           ED Course      /92  "(BP Location: Left arm, Patient Position: Sitting)   Pulse 105   Temp 98.3 °F (36.8 °C) (Oral)   Resp 18   Ht 182.9 cm (72\")   Wt 121 kg (267 lb)   SpO2 98%   BMI 36.21 kg/m²   Labs Reviewed   COMPREHENSIVE METABOLIC PANEL - Abnormal; Notable for the following components:       Result Value    Glucose 109 (*)     Sodium 135 (*)     CO2 21.0 (*)     ALT (SGPT) 54 (*)     All other components within normal limits    Narrative:     GFR Normal >60  Chronic Kidney Disease <60  Kidney Failure <15     BNP (IN-HOUSE) - Abnormal; Notable for the following components:    proBNP 1,184.0 (*)     All other components within normal limits    Narrative:     Among patients with dyspnea, NT-proBNP is highly sensitive for the detection of acute congestive heart failure. In addition NT-proBNP of <300 pg/ml effectively rules out acute congestive heart failure with 99% negative predictive value.    Results may be falsely decreased if patient taking Biotin.     TROPONIN (IN-HOUSE) - Abnormal; Notable for the following components:    Troponin T 0.416 (*)     All other components within normal limits    Narrative:     Troponin T Reference Range:  <= 0.03 ng/mL-   Negative for AMI  >0.03 ng/mL-     Abnormal for myocardial necrosis.  Clinicians would have to utilize clinical acumen, EKG, Troponin and serial changes to determine if it is an Acute Myocardial Infarction or myocardial injury due to an underlying chronic condition.       Results may be falsely decreased if patient taking Biotin.     CBC WITH AUTO DIFFERENTIAL - Abnormal; Notable for the following components:    RBC 3.89 (*)     Hemoglobin 11.2 (*)     Hematocrit 33.3 (*)     Neutrophil % 78.0 (*)     Lymphocyte % 12.6 (*)     All other components within normal limits   APTT - Abnormal; Notable for the following components:    PTT 30.2 (*)     All other components within normal limits   TROPONIN (IN-HOUSE) - Abnormal; Notable for the following components:    Troponin T " "0.417 (*)     All other components within normal limits    Narrative:     Troponin T Reference Range:  <= 0.03 ng/mL-   Negative for AMI  >0.03 ng/mL-     Abnormal for myocardial necrosis.  Clinicians would have to utilize clinical acumen, EKG, Troponin and serial changes to determine if it is an Acute Myocardial Infarction or myocardial injury due to an underlying chronic condition.       Results may be falsely decreased if patient taking Biotin.     PROCALCITONIN - Normal    Narrative:     As a Marker for Sepsis (Non-Neonates):    1. <0.5 ng/mL represents a low risk of severe sepsis and/or septic shock.  2. >2 ng/mL represents a high risk of severe sepsis and/or septic shock.    As a Marker for Lower Respiratory Tract Infections that require antibiotic therapy:    PCT on Admission    Antibiotic Therapy       6-12 Hrs later    >0.5                Strongly Recommended  >0.25 - <0.5        Recommended   0.1 - 0.25          Discouraged              Remeasure/reassess PCT  <0.1                Strongly Discouraged     Remeasure/reassess PCT    As 28 day mortality risk marker: \"Change in Procalcitonin Result\" (>80% or <=80%) if Day 0 (or Day 1) and Day 4 values are available. Refer to http://www.[facility]-pct-calculator.com    Change in PCT <=80%  A decrease of PCT levels below or equal to 80% defines a positive change in PCT test result representing a higher risk for 28-day all-cause mortality of patients diagnosed with severe sepsis for septic shock.    Change in PCT >80%  A decrease of PCT levels of more than 80% defines a negative change in PCT result representing a lower risk for 28-day all-cause mortality of patients diagnosed with severe sepsis or septic shock.      PROTIME-INR - Normal   POC LACTATE - Normal   POC LACTATE   CBC AND DIFFERENTIAL    Narrative:     The following orders were created for panel order CBC & Differential.  Procedure                               Abnormality         Status                   "   ---------                               -----------         ------                     CBC Auto Differential[[id number]]        Abnormal            Final result                 Please view results for these tests on the individual orders.     Medications   aspirin chewable tablet 324 mg (324 mg Oral Given 7/5/22 0617)   droperidol (INAPSINE) injection 2.5 mg (2.5 mg Intravenous Given 7/5/22 5279)   ipratropium-albuterol (DUO-NEB) nebulizer solution 3 mL (3 mL Nebulization Given 7/5/22 7088)   iopamidol (ISOVUE-370) 76 % injection 100 mL (100 mL Intravenous Given 7/5/22 3827)   traMADol (ULTRAM) tablet 50 mg (50 mg Oral Given 7/5/22 0627)     CT Angiogram Chest Pulmonary Embolism    Result Date: 7/5/2022  1. No pulmonary embolus. 2. Constellation of findings from recent median sternotomy without apparent complication. 3. Small pleural effusions. Electronically signed by:  Keven Purvis M.D.  7/5/2022 4:09 AM                                         [facility]     EKG # 1  Signed and interpreted by the EP.  Time Interpreted: 3:15 AM  Rate: 102  Rhythm: Sinus tachycardia  Axis: Within normal limits  Intervals: Within normal limits  ST Segments: T wave inversion in lead aVL is new compared to July 1, 2022 EKG     EKG # 2  Signed and interpreted by the EP.  Time Interpreted: 6:20 AM  Rate: 105  Rhythm: Normal sinus rhythm  Axis: Normal axis  Intervals: Normal intervals  ST Segments: No significant change from EKG earlier today.   jared      After labs back and just for going to CT, patient behaving abnormally.  Stating that someone was trying to kill him.  Would not go to CT because he needed to hold his significant other's hand.  States when he went to CT they were going to take out his heart and kill him.  Given patient's small dose of droperidol when this completely resolved.  He slept for couple minutes and then was alert and oriented x4 and completely appropriate.  Wife states that sometimes he has been acting  abnormally after taking his as needed Valium and he took this shortly prior to arrival.    Patient states he is no longer short of breath and reevaluated.  Discussed repeat troponin.  Just flat.  Discussed admission for observation and patient states he adamantly does not want to go to the hospital.  States he will follow-up with his surgeon.  Advised to return at any time if his symptoms worsen.  Patient alert and oriented x3 at this time.  States he feels well.  Final diagnoses:   Shortness of breath   Recent major surgery       ED Disposition  ED Disposition     ED Disposition   Discharge    Condition   Stable    Comment   --             Daniela Hernandez, SUKHJINDER  [address] [facility] [place] [address]  [phone]    In 2 days      Pablo Ball MD  [address] [facility] [place] [address]  [phone]               Medication List      No changes were made to your prescriptions during this visit.          Keyon Bowles MD  07/05/22 6649

## 2022-07-05 NOTE — PAYOR COMM NOTE
"This is discharge notification for Tramaine Gates  Reference/Auth # [id number]  Pt discharged routine to home on 7/4/22.    Mary Gutierrez RN, BSN  Utilization Review Nurse  [facility]  Direct & confidential phone # [phone]  Fax # [phone]      Tramaine Gates (36 y.o. Male)             Date of Birth   1985    Social Security Number       Address   [address] [place] [address] [place] [address]    Home Phone   [phone]    MRN   [id number]       Episcopalian   None    Marital Status   Single                            Admission Date   6/22/22    Admission Type   Emergency    Admitting Provider   Pablo Ball MD    Attending Provider       Department, Room/Bed   [facility] CARDIOVASCULAR CARE UNIT, 2204/1       Discharge Date   7/4/2022    Discharge Disposition   Home or Self Care    Discharge Destination                               Attending Provider: (none)   Allergies: Codeine, Hydrocodone, Hydrocodone-acetaminophen, Shellfish-derived Products    Isolation: None   Infection: COVID (History) (06/24/22)   Code Status: Prior   Advance Care Planning Activity    Ht: 182.9 cm (72.01\")   Wt: 121 kg (267 lb 6.4 oz)    Admission Cmt: None   Principal Problem: Chest pain, unspecified type [R07.9]                 Active Insurance as of 6/22/2022     Primary Coverage     Payor Plan Insurance Group Employer/Plan Group    [id number]     Payor Plan Address Payor Plan Phone Number Payor Plan Fax Number Effective Dates    MAIL STOP:   3/1/2022 - None Entered    [address] [address]       [facility] [id number]       Subscriber Name Subscriber Birth Date Member ID       TRAMAINE GATES 1985 [id number]                 Emergency Contacts      (Rel.) Home Phone Work Phone Mobile Phone    CATRACHO SHARMA (Significant Other) [phone] -- --    Magi Oliver (Mother) [phone] -- [phone]            Discharge " Summary    No notes of this type exist for this encounter.

## 2022-07-05 NOTE — TELEPHONE ENCOUNTER
Spoke to pt to schedule 2 weeks post op with Catrachita DODD, he stated he needs to find out when he can get a ride so he will call back to schedule appt.

## 2022-07-05 NOTE — ED NOTES
"Family states patient is acting confused now.  Patient states \"where am I and something bit my leg.\"  Dr. Bowles notified.   "

## 2022-07-06 ENCOUNTER — TELEPHONE (OUTPATIENT)
Dept: CARDIAC SURGERY | Facility: CLINIC | Age: 37
End: 2022-07-06

## 2022-07-06 ENCOUNTER — OFFICE VISIT (OUTPATIENT)
Dept: FAMILY MEDICINE CLINIC | Facility: CLINIC | Age: 37
End: 2022-07-06
Payer: COMMERCIAL

## 2022-07-06 ENCOUNTER — READMISSION MANAGEMENT (OUTPATIENT)
Dept: CALL CENTER | Facility: HOSPITAL | Age: 37
End: 2022-07-06

## 2022-07-06 VITALS
HEART RATE: 76 BPM | SYSTOLIC BLOOD PRESSURE: 128 MMHG | BODY MASS INDEX: 33.64 KG/M2 | WEIGHT: 235 LBS | DIASTOLIC BLOOD PRESSURE: 73 MMHG | HEIGHT: 70 IN

## 2022-07-06 DIAGNOSIS — E66.3 OVERWEIGHT: ICD-10-CM

## 2022-07-06 DIAGNOSIS — F90.9 ATTENTION DEFICIT HYPERACTIVITY DISORDER (ADHD), UNSPECIFIED ADHD TYPE: Primary | ICD-10-CM

## 2022-07-06 DIAGNOSIS — M77.12 LATERAL EPICONDYLITIS OF LEFT ELBOW: ICD-10-CM

## 2022-07-06 PROBLEM — E66.09 CLASS 1 OBESITY DUE TO EXCESS CALORIES WITHOUT SERIOUS COMORBIDITY WITH BODY MASS INDEX (BMI) OF 33.0 TO 33.9 IN ADULT: Status: ACTIVE | Noted: 2021-05-11

## 2022-07-06 PROBLEM — E66.811 CLASS 1 OBESITY DUE TO EXCESS CALORIES WITHOUT SERIOUS COMORBIDITY WITH BODY MASS INDEX (BMI) OF 33.0 TO 33.9 IN ADULT: Status: ACTIVE | Noted: 2021-05-11

## 2022-07-06 PROCEDURE — 99214 OFFICE O/P EST MOD 30 MIN: CPT | Performed by: FAMILY MEDICINE

## 2022-07-06 RX ORDER — DEXTROAMPHETAMINE SACCHARATE, AMPHETAMINE ASPARTATE, DEXTROAMPHETAMINE SULFATE AND AMPHETAMINE SULFATE 2.5; 2.5; 2.5; 2.5 MG/1; MG/1; MG/1; MG/1
10 TABLET ORAL 2 TIMES DAILY
Qty: 60 TABLET | Refills: 0 | Status: SHIPPED | OUTPATIENT
Start: 2022-07-06 | End: 2022-08-02 | Stop reason: SDUPTHER

## 2022-07-06 ASSESSMENT — PATIENT HEALTH QUESTIONNAIRE - PHQ9
2. FEELING DOWN, DEPRESSED OR HOPELESS: 0
SUM OF ALL RESPONSES TO PHQ9 QUESTIONS 1 & 2: 0
SUM OF ALL RESPONSES TO PHQ QUESTIONS 1-9: 0
1. LITTLE INTEREST OR PLEASURE IN DOING THINGS: 0

## 2022-07-06 ASSESSMENT — ENCOUNTER SYMPTOMS
ABDOMINAL PAIN: 0
CHEST TIGHTNESS: 0
BLOOD IN STOOL: 0
SHORTNESS OF BREATH: 0

## 2022-07-06 NOTE — TELEPHONE ENCOUNTER
Spoke with Ariana, she is going to drop [place] paperwork off at [facility] tomorrow. 2 week post op appt scheduled for 7/14/22 at 9am.

## 2022-07-06 NOTE — PROGRESS NOTES
[place]  _______________________________________  MD Kwadwo North, DO  Iven Moritz, MD Neymar Wilhelm MD    [address] , [address] [place] [address]  Phone: [phone]  Fax: [phone]    Sha Mccarthy (:  1985) is a 43 y.o. male,Established patient, here for evaluation of the following chief complaint(s):  ADHD (follow up and refills ) and Elbow Pain (L )         ASSESSMENT/PLAN:    1. Attention deficit hyperactivity disorder (ADHD), unspecified ADHD type  Stable, continue current regimen. I have reviewed the patients controlled substance prescription history, as maintained in the [address] [place] [address] prescription monitoring program, so that the prescription(s) for a  controlled substance can be given. - amphetamine-dextroamphetamine (ADDERALL) 10 MG tablet; Take 1 tablet by mouth 2 times daily for 30 days. Dispense: 60 tablet; Refill: 0    2. Overweight  His habitus is definitely of someone who is overweight. He has a lot of muscle now. Encouraged him to keep working out like he has been and congratulated him on his positive changes. Will continue to motivate. 3. Lateral epicondylitis of left elbow  ICE 15min TID  Voltaren QID  Try tennis elbow brace as well. When I gently compressed his proximal forearm near the elbow and repeated the middle digit maneuver, his pain was significantly less. If not improving in a month, consider sports med referral.     FU 6m        Subjective   SUBJECTIVE/OBJECTIVE:    ADD: We restarted Adderall at 10mg BID in early . He states he feels the medication is working, can concentrate through the day. Sometimes will take 20mg at one time if he has a long shift. No problems sleeping. No tics.      L elbow pain: Having pain in the lateral epicondyle of the L elbow for about a month. Worse when working up. Has not been treating it in anyway.  Does a lot of overhead workouts and also cleans. He has lost 10# in the last year. He has been hitting the gym and added a lot of muscle. Has overweight habitus. Wt Readings from Last 3 Encounters:   07/06/22 235 lb (106.6 kg)   06/17/21 245 lb (111.1 kg)   05/11/21 253 lb (114.8 kg)     Vitals:    07/06/22 1351   BP: 128/73   Pulse: 76         Review of Systems   Constitutional: Negative for chills and fever. Respiratory: Negative for chest tightness and shortness of breath. Gastrointestinal: Negative for abdominal pain and blood in stool. Genitourinary: Negative for hematuria. Musculoskeletal: Positive for arthralgias. Objective   Physical Exam  Vitals and nursing note reviewed. Constitutional:       General: He is not in acute distress. Appearance: Normal appearance. He is not ill-appearing. HENT:      Head: Normocephalic and atraumatic. Right Ear: External ear normal.      Left Ear: External ear normal.      Mouth/Throat:      Mouth: Mucous membranes are moist.   Eyes:      General: No scleral icterus. Right eye: No discharge. Left eye: No discharge. Extraocular Movements: Extraocular movements intact. Pupils: Pupils are equal, round, and reactive to light. Cardiovascular:      Rate and Rhythm: Normal rate and regular rhythm. Pulses: Normal pulses. Heart sounds: No murmur heard. No friction rub. No gallop. Pulmonary:      Effort: Pulmonary effort is normal. No respiratory distress. Breath sounds: Normal breath sounds. Abdominal:      General: Abdomen is flat. Bowel sounds are normal.      Palpations: Abdomen is soft. Musculoskeletal:         General: Tenderness present. No swelling. Normal range of motion. Cervical back: Normal range of motion and neck supple. No rigidity. Right lower leg: No edema. Left lower leg: No edema. Comments: L elbow: TTP at lateral epicondyle. A lot of pain with resisted extension of the middle left finger.     Skin: General: Skin is warm and dry. Coloration: Skin is not pale. Neurological:      General: No focal deficit present. Mental Status: He is alert and oriented to person, place, and time. Mental status is at baseline. Psychiatric:         Mood and Affect: Mood normal.         Behavior: Behavior normal.         Thought Content: Thought content normal.                  An electronic signature was used to authenticate this note.     --Nette Borrero MD

## 2022-07-06 NOTE — TELEPHONE ENCOUNTER
LM, Paperwork not received through fax. Will discuss with patient/significant other when call returned.

## 2022-07-06 NOTE — TELEPHONE ENCOUNTER
----- Message from YOUSIF Walton sent at 7/3/2022  9:12 AM EDT -----  Regarding: STD paperwork for KELSEY pt  We faxed over paperwork on Saturday.  Dr. MANN already signed it.  Can you guys get if filled out promptly.  His girlfriend is taking care of chasing the paperwork.  She has original copy if it didn't come thru to office.

## 2022-07-06 NOTE — OUTREACH NOTE
CT Surgery Week 1 Survey    Flowsheet Row Responses   [facility] facility patient discharged from? [facility]   Does the patient have one of the following disease processes/diagnoses(primary or secondary)? Cardiothoracic surgery   Week 1 attempt successful? No   Unsuccessful attempts Attempt 1            TRACIE Bocanegra Registered Nurse

## 2022-07-08 ENCOUNTER — READMISSION MANAGEMENT (OUTPATIENT)
Dept: CALL CENTER | Facility: HOSPITAL | Age: 37
End: 2022-07-08

## 2022-07-08 ENCOUNTER — TELEPHONE (OUTPATIENT)
Dept: CARDIAC SURGERY | Facility: CLINIC | Age: 37
End: 2022-07-08

## 2022-07-08 NOTE — TELEPHONE ENCOUNTER
Spoke with Ariana, she states Buspar has already been stopped. Discussed pulm referral, she [facility] [address] [facility] . Advised of labs needed in 1 week at [facility] to re-check na.    She verbalized understanding and this was agreeable.

## 2022-07-08 NOTE — TELEPHONE ENCOUNTER
----- Message from YOUSIF Walton sent at 7/8/2022  3:31 PM EDT -----  Regarding: KEL[facility]Y pt  Can you call him and tell him to stop his Buspar per psych recs at discharge.  We can get BMP in one week to see what his na looks like  He also needs an amb referral to pulm about his pulm nodules    Lmk if you need me to do anything to help you

## 2022-07-08 NOTE — OUTREACH NOTE
CT Surgery Week 1 Survey    Flowsheet Row Responses   [facility] facility patient discharged from? [facility]   Does the patient have one of the following disease processes/diagnoses(primary or secondary)? Cardiothoracic surgery   Week 1 attempt successful? No   Unsuccessful attempts Attempt 2   General alerts for this patient Heart Cath   Discharge diagnosis Chest pain, unspecified type            CUCA MESSINA - Registered Nurse

## 2022-07-10 DIAGNOSIS — Z95.1 S/P CABG X 3: ICD-10-CM

## 2022-07-11 RX ORDER — DIAZEPAM 5 MG/1
5 TABLET ORAL EVERY 8 HOURS PRN
Qty: 20 TABLET | Refills: 0 | OUTPATIENT
Start: 2022-07-11

## 2022-07-11 RX ORDER — TRAMADOL HYDROCHLORIDE 50 MG/1
50 TABLET ORAL EVERY 6 HOURS PRN
Qty: 40 TABLET | Refills: 0 | OUTPATIENT
Start: 2022-07-11

## 2022-07-12 DIAGNOSIS — Z95.1 S/P CABG X 3: ICD-10-CM

## 2022-07-13 ENCOUNTER — READMISSION MANAGEMENT (OUTPATIENT)
Dept: CALL CENTER | Facility: HOSPITAL | Age: 37
End: 2022-07-13

## 2022-07-13 RX ORDER — DIAZEPAM 5 MG/1
5 TABLET ORAL EVERY 8 HOURS PRN
Qty: 20 TABLET | Refills: 0 | OUTPATIENT
Start: 2022-07-13

## 2022-07-13 NOTE — OUTREACH NOTE
CT Surgery Week 2 Survey    Flowsheet Row Responses   [facility] facility patient discharged from? [facility]   Does the patient have one of the following disease processes/diagnoses(primary or secondary)? Cardiothoracic surgery   Week 2 attempt successful? No   Unsuccessful attempts Attempt 1          CHARLEEN DAVALOS - Registered Nurse

## 2022-07-14 ENCOUNTER — TELEPHONE (OUTPATIENT)
Dept: CARDIAC REHAB | Facility: HOSPITAL | Age: 37
End: 2022-07-14

## 2022-07-14 ENCOUNTER — OFFICE VISIT (OUTPATIENT)
Dept: CARDIAC SURGERY | Facility: CLINIC | Age: 37
End: 2022-07-14

## 2022-07-14 ENCOUNTER — TRANSCRIBE ORDERS (OUTPATIENT)
Dept: CARDIAC REHAB | Facility: HOSPITAL | Age: 37
End: 2022-07-14

## 2022-07-14 ENCOUNTER — NURSE TRIAGE (OUTPATIENT)
Dept: CALL CENTER | Facility: HOSPITAL | Age: 37
End: 2022-07-14

## 2022-07-14 VITALS
HEART RATE: 92 BPM | DIASTOLIC BLOOD PRESSURE: 91 MMHG | SYSTOLIC BLOOD PRESSURE: 119 MMHG | WEIGHT: 250.2 LBS | HEIGHT: 70 IN | BODY MASS INDEX: 35.82 KG/M2 | OXYGEN SATURATION: 99 % | RESPIRATION RATE: 20 BRPM | TEMPERATURE: 96.9 F

## 2022-07-14 DIAGNOSIS — Z95.1 S/P CABG X 3: ICD-10-CM

## 2022-07-14 DIAGNOSIS — I21.4 NSTEMI, INITIAL EPISODE OF CARE: Primary | ICD-10-CM

## 2022-07-14 PROCEDURE — 99024 POSTOP FOLLOW-UP VISIT: CPT | Performed by: NURSE PRACTITIONER

## 2022-07-14 RX ORDER — TRAMADOL HYDROCHLORIDE 50 MG/1
50 TABLET ORAL EVERY 6 HOURS PRN
Qty: 40 TABLET | Refills: 0 | Status: SHIPPED | OUTPATIENT
Start: 2022-07-14 | End: 2022-08-11

## 2022-07-14 RX ORDER — DIAZEPAM 5 MG/1
5 TABLET ORAL EVERY 8 HOURS PRN
Qty: 40 TABLET | Refills: 0 | Status: SHIPPED | OUTPATIENT
Start: 2022-07-14 | End: 2022-08-11

## 2022-07-14 RX ORDER — ACETAMINOPHEN 500 MG
500 TABLET ORAL EVERY 6 HOURS PRN
Status: ON HOLD | COMMUNITY
End: 2022-09-16

## 2022-07-14 NOTE — TELEPHONE ENCOUNTER
Reason for Disposition  • [1] Caller has NON-URGENT question AND [2] triager unable to answer question    Additional Information  • Negative: [1] Major abdominal surgical incision AND [2] wound gaping open AND [3] visible internal organs  • Negative: Sounds like a life-threatening emergency to the triager  • Negative: [1] Bleeding from incision AND [2] won't stop after 10 minutes of direct pressure  • Negative: [1] Widespread rash AND [2] bright red, sunburn-like  • Negative: Severe pain in the incision  • Negative: [1] Incision gaping open AND [2] < 48 hours since wound re-opened  • Negative: [1] Incision gaping open AND [2] length of opening > 2 inches (5 cm)  • Negative: Patient sounds very sick or weak to the triager  • Negative: Sounds like a serious complication to the triager  • Negative: Fever > 100.4 F (38.0 C)  • Negative: [1] Incision looks infected (spreading redness, pain) AND [2] fever > 99.5 F (37.5 C)  • Negative: [1] Incision looks infected (spreading redness, pain) AND [2] large red area (> 2 in. or 5 cm)  • Negative: [1] Incision looks infected (spreading redness, pain) AND [2] face wound  • Negative: [1] Red streak runs from the incision AND [2] longer than 1 inch (2.5 cm)  • Negative: [1] Pus or bad-smelling fluid draining from incision AND [2] no fever  • Negative: [1] Post-op pain AND [2] not controlled with pain medications  • Negative: Dressing soaked with blood or body fluid (e.g., drainage)  • Negative: [1] Raised bruise and [2] size > 2 inches (5 cm) and expanding  • Negative: [1] Caller has URGENT question AND [2] triager unable to answer question  • Negative: [1] INCREASING pain in incision AND [2] > 2 days (48 hours) since surgery  • Negative: [1] Small red area or streak AND [2] no fever  • Negative: [1] Clear or blood-tinged fluid draining from wound AND [2] no fever  • Negative: [1] Incision gaping open AND [2] > 48 hours since wound re-opened AND [3] length of opening > 1/2 inch  "(12 mm)  • Negative: [1] Incision on face gaping open after skin glue AND [2] > 48 hours since wound re-opened 3] length of opening > 1/4 inch (6 mm)  • Negative: Suture or staple removal is overdue  • Negative: [1] Suture or staple came out early AND [2] caller wants wound checked    Answer Assessment - Initial Assessment Questions  1. SYMPTOM: \"What's the main symptom you're concerned about?\" (e.g., redness, pain, drainage)      Looks like a piece of plastic or wire from the top of the incision.  2. ONSET: \"When did the object  start?\"      Today  3. SURGERY: \"What surgery was performed?\"      Triple bypass  4. DATE of SURGERY: \"When was surgery performed?\"       06/28/22  5. INCISION SITE: \"Where is the incision located?\"       Top the chest  6. REDNESS: \"Is there any redness at the incision site?\" If yes, ask: \"How wide across is the redness?\" (Inches, centimeters)       No redness  7. PAIN: \"Is there any pain?\" If so, ask: \"How bad is it?\"  (Scale 1-10; or mild, moderate, severe)      Some pain but nothing extra  8. BLEEDING: \"Is there any bleeding?\" If so, ask: \"How much?\" and \"Where?\"      No bleeding   9. DRAINAGE: \"Is there any drainage from the incision site?\" If yes, ask: \"What color and how much?\" (e.g., red, cloudy, pus; drops, teaspoon)      No Drainage  10. FEVER: \"Do you have a fever?\" If so, ask: \"What is your temperature, how was it measured, and when did it start?\"        No fever  11. OTHER SYMPTOMS: \"Do you have any other symptoms?\" (e.g., shaking chills, weakness, rash elsewhere on body)        No other symptoms.    Protocols used: POST-OP INCISION SYMPTOMS-ADULT-AH    "

## 2022-07-14 NOTE — PATIENT INSTRUCTIONS
Continue lifting restriction of 10 lbs until 6 weeks and 50 lbs until 12 weeks from the date of surgery, no excessive jarring motions or twisting motions until 12 weeks from the date of surgery

## 2022-07-14 NOTE — PROGRESS NOTES
"CARDIOVASCULAR SURGERY FOLLOW-UP PROGRESS NOTE  Chief Complaint: Postop follow-up        HPI:   Dear Dr. Hernandez, Daniela Fulton, SUKHJINDER and colleagues:    It was nice to see Tramaine Gates in follow up 2 weeks after surgery.  As you know, he is a 36 y.o. male with CAD, hypertension, hyperlipidemia, bipolar disorder/anxiety/panic disorder, recently diagnosed migraines, pulmonary nodules, tobacco abuse,who underwent CABG x3 with LIMA on 6/29/2022 at [facility] with Dr. Ball. He had issues postoperatively with anxiety and depression, psychiatric services changed his medication regimen, I have sent in refills for his psychiatric medications as he cannot get into outpatient services until October, will fill Valium as needed until that time.  He additionally had an incidental finding of pulmonary nodules, he has follow-up with pulmonologist tomorrow. He comes in today complaining of bilateral chest wall pain that is normal for postop recovery.  His activity level has been fair, he and his significant other state that their apartment is quite small and walking into the office today is the most activity he has had since surgery.  He states that he has not been smoking since his return home.  From a surgical standpoint, the sternal incision is well approximated without erythema, edema, or drainage.  The sternum is stable to palpation, and the patient denies any popping or clicking with deep inspiration or coughing.  Instructed the patient to shower daily and utilize washcloth with antimicrobial soap to lightly debride incisions.    Physical Exam:         /91 (BP Location: Left arm, Patient Position: Sitting, Cuff Size: Adult)   Pulse 92   Temp 96.9 °F (36.1 °C)   Resp 20   Ht 177.8 cm (70\")   Wt 113 kg (250 lb 3.2 oz)   SpO2 99%   BMI 35.90 kg/m²   Heart:  regular rate and rhythm, S1, S2 normal, no murmur, click, rub or gallop  Lungs:  clear to auscultation bilaterally  Extremities:  no " edema  Incision(s):  mid chest healing well, bilateral leg healing well, sternum stable    Assessment/Plan:     S/P CABG. Overall, he is doing well.    No significant post-op complications    No heavy lifting > 10 pounds for 4 more weeks  Keep incisions clean and dry  Follow-up as scheduled with cardiology  Follow-up as scheduled with PCP  Return to clinic in 4 week(s) with no new studies    Continue lifting restriction of 10 lbs until 6 weeks and 50 lbs until 12 weeks from the date of surgery, no excessive jarring motions or twisting motions until 12 weeks from the date of surgery    Return to clinic if any signs or symptoms of infection or sternal instability develop       Thank you for allowing me to participate in the care of your patient.    Regards,  YOUSIF Leggett    Current outpatient and discharge medications have been reconciled for the patient.  Reviewed by: YOUSIF Leggett

## 2022-07-15 ENCOUNTER — TELEPHONE (OUTPATIENT)
Dept: CARDIAC SURGERY | Facility: CLINIC | Age: 37
End: 2022-07-15

## 2022-07-15 ENCOUNTER — APPOINTMENT (OUTPATIENT)
Dept: LAB | Facility: HOSPITAL | Age: 37
End: 2022-07-15

## 2022-07-15 ENCOUNTER — TELEPHONE (OUTPATIENT)
Dept: ONCOLOGY | Facility: CLINIC | Age: 37
End: 2022-07-15

## 2022-07-15 LAB — QT INTERVAL: 311 MS

## 2022-07-15 NOTE — TELEPHONE ENCOUNTER
Spoke with Ariana, received picture, Berna DODD reviewed. Per Berna, Mr. Gates can come to [facility] and she will assess. I discussed with Mr. Gates, he is going to find a ride and let office know about what time he will be at [facility].

## 2022-07-15 NOTE — TELEPHONE ENCOUNTER
LM for return call to discuss sternal incision.     [facility] Triage Nurse sent message:  7/14/2022, Wound Check: something is protruding from the wound, they mentioned a pacer wire may stick out, unsure of what it is.     Looks like a piece of plastic or wire from the top of the incision.

## 2022-07-15 NOTE — TELEPHONE ENCOUNTER
"HUB warm transferred phone call to me. Patients wife Ariana called to cancel Mr. Gates's follow up appt with Dr. Plata today. She stated Mr. Gates recently had heart surgery and when he woke up this morning, he had to go to the hospital because he \"had stitches hanging out\". She stated that they are needing to cancel because her  is exhausted from having to go to the hospital this morning. I asked when she would like to reschedule for. She asked that due to them having multiple appts in the upcoming weeks, can she contact our office to reschedule once she is able to check their schedule to make sure upcoming appts do not overlap. I confirmed and stated that I will cancel today's appt. She stated she will call as soon as she confirms their appt schedule. Confirmed.   "

## 2022-07-19 ENCOUNTER — TELEPHONE (OUTPATIENT)
Dept: CARDIAC SURGERY | Facility: CLINIC | Age: 37
End: 2022-07-19

## 2022-07-19 NOTE — TELEPHONE ENCOUNTER
Ariana calling to report patient is constipated. Per patient it was days ago for his last BM. He started taking stool softener on Sunday 7/17 and today added miralax. He also report he is unable to urinate for 2 days. He is drinking fluids. He has drank 1.5bottles of water today and can of mt.dew. Currently in the bathroom trying to have bm and feels like he needs to urinate but is unable.     Discussed with Berna DODD, per Berna needs to go to ER for evaluation.     Spoke with Ariana, advised per Berna DODD patient needs evaluation at the ER. She verbalized understanding and this was agreeable.

## 2022-07-20 ENCOUNTER — READMISSION MANAGEMENT (OUTPATIENT)
Dept: CALL CENTER | Facility: HOSPITAL | Age: 37
End: 2022-07-20

## 2022-07-20 NOTE — OUTREACH NOTE
CT Surgery Week 3 Survey    Flowsheet Row Responses   [facility] patient discharged from? [facility]   Does the patient have one of the following disease processes/diagnoses(primary or secondary)? Cardiothoracic surgery   Week 3 attempt successful? Yes   Call start time 1025   Call end time 1033   General alerts for this patient Heart Cath   Discharge diagnosis Chest pain, unspecified type   Person spoke with today (if not patient) and relationship patient   Meds reviewed with patient/caregiver? Yes   Is the patient having any side effects they believe may be caused by any medication additions or changes? No   Does the patient have all medications related to this admission filled (includes all antibiotics, pain medications, cardiac medications, etc.) Yes   Is the patient taking all medications as directed (includes completed medication regime)? Yes   Does the patient have a primary care provider?  Yes   Does the patient have an appointment scheduled with their C/T surgeon? Yes   Has the patient kept scheduled appointments due by today? Yes   Psychosocial issues? No   What is the patient's perception of their health status since discharge? Improving   Nursing interventions Nurse provided patient education   Nursing interventions Reassured on normal signs of recovery   Is the patient /caregiver able to teach back basic post-op care? Take showers only when approved by MD-sponge bathe until then, No tub bath, swimming, or hot tub until instructed by MD, Keep incision areas clean, dry and protected, Lifting as instructed by MD in discharge instructions   Is the patient/caregiver able to teach back signs and symptoms of incisional infection? Increased drainage or bleeding, Increased redness, swelling or pain at the incisonal site, Pus or odor from incision, Fever, Incisional warmth   Is the patient/caregiver able to teach back steps to recovery at home? Rest and rebuild strength, gradually increase activity, Eat a  well-balance diet   Week 3 call completed? Yes   Wrap up additional comments Pt states he is doing better, and denies, increased redness, swelling, drainage, warnth at incisional site. Pt had post-op appt, and advised to make a PCP fu appt.           CUCA MESSINA - Registered Nurse

## 2022-07-21 ENCOUNTER — TELEPHONE (OUTPATIENT)
Dept: CARDIAC REHAB | Facility: HOSPITAL | Age: 37
End: 2022-07-21

## 2022-07-21 NOTE — TELEPHONE ENCOUNTER
[employer]: Primary  OOP: $2113.64 left  Co-pay: $50.00  After OOP met covered @ 100%   60v    [place] [employer] [place] Secondary  Covered @ 100%  75v/calendar year  Ref#: [id number]  ~deana

## 2022-07-25 ENCOUNTER — TELEPHONE (OUTPATIENT)
Dept: CARDIAC REHAB | Facility: HOSPITAL | Age: 37
End: 2022-07-25

## 2022-07-26 ENCOUNTER — TELEPHONE (OUTPATIENT)
Dept: CARDIAC SURGERY | Facility: CLINIC | Age: 37
End: 2022-07-26

## 2022-07-26 DIAGNOSIS — Z95.1 S/P CABG X 3: ICD-10-CM

## 2022-07-26 LAB
QT INTERVAL: 370 MS
QT INTERVAL: 370 MS
QT INTERVAL: 374 MS
QT INTERVAL: 384 MS
QT INTERVAL: 386 MS

## 2022-07-26 RX ORDER — TRAMADOL HYDROCHLORIDE 50 MG/1
50 TABLET ORAL EVERY 6 HOURS PRN
Qty: 40 TABLET | Refills: 0 | OUTPATIENT
Start: 2022-07-26

## 2022-07-26 NOTE — TELEPHONE ENCOUNTER
calling for Tramadol Refill. He is still having a significant amount of pain, States his incision is sore/tender/tight and painful when moving, He states its like its popping and cracking. I inquired about the popping and cracking and he said its not really popping and cracking but feels that way. Mr. Gates states he has been taking Tramadol every 6 hours with Tylenol 750mg in between Tramadol doses.     Discussed with Berna DODD, per Berna schedule pt appt for Thursday for eval.    Appt made for evaluation on Thursday 7/28@1550, discussed appt date and time with Ariana. She verbalized understanding and this was agreeable.

## 2022-07-28 ENCOUNTER — OFFICE VISIT (OUTPATIENT)
Dept: CARDIAC SURGERY | Facility: CLINIC | Age: 37
End: 2022-07-28

## 2022-07-28 VITALS
BODY MASS INDEX: 34.86 KG/M2 | HEIGHT: 71 IN | HEART RATE: 78 BPM | DIASTOLIC BLOOD PRESSURE: 89 MMHG | TEMPERATURE: 97.1 F | SYSTOLIC BLOOD PRESSURE: 126 MMHG | RESPIRATION RATE: 20 BRPM | OXYGEN SATURATION: 98 % | WEIGHT: 249 LBS

## 2022-07-28 DIAGNOSIS — Z95.1 S/P CABG X 3: Primary | ICD-10-CM

## 2022-07-28 PROCEDURE — 99024 POSTOP FOLLOW-UP VISIT: CPT | Performed by: NURSE PRACTITIONER

## 2022-07-28 RX ORDER — HYDROCODONE BITARTRATE AND ACETAMINOPHEN 5; 325 MG/1; MG/1
1 TABLET ORAL EVERY 4 HOURS PRN
Qty: 30 TABLET | Refills: 0 | Status: SHIPPED | OUTPATIENT
Start: 2022-07-28 | End: 2022-08-11

## 2022-07-28 RX ORDER — METHYLPREDNISOLONE 4 MG/1
1 TABLET ORAL DAILY
Qty: 21 TABLET | Refills: 0 | Status: SHIPPED | OUTPATIENT
Start: 2022-07-28 | End: 2022-08-11

## 2022-08-01 NOTE — PROGRESS NOTES
"CARDIOVASCULAR SURGERY FOLLOW-UP PROGRESS NOTE  Chief Complaint: Postop follow-up        HPI:   Dear Dr. Hernandez, Daniela Fulton, SUKHJINDER and colleagues:    It was nice to see Tramaine Gates in follow up 7 weeks after surgery.  As you know, he is a 37 y.o. male with CAD, hypertension, hyperlipidemia, bipolar disorder/anxiety/panic disorder, recently diagnosed migraines, pulmonary nodules, tobacco abuse,who underwent CABG x3 with LIMA on 6/29/2022 at [facility] with Dr. Ball. He had issues postoperatively with anxiety and depression, psychiatric services changed his medication regimen, and he has follow-up with them in October. He comes in today complaining of bilateral chest wall pain that is easily reproduced with palpation of costochondral areas.  His activity level has been fair.  From a surgical standpoint, the sternal incision is well approximated without erythema, edema, or drainage.  The sternum is stable to palpation, and the patient denies any popping or clicking with deep inspiration or coughing.      Physical Exam:         /89 (BP Location: Left arm, Patient Position: Sitting, Cuff Size: Adult)   Pulse 78   Temp 97.1 °F (36.2 °C)   Resp 20   Ht 179.1 cm (70.5\")   Wt 113 kg (249 lb)   SpO2 98%   BMI 35.22 kg/m²   Heart:  regular rate and rhythm, S1, S2 normal, no murmur, click, rub or gallop  Lungs:  clear to auscultation bilaterally  Extremities:  no edema  Incision(s):  mid chest healing well, bilateral leg healing well, sternum stable    Assessment/Plan:     S/P CABG. Overall, he is doing well.    Costochondritis--Medrol Dosepak, [place] #30 sent to pharmacy, return to clinic in 1 week for reevaluation    No heavy lifting > 10 pounds for 1 more weeks    Continue lifting restriction of 10 lbs until 6 weeks and 50 lbs until 12 weeks from the date of surgery, no excessive jarring motions or twisting motions until 12 weeks from the date of surgery    Return to clinic if any " signs or symptoms of infection or sternal instability develop       Thank you for allowing me to participate in the care of your patient.    Regards,  YOUSIF Leggett

## 2022-08-02 ENCOUNTER — TRANSCRIBE ORDERS (OUTPATIENT)
Dept: CARDIAC REHAB | Facility: HOSPITAL | Age: 37
End: 2022-08-02

## 2022-08-02 DIAGNOSIS — Z95.1 S/P CABG (CORONARY ARTERY BYPASS GRAFT): Primary | ICD-10-CM

## 2022-08-02 DIAGNOSIS — F90.9 ATTENTION DEFICIT HYPERACTIVITY DISORDER (ADHD), UNSPECIFIED ADHD TYPE: ICD-10-CM

## 2022-08-03 RX ORDER — DEXTROAMPHETAMINE SACCHARATE, AMPHETAMINE ASPARTATE, DEXTROAMPHETAMINE SULFATE AND AMPHETAMINE SULFATE 2.5; 2.5; 2.5; 2.5 MG/1; MG/1; MG/1; MG/1
10 TABLET ORAL 2 TIMES DAILY
Qty: 60 TABLET | Refills: 0 | Status: SHIPPED | OUTPATIENT
Start: 2022-08-03 | End: 2022-08-31 | Stop reason: SDUPTHER

## 2022-08-03 NOTE — TELEPHONE ENCOUNTER
Last OV 7/6/22  Last refill 7/6/22  I have reviewed the patients controlled substance prescription history, as maintained in the [place] prescription monitoring program, so that the prescription(s) for a  controlled substance can be given.

## 2022-08-08 ENCOUNTER — TELEPHONE (OUTPATIENT)
Dept: PSYCHIATRY | Facility: CLINIC | Age: 37
End: 2022-08-08

## 2022-08-08 NOTE — TELEPHONE ENCOUNTER
See if the patient is still taking diazepam like he was in the hospital. If not it is likely the reason his anxiety is uncontrolled instead of the Vraylar. I can sent for diazepam if he needs it until his appointment.

## 2022-08-08 NOTE — TELEPHONE ENCOUNTER
Patient was seen in hospital and you gave him vraylar to start. He was having open heat surgery and a lot going on. His wife says his anxiety is really bad where she cant even leave the house. She said it appears the Vraylar is making him worse. He has a new patient appt in Oct and she calls to check for cancellations. Is there anything you can do in the mean time.

## 2022-08-11 ENCOUNTER — OFFICE VISIT (OUTPATIENT)
Dept: CARDIAC SURGERY | Facility: CLINIC | Age: 37
End: 2022-08-11

## 2022-08-11 VITALS
OXYGEN SATURATION: 98 % | HEIGHT: 71 IN | SYSTOLIC BLOOD PRESSURE: 116 MMHG | RESPIRATION RATE: 20 BRPM | DIASTOLIC BLOOD PRESSURE: 85 MMHG | WEIGHT: 255.5 LBS | TEMPERATURE: 96.4 F | BODY MASS INDEX: 35.77 KG/M2 | HEART RATE: 86 BPM

## 2022-08-11 DIAGNOSIS — Z95.1 S/P CABG X 3: ICD-10-CM

## 2022-08-11 PROCEDURE — 99024 POSTOP FOLLOW-UP VISIT: CPT | Performed by: NURSE PRACTITIONER

## 2022-08-11 RX ORDER — HYDROXYZINE PAMOATE 50 MG/1
100 CAPSULE ORAL 4 TIMES DAILY PRN
Qty: 60 CAPSULE | Refills: 0 | Status: ON HOLD | OUTPATIENT
Start: 2022-08-11 | End: 2022-09-16

## 2022-08-11 RX ORDER — HYDROCODONE BITARTRATE AND ACETAMINOPHEN 5; 325 MG/1; MG/1
1 TABLET ORAL EVERY 4 HOURS PRN
Qty: 40 TABLET | Refills: 0 | Status: SHIPPED | OUTPATIENT
Start: 2022-08-11 | End: 2022-08-25

## 2022-08-11 RX ORDER — METHYLPREDNISOLONE 4 MG/1
1 TABLET ORAL DAILY
Qty: 21 TABLET | Refills: 0 | Status: SHIPPED | OUTPATIENT
Start: 2022-08-11 | End: 2022-08-25

## 2022-08-11 RX ORDER — IBUPROFEN 800 MG/1
800 TABLET ORAL EVERY 6 HOURS
Qty: 56 TABLET | Refills: 0 | Status: SHIPPED | OUTPATIENT
Start: 2022-08-11 | End: 2022-08-25

## 2022-08-11 NOTE — PROGRESS NOTES
"CARDIOVASCULAR SURGERY FOLLOW-UP PROGRESS NOTE  Chief Complaint: postop follow up        HPI:   Dear Dr. Hernandez, Daniela Fulton, SUKHJINDER and colleagues:    It was nice to see Tramaine Gates in follow up 6 weeks after surgery.  As you know, he is a 37 y.o. male with with CAD, hypertension, hyperlipidemia, bipolar disorder/anxiety/panic disorder, recently diagnosed migraines, pulmonary nodules, tobacco abuse,who underwent CABG x3 with LIMA on 6/29/2022 at [facility] with Dr. Ball. He had issues postoperatively with anxiety and depression, psychiatric services changed his medication regimen, and he has follow-up with them in October.  I saw him 2 weeks ago at which time he had obvious costochondritis, I ordered a Medrol Dosepak at that time.  Patient states that he had significant improvement in his symptoms but after a few days of the Dosepak being out of his system the pain began to return again.  He continues to have issues with anxiety, states that Valium typically is not helping, I have sent in a prescription for Vistaril 50 to 100 mg as needed, and again discussed relaxation techniques in order to diminish the inflammation in his chest.  Have sent in another Medrol Dosepak, ibuprofen 800 mg as needed after Medrol Dosepak completed, and an additional Norco #40 for pain control.  If continued issues after these measures, will refer out to pain management for costochondral injections.    Physical Exam:         /85 (BP Location: Left arm, Patient Position: Sitting, Cuff Size: Adult)   Pulse 86   Temp 96.4 °F (35.8 °C)   Resp 20   Ht 179.1 cm (70.5\")   Wt 116 kg (255 lb 8 oz)   SpO2 98%   BMI 36.14 kg/m²       Assessment/Plan:     S/P CABG. Overall, he is doing well.    Costochondritis--- return to clinic in 2 weeks for reevaluation    No heavy lifting > 10 pounds for 2 more weeks    Continue lifting restriction of 10 lbs until 6 weeks and 50 lbs until 12 weeks from the date of surgery, " no excessive jarring motions or twisting motions until 12 weeks from the date of surgery    Return to clinic if any signs or symptoms of infection or sternal instability develop       Thank you for allowing me to participate in the care of your patient.    Regards,  YOUSIF Leggett

## 2022-08-17 RX ORDER — METOPROLOL TARTRATE 50 MG/1
50 TABLET, FILM COATED ORAL 2 TIMES DAILY
Qty: 30 TABLET | Refills: 2 | Status: CANCELLED | OUTPATIENT
Start: 2022-08-17

## 2022-08-18 RX ORDER — METOPROLOL TARTRATE 50 MG/1
50 TABLET, FILM COATED ORAL 2 TIMES DAILY
Qty: 30 TABLET | Refills: 2 | Status: CANCELLED | OUTPATIENT
Start: 2022-08-17

## 2022-08-22 RX ORDER — METOPROLOL TARTRATE 50 MG/1
50 TABLET, FILM COATED ORAL 2 TIMES DAILY
Qty: 30 TABLET | Refills: 2 | Status: CANCELLED | OUTPATIENT
Start: 2022-08-17

## 2022-08-25 ENCOUNTER — OFFICE VISIT (OUTPATIENT)
Dept: CARDIAC SURGERY | Facility: CLINIC | Age: 37
End: 2022-08-25

## 2022-08-25 VITALS
TEMPERATURE: 96.2 F | RESPIRATION RATE: 20 BRPM | HEART RATE: 106 BPM | BODY MASS INDEX: 37.94 KG/M2 | DIASTOLIC BLOOD PRESSURE: 100 MMHG | SYSTOLIC BLOOD PRESSURE: 140 MMHG | OXYGEN SATURATION: 98 % | WEIGHT: 265 LBS | HEIGHT: 70 IN

## 2022-08-25 DIAGNOSIS — Z95.1 S/P CABG X 3: ICD-10-CM

## 2022-08-25 PROCEDURE — 99024 POSTOP FOLLOW-UP VISIT: CPT | Performed by: NURSE PRACTITIONER

## 2022-08-25 RX ORDER — METOPROLOL TARTRATE 50 MG/1
50 TABLET, FILM COATED ORAL 2 TIMES DAILY
Qty: 30 TABLET | Refills: 2 | Status: SHIPPED | OUTPATIENT
Start: 2022-08-25 | End: 2022-10-14 | Stop reason: SDUPTHER

## 2022-08-25 RX ORDER — HYDROCODONE BITARTRATE AND ACETAMINOPHEN 5; 325 MG/1; MG/1
1 TABLET ORAL EVERY 4 HOURS PRN
Qty: 40 TABLET | Refills: 0 | Status: SHIPPED | OUTPATIENT
Start: 2022-08-25

## 2022-08-31 DIAGNOSIS — F90.9 ATTENTION DEFICIT HYPERACTIVITY DISORDER (ADHD), UNSPECIFIED ADHD TYPE: ICD-10-CM

## 2022-08-31 RX ORDER — DEXTROAMPHETAMINE SACCHARATE, AMPHETAMINE ASPARTATE, DEXTROAMPHETAMINE SULFATE AND AMPHETAMINE SULFATE 2.5; 2.5; 2.5; 2.5 MG/1; MG/1; MG/1; MG/1
10 TABLET ORAL 2 TIMES DAILY
Qty: 60 TABLET | Refills: 0 | Status: SHIPPED | OUTPATIENT
Start: 2022-08-31 | End: 2022-09-28 | Stop reason: SDUPTHER

## 2022-08-31 NOTE — PROGRESS NOTES
"CARDIOVASCULAR SURGERY FOLLOW-UP PROGRESS NOTE  Chief Complaint: Postop follow-up, reevaluation of costochondritis        HPI:   Dear Dr. Hernandez, Daniela Fulton, SUKHJINDER and colleagues:    It was nice to see Tramaine Gates in follow up 8 weeks after surgery.  As you know, he is a 37 y.o. male with CAD, hypertension, hyperlipidemia, bipolar disorder/anxiety/panic disorder, recently diagnosed migraines, pulmonary nodules, tobacco abuse,who underwent CABG x3 with LIMA on 6/29/2022 at [facility] with Dr. Ball. He had issues postoperatively with anxiety and depression, psychiatric services changed his medication regimen, and he has follow-up with them in October. He has been seen by me every few weeks due to ongoing issues with costochondritis.  He completed Medrol Dosepak and states he has had significant improvement in symptoms.  He still has mild tenderness in the costochondral areas but is moving much easier and his affect is much brighter.  I have sent another prescription for Norco #40 for continued pain control with the instructions to continue to wean off, if he continues to have significant pain he will need to be referred out to pain management for costochondral injections and pain medication management.  Patient's blood pressure is significantly elevated in the office today, patient states that he has been out of his metoprolol for 3 or more days, I have sent a prescription for refill into his pharmacy until he can be reevaluated by his cardiologist.    Physical Exam:         /100 (BP Location: Left arm, Patient Position: Sitting, Cuff Size: Adult)   Pulse 106   Temp 96.2 °F (35.7 °C)   Resp 20   Ht 177.8 cm (70\")   Wt 120 kg (265 lb)   SpO2 98%   BMI 38.02 kg/m²   Incision(s):  mid chest healing well, sternum stable    Assessment/Plan:     S/P CABG. Overall, he is doing well.    Costochondritis----improving    Follow-up with CT surgery prn    Continue lifting restriction of 10 " lbs until 6 weeks and 50 lbs until 12 weeks from the date of surgery, no excessive jarring motions or twisting motions until 12 weeks from the date of surgery    Return to clinic if any signs or symptoms of infection or sternal instability develop       Thank you for allowing me to participate in the care of your patient.    Regards,  YOUSIF Leggett

## 2022-08-31 NOTE — TELEPHONE ENCOUNTER
Last OV 7/6/22  Last refill 8/3/22  I have reviewed the patients controlled substance prescription history, as maintained in the [place] prescription monitoring program, so that the prescription(s) for a  controlled substance can be given.

## 2022-09-14 ENCOUNTER — APPOINTMENT (OUTPATIENT)
Dept: GENERAL RADIOLOGY | Facility: HOSPITAL | Age: 37
End: 2022-09-14

## 2022-09-14 ENCOUNTER — HOSPITAL ENCOUNTER (INPATIENT)
Facility: HOSPITAL | Age: 37
LOS: 4 days | Discharge: HOME | End: 2022-09-18
Attending: EMERGENCY MEDICINE | Admitting: EMERGENCY MEDICINE

## 2022-09-14 DIAGNOSIS — R07.9 CHEST PAIN, UNSPECIFIED TYPE: Primary | ICD-10-CM

## 2022-09-14 DIAGNOSIS — Z95.1 S/P CABG X 3: ICD-10-CM

## 2022-09-14 DIAGNOSIS — R06.00 DYSPNEA, UNSPECIFIED TYPE: ICD-10-CM

## 2022-09-14 DIAGNOSIS — I25.10 CAD, MULTIPLE VESSEL: ICD-10-CM

## 2022-09-14 LAB
ALBUMIN SERPL-MCNC: 4.1 G/DL (ref 3.5–5.2)
ALBUMIN/GLOB SERPL: 1.4 G/DL
ALP SERPL-CCNC: 116 U/L (ref 39–117)
ALT SERPL W P-5'-P-CCNC: 26 U/L (ref 1–41)
ANION GAP SERPL CALCULATED.3IONS-SCNC: 12 MMOL/L (ref 5–15)
AST SERPL-CCNC: 20 U/L (ref 1–40)
BASOPHILS # BLD AUTO: 0.1 10*3/MM3 (ref 0–0.2)
BASOPHILS NFR BLD AUTO: 1.1 % (ref 0–1.5)
BILIRUB SERPL-MCNC: 0.2 MG/DL (ref 0–1.2)
BUN SERPL-MCNC: 11 MG/DL (ref 6–20)
BUN/CREAT SERPL: 9.2 (ref 7–25)
CALCIUM SPEC-SCNC: 8.8 MG/DL (ref 8.6–10.5)
CHLORIDE SERPL-SCNC: 97 MMOL/L (ref 98–107)
CO2 SERPL-SCNC: 24 MMOL/L (ref 22–29)
CREAT SERPL-MCNC: 1.19 MG/DL (ref 0.76–1.27)
D DIMER PPP FEU-MCNC: 0.5 MG/L (FEU) (ref 0–0.59)
DEPRECATED RDW RBC AUTO: 43.8 FL (ref 37–54)
EGFRCR SERPLBLD CKD-EPI 2021: 79.7 ML/MIN/1.73
EOSINOPHIL # BLD AUTO: 0.1 10*3/MM3 (ref 0–0.4)
EOSINOPHIL NFR BLD AUTO: 1.1 % (ref 0.3–6.2)
ERYTHROCYTE [DISTWIDTH] IN BLOOD BY AUTOMATED COUNT: 14.8 % (ref 12.3–15.4)
GLOBULIN UR ELPH-MCNC: 3 GM/DL
GLUCOSE SERPL-MCNC: 99 MG/DL (ref 65–99)
HCT VFR BLD AUTO: 40.6 % (ref 37.5–51)
HGB BLD-MCNC: 13.5 G/DL (ref 13–17.7)
HOLD SPECIMEN: NORMAL
HOLD SPECIMEN: NORMAL
LYMPHOCYTES # BLD AUTO: 2.4 10*3/MM3 (ref 0.7–3.1)
LYMPHOCYTES NFR BLD AUTO: 32.1 % (ref 19.6–45.3)
MCH RBC QN AUTO: 27.9 PG (ref 26.6–33)
MCHC RBC AUTO-ENTMCNC: 33.3 G/DL (ref 31.5–35.7)
MCV RBC AUTO: 83.7 FL (ref 79–97)
MONOCYTES # BLD AUTO: 0.5 10*3/MM3 (ref 0.1–0.9)
MONOCYTES NFR BLD AUTO: 6.6 % (ref 5–12)
NEUTROPHILS NFR BLD AUTO: 4.4 10*3/MM3 (ref 1.7–7)
NEUTROPHILS NFR BLD AUTO: 59.1 % (ref 42.7–76)
NRBC BLD AUTO-RTO: 0.1 /100 WBC (ref 0–0.2)
NT-PROBNP SERPL-MCNC: 105.7 PG/ML (ref 0–450)
PLATELET # BLD AUTO: 322 10*3/MM3 (ref 140–450)
PMV BLD AUTO: 6.8 FL (ref 6–12)
POTASSIUM SERPL-SCNC: 4 MMOL/L (ref 3.5–5.2)
PROT SERPL-MCNC: 7.1 G/DL (ref 6–8.5)
RBC # BLD AUTO: 4.85 10*6/MM3 (ref 4.14–5.8)
SODIUM SERPL-SCNC: 133 MMOL/L (ref 136–145)
TROPONIN T SERPL-MCNC: <0.01 NG/ML (ref 0–0.03)
WBC NRBC COR # BLD: 7.5 10*3/MM3 (ref 3.4–10.8)
WHOLE BLOOD HOLD COAG: NORMAL
WHOLE BLOOD HOLD SPECIMEN: NORMAL

## 2022-09-14 PROCEDURE — 85379 FIBRIN DEGRADATION QUANT: CPT | Performed by: PHYSICIAN ASSISTANT

## 2022-09-14 PROCEDURE — 80053 COMPREHEN METABOLIC PANEL: CPT | Performed by: PHYSICIAN ASSISTANT

## 2022-09-14 PROCEDURE — 71045 X-RAY EXAM CHEST 1 VIEW: CPT

## 2022-09-14 PROCEDURE — 86038 ANTINUCLEAR ANTIBODIES: CPT | Performed by: NURSE PRACTITIONER

## 2022-09-14 PROCEDURE — G0378 HOSPITAL OBSERVATION PER HR: HCPCS

## 2022-09-14 PROCEDURE — 83880 ASSAY OF NATRIURETIC PEPTIDE: CPT | Performed by: PHYSICIAN ASSISTANT

## 2022-09-14 PROCEDURE — 25010000002 HYDROMORPHONE 1 MG/ML SOLUTION: Performed by: PHYSICIAN ASSISTANT

## 2022-09-14 PROCEDURE — 36415 COLL VENOUS BLD VENIPUNCTURE: CPT

## 2022-09-14 PROCEDURE — 99284 EMERGENCY DEPT VISIT MOD MDM: CPT

## 2022-09-14 PROCEDURE — 85025 COMPLETE CBC W/AUTO DIFF WBC: CPT | Performed by: PHYSICIAN ASSISTANT

## 2022-09-14 PROCEDURE — 80074 ACUTE HEPATITIS PANEL: CPT | Performed by: NURSE PRACTITIONER

## 2022-09-14 PROCEDURE — 25010000002 ENOXAPARIN PER 10 MG: Performed by: PHYSICIAN ASSISTANT

## 2022-09-14 PROCEDURE — 93005 ELECTROCARDIOGRAM TRACING: CPT

## 2022-09-14 PROCEDURE — 84484 ASSAY OF TROPONIN QUANT: CPT | Performed by: PHYSICIAN ASSISTANT

## 2022-09-14 PROCEDURE — 25010000002 ENOXAPARIN PER 10 MG: Performed by: INTERNAL MEDICINE

## 2022-09-14 RX ORDER — SODIUM CHLORIDE 0.9 % (FLUSH) 0.9 %
10 SYRINGE (ML) INJECTION AS NEEDED
Status: DISCONTINUED | OUTPATIENT
Start: 2022-09-14 | End: 2022-09-18 | Stop reason: HOSPADM

## 2022-09-14 RX ORDER — LISINOPRIL 5 MG/1
5 TABLET ORAL
Status: DISCONTINUED | OUTPATIENT
Start: 2022-09-14 | End: 2022-09-18 | Stop reason: HOSPADM

## 2022-09-14 RX ORDER — ONDANSETRON 4 MG/1
4 TABLET, FILM COATED ORAL EVERY 6 HOURS PRN
Status: DISCONTINUED | OUTPATIENT
Start: 2022-09-14 | End: 2022-09-15

## 2022-09-14 RX ORDER — METOPROLOL TARTRATE 50 MG/1
50 TABLET, FILM COATED ORAL 2 TIMES DAILY
Status: DISCONTINUED | OUTPATIENT
Start: 2022-09-14 | End: 2022-09-18 | Stop reason: HOSPADM

## 2022-09-14 RX ORDER — ENOXAPARIN SODIUM 100 MG/ML
40 INJECTION SUBCUTANEOUS
Status: DISCONTINUED | OUTPATIENT
Start: 2022-09-14 | End: 2022-09-18 | Stop reason: HOSPADM

## 2022-09-14 RX ORDER — SODIUM CHLORIDE 0.9 % (FLUSH) 0.9 %
10 SYRINGE (ML) INJECTION EVERY 12 HOURS SCHEDULED
Status: DISCONTINUED | OUTPATIENT
Start: 2022-09-14 | End: 2022-09-18 | Stop reason: HOSPADM

## 2022-09-14 RX ORDER — CHOLECALCIFEROL (VITAMIN D3) 125 MCG
5 CAPSULE ORAL NIGHTLY PRN
Status: DISCONTINUED | OUTPATIENT
Start: 2022-09-14 | End: 2022-09-18 | Stop reason: HOSPADM

## 2022-09-14 RX ORDER — ASPIRIN 81 MG/1
324 TABLET, CHEWABLE ORAL ONCE
Status: COMPLETED | OUTPATIENT
Start: 2022-09-14 | End: 2022-09-14

## 2022-09-14 RX ORDER — ATORVASTATIN CALCIUM 40 MG/1
40 TABLET, FILM COATED ORAL NIGHTLY
Status: DISCONTINUED | OUTPATIENT
Start: 2022-09-14 | End: 2022-09-18 | Stop reason: HOSPADM

## 2022-09-14 RX ORDER — NITROGLYCERIN 0.4 MG/1
0.4 TABLET SUBLINGUAL
Status: DISCONTINUED | OUTPATIENT
Start: 2022-09-14 | End: 2022-09-18 | Stop reason: HOSPADM

## 2022-09-14 RX ORDER — LEVOTHYROXINE SODIUM 0.05 MG/1
50 TABLET ORAL DAILY
Status: DISCONTINUED | OUTPATIENT
Start: 2022-09-15 | End: 2022-09-18 | Stop reason: HOSPADM

## 2022-09-14 RX ORDER — ONDANSETRON 2 MG/ML
4 INJECTION INTRAMUSCULAR; INTRAVENOUS EVERY 6 HOURS PRN
Status: DISCONTINUED | OUTPATIENT
Start: 2022-09-14 | End: 2022-09-15

## 2022-09-14 RX ORDER — ASPIRIN 81 MG/1
81 TABLET ORAL DAILY
Status: DISCONTINUED | OUTPATIENT
Start: 2022-09-15 | End: 2022-09-18 | Stop reason: HOSPADM

## 2022-09-14 RX ADMIN — NITROGLYCERIN 0.4 MG: 0.4 TABLET SUBLINGUAL at 18:56

## 2022-09-14 RX ADMIN — HYDROMORPHONE HYDROCHLORIDE 0.5 MG: 1 INJECTION, SOLUTION INTRAMUSCULAR; INTRAVENOUS; SUBCUTANEOUS at 16:47

## 2022-09-14 RX ADMIN — LISINOPRIL 5 MG: 5 TABLET ORAL at 18:55

## 2022-09-14 RX ADMIN — Medication 10 ML: at 20:54

## 2022-09-14 RX ADMIN — TICAGRELOR 90 MG: 90 TABLET ORAL at 20:54

## 2022-09-14 RX ADMIN — ASPIRIN 81 MG CHEWABLE TABLET 324 MG: 81 TABLET CHEWABLE at 14:27

## 2022-09-14 RX ADMIN — ATORVASTATIN CALCIUM 40 MG: 40 TABLET, FILM COATED ORAL at 20:54

## 2022-09-14 RX ADMIN — METOPROLOL TARTRATE 50 MG: 50 TABLET, FILM COATED ORAL at 18:55

## 2022-09-14 RX ADMIN — NITROGLYCERIN 1 INCH: 20 OINTMENT TOPICAL at 16:01

## 2022-09-14 RX ADMIN — ENOXAPARIN SODIUM 40 MG: 100 INJECTION SUBCUTANEOUS at 18:55

## 2022-09-14 NOTE — ED NOTES
Pt reports he had 4 stents placed and a triple bypass in June. Pt was covid positive in June as well. Pt reports yesterday he had pain in his chest, back, and jaw that felt like when he had a heart attack in June. Pt reports he has a hx of heart disease. Pt reports SOA that has gotten worse since stents placed. Pt denies nausea and vomiting.

## 2022-09-14 NOTE — CASE MANAGEMENT/SOCIAL WORK
Discharge Planning Assessment  [facility]     Patient Name: Tramaine Gates  MRN: [id number]  Today's Date: 9/14/2022    Admit Date: 9/14/2022     Discharge Needs Assessment     Row Name 09/14/22 1704       Living Environment    People in Home significant other    Current Living Arrangements home    Primary Care Provided by self    Provides Primary Care For no one    Able to Return to Prior Arrangements yes       Resource/Environmental Concerns    Resource/Environmental Concerns none    Transportation Concerns none       Transition Planning    Patient/Family Anticipates Transition to home with family    Patient/Family Anticipated Services at Transition none    Transportation Anticipated family or friend will provide       Discharge Needs Assessment    Equipment Currently Used at Home none    Concerns to be Addressed discharge planning    Anticipated Changes Related to Illness none    Equipment Needed After Discharge none               Discharge Plan     Row Name 09/14/22 1704       Plan    Plan Anticipate Routine Home, Pending Cardiology Consult    Patient/Family in Agreement with Plan yes    Plan Comments Met with Patient at bedside Lives at home with family. IADL's. Family will provide transportation. PCP and Pharmacy verified, able to afford medications. Denies any needs a this time. d/c Barriers:Cardiology Consult              Continued Care and Services - Admitted Since 9/14/2022    Coordination has not been started for this encounter.       Expected Discharge Date and Time     Expected Discharge Date Expected Discharge Time    Sep 15, 2022          Demographic Summary     Row Name 09/14/22 1704       General Information    Admission Type observation    Arrived From emergency department    Referral Source admission list    Reason for Consult discharge planning    Preferred Language English               Functional Status     Row Name 09/14/22 1704       Functional Status    Usual Activity Tolerance moderate     Current Activity Tolerance moderate       Functional Status, IADL    Medications independent    Meal Preparation independent    Housekeeping independent    Laundry independent    Shopping independent       Mental Status    General Appearance WDL WDL       Mental Status Summary    Recent Changes in Mental Status/Cognitive Functioning no changes              Met with patient at bedside wearing mask and goggles, Spent less than 15 minutes in room at greater than 6 feet distance.       Sonia Hernandes RN

## 2022-09-14 NOTE — ED PROVIDER NOTES
"Subjective   History of Present Illness  Chief Complaint: Chest pain     Patient is a 37-year-old  male with past medical history significant for previous MI and three-vessel bypass in June 2022, presents to the ED today with a chief complaint of chest pain for \"weeks\" that has been worsening.  Patient states that he had an episode of chest pain yesterday that \"felt like his previous heart attack\".  Patient describes the pain as a sharp pressure type pain in the left side of his chest that occasionally radiates to his left shoulder and arm.  Patient states he had severe pain yesterday, it is since substantially improved.  Patient currently rates the pain at a 6/10 in severity.  Patient does report worsening shortness of breath over the last few days, worse with exertion.  Notably the patient denies fever, cough, chills, and body aches.  Additionally patient reports that he is compliant with his medications.  No recent travel.    Location: Chest    Quality: Pressure    Duration: Few weeks, worse over the last 2 days    Timing: Intermittent    Severity: Moderate    Associated Symptoms: Short of breath    PCP: Daniela Liu  Cardiology: Boo    History provided by:  Patient      Review of Systems   Constitutional: Negative for chills and fever.   HENT: Negative for sore throat and trouble swallowing.    Eyes: Negative.    Respiratory: Positive for shortness of breath. Negative for wheezing.    Cardiovascular: Positive for chest pain.   Gastrointestinal: Negative for abdominal pain, diarrhea, nausea and vomiting.   Endocrine: Negative.    Genitourinary: Negative for dysuria.   Musculoskeletal: Negative for myalgias.   Skin: Negative for rash.   Allergic/Immunologic: Negative.    Neurological: Negative for weakness and headaches.   Psychiatric/Behavioral: Negative for behavioral problems.   All other systems reviewed and are negative.      Past Medical History:   Diagnosis Date   • Acute inferior " myocardial infarction (HCC) 2022   • Allergic    • Asthma 2022   • CAD, multiple vessel 2022   • Gastroesophageal reflux disease 2022   • Hx of complete eye exam 2016   • Hyperlipidemia 2022   • Hypertension    • Migraine headache 2022   • Panic attack 2022   • Peptic ulcer 2017       Allergies   Allergen Reactions   • Codeine Itching   • Hydrocodone Itching   • Hydrocodone-Acetaminophen Other (See Comments)   • Shellfish-Derived Products Unknown - High Severity       Past Surgical History:   Procedure Laterality Date   • CARDIAC CATHETERIZATION N/A 2022    Procedure: Left Heart Cath;  Surgeon: Matthieu Del Toro MD;  Location:  [facility];  Service: Cardiology;  Laterality: N/A;   • CARDIAC CATHETERIZATION N/A 2022    Procedure: Percutaneous Coronary Intervention;  Surgeon: Matthieu Del Toro MD;  Location:  [facility];  Service: Cardiovascular;  Laterality: N/A;   • CARDIAC CATHETERIZATION N/A 2022    Procedure: Left Heart Cath possible PCI, atherectomy, hemodynamic support;  Surgeon: Moises Haddad MD;  Location:  [facility];  Service: Cardiology;  Laterality: N/A;   • CHOLECYSTECTOMY     • CORONARY ARTERY BYPASS GRAFT N/A 2022    Procedure: CORONARY ARTERY BYPASS GRAFTING;  Surgeon: Pablo Ball MD;  Location: [facility];  Service: Cardiothoracic;  Laterality: N/A;  CABG X 3(2 vein grafts, 1 LIMA graft)   • MANDIBLE SURGERY         Family History   Problem Relation Age of Onset   • Heart disease Mother    • Heart failure Father    • Cirrhosis Maternal Grandfather        Social History     Socioeconomic History   • Marital status: Single   Tobacco Use   • Smoking status: Former Smoker     Packs/day: 1.00     Types: Cigarettes     Start date:      Quit date: 2022     Years since quittin.2   • Smokeless tobacco: Never Used   Vaping Use   • Vaping Use: Never used   Substance  and Sexual Activity   • Alcohol use: Not Currently   • Drug use: Yes     Frequency: 7.0 times per week     Types: Marijuana   • Sexual activity: Defer           Objective   Physical Exam  Vitals and nursing note reviewed.   Constitutional:       General: He is not in acute distress.     Appearance: He is well-developed and normal weight. He is not diaphoretic.   HENT:      Head: Normocephalic and atraumatic.   Eyes:      Extraocular Movements: Extraocular movements intact.      Pupils: Pupils are equal, round, and reactive to light.   Cardiovascular:      Rate and Rhythm: Normal rate and regular rhythm.      Heart sounds: Normal heart sounds. No murmur heard.  Pulmonary:      Effort: Pulmonary effort is normal.      Breath sounds: Normal breath sounds. No decreased breath sounds, wheezing or rhonchi.   Chest:      Chest wall: No mass or tenderness.   Abdominal:      Palpations: Abdomen is soft. There is no mass.      Tenderness: There is no abdominal tenderness.   Musculoskeletal:         General: Normal range of motion.      Cervical back: Normal range of motion.      Right lower leg: No edema.      Left lower leg: No edema.   Skin:     General: Skin is warm.      Capillary Refill: Capillary refill takes less than 2 seconds.   Neurological:      General: No focal deficit present.      Mental Status: He is alert and oriented to person, place, and time.   Psychiatric:         Mood and Affect: Mood normal.         Behavior: Behavior normal.         ECG 12 Lead      Date/Time: 9/14/2022 4:50 PM  Performed by: Daya Chapin PA  Authorized by: Marcus Mandujano MD   Interpreted by physician  Previous ECG: no previous ECG available  Rhythm: sinus rhythm  Rate: normal  BPM: 72  QRS axis: normal  Conduction: conduction normal  ST Segments: ST segments normal  T Waves: T waves normal  Clinical impression: non-specific ECG                 ED Course  ED Course as of 09/14/22 1654   Wed Sep 14, 2022   1543 Patient complaining of  "increased chest pain, nausea ordered. []   1647 D-Dimer, Quant: 0.50  CTA 2 months ago shows  IMPRESSION:  1. No pulmonary embolus.  2. Constellation of findings from recent median sternotomy without apparent complication.  3. Small pleural effusions.     Electronically signed by:  Keven Purvis M.D.    7/5/2022 4:09 AM []      ED Course User Index  [] Daya Chapin, PA    /100   Pulse 78   Temp 97.9 °F (36.6 °C) (Oral)   Resp 18   Ht 177.8 cm (70\")   Wt 123 kg (270 lb 8.1 oz)   SpO2 96%   BMI 38.81 kg/m²   Labs Reviewed   COMPREHENSIVE METABOLIC PANEL - Abnormal; Notable for the following components:       Result Value    Sodium 133 (*)     Chloride 97 (*)     All other components within normal limits    Narrative:     GFR Normal >60  Chronic Kidney Disease <60  Kidney Failure <15     TROPONIN (IN-HOUSE) - Normal    Narrative:     Troponin T Reference Range:  <= 0.03 ng/mL-   Negative for AMI  >0.03 ng/mL-     Abnormal for myocardial necrosis.  Clinicians would have to utilize clinical acumen, EKG, Troponin and serial changes to determine if it is an Acute Myocardial Infarction or myocardial injury due to an underlying chronic condition.       Results may be falsely decreased if patient taking Biotin.     TROPONIN (IN-HOUSE) - Normal    Narrative:     Troponin T Reference Range:  <= 0.03 ng/mL-   Negative for AMI  >0.03 ng/mL-     Abnormal for myocardial necrosis.  Clinicians would have to utilize clinical acumen, EKG, Troponin and serial changes to determine if it is an Acute Myocardial Infarction or myocardial injury due to an underlying chronic condition.       Results may be falsely decreased if patient taking Biotin.     CBC WITH AUTO DIFFERENTIAL - Normal   BNP (IN-HOUSE) - Normal    Narrative:     Among patients with dyspnea, NT-proBNP is highly sensitive for the detection of acute congestive heart failure. In addition NT-proBNP of <300 pg/ml effectively rules out acute congestive " heart failure with 99% negative predictive value.    Results may be falsely decreased if patient taking Biotin.     D-DIMER, QUANTITATIVE - Normal    Narrative:     Reference Range  --------------------------------------------------------------------     < 0.50   Negative Predictive Value  0.50-0.59   Indeterminate    >= 0.60   Probable VTE             A very low percentage of patients with DVT may yield D-Dimer results   below the cut-off of 0.50 mg/L FEU.  This is known to be more   prevalent in patients with distal DVT.             Results of this test should always be interpreted in conjunction with   the patient's medical history, clinical presentation and other   findings.  Clinical diagnosis should not be based on the result of   [place]OV[place] D-Dimer alone.   RAINBOW DRAW    Narrative:     The following orders were created for panel order [place] Draw.  Procedure                               Abnormality         Status                     ---------                               -----------         ------                     Green Top (Gel)[[id number]]                                  Final result               Lavender Top[[id number]]                                     Final result               Gold Top - SST[[id number]]                                   Final result               Light Blue Top[[id number]]                                   Final result                 Please view results for these tests on the individual orders.   CBC AND DIFFERENTIAL    Narrative:     The following orders were created for panel order CBC & Differential.  Procedure                               Abnormality         Status                     ---------                               -----------         ------                     CBC Auto Differential[[id number]]        Normal              Final result                 Please view results for these tests on the individual orders.   GREEN TOP   LAVENDER TOP   GOLD TOP - SST   LIGHT BLUE  TOP     Medications   sodium chloride 0.9 % flush 10 mL (has no administration in time range)   aspirin chewable tablet 324 mg (324 mg Oral Given 9/14/22 1427)   nitroglycerin (NITROSTAT) ointment 1 inch (1 inch Topical Given 9/14/22 1601)   HYDROmorphone (DILAUDID) injection 0.5 mg (0.5 mg Intravenous Given 9/14/22 1647)     XR Chest 1 View    Result Date: 9/14/2022   1. No acute cardiopulmonary disease.   Electronically Signed By-Roman Montano MD On:9/14/2022 2:01 PM This report was finalized on [id number] by  Roman Montano MD.                      HEART Score: 3                      MDM  Number of Diagnoses or Management Options  Chest pain, unspecified type  Diagnosis management comments: MEDICAL DECISION  Epic Chart Review: Patient had three-vessel bypass June 2022.  2D echo on 6/20/2022  · Left ventricular systolic function is normal.  · Left ventricular ejection fraction is 55 to 60%  · Basal inferior wall aneurysm is noted    Comorbidities: Previous MI, multivessel CAD, GERD, hypertension, hyperlipidemia  Differentials: Unstable angina, NSTEMI, PE, pneumonia; this list is not all inclusive and does not constitute the entirety of considered causes  Radiology interpretation:  Images reviewed by me and interpreted by radiologist, as above  Lab interpretation:  Labs viewed by me significant for, as above  EKG interpretation: Reviewed myself interpreted by ER attending, sinus rhythm rate of 72 with no acute ST changes.    While in the ED IV was placed and labs were obtained appropriate PPE was worn during exam and throughout all encounters with the patient.  Patient had the above evaluation.  IV established, lab work obtained.  Patient placed on continuous telemetry monitoring throughout ER stay.  Patient given 324 mg aspirin as well as nitro patch.  Patient reports persistent left-sided chest pain radiating to left shoulder.  EKG unremarkable.  Lab work within normal limits including normal CBC, CMP,  troponins.  D-dimer borderline 0.50, patient did have CT PE protocol 2 months ago which was within normal limits.  Patient given Dilaudid for persistent chest pain.  Given patient's significant cardiac history, persistent pain he will be placed in the ED observation unit for further monitoring, evaluation, cardiology consultation.  Patient is agreeable.  I spoke with JOHN Marie who agreed accept patient to the ED observation unit.         Amount and/or Complexity of Data Reviewed  Clinical lab tests: ordered and reviewed  Tests in the radiology section of CPT®: ordered and reviewed    Patient Progress  Patient progress: stable      Final diagnoses:   Chest pain, unspecified type   Dyspnea, unspecified type       ED Disposition  ED Disposition     ED Disposition   Decision to Admit    Condition   --    Comment   --             No follow-up provider specified.       Medication List      No changes were made to your prescriptions during this visit.          Daya Chapin PA  09/14/22 3994     none

## 2022-09-15 LAB
ACT BLD: 329 SECONDS (ref 89–137)
ANION GAP SERPL CALCULATED.3IONS-SCNC: 11 MMOL/L (ref 5–15)
BASOPHILS # BLD AUTO: 0.1 10*3/MM3 (ref 0–0.2)
BASOPHILS NFR BLD AUTO: 0.9 % (ref 0–1.5)
BUN SERPL-MCNC: 12 MG/DL (ref 6–20)
BUN/CREAT SERPL: 11.5 (ref 7–25)
CALCIUM SPEC-SCNC: 9.1 MG/DL (ref 8.6–10.5)
CHLORIDE SERPL-SCNC: 102 MMOL/L (ref 98–107)
CO2 SERPL-SCNC: 23 MMOL/L (ref 22–29)
CREAT SERPL-MCNC: 1.04 MG/DL (ref 0.76–1.27)
DEPRECATED RDW RBC AUTO: 44.6 FL (ref 37–54)
EGFRCR SERPLBLD CKD-EPI 2021: 93.7 ML/MIN/1.73
EOSINOPHIL # BLD AUTO: 0.1 10*3/MM3 (ref 0–0.4)
EOSINOPHIL NFR BLD AUTO: 0.9 % (ref 0.3–6.2)
ERYTHROCYTE [DISTWIDTH] IN BLOOD BY AUTOMATED COUNT: 15 % (ref 12.3–15.4)
GLUCOSE SERPL-MCNC: 96 MG/DL (ref 65–99)
HCT VFR BLD AUTO: 40.6 % (ref 37.5–51)
HGB BLD-MCNC: 13.6 G/DL (ref 13–17.7)
LYMPHOCYTES # BLD AUTO: 1.5 10*3/MM3 (ref 0.7–3.1)
LYMPHOCYTES NFR BLD AUTO: 23.2 % (ref 19.6–45.3)
MAGNESIUM SERPL-MCNC: 2 MG/DL (ref 1.6–2.6)
MCH RBC QN AUTO: 28.1 PG (ref 26.6–33)
MCHC RBC AUTO-ENTMCNC: 33.5 G/DL (ref 31.5–35.7)
MCV RBC AUTO: 83.8 FL (ref 79–97)
MONOCYTES # BLD AUTO: 0.4 10*3/MM3 (ref 0.1–0.9)
MONOCYTES NFR BLD AUTO: 5.7 % (ref 5–12)
NEUTROPHILS NFR BLD AUTO: 4.4 10*3/MM3 (ref 1.7–7)
NEUTROPHILS NFR BLD AUTO: 69.3 % (ref 42.7–76)
NRBC BLD AUTO-RTO: 0 /100 WBC (ref 0–0.2)
PLATELET # BLD AUTO: 320 10*3/MM3 (ref 140–450)
PMV BLD AUTO: 6.7 FL (ref 6–12)
POTASSIUM SERPL-SCNC: 4.3 MMOL/L (ref 3.5–5.2)
QT INTERVAL: 400 MS
RBC # BLD AUTO: 4.84 10*6/MM3 (ref 4.14–5.8)
SODIUM SERPL-SCNC: 136 MMOL/L (ref 136–145)
TROPONIN T SERPL-MCNC: <0.01 NG/ML (ref 0–0.03)
WBC NRBC COR # BLD: 6.3 10*3/MM3 (ref 3.4–10.8)

## 2022-09-15 PROCEDURE — 25010000002 MORPHINE PER 10 MG: Performed by: EMERGENCY MEDICINE

## 2022-09-15 PROCEDURE — 85025 COMPLETE CBC W/AUTO DIFF WBC: CPT | Performed by: PHYSICIAN ASSISTANT

## 2022-09-15 PROCEDURE — 99153 MOD SED SAME PHYS/QHP EA: CPT | Performed by: INTERNAL MEDICINE

## 2022-09-15 PROCEDURE — C1760 CLOSURE DEV, VASC: HCPCS | Performed by: INTERNAL MEDICINE

## 2022-09-15 PROCEDURE — 0 IOPAMIDOL PER 1 ML: Performed by: INTERNAL MEDICINE

## 2022-09-15 PROCEDURE — G0378 HOSPITAL OBSERVATION PER HR: HCPCS

## 2022-09-15 PROCEDURE — 99152 MOD SED SAME PHYS/QHP 5/>YRS: CPT | Performed by: INTERNAL MEDICINE

## 2022-09-15 PROCEDURE — 85347 COAGULATION TIME ACTIVATED: CPT

## 2022-09-15 PROCEDURE — B2131ZZ FLUOROSCOPY OF MULTIPLE CORONARY ARTERY BYPASS GRAFTS USING LOW OSMOLAR CONTRAST: ICD-10-PCS | Performed by: INTERNAL MEDICINE

## 2022-09-15 PROCEDURE — 99223 1ST HOSP IP/OBS HIGH 75: CPT | Performed by: INTERNAL MEDICINE

## 2022-09-15 PROCEDURE — 93459 L HRT ART/GRFT ANGIO: CPT | Performed by: INTERNAL MEDICINE

## 2022-09-15 PROCEDURE — 92937 PRQ TRLUML REVSC CAB GRF 1: CPT | Performed by: INTERNAL MEDICINE

## 2022-09-15 PROCEDURE — 25010000002 MORPHINE PER 10 MG: Performed by: PHYSICIAN ASSISTANT

## 2022-09-15 PROCEDURE — 93005 ELECTROCARDIOGRAM TRACING: CPT | Performed by: EMERGENCY MEDICINE

## 2022-09-15 PROCEDURE — C9604 PERC D-E COR REVASC T CABG S: HCPCS | Performed by: INTERNAL MEDICINE

## 2022-09-15 PROCEDURE — 93010 ELECTROCARDIOGRAM REPORT: CPT | Performed by: INTERNAL MEDICINE

## 2022-09-15 PROCEDURE — C1769 GUIDE WIRE: HCPCS | Performed by: INTERNAL MEDICINE

## 2022-09-15 PROCEDURE — 25010000002 MORPHINE PER 10 MG: Performed by: INTERNAL MEDICINE

## 2022-09-15 PROCEDURE — 4A023N7 MEASUREMENT OF CARDIAC SAMPLING AND PRESSURE, LEFT HEART, PERCUTANEOUS APPROACH: ICD-10-PCS | Performed by: INTERNAL MEDICINE

## 2022-09-15 PROCEDURE — 25010000002 FENTANYL CITRATE (PF) 100 MCG/2ML SOLUTION: Performed by: INTERNAL MEDICINE

## 2022-09-15 PROCEDURE — 84484 ASSAY OF TROPONIN QUANT: CPT | Performed by: EMERGENCY MEDICINE

## 2022-09-15 PROCEDURE — 25010000002 ONDANSETRON PER 1 MG: Performed by: PHYSICIAN ASSISTANT

## 2022-09-15 PROCEDURE — 25010000002 ONDANSETRON PER 1 MG: Performed by: INTERNAL MEDICINE

## 2022-09-15 PROCEDURE — C1725 CATH, TRANSLUMIN NON-LASER: HCPCS | Performed by: INTERNAL MEDICINE

## 2022-09-15 PROCEDURE — B2111ZZ FLUOROSCOPY OF MULTIPLE CORONARY ARTERIES USING LOW OSMOLAR CONTRAST: ICD-10-PCS | Performed by: INTERNAL MEDICINE

## 2022-09-15 PROCEDURE — 99024 POSTOP FOLLOW-UP VISIT: CPT | Performed by: NURSE PRACTITIONER

## 2022-09-15 PROCEDURE — 027036Z DILATION OF CORONARY ARTERY, ONE ARTERY WITH THREE DRUG-ELUTING INTRALUMINAL DEVICES, PERCUTANEOUS APPROACH: ICD-10-PCS | Performed by: INTERNAL MEDICINE

## 2022-09-15 PROCEDURE — 25010000002 MIDAZOLAM PER 1 MG: Performed by: INTERNAL MEDICINE

## 2022-09-15 PROCEDURE — 25010000002 HEPARIN (PORCINE) PER 1000 UNITS: Performed by: INTERNAL MEDICINE

## 2022-09-15 PROCEDURE — 25010000002 ENOXAPARIN PER 10 MG: Performed by: INTERNAL MEDICINE

## 2022-09-15 PROCEDURE — 80048 BASIC METABOLIC PNL TOTAL CA: CPT | Performed by: PHYSICIAN ASSISTANT

## 2022-09-15 PROCEDURE — C1894 INTRO/SHEATH, NON-LASER: HCPCS | Performed by: INTERNAL MEDICINE

## 2022-09-15 PROCEDURE — 25010000002 DIPHENHYDRAMINE PER 50 MG: Performed by: INTERNAL MEDICINE

## 2022-09-15 PROCEDURE — B2151ZZ FLUOROSCOPY OF LEFT HEART USING LOW OSMOLAR CONTRAST: ICD-10-PCS | Performed by: INTERNAL MEDICINE

## 2022-09-15 PROCEDURE — C1887 CATHETER, GUIDING: HCPCS | Performed by: INTERNAL MEDICINE

## 2022-09-15 PROCEDURE — C1874 STENT, COATED/COV W/DEL SYS: HCPCS | Performed by: INTERNAL MEDICINE

## 2022-09-15 PROCEDURE — 83735 ASSAY OF MAGNESIUM: CPT | Performed by: PHYSICIAN ASSISTANT

## 2022-09-15 DEVICE — XIENCE SKYPOINT™ EVEROLIMUS ELUTING CORONARY STENT SYSTEM 3.50 MM X 38 MM / RAPID-EXCHANGE
Type: IMPLANTABLE DEVICE | Status: FUNCTIONAL
Brand: XIENCE SKYPOINT™

## 2022-09-15 RX ORDER — ONDANSETRON 2 MG/ML
4 INJECTION INTRAMUSCULAR; INTRAVENOUS EVERY 6 HOURS PRN
Status: DISCONTINUED | OUTPATIENT
Start: 2022-09-15 | End: 2022-09-15

## 2022-09-15 RX ORDER — ONDANSETRON 2 MG/ML
4 INJECTION INTRAMUSCULAR; INTRAVENOUS EVERY 4 HOURS PRN
Status: DISCONTINUED | OUTPATIENT
Start: 2022-09-15 | End: 2022-09-18 | Stop reason: HOSPADM

## 2022-09-15 RX ORDER — MIDAZOLAM HYDROCHLORIDE 1 MG/ML
INJECTION INTRAMUSCULAR; INTRAVENOUS AS NEEDED
Status: DISCONTINUED | OUTPATIENT
Start: 2022-09-15 | End: 2022-09-15 | Stop reason: HOSPADM

## 2022-09-15 RX ORDER — RANOLAZINE 500 MG/1
500 TABLET, EXTENDED RELEASE ORAL EVERY 12 HOURS SCHEDULED
Status: DISCONTINUED | OUTPATIENT
Start: 2022-09-15 | End: 2022-09-18 | Stop reason: HOSPADM

## 2022-09-15 RX ORDER — NITROGLYCERIN 20 MG/100ML
10-50 INJECTION INTRAVENOUS
Status: DISCONTINUED | OUTPATIENT
Start: 2022-09-15 | End: 2022-09-16

## 2022-09-15 RX ORDER — MORPHINE SULFATE 4 MG/ML
4 INJECTION, SOLUTION INTRAMUSCULAR; INTRAVENOUS EVERY 4 HOURS PRN
Status: DISCONTINUED | OUTPATIENT
Start: 2022-09-15 | End: 2022-09-16

## 2022-09-15 RX ORDER — LIDOCAINE HYDROCHLORIDE 20 MG/ML
INJECTION, SOLUTION INFILTRATION; PERINEURAL AS NEEDED
Status: DISCONTINUED | OUTPATIENT
Start: 2022-09-15 | End: 2022-09-15 | Stop reason: HOSPADM

## 2022-09-15 RX ORDER — PANTOPRAZOLE SODIUM 40 MG/10ML
40 INJECTION, POWDER, LYOPHILIZED, FOR SOLUTION INTRAVENOUS ONCE
Status: COMPLETED | OUTPATIENT
Start: 2022-09-15 | End: 2022-09-15

## 2022-09-15 RX ORDER — ONDANSETRON 4 MG/1
4 TABLET, FILM COATED ORAL EVERY 4 HOURS PRN
Status: DISCONTINUED | OUTPATIENT
Start: 2022-09-15 | End: 2022-09-18 | Stop reason: HOSPADM

## 2022-09-15 RX ORDER — ONDANSETRON 4 MG/1
4 TABLET, FILM COATED ORAL EVERY 4 HOURS PRN
Status: DISCONTINUED | OUTPATIENT
Start: 2022-09-15 | End: 2022-09-15

## 2022-09-15 RX ORDER — SODIUM CHLORIDE 9 MG/ML
3 INJECTION, SOLUTION INTRAVENOUS CONTINUOUS
Status: DISPENSED | OUTPATIENT
Start: 2022-09-15 | End: 2022-09-15

## 2022-09-15 RX ORDER — HEPARIN SODIUM 1000 [USP'U]/ML
INJECTION, SOLUTION INTRAVENOUS; SUBCUTANEOUS AS NEEDED
Status: DISCONTINUED | OUTPATIENT
Start: 2022-09-15 | End: 2022-09-15 | Stop reason: HOSPADM

## 2022-09-15 RX ORDER — NITROGLYCERIN 5 MG/ML
INJECTION, SOLUTION INTRAVENOUS AS NEEDED
Status: DISCONTINUED | OUTPATIENT
Start: 2022-09-15 | End: 2022-09-15 | Stop reason: HOSPADM

## 2022-09-15 RX ORDER — FENTANYL CITRATE 50 UG/ML
INJECTION, SOLUTION INTRAMUSCULAR; INTRAVENOUS AS NEEDED
Status: DISCONTINUED | OUTPATIENT
Start: 2022-09-15 | End: 2022-09-15 | Stop reason: HOSPADM

## 2022-09-15 RX ORDER — MORPHINE SULFATE 4 MG/ML
4 INJECTION, SOLUTION INTRAMUSCULAR; INTRAVENOUS ONCE
Status: COMPLETED | OUTPATIENT
Start: 2022-09-15 | End: 2022-09-15

## 2022-09-15 RX ORDER — DIPHENHYDRAMINE HYDROCHLORIDE 50 MG/ML
INJECTION INTRAMUSCULAR; INTRAVENOUS AS NEEDED
Status: DISCONTINUED | OUTPATIENT
Start: 2022-09-15 | End: 2022-09-15 | Stop reason: HOSPADM

## 2022-09-15 RX ADMIN — METOPROLOL TARTRATE 50 MG: 50 TABLET, FILM COATED ORAL at 21:39

## 2022-09-15 RX ADMIN — ONDANSETRON 4 MG: 2 INJECTION INTRAMUSCULAR; INTRAVENOUS at 02:16

## 2022-09-15 RX ADMIN — MORPHINE SULFATE 4 MG: 4 INJECTION, SOLUTION INTRAMUSCULAR; INTRAVENOUS at 07:20

## 2022-09-15 RX ADMIN — Medication 10 ML: at 21:40

## 2022-09-15 RX ADMIN — LISINOPRIL 5 MG: 5 TABLET ORAL at 09:13

## 2022-09-15 RX ADMIN — RANOLAZINE 500 MG: 500 TABLET, FILM COATED, EXTENDED RELEASE ORAL at 21:46

## 2022-09-15 RX ADMIN — Medication 10 ML: at 09:13

## 2022-09-15 RX ADMIN — ENOXAPARIN SODIUM 40 MG: 100 INJECTION SUBCUTANEOUS at 16:50

## 2022-09-15 RX ADMIN — ONDANSETRON 4 MG: 2 INJECTION INTRAMUSCULAR; INTRAVENOUS at 17:06

## 2022-09-15 RX ADMIN — ASPIRIN 81 MG: 81 TABLET, COATED ORAL at 09:13

## 2022-09-15 RX ADMIN — RANOLAZINE 500 MG: 500 TABLET, FILM COATED, EXTENDED RELEASE ORAL at 16:50

## 2022-09-15 RX ADMIN — TICAGRELOR 90 MG: 90 TABLET ORAL at 21:39

## 2022-09-15 RX ADMIN — PANTOPRAZOLE SODIUM 40 MG: 40 INJECTION, POWDER, FOR SOLUTION INTRAVENOUS at 16:51

## 2022-09-15 RX ADMIN — NITROGLYCERIN 0.4 MG: 0.4 TABLET SUBLINGUAL at 01:49

## 2022-09-15 RX ADMIN — LEVOTHYROXINE SODIUM 50 MCG: 0.05 TABLET ORAL at 09:13

## 2022-09-15 RX ADMIN — ONDANSETRON 4 MG: 2 INJECTION INTRAMUSCULAR; INTRAVENOUS at 21:39

## 2022-09-15 RX ADMIN — MORPHINE SULFATE 4 MG: 4 INJECTION, SOLUTION INTRAMUSCULAR; INTRAVENOUS at 16:53

## 2022-09-15 RX ADMIN — MORPHINE SULFATE 4 MG: 4 INJECTION, SOLUTION INTRAMUSCULAR; INTRAVENOUS at 21:39

## 2022-09-15 RX ADMIN — ATORVASTATIN CALCIUM 40 MG: 40 TABLET, FILM COATED ORAL at 21:39

## 2022-09-15 RX ADMIN — MORPHINE SULFATE 4 MG: 4 INJECTION, SOLUTION INTRAMUSCULAR; INTRAVENOUS at 02:16

## 2022-09-15 RX ADMIN — TICAGRELOR 90 MG: 90 TABLET ORAL at 09:12

## 2022-09-15 RX ADMIN — ONDANSETRON 4 MG: 2 INJECTION INTRAMUSCULAR; INTRAVENOUS at 07:20

## 2022-09-15 RX ADMIN — NITROGLYCERIN 5 MCG/MIN: 20 INJECTION INTRAVENOUS at 09:22

## 2022-09-15 NOTE — CONSULTS
Cardiology consult note  Moises Haddad MD, PhD      Patient Care Team:  Daniela Hernandez FNP as PCP - General (Family Medicine)  Ollie Dunn MD as Consulting Physician (Gastroenterology)    CHIEF COMPLAINT: CP, unstable angina    HISTORY OF PRESENT ILLNESS:    I the pleasure to see this 37-year-old gentleman well-known to me from prior hospitalization.  He had LAD STEMI treated with drug-eluting stent in the midportion followed a few months later by in-stent thrombosis and ultimately sent for three-vessel bypass with LIMA to LAD, SVG to obtuse marginal branch and SVG to distal RPDA.  His EF is preserved at 60%.  He presents back after multiple episodes of chest discomfort somewhat atypical in description but with his risk factors, early advanced disease, small disease he is in need of ischemic evaluation which we discussed today.  His troponin is unremarkable his EKG is otherwise unchanged and he is in sinus rhythm.  He is on nitro drip with continued chest discomfort.  After long discussion he elected for invasive ischemic evaluation to check grafts and native vessels.  He does report that he has been compliant with Brilinta for the most part but has missed occasional doses.  No other complaints no fevers chill sweats nausea vomiting or diarrhea    Review of systems otherwise negative x14 point review of systems except was mentioned above  Historical data copied forward from previous encounters in EMR is unchanged    EKG reviewed and interpreted by me demonstrates normal sinus rhythm no ischemic segments      Past Medical History:   Diagnosis Date   • Acute inferior myocardial infarction (HCC) 6/14/2022   • Allergic    • Asthma 1/27/2022   • CAD, multiple vessel 6/14/2022   • Gastroesophageal reflux disease 1/27/2022   • Hx of complete eye exam 2016   • Hyperlipidemia 1/27/2022   • Hypertension    • Migraine headache 1/27/2022   • Panic attack 1/27/2022   • Peptic ulcer 9/14/2017     Past  Surgical History:   Procedure Laterality Date   • CARDIAC CATHETERIZATION N/A 2022    Procedure: Left Heart Cath;  Surgeon: Matthieu Del Toro MD;  Location: [facility] CATH INVASIVE LOCATION;  Service: Cardiology;  Laterality: N/A;   • CARDIAC CATHETERIZATION N/A 2022    Procedure: Percutaneous Coronary Intervention;  Surgeon: Matthieu Del Toro MD;  Location:  [facility] CATH INVASIVE LOCATION;  Service: Cardiovascular;  Laterality: N/A;   • CARDIAC CATHETERIZATION N/A 2022    Procedure: Left Heart Cath possible PCI, atherectomy, hemodynamic support;  Surgeon: Moises Haddad MD;  Location: [facility] CATH INVASIVE LOCATION;  Service: Cardiology;  Laterality: N/A;   • CHOLECYSTECTOMY     • CORONARY ARTERY BYPASS GRAFT N/A 2022    Procedure: CORONARY ARTERY BYPASS GRAFTING;  Surgeon: Pablo Ball MD;  Location: [facility] CVOR;  Service: Cardiothoracic;  Laterality: N/A;  CABG X 3(2 vein grafts, 1 LIMA graft)   • MANDIBLE SURGERY       Family History   Problem Relation Age of Onset   • Heart disease Mother    • Heart failure Father    • Cirrhosis Maternal Grandfather      Social History     Tobacco Use   • Smoking status: Former Smoker     Packs/day: 1.00     Types: Cigarettes     Start date:      Quit date: 2022     Years since quittin.2   • Smokeless tobacco: Never Used   Vaping Use   • Vaping Use: Never used   Substance Use Topics   • Alcohol use: Not Currently   • Drug use: Yes     Frequency: 7.0 times per week     Types: Marijuana     Medications Prior to Admission   Medication Sig Dispense Refill Last Dose   • aspirin 81 MG EC tablet Take 1 tablet by mouth Daily. 160 tablet 0 2022 at Unknown time   • atorvastatin (LIPITOR) 40 MG tablet Take 40 mg by mouth Every Night.   2022 at Unknown time   • HYDROcodone-acetaminophen (NORCO) 5-325 MG per tablet Take 1 tablet by mouth Every 4 (Four) Hours As Needed for Moderate Pain . 40 tablet 0 2022 at Unknown time   •  "hydrOXYzine pamoate (VISTARIL) 50 MG capsule Take 2 capsules by mouth 4 (Four) Times a Day As Needed for Itching (1-2 capsules). 60 capsule 0 Past Week at Unknown time   • levothyroxine (SYNTHROID, LEVOTHROID) 50 MCG tablet Take 50 mcg by mouth Daily.   9/13/2022 at Unknown time   • lisinopril (PRINIVIL,ZESTRIL) 5 MG tablet Take 1 tablet by mouth Daily. 30 tablet 2 9/13/2022 at Unknown time   • metoprolol tartrate (LOPRESSOR) 50 MG tablet Take 1 tablet by mouth 2 (Two) Times a Day. 30 tablet 2 9/13/2022 at Unknown time   • ticagrelor (BRILINTA) 90 MG tablet tablet Take 1 tablet by mouth 2 (Two) Times a Day. 60 tablet 3 9/13/2022 at Unknown time   • acetaminophen (TYLENOL) 500 MG tablet Take 500 mg by mouth Every 6 (Six) Hours As Needed for Mild Pain . (Patient not taking: Reported on 9/14/2022)   Not Taking at Unknown time   • albuterol sulfate  (90 Base) MCG/ACT inhaler Inhale 2 puffs Every 4 (Four) Hours As Needed for Wheezing. (Patient not taking: Reported on 9/14/2022)   Not Taking at Unknown time   • levothyroxine (SYNTHROID, LEVOTHROID) 50 MCG tablet TAKE 1 TABLET BY MOUTH EVERY DAY (Patient not taking: Reported on 9/14/2022) 90 tablet 0 Not Taking at Unknown time     Allergies:  Codeine, Hydrocodone, Hydrocodone-acetaminophen, and Shellfish-derived products    REVIEW OF SYSTEMS:  Please see the above history of present illness for pertinent positives and negatives.  The remainder of the patient's systems have been reviewed and are negative.     Vital Signs  Temp:  [97.7 °F (36.5 °C)-98.2 °F (36.8 °C)] 98.2 °F (36.8 °C)  Heart Rate:  [58-93] 73  Resp:  [12-24] 16  BP: ()/() 119/77    Flowsheet Rows    Flowsheet Row First Filed Value   Admission Height 177.8 cm (70\") Documented at 09/14/2022 1217   Admission Weight 123 kg (270 lb 8.1 oz) Documented at 09/14/2022 1217           Physical Exam:  Physical Exam   Constitutional: Patient appears well-developed and well-nourished and in no acute " distress   HEENT:   Head: Normocephalic and atraumatic.   Eyes:  Pupils are equal, round, and reactive to light. EOM are intact. Sclerae are anicteric and noninjected.  Mouth and Throat: Patient has moist mucous membranes. Oropharynx is clear of any erythema or exudate.     Neck: Neck supple. No JVD present. No thyromegaly present. No lymphadenopathy present.  Cardiovascular: Regular rate, regular rhythm, S1 normal and S2 normal.  Exam reveals no gallop and no friction rub.  No murmur heard.  Pulmonary/Chest: Lungs are clear to auscultation bilaterally. No respiratory distress. No wheezes. No rhonchi. No rales.   Abdominal: Soft. Bowel sounds are normal. No distension and no mass. There is no hepatosplenomegaly. There is no tenderness.   Musculoskeletal: Normal muscle tone  Extremities: No edema. Pulses are palpable in all 4 extremities.  Neurological: Patient is alert and oriented to person, place, and time. Cranial nerves II-XII are grossly intact with no focal deficits.  Skin: Skin is warm. No rash noted. Nails show no clubbing.  No cyanosis or erythema.     Results Review:    I reviewed the patient's new clinical results.  Lab Results (most recent)     Procedure Component Value Units Date/Time    POC Activated Clotting Time [[id number]]  (Abnormal) Collected: 09/15/22 1204    Specimen: Arterial Blood Updated: 09/15/22 1211     Activated Clotting Time  329 Seconds      Comment: Serial Number: [id number]:  [id number]       Troponin [[id number]]  (Normal) Collected: 09/15/22 0457    Specimen: Blood Updated: 09/15/22 0711     Troponin T <0.010 ng/mL     Narrative:      Troponin T Reference Range:  <= 0.03 ng/mL-   Negative for AMI  >0.03 ng/mL-     Abnormal for myocardial necrosis.  Clinicians would have to utilize clinical acumen, EKG, Troponin and serial changes to determine if it is an Acute Myocardial Infarction or myocardial injury due to an underlying chronic condition.       Results may be falsely decreased if  patient taking Biotin.      Basic Metabolic Panel [[id number]]  (Normal) Collected: 09/15/22 0457    Specimen: Blood Updated: 09/15/22 0555     Glucose 96 mg/dL      BUN 12 mg/dL      Creatinine 1.04 mg/dL      Sodium 136 mmol/L      Potassium 4.3 mmol/L      Chloride 102 mmol/L      CO2 23.0 mmol/L      Calcium 9.1 mg/dL      BUN/Creatinine Ratio 11.5     Anion Gap 11.0 mmol/L      eGFR 93.7 mL/min/1.73      Comment: National Kidney Foundation and American Society of Nephrology (ASN) Task Force recommended calculation based on the Chronic Kidney Disease Epidemiology Collaboration (CKD-EPI) equation refit without adjustment for race.       Narrative:      GFR Normal >60  Chronic Kidney Disease <60  Kidney Failure <15      Magnesium [[id number]]  (Normal) Collected: 09/15/22 0457    Specimen: Blood Updated: 09/15/22 0555     Magnesium 2.0 mg/dL     CBC & Differential [[id number]]  (Normal) Collected: 09/15/22 0457    Specimen: Blood Updated: 09/15/22 0534    Narrative:      The following orders were created for panel order CBC & Differential.  Procedure                               Abnormality         Status                     ---------                               -----------         ------                     CBC Auto Differential[[id number]]        Normal              Final result                 Please view results for these tests on the individual orders.    CBC Auto Differential [[id number]]  (Normal) Collected: 09/15/22 0457    Specimen: Blood Updated: 09/15/22 0534     WBC 6.30 10*3/mm3      RBC 4.84 10*6/mm3      Hemoglobin 13.6 g/dL      Hematocrit 40.6 %      MCV 83.8 fL      MCH 28.1 pg      MCHC 33.5 g/dL      RDW 15.0 %      RDW-SD 44.6 fl      MPV 6.7 fL      Platelets 320 10*3/mm3      Neutrophil % 69.3 %      Lymphocyte % 23.2 %      Monocyte % 5.7 %      Eosinophil % 0.9 %      Basophil % 0.9 %      Neutrophils, Absolute 4.40 10*3/mm3      Lymphocytes, Absolute 1.50 10*3/mm3      Monocytes, Absolute  0.40 10*3/mm3      Eosinophils, Absolute 0.10 10*3/mm3      Basophils, Absolute 0.10 10*3/mm3      nRBC 0.0 /100 WBC     Troponin [[id number]]  (Normal) Collected: 09/14/22 1926    Specimen: Blood Updated: 09/14/22 2055     Troponin T <0.010 ng/mL     Narrative:      Troponin T Reference Range:  <= 0.03 ng/mL-   Negative for AMI  >0.03 ng/mL-     Abnormal for myocardial necrosis.  Clinicians would have to utilize clinical acumen, EKG, Troponin and serial changes to determine if it is an Acute Myocardial Infarction or myocardial injury due to an underlying chronic condition.       Results may be falsely decreased if patient taking Biotin.      [place] Draw [[id number]] Collected: 09/14/22 1315    Specimen: Blood Updated: 09/14/22 1433    Narrative:      The following orders were created for panel order [place] Draw.  Procedure                               Abnormality         Status                     ---------                               -----------         ------                     Green Top (Gel)[[id number]]                                  Final result               Lavender Top[[id number]]                                     Final result               Gold Top - SST[[id number]]                                   Final result               Light Blue Top[[id number]]                                   Final result                 Please view results for these tests on the individual orders.    Lavender Top [[id number]] Collected: 09/14/22 1315    Specimen: Blood Updated: 09/14/22 1433     Extra Tube hold for add-on     Comment: Auto resulted       Gold Top - SST [[id number]] Collected: 09/14/22 1315    Specimen: Blood Updated: 09/14/22 1433     Extra Tube Hold for add-ons.     Comment: Auto resulted.       Green Top (Gel) [[id number]] Collected: 09/14/22 1315    Specimen: Blood Updated: 09/14/22 1433     Extra Tube Hold for add-ons.     Comment: Auto resulted.       Light Blue Top [[id number]] Collected: 09/14/22 1315     Specimen: Blood Updated: 09/14/22 1433     Extra Tube Hold for add-ons.     Comment: Auto resulted       D-dimer, Quantitative [[id number]]  (Normal) Collected: 09/14/22 1315    Specimen: Blood Updated: 09/14/22 1356     D-Dimer, Quantitative 0.50 mg/L (FEU)     Narrative:      Reference Range  --------------------------------------------------------------------     < 0.50   Negative Predictive Value  0.50-0.59   Indeterminate    >= 0.60   Probable VTE             A very low percentage of patients with DVT may yield D-Dimer results   below the cut-off of 0.50 mg/L FEU.  This is known to be more   prevalent in patients with distal DVT.             Results of this test should always be interpreted in conjunction with   the patient's medical history, clinical presentation and other   findings.  Clinical diagnosis should not be based on the result of   [place] D-Dimer alone.    Comprehensive Metabolic Panel [[id number]]  (Abnormal) Collected: 09/14/22 1315    Specimen: Blood Updated: 09/14/22 1352     Glucose 99 mg/dL      BUN 11 mg/dL      Creatinine 1.19 mg/dL      Sodium 133 mmol/L      Potassium 4.0 mmol/L      Comment: Slight hemolysis detected by analyzer. Results may be affected.        Chloride 97 mmol/L      CO2 24.0 mmol/L      Calcium 8.8 mg/dL      Total Protein 7.1 g/dL      Albumin 4.10 g/dL      ALT (SGPT) 26 U/L      AST (SGOT) 20 U/L      Comment: Slight hemolysis detected by analyzer. Results may be affected.        Alkaline Phosphatase 116 U/L      Total Bilirubin 0.2 mg/dL      Globulin 3.0 gm/dL      A/G Ratio 1.4 g/dL      BUN/Creatinine Ratio 9.2     Anion Gap 12.0 mmol/L      eGFR 79.7 mL/min/1.73      Comment: National Kidney Foundation and American Society of Nephrology (ASN) Task Force recommended calculation based on the Chronic Kidney Disease Epidemiology Collaboration (CKD-EPI) equation refit without adjustment for race.       Narrative:      GFR Normal >60  Chronic Kidney Disease  <60  Kidney Failure <15      BNP [[id number]]  (Normal) Collected: 09/14/22 1315    Specimen: Blood Updated: 09/14/22 1347     proBNP 105.7 pg/mL     Narrative:      Among patients with dyspnea, NT-proBNP is highly sensitive for the detection of acute congestive heart failure. In addition NT-proBNP of <300 pg/ml effectively rules out acute congestive heart failure with 99% negative predictive value.    Results may be falsely decreased if patient taking Biotin.      CBC & Differential [[id number]]  (Normal) Collected: 09/14/22 1315    Specimen: Blood Updated: 09/14/22 1330    Narrative:      The following orders were created for panel order CBC & Differential.  Procedure                               Abnormality         Status                     ---------                               -----------         ------                     CBC Auto Differential[[id number]]        Normal              Final result                 Please view results for these tests on the individual orders.    CBC Auto Differential [[id number]]  (Normal) Collected: 09/14/22 1315    Specimen: Blood Updated: 09/14/22 1330     WBC 7.50 10*3/mm3      RBC 4.85 10*6/mm3      Hemoglobin 13.5 g/dL      Hematocrit 40.6 %      MCV 83.7 fL      MCH 27.9 pg      MCHC 33.3 g/dL      RDW 14.8 %      RDW-SD 43.8 fl      MPV 6.8 fL      Platelets 322 10*3/mm3      Neutrophil % 59.1 %      Lymphocyte % 32.1 %      Monocyte % 6.6 %      Eosinophil % 1.1 %      Basophil % 1.1 %      Neutrophils, Absolute 4.40 10*3/mm3      Lymphocytes, Absolute 2.40 10*3/mm3      Monocytes, Absolute 0.50 10*3/mm3      Eosinophils, Absolute 0.10 10*3/mm3      Basophils, Absolute 0.10 10*3/mm3      nRBC 0.1 /100 WBC           Imaging Results (Most Recent)     Procedure Component Value Units Date/Time    XR Chest 1 View [[id number]] Collected: 09/14/22 1401     Updated: 09/14/22 1403    Narrative:      XR CHEST 1 VW-     Date of Exam: 9/14/2022 1:10 PM     Indication: Chest Pain  Protocol.     Comparison: 7/1/2022     Technique: A single view of the chest was obtained.     FINDINGS:      The heart size and pulmonary vessels are within normal limits. The  lungs are clear bilaterally. There are no pleural effusions. Bony thorax  is unremarkable.                Impression:         1. No acute cardiopulmonary disease.        Electronically Signed By-Roman Montano MD On:9/14/2022 2:01 PM  This report was finalized on [id number] by  Roman Montano MD.        reviewed    ECG/EMG Results (most recent)     Procedure Component Value Units Date/Time    ECG 12 Lead [[id number]] Collected: 09/14/22 1221     Updated: 09/14/22 1222     QT Interval 360 ms     Narrative:      HEART RATE= 72  bpm  RR Interval= 832  ms  NJ Interval= 132  ms  P Horizontal Axis= 21  deg  P Front Axis= 33  deg  QRSD Interval= 75  ms  QT Interval= 360  ms  QRS Axis= 62  deg  T Wave Axis= -9  deg  - NORMAL ECG -  Sinus rhythm  When compared with ECG of 05-Jul-2022 6:15:24,  Significant rate decrease  Significant repolarization change  Significant axis, voltage or hypertrophy change  Electronically Signed By:   Date and Time of Study: 2022-09-14 12:21:47    ECG 12 Lead [[id number]] Resulted: 09/14/22 1654     Updated: 09/14/22 1654    ECG 12 Lead [[id number]] Collected: 09/15/22 0715     Updated: 09/15/22 0718     QT Interval 400 ms     Narrative:      HEART RATE= 58  bpm  RR Interval= 1032  ms  NJ Interval= 129  ms  P Horizontal Axis= 15  deg  P Front Axis= 27  deg  QRSD Interval= 80  ms  QT Interval= 400  ms  QRS Axis= -1  deg  T Wave Axis= 9  deg  - OTHERWISE NORMAL ECG -  Sinus bradycardia  Electronically Signed By:   Date and Time of Study: 2022-09-15 07:15:57    SCANNED - TELEMETRY   [[id number]] Resulted: 09/14/22     Updated: 09/15/22 1047        reviewed    Assessment & Plan     Unstable angina  Known three-vessel coronary disease status post three-vessel bypass just 3 months ago  Has been maintained on aspirin Brilinta  which will be continued      Ischemic evaluation with left heart cath native and bypass vessels    Further recommendation to follow findings and clinical course    Advance antianginals  Secondary prevention goals  Diet and exercise per [place] guidelines  Smoking cessation discussed  Compliance with antiplatelets discussed        I discussed the patient's findings and my recommendations with patient and staff    Moises Haddad MD  09/15/22  12:34 EDT

## 2022-09-15 NOTE — PROGRESS NOTES
[facility] Medicine Services Daily Progress Note    Patient Name: Tramaine Gates  : 1985  MRN: [id number]  Primary Care Physician:  Daniela Hernandez FNP  Date of admission: 2022      Subjective      Chief Complaint: Chest pain    Patient Reports 1/15  Patient had heart cath today  Reports been reviewed  going to PCU    Review of Systems   All other systems reviewed and are negative.   *        Objective      Vitals:   Temp:  [97.7 °F (36.5 °C)-98.2 °F (36.8 °C)] 98.2 °F (36.8 °C)  Heart Rate:  [58-93] 72  Resp:  [12-24] 16  BP: ()/() 128/89    Physical Exam  Vitals and nursing note reviewed.   Constitutional:       General: He is not in acute distress.     Appearance: Normal appearance. He is well-developed. He is not ill-appearing, toxic-appearing or diaphoretic.   HENT:      Head: Normocephalic and atraumatic.      Right Ear: Ear canal and external ear normal.      Left Ear: Ear canal and external ear normal.      Nose: Nose normal. No congestion or rhinorrhea.      Mouth/Throat:      Mouth: Mucous membranes are moist.      Pharynx: No oropharyngeal exudate.   Eyes:      General: No scleral icterus.        Right eye: No discharge.         Left eye: No discharge.      Extraocular Movements: Extraocular movements intact.      Conjunctiva/sclera: Conjunctivae normal.      Pupils: Pupils are equal, round, and reactive to light.   Neck:      Thyroid: No thyromegaly.      Vascular: No carotid bruit or JVD.      Trachea: No tracheal deviation.   Cardiovascular:      Rate and Rhythm: Normal rate and regular rhythm.      Pulses: Normal pulses.      Heart sounds: Normal heart sounds. No murmur heard.    No friction rub. No gallop.   Pulmonary:      Effort: Pulmonary effort is normal. No respiratory distress.      Breath sounds: Normal breath sounds. No stridor. No wheezing, rhonchi or rales.   Chest:      Chest wall: No tenderness.   Abdominal:      General: Bowel  "sounds are normal. There is no distension.      Palpations: Abdomen is soft. There is no mass.      Tenderness: There is no abdominal tenderness. There is no guarding or rebound.      Hernia: No hernia is present.   Musculoskeletal:         General: No swelling, tenderness, deformity or signs of injury. Normal range of motion.      Cervical back: Normal range of motion and neck supple. No rigidity. No muscular tenderness.      Right lower leg: No edema.      Left lower leg: No edema.   Lymphadenopathy:      Cervical: No cervical adenopathy.   Skin:     General: Skin is warm and dry.      Coloration: Skin is not jaundiced or pale.      Findings: No bruising, erythema or rash.   Neurological:      General: No focal deficit present.      Mental Status: He is alert and oriented to person, place, and time. Mental status is at baseline.      Cranial Nerves: No cranial nerve deficit.      Sensory: No sensory deficit.      Motor: No weakness or abnormal muscle tone.      Coordination: Coordination normal.   Psychiatric:         Mood and Affect: Mood normal.         Behavior: Behavior normal.         Thought Content: Thought content normal.         Judgment: Judgment normal.               Result Review    Result Review:  I have personally reviewed the results from the time of this admission to 9/15/2022 17:13 EDT and agree with these findings:  [x]  Laboratory  [x]  Microbiology  [x]  Radiology  [x]  EKG/Telemetry   [x]  Cardiology/Vascular   []  Pathology  []  Old records  []  Other:  Most notable findings include: As outlined above          Assessment & Plan      Brief Patient Summary:  Tramaine Gates is a 37 y.o. male who presents with unstable angina      Obtained from ED provider HPI 9/14/2022:  Patient is a 37-year-old  male with past medical history significant for previous MI and three-vessel bypass in June 2022, presents to the ED today with a chief complaint of chest pain for \"weeks\" that has been " "worsening.  Patient states that he had an episode of chest pain yesterday that \"felt like his previous heart attack\".  Patient describes the pain as a sharp pressure type pain in the left side of his chest that occasionally radiates to his left shoulder and arm.  Patient states he had severe pain yesterday, it is since substantially improved.  Patient currently rates the pain at a 6/10 in severity.  Patient does report worsening shortness of breath over the last few days, worse with exertion.  Notably the patient denies fever, cough, chills, and body aches.  Additionally patient reports that he is compliant with his medications.  No recent travel.     9/15/2022:  Patient confirms the HPI noted above including some constant chest pain over the past several weeks which was significantly worse over the past day described as \"like when I had my heart attack\" with pain located on the left side of his chest noted as stabbing with a waxing and waning intensity though never completely resolving.  Some associated dyspnea as well as nausea without vomiting and a mild nonproductive cough have been noted but he denies any fever, palpitations, peripheral edema, diaphoresis, syncope or near syncope.  He confirms compliance all of his outpatient medical therapies and does not smoke or drink alcohol.       aspirin, 81 mg, Oral, Daily  atorvastatin, 40 mg, Oral, Nightly  enoxaparin, 40 mg, Subcutaneous, Q24H  levothyroxine, 50 mcg, Oral, Daily  lisinopril, 5 mg, Oral, Q24H  metoprolol tartrate, 50 mg, Oral, BID  ranolazine, 500 mg, Oral, Q12H  sodium chloride, 10 mL, Intravenous, Q12H  ticagrelor, 90 mg, Oral, BID       nitroglycerin, 10-50 mcg/min, Last Rate: Stopped (09/15/22 1215)  sodium chloride, 3 mL/kg/hr         Active Hospital Problems:  Active Hospital Problems    Diagnosis    • S/P CABG x 3    • CAD, multiple vessel      Added automatically from request for surgery [id number]     • Chest pain      Plan:  Unstable " angina    Patient with known coronary artery disease with CABG 6/29/2022 presenting with chest pain and had scheduled for cardiac cath today that showed  1 three-vessel coronary disease, patent LIMA to LAD, closed SVG to OM, heavily diseased but rescue able SVG to RCA with poor candidacy for native vessel intervention  2.  Overlapping Xience drug-eluting stents 3.5 x 38 x 3 stents in overlapping fashion postdilated to 3.7 mm at 16 to 18 radha, reduction stenosis to 0% residual, improvement DARRYL-3 flow via the vein graft to the distal RCA and RPDA     Continue aspirin Brilinta uninterrupted  High intensity statin  Risk factor modification  Smoking cessation  Optimize medical therapy  Antianginals if indicated, long-acting nitrates versus Ranexa for small vessel disease  Seen by CT surgery    - CAD, s/p STEMI with stenting, s/p CABG x3 with LIMA (Camporrot[place]  Bronchial asthma without exacerbation  GERD on PPI  Dyslipidemia on statin  Hypertension on lisinopril and metoprolol  History migraine headaches panic attacks peptic ulcer  Hypothyroidism on Synthroid        DVT prophylaxis: Consider Lovenox starting tomorrow if patient continues to be in the hospital  Medical DVT prophylaxis orders are present.    CODE STATUS:    Code Status (Patient has no pulse and is not breathing): CPR (Attempt to Resuscitate)  Medical Interventions (Patient has pulse or is breathing): Full Support      Disposition:  I expect patient to be discharged home.    This patient has been examined wearing appropriate Personal Protective Equipment and discussed with hospital infection control department. 09/15/22      Electronically signed by Andres Ardon MD, 09/15/22, 17:13 EDT.  [facility] Hospitalist Team

## 2022-09-15 NOTE — NURSING NOTE
F/u with Radha Madrid in two weeks. Dr. Garcia  said the patient cannot go back to work until he sees him in his office.

## 2022-09-15 NOTE — PLAN OF CARE
Goal Outcome Evaluation:  Plan of Care Reviewed With: patient        Progress: no change  Outcome Evaluation: Pt rested most of the shift but did complain of chest pain, ED provider was notifed of pt complaints and new orders were given. Pt had a dose of morphine which relieved his pain. CLARA JOY  pt has cardiology consult today will await new orders

## 2022-09-15 NOTE — PLAN OF CARE
Goal Outcome Evaluation:  Plan of Care Reviewed With: patient           Outcome Evaluation: Pt reports chest pain 7/10 which he describes it as tight, sharp and stabbing pain. He also c/o nausea and SOB. Ordered Stat ekg, gave morphine and zofran. Pt reports no relief from morphine. Noted nitro patch on patient. Notified Dr. Haddad this morning about the pt's symptoms, and Dr. haddad seen pt at bedside. Started the pt on tridil drip at the lowest dose. Held metoprolol d/t low BP. Safety maintained.

## 2022-09-15 NOTE — PROGRESS NOTES
S/P CABG x 3 (LIMA to LAD, SVG to CX, SVG to PDA) --Lizzy  EF 60% (cath)    Pt well known to our service.  S/P CABG x3 per Dr. Ball 6/29/2022.  Prior to that he had STEMI and ASHLEE placed in LAD that was found to have in-stent thrombosis.  He has had intermittent chest pain that appears atypical.  He has also been treated for costochondritis--steroid dose pack.  Troponin and ekg negative.  Pt is scheduled for cardiac cath today to evaluate grafts and native anatomy.           Intake/Output Summary (Last 24 hours) at 9/15/2022 1416  Last data filed at 9/15/2022 0800  Gross per 24 hour   Intake 200 ml   Output 700 ml   Net -500 ml     Temp:  [97.7 °F (36.5 °C)-98.2 °F (36.8 °C)] 98.2 °F (36.8 °C)  Heart Rate:  [58-93] 76  Resp:  [12-24] 16  BP: ()/() 136/85      Results from last 7 days   Lab Units 09/15/22  0457 09/14/22  1315   WBC 10*3/mm3 6.30 7.50   HEMOGLOBIN g/dL 13.6 13.5   HEMATOCRIT % 40.6 40.6   PLATELETS 10*3/mm3 320 322     Results from last 7 days   Lab Units 09/15/22  0457   CREATININE mg/dL 1.04   POTASSIUM mmol/L 4.3   SODIUM mmol/L 136   MAGNESIUM mg/dL 2.0       Assessment/Plan:  Active Problems:    Chest pain    CAD, multiple vessel    S/P CABG x 3    - Atypical chest pain--troponin negative, plans for cath  - CAD, s/p STEMI with stenting, s/p CABG x3 with LIMA (Lizzy)  - HTN  - HLD  - Bipolar disorder/ HOLLY/ panic disorder  - Tobacco abuse    Await cardiac cath findings.    Berna Sequeira-Silvia, YOUSIF  9/15/2022  14:16 EDT

## 2022-09-15 NOTE — NURSING NOTE
Pt was given morphine for chest pain of 7/10. Pt states the morphine helps for a few minutes and chest pain comes back again. Also, pt was given zofran  for nausea with no relief.

## 2022-09-15 NOTE — H&P
"[facility] H&P    Patient Name: Tramaine Gates  : 1985  MRN: [id number]  Primary Care Physician: Daniela Hernandez FNP  Date of admission: 2022     Patient Care Team:  Daniela Hernandez FNP as PCP - General (Family Medicine)  Ollie Dunn MD as Consulting Physician (Gastroenterology)          Subjective   History Present Illness     Chief Complaint:   Chief Complaint   Patient presents with   • Chest Pain     Obtained from ED provider HPI 2022:  Patient is a 37-year-old  male with past medical history significant for previous MI and three-vessel bypass in 2022, presents to the ED today with a chief complaint of chest pain for \"weeks\" that has been worsening.  Patient states that he had an episode of chest pain yesterday that \"felt like his previous heart attack\".  Patient describes the pain as a sharp pressure type pain in the left side of his chest that occasionally radiates to his left shoulder and arm.  Patient states he had severe pain yesterday, it is since substantially improved.  Patient currently rates the pain at a 6/10 in severity.  Patient does report worsening shortness of breath over the last few days, worse with exertion.  Notably the patient denies fever, cough, chills, and body aches.  Additionally patient reports that he is compliant with his medications.  No recent travel.     9/15/2022:  Patient confirms the HPI noted above including some constant chest pain over the past several weeks which was significantly worse over the past day described as \"like when I had my heart attack\" with pain located on the left side of his chest noted as stabbing with a waxing and waning intensity though never completely resolving.  Some associated dyspnea as well as nausea without vomiting and a mild nonproductive cough have been noted but he denies any fever, palpitations, peripheral edema, diaphoresis, syncope or near syncope.  He confirms " compliance all of his outpatient medical therapies and does not smoke or drink alcohol.    History of Present Illness    ROS   Review of Systems   Constitutional: Negative.   HENT: Negative.    Eyes: Negative.    Cardiovascular: Positive for chest pain.   Respiratory: Positive for cough and shortness of breath.    Skin: Negative.    Musculoskeletal: Negative.    Gastrointestinal: Positive for nausea. Negative for vomiting.   Genitourinary: Negative.    Neurological: Negative.    Psychiatric/Behavioral: Negative.         Personal History     Past Medical History:   Past Medical History:   Diagnosis Date   • Acute inferior myocardial infarction (HCC) 6/14/2022   • Allergic    • Asthma 1/27/2022   • CAD, multiple vessel 6/14/2022   • Gastroesophageal reflux disease 1/27/2022   • Hx of complete eye exam 2016   • Hyperlipidemia 1/27/2022   • Hypertension    • Migraine headache 1/27/2022   • Panic attack 1/27/2022   • Peptic ulcer 9/14/2017       Surgical History:      Past Surgical History:   Procedure Laterality Date   • CARDIAC CATHETERIZATION N/A 6/14/2022    Procedure: Left Heart Cath;  Surgeon: Matthieu Del Toro MD;  Location: [facility] CATH INVASIVE LOCATION;  Service: Cardiology;  Laterality: N/A;   • CARDIAC CATHETERIZATION N/A 6/16/2022    Procedure: Percutaneous Coronary Intervention;  Surgeon: Matthieu Del Toro MD;  Location: [facility] CATH INVASIVE LOCATION;  Service: Cardiovascular;  Laterality: N/A;   • CARDIAC CATHETERIZATION N/A 6/23/2022    Procedure: Left Heart Cath possible PCI, atherectomy, hemodynamic support;  Surgeon: Moises Haddad MD;  Location: [facility] CATH INVASIVE LOCATION;  Service: Cardiology;  Laterality: N/A;   • CHOLECYSTECTOMY  2019   • CORONARY ARTERY BYPASS GRAFT N/A 6/29/2022    Procedure: CORONARY ARTERY BYPASS GRAFTING;  Surgeon: Pablo Ball MD;  Location: [facility] CVOR;  Service: Cardiothoracic;  Laterality: N/A;  CABG X 3(2 vein grafts, 1 LIMA graft)   • MANDIBLE SURGERY              Family History: family history includes Cirrhosis in his maternal grandfather; Heart disease in his mother; Heart failure in his father. Otherwise pertinent FHx was reviewed and unremarkable.     Social History:  reports that he quit smoking about 2 months ago. His smoking use included cigarettes. He started smoking about 23 years ago. He smoked 1.00 pack per day. He has never used smokeless tobacco. He reports previous alcohol use. He reports current drug use. Frequency: 7.00 times per week. Drug: Marijuana.      Medications:  Prior to Admission medications    Medication Sig Start Date End Date Taking? Authorizing Provider   aspirin 81 MG EC tablet Take 1 tablet by mouth Daily. 6/18/22  Yes Sorin Yap MD   atorvastatin (LIPITOR) 40 MG tablet Take 40 mg by mouth Every Night.   Yes Adolfo Zuniga MD   HYDROcodone-acetaminophen (NORCO) 5-325 MG per tablet Take 1 tablet by mouth Every 4 (Four) Hours As Needed for Moderate Pain . 8/25/22  Yes Catrachita Fowler APRN   hydrOXYzine pamoate (VISTARIL) 50 MG capsule Take 2 capsules by mouth 4 (Four) Times a Day As Needed for Itching (1-2 capsules). 8/11/22  Yes Catrachita Fowler APRN   levothyroxine (SYNTHROID, LEVOTHROID) 50 MCG tablet Take 50 mcg by mouth Daily.   Yes Adolfo Zuniga MD   lisinopril (PRINIVIL,ZESTRIL) 5 MG tablet Take 1 tablet by mouth Daily. 7/5/22  Yes Catrachita Fowler APRN   metoprolol tartrate (LOPRESSOR) 50 MG tablet Take 1 tablet by mouth 2 (Two) Times a Day. 8/25/22  Yes Catrachita Fowler APRN   ticagrelor (BRILINTA) 90 MG tablet tablet Take 1 tablet by mouth 2 (Two) Times a Day. 7/4/22  Yes Catrachita Fowler APRN   acetaminophen (TYLENOL) 500 MG tablet Take 500 mg by mouth Every 6 (Six) Hours As Needed for Mild Pain .  Patient not taking: Reported on 9/14/2022    Adolfo Zuniga MD   albuterol sulfate  (90 Base) MCG/ACT inhaler Inhale 2 puffs Every 4 (Four) Hours As Needed for Wheezing.  Patient not taking:  Reported on 9/14/2022    Provider, MD Adolfo   levothyroxine (SYNTHROID, LEVOTHROID) 50 MCG tablet TAKE 1 TABLET BY MOUTH EVERY DAY  Patient not taking: Reported on 9/14/2022 9/14/22          Allergies:    Allergies   Allergen Reactions   • Codeine Itching   • Hydrocodone Itching   • Hydrocodone-Acetaminophen Other (See Comments)   • Shellfish-Derived Products Unknown - High Severity       Objective   Objective     Vital Signs  Temp:  [97.7 °F (36.5 °C)-98.2 °F (36.8 °C)] 98.2 °F (36.8 °C)  Heart Rate:  [58-93] 72  Resp:  [12-24] 16  BP: ()/() 130/82  SpO2:  [96 %-100 %] 98 %  on   ;   Device (Oxygen Therapy): room air  Body mass index is 38.4 kg/m².    Physical Exam  Physical Exam  Vitals reviewed.   Constitutional:       General: He is not in acute distress.     Appearance: Normal appearance. He is normal weight. He is not ill-appearing, toxic-appearing or diaphoretic.   HENT:      Head: Normocephalic.      Right Ear: External ear normal.      Left Ear: External ear normal.      Nose: Nose normal.      Mouth/Throat:      Mouth: Mucous membranes are moist.   Eyes:      Extraocular Movements: Extraocular movements intact.   Cardiovascular:      Rate and Rhythm: Normal rate and regular rhythm.      Pulses: Normal pulses.      Heart sounds: Normal heart sounds.   Pulmonary:      Effort: Pulmonary effort is normal.      Breath sounds: Normal breath sounds.   Abdominal:      General: Bowel sounds are normal.      Palpations: Abdomen is soft.      Tenderness: There is no abdominal tenderness.   Musculoskeletal:         General: Normal range of motion.      Cervical back: Normal range of motion.      Right lower leg: No edema.      Left lower leg: No edema.   Skin:     General: Skin is warm and dry.      Capillary Refill: Capillary refill takes less than 2 seconds.   Neurological:      General: No focal deficit present.      Mental Status: He is alert and oriented to person, place, and time.    Psychiatric:         Mood and Affect: Mood normal.         Behavior: Behavior normal.         Thought Content: Thought content normal.         Judgment: Judgment normal.     Results Review:  I have personally reviewed most recent cardiac tracings, lab results and radiology images and interpretations and agree with findings, most notably: Troponin, CBC, CMP, chest x-ray and EKG.    Results from last 7 days   Lab Units 09/15/22  0457   WBC 10*3/mm3 6.30   HEMOGLOBIN g/dL 13.6   HEMATOCRIT % 40.6   PLATELETS 10*3/mm3 320     Results from last 7 days   Lab Units 09/15/22  0457 09/14/22  1926 09/14/22  1617 09/14/22  1315   SODIUM mmol/L 136  --   --  133*   POTASSIUM mmol/L 4.3  --   --  4.0   CHLORIDE mmol/L 102  --   --  97*   CO2 mmol/L 23.0  --   --  24.0   BUN mg/dL 12  --   --  11   CREATININE mg/dL 1.04  --   --  1.19   GLUCOSE mg/dL 96  --   --  99   CALCIUM mg/dL 9.1  --   --  8.8   ALT (SGPT) U/L  --   --   --  26   AST (SGOT) U/L  --   --   --  20   TROPONIN T ng/mL <0.010 <0.010 <0.010 <0.010   PROBNP pg/mL  --   --   --  105.7     Estimated Creatinine Clearance: 124.4 mL/min (by C-G formula based on SCr of 1.04 mg/dL).  Brief Urine Lab Results  (Last result in the past 365 days)      Color   Clarity   Blood   Leuk Est   Nitrite   Protein   CREAT   Urine HCG        06/25/22 0407 Yellow   Clear   Negative   Negative   Negative   Negative                 Microbiology Results (last 10 days)     ** No results found for the last 240 hours. **          ECG/EMG Results (most recent)     Procedure Component Value Units Date/Time    ECG 12 Lead [[id number]] Collected: 09/14/22 1221     Updated: 09/14/22 1222     QT Interval 360 ms     Narrative:      HEART RATE= 72  bpm  RR Interval= 832  ms  VA Interval= 132  ms  P Horizontal Axis= 21  deg  P Front Axis= 33  deg  QRSD Interval= 75  ms  QT Interval= 360  ms  QRS Axis= 62  deg  T Wave Axis= -9  deg  - NORMAL ECG -  Sinus rhythm  When compared with ECG of 05-Jul-2022  6:15:24,  Significant rate decrease  Significant repolarization change  Significant axis, voltage or hypertrophy change  Electronically Signed By:   Date and Time of Study: 2022-09-14 12:21:47    ECG 12 Lead [[id number]] Resulted: 09/14/22 1654     Updated: 09/14/22 1654    ECG 12 Lead [[id number]] Collected: 09/15/22 0715     Updated: 09/15/22 0718     QT Interval 400 ms     Narrative:      HEART RATE= 58  bpm  RR Interval= 1032  ms  AL Interval= 129  ms  P Horizontal Axis= 15  deg  P Front Axis= 27  deg  QRSD Interval= 80  ms  QT Interval= 400  ms  QRS Axis= -1  deg  T Wave Axis= 9  deg  - OTHERWISE NORMAL ECG -  Sinus bradycardia  Electronically Signed By:   Date and Time of Study: 2022-09-15 07:15:57    SCANNED - TELEMETRY   [[id number]] Resulted: 09/14/22     Updated: 09/15/22 1047          Results for orders placed during the hospital encounter of 06/22/22    Duplex Vein Mapping Lower Extremity - Bilateral CAR    Interpretation Summary  · The left greater saphenous vein is patent and of adequate size in the thigh.  · The left greater saphenous vein is patent and of adequate size in the calf.      Results for orders placed during the hospital encounter of 06/22/22    Adult Transthoracic Echo Limited W/ Cont if Necessary Per Protocol    Interpretation Summary  · Left ventricular systolic function is normal.  · Left ventricular ejection fraction is 55 to 60%  · Basal inferior wall aneurysm is noted      XR Chest 1 View    Result Date: 9/14/2022   1. No acute cardiopulmonary disease.   Electronically Signed By-Roman Montano MD On:9/14/2022 2:01 PM This report was finalized on [id number] by  Roman Montano MD.        Estimated Creatinine Clearance: 124.4 mL/min (by C-G formula based on SCr of 1.04 mg/dL).    Assessment & Plan   Assessment/Plan       Active Hospital Problems    Diagnosis  POA   • S/P CABG x 3 [Z95.1]  Not Applicable   • CAD, multiple vessel [I25.10]  Unknown   • Chest pain [R07.9]  Yes       Resolved Hospital Problems   No resolved problems to display.     Chest pain        Lab Results   Component Value Date     TROPONINT <0.010 09/15/2022     TROPONINT <0.010 09/14/2022     TROPONINT <0.010 09/14/2022   -Chest x-ray shows no acute cardiopulmonary process  -EKG shows sinus bradycardia at 58 with no obvious acute ST changes or ectopy and a QTC of 394 ms  -Cardiology consulted cath performed with stent placement on 9/15/2022  -Telemetry  -NPO  -Continue aspirin, beta-blocker, statin and Brilinta  -Hospitalist consulted     Hypertension  -Well controlled       BP Readings from Last 1 Encounters:   09/15/22 125/74   - Continue lisinopril and metoprolol  - Monitor while admitted     Hypothyroidism  -Levothyroxine     Hyperlipidemia  -Statin     Obesity (BMI: 38.40)  -Encourage diet lifestyle modifications              VTE Prophylaxis -   Mechanical Order History:     None      Pharmalogical Order History:      Ordered     Dose Route Frequency Stop    09/15/22 1138  heparin (porcine) injection         -- -- As Needed --    09/14/22 1836  Enoxaparin Sodium (LOVENOX) syringe 40 mg         40 mg SC Every 24 Hours Scheduled --                CODE STATUS:    Code Status and Medical Interventions:   Ordered at: 09/14/22 1713     Code Status (Patient has no pulse and is not breathing):    CPR (Attempt to Resuscitate)     Medical Interventions (Patient has pulse or is breathing):    Full Support       This patient has been examined wearing personal protective equipment.     I discussed the patient's findings and my recommendations with patient and nursing staff.      Signature:Electronically signed by Keven Nash PA-C, 09/15/22, 12:12 PM EDT.

## 2022-09-16 ENCOUNTER — INPATIENT HOSPITAL (OUTPATIENT)
Dept: URBAN - METROPOLITAN AREA HOSPITAL 84 | Facility: HOSPITAL | Age: 37
End: 2022-09-16
Payer: COMMERCIAL

## 2022-09-16 ENCOUNTER — APPOINTMENT (OUTPATIENT)
Dept: MRI IMAGING | Facility: HOSPITAL | Age: 37
End: 2022-09-16

## 2022-09-16 DIAGNOSIS — Z90.49 ACQUIRED ABSENCE OF OTHER SPECIFIED PARTS OF DIGESTIVE TRACT: ICD-10-CM

## 2022-09-16 DIAGNOSIS — R10.11 RIGHT UPPER QUADRANT PAIN: ICD-10-CM

## 2022-09-16 DIAGNOSIS — R94.5 ABNORMAL RESULTS OF LIVER FUNCTION STUDIES: ICD-10-CM

## 2022-09-16 DIAGNOSIS — R07.9 CHEST PAIN, UNSPECIFIED: ICD-10-CM

## 2022-09-16 LAB
ALBUMIN SERPL-MCNC: 3.8 G/DL (ref 3.5–5.2)
ALBUMIN/GLOB SERPL: 1.2 G/DL
ALP SERPL-CCNC: 168 U/L (ref 39–117)
ALT SERPL W P-5'-P-CCNC: 130 U/L (ref 1–41)
ANION GAP SERPL CALCULATED.3IONS-SCNC: 10 MMOL/L (ref 5–15)
AST SERPL-CCNC: 71 U/L (ref 1–40)
BASOPHILS # BLD AUTO: 0 10*3/MM3 (ref 0–0.2)
BASOPHILS NFR BLD AUTO: 0.5 % (ref 0–1.5)
BILIRUB SERPL-MCNC: 0.6 MG/DL (ref 0–1.2)
BUN SERPL-MCNC: 11 MG/DL (ref 6–20)
BUN/CREAT SERPL: 10.1 (ref 7–25)
CALCIUM SPEC-SCNC: 9.1 MG/DL (ref 8.6–10.5)
CHLORIDE SERPL-SCNC: 100 MMOL/L (ref 98–107)
CHOLEST SERPL-MCNC: 228 MG/DL (ref 0–200)
CO2 SERPL-SCNC: 25 MMOL/L (ref 22–29)
CREAT SERPL-MCNC: 1.09 MG/DL (ref 0.76–1.27)
DEPRECATED RDW RBC AUTO: 42.9 FL (ref 37–54)
EGFRCR SERPLBLD CKD-EPI 2021: 88.5 ML/MIN/1.73
EOSINOPHIL # BLD AUTO: 0.1 10*3/MM3 (ref 0–0.4)
EOSINOPHIL NFR BLD AUTO: 1 % (ref 0.3–6.2)
ERYTHROCYTE [DISTWIDTH] IN BLOOD BY AUTOMATED COUNT: 14.6 % (ref 12.3–15.4)
FERRITIN SERPL-MCNC: 314.3 NG/ML (ref 30–400)
GLOBULIN UR ELPH-MCNC: 3.1 GM/DL
GLUCOSE SERPL-MCNC: 97 MG/DL (ref 65–99)
HAV IGM SERPL QL IA: NORMAL
HBA1C MFR BLD: 5.3 % (ref 3.5–5.6)
HBV CORE IGM SERPL QL IA: NORMAL
HBV SURFACE AG SERPL QL IA: NORMAL
HCT VFR BLD AUTO: 40.9 % (ref 37.5–51)
HCV AB SER DONR QL: NORMAL
HDLC SERPL-MCNC: 38 MG/DL (ref 40–60)
HGB BLD-MCNC: 13.5 G/DL (ref 13–17.7)
IRON 24H UR-MRATE: 75 MCG/DL (ref 59–158)
LDLC SERPL CALC-MCNC: 158 MG/DL (ref 0–100)
LDLC/HDLC SERPL: 4.07 {RATIO}
LYMPHOCYTES # BLD AUTO: 1.3 10*3/MM3 (ref 0.7–3.1)
LYMPHOCYTES NFR BLD AUTO: 20.8 % (ref 19.6–45.3)
MAGNESIUM SERPL-MCNC: 2.2 MG/DL (ref 1.6–2.6)
MCH RBC QN AUTO: 27.7 PG (ref 26.6–33)
MCHC RBC AUTO-ENTMCNC: 33.1 G/DL (ref 31.5–35.7)
MCV RBC AUTO: 83.9 FL (ref 79–97)
MONOCYTES # BLD AUTO: 0.4 10*3/MM3 (ref 0.1–0.9)
MONOCYTES NFR BLD AUTO: 7.1 % (ref 5–12)
NEUTROPHILS NFR BLD AUTO: 4.3 10*3/MM3 (ref 1.7–7)
NEUTROPHILS NFR BLD AUTO: 70.6 % (ref 42.7–76)
NRBC BLD AUTO-RTO: 0.1 /100 WBC (ref 0–0.2)
PLATELET # BLD AUTO: 294 10*3/MM3 (ref 140–450)
PMV BLD AUTO: 6.8 FL (ref 6–12)
POTASSIUM SERPL-SCNC: 4.9 MMOL/L (ref 3.5–5.2)
PROT SERPL-MCNC: 6.9 G/DL (ref 6–8.5)
QT INTERVAL: 360 MS
RBC # BLD AUTO: 4.88 10*6/MM3 (ref 4.14–5.8)
SODIUM SERPL-SCNC: 135 MMOL/L (ref 136–145)
TRIGL SERPL-MCNC: 176 MG/DL (ref 0–150)
VLDLC SERPL-MCNC: 32 MG/DL (ref 5–40)
WBC NRBC COR # BLD: 6.1 10*3/MM3 (ref 3.4–10.8)

## 2022-09-16 PROCEDURE — 80061 LIPID PANEL: CPT | Performed by: INTERNAL MEDICINE

## 2022-09-16 PROCEDURE — 86376 MICROSOMAL ANTIBODY EACH: CPT | Performed by: NURSE PRACTITIONER

## 2022-09-16 PROCEDURE — 82728 ASSAY OF FERRITIN: CPT | Performed by: NURSE PRACTITIONER

## 2022-09-16 PROCEDURE — 82103 ALPHA-1-ANTITRYPSIN TOTAL: CPT | Performed by: NURSE PRACTITIONER

## 2022-09-16 PROCEDURE — 93010 ELECTROCARDIOGRAM REPORT: CPT | Performed by: INTERNAL MEDICINE

## 2022-09-16 PROCEDURE — 25010000002 ONDANSETRON PER 1 MG: Performed by: INTERNAL MEDICINE

## 2022-09-16 PROCEDURE — 86015 ACTIN ANTIBODY EACH: CPT | Performed by: NURSE PRACTITIONER

## 2022-09-16 PROCEDURE — 80053 COMPREHEN METABOLIC PANEL: CPT | Performed by: INTERNAL MEDICINE

## 2022-09-16 PROCEDURE — 93005 ELECTROCARDIOGRAM TRACING: CPT | Performed by: INTERNAL MEDICINE

## 2022-09-16 PROCEDURE — 25010000002 ENOXAPARIN PER 10 MG: Performed by: INTERNAL MEDICINE

## 2022-09-16 PROCEDURE — 25010000002 MORPHINE PER 10 MG: Performed by: HOSPITALIST

## 2022-09-16 PROCEDURE — 82390 ASSAY OF CERULOPLASMIN: CPT | Performed by: NURSE PRACTITIONER

## 2022-09-16 PROCEDURE — 63710000001 ONDANSETRON PER 8 MG: Performed by: INTERNAL MEDICINE

## 2022-09-16 PROCEDURE — 83735 ASSAY OF MAGNESIUM: CPT | Performed by: INTERNAL MEDICINE

## 2022-09-16 PROCEDURE — 99233 SBSQ HOSP IP/OBS HIGH 50: CPT | Performed by: INTERNAL MEDICINE

## 2022-09-16 PROCEDURE — 83036 HEMOGLOBIN GLYCOSYLATED A1C: CPT | Performed by: INTERNAL MEDICINE

## 2022-09-16 PROCEDURE — 86381 MITOCHONDRIAL ANTIBODY EACH: CPT | Performed by: NURSE PRACTITIONER

## 2022-09-16 PROCEDURE — 74181 MRI ABDOMEN W/O CONTRAST: CPT

## 2022-09-16 PROCEDURE — 83540 ASSAY OF IRON: CPT | Performed by: NURSE PRACTITIONER

## 2022-09-16 PROCEDURE — 85025 COMPLETE CBC W/AUTO DIFF WBC: CPT | Performed by: INTERNAL MEDICINE

## 2022-09-16 PROCEDURE — 99024 POSTOP FOLLOW-UP VISIT: CPT | Performed by: NURSE PRACTITIONER

## 2022-09-16 PROCEDURE — 99222 1ST HOSP IP/OBS MODERATE 55: CPT | Performed by: NURSE PRACTITIONER

## 2022-09-16 PROCEDURE — 25010000002 MORPHINE PER 10 MG: Performed by: INTERNAL MEDICINE

## 2022-09-16 RX ORDER — DIAZEPAM 2 MG/1
2 TABLET ORAL EVERY 24 HOURS
Status: DISCONTINUED | OUTPATIENT
Start: 2022-09-16 | End: 2022-09-18 | Stop reason: HOSPADM

## 2022-09-16 RX ORDER — MORPHINE SULFATE 2 MG/ML
2 INJECTION, SOLUTION INTRAMUSCULAR; INTRAVENOUS EVERY 4 HOURS PRN
Status: DISCONTINUED | OUTPATIENT
Start: 2022-09-16 | End: 2022-09-18 | Stop reason: HOSPADM

## 2022-09-16 RX ORDER — HYDROXYZINE HYDROCHLORIDE 25 MG/1
25 TABLET, FILM COATED ORAL 3 TIMES DAILY PRN
COMMUNITY
End: 2022-09-18 | Stop reason: HOSPADM

## 2022-09-16 RX ORDER — HYDROCODONE BITARTRATE AND ACETAMINOPHEN 5; 325 MG/1; MG/1
1 TABLET ORAL EVERY 4 HOURS PRN
Status: DISCONTINUED | OUTPATIENT
Start: 2022-09-16 | End: 2022-09-18 | Stop reason: HOSPADM

## 2022-09-16 RX ORDER — ISOSORBIDE MONONITRATE 30 MG/1
30 TABLET, EXTENDED RELEASE ORAL
Status: DISCONTINUED | OUTPATIENT
Start: 2022-09-16 | End: 2022-09-18 | Stop reason: HOSPADM

## 2022-09-16 RX ADMIN — METOPROLOL TARTRATE 50 MG: 50 TABLET, FILM COATED ORAL at 20:25

## 2022-09-16 RX ADMIN — ASPIRIN 81 MG: 81 TABLET, COATED ORAL at 08:02

## 2022-09-16 RX ADMIN — ATORVASTATIN CALCIUM 40 MG: 40 TABLET, FILM COATED ORAL at 20:25

## 2022-09-16 RX ADMIN — TICAGRELOR 90 MG: 90 TABLET ORAL at 08:02

## 2022-09-16 RX ADMIN — Medication 10 ML: at 20:25

## 2022-09-16 RX ADMIN — ONDANSETRON 4 MG: 2 INJECTION INTRAMUSCULAR; INTRAVENOUS at 13:29

## 2022-09-16 RX ADMIN — Medication 10 ML: at 07:58

## 2022-09-16 RX ADMIN — ENOXAPARIN SODIUM 40 MG: 100 INJECTION SUBCUTANEOUS at 13:29

## 2022-09-16 RX ADMIN — LEVOTHYROXINE SODIUM 50 MCG: 0.05 TABLET ORAL at 08:02

## 2022-09-16 RX ADMIN — MORPHINE SULFATE 4 MG: 4 INJECTION, SOLUTION INTRAMUSCULAR; INTRAVENOUS at 13:29

## 2022-09-16 RX ADMIN — RANOLAZINE 500 MG: 500 TABLET, FILM COATED, EXTENDED RELEASE ORAL at 20:25

## 2022-09-16 RX ADMIN — MORPHINE SULFATE 4 MG: 4 INJECTION, SOLUTION INTRAMUSCULAR; INTRAVENOUS at 07:57

## 2022-09-16 RX ADMIN — TICAGRELOR 90 MG: 90 TABLET ORAL at 20:25

## 2022-09-16 RX ADMIN — ISOSORBIDE MONONITRATE 30 MG: 30 TABLET, EXTENDED RELEASE ORAL at 11:42

## 2022-09-16 RX ADMIN — ONDANSETRON HYDROCHLORIDE 4 MG: 4 TABLET, FILM COATED ORAL at 07:57

## 2022-09-16 RX ADMIN — HYDROCODONE BITARTRATE AND ACETAMINOPHEN 1 TABLET: 5; 325 TABLET ORAL at 20:25

## 2022-09-16 RX ADMIN — LISINOPRIL 5 MG: 5 TABLET ORAL at 08:02

## 2022-09-16 RX ADMIN — MORPHINE SULFATE 2 MG: 2 INJECTION, SOLUTION INTRAMUSCULAR; INTRAVENOUS at 20:47

## 2022-09-16 RX ADMIN — METOPROLOL TARTRATE 50 MG: 50 TABLET, FILM COATED ORAL at 08:02

## 2022-09-16 RX ADMIN — DIAZEPAM 2 MG: 2 TABLET ORAL at 19:08

## 2022-09-16 RX ADMIN — RANOLAZINE 500 MG: 500 TABLET, FILM COATED, EXTENDED RELEASE ORAL at 08:02

## 2022-09-16 RX ADMIN — Medication 10 ML: at 08:06

## 2022-09-16 NOTE — PLAN OF CARE
Goal Outcome Evaluation:   Pt was OOB at 2015,site looks good with no bruising or hematoma formation,pulses present.Vitals within normal ranges,will ct care.

## 2022-09-16 NOTE — NURSING NOTE
Patient said he has atarax 25 mg TID ordered at home but he can not take it because one does made him sleep 2 days. He was on valium before this and would like to be back on valium. Patient also takes hydrocodone 5 mg tab every 4 hours PRN for pain. Neither is ordered at this time. Patient would like valium and hydrocodone ordered. Call placed to Dr. Lazaro for PRN meds.

## 2022-09-16 NOTE — PLAN OF CARE
Goal Outcome Evaluation:              Outcome Evaluation: patient rt groing dressing dry and intact no hematoma or sweeling noted, is tender with palpation. patient says I have chronic chest pain, the morphine does helps it for a but it comes back. patient also complains of nausea intermittently, zofran given IV. patient alert and oreinted x 4 vital signs stable. patient ambulating to bathroom and back without pain or discomfort. cansulted GI for patients increase in LFT.

## 2022-09-16 NOTE — PROGRESS NOTES
"Card[facility] [place]        LOS:  LOS: 0 days   Patient Name: Tramaine Gates  Age/Sex: 37 y.o. male  : 1985  MRN: [id number]    Day of Service: 22   Length of Stay: 0  Encounter Provider: YOUSIF Garces  Place of Service: [facility] CARDIOLOGY  Patient Care Team:  Daniela Hernandez FNP as PCP - General (Family Medicine)  Ollie Dunn MD as Consulting Physician (Gastroenterology)    Subjective:     Chief Complaint: f/u chest pain    Subjective: Mr. Gates reports some continued chest discomfort.  Continues to be atypical  He states it is \"always there\".  His ECG is unchanged this morning without acute ischemic changes.  He has some nausea. LFTs noted to be increased curiously over the last 2 days. He has been on same statin dose at home    Would continue to trend without stopping cholesterol medicines  Consult GI  May need upper quadrant ultrasound    Current Medications:   Scheduled Meds:aspirin, 81 mg, Oral, Daily  atorvastatin, 40 mg, Oral, Nightly  enoxaparin, 40 mg, Subcutaneous, Q24H  levothyroxine, 50 mcg, Oral, Daily  lisinopril, 5 mg, Oral, Q24H  metoprolol tartrate, 50 mg, Oral, BID  ranolazine, 500 mg, Oral, Q12H  sodium chloride, 10 mL, Intravenous, Q12H  ticagrelor, 90 mg, Oral, BID      Continuous Infusions:nitroglycerin, 10-50 mcg/min, Last Rate: Stopped (09/15/22 1215)        Allergies:  Allergies   Allergen Reactions   • Codeine Itching   • Hydrocodone Itching   • Hydrocodone-Acetaminophen Other (See Comments)   • Shellfish-Derived Products Unknown - High Severity       Review of Systems   Constitutional: Negative for chills, diaphoresis and malaise/fatigue.   Cardiovascular: Positive for chest pain (somewhat chronic in nature). Negative for dyspnea on exertion, irregular heartbeat, leg swelling, near-syncope, orthopnea, palpitations, paroxysmal nocturnal dyspnea and syncope.   Respiratory: Negative for cough, shortness of breath, sleep " disturbances due to breathing and sputum production.    Gastrointestinal: Positive for nausea. Negative for change in bowel habit.   Genitourinary: Negative for urgency.   Neurological: Negative for dizziness and headaches.   Psychiatric/Behavioral: Negative for altered mental status.         Objective:     Temp:  [97.3 °F (36.3 °C)-98.4 °F (36.9 °C)] 98.2 °F (36.8 °C)  Heart Rate:  [58-93] 65  Resp:  [12-24] 18  BP: ()/() 120/77     Intake/Output Summary (Last 24 hours) at 9/16/2022 0925  Last data filed at 9/16/2022 0540  Gross per 24 hour   Intake 1260 ml   Output 1000 ml   Net 260 ml     Body mass index is 38.45 kg/m².      09/14/22  1833 09/15/22  0500 09/15/22  1830   Weight: 122 kg (269 lb) 121 kg (267 lb 10.2 oz) 122 kg (268 lb)         General Appearance:    Alert, cooperative, in no acute distress                                Head: Atraumatic, normocephalic, PERRLA               Neck:   supple, no JVD   Lungs:     Clear to auscultation, respirations regular, even and               unlabored    Heart:    Regular rhythm and normal rate, normal S1 and S2   Abdomen:     Normal bowel sounds, soft   Extremities:   Moves all extremities well, no edema, no cyanosis, no  Redness groin soft without oozing bruising or hematoma   Pulses:   Pulses palpable and equal bilaterally   Skin:   No bleeding, bruising or rash   Neurologic:   Awake, alert, oriented x3   Agree with encounter and exam as discussed with nurse practitioner after face-to-face encounter      Lab Review:   Results from last 7 days   Lab Units 09/16/22  0442 09/15/22  0457 09/14/22  1315   SODIUM mmol/L 135* 136 133*   POTASSIUM mmol/L 4.9 4.3 4.0   CHLORIDE mmol/L 100 102 97*   CO2 mmol/L 25.0 23.0 24.0   BUN mg/dL 11 12 11   CREATININE mg/dL 1.09 1.04 1.19   GLUCOSE mg/dL 97 96 99   CALCIUM mg/dL 9.1 9.1 8.8   AST (SGOT) U/L 71*  --  20   ALT (SGPT) U/L 130*  --  26     Results from last 7 days   Lab Units 09/15/22  0457 09/14/22  7563  09/14/22  1617 09/14/22  1315   TROPONIN T ng/mL <0.010 <0.010 <0.010 <0.010     Results from last 7 days   Lab Units 09/16/22  0442 09/15/22  0457   WBC 10*3/mm3 6.10 6.30   HEMOGLOBIN g/dL 13.5 13.6   HEMATOCRIT % 40.9 40.6   PLATELETS 10*3/mm3 294 320         Results from last 7 days   Lab Units 09/16/22  0442 09/15/22  0457   MAGNESIUM mg/dL 2.2 2.0     Results from last 7 days   Lab Units 09/16/22  0442   CHOLESTEROL mg/dL 228*   TRIGLYCERIDES mg/dL 176*   HDL CHOL mg/dL 38*     Results from last 7 days   Lab Units 09/14/22  1315   PROBNP pg/mL 105.7           Recent Radiology:  Imaging Results (Most Recent)     Procedure Component Value Units Date/Time    XR Chest 1 View [[id number]] Collected: 09/14/22 1401     Updated: 09/14/22 1403    Narrative:      XR CHEST 1 VW-     Date of Exam: 9/14/2022 1:10 PM     Indication: Chest Pain Protocol.     Comparison: 7/1/2022     Technique: A single view of the chest was obtained.     FINDINGS:      The heart size and pulmonary vessels are within normal limits. The  lungs are clear bilaterally. There are no pleural effusions. Bony thorax  is unremarkable.                Impression:         1. No acute cardiopulmonary disease.        Electronically Signed By-Roman Montano MD On:9/14/2022 2:01 PM  This report was finalized on [id number] by  Roman Montano MD.          ECHOCARDIOGRAM:    Results for orders placed during the hospital encounter of 06/22/22    Adult Transthoracic Echo Limited W/ Cont if Necessary Per Protocol    Interpretation Summary  · Left ventricular systolic function is normal.  · Left ventricular ejection fraction is 55 to 60%  · Basal inferior wall aneurysm is noted        I reviewed the patient's new clinical results.    EKG:          Assessment:       Chest pain    CAD, multiple vessel    S/P CABG x 3    1. Chest Pain  - [facility] 9/15/2022: three-vessel coronary disease, patent LIMA to LAD, closed SVG to OM, heavily diseased SVG to RCA with poor  candidacy for native vessel intervention  - s/p Overlapping Xience drug-eluting stents 3.5 x 38 x 3 stents in overlapping fashion postdilated to 3.7 mm at 16 to 18 radha, reduction stenosis to 0% residual, improvement DARRYL-3 flow via the vein graft to the distal RCA and RPDA  - ASA / Brilinta  - statin therapy  - preserved LVEF    2. CAD  - s/p STEMI 6/14/2022: LHC showed three vessel CAD- he underwent ASHLEE placement to the culprit lesion in the RCA.  LAD had 80-90% proximal stenosis and LCx had 40-50% in distal LCx/OM.  Mr. Gates underwent repeat cardiac cath on 6/16/2022 and received ASHLEE to LAD  - repeat admission 5 days later showing in stent thrombosis sent for CABG  - s/p 3V CABG 6/29/2022 (LIMA-LAD, SVG-OMofLCx, SVG-RPDA)    3. Elevated LFTs  - 9/14/2022 AST / ALT 20/26; 9/16/2022 AST/AL 71/130  - will ask GI to see    4. HTN    5. HLD    6. Obesity    Plan:   Mr. Gates presented with unstable angina. He has signfiicant CAD s/p stemi followed by readmission several days later showing stent thrombosis- referred for CABG and ultimately underwent 3V CABG 6/16/2022.    He presented with unstable angina and LHC showed three-vessel coronary disease, patent LIMA to LAD, closed SVG to OM, heavily diseased SVG to RCA with poor candidacy for native vessel intervention. He is s/p Overlapping Xience drug-eluting stents 3.5 x 38 x 3 stents in overlapping fashion postdilated to 3.7 mm at 16 to 18 radha, reduction stenosis to 0% residual, improvement DARRYL-3 flow via the vein graft to the distal RCA and RPDA.    Continue ASA / Brilinta indefinitely given advanced CAD native vessel and graft disease  Continue statin therapy- he has been on 40mg QHS   He does have elevated LFTs. Will ask GI to see- RUQ US  He continues to have some chest discomfort and has been started on Ranexa.   His ECG is unchanged       Greater than 50% of his face-to-face time as well as exam, patient encounter and scribed findings as above from nurse  practitioner performed by oskar Villareal, YOUSIF  09/16/22  09:25 EDT

## 2022-09-16 NOTE — CONSULTS
"GI CONSULT  NOTE:    Referring Provider:  Dr. Lazaro    Chief complaint: Elevated liver enzymes    Subjective . \"  I have had persistent chest pain\"    History of present illness: Tramaine Gates is a 37 y.o. male who has a history of CAD/MI/CAD/CABG.  He had a CABG in June with persistent chest pain with repeat stents placed.  He remains on aspirin and Brilinta.  He has had persistent chest pain that is sharp and pressure with radiation to left arm.  He has associated shortness of breath but no fevers or chills.  He has also had some nausea with occasional right upper quadrant discomfort.  No vomiting, heartburn or difficulty swallowing.  No change in bowel habits.  He did have rectal bleeding in the past but none in 6 months.  Gallbladder is absent with history of biliary dyskinesia but denies history of cholelithiasis.  No unintentional weight loss.  No known history of liver disease or jaundice.  No IV drug abuse but does report snorting drugs in the past.  No tattoos.  Rare alcohol use.  No exposure to hepatitis.  Grandfather with cirrhosis of unknown etiology.      Endo History:  None    Past Medical History:  Past Medical History:   Diagnosis Date   • Acute inferior myocardial infarction (HCC) 6/14/2022   • Allergic    • Asthma 1/27/2022   • CAD, multiple vessel 6/14/2022   • Gastroesophageal reflux disease 1/27/2022   • Hx of complete eye exam 2016   • Hyperlipidemia 1/27/2022   • Hypertension    • Migraine headache 1/27/2022   • Panic attack 1/27/2022   • Peptic ulcer 9/14/2017       Past Surgical History:  Past Surgical History:   Procedure Laterality Date   • CARDIAC CATHETERIZATION N/A 6/14/2022    Procedure: Left Heart Cath;  Surgeon: Matthieu Del Toro MD;  Location: [facility] CATH INVASIVE LOCATION;  Service: Cardiology;  Laterality: N/A;   • CARDIAC CATHETERIZATION N/A 6/16/2022    Procedure: Percutaneous Coronary Intervention;  Surgeon: Matthieu Del Toro MD;  Location: [facility] CATH INVASIVE LOCATION;  Service: " Cardiovascular;  Laterality: N/A;   • CARDIAC CATHETERIZATION N/A 2022    Procedure: Left Heart Cath possible PCI, atherectomy, hemodynamic support;  Surgeon: Moises Haddad MD;  Location: [facility] CATH INVASIVE LOCATION;  Service: Cardiology;  Laterality: N/A;   • CARDIAC CATHETERIZATION N/A 9/15/2022    Procedure: Left Heart Cath;  Surgeon: Moises Haddad MD;  Location: [facility] CATH INVASIVE LOCATION;  Service: Cardiology;  Laterality: N/A;   • CHOLECYSTECTOMY  2019   • CORONARY ARTERY BYPASS GRAFT N/A 2022    Procedure: CORONARY ARTERY BYPASS GRAFTING;  Surgeon: Pablo Ball MD;  Location: [facility] CVOR;  Service: Cardiothoracic;  Laterality: N/A;  CABG X 3(2 vein grafts, 1 LIMA graft)   • MANDIBLE SURGERY         Social History:  Social History     Tobacco Use   • Smoking status: Former Smoker     Packs/day: 1.00     Types: Cigarettes     Start date:      Quit date: 2022     Years since quittin.2   • Smokeless tobacco: Never Used   Vaping Use   • Vaping Use: Never used   Substance Use Topics   • Alcohol use: Not Currently   • Drug use: Yes     Frequency: 7.0 times per week     Types: Marijuana       Family History:  Family History   Problem Relation Age of Onset   • Heart disease Mother    • Heart failure Father    • Cirrhosis Maternal Grandfather        Medications:  Medications Prior to Admission   Medication Sig Dispense Refill Last Dose   • aspirin 81 MG EC tablet Take 1 tablet by mouth Daily. 160 tablet 0 2022 at Unknown time   • atorvastatin (LIPITOR) 40 MG tablet Take 40 mg by mouth Every Night.   2022 at Unknown time   • HYDROcodone-acetaminophen (NORCO) 5-325 MG per tablet Take 1 tablet by mouth Every 4 (Four) Hours As Needed for Moderate Pain . 40 tablet 0 2022 at Unknown time   • levothyroxine (SYNTHROID, LEVOTHROID) 50 MCG tablet Take 50 mcg by mouth Daily.   2022 at Unknown time   • lisinopril (PRINIVIL,ZESTRIL) 5 MG tablet Take 1  tablet by mouth Daily. 30 tablet 2 9/13/2022 at Unknown time   • metoprolol tartrate (LOPRESSOR) 50 MG tablet Take 1 tablet by mouth 2 (Two) Times a Day. 30 tablet 2 9/13/2022 at Unknown time   • ticagrelor (BRILINTA) 90 MG tablet tablet Take 1 tablet by mouth 2 (Two) Times a Day. 60 tablet 3 9/13/2022 at Unknown time   • albuterol sulfate  (90 Base) MCG/ACT inhaler Inhale 2 puffs Every 4 (Four) Hours As Needed for Wheezing.          Scheduled Meds:aspirin, 81 mg, Oral, Daily  atorvastatin, 40 mg, Oral, Nightly  enoxaparin, 40 mg, Subcutaneous, Q24H  isosorbide mononitrate, 30 mg, Oral, Q24H  levothyroxine, 50 mcg, Oral, Daily  lisinopril, 5 mg, Oral, Q24H  metoprolol tartrate, 50 mg, Oral, BID  ranolazine, 500 mg, Oral, Q12H  sodium chloride, 10 mL, Intravenous, Q12H  ticagrelor, 90 mg, Oral, BID      Continuous Infusions:   PRN Meds:.melatonin  •  Morphine  •  nitroglycerin  •  ondansetron **OR** ondansetron  •  sodium chloride  •  sodium chloride    ALLERGIES:  Codeine, Hydrocodone, Hydrocodone-acetaminophen, and Shellfish-derived products    ROS:  The following systems were reviewed   Constitution:  No fevers, chills, no unintentional weight loss  Skin: no rash, no jaundice  Eyes:  No blurry vision, no eye pain  HENT:  No change in hearing or smell  Resp:  No dyspnea or cough  CV: Persistent chest pain  :  No dysuria, hematuria  Musculoskeletal:  No leg cramps or arthralgias  Neuro:  No tremor, no numbness  Psych:  No depression or confusion    Objective Resting in bed, no acute distress    Vital Signs:   Vitals:    09/16/22 0231 09/16/22 0540 09/16/22 1046 09/16/22 1548   BP: 107/69 120/77 120/79 114/63   BP Location: Left arm Left arm     Patient Position: Lying Lying     Pulse: 66 65 63 77   Resp: 18 18 18 18   Temp: 97.3 °F (36.3 °C) 98.2 °F (36.8 °C) 98.2 °F (36.8 °C) 98.2 °F (36.8 °C)   TempSrc: Oral Oral Oral Oral   SpO2: 100% 97% 99% 99%   Weight:       Height:           Physical Exam:        General Appearance:    Awake and alert, in no acute distress   Head:    Normocephalic, without obvious abnormality, atraumatic   Throat:   No oral lesions, no thrush, oral mucosa moist   Lungs:     Respirations regular, even and unlabored   Chest Wall:    No abnormalities observed   Abdomen:     Soft, nontender, nondistended   Rectal:     Deferred   Extremities:   Moves all extremities,  no cyanosis       Skin:   No rash, no jaundice, normal palpation       Neurologic:   Cranial nerves 2 - 12 grossly intact, no asterixis       Results Review:   I reviewed the patient's labs and imaging.  CBC    Results from last 7 days   Lab Units 09/16/22  0442 09/15/22  0457 09/14/22  1315   WBC 10*3/mm3 6.10 6.30 7.50   HEMOGLOBIN g/dL 13.5 13.6 13.5   PLATELETS 10*3/mm3 294 320 322     CMP   Results from last 7 days   Lab Units 09/16/22  0442 09/15/22  0457 09/14/22  1315   SODIUM mmol/L 135* 136 133*   POTASSIUM mmol/L 4.9 4.3 4.0   CHLORIDE mmol/L 100 102 97*   CO2 mmol/L 25.0 23.0 24.0   BUN mg/dL 11 12 11   CREATININE mg/dL 1.09 1.04 1.19   GLUCOSE mg/dL 97 96 99   ALBUMIN g/dL 3.80  --  4.10   BILIRUBIN mg/dL 0.6  --  0.2   ALK PHOS U/L 168*  --  116   AST (SGOT) U/L 71*  --  20   ALT (SGPT) U/L 130*  --  26   MAGNESIUM mg/dL 2.2 2.0  --      Cr Clearance Estimated Creatinine Clearance: 119.2 mL/min (by C-G formula based on SCr of 1.09 mg/dL).  Coag     HbA1C   Lab Results   Component Value Date    HGBA1C 5.3 09/16/2022    HGBA1C 5.4 06/23/2022     Blood Glucose No results found for: POCGLU  Infection     UA      Radiology(recent) No radiology results for the last day       ASSESSMENT:  Elevated LFTs  Right upper quadrant pain  Chest pain  CAD/CABG/stents -aspirin/Brilinta  Hyperlipidemia  History of pulmonary emboli  History of cholecystectomy    PLAN:  Patient presents with recurrent chest pain despite CABG in June.  He had repeat cardiac stents and is now on Ranexa along with aspirin and Brilinta.  GI asked to  consult for elevated LFTs.  He has had intermittent right upper quadrant discomfort that is different than his previous gallbladder surgery.  Proceed with full liver serology work-up and MRCP to rule out choledocholithiasis.  Further recommendations to follow.    I discussed the patients findings and my recommendations with the patient.    We appreciate the referral    Electronically signed by YOUSIF Leon, 09/16/22, 5:36 PM EDT.

## 2022-09-16 NOTE — NURSING NOTE
MRI Technician called regarding patient MRI check list.  Pt concerned because he just recently had CABG in June and stents placed yesterday.  Per radiologist who reviewed pt chart, pt is able to have MRI completed.  Pt taken off telemetry for testing per orders and is taken off unit by transporter to transport to MRI at 1915 for MRI abd MRCP.

## 2022-09-16 NOTE — PROGRESS NOTES
S/P CABG x 3 (LIMA to LAD, SVG to CX, SVG to PDA) --Lizzy  EF 60% (cath)    Pt well known to our service.  S/P CABG x3 per Dr. Ball 6/29/2022.  Prior to that he had STEMI and ASHLEE placed in LAD that was found to have in-stent thrombosis.  He has had intermittent chest pain that appears atypical.  He has also been treated for costochondritis--steroid dose pack.  Troponin and ekg negative.      Pt continues to report chest pains.  He states he has had chest pain since he was 15 y/o.    Pt underwent stenting to RCA graft yesterday--on Brilinta  Noted his cholesterol panel is worse--pt reports he is taking atorvastatin      Intake/Output Summary (Last 24 hours) at 9/16/2022 0907  Last data filed at 9/16/2022 0540  Gross per 24 hour   Intake 1260 ml   Output 1000 ml   Net 260 ml     Temp:  [97.3 °F (36.3 °C)-98.4 °F (36.9 °C)] 98.2 °F (36.8 °C)  Heart Rate:  [58-93] 65  Resp:  [12-24] 18  BP: ()/() 120/77      Results from last 7 days   Lab Units 09/16/22  0442 09/15/22  0457   WBC 10*3/mm3 6.10 6.30   HEMOGLOBIN g/dL 13.5 13.6   HEMATOCRIT % 40.9 40.6   PLATELETS 10*3/mm3 294 320     Results from last 7 days   Lab Units 09/16/22  0442   CREATININE mg/dL 1.09   POTASSIUM mmol/L 4.9   SODIUM mmol/L 135*   MAGNESIUM mg/dL 2.2     Physical exam:  Neuro intact, NAD, resting in bed  Tele:  SR 60s  CTA on room air  Sternotomy well healed, no tenderness along sternum noted  abd benign, no BM  No edema      Assessment/Plan:  Active Problems:    Chest pain    CAD, multiple vessel    S/P CABG x 3    - Atypical chest pain--troponin negative, plans for cath  - CAD, s/p STEMI with stenting, s/p CABG x3 with LIMA (Lizzy), s/p ASHLEE to RCA graft (Boo) 9/15--Brilinta  - HTN  - HLD, worsening profile--on atorvastatin 40 mg qHS  - Bipolar disorder/ HOLLY/ panic disorder  - Tobacco abuse    Pt remains with chest pain despite stenting to RCA.  Unable to illicit any tenderness/pain along sternum with hx of  costochondritis.  Pt reports he is taking atorvastatin at home but lipid panel is worse than preop panel.  Will defer to cardiology.  Will sign off, available if needed.    Berna Sandoval, APRN  9/16/2022  09:07 EDT

## 2022-09-16 NOTE — PAYOR COMM NOTE
"Observation order 9/14/22  Inpatient order 9/16/22    Er admit with chest pain. Treatment of unstable angina. Cardiac cath w/pci placement required.   Hx of CABG 6/29/2022  -----  AUTHORIZATION PENDING:   PLEASE FAX OR CALL DETERMINATION TO CONTACT BELOW:       THANK YOU,    JONO Gonzalez, RN  Utilization Review  [facility]  Phone: [phone]  Fax: [phone]      NPI: [id number]  Tax ID: [id number]        Tramaine Gates (37 y.o. Male)             Date of Birth   1985    Social Security Number       Address   [address] [place] [address] [place] [facility]    Home Phone   [phone]    MRN   [id number]       Latter day   None    Marital Status   Single                            Admission Date   9/14/22    Admission Type   Emergency    Admitting Provider   Marcus Mandujano MD    Attending Provider   Pranav Lazaro MD    Department, Room/Bed   [facility] PROGRESS CARE, 2120/1       Discharge Date       Discharge Disposition       Discharge Destination                               Attending Provider: Pranav Lazaro MD    Allergies: Codeine, Hydrocodone, Hydrocodone-acetaminophen, Shellfish-derived Products    Isolation: None   Infection: None   Code Status: CPR   Advance Care Planning Activity    Ht: 177.8 cm (70\")   Wt: 122 kg (268 lb)    Admission Cmt: None   Principal Problem: None                Active Insurance as of 9/14/2022     Primary Coverage     Payor Plan Insurance Group Employer/Plan Group    [id number]     Payor Plan Address Payor Plan Phone Number Payor Plan Fax Number Effective Dates    MAIL STOP:   3/1/2022 - None Entered    [address] [address]       [facility] [address]       Subscriber Name Subscriber Birth Date Member ID       TRAMAINE GATES 1985 [id number]                 Emergency Contacts      (Rel.) Home Phone Work Phone Mobile Phone    CATRACHO SHARMA (Significant Other) " "[phone] -- --    Magi Oliver (Mother) [phone] -- [phone]               History & Physical      Keven Nash PA-C at 09/15/22 1206          [facility] H&P    Patient Name: Tramaine Gates  : 1985  MRN: [id number]  Primary Care Physician: Daniela Hernandez FNP  Date of admission: 2022     Patient Care Team:  Daniela Hernandez FNP as PCP - General (Family Medicine)  Ollie Dunn MD as Consulting Physician (Gastroenterology)          Subjective   History Present Illness     Chief Complaint:   Chief Complaint   Patient presents with   • Chest Pain     Obtained from ED provider HPI 2022:  Patient is a 37-year-old  male with past medical history significant for previous MI and three-vessel bypass in 2022, presents to the ED today with a chief complaint of chest pain for \"weeks\" that has been worsening.  Patient states that he had an episode of chest pain yesterday that \"felt like his previous heart attack\".  Patient describes the pain as a sharp pressure type pain in the left side of his chest that occasionally radiates to his left shoulder and arm.  Patient states he had severe pain yesterday, it is since substantially improved.  Patient currently rates the pain at a 6/10 in severity.  Patient does report worsening shortness of breath over the last few days, worse with exertion.  Notably the patient denies fever, cough, chills, and body aches.  Additionally patient reports that he is compliant with his medications.  No recent travel.     9/15/2022:  Patient confirms the HPI noted above including some constant chest pain over the past several weeks which was significantly worse over the past day described as \"like when I had my heart attack\" with pain located on the left side of his chest noted as stabbing with a waxing and waning intensity though never completely resolving.  Some associated dyspnea as well as nausea without " vomiting and a mild nonproductive cough have been noted but he denies any fever, palpitations, peripheral edema, diaphoresis, syncope or near syncope.  He confirms compliance all of his outpatient medical therapies and does not smoke or drink alcohol.    History of Present Illness    ROS   Review of Systems   Constitutional: Negative.   HENT: Negative.    Eyes: Negative.    Cardiovascular: Positive for chest pain.   Respiratory: Positive for cough and shortness of breath.    Skin: Negative.    Musculoskeletal: Negative.    Gastrointestinal: Positive for nausea. Negative for vomiting.   Genitourinary: Negative.    Neurological: Negative.    Psychiatric/Behavioral: Negative.         Personal History     Past Medical History:   Past Medical History:   Diagnosis Date   • Acute inferior myocardial infarction (HCC) 6/14/2022   • Allergic    • Asthma 1/27/2022   • CAD, multiple vessel 6/14/2022   • Gastroesophageal reflux disease 1/27/2022   • Hx of complete eye exam 2016   • Hyperlipidemia 1/27/2022   • Hypertension    • Migraine headache 1/27/2022   • Panic attack 1/27/2022   • Peptic ulcer 9/14/2017       Surgical History:      Past Surgical History:   Procedure Laterality Date   • CARDIAC CATHETERIZATION N/A 6/14/2022    Procedure: Left Heart Cath;  Surgeon: Matthieu Del Toro MD;  Location: [facility] CATH INVASIVE LOCATION;  Service: Cardiology;  Laterality: N/A;   • CARDIAC CATHETERIZATION N/A 6/16/2022    Procedure: Percutaneous Coronary Intervention;  Surgeon: Matthieu Del Toro MD;  Location: [facility] CATH INVASIVE LOCATION;  Service: Cardiovascular;  Laterality: N/A;   • CARDIAC CATHETERIZATION N/A 6/23/2022    Procedure: Left Heart Cath possible PCI, atherectomy, hemodynamic support;  Surgeon: Moises Haddad MD;  Location: [facility] CATH INVASIVE LOCATION;  Service: Cardiology;  Laterality: N/A;   • CHOLECYSTECTOMY  2019   • CORONARY ARTERY BYPASS GRAFT N/A 6/29/2022    Procedure: CORONARY ARTERY BYPASS GRAFTING;   Surgeon: Pablo Ball MD;  Location: [facility];  Service: Cardiothoracic;  Laterality: N/A;  CABG X 3(2 vein grafts, 1 LIMA graft)   • MANDIBLE SURGERY             Family History: family history includes Cirrhosis in his maternal grandfather; Heart disease in his mother; Heart failure in his father. Otherwise pertinent FHx was reviewed and unremarkable.     Social History:  reports that he quit smoking about 2 months ago. His smoking use included cigarettes. He started smoking about 23 years ago. He smoked 1.00 pack per day. He has never used smokeless tobacco. He reports previous alcohol use. He reports current drug use. Frequency: 7.00 times per week. Drug: Marijuana.      Medications:  Prior to Admission medications    Medication Sig Start Date End Date Taking? Authorizing Provider   aspirin 81 MG EC tablet Take 1 tablet by mouth Daily. 6/18/22  Yes Sorin Yap MD   atorvastatin (LIPITOR) 40 MG tablet Take 40 mg by mouth Every Night.   Yes Provider, MD Adolfo   HYDROcodone-acetaminophen (NORCO) 5-325 MG per tablet Take 1 tablet by mouth Every 4 (Four) Hours As Needed for Moderate Pain . 8/25/22  Yes Catrachita Fowler APRN   hydrOXYzine pamoate (VISTARIL) 50 MG capsule Take 2 capsules by mouth 4 (Four) Times a Day As Needed for Itching (1-2 capsules). 8/11/22  Yes Catrachita Fowler APRN   levothyroxine (SYNTHROID, LEVOTHROID) 50 MCG tablet Take 50 mcg by mouth Daily.   Yes ProviderAdolfo MD   lisinopril (PRINIVIL,ZESTRIL) 5 MG tablet Take 1 tablet by mouth Daily. 7/5/22  Yes Catrachita Fowler APRN   metoprolol tartrate (LOPRESSOR) 50 MG tablet Take 1 tablet by mouth 2 (Two) Times a Day. 8/25/22  Yes Catrachita Fowler APRN   ticagrelor (BRILINTA) 90 MG tablet tablet Take 1 tablet by mouth 2 (Two) Times a Day. 7/4/22  Yes Catrachita Fowler APRN   acetaminophen (TYLENOL) 500 MG tablet Take 500 mg by mouth Every 6 (Six) Hours As Needed for Mild Pain .  Patient not taking: Reported on 9/14/2022     Provider, MD Adolfo   albuterol sulfate  (90 Base) MCG/ACT inhaler Inhale 2 puffs Every 4 (Four) Hours As Needed for Wheezing.  Patient not taking: Reported on 9/14/2022    Adolfo Zuniga MD   levothyroxine (SYNTHROID, LEVOTHROID) 50 MCG tablet TAKE 1 TABLET BY MOUTH EVERY DAY  Patient not taking: Reported on 9/14/2022 9/14/22          Allergies:    Allergies   Allergen Reactions   • Codeine Itching   • Hydrocodone Itching   • Hydrocodone-Acetaminophen Other (See Comments)   • Shellfish-Derived Products Unknown - High Severity       Objective   Objective     Vital Signs  Temp:  [97.7 °F (36.5 °C)-98.2 °F (36.8 °C)] 98.2 °F (36.8 °C)  Heart Rate:  [58-93] 72  Resp:  [12-24] 16  BP: ()/() 130/82  SpO2:  [96 %-100 %] 98 %  on   ;   Device (Oxygen Therapy): room air  Body mass index is 38.4 kg/m².    Physical Exam  Physical Exam  Vitals reviewed.   Constitutional:       General: He is not in acute distress.     Appearance: Normal appearance. He is normal weight. He is not ill-appearing, toxic-appearing or diaphoretic.   HENT:      Head: Normocephalic.      Right Ear: External ear normal.      Left Ear: External ear normal.      Nose: Nose normal.      Mouth/Throat:      Mouth: Mucous membranes are moist.   Eyes:      Extraocular Movements: Extraocular movements intact.   Cardiovascular:      Rate and Rhythm: Normal rate and regular rhythm.      Pulses: Normal pulses.      Heart sounds: Normal heart sounds.   Pulmonary:      Effort: Pulmonary effort is normal.      Breath sounds: Normal breath sounds.   Abdominal:      General: Bowel sounds are normal.      Palpations: Abdomen is soft.      Tenderness: There is no abdominal tenderness.   Musculoskeletal:         General: Normal range of motion.      Cervical back: Normal range of motion.      Right lower leg: No edema.      Left lower leg: No edema.   Skin:     General: Skin is warm and dry.      Capillary Refill: Capillary refill takes  less than 2 seconds.   Neurological:      General: No focal deficit present.      Mental Status: He is alert and oriented to person, place, and time.   Psychiatric:         Mood and Affect: Mood normal.         Behavior: Behavior normal.         Thought Content: Thought content normal.         Judgment: Judgment normal.     Results Review:  I have personally reviewed most recent cardiac tracings, lab results and radiology images and interpretations and agree with findings, most notably: Troponin, CBC, CMP, chest x-ray and EKG.    Results from last 7 days   Lab Units 09/15/22  0457   WBC 10*3/mm3 6.30   HEMOGLOBIN g/dL 13.6   HEMATOCRIT % 40.6   PLATELETS 10*3/mm3 320     Results from last 7 days   Lab Units 09/15/22  0457 09/14/22  1926 09/14/22  1617 09/14/22  1315   SODIUM mmol/L 136  --   --  133*   POTASSIUM mmol/L 4.3  --   --  4.0   CHLORIDE mmol/L 102  --   --  97*   CO2 mmol/L 23.0  --   --  24.0   BUN mg/dL 12  --   --  11   CREATININE mg/dL 1.04  --   --  1.19   GLUCOSE mg/dL 96  --   --  99   CALCIUM mg/dL 9.1  --   --  8.8   ALT (SGPT) U/L  --   --   --  26   AST (SGOT) U/L  --   --   --  20   TROPONIN T ng/mL <0.010 <0.010 <0.010 <0.010   PROBNP pg/mL  --   --   --  105.7     Estimated Creatinine Clearance: 124.4 mL/min (by C-G formula based on SCr of 1.04 mg/dL).  Brief Urine Lab Results  (Last result in the past 365 days)      Color   Clarity   Blood   Leuk Est   Nitrite   Protein   CREAT   Urine HCG        06/25/22 0407 Yellow   Clear   Negative   Negative   Negative   Negative                 Microbiology Results (last 10 days)     ** No results found for the last 240 hours. **          ECG/EMG Results (most recent)     Procedure Component Value Units Date/Time    ECG 12 Lead [[id number]] Collected: 09/14/22 1221     Updated: 09/14/22 1222     QT Interval 360 ms     Narrative:      HEART RATE= 72  bpm  RR Interval= 832  ms  AR Interval= 132  ms  P Horizontal Axis= 21  deg  P Front Axis= 33  deg  QRSD  Interval= 75  ms  QT Interval= 360  ms  QRS Axis= 62  deg  T Wave Axis= -9  deg  - NORMAL ECG -  Sinus rhythm  When compared with ECG of 05-Jul-2022 6:15:24,  Significant rate decrease  Significant repolarization change  Significant axis, voltage or hypertrophy change  Electronically Signed By:   Date and Time of Study: 2022-09-14 12:21:47    ECG 12 Lead [[id number]] Resulted: 09/14/22 1654     Updated: 09/14/22 1654    ECG 12 Lead [[id number]] Collected: 09/15/22 0715     Updated: 09/15/22 0718     QT Interval 400 ms     Narrative:      HEART RATE= 58  bpm  RR Interval= 1032  ms  DC Interval= 129  ms  P Horizontal Axis= 15  deg  P Front Axis= 27  deg  QRSD Interval= 80  ms  QT Interval= 400  ms  QRS Axis= -1  deg  T Wave Axis= 9  deg  - OTHERWISE NORMAL ECG -  Sinus bradycardia  Electronically Signed By:   Date and Time of Study: 2022-09-15 07:15:57    SCANNED - TELEMETRY   [[id number]] Resulted: 09/14/22     Updated: 09/15/22 1047          Results for orders placed during the hospital encounter of 06/22/22    Duplex Vein Mapping Lower Extremity - Bilateral CAR    Interpretation Summary  · The left greater saphenous vein is patent and of adequate size in the thigh.  · The left greater saphenous vein is patent and of adequate size in the calf.      Results for orders placed during the hospital encounter of 06/22/22    Adult Transthoracic Echo Limited W/ Cont if Necessary Per Protocol    Interpretation Summary  · Left ventricular systolic function is normal.  · Left ventricular ejection fraction is 55 to 60%  · Basal inferior wall aneurysm is noted      XR Chest 1 View    Result Date: 9/14/2022   1. No acute cardiopulmonary disease.   Electronically Signed By-Roman Montano MD On:9/14/2022 2:01 PM This report was finalized on [id number] by  Roman Montano MD.        Estimated Creatinine Clearance: 124.4 mL/min (by C-G formula based on SCr of 1.04 mg/dL).    Assessment & Plan   Assessment/Plan       Active Hospital  Problems    Diagnosis  POA   • S/P CABG x 3 [Z95.1]  Not Applicable   • CAD, multiple vessel [I25.10]  Unknown   • Chest pain [R07.9]  Yes      Resolved Hospital Problems   No resolved problems to display.     Chest pain        Lab Results   Component Value Date     TROPONINT <0.010 09/15/2022     TROPONINT <0.010 09/14/2022     TROPONINT <0.010 09/14/2022   -Chest x-ray shows no acute cardiopulmonary process  -EKG shows sinus bradycardia at 58 with no obvious acute ST changes or ectopy and a QTC of 394 ms  -Cardiology consulted cath performed with stent placement on 9/15/2022  -Telemetry  -NPO  -Continue aspirin, beta-blocker, statin and Brilinta  -Hospitalist consulted     Hypertension  -Well controlled       BP Readings from Last 1 Encounters:   09/15/22 125/74   - Continue lisinopril and metoprolol  - Monitor while admitted     Hypothyroidism  -Levothyroxine     Hyperlipidemia  -Statin     Obesity (BMI: 38.40)  -Encourage diet lifestyle modifications              VTE Prophylaxis -   Mechanical Order History:     None      Pharmalogical Order History:      Ordered     Dose Route Frequency Stop    09/15/22 1138  heparin (porcine) injection         -- -- As Needed --    09/14/22 1836  Enoxaparin Sodium (LOVENOX) syringe 40 mg         40 mg SC Every 24 Hours Scheduled --                CODE STATUS:    Code Status and Medical Interventions:   Ordered at: 09/14/22 1713     Code Status (Patient has no pulse and is not breathing):    CPR (Attempt to Resuscitate)     Medical Interventions (Patient has pulse or is breathing):    Full Support       This patient has been examined wearing personal protective equipment.     I discussed the patient's findings and my recommendations with patient and nursing staff.      Signature:Electronically signed by Keven Nash PA-C, 09/15/22, 12:12 PM EDT.            Electronically signed by Keven Nash PA-C at 09/15/22 1350          Emergency Department Notes      [place]  "JOHN Leone at 09/14/22 1313      Procedure Orders    1. ECG 12 Lead [[id number]] ordered by Daya Chapin PA               Subjective   History of Present Illness  Chief Complaint: Chest pain     Patient is a 37-year-old  male with past medical history significant for previous MI and three-vessel bypass in June 2022, presents to the ED today with a chief complaint of chest pain for \"weeks\" that has been worsening.  Patient states that he had an episode of chest pain yesterday that \"felt like his previous heart attack\".  Patient describes the pain as a sharp pressure type pain in the left side of his chest that occasionally radiates to his left shoulder and arm.  Patient states he had severe pain yesterday, it is since substantially improved.  Patient currently rates the pain at a 6/10 in severity.  Patient does report worsening shortness of breath over the last few days, worse with exertion.  Notably the patient denies fever, cough, chills, and body aches.  Additionally patient reports that he is compliant with his medications.  No recent travel.    Location: Chest    Quality: Pressure    Duration: Few weeks, worse over the last 2 days    Timing: Intermittent    Severity: Moderate    Associated Symptoms: Short of breath    PCP: Daniela Liu  Cardiology: Boo    History provided by:  Patient      Review of Systems   Constitutional: Negative for chills and fever.   HENT: Negative for sore throat and trouble swallowing.    Eyes: Negative.    Respiratory: Positive for shortness of breath. Negative for wheezing.    Cardiovascular: Positive for chest pain.   Gastrointestinal: Negative for abdominal pain, diarrhea, nausea and vomiting.   Endocrine: Negative.    Genitourinary: Negative for dysuria.   Musculoskeletal: Negative for myalgias.   Skin: Negative for rash.   Allergic/Immunologic: Negative.    Neurological: Negative for weakness and headaches.   Psychiatric/Behavioral: Negative for behavioral " problems.   All other systems reviewed and are negative.      Past Medical History:   Diagnosis Date   • Acute inferior myocardial infarction (HCC) 6/14/2022   • Allergic    • Asthma 1/27/2022   • CAD, multiple vessel 6/14/2022   • Gastroesophageal reflux disease 1/27/2022   • Hx of complete eye exam 2016   • Hyperlipidemia 1/27/2022   • Hypertension    • Migraine headache 1/27/2022   • Panic attack 1/27/2022   • Peptic ulcer 9/14/2017       Allergies   Allergen Reactions   • Codeine Itching   • Hydrocodone Itching   • Hydrocodone-Acetaminophen Other (See Comments)   • Shellfish-Derived Products Unknown - High Severity       Past Surgical History:   Procedure Laterality Date   • CARDIAC CATHETERIZATION N/A 6/14/2022    Procedure: Left Heart Cath;  Surgeon: Matthieu Del Toro MD;  Location: [facility] CATH INVASIVE LOCATION;  Service: Cardiology;  Laterality: N/A;   • CARDIAC CATHETERIZATION N/A 6/16/2022    Procedure: Percutaneous Coronary Intervention;  Surgeon: Matthieu Del Toro MD;  Location: [facility] CATH INVASIVE LOCATION;  Service: Cardiovascular;  Laterality: N/A;   • CARDIAC CATHETERIZATION N/A 6/23/2022    Procedure: Left Heart Cath possible PCI, atherectomy, hemodynamic support;  Surgeon: Moises Haddad MD;  Location: [facility] CATH INVASIVE LOCATION;  Service: Cardiology;  Laterality: N/A;   • CHOLECYSTECTOMY  2019   • CORONARY ARTERY BYPASS GRAFT N/A 6/29/2022    Procedure: CORONARY ARTERY BYPASS GRAFTING;  Surgeon: Pablo Ball MD;  Location: [facility] CVOR;  Service: Cardiothoracic;  Laterality: N/A;  CABG X 3(2 vein grafts, 1 LIMA graft)   • MANDIBLE SURGERY         Family History   Problem Relation Age of Onset   • Heart disease Mother    • Heart failure Father    • Cirrhosis Maternal Grandfather        Social History     Socioeconomic History   • Marital status: Single   Tobacco Use   • Smoking status: Former Smoker     Packs/day: 1.00     Types: Cigarettes     Start date: 1999     Quit date:  2022     Years since quittin.2   • Smokeless tobacco: Never Used   Vaping Use   • Vaping Use: Never used   Substance and Sexual Activity   • Alcohol use: Not Currently   • Drug use: Yes     Frequency: 7.0 times per week     Types: Marijuana   • Sexual activity: Defer           Objective   Physical Exam  Vitals and nursing note reviewed.   Constitutional:       General: He is not in acute distress.     Appearance: He is well-developed and normal weight. He is not diaphoretic.   HENT:      Head: Normocephalic and atraumatic.   Eyes:      Extraocular Movements: Extraocular movements intact.      Pupils: Pupils are equal, round, and reactive to light.   Cardiovascular:      Rate and Rhythm: Normal rate and regular rhythm.      Heart sounds: Normal heart sounds. No murmur heard.  Pulmonary:      Effort: Pulmonary effort is normal.      Breath sounds: Normal breath sounds. No decreased breath sounds, wheezing or rhonchi.   Chest:      Chest wall: No mass or tenderness.   Abdominal:      Palpations: Abdomen is soft. There is no mass.      Tenderness: There is no abdominal tenderness.   Musculoskeletal:         General: Normal range of motion.      Cervical back: Normal range of motion.      Right lower leg: No edema.      Left lower leg: No edema.   Skin:     General: Skin is warm.      Capillary Refill: Capillary refill takes less than 2 seconds.   Neurological:      General: No focal deficit present.      Mental Status: He is alert and oriented to person, place, and time.   Psychiatric:         Mood and Affect: Mood normal.         Behavior: Behavior normal.         ECG 12 Lead      Date/Time: 2022 4:50 PM  Performed by: Daya Chapin PA  Authorized by: Marcus Mandujano MD   Interpreted by physician  Previous ECG: no previous ECG available  Rhythm: sinus rhythm  Rate: normal  BPM: 72  QRS axis: normal  Conduction: conduction normal  ST Segments: ST segments normal  T Waves: T waves normal  Clinical  "impression: non-specific ECG                ED Course  ED Course as of 09/14/22 1654   Wed Sep 14, 2022   1543 Patient complaining of increased chest pain, nausea ordered. []   1647 D-Dimer, Quant: 0.50  CTA 2 months ago shows  IMPRESSION:  1. No pulmonary embolus.  2. Constellation of findings from recent median sternotomy without apparent complication.  3. Small pleural effusions.     Electronically signed by:  Keven Purvis M.D.    7/5/2022 4:09 AM []      ED Course User Index  [] Sonia Sulema, PA    /100   Pulse 78   Temp 97.9 °F (36.6 °C) (Oral)   Resp 18   Ht 177.8 cm (70\")   Wt 123 kg (270 lb 8.1 oz)   SpO2 96%   BMI 38.81 kg/m²   Labs Reviewed   COMPREHENSIVE METABOLIC PANEL - Abnormal; Notable for the following components:       Result Value    Sodium 133 (*)     Chloride 97 (*)     All other components within normal limits    Narrative:     GFR Normal >60  Chronic Kidney Disease <60  Kidney Failure <15     TROPONIN (IN-HOUSE) - Normal    Narrative:     Troponin T Reference Range:  <= 0.03 ng/mL-   Negative for AMI  >0.03 ng/mL-     Abnormal for myocardial necrosis.  Clinicians would have to utilize clinical acumen, EKG, Troponin and serial changes to determine if it is an Acute Myocardial Infarction or myocardial injury due to an underlying chronic condition.       Results may be falsely decreased if patient taking Biotin.     TROPONIN (IN-HOUSE) - Normal    Narrative:     Troponin T Reference Range:  <= 0.03 ng/mL-   Negative for AMI  >0.03 ng/mL-     Abnormal for myocardial necrosis.  Clinicians would have to utilize clinical acumen, EKG, Troponin and serial changes to determine if it is an Acute Myocardial Infarction or myocardial injury due to an underlying chronic condition.       Results may be falsely decreased if patient taking Biotin.     CBC WITH AUTO DIFFERENTIAL - Normal   BNP (IN-HOUSE) - Normal    Narrative:     Among patients with dyspnea, NT-proBNP is highly " sensitive for the detection of acute congestive heart failure. In addition NT-proBNP of <300 pg/ml effectively rules out acute congestive heart failure with 99% negative predictive value.    Results may be falsely decreased if patient taking Biotin.     D-DIMER, QUANTITATIVE - Normal    Narrative:     Reference Range  --------------------------------------------------------------------     < 0.50   Negative Predictive Value  0.50-0.59   Indeterminate    >= 0.60   Probable VTE             A very low percentage of patients with DVT may yield D-Dimer results   below the cut-off of 0.50 mg/L FEU.  This is known to be more   prevalent in patients with distal DVT.             Results of this test should always be interpreted in conjunction with   the patient's medical history, clinical presentation and other   findings.  Clinical diagnosis should not be based on the result of   [place] D-Dimer alone.   RAINBOW DRAW    Narrative:     The following orders were created for panel order [place] Draw.  Procedure                               Abnormality         Status                     ---------                               -----------         ------                     Green Top (Gel)[[id number]]                                  Final result               Lavender Top[[id number]]                                     Final result               Gold Top - SST[[id number]]                                   Final result               Light Blue Top[[id number]]                                   Final result                 Please view results for these tests on the individual orders.   CBC AND DIFFERENTIAL    Narrative:     The following orders were created for panel order CBC & Differential.  Procedure                               Abnormality         Status                     ---------                               -----------         ------                     CBC Auto Differential[[id number]]        Normal              Final  result                 Please view results for these tests on the individual orders.   GREEN TOP   LAVENDER TOP   GOLD TOP - SST   LIGHT BLUE TOP     Medications   sodium chloride 0.9 % flush 10 mL (has no administration in time range)   aspirin chewable tablet 324 mg (324 mg Oral Given 9/14/22 1427)   nitroglycerin (NITROSTAT) ointment 1 inch (1 inch Topical Given 9/14/22 1601)   HYDROmorphone (DILAUDID) injection 0.5 mg (0.5 mg Intravenous Given 9/14/22 1647)     XR Chest 1 View    Result Date: 9/14/2022   1. No acute cardiopulmonary disease.   Electronically Signed By-Roman Montano MD On:9/14/2022 2:01 PM This report was finalized on [id number] by  Roman Montano MD.                      HEART Score: 3                      MDM  Number of Diagnoses or Management Options  Chest pain, unspecified type  Diagnosis management comments: MEDICAL DECISION  Epic Chart Review: Patient had three-vessel bypass June 2022.  2D echo on 6/20/2022  · Left ventricular systolic function is normal.  · Left ventricular ejection fraction is 55 to 60%  · Basal inferior wall aneurysm is noted    Comorbidities: Previous MI, multivessel CAD, GERD, hypertension, hyperlipidemia  Differentials: Unstable angina, NSTEMI, PE, pneumonia; this list is not all inclusive and does not constitute the entirety of considered causes  Radiology interpretation:  Images reviewed by me and interpreted by radiologist, as above  Lab interpretation:  Labs viewed by me significant for, as above  EKG interpretation: Reviewed myself interpreted by ER attending, sinus rhythm rate of 72 with no acute ST changes.    While in the ED IV was placed and labs were obtained appropriate PPE was worn during exam and throughout all encounters with the patient.  Patient had the above evaluation.  IV established, lab work obtained.  Patient placed on continuous telemetry monitoring throughout ER stay.  Patient given 324 mg aspirin as well as nitro patch.  Patient reports  persistent left-sided chest pain radiating to left shoulder.  EKG unremarkable.  Lab work within normal limits including normal CBC, CMP, troponins.  D-dimer borderline 0.50, patient did have CT PE protocol 2 months ago which was within normal limits.  Patient given Dilaudid for persistent chest pain.  Given patient's significant cardiac history, persistent pain he will be placed in the ED observation unit for further monitoring, evaluation, cardiology consultation.  Patient is agreeable.  I spoke with JOHN Marie who agreed accept patient to the ED observation unit.         Amount and/or Complexity of Data Reviewed  Clinical lab tests: ordered and reviewed  Tests in the radiology section of CPT®: ordered and reviewed    Patient Progress  Patient progress: stable      Final diagnoses:   Chest pain, unspecified type   Dyspnea, unspecified type       ED Disposition  ED Disposition     ED Disposition   Decision to Admit    Condition   --    Comment   --             No follow-up provider specified.       Medication List      No changes were made to your prescriptions during this visit.          Daya Chapin PA  09/14/22 1654      Electronically signed by Daya Chapin PA at 09/14/22 1654     Lucila Tate, RN at 09/14/22 1318        Pt reports he had 4 stents placed and a triple bypass in June. Pt was covid positive in June as well. Pt reports yesterday he had pain in his chest, back, and jaw that felt like when he had a heart attack in June. Pt reports he has a hx of heart disease. Pt reports SOA that has gotten worse since stents placed. Pt denies nausea and vomiting.     Electronically signed by Lucila Tate, RN at 09/14/22 1320         Facility-Administered Medications as of 9/16/2022   Medication Dose Route Frequency Provider Last Rate Last Admin   • [COMPLETED] aspirin chewable tablet 324 mg  324 mg Oral Once Daya Chapin PA   324 mg at 09/14/22 1427   • aspirin EC tablet 81 mg  81 mg Oral  Daily Moises Haddad MD   81 mg at 09/16/22 0802   • atorvastatin (LIPITOR) tablet 40 mg  40 mg Oral Nightly Moises Haddad MD   40 mg at 09/15/22 2139   • Enoxaparin Sodium (LOVENOX) syringe 40 mg  40 mg Subcutaneous Q24H Moises Haddad MD   40 mg at 09/16/22 1329   • [COMPLETED] HYDROmorphone (DILAUDID) injection 0.5 mg  0.5 mg Intravenous Once Daya Chapin PA   0.5 mg at 09/14/22 1647   • isosorbide mononitrate (IMDUR) 24 hr tablet 30 mg  30 mg Oral Q24H Izabella Villareal APRN   30 mg at 09/16/22 1142   • levothyroxine (SYNTHROID, LEVOTHROID) tablet 50 mcg  50 mcg Oral Daily Moises Haddad MD   50 mcg at 09/16/22 0802   • lisinopril (PRINIVIL,ZESTRIL) tablet 5 mg  5 mg Oral Q24H Moises Haddad MD   5 mg at 09/16/22 0802   • melatonin tablet 5 mg  5 mg Oral Nightly PRN Moises Haddad MD       • metoprolol tartrate (LOPRESSOR) tablet 50 mg  50 mg Oral BID Moises Haddad MD   50 mg at 09/16/22 0802   • [COMPLETED] Morphine sulfate (PF) injection 4 mg  4 mg Intravenous Once Reynold Seo MD   4 mg at 09/15/22 0216   • Morphine sulfate (PF) injection 4 mg  4 mg Intravenous Q4H PRN Moises Haddad MD   4 mg at 09/16/22 1329   • [COMPLETED] nitroglycerin (NITROSTAT) ointment 1 inch  1 inch Topical Once Daya Chapni PA   1 inch at 09/14/22 1601   • nitroglycerin (NITROSTAT) SL tablet 0.4 mg  0.4 mg Sublingual Q5 Min PRN Moises Haddad MD   0.4 mg at 09/15/22 0149   • ondansetron (ZOFRAN) injection 4 mg  4 mg Intravenous Q4H PRN Moises Haddad MD   4 mg at 09/16/22 1329    Or   • ondansetron (ZOFRAN) tablet 4 mg  4 mg Oral Q4H PRN Moises Haddad MD   4 mg at 09/16/22 9787   • [COMPLETED] pantoprazole (PROTONIX) injection 40 mg  40 mg Intravenous Once Moises Haddad MD   40 mg at 09/15/22 1651   • ranolazine (RANEXA) 12 hr tablet 500 mg  500 mg Oral Q12H Moises Haddad MD   500 mg at  22 0802   • sodium chloride 0.9 % flush 10 mL  10 mL Intravenous PRN Moises Haddad MD       • sodium chloride 0.9 % flush 10 mL  10 mL Intravenous Q12H Moises Haddad MD   10 mL at 22 0806   • sodium chloride 0.9 % flush 10 mL  10 mL Intravenous PRN Moises Haddad MD       • [] sodium chloride 0.9 % infusion  3 mL/kg/hr Intravenous Continuous Moises Haddad MD       • ticagrelor (BRILINTA) tablet 90 mg  90 mg Oral BID Moises Haddad MD   90 mg at 22 0802     Lab Results (last 48 hours)     Procedure Component Value Units Date/Time    Hemoglobin A1c [[id number]]  (Normal) Collected: 22    Specimen: Blood Updated: 22 111     Hemoglobin A1C 5.3 %     Narrative:      Hemoglobin A1C Reference Range:    <5.7 %        Normal  5.7-6.4 %     Increased risk for diabetes  > 6.4 %        Diabetes       These guidelines have been recommended by the American Diabetic Association for Hgb A1c.      The following 2010 guidelines have been recommended by the American Diabetes Association for Hemoglobin A1c.    HBA1c 5.7-6.4% Increased risk for future diabetes (pre-diabetes)  HBA1c     >6.4% Diabetes      Comprehensive Metabolic Panel [[id number]]  (Abnormal) Collected: 22    Specimen: Blood Updated: 22 0608     Glucose 97 mg/dL      BUN 11 mg/dL      Creatinine 1.09 mg/dL      Sodium 135 mmol/L      Potassium 4.9 mmol/L      Comment: Slight hemolysis detected by analyzer. Results may be affected.        Chloride 100 mmol/L      CO2 25.0 mmol/L      Calcium 9.1 mg/dL      Total Protein 6.9 g/dL      Albumin 3.80 g/dL      ALT (SGPT) 130 U/L      AST (SGOT) 71 U/L      Comment: Slight hemolysis detected by analyzer. Results may be affected.        Alkaline Phosphatase 168 U/L      Total Bilirubin 0.6 mg/dL      Globulin 3.1 gm/dL      A/G Ratio 1.2 g/dL      BUN/Creatinine Ratio 10.1     Anion Gap 10.0 mmol/L      eGFR 88.5  mL/min/1.73      Comment: National Kidney Foundation and American Society of Nephrology (ASN) Task Force recommended calculation based on the Chronic Kidney Disease Epidemiology Collaboration (CKD-EPI) equation refit without adjustment for race.       Narrative:      GFR Normal >60  Chronic Kidney Disease <60  Kidney Failure <15      Magnesium [[id number]]  (Normal) Collected: 09/16/22 0442    Specimen: Blood Updated: 09/16/22 0604     Magnesium 2.2 mg/dL     Lipid Panel [[id number]]  (Abnormal) Collected: 09/16/22 0442    Specimen: Blood Updated: 09/16/22 0604     Total Cholesterol 228 mg/dL      Triglycerides 176 mg/dL      HDL Cholesterol 38 mg/dL      LDL Cholesterol  158 mg/dL      VLDL Cholesterol 32 mg/dL      LDL/HDL Ratio 4.07    Narrative:      Cholesterol Reference Ranges  (U.S. Department of Health and Human Services ATP III Classifications)    Desirable          <200 mg/dL  Borderline High    200-239 mg/dL  High Risk          >240 mg/dL      Triglyceride Reference Ranges  (U.S. Department of Health and Human Services ATP III Classifications)    Normal           <150 mg/dL  Borderline High  150-199 mg/dL  High             200-499 mg/dL  Very High        >500 mg/dL    HDL Reference Ranges  (U.S. Department of Health and Human Services ATP III Classifications)    Low     <40 mg/dl (major risk factor for CHD)  High    >60 mg/dl ('negative' risk factor for CHD)        LDL Reference Ranges  (U.S. Department of Health and Human Services ATP III Classifications)    Optimal          <100 mg/dL  Near Optimal     100-129 mg/dL  Borderline High  130-159 mg/dL  High             160-189 mg/dL  Very High        >189 mg/dL    CBC & Differential [[id number]]  (Normal) Collected: 09/16/22 0442    Specimen: Blood Updated: 09/16/22 0511    Narrative:      The following orders were created for panel order CBC & Differential.  Procedure                               Abnormality         Status                     ---------                                -----------         ------                     CBC Auto Differential[[id number]]        Normal              Final result                 Please view results for these tests on the individual orders.    CBC Auto Differential [[id number]]  (Normal) Collected: 09/16/22 0442    Specimen: Blood Updated: 09/16/22 0511     WBC 6.10 10*3/mm3      RBC 4.88 10*6/mm3      Hemoglobin 13.5 g/dL      Hematocrit 40.9 %      MCV 83.9 fL      MCH 27.7 pg      MCHC 33.1 g/dL      RDW 14.6 %      RDW-SD 42.9 fl      MPV 6.8 fL      Platelets 294 10*3/mm3      Neutrophil % 70.6 %      Lymphocyte % 20.8 %      Monocyte % 7.1 %      Eosinophil % 1.0 %      Basophil % 0.5 %      Neutrophils, Absolute 4.30 10*3/mm3      Lymphocytes, Absolute 1.30 10*3/mm3      Monocytes, Absolute 0.40 10*3/mm3      Eosinophils, Absolute 0.10 10*3/mm3      Basophils, Absolute 0.00 10*3/mm3      nRBC 0.1 /100 WBC     POC Activated Clotting Time [[id number]]  (Abnormal) Collected: 09/15/22 1204    Specimen: Arterial Blood Updated: 09/15/22 1211     Activated Clotting Time  329 Seconds      Comment: Serial Number: [id number]:  [id number]       Troponin [[id number]]  (Normal) Collected: 09/15/22 0457    Specimen: Blood Updated: 09/15/22 0711     Troponin T <0.010 ng/mL     Narrative:      Troponin T Reference Range:  <= 0.03 ng/mL-   Negative for AMI  >0.03 ng/mL-     Abnormal for myocardial necrosis.  Clinicians would have to utilize clinical acumen, EKG, Troponin and serial changes to determine if it is an Acute Myocardial Infarction or myocardial injury due to an underlying chronic condition.       Results may be falsely decreased if patient taking Biotin.      Basic Metabolic Panel [[id number]]  (Normal) Collected: 09/15/22 0457    Specimen: Blood Updated: 09/15/22 0555     Glucose 96 mg/dL      BUN 12 mg/dL      Creatinine 1.04 mg/dL      Sodium 136 mmol/L      Potassium 4.3 mmol/L      Chloride 102 mmol/L      CO2 23.0 mmol/L       Calcium 9.1 mg/dL      BUN/Creatinine Ratio 11.5     Anion Gap 11.0 mmol/L      eGFR 93.7 mL/min/1.73      Comment: National Kidney Foundation and American Society of Nephrology (ASN) Task Force recommended calculation based on the Chronic Kidney Disease Epidemiology Collaboration (CKD-EPI) equation refit without adjustment for race.       Narrative:      GFR Normal >60  Chronic Kidney Disease <60  Kidney Failure <15      Magnesium [[id number]]  (Normal) Collected: 09/15/22 0457    Specimen: Blood Updated: 09/15/22 0555     Magnesium 2.0 mg/dL     CBC & Differential [[id number]]  (Normal) Collected: 09/15/22 0457    Specimen: Blood Updated: 09/15/22 0534    Narrative:      The following orders were created for panel order CBC & Differential.  Procedure                               Abnormality         Status                     ---------                               -----------         ------                     CBC Auto Differential[[id number]]        Normal              Final result                 Please view results for these tests on the individual orders.    CBC Auto Differential [[id number]]  (Normal) Collected: 09/15/22 0457    Specimen: Blood Updated: 09/15/22 0534     WBC 6.30 10*3/mm3      RBC 4.84 10*6/mm3      Hemoglobin 13.6 g/dL      Hematocrit 40.6 %      MCV 83.8 fL      MCH 28.1 pg      MCHC 33.5 g/dL      RDW 15.0 %      RDW-SD 44.6 fl      MPV 6.7 fL      Platelets 320 10*3/mm3      Neutrophil % 69.3 %      Lymphocyte % 23.2 %      Monocyte % 5.7 %      Eosinophil % 0.9 %      Basophil % 0.9 %      Neutrophils, Absolute 4.40 10*3/mm3      Lymphocytes, Absolute 1.50 10*3/mm3      Monocytes, Absolute 0.40 10*3/mm3      Eosinophils, Absolute 0.10 10*3/mm3      Basophils, Absolute 0.10 10*3/mm3      nRBC 0.0 /100 WBC     Troponin [[id number]]  (Normal) Collected: 09/14/22 1926    Specimen: Blood Updated: 09/14/22 2055     Troponin T <0.010 ng/mL     Narrative:      Troponin T Reference Range:  <= 0.03  ng/mL-   Negative for AMI  >0.03 ng/mL-     Abnormal for myocardial necrosis.  Clinicians would have to utilize clinical acumen, EKG, Troponin and serial changes to determine if it is an Acute Myocardial Infarction or myocardial injury due to an underlying chronic condition.       Results may be falsely decreased if patient taking Biotin.      Troponin [[id number]]  (Normal) Collected: 09/14/22 1617    Specimen: Blood Updated: 09/14/22 1645     Troponin T <0.010 ng/mL     Narrative:      Troponin T Reference Range:  <= 0.03 ng/mL-   Negative for AMI  >0.03 ng/mL-     Abnormal for myocardial necrosis.  Clinicians would have to utilize clinical acumen, EKG, Troponin and serial changes to determine if it is an Acute Myocardial Infarction or myocardial injury due to an underlying chronic condition.       Results may be falsely decreased if patient taking Biotin.      [place] Draw [[id number]] Collected: 09/14/22 1315    Specimen: Blood Updated: 09/14/22 1433    Narrative:      The following orders were created for panel order [place] Draw.  Procedure                               Abnormality         Status                     ---------                               -----------         ------                     Green Top (Gel)[[id number]]                                  Final result               Lavender Top[[id number]]                                     Final result               Gold Top - SST[[id number]]                                   Final result               Light Blue Top[[id number]]                                   Final result                 Please view results for these tests on the individual orders.    Lavender Top [[id number]] Collected: 09/14/22 1315    Specimen: Blood Updated: 09/14/22 1433     Extra Tube hold for add-on     Comment: Auto resulted       Gold Top - SST [[id number]] Collected: 09/14/22 1315    Specimen: Blood Updated: 09/14/22 1433     Extra Tube Hold for add-ons.     Comment: Auto  "resulted.       Green Top (Gel) [[id number]] Collected: 22    Specimen: Blood Updated: 22     Extra Tube Hold for add-ons.     Comment: Auto resulted.       Light Blue Top [[id number]] Collected: 22    Specimen: Blood Updated: 22     Extra Tube Hold for add-ons.     Comment: Auto resulted                Physician Progress Notes (last 48 hours)      Izabella Villareal APRN at 22 0925          Cardiology [place]        LOS:  LOS: 0 days   Patient Name: Tramaine Gates  Age/Sex: 37 y.o. male  : 1985  MRN: [id number]    Day of Service: 22   Length of Stay: 0  Encounter Provider: YOUSIF Garces  Place of Service: [facility] CARDIOLOGY  Patient Care Team:  Daniela Hernandez FNP as PCP - General (Family Medicine)  Ollie Dunn MD as Consulting Physician (Gastroenterology)    Subjective:     Chief Complaint: f/u chest pain    Subjective: Mr. Gates reports some continued chest discomfort. He states it is \"always there\".  His ECG is unchanged this morning without acute ischemic changes.  He has some nausea. LFTs noted to be increased. He has been on same statin dose at home    Current Medications:   Scheduled Meds:aspirin, 81 mg, Oral, Daily  atorvastatin, 40 mg, Oral, Nightly  enoxaparin, 40 mg, Subcutaneous, Q24H  levothyroxine, 50 mcg, Oral, Daily  lisinopril, 5 mg, Oral, Q24H  metoprolol tartrate, 50 mg, Oral, BID  ranolazine, 500 mg, Oral, Q12H  sodium chloride, 10 mL, Intravenous, Q12H  ticagrelor, 90 mg, Oral, BID      Continuous Infusions:nitroglycerin, 10-50 mcg/min, Last Rate: Stopped (09/15/22 1215)        Allergies:  Allergies   Allergen Reactions   • Codeine Itching   • Hydrocodone Itching   • Hydrocodone-Acetaminophen Other (See Comments)   • Shellfish-Derived Products Unknown - High Severity       Review of Systems   Constitutional: Negative for chills, diaphoresis and malaise/fatigue.   Cardiovascular: " Positive for chest pain (somewhat chronic in nature). Negative for dyspnea on exertion, irregular heartbeat, leg swelling, near-syncope, orthopnea, palpitations, paroxysmal nocturnal dyspnea and syncope.   Respiratory: Negative for cough, shortness of breath, sleep disturbances due to breathing and sputum production.    Gastrointestinal: Positive for nausea. Negative for change in bowel habit.   Genitourinary: Negative for urgency.   Neurological: Negative for dizziness and headaches.   Psychiatric/Behavioral: Negative for altered mental status.         Objective:     Temp:  [97.3 °F (36.3 °C)-98.4 °F (36.9 °C)] 98.2 °F (36.8 °C)  Heart Rate:  [58-93] 65  Resp:  [12-24] 18  BP: ()/() 120/77     Intake/Output Summary (Last 24 hours) at 9/16/2022 0925  Last data filed at 9/16/2022 0540  Gross per 24 hour   Intake 1260 ml   Output 1000 ml   Net 260 ml     Body mass index is 38.45 kg/m².      09/14/22  1833 09/15/22  0500 09/15/22  1830   Weight: 122 kg (269 lb) 121 kg (267 lb 10.2 oz) 122 kg (268 lb)         General Appearance:    Alert, cooperative, in no acute distress                                Head: Atraumatic, normocephalic, PERRLA               Neck:   supple, no JVD   Lungs:     Clear to auscultation, respirations regular, even and               unlabored    Heart:    Regular rhythm and normal rate, normal S1 and S2   Abdomen:     Normal bowel sounds, soft   Extremities:   Moves all extremities well, no edema, no cyanosis, no  Redness groin soft without oozing bruising or hematoma   Pulses:   Pulses palpable and equal bilaterally   Skin:   No bleeding, bruising or rash   Neurologic:   Awake, alert, oriented x3         Lab Review:   Results from last 7 days   Lab Units 09/16/22  0442 09/15/22  0457 09/14/22  1315   SODIUM mmol/L 135* 136 133*   POTASSIUM mmol/L 4.9 4.3 4.0   CHLORIDE mmol/L 100 102 97*   CO2 mmol/L 25.0 23.0 24.0   BUN mg/dL 11 12 11   CREATININE mg/dL 1.09 1.04 1.19   GLUCOSE  mg/dL 97 96 99   CALCIUM mg/dL 9.1 9.1 8.8   AST (SGOT) U/L 71*  --  20   ALT (SGPT) U/L 130*  --  26     Results from last 7 days   Lab Units 09/15/22  0457 09/14/22  1926 09/14/22  1617 09/14/22  1315   TROPONIN T ng/mL <0.010 <0.010 <0.010 <0.010     Results from last 7 days   Lab Units 09/16/22  0442 09/15/22  0457   WBC 10*3/mm3 6.10 6.30   HEMOGLOBIN g/dL 13.5 13.6   HEMATOCRIT % 40.9 40.6   PLATELETS 10*3/mm3 294 320         Results from last 7 days   Lab Units 09/16/22  0442 09/15/22  0457   MAGNESIUM mg/dL 2.2 2.0     Results from last 7 days   Lab Units 09/16/22  0442   CHOLESTEROL mg/dL 228*   TRIGLYCERIDES mg/dL 176*   HDL CHOL mg/dL 38*     Results from last 7 days   Lab Units 09/14/22  1315   PROBNP pg/mL 105.7           Recent Radiology:  Imaging Results (Most Recent)     Procedure Component Value Units Date/Time    XR Chest 1 View [[id number]] Collected: 09/14/22 1401     Updated: 09/14/22 1403    Narrative:      XR CHEST 1 VW-     Date of Exam: 9/14/2022 1:10 PM     Indication: Chest Pain Protocol.     Comparison: 7/1/2022     Technique: A single view of the chest was obtained.     FINDINGS:      The heart size and pulmonary vessels are within normal limits. The  lungs are clear bilaterally. There are no pleural effusions. Bony thorax  is unremarkable.                Impression:         1. No acute cardiopulmonary disease.        Electronically Signed By-Roman Montano MD On:9/14/2022 2:01 PM  This report was finalized on [id number] by  Roman Montano MD.          ECHOCARDIOGRAM:    Results for orders placed during the hospital encounter of 06/22/22    Adult Transthoracic Echo Limited W/ Cont if Necessary Per Protocol    Interpretation Summary  · Left ventricular systolic function is normal.  · Left ventricular ejection fraction is 55 to 60%  · Basal inferior wall aneurysm is noted        I reviewed the patient's new clinical results.    EKG:          Assessment:       Chest pain    CAD, multiple  vessel    S/P CABG x 3    1. Chest Pain  - [facility] 9/15/2022: three-vessel coronary disease, patent LIMA to LAD, closed SVG to OM, heavily diseased SVG to RCA with poor candidacy for native vessel intervention  - s/p Overlapping Xience drug-eluting stents 3.5 x 38 x 3 stents in overlapping fashion postdilated to 3.7 mm at 16 to 18 radha, reduction stenosis to 0% residual, improvement DARRYL-3 flow via the vein graft to the distal RCA and RPDA  - ASA / Brilinta  - statin therapy  - preserved LVEF    2. CAD  - s/p STEMI 6/14/2022: [facility] showed three vessel CAD- he underwent ASHLEE placement to the culprit lesion in the RCA.  LAD had 80-90% proximal stenosis and LCx had 40-50% in distal LCx/OM.  Mr. Gates underwent repeat cardiac cath on 6/16/2022 and received ASHLEE to LAD  - repeat admission 5 days later showing in stent thrombosis sent for CABG  - s/p 3V CABG 6/29/2022 (LIMA-LAD, SVG-OMofLCx, SVG-RPDA)    3. Elevated LFTs  - 9/14/2022 AST / ALT 20/26; 9/16/2022 AST/AL 71/130  - will ask GI to see    4. HTN    5. HLD    6. Obesity    Plan:   Mr. Gates presented with unstable angina. He has signfiicant CAD s/p stemi followed by readmission several days later showing stent thrombosis- referred for CABG and ultimately underwent 3V CABG 6/16/2022.    He presented with unstable angina and [facility] showed three-vessel coronary disease, patent LIMA to LAD, closed SVG to OM, heavily diseased SVG to RCA with poor candidacy for native vessel intervention. He is s/p Overlapping Xience drug-eluting stents 3.5 x 38 x 3 stents in overlapping fashion postdilated to 3.7 mm at 16 to 18 radha, reduction stenosis to 0% residual, improvement DARRYL-3 flow via the vein graft to the distal RCA and RPDA.    Continue ASA / Brilinta indefinitely  Continue statin therapy- he has been on 40mg QHS   He does have elevated LFTs. Will ask GI to see- RUQ US  He continues to have some chest discomfort and has been started on Ranexa.   His ECG is unchanged            Izabella  YOUSIF Villareal  09/16/22  09:25 EDT    Electronically signed by Izabella Villareal APRN at 09/16/22 1054     Satterly-Berna Vega APRN at 09/16/22 0907          S/P CABG x 3 (LIMA to LAD, SVG to CX, SVG to PDA) --Lizzy  EF 60% (cath)    Pt well known to our service.  S/P CABG x3 per Dr. Ball 6/29/2022.  Prior to that he had STEMI and ASHLEE placed in LAD that was found to have in-stent thrombosis.  He has had intermittent chest pain that appears atypical.  He has also been treated for costochondritis--steroid dose pack.  Troponin and ekg negative.      Pt continues to report chest pains.  He states he has had chest pain since he was 15 y/o.    Pt underwent stenting to RCA graft yesterday--on Brilinta  Noted his cholesterol panel is worse--pt reports he is taking atorvastatin      Intake/Output Summary (Last 24 hours) at 9/16/2022 0907  Last data filed at 9/16/2022 0540  Gross per 24 hour   Intake 1260 ml   Output 1000 ml   Net 260 ml     Temp:  [97.3 °F (36.3 °C)-98.4 °F (36.9 °C)] 98.2 °F (36.8 °C)  Heart Rate:  [58-93] 65  Resp:  [12-24] 18  BP: ()/() 120/77      Results from last 7 days   Lab Units 09/16/22  0442 09/15/22  0457   WBC 10*3/mm3 6.10 6.30   HEMOGLOBIN g/dL 13.5 13.6   HEMATOCRIT % 40.9 40.6   PLATELETS 10*3/mm3 294 320     Results from last 7 days   Lab Units 09/16/22  0442   CREATININE mg/dL 1.09   POTASSIUM mmol/L 4.9   SODIUM mmol/L 135*   MAGNESIUM mg/dL 2.2     Physical exam:  Neuro intact, NAD, resting in bed  Tele:  SR 60s  CTA on room air  Sternotomy well healed, no tenderness along sternum noted  abd benign, no BM  No edema      Assessment/Plan:  Active Problems:    Chest pain    CAD, multiple vessel    S/P CABG x 3    - Atypical chest pain--troponin negative, plans for cath  - CAD, s/p STEMI with stenting, s/p CABG x3 with LIMA (Lizzy), s/p ASHLEE to RCA graft (Boo) 9/15--Brilinta  - HTN  - HLD, worsening profile--on atorvastatin 40 mg qHS  - Bipolar  disorder/ HOLLY/ panic disorder  - Tobacco abuse    Pt remains with chest pain despite stenting to RCA.  Unable to illicit any tenderness/pain along sternum with hx of costochondritis.  Pt reports he is taking atorvastatin at home but lipid panel is worse than preop panel.  Will defer to cardiology.  Will sign off, available if needed.    YOUSIF Dhillon  2022  09:07 EDT    Electronically signed by Berna Sandoval APRN at 22 1011     Andres Ardon MD at 09/15/22 1713              [facility] Daily Progress Note    Patient Name: Tramaine Gates  : 1985  MRN: [id number]  Primary Care Physician:  Daniela Hernandez FNP  Date of admission: 2022      Subjective      Chief Complaint: Chest pain    Patient Reports 1/15  Patient had heart cath today  Reports been reviewed  going to PCU    Review of Systems   All other systems reviewed and are negative.   *        Objective      Vitals:   Temp:  [97.7 °F (36.5 °C)-98.2 °F (36.8 °C)] 98.2 °F (36.8 °C)  Heart Rate:  [58-93] 72  Resp:  [12-24] 16  BP: ()/() 128/89    Physical Exam  Vitals and nursing note reviewed.   Constitutional:       General: He is not in acute distress.     Appearance: Normal appearance. He is well-developed. He is not ill-appearing, toxic-appearing or diaphoretic.   HENT:      Head: Normocephalic and atraumatic.      Right Ear: Ear canal and external ear normal.      Left Ear: Ear canal and external ear normal.      Nose: Nose normal. No congestion or rhinorrhea.      Mouth/Throat:      Mouth: Mucous membranes are moist.      Pharynx: No oropharyngeal exudate.   Eyes:      General: No scleral icterus.        Right eye: No discharge.         Left eye: No discharge.      Extraocular Movements: Extraocular movements intact.      Conjunctiva/sclera: Conjunctivae normal.      Pupils: Pupils are equal, round, and reactive to light.    Neck:      Thyroid: No thyromegaly.      Vascular: No carotid bruit or JVD.      Trachea: No tracheal deviation.   Cardiovascular:      Rate and Rhythm: Normal rate and regular rhythm.      Pulses: Normal pulses.      Heart sounds: Normal heart sounds. No murmur heard.    No friction rub. No gallop.   Pulmonary:      Effort: Pulmonary effort is normal. No respiratory distress.      Breath sounds: Normal breath sounds. No stridor. No wheezing, rhonchi or rales.   Chest:      Chest wall: No tenderness.   Abdominal:      General: Bowel sounds are normal. There is no distension.      Palpations: Abdomen is soft. There is no mass.      Tenderness: There is no abdominal tenderness. There is no guarding or rebound.      Hernia: No hernia is present.   Musculoskeletal:         General: No swelling, tenderness, deformity or signs of injury. Normal range of motion.      Cervical back: Normal range of motion and neck supple. No rigidity. No muscular tenderness.      Right lower leg: No edema.      Left lower leg: No edema.   Lymphadenopathy:      Cervical: No cervical adenopathy.   Skin:     General: Skin is warm and dry.      Coloration: Skin is not jaundiced or pale.      Findings: No bruising, erythema or rash.   Neurological:      General: No focal deficit present.      Mental Status: He is alert and oriented to person, place, and time. Mental status is at baseline.      Cranial Nerves: No cranial nerve deficit.      Sensory: No sensory deficit.      Motor: No weakness or abnormal muscle tone.      Coordination: Coordination normal.   Psychiatric:         Mood and Affect: Mood normal.         Behavior: Behavior normal.         Thought Content: Thought content normal.         Judgment: Judgment normal.               Result Review    Result Review:  I have personally reviewed the results from the time of this admission to 9/15/2022 17:13 EDT and agree with these findings:  [x]  Laboratory  [x]  Microbiology  [x]   "Radiology  [x]  EKG/Telemetry   [x]  Cardiology/Vascular   []  Pathology  []  Old records  []  Other:  Most notable findings include: As outlined above          Assessment & Plan      Brief Patient Summary:  Tramaine Gates is a 37 y.o. male who presents with unstable angina      Obtained from ED provider HPI 9/14/2022:  Patient is a 37-year-old  male with past medical history significant for previous MI and three-vessel bypass in June 2022, presents to the ED today with a chief complaint of chest pain for \"weeks\" that has been worsening.  Patient states that he had an episode of chest pain yesterday that \"felt like his previous heart attack\".  Patient describes the pain as a sharp pressure type pain in the left side of his chest that occasionally radiates to his left shoulder and arm.  Patient states he had severe pain yesterday, it is since substantially improved.  Patient currently rates the pain at a 6/10 in severity.  Patient does report worsening shortness of breath over the last few days, worse with exertion.  Notably the patient denies fever, cough, chills, and body aches.  Additionally patient reports that he is compliant with his medications.  No recent travel.     9/15/2022:  Patient confirms the HPI noted above including some constant chest pain over the past several weeks which was significantly worse over the past day described as \"like when I had my heart attack\" with pain located on the left side of his chest noted as stabbing with a waxing and waning intensity though never completely resolving.  Some associated dyspnea as well as nausea without vomiting and a mild nonproductive cough have been noted but he denies any fever, palpitations, peripheral edema, diaphoresis, syncope or near syncope.  He confirms compliance all of his outpatient medical therapies and does not smoke or drink alcohol.       aspirin, 81 mg, Oral, Daily  atorvastatin, 40 mg, Oral, Nightly  enoxaparin, 40 mg, " Subcutaneous, Q24H  levothyroxine, 50 mcg, Oral, Daily  lisinopril, 5 mg, Oral, Q24H  metoprolol tartrate, 50 mg, Oral, BID  ranolazine, 500 mg, Oral, Q12H  sodium chloride, 10 mL, Intravenous, Q12H  ticagrelor, 90 mg, Oral, BID       nitroglycerin, 10-50 mcg/min, Last Rate: Stopped (09/15/22 1215)  sodium chloride, 3 mL/kg/hr         Active Hospital Problems:  Active Hospital Problems    Diagnosis    • S/P CABG x 3    • CAD, multiple vessel      Added automatically from request for surgery [id number]     • Chest pain      Plan:  Unstable angina    Patient with known coronary artery disease with CABG 6/29/2022 presenting with chest pain and had scheduled for cardiac cath today that showed  1 three-vessel coronary disease, patent LIMA to LAD, closed SVG to OM, heavily diseased but rescue able SVG to RCA with poor candidacy for native vessel intervention  2.  Overlapping Xience drug-eluting stents 3.5 x 38 x 3 stents in overlapping fashion postdilated to 3.7 mm at 16 to 18 radha, reduction stenosis to 0% residual, improvement DARRYL-3 flow via the vein graft to the distal RCA and RPDA     Continue aspirin Brilinta uninterrupted  High intensity statin  Risk factor modification  Smoking cessation  Optimize medical therapy  Antianginals if indicated, long-acting nitrates versus Ranexa for small vessel disease  Seen by CT surgery    - CAD, s/p STEMI with stenting, s/p CABG x3 with LIMA (Camporrotondo  Bronchial asthma without exacerbation  GERD on PPI  Dyslipidemia on statin  Hypertension on lisinopril and metoprolol  History migraine headaches panic attacks peptic ulcer  Hypothyroidism on Synthroid        DVT prophylaxis: Consider Lovenox starting tomorrow if patient continues to be in the hospital  Medical DVT prophylaxis orders are present.    CODE STATUS:    Code Status (Patient has no pulse and is not breathing): CPR (Attempt to Resuscitate)  Medical Interventions (Patient has pulse or is breathing): Full  Support      Disposition:  I expect patient to be discharged home.    This patient has been examined wearing appropriate Personal Protective Equipment and discussed with hospital infection control department. 09/15/22      Electronically signed by Andres Ardon MD, 09/15/22, 17:13 EDT.  [facility] Hospitalist Team             Electronically signed by Andres Ardon MD at 09/15/22 1730     Satterly-Silvia, Berna FUNEZ APRN at 09/15/22 1416     Attestation signed by Pablo Ball MD at 09/15/22 1536    I have reviewed this documentation and agree.                  S/P CABG x 3 (LIMA to LAD, SVG to CX, SVG to PDA) --Lizzy  EF 60% (cath)    Pt well known to our service.  S/P CABG x3 per Dr. Ball 6/29/2022.  Prior to that he had STEMI and ASHLEE placed in LAD that was found to have in-stent thrombosis.  He has had intermittent chest pain that appears atypical.  He has also been treated for costochondritis--steroid dose pack.  Troponin and ekg negative.  Pt is scheduled for cardiac cath today to evaluate grafts and native anatomy.           Intake/Output Summary (Last 24 hours) at 9/15/2022 1416  Last data filed at 9/15/2022 0800  Gross per 24 hour   Intake 200 ml   Output 700 ml   Net -500 ml     Temp:  [97.7 °F (36.5 °C)-98.2 °F (36.8 °C)] 98.2 °F (36.8 °C)  Heart Rate:  [58-93] 76  Resp:  [12-24] 16  BP: ()/() 136/85      Results from last 7 days   Lab Units 09/15/22  0457 09/14/22  1315   WBC 10*3/mm3 6.30 7.50   HEMOGLOBIN g/dL 13.6 13.5   HEMATOCRIT % 40.6 40.6   PLATELETS 10*3/mm3 320 322     Results from last 7 days   Lab Units 09/15/22  0457   CREATININE mg/dL 1.04   POTASSIUM mmol/L 4.3   SODIUM mmol/L 136   MAGNESIUM mg/dL 2.0       Assessment/Plan:  Active Problems:    Chest pain    CAD, multiple vessel    S/P CABG x 3    - Atypical chest pain--troponin negative, plans for cath  - CAD, s/p STEMI with stenting, s/p CABG x3 with LIMA (Calvinorrotondo)  -  HTN  - HLD  - Bipolar disorder/ HOLLY/ panic disorder  - Tobacco abuse    Await cardiac cath findings.    Berna Sandoval, APRN  9/15/2022  14:16 EDT    Electronically signed by Pablo Ball MD at 09/15/22 1536          Consult Notes (last 48 hours)      Moises Haddad MD at 09/15/22 0834      Consult Orders    1. Inpatient Cardiology Consult [[id number]] ordered by Moises Haddad MD at 09/14/22 1713                  Cardiology consult note  Moises Haddad MD, PhD      Patient Care Team:  Daniela Hernandez FNP as PCP - General (Family Medicine)  Ollie Dunn MD as Consulting Physician (Gastroenterology)    CHIEF COMPLAINT: CP, unstable angina    HISTORY OF PRESENT ILLNESS:    I the pleasure to see this 37-year-old gentleman well-known to me from prior hospitalization.  He had LAD STEMI treated with drug-eluting stent in the midportion followed a few months later by in-stent thrombosis and ultimately sent for three-vessel bypass with LIMA to LAD, SVG to obtuse marginal branch and SVG to distal RPDA.  His EF is preserved at 60%.  He presents back after multiple episodes of chest discomfort somewhat atypical in description but with his risk factors, early advanced disease, small disease he is in need of ischemic evaluation which we discussed today.  His troponin is unremarkable his EKG is otherwise unchanged and he is in sinus rhythm.  He is on nitro drip with continued chest discomfort.  After long discussion he elected for invasive ischemic evaluation to check grafts and native vessels.  He does report that he has been compliant with Brilinta for the most part but has missed occasional doses.  No other complaints no fevers chill sweats nausea vomiting or diarrhea    Review of systems otherwise negative x14 point review of systems except was mentioned above  Historical data copied forward from previous encounters in EMR is unchanged    EKG reviewed and  interpreted by me demonstrates normal sinus rhythm no ischemic segments      Past Medical History:   Diagnosis Date   • Acute inferior myocardial infarction (HCC) 2022   • Allergic    • Asthma 2022   • CAD, multiple vessel 2022   • Gastroesophageal reflux disease 2022   • Hx of complete eye exam 2016   • Hyperlipidemia 2022   • Hypertension    • Migraine headache 2022   • Panic attack 2022   • Peptic ulcer 2017     Past Surgical History:   Procedure Laterality Date   • CARDIAC CATHETERIZATION N/A 2022    Procedure: Left Heart Cath;  Surgeon: Matthieu Del Toro MD;  Location: [facility] CATH INVASIVE LOCATION;  Service: Cardiology;  Laterality: N/A;   • CARDIAC CATHETERIZATION N/A 2022    Procedure: Percutaneous Coronary Intervention;  Surgeon: Matthieu Del Toro MD;  Location: [facility] CATH INVASIVE LOCATION;  Service: Cardiovascular;  Laterality: N/A;   • CARDIAC CATHETERIZATION N/A 2022    Procedure: Left Heart Cath possible PCI, atherectomy, hemodynamic support;  Surgeon: Mioses Haddad MD;  Location: [facility] CATH INVASIVE LOCATION;  Service: Cardiology;  Laterality: N/A;   • CHOLECYSTECTOMY     • CORONARY ARTERY BYPASS GRAFT N/A 2022    Procedure: CORONARY ARTERY BYPASS GRAFTING;  Surgeon: Pablo Ball MD;  Location: [facility] CVOR;  Service: Cardiothoracic;  Laterality: N/A;  CABG X 3(2 vein grafts, 1 LIMA graft)   • MANDIBLE SURGERY       Family History   Problem Relation Age of Onset   • Heart disease Mother    • Heart failure Father    • Cirrhosis Maternal Grandfather      Social History     Tobacco Use   • Smoking status: Former Smoker     Packs/day: 1.00     Types: Cigarettes     Start date:      Quit date: 2022     Years since quittin.2   • Smokeless tobacco: Never Used   Vaping Use   • Vaping Use: Never used   Substance Use Topics   • Alcohol use: Not Currently   • Drug use: Yes     Frequency: 7.0 times per week     Types:  Marijuana     Medications Prior to Admission   Medication Sig Dispense Refill Last Dose   • aspirin 81 MG EC tablet Take 1 tablet by mouth Daily. 160 tablet 0 9/13/2022 at Unknown time   • atorvastatin (LIPITOR) 40 MG tablet Take 40 mg by mouth Every Night.   9/13/2022 at Unknown time   • HYDROcodone-acetaminophen (NORCO) 5-325 MG per tablet Take 1 tablet by mouth Every 4 (Four) Hours As Needed for Moderate Pain . 40 tablet 0 9/13/2022 at Unknown time   • hydrOXYzine pamoate (VISTARIL) 50 MG capsule Take 2 capsules by mouth 4 (Four) Times a Day As Needed for Itching (1-2 capsules). 60 capsule 0 Past Week at Unknown time   • levothyroxine (SYNTHROID, LEVOTHROID) 50 MCG tablet Take 50 mcg by mouth Daily.   9/13/2022 at Unknown time   • lisinopril (PRINIVIL,ZESTRIL) 5 MG tablet Take 1 tablet by mouth Daily. 30 tablet 2 9/13/2022 at Unknown time   • metoprolol tartrate (LOPRESSOR) 50 MG tablet Take 1 tablet by mouth 2 (Two) Times a Day. 30 tablet 2 9/13/2022 at Unknown time   • ticagrelor (BRILINTA) 90 MG tablet tablet Take 1 tablet by mouth 2 (Two) Times a Day. 60 tablet 3 9/13/2022 at Unknown time   • acetaminophen (TYLENOL) 500 MG tablet Take 500 mg by mouth Every 6 (Six) Hours As Needed for Mild Pain . (Patient not taking: Reported on 9/14/2022)   Not Taking at Unknown time   • albuterol sulfate  (90 Base) MCG/ACT inhaler Inhale 2 puffs Every 4 (Four) Hours As Needed for Wheezing. (Patient not taking: Reported on 9/14/2022)   Not Taking at Unknown time   • levothyroxine (SYNTHROID, LEVOTHROID) 50 MCG tablet TAKE 1 TABLET BY MOUTH EVERY DAY (Patient not taking: Reported on 9/14/2022) 90 tablet 0 Not Taking at Unknown time     Allergies:  Codeine, Hydrocodone, Hydrocodone-acetaminophen, and Shellfish-derived products    REVIEW OF SYSTEMS:  Please see the above history of present illness for pertinent positives and negatives.  The remainder of the patient's systems have been reviewed and are negative.     Vital  "Signs  Temp:  [97.7 °F (36.5 °C)-98.2 °F (36.8 °C)] 98.2 °F (36.8 °C)  Heart Rate:  [58-93] 73  Resp:  [12-24] 16  BP: ()/() 119/77    Flowsheet Rows    Flowsheet Row First Filed Value   Admission Height 177.8 cm (70\") Documented at 09/14/2022 1217   Admission Weight 123 kg (270 lb 8.1 oz) Documented at 09/14/2022 1217           Physical Exam:  Physical Exam   Constitutional: Patient appears well-developed and well-nourished and in no acute distress   HEENT:   Head: Normocephalic and atraumatic.   Eyes:  Pupils are equal, round, and reactive to light. EOM are intact. Sclerae are anicteric and noninjected.  Mouth and Throat: Patient has moist mucous membranes. Oropharynx is clear of any erythema or exudate.     Neck: Neck supple. No JVD present. No thyromegaly present. No lymphadenopathy present.  Cardiovascular: Regular rate, regular rhythm, S1 normal and S2 normal.  Exam reveals no gallop and no friction rub.  No murmur heard.  Pulmonary/Chest: Lungs are clear to auscultation bilaterally. No respiratory distress. No wheezes. No rhonchi. No rales.   Abdominal: Soft. Bowel sounds are normal. No distension and no mass. There is no hepatosplenomegaly. There is no tenderness.   Musculoskeletal: Normal muscle tone  Extremities: No edema. Pulses are palpable in all 4 extremities.  Neurological: Patient is alert and oriented to person, place, and time. Cranial nerves II-XII are grossly intact with no focal deficits.  Skin: Skin is warm. No rash noted. Nails show no clubbing.  No cyanosis or erythema.     Results Review:    I reviewed the patient's new clinical results.  Lab Results (most recent)     Procedure Component Value Units Date/Time    POC Activated Clotting Time [[id number]]  (Abnormal) Collected: 09/15/22 1204    Specimen: Arterial Blood Updated: 09/15/22 1211     Activated Clotting Time  329 Seconds      Comment: Serial Number: [id number]:  [id number]       Troponin [[id number]]  (Normal) Collected: " 09/15/22 0457    Specimen: Blood Updated: 09/15/22 0711     Troponin T <0.010 ng/mL     Narrative:      Troponin T Reference Range:  <= 0.03 ng/mL-   Negative for AMI  >0.03 ng/mL-     Abnormal for myocardial necrosis.  Clinicians would have to utilize clinical acumen, EKG, Troponin and serial changes to determine if it is an Acute Myocardial Infarction or myocardial injury due to an underlying chronic condition.       Results may be falsely decreased if patient taking Biotin.      Basic Metabolic Panel [[id number]]  (Normal) Collected: 09/15/22 0457    Specimen: Blood Updated: 09/15/22 0555     Glucose 96 mg/dL      BUN 12 mg/dL      Creatinine 1.04 mg/dL      Sodium 136 mmol/L      Potassium 4.3 mmol/L      Chloride 102 mmol/L      CO2 23.0 mmol/L      Calcium 9.1 mg/dL      BUN/Creatinine Ratio 11.5     Anion Gap 11.0 mmol/L      eGFR 93.7 mL/min/1.73      Comment: National Kidney Foundation and American Society of Nephrology (ASN) Task Force recommended calculation based on the Chronic Kidney Disease Epidemiology Collaboration (CKD-EPI) equation refit without adjustment for race.       Narrative:      GFR Normal >60  Chronic Kidney Disease <60  Kidney Failure <15      Magnesium [[id number]]  (Normal) Collected: 09/15/22 0457    Specimen: Blood Updated: 09/15/22 0555     Magnesium 2.0 mg/dL     CBC & Differential [[id number]]  (Normal) Collected: 09/15/22 0457    Specimen: Blood Updated: 09/15/22 0534    Narrative:      The following orders were created for panel order CBC & Differential.  Procedure                               Abnormality         Status                     ---------                               -----------         ------                     CBC Auto Differential[[id number]]        Normal              Final result                 Please view results for these tests on the individual orders.    CBC Auto Differential [[id number]]  (Normal) Collected: 09/15/22 0457    Specimen: Blood Updated: 09/15/22  0534     WBC 6.30 10*3/mm3      RBC 4.84 10*6/mm3      Hemoglobin 13.6 g/dL      Hematocrit 40.6 %      MCV 83.8 fL      MCH 28.1 pg      MCHC 33.5 g/dL      RDW 15.0 %      RDW-SD 44.6 fl      MPV 6.7 fL      Platelets 320 10*3/mm3      Neutrophil % 69.3 %      Lymphocyte % 23.2 %      Monocyte % 5.7 %      Eosinophil % 0.9 %      Basophil % 0.9 %      Neutrophils, Absolute 4.40 10*3/mm3      Lymphocytes, Absolute 1.50 10*3/mm3      Monocytes, Absolute 0.40 10*3/mm3      Eosinophils, Absolute 0.10 10*3/mm3      Basophils, Absolute 0.10 10*3/mm3      nRBC 0.0 /100 WBC     Troponin [[id number]]  (Normal) Collected: 09/14/22 1926    Specimen: Blood Updated: 09/14/22 2055     Troponin T <0.010 ng/mL     Narrative:      Troponin T Reference Range:  <= 0.03 ng/mL-   Negative for AMI  >0.03 ng/mL-     Abnormal for myocardial necrosis.  Clinicians would have to utilize clinical acumen, EKG, Troponin and serial changes to determine if it is an Acute Myocardial Infarction or myocardial injury due to an underlying chronic condition.       Results may be falsely decreased if patient taking Biotin.      [place] Draw [[id number]] Collected: 09/14/22 1315    Specimen: Blood Updated: 09/14/22 1433    Narrative:      The following orders were created for panel order [place] Draw.  Procedure                               Abnormality         Status                     ---------                               -----------         ------                     Green Top (Gel)[[id number]]                                  Final result               Lavender Top[[id number]]                                     Final result               Gold Top - SST[[id number]]                                   Final result               Light Blue Top[[id number]]                                   Final result                 Please view results for these tests on the individual orders.    Lavender Top [[id number]] Collected: 09/14/22 1315    Specimen: Blood  Updated: 09/14/22 1433     Extra Tube hold for add-on     Comment: Auto resulted       Gold Top - SST [[id number]] Collected: 09/14/22 1315    Specimen: Blood Updated: 09/14/22 1433     Extra Tube Hold for add-ons.     Comment: Auto resulted.       Green Top (Gel) [[id number]] Collected: 09/14/22 1315    Specimen: Blood Updated: 09/14/22 1433     Extra Tube Hold for add-ons.     Comment: Auto resulted.       Light Blue Top [[id number]] Collected: 09/14/22 1315    Specimen: Blood Updated: 09/14/22 1433     Extra Tube Hold for add-ons.     Comment: Auto resulted       D-dimer, Quantitative [[id number]]  (Normal) Collected: 09/14/22 1315    Specimen: Blood Updated: 09/14/22 1356     D-Dimer, Quantitative 0.50 mg/L (FEU)     Narrative:      Reference Range  --------------------------------------------------------------------     < 0.50   Negative Predictive Value  0.50-0.59   Indeterminate    >= 0.60   Probable VTE             A very low percentage of patients with DVT may yield D-Dimer results   below the cut-off of 0.50 mg/L FEU.  This is known to be more   prevalent in patients with distal DVT.             Results of this test should always be interpreted in conjunction with   the patient's medical history, clinical presentation and other   findings.  Clinical diagnosis should not be based on the result of   [place] D-Dimer alone.    Comprehensive Metabolic Panel [[id number]]  (Abnormal) Collected: 09/14/22 1315    Specimen: Blood Updated: 09/14/22 1352     Glucose 99 mg/dL      BUN 11 mg/dL      Creatinine 1.19 mg/dL      Sodium 133 mmol/L      Potassium 4.0 mmol/L      Comment: Slight hemolysis detected by analyzer. Results may be affected.        Chloride 97 mmol/L      CO2 24.0 mmol/L      Calcium 8.8 mg/dL      Total Protein 7.1 g/dL      Albumin 4.10 g/dL      ALT (SGPT) 26 U/L      AST (SGOT) 20 U/L      Comment: Slight hemolysis detected by analyzer. Results may be affected.        Alkaline Phosphatase 116 U/L       Total Bilirubin 0.2 mg/dL      Globulin 3.0 gm/dL      A/G Ratio 1.4 g/dL      BUN/Creatinine Ratio 9.2     Anion Gap 12.0 mmol/L      eGFR 79.7 mL/min/1.73      Comment: National Kidney Foundation and American Society of Nephrology (ASN) Task Force recommended calculation based on the Chronic Kidney Disease Epidemiology Collaboration (CKD-EPI) equation refit without adjustment for race.       Narrative:      GFR Normal >60  Chronic Kidney Disease <60  Kidney Failure <15      BNP [[id number]]  (Normal) Collected: 09/14/22 1315    Specimen: Blood Updated: 09/14/22 1347     proBNP 105.7 pg/mL     Narrative:      Among patients with dyspnea, NT-proBNP is highly sensitive for the detection of acute congestive heart failure. In addition NT-proBNP of <300 pg/ml effectively rules out acute congestive heart failure with 99% negative predictive value.    Results may be falsely decreased if patient taking Biotin.      CBC & Differential [[id number]]  (Normal) Collected: 09/14/22 1315    Specimen: Blood Updated: 09/14/22 1330    Narrative:      The following orders were created for panel order CBC & Differential.  Procedure                               Abnormality         Status                     ---------                               -----------         ------                     CBC Auto Differential[[id number]]        Normal              Final result                 Please view results for these tests on the individual orders.    CBC Auto Differential [[id number]]  (Normal) Collected: 09/14/22 1315    Specimen: Blood Updated: 09/14/22 1330     WBC 7.50 10*3/mm3      RBC 4.85 10*6/mm3      Hemoglobin 13.5 g/dL      Hematocrit 40.6 %      MCV 83.7 fL      MCH 27.9 pg      MCHC 33.3 g/dL      RDW 14.8 %      RDW-SD 43.8 fl      MPV 6.8 fL      Platelets 322 10*3/mm3      Neutrophil % 59.1 %      Lymphocyte % 32.1 %      Monocyte % 6.6 %      Eosinophil % 1.1 %      Basophil % 1.1 %      Neutrophils, Absolute 4.40 10*3/mm3       Lymphocytes, Absolute 2.40 10*3/mm3      Monocytes, Absolute 0.50 10*3/mm3      Eosinophils, Absolute 0.10 10*3/mm3      Basophils, Absolute 0.10 10*3/mm3      nRBC 0.1 /100 WBC           Imaging Results (Most Recent)     Procedure Component Value Units Date/Time    XR Chest 1 View [[id number]] Collected: 09/14/22 1401     Updated: 09/14/22 1403    Narrative:      XR CHEST 1 VW-     Date of Exam: 9/14/2022 1:10 PM     Indication: Chest Pain Protocol.     Comparison: 7/1/2022     Technique: A single view of the chest was obtained.     FINDINGS:      The heart size and pulmonary vessels are within normal limits. The  lungs are clear bilaterally. There are no pleural effusions. Bony thorax  is unremarkable.                Impression:         1. No acute cardiopulmonary disease.        Electronically Signed By-Roman Montano MD On:9/14/2022 2:01 PM  This report was finalized on [id number] by  Roman Montano MD.        reviewed    ECG/EMG Results (most recent)     Procedure Component Value Units Date/Time    ECG 12 Lead [[id number]] Collected: 09/14/22 1221     Updated: 09/14/22 1222     QT Interval 360 ms     Narrative:      HEART RATE= 72  bpm  RR Interval= 832  ms  MD Interval= 132  ms  P Horizontal Axis= 21  deg  P Front Axis= 33  deg  QRSD Interval= 75  ms  QT Interval= 360  ms  QRS Axis= 62  deg  T Wave Axis= -9  deg  - NORMAL ECG -  Sinus rhythm  When compared with ECG of 05-Jul-2022 6:15:24,  Significant rate decrease  Significant repolarization change  Significant axis, voltage or hypertrophy change  Electronically Signed By:   Date and Time of Study: 2022-09-14 12:21:47    ECG 12 Lead [[id number]] Resulted: 09/14/22 1654     Updated: 09/14/22 1654    ECG 12 Lead [[id number]] Collected: 09/15/22 0715     Updated: 09/15/22 0718     QT Interval 400 ms     Narrative:      HEART RATE= 58  bpm  RR Interval= 1032  ms  MD Interval= 129  ms  P Horizontal Axis= 15  deg  P Front Axis= 27  deg  QRSD Interval= 80   ms  QT Interval= 400  ms  QRS Axis= -1  deg  T Wave Axis= 9  deg  - OTHERWISE NORMAL ECG -  Sinus bradycardia  Electronically Signed By:   Date and Time of Study: 2022-09-15 07:15:57    SCANNED - TELEMETRY   [[id number]] Resulted: 09/14/22     Updated: 09/15/22 1047        reviewed    Assessment & Plan     Unstable angina  Known three-vessel coronary disease status post three-vessel bypass just 3 months ago  Has been maintained on aspirin Brilinta which will be continued      Ischemic evaluation with left heart cath native and bypass vessels    Further recommendation to follow findings and clinical course    Advance antianginals  Secondary prevention goals  Diet and exercise per HI guidelines  Smoking cessation discussed  Compliance with antiplatelets discussed        I discussed the patient's findings and my recommendations with patient and staff    Moises Haddad MD  09/15/22  12:34 EDT          Electronically signed by Moises Haddad MD at 09/15/22 4277

## 2022-09-16 NOTE — PROGRESS NOTES
[facility]     Progress Note    Patient Name: Tramaine Gates  : 1985  MRN: [id number]  Primary Care Physician:  Daniela Hernandez FNP  Date of admission: 2022  Service date and time: 22 17:25 EDT  Subjective   Subjective     Chief Complaint: chest pain    HPI:  Patient Reports still having chest discomfort      Objective   Objective     Vitals:   Temp:  [97.3 °F (36.3 °C)-98.4 °F (36.9 °C)] 98.2 °F (36.8 °C)  Heart Rate:  [63-88] 77  Resp:  [18-20] 18  BP: (107-142)/(63-97) 114/63  Physical Exam    Constitutional: Awake, alert, obese   Eyes: PERRLA, sclerae anicteric, no conjunctival injection   HENT: NCAT, mucous membranes moist   Neck: Supple, no thyromegaly, no lymphadenopathy, trachea midline   Respiratory: Clear to auscultation bilaterally, nonlabored respirations    Cardiovascular: RRR, no murmurs, rubs, or gallops, palpable pedal pulses bilaterally   Gastrointestinal: Positive bowel sounds, soft, nontender, nondistended   Musculoskeletal: No bilateral ankle edema, no clubbing or cyanosis to extremities   Psychiatric: Appropriate affect, cooperative   Neurologic: Oriented x 3, strength symmetric in all extremities, Cranial Nerves grossly intact to confrontation, speech clear   Skin: No rashes     Result Review    Result Review:  I have personally reviewed the results from the time of this admission to 2022 17:25 EDT and agree with these findings:  [x]  Laboratory list / accordion  []  Microbiology  [x]  Radiology  [x]  EKG/Telemetry   [x]  Cardiology/Vascular   []  Pathology  []  Old records  []  Other:        Assessment & Plan   Assessment / Plan       Active Hospital Problems:  Active Hospital Problems    Diagnosis    • S/P CABG x 3    • Chest pain, unspecified type    • CAD, multiple vessel      Added automatically from request for surgery [id number]     • Chest pain      Plan:    - cards and CTS following, telemetry  - cont OMT per cards, started on ranexa  - GI  consulted for transaminitis, f/u recs, supportive care  - trend labs    DVT prophylaxis:  Medical DVT prophylaxis orders are present.    CODE STATUS:   Code Status (Patient has no pulse and is not breathing): CPR (Attempt to Resuscitate)  Medical Interventions (Patient has pulse or is breathing): Full Support    Disposition:  I expect patient to be discharged 1-2 days    Pranav Lazaro MD

## 2022-09-16 NOTE — CASE MANAGEMENT/SOCIAL WORK
Continued Stay Note  [facility]     Patient Name: Tramaine Gates  MRN: [id number]  Today's Date: 9/16/2022    Admit Date: 9/14/2022     Discharge Plan     Row Name 09/16/22 1106       Plan    Plan D/C Plan: Anticipate routine home.    Plan Comments Barrier to D/C: GI consult pending.                    Expected Discharge Date and Time     Expected Discharge Date Expected Discharge Time    Sep 16, 2022         Phone communication or documentation only - no physical contact with patient or family.    HODA AllredN, [place] [address]    Office: [phone]  Fax: [phone]

## 2022-09-17 ENCOUNTER — INPATIENT HOSPITAL (OUTPATIENT)
Dept: URBAN - METROPOLITAN AREA HOSPITAL 84 | Facility: HOSPITAL | Age: 37
End: 2022-09-17
Payer: COMMERCIAL

## 2022-09-17 DIAGNOSIS — R10.11 RIGHT UPPER QUADRANT PAIN: ICD-10-CM

## 2022-09-17 DIAGNOSIS — R74.01 ELEVATION OF LEVELS OF LIVER TRANSAMINASE LEVELS: ICD-10-CM

## 2022-09-17 DIAGNOSIS — R07.89 OTHER CHEST PAIN: ICD-10-CM

## 2022-09-17 DIAGNOSIS — E78.5 HYPERLIPIDEMIA, UNSPECIFIED: ICD-10-CM

## 2022-09-17 LAB
ALBUMIN SERPL-MCNC: 3.9 G/DL (ref 3.5–5.2)
ALBUMIN/GLOB SERPL: 1.3 G/DL
ALP SERPL-CCNC: 147 U/L (ref 39–117)
ALPHA1 GLOB MFR UR ELPH: 139 MG/DL (ref 90–200)
ALT SERPL W P-5'-P-CCNC: 79 U/L (ref 1–41)
ANION GAP SERPL CALCULATED.3IONS-SCNC: 12 MMOL/L (ref 5–15)
AST SERPL-CCNC: 34 U/L (ref 1–40)
BASOPHILS # BLD AUTO: 0.1 10*3/MM3 (ref 0–0.2)
BASOPHILS NFR BLD AUTO: 1 % (ref 0–1.5)
BILIRUB SERPL-MCNC: 0.5 MG/DL (ref 0–1.2)
BUN SERPL-MCNC: 12 MG/DL (ref 6–20)
BUN/CREAT SERPL: 11 (ref 7–25)
CALCIUM SPEC-SCNC: 9.2 MG/DL (ref 8.6–10.5)
CERULOPLASMIN SERPL-MCNC: 31 MG/DL (ref 16–31)
CHLORIDE SERPL-SCNC: 100 MMOL/L (ref 98–107)
CO2 SERPL-SCNC: 22 MMOL/L (ref 22–29)
CREAT SERPL-MCNC: 1.09 MG/DL (ref 0.76–1.27)
DEPRECATED RDW RBC AUTO: 45.1 FL (ref 37–54)
EGFRCR SERPLBLD CKD-EPI 2021: 88.5 ML/MIN/1.73
EOSINOPHIL # BLD AUTO: 0.1 10*3/MM3 (ref 0–0.4)
EOSINOPHIL NFR BLD AUTO: 1.1 % (ref 0.3–6.2)
ERYTHROCYTE [DISTWIDTH] IN BLOOD BY AUTOMATED COUNT: 15.1 % (ref 12.3–15.4)
GLOBULIN UR ELPH-MCNC: 2.9 GM/DL
GLUCOSE SERPL-MCNC: 88 MG/DL (ref 65–99)
HCT VFR BLD AUTO: 40.8 % (ref 37.5–51)
HGB BLD-MCNC: 13.4 G/DL (ref 13–17.7)
INR PPP: 1.03 (ref 0.93–1.1)
LYMPHOCYTES # BLD AUTO: 1.9 10*3/MM3 (ref 0.7–3.1)
LYMPHOCYTES NFR BLD AUTO: 30.5 % (ref 19.6–45.3)
MAGNESIUM SERPL-MCNC: 1.8 MG/DL (ref 1.6–2.6)
MCH RBC QN AUTO: 27.6 PG (ref 26.6–33)
MCHC RBC AUTO-ENTMCNC: 32.8 G/DL (ref 31.5–35.7)
MCV RBC AUTO: 84.1 FL (ref 79–97)
MONOCYTES # BLD AUTO: 0.5 10*3/MM3 (ref 0.1–0.9)
MONOCYTES NFR BLD AUTO: 8.1 % (ref 5–12)
NEUTROPHILS NFR BLD AUTO: 3.6 10*3/MM3 (ref 1.7–7)
NEUTROPHILS NFR BLD AUTO: 59.3 % (ref 42.7–76)
NRBC BLD AUTO-RTO: 0.1 /100 WBC (ref 0–0.2)
PLATELET # BLD AUTO: 296 10*3/MM3 (ref 140–450)
PMV BLD AUTO: 6.7 FL (ref 6–12)
POTASSIUM SERPL-SCNC: 4.4 MMOL/L (ref 3.5–5.2)
PROT SERPL-MCNC: 6.8 G/DL (ref 6–8.5)
PROTHROMBIN TIME: 10.6 SECONDS (ref 9.6–11.7)
RBC # BLD AUTO: 4.85 10*6/MM3 (ref 4.14–5.8)
SODIUM SERPL-SCNC: 134 MMOL/L (ref 136–145)
WBC NRBC COR # BLD: 6.1 10*3/MM3 (ref 3.4–10.8)

## 2022-09-17 PROCEDURE — 63710000001 ONDANSETRON PER 8 MG: Performed by: INTERNAL MEDICINE

## 2022-09-17 PROCEDURE — 99222 1ST HOSP IP/OBS MODERATE 55: CPT | Performed by: NURSE PRACTITIONER

## 2022-09-17 PROCEDURE — 83735 ASSAY OF MAGNESIUM: CPT | Performed by: INTERNAL MEDICINE

## 2022-09-17 PROCEDURE — 80053 COMPREHEN METABOLIC PANEL: CPT | Performed by: HOSPITALIST

## 2022-09-17 PROCEDURE — 85610 PROTHROMBIN TIME: CPT | Performed by: HOSPITALIST

## 2022-09-17 PROCEDURE — 99233 SBSQ HOSP IP/OBS HIGH 50: CPT | Performed by: INTERNAL MEDICINE

## 2022-09-17 PROCEDURE — 25010000002 ENOXAPARIN PER 10 MG: Performed by: INTERNAL MEDICINE

## 2022-09-17 PROCEDURE — 25010000002 ONDANSETRON PER 1 MG: Performed by: INTERNAL MEDICINE

## 2022-09-17 PROCEDURE — 25010000002 MORPHINE PER 10 MG: Performed by: HOSPITALIST

## 2022-09-17 PROCEDURE — 85025 COMPLETE CBC W/AUTO DIFF WBC: CPT | Performed by: INTERNAL MEDICINE

## 2022-09-17 RX ADMIN — ENOXAPARIN SODIUM 40 MG: 100 INJECTION SUBCUTANEOUS at 15:39

## 2022-09-17 RX ADMIN — ISOSORBIDE MONONITRATE 30 MG: 30 TABLET, EXTENDED RELEASE ORAL at 08:39

## 2022-09-17 RX ADMIN — ASPIRIN 81 MG: 81 TABLET, COATED ORAL at 08:40

## 2022-09-17 RX ADMIN — HYDROCODONE BITARTRATE AND ACETAMINOPHEN 1 TABLET: 5; 325 TABLET ORAL at 20:26

## 2022-09-17 RX ADMIN — HYDROCODONE BITARTRATE AND ACETAMINOPHEN 1 TABLET: 5; 325 TABLET ORAL at 15:39

## 2022-09-17 RX ADMIN — METOPROLOL TARTRATE 50 MG: 50 TABLET, FILM COATED ORAL at 20:26

## 2022-09-17 RX ADMIN — RANOLAZINE 500 MG: 500 TABLET, FILM COATED, EXTENDED RELEASE ORAL at 08:40

## 2022-09-17 RX ADMIN — Medication 10 ML: at 08:42

## 2022-09-17 RX ADMIN — TICAGRELOR 90 MG: 90 TABLET ORAL at 20:26

## 2022-09-17 RX ADMIN — TICAGRELOR 90 MG: 90 TABLET ORAL at 08:40

## 2022-09-17 RX ADMIN — MORPHINE SULFATE 2 MG: 2 INJECTION, SOLUTION INTRAMUSCULAR; INTRAVENOUS at 08:42

## 2022-09-17 RX ADMIN — ATORVASTATIN CALCIUM 40 MG: 40 TABLET, FILM COATED ORAL at 20:26

## 2022-09-17 RX ADMIN — ONDANSETRON 4 MG: 2 INJECTION INTRAMUSCULAR; INTRAVENOUS at 12:55

## 2022-09-17 RX ADMIN — Medication 10 ML: at 21:59

## 2022-09-17 RX ADMIN — HYDROCODONE BITARTRATE AND ACETAMINOPHEN 1 TABLET: 5; 325 TABLET ORAL at 01:09

## 2022-09-17 RX ADMIN — MORPHINE SULFATE 2 MG: 2 INJECTION, SOLUTION INTRAMUSCULAR; INTRAVENOUS at 02:48

## 2022-09-17 RX ADMIN — METOPROLOL TARTRATE 50 MG: 50 TABLET, FILM COATED ORAL at 08:40

## 2022-09-17 RX ADMIN — RANOLAZINE 500 MG: 500 TABLET, FILM COATED, EXTENDED RELEASE ORAL at 20:26

## 2022-09-17 RX ADMIN — ONDANSETRON HYDROCHLORIDE 4 MG: 4 TABLET, FILM COATED ORAL at 21:58

## 2022-09-17 RX ADMIN — HYDROCODONE BITARTRATE AND ACETAMINOPHEN 1 TABLET: 5; 325 TABLET ORAL at 11:19

## 2022-09-17 RX ADMIN — HYDROCODONE BITARTRATE AND ACETAMINOPHEN 1 TABLET: 5; 325 TABLET ORAL at 07:01

## 2022-09-17 RX ADMIN — DIAZEPAM 2 MG: 2 TABLET ORAL at 20:26

## 2022-09-17 RX ADMIN — MORPHINE SULFATE 2 MG: 2 INJECTION, SOLUTION INTRAMUSCULAR; INTRAVENOUS at 21:58

## 2022-09-17 RX ADMIN — LISINOPRIL 5 MG: 5 TABLET ORAL at 08:39

## 2022-09-17 RX ADMIN — MORPHINE SULFATE 2 MG: 2 INJECTION, SOLUTION INTRAMUSCULAR; INTRAVENOUS at 12:55

## 2022-09-17 RX ADMIN — LEVOTHYROXINE SODIUM 50 MCG: 0.05 TABLET ORAL at 08:40

## 2022-09-17 NOTE — PROGRESS NOTES
Referring Provider: Pranav Lazaro MD    Reason for follow-up:  Chest pain     Patient Care Team:  Daniela Hernandez FNP as PCP - General (Family Medicine)  Ollie Dunn MD as Consulting Physician (Gastroenterology)    Subjective .  Patient is having sharp chest pains     ROS    Patient is not having any shortness of breath, palpitations, dizziness or syncope.  Denies having any headache ,abdominal pain ,nausea, vomiting , diarrhea constipation, loss of weight or loss of appetite.  Denies having any excessive bruising ,hematuria or blood in the stool.    Review of all systems negative except as indicated    History  Past Medical History:   Diagnosis Date   • Acute inferior myocardial infarction (HCC) 6/14/2022   • Allergic    • Asthma 1/27/2022   • CAD, multiple vessel 6/14/2022   • Gastroesophageal reflux disease 1/27/2022   • Hx of complete eye exam 2016   • Hyperlipidemia 1/27/2022   • Hypertension    • Migraine headache 1/27/2022   • Panic attack 1/27/2022   • Peptic ulcer 9/14/2017       Past Surgical History:   Procedure Laterality Date   • CARDIAC CATHETERIZATION N/A 6/14/2022    Procedure: Left Heart Cath;  Surgeon: Matthieu Del Toro MD;  Location: [facility] CATH INVASIVE LOCATION;  Service: Cardiology;  Laterality: N/A;   • CARDIAC CATHETERIZATION N/A 6/16/2022    Procedure: Percutaneous Coronary Intervention;  Surgeon: Matthieu Del Toro MD;  Location: [facility] CATH INVASIVE LOCATION;  Service: Cardiovascular;  Laterality: N/A;   • CARDIAC CATHETERIZATION N/A 6/23/2022    Procedure: Left Heart Cath possible PCI, atherectomy, hemodynamic support;  Surgeon: Moises Haddad MD;  Location: [facility] CATH INVASIVE LOCATION;  Service: Cardiology;  Laterality: N/A;   • CARDIAC CATHETERIZATION N/A 9/15/2022    Procedure: Left Heart Cath;  Surgeon: Moises Haddad MD;  Location: [facility] CATH INVASIVE LOCATION;  Service: Cardiology;  Laterality: N/A;   • CHOLECYSTECTOMY  2019   • CORONARY  ARTERY BYPASS GRAFT N/A 2022    Procedure: CORONARY ARTERY BYPASS GRAFTING;  Surgeon: Pablo Ball MD;  Location: [facility];  Service: Cardiothoracic;  Laterality: N/A;  CABG X 3(2 vein grafts, 1 LIMA graft)   • MANDIBLE SURGERY         Family History   Problem Relation Age of Onset   • Heart disease Mother    • Heart failure Father    • Cirrhosis Maternal Grandfather        Social History     Tobacco Use   • Smoking status: Former Smoker     Packs/day: 1.00     Types: Cigarettes     Start date:      Quit date: 2022     Years since quittin.2   • Smokeless tobacco: Never Used   Vaping Use   • Vaping Use: Never used   Substance Use Topics   • Alcohol use: Not Currently   • Drug use: Yes     Frequency: 7.0 times per week     Types: Marijuana        Medications Prior to Admission   Medication Sig Dispense Refill Last Dose   • aspirin 81 MG EC tablet Take 1 tablet by mouth Daily. 160 tablet 0 2022 at Unknown time   • atorvastatin (LIPITOR) 40 MG tablet Take 40 mg by mouth Every Night.   2022 at Unknown time   • HYDROcodone-acetaminophen (NORCO) 5-325 MG per tablet Take 1 tablet by mouth Every 4 (Four) Hours As Needed for Moderate Pain . 40 tablet 0 2022 at Unknown time   • levothyroxine (SYNTHROID, LEVOTHROID) 50 MCG tablet Take 50 mcg by mouth Daily.   2022 at Unknown time   • lisinopril (PRINIVIL,ZESTRIL) 5 MG tablet Take 1 tablet by mouth Daily. 30 tablet 2 2022 at Unknown time   • metoprolol tartrate (LOPRESSOR) 50 MG tablet Take 1 tablet by mouth 2 (Two) Times a Day. 30 tablet 2 2022 at Unknown time   • ticagrelor (BRILINTA) 90 MG tablet tablet Take 1 tablet by mouth 2 (Two) Times a Day. 60 tablet 3 2022 at Unknown time   • albuterol sulfate  (90 Base) MCG/ACT inhaler Inhale 2 puffs Every 4 (Four) Hours As Needed for Wheezing.      • hydrOXYzine (ATARAX) 25 MG tablet Take 25 mg by mouth 3 (Three) Times a Day As Needed for Itching.     "      Allergies  Shellfish-derived products    Scheduled Meds:aspirin, 81 mg, Oral, Daily  atorvastatin, 40 mg, Oral, Nightly  diazePAM, 2 mg, Oral, Q24H  enoxaparin, 40 mg, Subcutaneous, Q24H  isosorbide mononitrate, 30 mg, Oral, Q24H  levothyroxine, 50 mcg, Oral, Daily  lisinopril, 5 mg, Oral, Q24H  metoprolol tartrate, 50 mg, Oral, BID  ranolazine, 500 mg, Oral, Q12H  sodium chloride, 10 mL, Intravenous, Q12H  ticagrelor, 90 mg, Oral, BID      Continuous Infusions:   PRN Meds:.HYDROcodone-acetaminophen  •  melatonin  •  Morphine  •  nitroglycerin  •  ondansetron **OR** ondansetron  •  sodium chloride  •  sodium chloride    Objective     VITAL SIGNS  Vitals:    09/17/22 0112 09/17/22 0400 09/17/22 0516 09/17/22 1121   BP: 115/70  115/81 121/69   BP Location: Left arm  Left arm Left arm   Patient Position:    Lying   Pulse: 68 56 74 62   Resp: 16  18 18   Temp: 98.8 °F (37.1 °C)  97.7 °F (36.5 °C) 98 °F (36.7 °C)   TempSrc: Oral  Oral Oral   SpO2: 98% 95% 98% 100%   Weight:   123 kg (271 lb 2.7 oz)    Height:           Flowsheet Rows    Flowsheet Row First Filed Value   Admission Height 177.8 cm (70\") Documented at 09/14/2022 1217   Admission Weight 123 kg (270 lb 8.1 oz) Documented at 09/14/2022 1217            Intake/Output Summary (Last 24 hours) at 9/17/2022 1149  Last data filed at 9/17/2022 1121  Gross per 24 hour   Intake 720 ml   Output 2200 ml   Net -1480 ml        TELEMETRY: Sinus bradycardia    Physical Exam:  The patient is alert, oriented and in no distress.  Vital signs as noted above.  Head and neck revealed no carotid bruits or jugular venous distention.  No thyromegaly or lymphadenopathy is present  Lungs clear.  No wheezing.  Breath sounds are normal bilaterally.  Heart normal first and second heart sounds.  No murmur. No precordial rub is present.  No gallop is present.  Abdomen soft and nontender.  No organomegaly is present.  Extremities with good peripheral pulses without any pedal " edema.  Skin warm and dry.  Incisions are looking well.  Musculoskeletal system is grossly normal  CNS grossly normal      Results Review:   I reviewed the patient's new clinical results.  Lab Results (last 24 hours)     Procedure Component Value Units Date/Time    Alpha - 1 - Antitrypsin [[id number]]  (Normal) Collected: 09/16/22 0442    Specimen: Blood Updated: 09/17/22 0624     ALPHA -1 ANTITRYPSIN 139 mg/dL     Ceruloplasmin [[id number]]  (Normal) Collected: 09/16/22 0442    Specimen: Blood Updated: 09/17/22 0624     Ceruloplasmin 31 mg/dL     Anti-microsomal Antibody [[id number]] Collected: 09/16/22 1815    Specimen: Blood Updated: 09/16/22 1825    Anti-Smooth Muscle Antibody Titer [[id number]] Collected: 09/16/22 1815    Specimen: Blood Updated: 09/16/22 1825    Mitochondrial Antibodies, M2 [[id number]] Collected: 09/16/22 1815    Specimen: Blood Updated: 09/16/22 1825    Hepatitis Panel, Acute [[id number]]  (Normal) Collected: 09/14/22 1315    Specimen: Blood Updated: 09/16/22 1824     Hepatitis B Surface Ag Non-Reactive     Hep A IgM Non-Reactive     Hep B C IgM Non-Reactive     Hepatitis C Ab Non-Reactive    Narrative:      Results may be falsely decreased if patient taking Biotin.     Ferritin [[id number]]  (Normal) Collected: 09/16/22 0442    Specimen: Blood Updated: 09/16/22 1821     Ferritin 314.30 ng/mL     Narrative:      Results may be falsely decreased if patient taking Biotin.      Iron [[id number]]  (Normal) Collected: 09/16/22 0442    Specimen: Blood Updated: 09/16/22 1817     Iron 75 mcg/dL     PAULA [[id number]] Collected: 09/14/22 1315    Specimen: Blood Updated: 09/16/22 1754          Imaging Results (Last 24 Hours)     Procedure Component Value Units Date/Time    MRI abdomen wo contrast mrcp [[id number]] Collected: 09/16/22 2025     Updated: 09/16/22 2031    Narrative:         DATE OF EXAM:   9/16/2022 7:25 PM     PROCEDURE:   MRI ABDOMEN WO CONTRAST MRCP-     INDICATIONS:   Elevated LFTs, history of  cholecystectomy, right upper quadrant pain;  R07.9-Chest pain, unspecified; R06.00-Dyspnea, unspecified;  Z95.1-Presence of aortocoronary bypass graft; I25.10-Atherosclerotic  heart disease of native coronary artery without angina pectoris     COMPARISON:  No Comparisons Available     TECHNIQUE:   Multiplanar/multisequence MRI imaging of the abdomen was performed  without gadolinium contrast administration.     FINDINGS:   There is no discrete abnormality of the liver. The common bile duct and  pancreatic duct do are not dilated. There is no intrahepatic biliary  dilatation. There is focal tapering of the more distal common bile duct  of questionable significance. There is no definite abnormality of the  spleen or pancreas. There is a left renal cyst. The right kidney is  unremarkable. There is no ascites or pathologic appearing  lymphadenopathy is seen within the abdomen.       Impression:      IMPRESSION :   1.     Left renal cyst  2.     No significant biliary dilatation or discrete hepatic abnormality  is apparent. There is some focal tapering of the more distal common bile  duct which is of questionable significance.     Electronically Signed By-Luis Alfredo Weldon MD On:9/16/2022 8:29 PM  This report was finalized on [id number] by  Luis Alfredo Weldon MD.      LAB RESULTS (LAST 7 DAYS)    CBC  Results from last 7 days   Lab Units 09/16/22  0442 09/15/22  0457 09/14/22  1315   WBC 10*3/mm3 6.10 6.30 7.50   RBC 10*6/mm3 4.88 4.84 4.85   HEMOGLOBIN g/dL 13.5 13.6 13.5   HEMATOCRIT % 40.9 40.6 40.6   MCV fL 83.9 83.8 83.7   PLATELETS 10*3/mm3 294 320 322       BMP  Results from last 7 days   Lab Units 09/16/22  0442 09/15/22  0457 09/14/22  1315   SODIUM mmol/L 135* 136 133*   POTASSIUM mmol/L 4.9 4.3 4.0   CHLORIDE mmol/L 100 102 97*   CO2 mmol/L 25.0 23.0 24.0   BUN mg/dL 11 12 11   CREATININE mg/dL 1.09 1.04 1.19   GLUCOSE mg/dL 97 96 99   MAGNESIUM mg/dL 2.2 2.0  --        CMP   Results from last 7 days   Lab Units  09/16/22  0442 09/15/22  0457 09/14/22  1315   SODIUM mmol/L 135* 136 133*   POTASSIUM mmol/L 4.9 4.3 4.0   CHLORIDE mmol/L 100 102 97*   CO2 mmol/L 25.0 23.0 24.0   BUN mg/dL 11 12 11   CREATININE mg/dL 1.09 1.04 1.19   GLUCOSE mg/dL 97 96 99   ALBUMIN g/dL 3.80  --  4.10   BILIRUBIN mg/dL 0.6  --  0.2   ALK PHOS U/L 168*  --  116   AST (SGOT) U/L 71*  --  20   ALT (SGPT) U/L 130*  --  26         BNP        TROPONIN  Results from last 7 days   Lab Units 09/15/22  0457   TROPONIN T ng/mL <0.010       CoAg        Creatinine Clearance  Estimated Creatinine Clearance: 119.7 mL/min (by C-G formula based on SCr of 1.09 mg/dL).    ABG        Radiology  MRI abdomen wo contrast mrcp    Result Date: 9/16/2022  IMPRESSION : 1.     Left renal cyst 2.     No significant biliary dilatation or discrete hepatic abnormality is apparent. There is some focal tapering of the more distal common bile duct which is of questionable significance.  Electronically Signed By-Luis Alfredo Weldon MD On:9/16/2022 8:29 PM This report was finalized on [id number] by  Luis Alfredo Weldon MD.          EKG                        I personally viewed and interpreted the patient's EKG/Telemetry data: Sinus bradycardia    ECHOCARDIOGRAM:    Results for orders placed during the hospital encounter of 06/22/22    Adult Transthoracic Echo Limited W/ Cont if Necessary Per Protocol    Interpretation Summary  · Left ventricular systolic function is normal.  · Left ventricular ejection fraction is 55 to 60%  · Basal inferior wall aneurysm is noted          STRESS MYOVIEW:    Cardiolite (Tc-99m Sestamibi) stress test    CARDIAC CATHETERIZATION:            OTHER:         Assessment & Plan     Active Problems:    Chest pain    CAD, multiple vessel    Chest pain, unspecified type    S/P CABG x 3    Chest pain    CAD, multiple vessel    S/P CABG x 3     1. Chest Pain  - [facility] 9/15/2022: three-vessel coronary disease, patent LIMA to LAD, closed SVG to OM, heavily diseased SVG to  RCA with poor candidacy for native vessel intervention  - s/p Overlapping Xience drug-eluting stents 3.5 x 38 x 3 stents in overlapping fashion postdilated to 3.7 mm at 16 to 18 radha, reduction stenosis to 0% residual, improvement DARRYL-3 flow via the vein graft to the distal RCA and RPDA  - ASA / Brilinta  - statin therapy  - preserved LVEF     2. CAD  - s/p STEMI 6/14/2022: LHC showed three vessel CAD- he underwent ASHLEE placement to the culprit lesion in the RCA.  LAD had 80-90% proximal stenosis and LCx had 40-50% in distal LCx/OM.  Mr. Gates underwent repeat cardiac cath on 6/16/2022 and received ASHLEE to LAD  - repeat admission 5 days later showing in stent thrombosis sent for CABG  - s/p 3V CABG 6/29/2022 (LIMA-LAD, SVG-OMofLCx, SVG-RPDA)     3. Elevated LFTs  - 9/14/2022 AST / ALT 20/26; 9/16/2022 AST/AL 71/130  - will ask GI to see     4. HTN     5. HLD     6. Obesity     Plan:   Mr. Gates presented with unstable angina. He has signfiicant CAD s/p stemi followed by readmission several days later showing stent thrombosis- referred for CABG and ultimately underwent 3V CABG 6/16/2022.    He presented with unstable angina and LHC showed three-vessel coronary disease, patent LIMA to LAD, closed SVG to OM, heavily diseased SVG to RCA with poor candidacy for native vessel intervention. He is s/p Overlapping Xience drug-eluting stents 3.5 x 38 x 3 stents in overlapping fashion postdilated to 3.7 mm at 16 to 18 radha, reduction stenosis to 0% residual, improvement DARRYL-3 flow via the vein graft to the distal RCA and RPDA.     Continue ASA / Brilinta indefinitely given advanced CAD native vessel and graft disease  Continue statin therapy- he has been on 40mg QHS   He does have elevated LFTs. Will ask GI to see- RUQ US  He continues to have some chest discomfort and has been started on Ranexa.   His ECG is unchanged     ]]]]]]]]]]]]]]]]]]]]  9/17/2022  Reviewed extensive history especially from primary cardiologist.  Patient  is having mild sharp chest pain-atypical and probably musculoskeletal.  Patient had extensive problems with coronary artery disease.  Patient previously had stent placement and subsequently had CABG.  Patient recently had stent to SVG to RCA.  Physical exam is normal with well-healed incisions.  Continued medical treatment with aspirin/Brilinta indefinitely.  Medications were reviewed.  Patient is on aspirin atorvastatin    Valium subcu Lovenox (DVT prophylaxis) Imdur 30 mg daily levothyroxine lisinopril metoprolol Ranexa 500 mg twice a day Brilinta 90 mg twice a day.    Close cardiac monitoring.    Further plan will depend on patient's progress.          Mariia Carvajal MD  09/17/22  11:49 EDT

## 2022-09-17 NOTE — PROGRESS NOTES
[facility]     Progress Note    Patient Name: Tramaine Gates  : 1985  MRN: [id number]  Primary Care Physician:  Daniela Hernandez FNP  Date of admission: 2022  Service date and time: 22 12:57 EDT  Subjective   Subjective     Chief Complaint: chest pain    HPI:  Patient Reports still having chest discomfort, awaiting AM labs      Objective   Objective     Vitals:   Temp:  [97.7 °F (36.5 °C)-98.8 °F (37.1 °C)] 98 °F (36.7 °C)  Heart Rate:  [56-87] 62  Resp:  [16-20] 18  BP: (114-140)/(63-89) 121/69  Physical Exam    Constitutional: Awake, alert, obese   Eyes: PERRLA, sclerae anicteric, no conjunctival injection   HENT: NCAT, mucous membranes moist   Neck: Supple, no thyromegaly, no lymphadenopathy, trachea midline   Respiratory: Clear to auscultation bilaterally, nonlabored respirations    Cardiovascular: RRR, no murmurs, rubs, or gallops, palpable pedal pulses bilaterally   Gastrointestinal: Positive bowel sounds, soft, nontender, nondistended   Musculoskeletal: No bilateral ankle edema, no clubbing or cyanosis to extremities   Psychiatric: Appropriate affect, cooperative   Neurologic: Oriented x 3, strength symmetric in all extremities, Cranial Nerves grossly intact to confrontation, speech clear   Skin: No rashes     Result Review    Result Review:  I have personally reviewed the results from the time of this admission to 2022 12:57 EDT and agree with these findings:  [x]  Laboratory list / accordion  []  Microbiology  [x]  Radiology  [x]  EKG/Telemetry   [x]  Cardiology/Vascular   []  Pathology  []  Old records  []  Other:        Assessment & Plan   Assessment / Plan       Active Hospital Problems:  Active Hospital Problems    Diagnosis    • S/P CABG x 3    • Chest pain, unspecified type    • CAD, multiple vessel      Added automatically from request for surgery [id number]     • Chest pain    RUQ pain  Transaminitis    Plan:    - cards and CTS following, telemetry  - cont OMT  per cards, cont ranexa, still having pain  - GI following, MRCP negative, awaiting LFT's this AM  - trend labs daily    DVT prophylaxis:  Medical DVT prophylaxis orders are present.    CODE STATUS:   Code Status (Patient has no pulse and is not breathing): CPR (Attempt to Resuscitate)  Medical Interventions (Patient has pulse or is breathing): Full Support    Disposition:  I expect patient to be discharged 1-2 days    Pranav Lazaro MD

## 2022-09-17 NOTE — PROGRESS NOTES
" LOS: 1 day   Patient Care Team:  Daniela Hernandez FNP as PCP - General (Family Medicine)  Ollie Dunn MD as Consulting Physician (Gastroenterology)      Subjective \"I am ok\"    Interval History:     Subjective: Mild nausea but no vomiting.  Having bowel movements.  Tolerating diet.  Mild chest pain      ROS:   + chest pain, no shortness of breath, or cough.        Medication Review:     Current Facility-Administered Medications:   •  aspirin EC tablet 81 mg, 81 mg, Oral, Daily, Moises Haddad MD, 81 mg at 09/17/22 0840  •  atorvastatin (LIPITOR) tablet 40 mg, 40 mg, Oral, Nightly, Moises Haddad MD, 40 mg at 09/16/22 2025  •  diazePAM (VALIUM) tablet 2 mg, 2 mg, Oral, Q24H, Pranav Lazaro MD, 2 mg at 09/16/22 1908  •  Enoxaparin Sodium (LOVENOX) syringe 40 mg, 40 mg, Subcutaneous, Q24H, Moises Haddad MD, 40 mg at 09/16/22 1329  •  HYDROcodone-acetaminophen (NORCO) 5-325 MG per tablet 1 tablet, 1 tablet, Oral, Q4H PRN, Pranav Lazaro MD, 1 tablet at 09/17/22 1119  •  isosorbide mononitrate (IMDUR) 24 hr tablet 30 mg, 30 mg, Oral, Q24H, Izabella Villareal APRN, 30 mg at 09/17/22 0839  •  levothyroxine (SYNTHROID, LEVOTHROID) tablet 50 mcg, 50 mcg, Oral, Daily, Moises Haddad MD, 50 mcg at 09/17/22 0840  •  lisinopril (PRINIVIL,ZESTRIL) tablet 5 mg, 5 mg, Oral, Q24H, Moises Haddad MD, 5 mg at 09/17/22 0839  •  melatonin tablet 5 mg, 5 mg, Oral, Nightly PRN, Moises Haddad MD  •  metoprolol tartrate (LOPRESSOR) tablet 50 mg, 50 mg, Oral, BID, Moises Haddad MD, 50 mg at 09/17/22 0840  •  morphine injection 2 mg, 2 mg, Intravenous, Q4H PRN, Pranav Lazaro MD, 2 mg at 09/17/22 0842  •  nitroglycerin (NITROSTAT) SL tablet 0.4 mg, 0.4 mg, Sublingual, Q5 Min PRN, Moises Haddad MD, 0.4 mg at 09/15/22 0149  •  ondansetron (ZOFRAN) tablet 4 mg, 4 mg, Oral, Q4H PRN, 4 mg at 09/16/22 0757 **OR** ondansetron (ZOFRAN) " injection 4 mg, 4 mg, Intravenous, Q4H PRN, Moises Haddad MD, 4 mg at 09/16/22 1329  •  ranolazine (RANEXA) 12 hr tablet 500 mg, 500 mg, Oral, Q12H, Moises Haddad MD, 500 mg at 09/17/22 0840  •  sodium chloride 0.9 % flush 10 mL, 10 mL, Intravenous, PRN, Moises Haddad MD  •  sodium chloride 0.9 % flush 10 mL, 10 mL, Intravenous, Q12H, Moises Haddad MD, 10 mL at 09/17/22 0842  •  sodium chloride 0.9 % flush 10 mL, 10 mL, Intravenous, PRN, Moises Haddad MD  •  ticagrelor (BRILINTA) tablet 90 mg, 90 mg, Oral, BID, Moises Haddad MD, 90 mg at 09/17/22 0840      Objective Resting in bed, no acute distress, no family present    Vital Signs  Vitals:    09/17/22 0112 09/17/22 0400 09/17/22 0516 09/17/22 1121   BP: 115/70  115/81 121/69   BP Location: Left arm  Left arm Left arm   Patient Position:    Lying   Pulse: 68 56 74 62   Resp: 16  18 18   Temp: 98.8 °F (37.1 °C)  97.7 °F (36.5 °C) 98 °F (36.7 °C)   TempSrc: Oral  Oral Oral   SpO2: 98% 95% 98% 100%   Weight:   123 kg (271 lb 2.7 oz)    Height:           Physical Exam:     General Appearance:    Awake and alert, in no acute distress, overweight   Head:    Normocephalic, without obvious abnormality   Eyes:          Conjunctivae normal, anicteric sclera   Throat:   No oral lesions, no thrush, oral mucosa moist   Neck:  supple, no JVD   Lungs:     respirations regular, even and unlabored       Rectal:     Deferred   Extremities:   No edema, no cyanosis   Skin:   No bruising or rash, no jaundice        Results Review:    CBC    Results from last 7 days   Lab Units 09/16/22  0442 09/15/22  0457 09/14/22  1315   WBC 10*3/mm3 6.10 6.30 7.50   HEMOGLOBIN g/dL 13.5 13.6 13.5   PLATELETS 10*3/mm3 294 320 322     CMP   Results from last 7 days   Lab Units 09/16/22  0442 09/15/22  0457 09/14/22  1315   SODIUM mmol/L 135* 136 133*   POTASSIUM mmol/L 4.9 4.3 4.0   CHLORIDE mmol/L 100 102 97*   CO2 mmol/L 25.0 23.0  24.0   BUN mg/dL 11 12 11   CREATININE mg/dL 1.09 1.04 1.19   GLUCOSE mg/dL 97 96 99   ALBUMIN g/dL 3.80  --  4.10   BILIRUBIN mg/dL 0.6  --  0.2   ALK PHOS U/L 168*  --  116   AST (SGOT) U/L 71*  --  20   ALT (SGPT) U/L 130*  --  26   MAGNESIUM mg/dL 2.2 2.0  --      Cr Clearance Estimated Creatinine Clearance: 119.7 mL/min (by C-G formula based on SCr of 1.09 mg/dL).  Coag     HbA1C   Lab Results   Component Value Date    HGBA1C 5.3 09/16/2022    HGBA1C 5.4 06/23/2022     Blood Glucose No results found for: POCGLU  Infection     UA      Radiology(recent) MRI abdomen wo contrast mrcp    Result Date: 9/16/2022  IMPRESSION : 1.     Left renal cyst 2.     No significant biliary dilatation or discrete hepatic abnormality is apparent. There is some focal tapering of the more distal common bile duct which is of questionable significance.  Electronically Signed By-Luis Alfredo Weldon MD On:9/16/2022 8:29 PM This report was finalized on [id number] by  Luis Alfredo Weldon MD.         Assessment & Plan   Elevated LFTs  Right upper quadrant pain-intermittent/mild  Chest pain  CAD/CABG/stents -aspirin/Brilinta  Hyperlipidemia  History of pulmonary emboli  History of cholecystectomy     PLAN:  CABG in June with repeat cardiac stents this admission.  Cardiology following with current aspirin/Brilinta, atorvastatin, Imdur and Ranexa.  MRCP without choledocholithiasis or biliary dilation.  Full liver serology work-up in progress.  Little more to GI to offer at this time, he can follow-up as an outpatient for further management of elevated LFTs.  No plan for current endoscopy with recent cardiac stents.  We will see inpatient as needed, call questions or concerns.    Electronically signed by YOUSIF Leon, 09/17/22, 12:26 PM EDT.

## 2022-09-17 NOTE — PLAN OF CARE
Goal Outcome Evaluation:   Pt is receiving IV and PO pain medication for persistent chest pain and discomfort present since admission.

## 2022-09-17 NOTE — PLAN OF CARE
Goal Outcome Evaluation:  Plan of Care Reviewed With: patient           Outcome Evaluation: Patient right groin site soft and nontender, no bleeded noted. Patient still c/o chronic chest pain, 7/10 at times but relieved with PRN pain medication. Pt appears to have rested well during shift. VSS. MRI MRCP completed at beginning of shift.

## 2022-09-18 ENCOUNTER — READMISSION MANAGEMENT (OUTPATIENT)
Dept: CALL CENTER | Facility: HOSPITAL | Age: 37
End: 2022-09-18

## 2022-09-18 VITALS
DIASTOLIC BLOOD PRESSURE: 62 MMHG | HEIGHT: 70 IN | SYSTOLIC BLOOD PRESSURE: 112 MMHG | WEIGHT: 274.91 LBS | RESPIRATION RATE: 18 BRPM | BODY MASS INDEX: 39.36 KG/M2 | OXYGEN SATURATION: 98 % | TEMPERATURE: 98.2 F | HEART RATE: 69 BPM

## 2022-09-18 LAB
ALBUMIN SERPL-MCNC: 4 G/DL (ref 3.5–5.2)
ALBUMIN/GLOB SERPL: 1.4 G/DL
ALP SERPL-CCNC: 132 U/L (ref 39–117)
ALT SERPL W P-5'-P-CCNC: 68 U/L (ref 1–41)
ANION GAP SERPL CALCULATED.3IONS-SCNC: 7 MMOL/L (ref 5–15)
AST SERPL-CCNC: 30 U/L (ref 1–40)
BILIRUB SERPL-MCNC: 0.4 MG/DL (ref 0–1.2)
BUN SERPL-MCNC: 11 MG/DL (ref 6–20)
BUN/CREAT SERPL: 8.7 (ref 7–25)
CALCIUM SPEC-SCNC: 9 MG/DL (ref 8.6–10.5)
CHLORIDE SERPL-SCNC: 98 MMOL/L (ref 98–107)
CO2 SERPL-SCNC: 27 MMOL/L (ref 22–29)
CREAT SERPL-MCNC: 1.26 MG/DL (ref 0.76–1.27)
EGFRCR SERPLBLD CKD-EPI 2021: 74.4 ML/MIN/1.73
GLOBULIN UR ELPH-MCNC: 2.9 GM/DL
GLUCOSE SERPL-MCNC: 101 MG/DL (ref 65–99)
POTASSIUM SERPL-SCNC: 5 MMOL/L (ref 3.5–5.2)
PROT SERPL-MCNC: 6.9 G/DL (ref 6–8.5)
SODIUM SERPL-SCNC: 132 MMOL/L (ref 136–145)

## 2022-09-18 PROCEDURE — 25010000002 MORPHINE PER 10 MG: Performed by: HOSPITALIST

## 2022-09-18 PROCEDURE — 80053 COMPREHEN METABOLIC PANEL: CPT | Performed by: HOSPITALIST

## 2022-09-18 PROCEDURE — 99232 SBSQ HOSP IP/OBS MODERATE 35: CPT | Performed by: INTERNAL MEDICINE

## 2022-09-18 RX ORDER — RANOLAZINE 500 MG/1
500 TABLET, EXTENDED RELEASE ORAL EVERY 12 HOURS SCHEDULED
Qty: 60 TABLET | Refills: 0 | Status: SHIPPED | OUTPATIENT
Start: 2022-09-18 | End: 2022-10-14 | Stop reason: SDUPTHER

## 2022-09-18 RX ORDER — NITROGLYCERIN 0.4 MG/1
0.4 TABLET SUBLINGUAL
Qty: 30 TABLET | Refills: 0 | Status: SHIPPED | OUTPATIENT
Start: 2022-09-18 | End: 2023-02-23 | Stop reason: SDUPTHER

## 2022-09-18 RX ORDER — ISOSORBIDE MONONITRATE 30 MG/1
30 TABLET, EXTENDED RELEASE ORAL
Qty: 30 TABLET | Refills: 0 | Status: SHIPPED | OUTPATIENT
Start: 2022-09-19 | End: 2022-10-14 | Stop reason: SDUPTHER

## 2022-09-18 RX ADMIN — LISINOPRIL 5 MG: 5 TABLET ORAL at 08:33

## 2022-09-18 RX ADMIN — HYDROCODONE BITARTRATE AND ACETAMINOPHEN 1 TABLET: 5; 325 TABLET ORAL at 13:44

## 2022-09-18 RX ADMIN — HYDROCODONE BITARTRATE AND ACETAMINOPHEN 1 TABLET: 5; 325 TABLET ORAL at 08:33

## 2022-09-18 RX ADMIN — ISOSORBIDE MONONITRATE 30 MG: 30 TABLET, EXTENDED RELEASE ORAL at 08:33

## 2022-09-18 RX ADMIN — METOPROLOL TARTRATE 50 MG: 50 TABLET, FILM COATED ORAL at 08:33

## 2022-09-18 RX ADMIN — MORPHINE SULFATE 2 MG: 2 INJECTION, SOLUTION INTRAMUSCULAR; INTRAVENOUS at 06:18

## 2022-09-18 RX ADMIN — RANOLAZINE 500 MG: 500 TABLET, FILM COATED, EXTENDED RELEASE ORAL at 08:33

## 2022-09-18 RX ADMIN — HYDROCODONE BITARTRATE AND ACETAMINOPHEN 1 TABLET: 5; 325 TABLET ORAL at 02:34

## 2022-09-18 RX ADMIN — TICAGRELOR 90 MG: 90 TABLET ORAL at 08:33

## 2022-09-18 RX ADMIN — Medication 10 ML: at 08:33

## 2022-09-18 RX ADMIN — LEVOTHYROXINE SODIUM 50 MCG: 0.05 TABLET ORAL at 08:33

## 2022-09-18 RX ADMIN — ASPIRIN 81 MG: 81 TABLET, COATED ORAL at 08:33

## 2022-09-18 NOTE — PROGRESS NOTES
Referring Provider: Pranav Lazaro MD    Reason for follow-up:  Chest pain     Patient Care Team:  Daniela Hernandez FNP as PCP - General (Family Medicine)  Ollie Dunn MD as Consulting Physician (Gastroenterology)    Subjective .  Feeling better.  ROS    Patient is not having any unusual chest pain, shortness of breath, palpitations, dizziness or syncope.  Denies having any headache ,abdominal pain ,nausea, vomiting , diarrhea constipation, loss of weight or loss of appetite.  Denies having any excessive bruising ,hematuria or blood in the stool.    Review of all systems negative except as indicated    History  Past Medical History:   Diagnosis Date   • Acute inferior myocardial infarction (HCC) 6/14/2022   • Allergic    • Asthma 1/27/2022   • CAD, multiple vessel 6/14/2022   • Gastroesophageal reflux disease 1/27/2022   • Hx of complete eye exam 2016   • Hyperlipidemia 1/27/2022   • Hypertension    • Migraine headache 1/27/2022   • Panic attack 1/27/2022   • Peptic ulcer 9/14/2017       Past Surgical History:   Procedure Laterality Date   • CARDIAC CATHETERIZATION N/A 6/14/2022    Procedure: Left Heart Cath;  Surgeon: Matthieu Del Toro MD;  Location: [facility] CATH INVASIVE LOCATION;  Service: Cardiology;  Laterality: N/A;   • CARDIAC CATHETERIZATION N/A 6/16/2022    Procedure: Percutaneous Coronary Intervention;  Surgeon: Matthieu Del Toro MD;  Location: [facility] CATH INVASIVE LOCATION;  Service: Cardiovascular;  Laterality: N/A;   • CARDIAC CATHETERIZATION N/A 6/23/2022    Procedure: Left Heart Cath possible PCI, atherectomy, hemodynamic support;  Surgeon: Moises Haddad MD;  Location: [facility] CATH INVASIVE LOCATION;  Service: Cardiology;  Laterality: N/A;   • CARDIAC CATHETERIZATION N/A 9/15/2022    Procedure: Left Heart Cath;  Surgeon: Moises Haddad MD;  Location: [facility] CATH INVASIVE LOCATION;  Service: Cardiology;  Laterality: N/A;   • CHOLECYSTECTOMY  2019   • CORONARY ARTERY  BYPASS GRAFT N/A 2022    Procedure: CORONARY ARTERY BYPASS GRAFTING;  Surgeon: Pablo Ball MD;  Location: [facility];  Service: Cardiothoracic;  Laterality: N/A;  CABG X 3(2 vein grafts, 1 LIMA graft)   • MANDIBLE SURGERY         Family History   Problem Relation Age of Onset   • Heart disease Mother    • Heart failure Father    • Cirrhosis Maternal Grandfather        Social History     Tobacco Use   • Smoking status: Former Smoker     Packs/day: 1.00     Types: Cigarettes     Start date:      Quit date: 2022     Years since quittin.2   • Smokeless tobacco: Never Used   Vaping Use   • Vaping Use: Never used   Substance Use Topics   • Alcohol use: Not Currently   • Drug use: Yes     Frequency: 7.0 times per week     Types: Marijuana        Medications Prior to Admission   Medication Sig Dispense Refill Last Dose   • aspirin 81 MG EC tablet Take 1 tablet by mouth Daily. 160 tablet 0 2022 at Unknown time   • atorvastatin (LIPITOR) 40 MG tablet Take 40 mg by mouth Every Night.   2022 at Unknown time   • HYDROcodone-acetaminophen (NORCO) 5-325 MG per tablet Take 1 tablet by mouth Every 4 (Four) Hours As Needed for Moderate Pain . 40 tablet 0 2022 at Unknown time   • levothyroxine (SYNTHROID, LEVOTHROID) 50 MCG tablet Take 50 mcg by mouth Daily.   2022 at Unknown time   • lisinopril (PRINIVIL,ZESTRIL) 5 MG tablet Take 1 tablet by mouth Daily. 30 tablet 2 2022 at Unknown time   • metoprolol tartrate (LOPRESSOR) 50 MG tablet Take 1 tablet by mouth 2 (Two) Times a Day. 30 tablet 2 2022 at Unknown time   • ticagrelor (BRILINTA) 90 MG tablet tablet Take 1 tablet by mouth 2 (Two) Times a Day. 60 tablet 3 2022 at Unknown time   • albuterol sulfate  (90 Base) MCG/ACT inhaler Inhale 2 puffs Every 4 (Four) Hours As Needed for Wheezing.      • hydrOXYzine (ATARAX) 25 MG tablet Take 25 mg by mouth 3 (Three) Times a Day As Needed for Itching.     "      Allergies  Shellfish-derived products    Scheduled Meds:aspirin, 81 mg, Oral, Daily  atorvastatin, 40 mg, Oral, Nightly  diazePAM, 2 mg, Oral, Q24H  enoxaparin, 40 mg, Subcutaneous, Q24H  isosorbide mononitrate, 30 mg, Oral, Q24H  levothyroxine, 50 mcg, Oral, Daily  lisinopril, 5 mg, Oral, Q24H  metoprolol tartrate, 50 mg, Oral, BID  ranolazine, 500 mg, Oral, Q12H  sodium chloride, 10 mL, Intravenous, Q12H  ticagrelor, 90 mg, Oral, BID      Continuous Infusions:   PRN Meds:.HYDROcodone-acetaminophen  •  melatonin  •  Morphine  •  nitroglycerin  •  ondansetron **OR** ondansetron  •  sodium chloride  •  sodium chloride    Objective     VITAL SIGNS  Vitals:    09/17/22 2026 09/17/22 2200 09/18/22 0200 09/18/22 0500   BP: 128/82 118/79 123/86 133/77   BP Location:  Left arm Left arm Left arm   Patient Position:  Lying Lying Lying   Pulse: 82 77 67 54   Resp:  18 18 18   Temp:  97.6 °F (36.4 °C) 97.9 °F (36.6 °C) 97.8 °F (36.6 °C)   TempSrc:  Oral Oral Oral   SpO2:  96% 100% 97%   Weight:    125 kg (274 lb 14.6 oz)   Height:    177.8 cm (70\")       Flowsheet Rows    Flowsheet Row First Filed Value   Admission Height 177.8 cm (70\") Documented at 09/14/2022 1217   Admission Weight 123 kg (270 lb 8.1 oz) Documented at 09/14/2022 1217            Intake/Output Summary (Last 24 hours) at 9/18/2022 0749  Last data filed at 9/17/2022 2200  Gross per 24 hour   Intake 240 ml   Output 2350 ml   Net -2110 ml        TELEMETRY: Sinus bradycardia    Physical Exam:  The patient is alert, oriented and in no distress.  Vital signs as noted above.  Exogenous obesity.  Head and neck revealed no carotid bruits or jugular venous distention.  No thyromegaly or lymphadenopathy is present  Lungs clear.  No wheezing.  Breath sounds are normal bilaterally.  Heart normal first and second heart sounds.  No murmur. No precordial rub is present.  No gallop is present.  Abdomen soft and nontender.  No organomegaly is present.  Extremities with " good peripheral pulses without any pedal edema.  Skin warm and dry.  Incisions are looking well.  Musculoskeletal system is grossly normal  CNS grossly normal      Results Review:   I reviewed the patient's new clinical results.  Lab Results (last 24 hours)     Procedure Component Value Units Date/Time    Comprehensive Metabolic Panel [[id number]]  (Abnormal) Collected: 09/18/22 0516    Specimen: Blood Updated: 09/18/22 0621     Glucose 101 mg/dL      BUN 11 mg/dL      Creatinine 1.26 mg/dL      Sodium 132 mmol/L      Potassium 5.0 mmol/L      Comment: Slight hemolysis detected by analyzer. Results may be affected.        Chloride 98 mmol/L      CO2 27.0 mmol/L      Calcium 9.0 mg/dL      Total Protein 6.9 g/dL      Albumin 4.00 g/dL      ALT (SGPT) 68 U/L      AST (SGOT) 30 U/L      Alkaline Phosphatase 132 U/L      Total Bilirubin 0.4 mg/dL      Globulin 2.9 gm/dL      A/G Ratio 1.4 g/dL      BUN/Creatinine Ratio 8.7     Anion Gap 7.0 mmol/L      eGFR 74.4 mL/min/1.73      Comment: National Kidney Foundation and American Society of Nephrology (ASN) Task Force recommended calculation based on the Chronic Kidney Disease Epidemiology Collaboration (CKD-EPI) equation refit without adjustment for race.       Narrative:      GFR Normal >60  Chronic Kidney Disease <60  Kidney Failure <15      Protime-INR [[id number]]  (Normal) Collected: 09/17/22 1628    Specimen: Blood Updated: 09/17/22 1735     Protime 10.6 Seconds      INR 1.03    Comprehensive Metabolic Panel [[id number]]  (Abnormal) Collected: 09/17/22 1248    Specimen: Blood Updated: 09/17/22 1308     Glucose 88 mg/dL      BUN 12 mg/dL      Creatinine 1.09 mg/dL      Sodium 134 mmol/L      Potassium 4.4 mmol/L      Chloride 100 mmol/L      CO2 22.0 mmol/L      Calcium 9.2 mg/dL      Total Protein 6.8 g/dL      Albumin 3.90 g/dL      ALT (SGPT) 79 U/L      AST (SGOT) 34 U/L      Alkaline Phosphatase 147 U/L      Total Bilirubin 0.5 mg/dL      Globulin 2.9 gm/dL       A/G Ratio 1.3 g/dL      BUN/Creatinine Ratio 11.0     Anion Gap 12.0 mmol/L      eGFR 88.5 mL/min/1.73      Comment: National Kidney Foundation and American Society of Nephrology (ASN) Task Force recommended calculation based on the Chronic Kidney Disease Epidemiology Collaboration (CKD-EPI) equation refit without adjustment for race.       Narrative:      GFR Normal >60  Chronic Kidney Disease <60  Kidney Failure <15      Magnesium [[id number]]  (Normal) Collected: 09/17/22 1248    Specimen: Blood Updated: 09/17/22 1308     Magnesium 1.8 mg/dL     CBC & Differential [[id number]]  (Normal) Collected: 09/17/22 1248    Specimen: Blood Updated: 09/17/22 1252    Narrative:      The following orders were created for panel order CBC & Differential.  Procedure                               Abnormality         Status                     ---------                               -----------         ------                     CBC Auto Differential[[id number]]        Normal              Final result                 Please view results for these tests on the individual orders.    CBC Auto Differential [[id number]]  (Normal) Collected: 09/17/22 1248    Specimen: Blood Updated: 09/17/22 1252     WBC 6.10 10*3/mm3      RBC 4.85 10*6/mm3      Hemoglobin 13.4 g/dL      Hematocrit 40.8 %      MCV 84.1 fL      MCH 27.6 pg      MCHC 32.8 g/dL      RDW 15.1 %      RDW-SD 45.1 fl      MPV 6.7 fL      Platelets 296 10*3/mm3      Neutrophil % 59.3 %      Lymphocyte % 30.5 %      Monocyte % 8.1 %      Eosinophil % 1.1 %      Basophil % 1.0 %      Neutrophils, Absolute 3.60 10*3/mm3      Lymphocytes, Absolute 1.90 10*3/mm3      Monocytes, Absolute 0.50 10*3/mm3      Eosinophils, Absolute 0.10 10*3/mm3      Basophils, Absolute 0.10 10*3/mm3      nRBC 0.1 /100 WBC           Imaging Results (Last 24 Hours)     ** No results found for the last 24 hours. **      LAB RESULTS (LAST 7 DAYS)    CBC  Results from last 7 days   Lab Units 09/17/22  1243  09/16/22  0442 09/15/22  0457 09/14/22  1315   WBC 10*3/mm3 6.10 6.10 6.30 7.50   RBC 10*6/mm3 4.85 4.88 4.84 4.85   HEMOGLOBIN g/dL 13.4 13.5 13.6 13.5   HEMATOCRIT % 40.8 40.9 40.6 40.6   MCV fL 84.1 83.9 83.8 83.7   PLATELETS 10*3/mm3 296 294 320 322       BMP  Results from last 7 days   Lab Units 09/18/22  0516 09/17/22  1248 09/16/22  0442 09/15/22  0457 09/14/22  1315   SODIUM mmol/L 132* 134* 135* 136 133*   POTASSIUM mmol/L 5.0 4.4 4.9 4.3 4.0   CHLORIDE mmol/L 98 100 100 102 97*   CO2 mmol/L 27.0 22.0 25.0 23.0 24.0   BUN mg/dL 11 12 11 12 11   CREATININE mg/dL 1.26 1.09 1.09 1.04 1.19   GLUCOSE mg/dL 101* 88 97 96 99   MAGNESIUM mg/dL  --  1.8 2.2 2.0  --        CMP   Results from last 7 days   Lab Units 09/18/22  0516 09/17/22  1248 09/16/22  0442 09/15/22  0457 09/14/22  1315   SODIUM mmol/L 132* 134* 135* 136 133*   POTASSIUM mmol/L 5.0 4.4 4.9 4.3 4.0   CHLORIDE mmol/L 98 100 100 102 97*   CO2 mmol/L 27.0 22.0 25.0 23.0 24.0   BUN mg/dL 11 12 11 12 11   CREATININE mg/dL 1.26 1.09 1.09 1.04 1.19   GLUCOSE mg/dL 101* 88 97 96 99   ALBUMIN g/dL 4.00 3.90 3.80  --  4.10   BILIRUBIN mg/dL 0.4 0.5 0.6  --  0.2   ALK PHOS U/L 132* 147* 168*  --  116   AST (SGOT) U/L 30 34 71*  --  20   ALT (SGPT) U/L 68* 79* 130*  --  26         BNP        TROPONIN  Results from last 7 days   Lab Units 09/15/22  0457   TROPONIN T ng/mL <0.010       CoAg  Results from last 7 days   Lab Units 09/17/22  1628   INR  1.03       Creatinine Clearance  Estimated Creatinine Clearance: 104.4 mL/min (by C-G formula based on SCr of 1.26 mg/dL).    ABG        Radiology  MRI abdomen wo contrast mrcp    Result Date: 9/16/2022  IMPRESSION : 1.     Left renal cyst 2.     No significant biliary dilatation or discrete hepatic abnormality is apparent. There is some focal tapering of the more distal common bile duct which is of questionable significance.  Electronically Signed By-Luis Alfredo Weldon MD On:9/16/2022 8:29 PM This report was finalized on  [id number] by  Luis Alfredo Weldon MD.          EKG                        I personally viewed and interpreted the patient's EKG/Telemetry data: Sinus bradycardia    ECHOCARDIOGRAM:    Results for orders placed during the hospital encounter of 06/22/22    Adult Transthoracic Echo Limited W/ Cont if Necessary Per Protocol    Interpretation Summary  · Left ventricular systolic function is normal.  · Left ventricular ejection fraction is 55 to 60%  · Basal inferior wall aneurysm is noted          STRESS MYOVIEW:    Cardiolite (Tc-99m Sestamibi) stress test    CARDIAC CATHETERIZATION:            OTHER:         Assessment & Plan     Active Problems:    Chest pain    CAD, multiple vessel    Chest pain, unspecified type    S/P CABG x 3    Chest pain    CAD, multiple vessel    S/P CABG x 3     1. Chest Pain  - [facility] 9/15/2022: three-vessel coronary disease, patent LIMA to LAD, closed SVG to OM, heavily diseased SVG to RCA with poor candidacy for native vessel intervention  - s/p Overlapping Xience drug-eluting stents 3.5 x 38 x 3 stents in overlapping fashion postdilated to 3.7 mm at 16 to 18 radha, reduction stenosis to 0% residual, improvement DARRYL-3 flow via the vein graft to the distal RCA and RPDA  - ASA / Brilinta  - statin therapy  - preserved LVEF     2. CAD  - s/p STEMI 6/14/2022: [facility] showed three vessel CAD- he underwent ASHLEE placement to the culprit lesion in the RCA.  LAD had 80-90% proximal stenosis and LCx had 40-50% in distal LCx/OM.  Mr. Gates underwent repeat cardiac cath on 6/16/2022 and received ASHLEE to LAD  - repeat admission 5 days later showing in stent thrombosis sent for CABG  - s/p 3V CABG 6/29/2022 (LIMA-LAD, SVG-OMofLCx, SVG-RPDA)     3. Elevated LFTs  - 9/14/2022 AST / ALT 20/26; 9/16/2022 AST/AL 71/130  - will ask GI to see     4. HTN     5. HLD     6. Obesity     Plan:   Mr. Gates presented with unstable angina. He has signfiicant CAD s/p stemi followed by readmission several days later showing stent  thrombosis- referred for CABG and ultimately underwent 3V CABG 6/16/2022.    He presented with unstable angina and C showed three-vessel coronary disease, patent LIMA to LAD, closed SVG to OM, heavily diseased SVG to RCA with poor candidacy for native vessel intervention. He is s/p Overlapping Xience drug-eluting stents 3.5 x 38 x 3 stents in overlapping fashion postdilated to 3.7 mm at 16 to 18 radha, reduction stenosis to 0% residual, improvement DARRYL-3 flow via the vein graft to the distal RCA and RPDA.     Continue ASA / Brilinta indefinitely given advanced CAD native vessel and graft disease  Continue statin therapy- he has been on 40mg QHS   He does have elevated LFTs. Will ask GI to see- RUQ US  He continues to have some chest discomfort and has been started on Ranexa.   His ECG is unchanged     ]]]]]]]]]]]]]]]]]]]]  9/18/2022  Chest pain is better.  Patient had extensive problems with coronary artery disease.  Patient previously had stent placement and subsequently had CABG.  Patient recently had stent to SVG to RCA.  Physical exam is normal with well-healed incisions.  Continued medical treatment with aspirin/Brilinta indefinitely.  Medications were reviewed.  Patient is on aspirin atorvastatin    Valium subcu Lovenox (DVT prophylaxis) Imdur 30 mg daily levothyroxine lisinopril metoprolol Ranexa 500 mg twice a day Brilinta 90 mg twice a day.    Okay with discharge plans from cardiac standpoint.    Follow-up with primary cardiologist Dr. Haddad at a regularly scheduled appointment.    Further plan will depend on patient's progress.          Mariia Carvajal MD  09/18/22  07:49 EDT

## 2022-09-18 NOTE — PLAN OF CARE
Problem: Adult Inpatient Plan of Care  Goal: Absence of Hospital-Acquired Illness or Injury  Intervention: Prevent Infection  Recent Flowsheet Documentation  Taken 9/18/2022 0618 by Stacy Carroll LPN  Infection Prevention:   hand hygiene promoted   rest/sleep promoted  Taken 9/18/2022 0400 by Stacy Carroll LPN  Infection Prevention:   environmental surveillance performed   rest/sleep promoted  Taken 9/18/2022 0200 by Stacy Carroll LPN  Infection Prevention: environmental surveillance performed  Taken 9/18/2022 0000 by Stacy Carroll LPN  Infection Prevention: environmental surveillance performed  Taken 9/17/2022 2200 by Stacy Carroll LPN  Infection Prevention:   environmental surveillance performed   hand hygiene promoted   rest/sleep promoted  Taken 9/17/2022 2000 by Stacy Carroll LPN  Infection Prevention:   environmental surveillance performed   rest/sleep promoted   hand hygiene promoted     Problem: Chest Pain  Goal: Resolution of Chest Pain Symptoms  Outcome: Ongoing, Progressing   Goal Outcome Evaluation:

## 2022-09-18 NOTE — PLAN OF CARE
Problem: Adult Inpatient Plan of Care  Goal: Plan of Care Review  Outcome: Adequate for Care Transition  Goal: Patient-Specific Goal (Individualized)  Outcome: Adequate for Care Transition  Goal: Absence of Hospital-Acquired Illness or Injury  Outcome: Adequate for Care Transition  Intervention: Identify and Manage Fall Risk  Recent Flowsheet Documentation  Taken 9/18/2022 0800 by Kelsey Amaya, RN  Safety Promotion/Fall Prevention: safety round/check completed  Goal: Optimal Comfort and Wellbeing  Outcome: Adequate for Care Transition  Goal: Readiness for Transition of Care  Outcome: Adequate for Care Transition     Problem: Hypertension Comorbidity  Goal: Blood Pressure in Desired Range  Outcome: Adequate for Care Transition     Problem: Chest Pain  Goal: Resolution of Chest Pain Symptoms  Outcome: Adequate for Care Transition   Goal Outcome Evaluation:

## 2022-09-18 NOTE — DISCHARGE SUMMARY
[facility]         DISCHARGE SUMMARY    Patient Name: Tramaine Gates  : 1985  MRN: [id number]    Date of Admission: 2022  Date of Discharge:  22  Primary Care Physician: Daniela Hernandez FNP     Condition:stable    Consults     Date and Time Order Name Status Description    2022 10:55 AM Inpatient Gastroenterology Consult Completed     9/15/2022  1:50 PM Inpatient Hospitalist Consult      2022  6:36 PM Inpatient Cardiology Consult Completed           Presenting Problem:   Chest pain [R07.9]  Dyspnea, unspecified type [R06.00]  Chest pain, unspecified type [R07.9]    Active and Resolved Hospital Problems:  Active Hospital Problems    Diagnosis POA   • **Chest pain, unspecified type [R07.9] Yes   • S/P CABG x 3 [Z95.1] Not Applicable   • Bipolar II disorder (HCC) [F31.81] Yes   • Generalized anxiety disorder with panic attacks [F41.1, F41.0] Yes   • CAD, multiple vessel [I25.10] Yes   • Disorder of thyroid [E07.9] Yes   • Chest pain [R07.9] Yes   • Hypertension [I10] Yes   • Peptic ulcer [K27.9] Yes   Morbid obesity   Resolved Hospital Problems   No resolved problems to display.         Hospital Course     Hospital Course:  Tramaine Gates is a 37 y.o. male admitted with chest pain as has pmhx of multivessel CAD s/p CABG and stent placements, HTN, HLD and morbid obesity. Cardiology was consulted and patient's medications were optimized. He also was found to have transaminitis and GI was consulted and they did MRCP that was negative. He was placed on ranexa and imdur which helped resolve his chest pain. He will f/u with his primary cardiologist Dr. Haddad as outpatient. He will go home on the ranexa and imdur along with his current cardiac meds. Cardiology cleared him for discharge        DISCHARGE Follow Up Recommendations for labs and diagnostics: f/u with cardiology      Day of Discharge     Vital Signs:  Temp:  [97.6 °F (36.4 °C)-98.4 °F (36.9 °C)] 98.2 °F  (36.8 °C)  Heart Rate:  [54-82] 69  Resp:  [18] 18  BP: (112-137)/(62-86) 112/62  Physical Exam:  Constitutional: Awake, alert, obese   Eyes: PERRLA, sclerae anicteric, no conjunctival injection   HENT: NCAT, mucous membranes moist   Neck: Supple, no thyromegaly, no lymphadenopathy, trachea midline   Respiratory: Clear to auscultation bilaterally, nonlabored respirations    Cardiovascular: RRR, no murmurs, rubs, or gallops, palpable pedal pulses bilaterally   Gastrointestinal: Positive bowel sounds, soft, nontender, nondistended   Musculoskeletal: No bilateral ankle edema, no clubbing or cyanosis to extremities   Psychiatric: Appropriate affect, cooperative   Neurologic: Oriented x 3, strength symmetric in all extremities, Cranial Nerves grossly intact to confrontation, speech clear   Skin: No rashes     Pertinent  and/or Most Recent Results     LAB RESULTS:      Lab 09/17/22  1628 09/17/22  1248 09/16/22  0442 09/15/22  0457 09/14/22  1315   WBC  --  6.10 6.10 6.30 7.50   HEMOGLOBIN  --  13.4 13.5 13.6 13.5   HEMATOCRIT  --  40.8 40.9 40.6 40.6   PLATELETS  --  296 294 320 322   NEUTROS ABS  --  3.60 4.30 4.40 4.40   LYMPHS ABS  --  1.90 1.30 1.50 2.40   MONOS ABS  --  0.50 0.40 0.40 0.50   EOS ABS  --  0.10 0.10 0.10 0.10   MCV  --  84.1 83.9 83.8 83.7   PROTIME 10.6  --   --   --   --          Lab 09/18/22  0516 09/17/22  1248 09/16/22  0442 09/15/22  0457 09/14/22  1315   SODIUM 132* 134* 135* 136 133*   POTASSIUM 5.0 4.4 4.9 4.3 4.0   CHLORIDE 98 100 100 102 97*   CO2 27.0 22.0 25.0 23.0 24.0   ANION GAP 7.0 12.0 10.0 11.0 12.0   BUN 11 12 11 12 11   CREATININE 1.26 1.09 1.09 1.04 1.19   EGFR 74.4 88.5 88.5 93.7 79.7   GLUCOSE 101* 88 97 96 99   CALCIUM 9.0 9.2 9.1 9.1 8.8   MAGNESIUM  --  1.8 2.2 2.0  --    HEMOGLOBIN A1C  --   --  5.3  --   --          Lab 09/18/22  0516 09/17/22  1248 09/16/22  0442 09/14/22  1315   TOTAL PROTEIN 6.9 6.8 6.9 7.1   ALBUMIN 4.00 3.90 3.80 4.10   GLOBULIN 2.9 2.9 3.1 3.0   ALT  (SGPT) 68* 79* 130* 26   AST (SGOT) 30 34 71* 20   BILIRUBIN 0.4 0.5 0.6 0.2   ALK PHOS 132* 147* 168* 116         Lab 09/17/22  1628 09/15/22  0457 09/14/22  1926 09/14/22  1617 09/14/22  1315   PROBNP  --   --   --   --  105.7   TROPONIN T  --  <0.010 <0.010 <0.010 <0.010   PROTIME 10.6  --   --   --   --    INR 1.03  --   --   --   --          Lab 09/16/22  0442   CHOLESTEROL 228*   LDL CHOL 158*   HDL CHOL 38*   TRIGLYCERIDES 176*         Lab 09/16/22 0442   IRON 75   FERRITIN 314.30         Brief Urine Lab Results  (Last result in the past 365 days)      Color   Clarity   Blood   Leuk Est   Nitrite   Protein   CREAT   Urine HCG        06/25/22 0407 Yellow   Clear   Negative   Negative   Negative   Negative               Microbiology Results (last 10 days)     ** No results found for the last 240 hours. **          XR Chest 1 View    Result Date: 9/14/2022  Impression:  1. No acute cardiopulmonary disease.   Electronically Signed By-Roman Montano MD On:9/14/2022 2:01 PM This report was finalized on [id number] by  Roman Montano MD.    MRI abdomen wo contrast mrcp    Result Date: 9/16/2022  Impression: IMPRESSION : 1.     Left renal cyst 2.     No significant biliary dilatation or discrete hepatic abnormality is apparent. There is some focal tapering of the more distal common bile duct which is of questionable significance.  Electronically Signed By-Luis Alfredo Weldon MD On:9/16/2022 8:29 PM This report was finalized on [id number] by  Luis Alfredo Weldon MD.      Results for orders placed during the hospital encounter of 06/22/22    Duplex Vein Mapping Lower Extremity - Bilateral CAR    Interpretation Summary  · The left greater saphenous vein is patent and of adequate size in the thigh.  · The left greater saphenous vein is patent and of adequate size in the calf.      Results for orders placed during the hospital encounter of 06/22/22    Duplex Vein Mapping Lower Extremity - Bilateral CAR    Interpretation  Summary  · The left greater saphenous vein is patent and of adequate size in the thigh.  · The left greater saphenous vein is patent and of adequate size in the calf.      Results for orders placed during the hospital encounter of 06/22/22    Adult Transthoracic Echo Limited W/ Cont if Necessary Per Protocol    Interpretation Summary  · Left ventricular systolic function is normal.  · Left ventricular ejection fraction is 55 to 60%  · Basal inferior wall aneurysm is noted      Labs Pending at Discharge:  Pending Labs     Order Current Status    PAULA In process    Anti-Smooth Muscle Antibody Titer In process    Anti-microsomal Antibody In process    Mitochondrial Antibodies, M2 In process            Discharge Details        Discharge Medications      New Medications      Instructions Start Date   isosorbide mononitrate 30 MG 24 hr tablet  Commonly known as: IMDUR   30 mg, Oral, Every 24 Hours Scheduled   Start Date: September 19, 2022     nitroglycerin 0.4 MG SL tablet  Commonly known as: NITROSTAT   0.4 mg, Sublingual, Every 5 Minutes PRN, Take no more than 3 doses in 15 minutes.      ranolazine 500 MG 12 hr tablet  Commonly known as: RANEXA   500 mg, Oral, Every 12 Hours Scheduled         Continue These Medications      Instructions Start Date   albuterol sulfate  (90 Base) MCG/ACT inhaler  Commonly known as: PROVENTIL HFA;VENTOLIN HFA;PROAIR HFA   2 puffs, Inhalation, Every 4 Hours PRN      Aspirin Low Dose 81 MG EC tablet  Generic drug: aspirin   81 mg, Oral, Daily      atorvastatin 40 MG tablet  Commonly known as: LIPITOR   40 mg, Oral, Nightly      Brilinta 90 MG tablet tablet  Generic drug: ticagrelor   90 mg, Oral, 2 Times Daily      HYDROcodone-acetaminophen 5-325 MG per tablet  Commonly known as: NORCO   1 tablet, Oral, Every 4 Hours PRN      levothyroxine 50 MCG tablet  Commonly known as: SYNTHROID, LEVOTHROID   50 mcg, Oral, Daily      lisinopril 5 MG tablet  Commonly known as: PRINIVIL,ZESTRIL   5  mg, Oral, Every 24 Hours Scheduled      metoprolol tartrate 50 MG tablet  Commonly known as: LOPRESSOR   50 mg, Oral, 2 Times Daily         Stop These Medications    hydrOXYzine 25 MG tablet  Commonly known as: ATARAX            Allergies   Allergen Reactions   • Shellfish-Derived Products Unknown - High Severity         Discharge Disposition:  Home or Self Care    Diet:  Hospital:  Diet Order   Procedures   • Diet Cardiac; Healthy Heart         Discharge Activity: as tolerated        CODE STATUS:  Code Status and Medical Interventions:   Ordered at: 09/14/22 1712     Code Status (Patient has no pulse and is not breathing):    CPR (Attempt to Resuscitate)     Medical Interventions (Patient has pulse or is breathing):    Full Support         Future Appointments   Date Time Provider Department Center   10/25/2022  9:00 AM Moises Vasquez PA-C [facility]   11/15/2022  3:15 PM Moises Haddad MD [facility]           Time spent on Discharge including face to face service:  35 minutes    Pranav Lazaro MD

## 2022-09-19 LAB
ANA SER QL: NEGATIVE
LKM-1 AB SER-ACNC: 3 UNITS (ref 0–20)
MITOCHONDRIA M2 IGG SER-ACNC: <20 UNITS (ref 0–20)
SMA IGG SER-ACNC: 5 UNITS (ref 0–19)

## 2022-09-19 NOTE — OUTREACH NOTE
Prep Survey    Flowsheet Row Responses   H[facility] facility patient discharged from? [facility]   Is LACE score < 7 ? No   Emergency Room discharge w/ pulse ox? No   Eligibility Readm Mgmt   Discharge diagnosis Unstable angina   Does the patient have one of the following disease processes/diagnoses(primary or secondary)? Other   Does the patient have Home health ordered? No   Is there a DME ordered? No   General alerts for this patient Heart Cath    Prep survey completed? Yes          TITUS ALEMAN - Registered Nurse

## 2022-09-19 NOTE — CASE MANAGEMENT/SOCIAL WORK
Case Management Discharge Note      Final Note: Routine home         Selected Continued Care - Discharged on 9/18/2022 Admission date: 9/14/2022 - Discharge disposition: Home or Self Care    Destination    No services have been selected for the patient.                   Transportation Services  Private: Car    Final Discharge Disposition Code: 01 - home or self-care

## 2022-09-19 NOTE — PAYOR COMM NOTE
"RE: [id number]  CLINICAL UPDATE AND DC NOTIFICATION/ AUTH REPLY STILL PENDING  ------------------------------------------  PER NURSING 9/17/22: Pt is receiving IV and PO pain medication for persistent chest pain and discomfort present since admission.     MEDS:   NORCO X 9 DURING ADMISSION  MORPHINE IV X 11 DURING ADMISSION    HEART CATH WITH PCI 9/15: Conclusions recommendations  1 three-vessel coronary disease, patent LIMA to LAD, closed SVG to OM, heavily diseased but rescue able SVG to RCA with poor candidacy for native vessel intervention  2.  Overlapping Xience drug-eluting stents 3.5 x 38 x 3 stents in overlapping fashion postdilated to 3.7 mm at 16 to 18 radha, reduction stenosis to 0% residual, improvement DARRYL-3 flow via the vein graft to the distal RCA and RPDA   ==========================  UTILIZATION REVIEW  BOUCHRA ADAME RN   PH: [phone]  FAX: [phone]    [facility]  NPI# [id number]  TID # [id number]              Tramaine Gates (37 y.o. Male)             Date of Birth   1985    Social Security Number       Address   [address] [place] [address] [place] [address]    Home Phone   [phone]    MRN   [id number]       Tenriism   None    Marital Status   Single                            Admission Date   9/14/22    Admission Type   Emergency    Admitting Provider   Marcus Mandujano MD    Attending Provider       Department, Room/Bed   [facility] PROGRESS CARE, 2120/1       Discharge Date   9/18/2022    Discharge Disposition   Home or Self Care    Discharge Destination                               Attending Provider: (none)   Allergies: Shellfish-derived Products    Isolation: None   Infection: None   Code Status: Prior   Advance Care Planning Activity    Ht: 177.8 cm (70\")   Wt: 125 kg (274 lb 14.6 oz)    Admission Cmt: None   Principal Problem: Chest pain, unspecified type [R07.9]                 Active Insurance as of 9/14/2022     Primary Coverage     Payor Plan " Insurance Group Employer/Plan Group    [employer] [facility] [id number]     Payor Plan Address Payor Plan Phone Number Payor Plan Fax Number Effective Dates    MAIL STOP:   3/1/2022 - None Entered    [address] [address]       [facility] [address]       Subscriber Name Subscriber Birth Date Member ID       NICK NAVARRO 1985 [id number]                 Emergency Contacts      (Rel.) Home Phone Work Phone Mobile Phone    CATRACHO SHARMA (Significant Other) [phone] -- --    Magi Oliver (Mother) [phone] -- [phone]               Physician Progress Notes (last 72 hours)      Mariia Carvajal MD at 09/18/22 0749          Referring Provider: Pranav Lazaro MD    Reason for follow-up:  Chest pain     Patient Care Team:  Daniela Hernandez FNP as PCP - General (Family Medicine)  Ollie Dunn MD as Consulting Physician (Gastroenterology)    Subjective .  Feeling better.  ROS    Patient is not having any unusual chest pain, shortness of breath, palpitations, dizziness or syncope.  Denies having any headache ,abdominal pain ,nausea, vomiting , diarrhea constipation, loss of weight or loss of appetite.  Denies having any excessive bruising ,hematuria or blood in the stool.    Review of all systems negative except as indicated    History  Past Medical History:   Diagnosis Date   • Acute inferior myocardial infarction (HCC) 6/14/2022   • Allergic    • Asthma 1/27/2022   • CAD, multiple vessel 6/14/2022   • Gastroesophageal reflux disease 1/27/2022   • Hx of complete eye exam 2016   • Hyperlipidemia 1/27/2022   • Hypertension    • Migraine headache 1/27/2022   • Panic attack 1/27/2022   • Peptic ulcer 9/14/2017       Past Surgical History:   Procedure Laterality Date   • CARDIAC CATHETERIZATION N/A 6/14/2022    Procedure: Left Heart Cath;  Surgeon: Matthieu Del Toro MD;  Location: [facility] CATH INVASIVE LOCATION;  Service: Cardiology;  Laterality: N/A;   •  CARDIAC CATHETERIZATION N/A 2022    Procedure: Percutaneous Coronary Intervention;  Surgeon: Matthieu Del Toro MD;  Location: [facility] CATH INVASIVE LOCATION;  Service: Cardiovascular;  Laterality: N/A;   • CARDIAC CATHETERIZATION N/A 2022    Procedure: Left Heart Cath possible PCI, atherectomy, hemodynamic support;  Surgeon: Moises Haddad MD;  Location: [facility] CATH INVASIVE LOCATION;  Service: Cardiology;  Laterality: N/A;   • CARDIAC CATHETERIZATION N/A 9/15/2022    Procedure: Left Heart Cath;  Surgeon: Moises Haddad MD;  Location: [facility] CATH INVASIVE LOCATION;  Service: Cardiology;  Laterality: N/A;   • CHOLECYSTECTOMY     • CORONARY ARTERY BYPASS GRAFT N/A 2022    Procedure: CORONARY ARTERY BYPASS GRAFTING;  Surgeon: Pablo Ball MD;  Location: [facility] CVOR;  Service: Cardiothoracic;  Laterality: N/A;  CABG X 3(2 vein grafts, 1 LIMA graft)   • MANDIBLE SURGERY         Family History   Problem Relation Age of Onset   • Heart disease Mother    • Heart failure Father    • Cirrhosis Maternal Grandfather        Social History     Tobacco Use   • Smoking status: Former Smoker     Packs/day: 1.00     Types: Cigarettes     Start date:      Quit date: 2022     Years since quittin.2   • Smokeless tobacco: Never Used   Vaping Use   • Vaping Use: Never used   Substance Use Topics   • Alcohol use: Not Currently   • Drug use: Yes     Frequency: 7.0 times per week     Types: Marijuana        Medications Prior to Admission   Medication Sig Dispense Refill Last Dose   • aspirin 81 MG EC tablet Take 1 tablet by mouth Daily. 160 tablet 0 2022 at Unknown time   • atorvastatin (LIPITOR) 40 MG tablet Take 40 mg by mouth Every Night.   2022 at Unknown time   • HYDROcodone-acetaminophen (NORCO) 5-325 MG per tablet Take 1 tablet by mouth Every 4 (Four) Hours As Needed for Moderate Pain . 40 tablet 0 2022 at Unknown time   • levothyroxine (SYNTHROID, LEVOTHROID) 50  "MCG tablet Take 50 mcg by mouth Daily.   9/13/2022 at Unknown time   • lisinopril (PRINIVIL,ZESTRIL) 5 MG tablet Take 1 tablet by mouth Daily. 30 tablet 2 9/13/2022 at Unknown time   • metoprolol tartrate (LOPRESSOR) 50 MG tablet Take 1 tablet by mouth 2 (Two) Times a Day. 30 tablet 2 9/13/2022 at Unknown time   • ticagrelor (BRILINTA) 90 MG tablet tablet Take 1 tablet by mouth 2 (Two) Times a Day. 60 tablet 3 9/13/2022 at Unknown time   • albuterol sulfate  (90 Base) MCG/ACT inhaler Inhale 2 puffs Every 4 (Four) Hours As Needed for Wheezing.      • hydrOXYzine (ATARAX) 25 MG tablet Take 25 mg by mouth 3 (Three) Times a Day As Needed for Itching.          Allergies  Shellfish-derived products    Scheduled Meds:aspirin, 81 mg, Oral, Daily  atorvastatin, 40 mg, Oral, Nightly  diazePAM, 2 mg, Oral, Q24H  enoxaparin, 40 mg, Subcutaneous, Q24H  isosorbide mononitrate, 30 mg, Oral, Q24H  levothyroxine, 50 mcg, Oral, Daily  lisinopril, 5 mg, Oral, Q24H  metoprolol tartrate, 50 mg, Oral, BID  ranolazine, 500 mg, Oral, Q12H  sodium chloride, 10 mL, Intravenous, Q12H  ticagrelor, 90 mg, Oral, BID      Continuous Infusions:   PRN Meds:.HYDROcodone-acetaminophen  •  melatonin  •  Morphine  •  nitroglycerin  •  ondansetron **OR** ondansetron  •  sodium chloride  •  sodium chloride    Objective     VITAL SIGNS  Vitals:    09/17/22 2026 09/17/22 2200 09/18/22 0200 09/18/22 0500   BP: 128/82 118/79 123/86 133/77   BP Location:  Left arm Left arm Left arm   Patient Position:  Lying Lying Lying   Pulse: 82 77 67 54   Resp:  18 18 18   Temp:  97.6 °F (36.4 °C) 97.9 °F (36.6 °C) 97.8 °F (36.6 °C)   TempSrc:  Oral Oral Oral   SpO2:  96% 100% 97%   Weight:    125 kg (274 lb 14.6 oz)   Height:    177.8 cm (70\")       Flowsheet Rows    Flowsheet Row First Filed Value   Admission Height 177.8 cm (70\") Documented at 09/14/2022 1217   Admission Weight 123 kg (270 lb 8.1 oz) Documented at 09/14/2022 1217            Intake/Output Summary " (Last 24 hours) at 9/18/2022 0749  Last data filed at 9/17/2022 2200  Gross per 24 hour   Intake 240 ml   Output 2350 ml   Net -2110 ml        TELEMETRY: Sinus bradycardia    Physical Exam:  The patient is alert, oriented and in no distress.  Vital signs as noted above.  Exogenous obesity.  Head and neck revealed no carotid bruits or jugular venous distention.  No thyromegaly or lymphadenopathy is present  Lungs clear.  No wheezing.  Breath sounds are normal bilaterally.  Heart normal first and second heart sounds.  No murmur. No precordial rub is present.  No gallop is present.  Abdomen soft and nontender.  No organomegaly is present.  Extremities with good peripheral pulses without any pedal edema.  Skin warm and dry.  Incisions are looking well.  Musculoskeletal system is grossly normal  CNS grossly normal      Results Review:   I reviewed the patient's new clinical results.  Lab Results (last 24 hours)     Procedure Component Value Units Date/Time    Comprehensive Metabolic Panel [[id number]]  (Abnormal) Collected: 09/18/22 0516    Specimen: Blood Updated: 09/18/22 0621     Glucose 101 mg/dL      BUN 11 mg/dL      Creatinine 1.26 mg/dL      Sodium 132 mmol/L      Potassium 5.0 mmol/L      Comment: Slight hemolysis detected by analyzer. Results may be affected.        Chloride 98 mmol/L      CO2 27.0 mmol/L      Calcium 9.0 mg/dL      Total Protein 6.9 g/dL      Albumin 4.00 g/dL      ALT (SGPT) 68 U/L      AST (SGOT) 30 U/L      Alkaline Phosphatase 132 U/L      Total Bilirubin 0.4 mg/dL      Globulin 2.9 gm/dL      A/G Ratio 1.4 g/dL      BUN/Creatinine Ratio 8.7     Anion Gap 7.0 mmol/L      eGFR 74.4 mL/min/1.73      Comment: National Kidney Foundation and American Society of Nephrology (ASN) Task Force recommended calculation based on the Chronic Kidney Disease Epidemiology Collaboration (CKD-EPI) equation refit without adjustment for race.       Narrative:      GFR Normal >60  Chronic Kidney Disease  <60  Kidney Failure <15      Protime-INR [[id number]]  (Normal) Collected: 09/17/22 1628    Specimen: Blood Updated: 09/17/22 1735     Protime 10.6 Seconds      INR 1.03    Comprehensive Metabolic Panel [[id number]]  (Abnormal) Collected: 09/17/22 1248    Specimen: Blood Updated: 09/17/22 1308     Glucose 88 mg/dL      BUN 12 mg/dL      Creatinine 1.09 mg/dL      Sodium 134 mmol/L      Potassium 4.4 mmol/L      Chloride 100 mmol/L      CO2 22.0 mmol/L      Calcium 9.2 mg/dL      Total Protein 6.8 g/dL      Albumin 3.90 g/dL      ALT (SGPT) 79 U/L      AST (SGOT) 34 U/L      Alkaline Phosphatase 147 U/L      Total Bilirubin 0.5 mg/dL      Globulin 2.9 gm/dL      A/G Ratio 1.3 g/dL      BUN/Creatinine Ratio 11.0     Anion Gap 12.0 mmol/L      eGFR 88.5 mL/min/1.73      Comment: National Kidney Foundation and American Society of Nephrology (ASN) Task Force recommended calculation based on the Chronic Kidney Disease Epidemiology Collaboration (CKD-EPI) equation refit without adjustment for race.       Narrative:      GFR Normal >60  Chronic Kidney Disease <60  Kidney Failure <15      Magnesium [[id number]]  (Normal) Collected: 09/17/22 1248    Specimen: Blood Updated: 09/17/22 1308     Magnesium 1.8 mg/dL     CBC & Differential [[id number]]  (Normal) Collected: 09/17/22 1248    Specimen: Blood Updated: 09/17/22 1252    Narrative:      The following orders were created for panel order CBC & Differential.  Procedure                               Abnormality         Status                     ---------                               -----------         ------                     CBC Auto Differential[[id number]]        Normal              Final result                 Please view results for these tests on the individual orders.    CBC Auto Differential [[id number]]  (Normal) Collected: 09/17/22 1248    Specimen: Blood Updated: 09/17/22 1252     WBC 6.10 10*3/mm3      RBC 4.85 10*6/mm3      Hemoglobin 13.4 g/dL      Hematocrit  40.8 %      MCV 84.1 fL      MCH 27.6 pg      MCHC 32.8 g/dL      RDW 15.1 %      RDW-SD 45.1 fl      MPV 6.7 fL      Platelets 296 10*3/mm3      Neutrophil % 59.3 %      Lymphocyte % 30.5 %      Monocyte % 8.1 %      Eosinophil % 1.1 %      Basophil % 1.0 %      Neutrophils, Absolute 3.60 10*3/mm3      Lymphocytes, Absolute 1.90 10*3/mm3      Monocytes, Absolute 0.50 10*3/mm3      Eosinophils, Absolute 0.10 10*3/mm3      Basophils, Absolute 0.10 10*3/mm3      nRBC 0.1 /100 WBC           Imaging Results (Last 24 Hours)     ** No results found for the last 24 hours. **      LAB RESULTS (LAST 7 DAYS)    CBC  Results from last 7 days   Lab Units 09/17/22  1248 09/16/22  0442 09/15/22  0457 09/14/22  1315   WBC 10*3/mm3 6.10 6.10 6.30 7.50   RBC 10*6/mm3 4.85 4.88 4.84 4.85   HEMOGLOBIN g/dL 13.4 13.5 13.6 13.5   HEMATOCRIT % 40.8 40.9 40.6 40.6   MCV fL 84.1 83.9 83.8 83.7   PLATELETS 10*3/mm3 296 294 320 322       BMP  Results from last 7 days   Lab Units 09/18/22  0516 09/17/22  1248 09/16/22  0442 09/15/22  0457 09/14/22  1315   SODIUM mmol/L 132* 134* 135* 136 133*   POTASSIUM mmol/L 5.0 4.4 4.9 4.3 4.0   CHLORIDE mmol/L 98 100 100 102 97*   CO2 mmol/L 27.0 22.0 25.0 23.0 24.0   BUN mg/dL 11 12 11 12 11   CREATININE mg/dL 1.26 1.09 1.09 1.04 1.19   GLUCOSE mg/dL 101* 88 97 96 99   MAGNESIUM mg/dL  --  1.8 2.2 2.0  --        CMP   Results from last 7 days   Lab Units 09/18/22  0516 09/17/22  1248 09/16/22  0442 09/15/22  0457 09/14/22  1315   SODIUM mmol/L 132* 134* 135* 136 133*   POTASSIUM mmol/L 5.0 4.4 4.9 4.3 4.0   CHLORIDE mmol/L 98 100 100 102 97*   CO2 mmol/L 27.0 22.0 25.0 23.0 24.0   BUN mg/dL 11 12 11 12 11   CREATININE mg/dL 1.26 1.09 1.09 1.04 1.19   GLUCOSE mg/dL 101* 88 97 96 99   ALBUMIN g/dL 4.00 3.90 3.80  --  4.10   BILIRUBIN mg/dL 0.4 0.5 0.6  --  0.2   ALK PHOS U/L 132* 147* 168*  --  116   AST (SGOT) U/L 30 34 71*  --  20   ALT (SGPT) U/L 68* 79* 130*  --  26         BNP         TROPONIN  Results from last 7 days   Lab Units 09/15/22  0457   TROPONIN T ng/mL <0.010       CoAg  Results from last 7 days   Lab Units 09/17/22  1628   INR  1.03       Creatinine Clearance  Estimated Creatinine Clearance: 104.4 mL/min (by C-G formula based on SCr of 1.26 mg/dL).    ABG        Radiology  MRI abdomen wo contrast mrcp    Result Date: 9/16/2022  IMPRESSION : 1.     Left renal cyst 2.     No significant biliary dilatation or discrete hepatic abnormality is apparent. There is some focal tapering of the more distal common bile duct which is of questionable significance.  Electronically Signed By-Luis Alfredo Weldon MD On:9/16/2022 8:29 PM This report was finalized on [id number] by  Luis Alfredo Weldon MD.          EKG                        I personally viewed and interpreted the patient's EKG/Telemetry data: Sinus bradycardia    ECHOCARDIOGRAM:    Results for orders placed during the hospital encounter of 06/22/22    Adult Transthoracic Echo Limited W/ Cont if Necessary Per Protocol    Interpretation Summary  · Left ventricular systolic function is normal.  · Left ventricular ejection fraction is 55 to 60%  · Basal inferior wall aneurysm is noted          STRESS MYOVIEW:    Cardiolite (Tc-99m Sestamibi) stress test    CARDIAC CATHETERIZATION:            OTHER:         Assessment & Plan     Active Problems:    Chest pain    CAD, multiple vessel    Chest pain, unspecified type    S/P CABG x 3    Chest pain    CAD, multiple vessel    S/P CABG x 3     1. Chest Pain  - [facility] 9/15/2022: three-vessel coronary disease, patent LIMA to LAD, closed SVG to OM, heavily diseased SVG to RCA with poor candidacy for native vessel intervention  - s/p Overlapping Xience drug-eluting stents 3.5 x 38 x 3 stents in overlapping fashion postdilated to 3.7 mm at 16 to 18 radha, reduction stenosis to 0% residual, improvement DARRYL-3 flow via the vein graft to the distal RCA and RPDA  - ASA / Brilinta  - statin therapy  - preserved  LVEF     2. CAD  - s/p STEMI 6/14/2022: LHC showed three vessel CAD- he underwent ASHLEE placement to the culprit lesion in the RCA.  LAD had 80-90% proximal stenosis and LCx had 40-50% in distal LCx/OM.  Mr. Gates underwent repeat cardiac cath on 6/16/2022 and received ASHLEE to LAD  - repeat admission 5 days later showing in stent thrombosis sent for CABG  - s/p 3V CABG 6/29/2022 (LIMA-LAD, SVG-OMofLCx, SVG-RPDA)     3. Elevated LFTs  - 9/14/2022 AST / ALT 20/26; 9/16/2022 AST/AL 71/130  - will ask GI to see     4. HTN     5. HLD     6. Obesity     Plan:   Mr. Gates presented with unstable angina. He has signfiicant CAD s/p stemi followed by readmission several days later showing stent thrombosis- referred for CABG and ultimately underwent 3V CABG 6/16/2022.    He presented with unstable angina and LHC showed three-vessel coronary disease, patent LIMA to LAD, closed SVG to OM, heavily diseased SVG to RCA with poor candidacy for native vessel intervention. He is s/p Overlapping Xience drug-eluting stents 3.5 x 38 x 3 stents in overlapping fashion postdilated to 3.7 mm at 16 to 18 radha, reduction stenosis to 0% residual, improvement DARRYL-3 flow via the vein graft to the distal RCA and RPDA.     Continue ASA / Brilinta indefinitely given advanced CAD native vessel and graft disease  Continue statin therapy- he has been on 40mg QHS   He does have elevated LFTs. Will ask GI to see- RUQ US  He continues to have some chest discomfort and has been started on Ranexa.   His ECG is unchanged     ]]]]]]]]]]]]]]]]]]]]  9/18/2022  Chest pain is better.  Patient had extensive problems with coronary artery disease.  Patient previously had stent placement and subsequently had CABG.  Patient recently had stent to SVG to RCA.  Physical exam is normal with well-healed incisions.  Continued medical treatment with aspirin/Brilinta indefinitely.  Medications were reviewed.  Patient is on aspirin atorvastatin    Valium subcu Lovenox (DVT  prophylaxis) Imdur 30 mg daily levothyroxine lisinopril metoprolol Ranexa 500 mg twice a day Brilinta 90 mg twice a day.    Okay with discharge plans from cardiac standpoint.    Follow-up with primary cardiologist Dr. Haddad at a regularly scheduled appointment.    Further plan will depend on patient's progress.          Mariia Carvajal MD  22  07:49 EDT                Electronically signed by Mariia Carvajal MD at 22 1027     Pranav Lazaro MD at 22 1256           [facility]     Progress Note    Patient Name: Tramaine Gates  : 1985  MRN: [id number]  Primary Care Physician:  Daniela eHrnandez FNP  Date of admission: 2022  Service date and time: 22 12:57 EDT  Subjective   Subjective     Chief Complaint: chest pain    HPI:  Patient Reports still having chest discomfort, awaiting AM labs      Objective   Objective     Vitals:   Temp:  [97.7 °F (36.5 °C)-98.8 °F (37.1 °C)] 98 °F (36.7 °C)  Heart Rate:  [56-87] 62  Resp:  [16-20] 18  BP: (114-140)/(63-89) 121/69  Physical Exam    Constitutional: Awake, alert, obese   Eyes: PERRLA, sclerae anicteric, no conjunctival injection   HENT: NCAT, mucous membranes moist   Neck: Supple, no thyromegaly, no lymphadenopathy, trachea midline   Respiratory: Clear to auscultation bilaterally, nonlabored respirations    Cardiovascular: RRR, no murmurs, rubs, or gallops, palpable pedal pulses bilaterally   Gastrointestinal: Positive bowel sounds, soft, nontender, nondistended   Musculoskeletal: No bilateral ankle edema, no clubbing or cyanosis to extremities   Psychiatric: Appropriate affect, cooperative   Neurologic: Oriented x 3, strength symmetric in all extremities, Cranial Nerves grossly intact to confrontation, speech clear   Skin: No rashes     Result Review    Result Review:  I have personally reviewed the results from the time of this admission to 2022 12:57 EDT and agree with these findings:  [x]  Laboratory list /  accordion  []  Microbiology  [x]  Radiology  [x]  EKG/Telemetry   [x]  Cardiology/Vascular   []  Pathology  []  Old records  []  Other:        Assessment & Plan   Assessment / Plan       Active Hospital Problems:  Active Hospital Problems    Diagnosis    • S/P CABG x 3    • Chest pain, unspecified type    • CAD, multiple vessel      Added automatically from request for surgery [id number]     • Chest pain    RUQ pain  Transaminitis    Plan:    - cards and CTS following, telemetry  - cont OMT per cards, cont ranexa, still having pain  - GI following, MRCP negative, awaiting LFT's this AM  - trend labs daily    DVT prophylaxis:  Medical DVT prophylaxis orders are present.    CODE STATUS:   Code Status (Patient has no pulse and is not breathing): CPR (Attempt to Resuscitate)  Medical Interventions (Patient has pulse or is breathing): Full Support    Disposition:  I expect patient to be discharged 1-2 days    Pranav Lazaro MD    Electronically signed by Pranav Lazaro MD at 09/17/22 1257     Ya Villafana APRN at 09/17/22 1226     Attestation signed by MONY Doan MD at 09/17/22 1515    The patient was seen and examined with an APRN. I personally performed the entire medical decision making, and physical exam.    HPI/ Subjective: Had some mild nausea this morning but no vomiting.  Tolerating a diet.  Having bowel movements.  Physical Exam notable for:  Awake, alert, no distress. Abdomen soft, nondistended. Otherwise unremarkable physical exam.  Labs and imaging and outpatient records reviewed, ordered.  .  AST 71.  Alk phos 168.  Bilirubin normal.    Assessment/Plan  Elevated liver enzymes, right upper quadrant abdominal pain--mild transaminitis and elevated alk phos. possibly due to fatty liver disease versus drug-induced liver injury.  MRCP without biliary dilatation or choledocholithiasis.  No signs of cirrhosis by labs or imaging.  Chronic liver disease serologic work-up in progress.  Abdominal  "pain is mild.  Would plan for outpatient follow-up in 1 to 2 months to ensure liver enzymes normalize.  Recommend weight loss for fatty liver disease.    GI team to sign off, but please feel free to call us back if further assistance is needed.  Thank you      Electronically signed on 09/17/22 15:11 EDT by BRIELLE Doan MD                     LOS: 1 day   Patient Care Team:  Daniela Hernandez FNP as PCP - General (Family Medicine)  Ollie Dunn MD as Consulting Physician (Gastroenterology)      Subjective \"I am ok\"    Interval History:     Subjective: Mild nausea but no vomiting.  Having bowel movements.  Tolerating diet.  Mild chest pain      ROS:   + chest pain, no shortness of breath, or cough.        Medication Review:     Current Facility-Administered Medications:   •  aspirin EC tablet 81 mg, 81 mg, Oral, Daily, Moises Haddad MD, 81 mg at 09/17/22 0840  •  atorvastatin (LIPITOR) tablet 40 mg, 40 mg, Oral, Nightly, Moises Haddad MD, 40 mg at 09/16/22 2025  •  diazePAM (VALIUM) tablet 2 mg, 2 mg, Oral, Q24H, Pranav Lazaro MD, 2 mg at 09/16/22 1908  •  Enoxaparin Sodium (LOVENOX) syringe 40 mg, 40 mg, Subcutaneous, Q24H, Moises Haddad MD, 40 mg at 09/16/22 1329  •  HYDROcodone-acetaminophen (NORCO) 5-325 MG per tablet 1 tablet, 1 tablet, Oral, Q4H PRN, Pranav Lazaro MD, 1 tablet at 09/17/22 1119  •  isosorbide mononitrate (IMDUR) 24 hr tablet 30 mg, 30 mg, Oral, Q24H, Izabella Villareal APRN, 30 mg at 09/17/22 0839  •  levothyroxine (SYNTHROID, LEVOTHROID) tablet 50 mcg, 50 mcg, Oral, Daily, Moises Haddad MD, 50 mcg at 09/17/22 0840  •  lisinopril (PRINIVIL,ZESTRIL) tablet 5 mg, 5 mg, Oral, Q24H, Moises Haddad MD, 5 mg at 09/17/22 0839  •  melatonin tablet 5 mg, 5 mg, Oral, Nightly PRN, Moises Haddad MD  •  metoprolol tartrate (LOPRESSOR) tablet 50 mg, 50 mg, Oral, BID, Moises Haddad MD, 50 mg at 09/17/22 " 0840  •  morphine injection 2 mg, 2 mg, Intravenous, Q4H PRN, Pranav Lazaro MD, 2 mg at 09/17/22 0842  •  nitroglycerin (NITROSTAT) SL tablet 0.4 mg, 0.4 mg, Sublingual, Q5 Min PRN, Moises Haddad MD, 0.4 mg at 09/15/22 0149  •  ondansetron (ZOFRAN) tablet 4 mg, 4 mg, Oral, Q4H PRN, 4 mg at 09/16/22 0757 **OR** ondansetron (ZOFRAN) injection 4 mg, 4 mg, Intravenous, Q4H PRN, Moises Haddad MD, 4 mg at 09/16/22 1329  •  ranolazine (RANEXA) 12 hr tablet 500 mg, 500 mg, Oral, Q12H, Mosies Haddad MD, 500 mg at 09/17/22 0840  •  sodium chloride 0.9 % flush 10 mL, 10 mL, Intravenous, PRN, Moises Haddad MD  •  sodium chloride 0.9 % flush 10 mL, 10 mL, Intravenous, Q12H, Moises Haddad MD, 10 mL at 09/17/22 0842  •  sodium chloride 0.9 % flush 10 mL, 10 mL, Intravenous, PRN, Moises Haddad MD  •  ticagrelor (BRILINTA) tablet 90 mg, 90 mg, Oral, BID, Moises Haddad MD, 90 mg at 09/17/22 0840      Objective Resting in bed, no acute distress, no family present    Vital Signs  Vitals:    09/17/22 0112 09/17/22 0400 09/17/22 0516 09/17/22 1121   BP: 115/70  115/81 121/69   BP Location: Left arm  Left arm Left arm   Patient Position:    Lying   Pulse: 68 56 74 62   Resp: 16  18 18   Temp: 98.8 °F (37.1 °C)  97.7 °F (36.5 °C) 98 °F (36.7 °C)   TempSrc: Oral  Oral Oral   SpO2: 98% 95% 98% 100%   Weight:   123 kg (271 lb 2.7 oz)    Height:           Physical Exam:     General Appearance:    Awake and alert, in no acute distress, overweight   Head:    Normocephalic, without obvious abnormality   Eyes:          Conjunctivae normal, anicteric sclera   Throat:   No oral lesions, no thrush, oral mucosa moist   Neck:  supple, no JVD   Lungs:     respirations regular, even and unlabored       Rectal:     Deferred   Extremities:   No edema, no cyanosis   Skin:   No bruising or rash, no jaundice        Results Review:    CBC    Results from last 7 days   Lab Units  09/16/22  0442 09/15/22  0457 09/14/22  1315   WBC 10*3/mm3 6.10 6.30 7.50   HEMOGLOBIN g/dL 13.5 13.6 13.5   PLATELETS 10*3/mm3 294 320 322     CMP   Results from last 7 days   Lab Units 09/16/22  0442 09/15/22  0457 09/14/22  1315   SODIUM mmol/L 135* 136 133*   POTASSIUM mmol/L 4.9 4.3 4.0   CHLORIDE mmol/L 100 102 97*   CO2 mmol/L 25.0 23.0 24.0   BUN mg/dL 11 12 11   CREATININE mg/dL 1.09 1.04 1.19   GLUCOSE mg/dL 97 96 99   ALBUMIN g/dL 3.80  --  4.10   BILIRUBIN mg/dL 0.6  --  0.2   ALK PHOS U/L 168*  --  116   AST (SGOT) U/L 71*  --  20   ALT (SGPT) U/L 130*  --  26   MAGNESIUM mg/dL 2.2 2.0  --      Cr Clearance Estimated Creatinine Clearance: 119.7 mL/min (by C-G formula based on SCr of 1.09 mg/dL).  Coag     HbA1C   Lab Results   Component Value Date    HGBA1C 5.3 09/16/2022    HGBA1C 5.4 06/23/2022     Blood Glucose No results found for: POCGLU  Infection     UA      Radiology(recent) MRI abdomen wo contrast mrcp    Result Date: 9/16/2022  IMPRESSION : 1.     Left renal cyst 2.     No significant biliary dilatation or discrete hepatic abnormality is apparent. There is some focal tapering of the more distal common bile duct which is of questionable significance.  Electronically Signed By-Luis Alfredo Weldon MD On:9/16/2022 8:29 PM This report was finalized on [id number] by  Luis Alfredo Weldon MD.         Assessment & Plan   Elevated LFTs  Right upper quadrant pain-intermittent/mild  Chest pain  CAD/CABG/stents -aspirin/Brilinta  Hyperlipidemia  History of pulmonary emboli  History of cholecystectomy     PLAN:  CABG in June with repeat cardiac stents this admission.  Cardiology following with current aspirin/Brilinta, atorvastatin, Imdur and Ranexa.  MRCP without choledocholithiasis or biliary dilation.  Full liver serology work-up in progress.  Little more to GI to offer at this time, he can follow-up as an outpatient for further management of elevated LFTs.  No plan for current endoscopy with recent cardiac  stents.  We will see inpatient as needed, call questions or concerns.    Electronically signed by YOUSIF Leon, 09/17/22, 12:26 PM EDT.           Electronically signed by MONY Doan MD at 09/17/22 0551     Mariia Carvajal MD at 09/17/22 5135          Referring Provider: Pranav Lazaro MD    Reason for follow-up:  Chest pain     Patient Care Team:  Daniela Hernandez FNP as PCP - General (Family Medicine)  Ollie Dunn MD as Consulting Physician (Gastroenterology)    Subjective .  Patient is having sharp chest pains     ROS    Patient is not having any shortness of breath, palpitations, dizziness or syncope.  Denies having any headache ,abdominal pain ,nausea, vomiting , diarrhea constipation, loss of weight or loss of appetite.  Denies having any excessive bruising ,hematuria or blood in the stool.    Review of all systems negative except as indicated    History  Past Medical History:   Diagnosis Date   • Acute inferior myocardial infarction (HCC) 6/14/2022   • Allergic    • Asthma 1/27/2022   • CAD, multiple vessel 6/14/2022   • Gastroesophageal reflux disease 1/27/2022   • Hx of complete eye exam 2016   • Hyperlipidemia 1/27/2022   • Hypertension    • Migraine headache 1/27/2022   • Panic attack 1/27/2022   • Peptic ulcer 9/14/2017       Past Surgical History:   Procedure Laterality Date   • CARDIAC CATHETERIZATION N/A 6/14/2022    Procedure: Left Heart Cath;  Surgeon: Matthieu Del Toro MD;  Location: [facility] CATH INVASIVE LOCATION;  Service: Cardiology;  Laterality: N/A;   • CARDIAC CATHETERIZATION N/A 6/16/2022    Procedure: Percutaneous Coronary Intervention;  Surgeon: Matthieu Del Toro MD;  Location: [facility] CATH INVASIVE LOCATION;  Service: Cardiovascular;  Laterality: N/A;   • CARDIAC CATHETERIZATION N/A 6/23/2022    Procedure: Left Heart Cath possible PCI, atherectomy, hemodynamic support;  Surgeon: Moises Haddad MD;  Location: [facility] CATH INVASIVE LOCATION;  Service:  Cardiology;  Laterality: N/A;   • CARDIAC CATHETERIZATION N/A 9/15/2022    Procedure: Left Heart Cath;  Surgeon: Moises Haddad MD;  Location: [facility] CATH INVASIVE LOCATION;  Service: Cardiology;  Laterality: N/A;   • CHOLECYSTECTOMY  2019   • CORONARY ARTERY BYPASS GRAFT N/A 2022    Procedure: CORONARY ARTERY BYPASS GRAFTING;  Surgeon: Pablo Ball MD;  Location: [facility] CVOR;  Service: Cardiothoracic;  Laterality: N/A;  CABG X 3(2 vein grafts, 1 LIMA graft)   • MANDIBLE SURGERY         Family History   Problem Relation Age of Onset   • Heart disease Mother    • Heart failure Father    • Cirrhosis Maternal Grandfather        Social History     Tobacco Use   • Smoking status: Former Smoker     Packs/day: 1.00     Types: Cigarettes     Start date:      Quit date: 2022     Years since quittin.2   • Smokeless tobacco: Never Used   Vaping Use   • Vaping Use: Never used   Substance Use Topics   • Alcohol use: Not Currently   • Drug use: Yes     Frequency: 7.0 times per week     Types: Marijuana        Medications Prior to Admission   Medication Sig Dispense Refill Last Dose   • aspirin 81 MG EC tablet Take 1 tablet by mouth Daily. 160 tablet 0 2022 at Unknown time   • atorvastatin (LIPITOR) 40 MG tablet Take 40 mg by mouth Every Night.   2022 at Unknown time   • HYDROcodone-acetaminophen (NORCO) 5-325 MG per tablet Take 1 tablet by mouth Every 4 (Four) Hours As Needed for Moderate Pain . 40 tablet 0 2022 at Unknown time   • levothyroxine (SYNTHROID, LEVOTHROID) 50 MCG tablet Take 50 mcg by mouth Daily.   2022 at Unknown time   • lisinopril (PRINIVIL,ZESTRIL) 5 MG tablet Take 1 tablet by mouth Daily. 30 tablet 2 2022 at Unknown time   • metoprolol tartrate (LOPRESSOR) 50 MG tablet Take 1 tablet by mouth 2 (Two) Times a Day. 30 tablet 2 2022 at Unknown time   • ticagrelor (BRILINTA) 90 MG tablet tablet Take 1 tablet by mouth 2 (Two) Times a Day. 60 tablet  "3 9/13/2022 at Unknown time   • albuterol sulfate  (90 Base) MCG/ACT inhaler Inhale 2 puffs Every 4 (Four) Hours As Needed for Wheezing.      • hydrOXYzine (ATARAX) 25 MG tablet Take 25 mg by mouth 3 (Three) Times a Day As Needed for Itching.          Allergies  Shellfish-derived products    Scheduled Meds:aspirin, 81 mg, Oral, Daily  atorvastatin, 40 mg, Oral, Nightly  diazePAM, 2 mg, Oral, Q24H  enoxaparin, 40 mg, Subcutaneous, Q24H  isosorbide mononitrate, 30 mg, Oral, Q24H  levothyroxine, 50 mcg, Oral, Daily  lisinopril, 5 mg, Oral, Q24H  metoprolol tartrate, 50 mg, Oral, BID  ranolazine, 500 mg, Oral, Q12H  sodium chloride, 10 mL, Intravenous, Q12H  ticagrelor, 90 mg, Oral, BID      Continuous Infusions:   PRN Meds:.HYDROcodone-acetaminophen  •  melatonin  •  Morphine  •  nitroglycerin  •  ondansetron **OR** ondansetron  •  sodium chloride  •  sodium chloride    Objective     VITAL SIGNS  Vitals:    09/17/22 0112 09/17/22 0400 09/17/22 0516 09/17/22 1121   BP: 115/70  115/81 121/69   BP Location: Left arm  Left arm Left arm   Patient Position:    Lying   Pulse: 68 56 74 62   Resp: 16  18 18   Temp: 98.8 °F (37.1 °C)  97.7 °F (36.5 °C) 98 °F (36.7 °C)   TempSrc: Oral  Oral Oral   SpO2: 98% 95% 98% 100%   Weight:   123 kg (271 lb 2.7 oz)    Height:           Flowsheet Rows    Flowsheet Row First Filed Value   Admission Height 177.8 cm (70\") Documented at 09/14/2022 1217   Admission Weight 123 kg (270 lb 8.1 oz) Documented at 09/14/2022 1217            Intake/Output Summary (Last 24 hours) at 9/17/2022 1149  Last data filed at 9/17/2022 1121  Gross per 24 hour   Intake 720 ml   Output 2200 ml   Net -1480 ml        TELEMETRY: Sinus bradycardia    Physical Exam:  The patient is alert, oriented and in no distress.  Vital signs as noted above.  Head and neck revealed no carotid bruits or jugular venous distention.  No thyromegaly or lymphadenopathy is present  Lungs clear.  No wheezing.  Breath sounds are " normal bilaterally.  Heart normal first and second heart sounds.  No murmur. No precordial rub is present.  No gallop is present.  Abdomen soft and nontender.  No organomegaly is present.  Extremities with good peripheral pulses without any pedal edema.  Skin warm and dry.  Incisions are looking well.  Musculoskeletal system is grossly normal  CNS grossly normal      Results Review:   I reviewed the patient's new clinical results.  Lab Results (last 24 hours)     Procedure Component Value Units Date/Time    Alpha - 1 - Antitrypsin [[id number]]  (Normal) Collected: 09/16/22 0442    Specimen: Blood Updated: 09/17/22 0624     ALPHA -1 ANTITRYPSIN 139 mg/dL     Ceruloplasmin [[id number]]  (Normal) Collected: 09/16/22 0442    Specimen: Blood Updated: 09/17/22 0624     Ceruloplasmin 31 mg/dL     Anti-microsomal Antibody [[id number]] Collected: 09/16/22 1815    Specimen: Blood Updated: 09/16/22 1825    Anti-Smooth Muscle Antibody Titer [[id number]] Collected: 09/16/22 1815    Specimen: Blood Updated: 09/16/22 1825    Mitochondrial Antibodies, M2 [[id number]] Collected: 09/16/22 1815    Specimen: Blood Updated: 09/16/22 1825    Hepatitis Panel, Acute [[id number]]  (Normal) Collected: 09/14/22 1315    Specimen: Blood Updated: 09/16/22 1824     Hepatitis B Surface Ag Non-Reactive     Hep A IgM Non-Reactive     Hep B C IgM Non-Reactive     Hepatitis C Ab Non-Reactive    Narrative:      Results may be falsely decreased if patient taking Biotin.     Ferritin [[id number]]  (Normal) Collected: 09/16/22 0442    Specimen: Blood Updated: 09/16/22 1821     Ferritin 314.30 ng/mL     Narrative:      Results may be falsely decreased if patient taking Biotin.      Iron [[id number]]  (Normal) Collected: 09/16/22 0442    Specimen: Blood Updated: 09/16/22 1817     Iron 75 mcg/dL     PAULA [[id number]] Collected: 09/14/22 1315    Specimen: Blood Updated: 09/16/22 1754          Imaging Results (Last 24 Hours)     Procedure Component Value Units Date/Time     MRI abdomen wo contrast mrcp [[id number]] Collected: 09/16/22 2025     Updated: 09/16/22 2031    Narrative:         DATE OF EXAM:   9/16/2022 7:25 PM     PROCEDURE:   MRI ABDOMEN WO CONTRAST MRCP-     INDICATIONS:   Elevated LFTs, history of cholecystectomy, right upper quadrant pain;  R07.9-Chest pain, unspecified; R06.00-Dyspnea, unspecified;  Z95.1-Presence of aortocoronary bypass graft; I25.10-Atherosclerotic  heart disease of native coronary artery without angina pectoris     COMPARISON:  No Comparisons Available     TECHNIQUE:   Multiplanar/multisequence MRI imaging of the abdomen was performed  without gadolinium contrast administration.     FINDINGS:   There is no discrete abnormality of the liver. The common bile duct and  pancreatic duct do are not dilated. There is no intrahepatic biliary  dilatation. There is focal tapering of the more distal common bile duct  of questionable significance. There is no definite abnormality of the  spleen or pancreas. There is a left renal cyst. The right kidney is  unremarkable. There is no ascites or pathologic appearing  lymphadenopathy is seen within the abdomen.       Impression:      IMPRESSION :   1.     Left renal cyst  2.     No significant biliary dilatation or discrete hepatic abnormality  is apparent. There is some focal tapering of the more distal common bile  duct which is of questionable significance.     Electronically Signed By-Luis Alfredo Weldon MD On:9/16/2022 8:29 PM  This report was finalized on [id number] by  Luis Alfredo Weldon MD.      LAB RESULTS (LAST 7 DAYS)    CBC  Results from last 7 days   Lab Units 09/16/22  0442 09/15/22  0457 09/14/22  1315   WBC 10*3/mm3 6.10 6.30 7.50   RBC 10*6/mm3 4.88 4.84 4.85   HEMOGLOBIN g/dL 13.5 13.6 13.5   HEMATOCRIT % 40.9 40.6 40.6   MCV fL 83.9 83.8 83.7   PLATELETS 10*3/mm3 294 320 322       BMP  Results from last 7 days   Lab Units 09/16/22  0442 09/15/22  0457 09/14/22  1315   SODIUM mmol/L 135* 136 133*    POTASSIUM mmol/L 4.9 4.3 4.0   CHLORIDE mmol/L 100 102 97*   CO2 mmol/L 25.0 23.0 24.0   BUN mg/dL 11 12 11   CREATININE mg/dL 1.09 1.04 1.19   GLUCOSE mg/dL 97 96 99   MAGNESIUM mg/dL 2.2 2.0  --        CMP   Results from last 7 days   Lab Units 09/16/22  0442 09/15/22  0457 09/14/22  1315   SODIUM mmol/L 135* 136 133*   POTASSIUM mmol/L 4.9 4.3 4.0   CHLORIDE mmol/L 100 102 97*   CO2 mmol/L 25.0 23.0 24.0   BUN mg/dL 11 12 11   CREATININE mg/dL 1.09 1.04 1.19   GLUCOSE mg/dL 97 96 99   ALBUMIN g/dL 3.80  --  4.10   BILIRUBIN mg/dL 0.6  --  0.2   ALK PHOS U/L 168*  --  116   AST (SGOT) U/L 71*  --  20   ALT (SGPT) U/L 130*  --  26         BNP        TROPONIN  Results from last 7 days   Lab Units 09/15/22  0457   TROPONIN T ng/mL <0.010       CoAg        Creatinine Clearance  Estimated Creatinine Clearance: 119.7 mL/min (by C-G formula based on SCr of 1.09 mg/dL).    ABG        Radiology  MRI abdomen wo contrast mrcp    Result Date: 9/16/2022  IMPRESSION : 1.     Left renal cyst 2.     No significant biliary dilatation or discrete hepatic abnormality is apparent. There is some focal tapering of the more distal common bile duct which is of questionable significance.  Electronically Signed By-Luis Alfredo Weldon MD On:9/16/2022 8:29 PM This report was finalized on [id number] by  Luis Alfredo Weldon MD.          EKG                        I personally viewed and interpreted the patient's EKG/Telemetry data: Sinus bradycardia    ECHOCARDIOGRAM:    Results for orders placed during the hospital encounter of 06/22/22    Adult Transthoracic Echo Limited W/ Cont if Necessary Per Protocol    Interpretation Summary  · Left ventricular systolic function is normal.  · Left ventricular ejection fraction is 55 to 60%  · Basal inferior wall aneurysm is noted          STRESS MYOVIEW:    Cardiolite (Tc-99m Sestamibi) stress test    CARDIAC CATHETERIZATION:            OTHER:         Assessment & Plan     Active Problems:    Chest pain    CAD,  multiple vessel    Chest pain, unspecified type    S/P CABG x 3    Chest pain    CAD, multiple vessel    S/P CABG x 3     1. Chest Pain  - [facility] 9/15/2022: three-vessel coronary disease, patent LIMA to LAD, closed SVG to OM, heavily diseased SVG to RCA with poor candidacy for native vessel intervention  - s/p Overlapping Xience drug-eluting stents 3.5 x 38 x 3 stents in overlapping fashion postdilated to 3.7 mm at 16 to 18 radha, reduction stenosis to 0% residual, improvement DARRYL-3 flow via the vein graft to the distal RCA and RPDA  - ASA / Brilinta  - statin therapy  - preserved LVEF     2. CAD  - s/p STEMI 6/14/2022: [facility] showed three vessel CAD- he underwent ASHLEE placement to the culprit lesion in the RCA.  LAD had 80-90% proximal stenosis and LCx had 40-50% in distal LCx/OM.  Mr. Gates underwent repeat cardiac cath on 6/16/2022 and received ASHLEE to LAD  - repeat admission 5 days later showing in stent thrombosis sent for CABG  - s/p 3V CABG 6/29/2022 (LIMA-LAD, SVG-OMofLCx, SVG-RPDA)     3. Elevated LFTs  - 9/14/2022 AST / ALT 20/26; 9/16/2022 AST/AL 71/130  - will ask GI to see     4. HTN     5. HLD     6. Obesity     Plan:   Mr. Gates presented with unstable angina. He has signfiicant CAD s/p stemi followed by readmission several days later showing stent thrombosis- referred for CABG and ultimately underwent 3V CABG 6/16/2022.    He presented with unstable angina and [facility] showed three-vessel coronary disease, patent LIMA to LAD, closed SVG to OM, heavily diseased SVG to RCA with poor candidacy for native vessel intervention. He is s/p Overlapping Xience drug-eluting stents 3.5 x 38 x 3 stents in overlapping fashion postdilated to 3.7 mm at 16 to 18 radha, reduction stenosis to 0% residual, improvement DARRYL-3 flow via the vein graft to the distal RCA and RPDA.     Continue ASA / Brilinta indefinitely given advanced CAD native vessel and graft disease  Continue statin therapy- he has been on 40mg QHS   He does have  elevated LFTs. Will ask GI to see- RUQ US  He continues to have some chest discomfort and has been started on Ranexa.   His ECG is unchanged     ]]]]]]]]]]]]]]]]]]]]  2022  Reviewed extensive history especially from primary cardiologist.  Patient is having mild sharp chest pain-atypical and probably musculoskeletal.  Patient had extensive problems with coronary artery disease.  Patient previously had stent placement and subsequently had CABG.  Patient recently had stent to SVG to RCA.  Physical exam is normal with well-healed incisions.  Continued medical treatment with aspirin/Brilinta indefinitely.  Medications were reviewed.  Patient is on aspirin atorvastatin    Valium subcu Lovenox (DVT prophylaxis) Imdur 30 mg daily levothyroxine lisinopril metoprolol Ranexa 500 mg twice a day Brilinta 90 mg twice a day.    Close cardiac monitoring.    Further plan will depend on patient's progress.          Mariia Carvajal MD  22  11:49 EDT                Electronically signed by Mariia Carvajal MD at 22 1159     Pranav Lazaro MD at 22 1725           [facility]     Progress Note    Patient Name: Tramaine Gates  : 1985  MRN: [id number]  Primary Care Physician:  Daniela Hernandez FNP  Date of admission: 2022  Service date and time: 22 17:25 EDT  Subjective   Subjective     Chief Complaint: chest pain    HPI:  Patient Reports still having chest discomfort      Objective   Objective     Vitals:   Temp:  [97.3 °F (36.3 °C)-98.4 °F (36.9 °C)] 98.2 °F (36.8 °C)  Heart Rate:  [63-88] 77  Resp:  [18-20] 18  BP: (107-142)/(63-97) 114/63  Physical Exam    Constitutional: Awake, alert, obese   Eyes: PERRLA, sclerae anicteric, no conjunctival injection   HENT: NCAT, mucous membranes moist   Neck: Supple, no thyromegaly, no lymphadenopathy, trachea midline   Respiratory: Clear to auscultation bilaterally, nonlabored respirations    Cardiovascular: RRR, no murmurs, rubs, or  gallops, palpable pedal pulses bilaterally   Gastrointestinal: Positive bowel sounds, soft, nontender, nondistended   Musculoskeletal: No bilateral ankle edema, no clubbing or cyanosis to extremities   Psychiatric: Appropriate affect, cooperative   Neurologic: Oriented x 3, strength symmetric in all extremities, Cranial Nerves grossly intact to confrontation, speech clear   Skin: No rashes     Result Review    Result Review:  I have personally reviewed the results from the time of this admission to 2022 17:25 EDT and agree with these findings:  [x]  Laboratory list / accordion  []  Microbiology  [x]  Radiology  [x]  EKG/Telemetry   [x]  Cardiology/Vascular   []  Pathology  []  Old records  []  Other:        Assessment & Plan   Assessment / Plan       Active Hospital Problems:  Active Hospital Problems    Diagnosis    • S/P CABG x 3    • Chest pain, unspecified type    • CAD, multiple vessel      Added automatically from request for surgery [id number]     • Chest pain      Plan:    - cards and CTS following, telemetry  - cont OMT per cards, started on ranexa  - GI consulted for transaminitis, f/u recs, supportive care  - trend labs    DVT prophylaxis:  Medical DVT prophylaxis orders are present.    CODE STATUS:   Code Status (Patient has no pulse and is not breathing): CPR (Attempt to Resuscitate)  Medical Interventions (Patient has pulse or is breathing): Full Support    Disposition:  I expect patient to be discharged 1-2 days    Pranav Lazaro MD    Electronically signed by Pranav Lazaro MD at 22 172     Moises Haddad MD at 22 0927          [facility] [place]        LOS:  LOS: 0 days   Patient Name: Tramaine Gates  Age/Sex: 37 y.o. male  : 1985  MRN: [id number]    Day of Service: 22   Length of Stay: 0  Encounter Provider: YOUSIF Garces  Place of Service: [facility] CARDIOLOGY  Patient Care Team:  Daniela Hernandez FNP  "as PCP - General (Family Medicine)  Ollie Dunn MD as Consulting Physician (Gastroenterology)    Subjective:     Chief Complaint: f/u chest pain    Subjective: Mr. Gates reports some continued chest discomfort.  Continues to be atypical  He states it is \"always there\".  His ECG is unchanged this morning without acute ischemic changes.  He has some nausea. LFTs noted to be increased curiously over the last 2 days. He has been on same statin dose at home    Would continue to trend without stopping cholesterol medicines  Consult GI  May need upper quadrant ultrasound    Current Medications:   Scheduled Meds:aspirin, 81 mg, Oral, Daily  atorvastatin, 40 mg, Oral, Nightly  enoxaparin, 40 mg, Subcutaneous, Q24H  levothyroxine, 50 mcg, Oral, Daily  lisinopril, 5 mg, Oral, Q24H  metoprolol tartrate, 50 mg, Oral, BID  ranolazine, 500 mg, Oral, Q12H  sodium chloride, 10 mL, Intravenous, Q12H  ticagrelor, 90 mg, Oral, BID      Continuous Infusions:nitroglycerin, 10-50 mcg/min, Last Rate: Stopped (09/15/22 1215)        Allergies:  Allergies   Allergen Reactions   • Codeine Itching   • Hydrocodone Itching   • Hydrocodone-Acetaminophen Other (See Comments)   • Shellfish-Derived Products Unknown - High Severity       Review of Systems   Constitutional: Negative for chills, diaphoresis and malaise/fatigue.   Cardiovascular: Positive for chest pain (somewhat chronic in nature). Negative for dyspnea on exertion, irregular heartbeat, leg swelling, near-syncope, orthopnea, palpitations, paroxysmal nocturnal dyspnea and syncope.   Respiratory: Negative for cough, shortness of breath, sleep disturbances due to breathing and sputum production.    Gastrointestinal: Positive for nausea. Negative for change in bowel habit.   Genitourinary: Negative for urgency.   Neurological: Negative for dizziness and headaches.   Psychiatric/Behavioral: Negative for altered mental status.         Objective:     Temp:  [97.3 °F (36.3 °C)-98.4 °F " (36.9 °C)] 98.2 °F (36.8 °C)  Heart Rate:  [58-93] 65  Resp:  [12-24] 18  BP: ()/() 120/77     Intake/Output Summary (Last 24 hours) at 9/16/2022 0925  Last data filed at 9/16/2022 0540  Gross per 24 hour   Intake 1260 ml   Output 1000 ml   Net 260 ml     Body mass index is 38.45 kg/m².      09/14/22  1833 09/15/22  0500 09/15/22  1830   Weight: 122 kg (269 lb) 121 kg (267 lb 10.2 oz) 122 kg (268 lb)         General Appearance:    Alert, cooperative, in no acute distress                                Head: Atraumatic, normocephalic, PERRLA               Neck:   supple, no JVD   Lungs:     Clear to auscultation, respirations regular, even and               unlabored    Heart:    Regular rhythm and normal rate, normal S1 and S2   Abdomen:     Normal bowel sounds, soft   Extremities:   Moves all extremities well, no edema, no cyanosis, no  Redness groin soft without oozing bruising or hematoma   Pulses:   Pulses palpable and equal bilaterally   Skin:   No bleeding, bruising or rash   Neurologic:   Awake, alert, oriented x3   Agree with encounter and exam as discussed with nurse practitioner after face-to-face encounter      Lab Review:   Results from last 7 days   Lab Units 09/16/22  0442 09/15/22  0457 09/14/22  1315   SODIUM mmol/L 135* 136 133*   POTASSIUM mmol/L 4.9 4.3 4.0   CHLORIDE mmol/L 100 102 97*   CO2 mmol/L 25.0 23.0 24.0   BUN mg/dL 11 12 11   CREATININE mg/dL 1.09 1.04 1.19   GLUCOSE mg/dL 97 96 99   CALCIUM mg/dL 9.1 9.1 8.8   AST (SGOT) U/L 71*  --  20   ALT (SGPT) U/L 130*  --  26     Results from last 7 days   Lab Units 09/15/22  0457 09/14/22  1926 09/14/22  1617 09/14/22  1315   TROPONIN T ng/mL <0.010 <0.010 <0.010 <0.010     Results from last 7 days   Lab Units 09/16/22  0442 09/15/22  0457   WBC 10*3/mm3 6.10 6.30   HEMOGLOBIN g/dL 13.5 13.6   HEMATOCRIT % 40.9 40.6   PLATELETS 10*3/mm3 294 320         Results from last 7 days   Lab Units 09/16/22  0442 09/15/22  0457   MAGNESIUM  mg/dL 2.2 2.0     Results from last 7 days   Lab Units 09/16/22  0442   CHOLESTEROL mg/dL 228*   TRIGLYCERIDES mg/dL 176*   HDL CHOL mg/dL 38*     Results from last 7 days   Lab Units 09/14/22  1315   PROBNP pg/mL 105.7           Recent Radiology:  Imaging Results (Most Recent)     Procedure Component Value Units Date/Time    XR Chest 1 View [[id number]] Collected: 09/14/22 1401     Updated: 09/14/22 1403    Narrative:      XR CHEST 1 VW-     Date of Exam: 9/14/2022 1:10 PM     Indication: Chest Pain Protocol.     Comparison: 7/1/2022     Technique: A single view of the chest was obtained.     FINDINGS:      The heart size and pulmonary vessels are within normal limits. The  lungs are clear bilaterally. There are no pleural effusions. Bony thorax  is unremarkable.                Impression:         1. No acute cardiopulmonary disease.        Electronically Signed By-Roman Montano MD On:9/14/2022 2:01 PM  This report was finalized on [id number] by  Roman Montano MD.          ECHOCARDIOGRAM:    Results for orders placed during the hospital encounter of 06/22/22    Adult Transthoracic Echo Limited W/ Cont if Necessary Per Protocol    Interpretation Summary  · Left ventricular systolic function is normal.  · Left ventricular ejection fraction is 55 to 60%  · Basal inferior wall aneurysm is noted        I reviewed the patient's new clinical results.    EKG:          Assessment:       Chest pain    CAD, multiple vessel    S/P CABG x 3    1. Chest Pain  - [facility] 9/15/2022: three-vessel coronary disease, patent LIMA to LAD, closed SVG to OM, heavily diseased SVG to RCA with poor candidacy for native vessel intervention  - s/p Overlapping Xience drug-eluting stents 3.5 x 38 x 3 stents in overlapping fashion postdilated to 3.7 mm at 16 to 18 radha, reduction stenosis to 0% residual, improvement DARRYL-3 flow via the vein graft to the distal RCA and RPDA  - ASA / Brilinta  - statin therapy  - preserved LVEF    2. CAD  - s/p  STEMI 6/14/2022: LHC showed three vessel CAD- he underwent ASHLEE placement to the culprit lesion in the RCA.  LAD had 80-90% proximal stenosis and LCx had 40-50% in distal LCx/OM.  Mr. Gates underwent repeat cardiac cath on 6/16/2022 and received ASHLEE to LAD  - repeat admission 5 days later showing in stent thrombosis sent for CABG  - s/p 3V CABG 6/29/2022 (LIMA-LAD, SVG-OMofLCx, SVG-RPDA)    3. Elevated LFTs  - 9/14/2022 AST / ALT 20/26; 9/16/2022 AST/AL 71/130  - will ask GI to see    4. HTN    5. HLD    6. Obesity    Plan:   Mr. Gtaes presented with unstable angina. He has signfiicant CAD s/p stemi followed by readmission several days later showing stent thrombosis- referred for CABG and ultimately underwent 3V CABG 6/16/2022.    He presented with unstable angina and LHC showed three-vessel coronary disease, patent LIMA to LAD, closed SVG to OM, heavily diseased SVG to RCA with poor candidacy for native vessel intervention. He is s/p Overlapping Xience drug-eluting stents 3.5 x 38 x 3 stents in overlapping fashion postdilated to 3.7 mm at 16 to 18 radha, reduction stenosis to 0% residual, improvement DARRYL-3 flow via the vein graft to the distal RCA and RPDA.    Continue ASA / Brilinta indefinitely given advanced CAD native vessel and graft disease  Continue statin therapy- he has been on 40mg QHS   He does have elevated LFTs. Will ask GI to see- RUQ US  He continues to have some chest discomfort and has been started on Ranexa.   His ECG is unchanged       Greater than 50% of his face-to-face time as well as exam, patient encounter and scribed findings as above from nurse practitioner performed by YOUSIF Schilling  09/16/22  09:25 EDT    Electronically signed by Moises Haddad MD at 09/16/22 1452     Satterly-Berna Vega APRN at 09/16/22 0907          S/P CABG x 3 (LIMA to LAD, SVG to CX, SVG to PDA) --Camporrotondo  EF 60% (cath)    Pt well known to our service.  S/P CABG x3 per   [facility] 6/29/2022.  Prior to that he had STEMI and ASHLEE placed in LAD that was found to have in-stent thrombosis.  He has had intermittent chest pain that appears atypical.  He has also been treated for costochondritis--steroid dose pack.  Troponin and ekg negative.      Pt continues to report chest pains.  He states he has had chest pain since he was 13 y/o.    Pt underwent stenting to RCA graft yesterday--on Brilinta  Noted his cholesterol panel is worse--pt reports he is taking atorvastatin      Intake/Output Summary (Last 24 hours) at 9/16/2022 0907  Last data filed at 9/16/2022 0540  Gross per 24 hour   Intake 1260 ml   Output 1000 ml   Net 260 ml     Temp:  [97.3 °F (36.3 °C)-98.4 °F (36.9 °C)] 98.2 °F (36.8 °C)  Heart Rate:  [58-93] 65  Resp:  [12-24] 18  BP: ()/() 120/77      Results from last 7 days   Lab Units 09/16/22  0442 09/15/22  0457   WBC 10*3/mm3 6.10 6.30   HEMOGLOBIN g/dL 13.5 13.6   HEMATOCRIT % 40.9 40.6   PLATELETS 10*3/mm3 294 320     Results from last 7 days   Lab Units 09/16/22  0442   CREATININE mg/dL 1.09   POTASSIUM mmol/L 4.9   SODIUM mmol/L 135*   MAGNESIUM mg/dL 2.2     Physical exam:  Neuro intact, NAD, resting in bed  Tele:  SR 60s  CTA on room air  Sternotomy well healed, no tenderness along sternum noted  abd benign, no BM  No edema      Assessment/Plan:  Active Problems:    Chest pain    CAD, multiple vessel    S/P CABG x 3    - Atypical chest pain--troponin negative, plans for cath  - CAD, s/p STEMI with stenting, s/p CABG x3 with LIMA (Camporrotondo), s/p ASHLEE to RCA graft (Boo) 9/15--Brilinta  - HTN  - HLD, worsening profile--on atorvastatin 40 mg qHS  - Bipolar disorder/ HOLLY/ panic disorder  - Tobacco abuse    Pt remains with chest pain despite stenting to RCA.  Unable to illicit any tenderness/pain along sternum with hx of costochondritis.  Pt reports he is taking atorvastatin at home but lipid panel is worse than preop panel.  Will defer to cardiology.   "Will sign off, available if needed.    YOUSIF Dhillon  9/16/2022  09:07 EDT    Electronically signed by Berna Sandoval APRN at 09/16/22 1011          Consult Notes (last 72 hours)      Ya Villafana, YOUSIF at 09/16/22 1736      Consult Orders    1. Inpatient Gastroenterology Consult [[id number]] ordered by Izabella Villareal APRN at 09/16/22 1055          Attestation signed by Moises Brunson MD at 09/16/22 1810    The patient was seen and examined with an APRN. I personally performed the substantive portion of the history of presenting illness.  I also performed the entire physical exam and medical decision making.    37-year-old male with right upper quadrant pain and elevated liver enzymes.  He is on aspirin and Brilinta.  He has had multiple coronary stents, coronary bypass graft, and just had new stents placed.  Gallbladder is removed for dyskinesia.  Denies history of liver disease.  On exam he has right upper quadrant tenderness without rebound or guarding.  No stigmata for chronic liver disease.  Impression:  Right upper quadrant pain with elevated liver tests rule out common bile duct stone versus ampullary stenosis.  Also consider elevated liver tests from medications, viruses, autoimmune, etc.  Plan:  MRCP  Liver serologies  Follow-up in a.m.      Electronically signed by Moises Brunson MD, 09/16/22, 6:08 PM EDT.                       GI CONSULT  NOTE:    Referring Provider:  Dr. Lazaro    Chief complaint: Elevated liver enzymes    Subjective . \"  I have had persistent chest pain\"    History of present illness: Tramaine Gates is a 37 y.o. male who has a history of CAD/MI/CAD/CABG.  He had a CABG in June with persistent chest pain with repeat stents placed.  He remains on aspirin and Brilinta.  He has had persistent chest pain that is sharp and pressure with radiation to left arm.  He has associated shortness of breath but no fevers or chills.  He has " also had some nausea with occasional right upper quadrant discomfort.  No vomiting, heartburn or difficulty swallowing.  No change in bowel habits.  He did have rectal bleeding in the past but none in 6 months.  Gallbladder is absent with history of biliary dyskinesia but denies history of cholelithiasis.  No unintentional weight loss.  No known history of liver disease or jaundice.  No IV drug abuse but does report snorting drugs in the past.  No tattoos.  Rare alcohol use.  No exposure to hepatitis.  Grandfather with cirrhosis of unknown etiology.      Endo History:  None    Past Medical History:  Past Medical History:   Diagnosis Date   • Acute inferior myocardial infarction (HCC) 6/14/2022   • Allergic    • Asthma 1/27/2022   • CAD, multiple vessel 6/14/2022   • Gastroesophageal reflux disease 1/27/2022   • Hx of complete eye exam 2016   • Hyperlipidemia 1/27/2022   • Hypertension    • Migraine headache 1/27/2022   • Panic attack 1/27/2022   • Peptic ulcer 9/14/2017       Past Surgical History:  Past Surgical History:   Procedure Laterality Date   • CARDIAC CATHETERIZATION N/A 6/14/2022    Procedure: Left Heart Cath;  Surgeon: Matthieu Del Toro MD;  Location: [facility] CATH INVASIVE LOCATION;  Service: Cardiology;  Laterality: N/A;   • CARDIAC CATHETERIZATION N/A 6/16/2022    Procedure: Percutaneous Coronary Intervention;  Surgeon: Matthieu Del Toro MD;  Location:  [facility] CATH INVASIVE LOCATION;  Service: Cardiovascular;  Laterality: N/A;   • CARDIAC CATHETERIZATION N/A 6/23/2022    Procedure: Left Heart Cath possible PCI, atherectomy, hemodynamic support;  Surgeon: Moises Haddad MD;  Location:  [facility] CATH INVASIVE LOCATION;  Service: Cardiology;  Laterality: N/A;   • CARDIAC CATHETERIZATION N/A 9/15/2022    Procedure: Left Heart Cath;  Surgeon: Moises Haddad MD;  Location:  [facility] CATH INVASIVE LOCATION;  Service: Cardiology;  Laterality: N/A;   • CHOLECYSTECTOMY  2019   • CORONARY ARTERY BYPASS GRAFT  N/A 2022    Procedure: CORONARY ARTERY BYPASS GRAFTING;  Surgeon: Pablo Ball MD;  Location: [facility];  Service: Cardiothoracic;  Laterality: N/A;  CABG X 3(2 vein grafts, 1 LIMA graft)   • MANDIBLE SURGERY         Social History:  Social History     Tobacco Use   • Smoking status: Former Smoker     Packs/day: 1.00     Types: Cigarettes     Start date:      Quit date: 2022     Years since quittin.2   • Smokeless tobacco: Never Used   Vaping Use   • Vaping Use: Never used   Substance Use Topics   • Alcohol use: Not Currently   • Drug use: Yes     Frequency: 7.0 times per week     Types: Marijuana       Family History:  Family History   Problem Relation Age of Onset   • Heart disease Mother    • Heart failure Father    • Cirrhosis Maternal Grandfather        Medications:  Medications Prior to Admission   Medication Sig Dispense Refill Last Dose   • aspirin 81 MG EC tablet Take 1 tablet by mouth Daily. 160 tablet 0 2022 at Unknown time   • atorvastatin (LIPITOR) 40 MG tablet Take 40 mg by mouth Every Night.   2022 at Unknown time   • HYDROcodone-acetaminophen (NORCO) 5-325 MG per tablet Take 1 tablet by mouth Every 4 (Four) Hours As Needed for Moderate Pain . 40 tablet 0 2022 at Unknown time   • levothyroxine (SYNTHROID, LEVOTHROID) 50 MCG tablet Take 50 mcg by mouth Daily.   2022 at Unknown time   • lisinopril (PRINIVIL,ZESTRIL) 5 MG tablet Take 1 tablet by mouth Daily. 30 tablet 2 2022 at Unknown time   • metoprolol tartrate (LOPRESSOR) 50 MG tablet Take 1 tablet by mouth 2 (Two) Times a Day. 30 tablet 2 2022 at Unknown time   • ticagrelor (BRILINTA) 90 MG tablet tablet Take 1 tablet by mouth 2 (Two) Times a Day. 60 tablet 3 2022 at Unknown time   • albuterol sulfate  (90 Base) MCG/ACT inhaler Inhale 2 puffs Every 4 (Four) Hours As Needed for Wheezing.          Scheduled Meds:aspirin, 81 mg, Oral, Daily  atorvastatin, 40 mg, Oral,  Nightly  enoxaparin, 40 mg, Subcutaneous, Q24H  isosorbide mononitrate, 30 mg, Oral, Q24H  levothyroxine, 50 mcg, Oral, Daily  lisinopril, 5 mg, Oral, Q24H  metoprolol tartrate, 50 mg, Oral, BID  ranolazine, 500 mg, Oral, Q12H  sodium chloride, 10 mL, Intravenous, Q12H  ticagrelor, 90 mg, Oral, BID      Continuous Infusions:   PRN Meds:.melatonin  •  Morphine  •  nitroglycerin  •  ondansetron **OR** ondansetron  •  sodium chloride  •  sodium chloride    ALLERGIES:  Codeine, Hydrocodone, Hydrocodone-acetaminophen, and Shellfish-derived products    ROS:  The following systems were reviewed   Constitution:  No fevers, chills, no unintentional weight loss  Skin: no rash, no jaundice  Eyes:  No blurry vision, no eye pain  HENT:  No change in hearing or smell  Resp:  No dyspnea or cough  CV: Persistent chest pain  :  No dysuria, hematuria  Musculoskeletal:  No leg cramps or arthralgias  Neuro:  No tremor, no numbness  Psych:  No depression or confusion    Objective Resting in bed, no acute distress    Vital Signs:   Vitals:    09/16/22 0231 09/16/22 0540 09/16/22 1046 09/16/22 1548   BP: 107/69 120/77 120/79 114/63   BP Location: Left arm Left arm     Patient Position: Lying Lying     Pulse: 66 65 63 77   Resp: 18 18 18 18   Temp: 97.3 °F (36.3 °C) 98.2 °F (36.8 °C) 98.2 °F (36.8 °C) 98.2 °F (36.8 °C)   TempSrc: Oral Oral Oral Oral   SpO2: 100% 97% 99% 99%   Weight:       Height:           Physical Exam:       General Appearance:    Awake and alert, in no acute distress   Head:    Normocephalic, without obvious abnormality, atraumatic   Throat:   No oral lesions, no thrush, oral mucosa moist   Lungs:     Respirations regular, even and unlabored   Chest Wall:    No abnormalities observed   Abdomen:     Soft, nontender, nondistended   Rectal:     Deferred   Extremities:   Moves all extremities,  no cyanosis       Skin:   No rash, no jaundice, normal palpation       Neurologic:   Cranial nerves 2 - 12 grossly intact, no  asterixis       Results Review:   I reviewed the patient's labs and imaging.  CBC    Results from last 7 days   Lab Units 09/16/22  0442 09/15/22  0457 09/14/22  1315   WBC 10*3/mm3 6.10 6.30 7.50   HEMOGLOBIN g/dL 13.5 13.6 13.5   PLATELETS 10*3/mm3 294 320 322     CMP   Results from last 7 days   Lab Units 09/16/22  0442 09/15/22  0457 09/14/22  1315   SODIUM mmol/L 135* 136 133*   POTASSIUM mmol/L 4.9 4.3 4.0   CHLORIDE mmol/L 100 102 97*   CO2 mmol/L 25.0 23.0 24.0   BUN mg/dL 11 12 11   CREATININE mg/dL 1.09 1.04 1.19   GLUCOSE mg/dL 97 96 99   ALBUMIN g/dL 3.80  --  4.10   BILIRUBIN mg/dL 0.6  --  0.2   ALK PHOS U/L 168*  --  116   AST (SGOT) U/L 71*  --  20   ALT (SGPT) U/L 130*  --  26   MAGNESIUM mg/dL 2.2 2.0  --      Cr Clearance Estimated Creatinine Clearance: 119.2 mL/min (by C-G formula based on SCr of 1.09 mg/dL).  Coag     HbA1C   Lab Results   Component Value Date    HGBA1C 5.3 09/16/2022    HGBA1C 5.4 06/23/2022     Blood Glucose No results found for: POCGLU  Infection     UA      Radiology(recent) No radiology results for the last day       ASSESSMENT:  Elevated LFTs  Right upper quadrant pain  Chest pain  CAD/CABG/stents -aspirin/Brilinta  Hyperlipidemia  History of pulmonary emboli  History of cholecystectomy    PLAN:  Patient presents with recurrent chest pain despite CABG in June.  He had repeat cardiac stents and is now on Ranexa along with aspirin and Brilinta.  GI asked to consult for elevated LFTs.  He has had intermittent right upper quadrant discomfort that is different than his previous gallbladder surgery.  Proceed with full liver serology work-up and MRCP to rule out choledocholithiasis.  Further recommendations to follow.    I discussed the patients findings and my recommendations with the patient.    We appreciate the referral    Electronically signed by YOUSIF Leon, 09/16/22, 5:36 PM EDT.              Electronically signed by Moises Brunson MD at 09/16/22            Discharge Summary      Pranav Lazaro MD at 22 1202                         [facility]         DISCHARGE SUMMARY    Patient Name: Tramaine Gates  : 1985  MRN: [id number]    Date of Admission: 2022  Date of Discharge:  22  Primary Care Physician: Daniela Hernandez FNP    Consults     Date and Time Order Name Status Description    2022 10:55 AM Inpatient Gastroenterology Consult Completed     9/15/2022  1:50 PM Inpatient Hospitalist Consult      2022  6:36 PM Inpatient Cardiology Consult Completed           Presenting Problem:   Chest pain [R07.9]  Dyspnea, unspecified type [R06.00]  Chest pain, unspecified type [R07.9]    Active and Resolved Hospital Problems:  Active Hospital Problems    Diagnosis POA   • **Chest pain, unspecified type [R07.9] Yes   • S/P CABG x 3 [Z95.1] Not Applicable   • Bipolar II disorder (HCC) [F31.81] Yes   • Generalized anxiety disorder with panic attacks [F41.1, F41.0] Yes   • CAD, multiple vessel [I25.10] Yes   • Disorder of thyroid [E07.9] Yes   • Chest pain [R07.9] Yes   • Hypertension [I10] Yes   • Peptic ulcer [K27.9] Yes   Morbid obesity   Resolved Hospital Problems   No resolved problems to display.         Hospital Course     Hospital Course:  Tramaine Gates is a 37 y.o. male admitted with chest pain as has pmhx of multivessel CAD s/p CABG and stent placements, HTN, HLD and morbid obesity. Cardiology was consulted and patient's medications were optimized. He also was found to have transaminitis and GI was consulted and they did MRCP that was negative. He was placed on ranexa and imdur which helped resolve his chest pain. He will f/u with his primary cardiologist Dr. Haddad as outpatient. He will go home on the ranexa and imdur along with his current cardiac meds. Cardiology cleared him for discharge        DISCHARGE Follow Up Recommendations for labs and diagnostics: f/u with cardiology      Day of Discharge      Vital Signs:  Temp:  [97.6 °F (36.4 °C)-98.4 °F (36.9 °C)] 98.2 °F (36.8 °C)  Heart Rate:  [54-82] 69  Resp:  [18] 18  BP: (112-137)/(62-86) 112/62  Physical Exam:  Constitutional: Awake, alert, obese   Eyes: PERRLA, sclerae anicteric, no conjunctival injection   HENT: NCAT, mucous membranes moist   Neck: Supple, no thyromegaly, no lymphadenopathy, trachea midline   Respiratory: Clear to auscultation bilaterally, nonlabored respirations    Cardiovascular: RRR, no murmurs, rubs, or gallops, palpable pedal pulses bilaterally   Gastrointestinal: Positive bowel sounds, soft, nontender, nondistended   Musculoskeletal: No bilateral ankle edema, no clubbing or cyanosis to extremities   Psychiatric: Appropriate affect, cooperative   Neurologic: Oriented x 3, strength symmetric in all extremities, Cranial Nerves grossly intact to confrontation, speech clear   Skin: No rashes     Pertinent  and/or Most Recent Results     LAB RESULTS:      Lab 09/17/22  1628 09/17/22  1248 09/16/22  0442 09/15/22  0457 09/14/22  1315   WBC  --  6.10 6.10 6.30 7.50   HEMOGLOBIN  --  13.4 13.5 13.6 13.5   HEMATOCRIT  --  40.8 40.9 40.6 40.6   PLATELETS  --  296 294 320 322   NEUTROS ABS  --  3.60 4.30 4.40 4.40   LYMPHS ABS  --  1.90 1.30 1.50 2.40   MONOS ABS  --  0.50 0.40 0.40 0.50   EOS ABS  --  0.10 0.10 0.10 0.10   MCV  --  84.1 83.9 83.8 83.7   PROTIME 10.6  --   --   --   --          Lab 09/18/22  0516 09/17/22  1248 09/16/22  0442 09/15/22  0457 09/14/22  1315   SODIUM 132* 134* 135* 136 133*   POTASSIUM 5.0 4.4 4.9 4.3 4.0   CHLORIDE 98 100 100 102 97*   CO2 27.0 22.0 25.0 23.0 24.0   ANION GAP 7.0 12.0 10.0 11.0 12.0   BUN 11 12 11 12 11   CREATININE 1.26 1.09 1.09 1.04 1.19   EGFR 74.4 88.5 88.5 93.7 79.7   GLUCOSE 101* 88 97 96 99   CALCIUM 9.0 9.2 9.1 9.1 8.8   MAGNESIUM  --  1.8 2.2 2.0  --    HEMOGLOBIN A1C  --   --  5.3  --   --          Lab 09/18/22  0516 09/17/22  1248 09/16/22  0442 09/14/22  1315   TOTAL PROTEIN 6.9 6.8  6.9 7.1   ALBUMIN 4.00 3.90 3.80 4.10   GLOBULIN 2.9 2.9 3.1 3.0   ALT (SGPT) 68* 79* 130* 26   AST (SGOT) 30 34 71* 20   BILIRUBIN 0.4 0.5 0.6 0.2   ALK PHOS 132* 147* 168* 116         Lab 09/17/22  1628 09/15/22  0457 09/14/22  1926 09/14/22  1617 09/14/22  1315   PROBNP  --   --   --   --  105.7   TROPONIN T  --  <0.010 <0.010 <0.010 <0.010   PROTIME 10.6  --   --   --   --    INR 1.03  --   --   --   --          Lab 09/16/22  0442   CHOLESTEROL 228*   LDL CHOL 158*   HDL CHOL 38*   TRIGLYCERIDES 176*         Lab 09/16/22 0442   IRON 75   FERRITIN 314.30         Brief Urine Lab Results  (Last result in the past 365 days)      Color   Clarity   Blood   Leuk Est   Nitrite   Protein   CREAT   Urine HCG        06/25/22 0407 Yellow   Clear   Negative   Negative   Negative   Negative               Microbiology Results (last 10 days)     ** No results found for the last 240 hours. **          XR Chest 1 View    Result Date: 9/14/2022  Impression:  1. No acute cardiopulmonary disease.   Electronically Signed By-Roman Montano MD On:9/14/2022 2:01 PM This report was finalized on [id number] by  Roman Montano MD.    MRI abdomen wo contrast mrcp    Result Date: 9/16/2022  Impression: IMPRESSION : 1.     Left renal cyst 2.     No significant biliary dilatation or discrete hepatic abnormality is apparent. There is some focal tapering of the more distal common bile duct which is of questionable significance.  Electronically Signed By-Luis Alfredo Weldon MD On:9/16/2022 8:29 PM This report was finalized on [id number] by  Luis Alfredo Weldon MD.      Results for orders placed during the hospital encounter of 06/22/22    Duplex Vein Mapping Lower Extremity - Bilateral CAR    Interpretation Summary  · The left greater saphenous vein is patent and of adequate size in the thigh.  · The left greater saphenous vein is patent and of adequate size in the calf.      Results for orders placed during the hospital encounter of 06/22/22    Duplex  Vein Mapping Lower Extremity - Bilateral CAR    Interpretation Summary  · The left greater saphenous vein is patent and of adequate size in the thigh.  · The left greater saphenous vein is patent and of adequate size in the calf.      Results for orders placed during the hospital encounter of 06/22/22    Adult Transthoracic Echo Limited W/ Cont if Necessary Per Protocol    Interpretation Summary  · Left ventricular systolic function is normal.  · Left ventricular ejection fraction is 55 to 60%  · Basal inferior wall aneurysm is noted      Labs Pending at Discharge:  Pending Labs     Order Current Status    PAULA In process    Anti-Smooth Muscle Antibody Titer In process    Anti-microsomal Antibody In process    Mitochondrial Antibodies, M2 In process            Discharge Details        Discharge Medications      New Medications      Instructions Start Date   isosorbide mononitrate 30 MG 24 hr tablet  Commonly known as: IMDUR   30 mg, Oral, Every 24 Hours Scheduled   Start Date: September 19, 2022     nitroglycerin 0.4 MG SL tablet  Commonly known as: NITROSTAT   0.4 mg, Sublingual, Every 5 Minutes PRN, Take no more than 3 doses in 15 minutes.      ranolazine 500 MG 12 hr tablet  Commonly known as: RANEXA   500 mg, Oral, Every 12 Hours Scheduled         Continue These Medications      Instructions Start Date   albuterol sulfate  (90 Base) MCG/ACT inhaler  Commonly known as: PROVENTIL HFA;VENTOLIN HFA;PROAIR HFA   2 puffs, Inhalation, Every 4 Hours PRN      Aspirin Low Dose 81 MG EC tablet  Generic drug: aspirin   81 mg, Oral, Daily      atorvastatin 40 MG tablet  Commonly known as: LIPITOR   40 mg, Oral, Nightly      Brilinta 90 MG tablet tablet  Generic drug: ticagrelor   90 mg, Oral, 2 Times Daily      HYDROcodone-acetaminophen 5-325 MG per tablet  Commonly known as: NORCO   1 tablet, Oral, Every 4 Hours PRN      levothyroxine 50 MCG tablet  Commonly known as: SYNTHROID, LEVOTHROID   50 mcg, Oral, Daily       lisinopril 5 MG tablet  Commonly known as: PRINIVIL,ZESTRIL   5 mg, Oral, Every 24 Hours Scheduled      metoprolol tartrate 50 MG tablet  Commonly known as: LOPRESSOR   50 mg, Oral, 2 Times Daily         Stop These Medications    hydrOXYzine 25 MG tablet  Commonly known as: ATARAX            Allergies   Allergen Reactions   • Shellfish-Derived Products Unknown - High Severity         Discharge Disposition:  Home or Self Care    Diet:  Hospital:  Diet Order   Procedures   • Diet Cardiac; Healthy Heart         Discharge Activity: as tolerated        CODE STATUS:  Code Status and Medical Interventions:   Ordered at: 09/14/22 1713     Code Status (Patient has no pulse and is not breathing):    CPR (Attempt to Resuscitate)     Medical Interventions (Patient has pulse or is breathing):    Full Support         Future Appointments   Date Time Provider Department Center   10/25/2022  9:00 AM Moises Vasquez PA-C [facility]   11/15/2022  3:15 PM Moises Haddad MD [facility]           Time spent on Discharge including face to face service:  35 minutes    Pranav Lazaro MD      Electronically signed by Pranav Lazaro MD at 09/18/22 6845

## 2022-09-21 ENCOUNTER — TELEPHONE (OUTPATIENT)
Dept: CARDIAC REHAB | Facility: HOSPITAL | Age: 37
End: 2022-09-21

## 2022-09-22 ENCOUNTER — READMISSION MANAGEMENT (OUTPATIENT)
Dept: CALL CENTER | Facility: HOSPITAL | Age: 37
End: 2022-09-22

## 2022-09-22 LAB
QT INTERVAL: 390 MS
QT INTERVAL: 391 MS

## 2022-09-22 NOTE — OUTREACH NOTE
Medical Week 1 Survey    Flowsheet Row Responses   [facility] [place] facility patient discharged from? [facility]   Does the patient have one of the following disease processes/diagnoses(primary or secondary)? Other   Week 1 attempt successful? No   Unsuccessful attempts Attempt 1          [facility] Registered Nurse

## 2022-09-27 ENCOUNTER — READMISSION MANAGEMENT (OUTPATIENT)
Dept: CALL CENTER | Facility: HOSPITAL | Age: 37
End: 2022-09-27

## 2022-09-27 NOTE — OUTREACH NOTE
Medical Week 1 Survey    Flowsheet Row Responses   [facility] facility patient discharged from? [facility]   Does the patient have one of the following disease processes/diagnoses(primary or secondary)? Other   Week 1 attempt successful? No   Unsuccessful attempts Attempt 2          LEONILA MESSINA - Registered Nurse

## 2022-09-28 DIAGNOSIS — F90.9 ATTENTION DEFICIT HYPERACTIVITY DISORDER (ADHD), UNSPECIFIED ADHD TYPE: ICD-10-CM

## 2022-09-29 ENCOUNTER — READMISSION MANAGEMENT (OUTPATIENT)
Dept: CALL CENTER | Facility: HOSPITAL | Age: 37
End: 2022-09-29

## 2022-09-29 RX ORDER — DEXTROAMPHETAMINE SACCHARATE, AMPHETAMINE ASPARTATE, DEXTROAMPHETAMINE SULFATE AND AMPHETAMINE SULFATE 2.5; 2.5; 2.5; 2.5 MG/1; MG/1; MG/1; MG/1
10 TABLET ORAL 2 TIMES DAILY
Qty: 60 TABLET | Refills: 0 | Status: SHIPPED | OUTPATIENT
Start: 2022-09-29 | End: 2022-10-31 | Stop reason: SDUPTHER

## 2022-09-29 NOTE — TELEPHONE ENCOUNTER
Last OV 07/06/22  Follow up 1/5/23  Last refill 9/2/22  I have reviewed the patients controlled substance prescription history, as maintained in the [place] prescription monitoring program, so that the prescription(s) for a  controlled substance can be given.

## 2022-09-29 NOTE — OUTREACH NOTE
Medical Week 1 Survey    Flowsheet Row Responses   [facility] patient discharged from? [facility]   Does the patient have one of the following disease processes/diagnoses(primary or secondary)? Other   Week 1 attempt successful? Yes   Call start time 1455   Call end time 1458   Person spoke with today (if not patient) and relationship Ariana-s/o.   Meds reviewed with patient/caregiver? Yes   Is the patient having any side effects they believe may be caused by any medication additions or changes? No   Does the patient have all medications ordered at discharge? Yes   Is the patient taking all medications as directed (includes completed medication regime)? Yes   Does the patient have a primary care provider?  Yes   Does the patient have an appointment with their PCP within 7 days of discharge? No   What is preventing the patient from scheduling follow up appointments within 7 days of discharge? Haven't had time   Nursing Interventions Advised patient to make appointment, Educated patient on importance of making appointment   Has the patient kept scheduled appointments due by today? N/A   Has home health visited the patient within 72 hours of discharge? N/A   Psychosocial issues? No   Did the patient receive a copy of their discharge instructions? Yes   Nursing interventions Reviewed instructions with patient   What is the patient's perception of their health status since discharge? Improving   Is the patient/caregiver able to teach back signs and symptoms related to disease process for when to call PCP? Yes   Is the patient/caregiver able to teach back signs and symptoms related to disease process for when to call 911? Yes   Is the patient/caregiver able to teach back the hierarchy of who to call/visit for symptoms/problems? PCP, Specialist, Home health nurse, Urgent Care, ED, 911 Yes   If the patient is a current smoker, are they able to teach back resources for cessation? Not a smoker   Week 1 call completed? Yes    Wrap up additional comments States is improving. Denies any concerns/needs today. States would appreciate one more f/u call.          MERLYN STRANGE - Registered Nurse

## 2022-10-07 ENCOUNTER — READMISSION MANAGEMENT (OUTPATIENT)
Dept: CALL CENTER | Facility: HOSPITAL | Age: 37
End: 2022-10-07

## 2022-10-08 NOTE — OUTREACH NOTE
Medical Week 2 Survey    Flowsheet Row Responses   [facility] patient discharged from? [facility]   Does the patient have one of the following disease processes/diagnoses(primary or secondary)? Other   Week 2 attempt successful? Yes   Call start time 2000   Discharge diagnosis Unstable angina   Call end time 2005   Person spoke with today (if not patient) and relationship Ariana S/O   Is the patient taking all medications as directed (includes completed medication regime)? Yes   Does the patient have a primary care provider?  Yes   Has the patient kept scheduled appointments due by today? N/A   Comments Was advised to make appt with PCP   Psychosocial issues? No   What is the patient's perception of their health status since discharge? Improving   Is the patient/caregiver able to teach back signs and symptoms related to disease process for when to call PCP? Yes   Is the patient/caregiver able to teach back signs and symptoms related to disease process for when to call 911? Yes   Is the patient/caregiver able to teach back the hierarchy of who to call/visit for symptoms/problems? PCP, Specialist, Home health nurse, Urgent Care, ED, 911 Yes   Additional teach back comments States he has some pain in the leg where cath was done.  Still has some chest pains at time but the nitro helps.  Has appt with cardiology on Nov 15.  Advised to also make appt with PCP.     Week 2 Call Completed? Yes   Wrap up additional comments Advised if he continues to have discomfort in leg to call cardiology to get sooner appt.           CHANDNI DUMONT - Licensed Nurse

## 2022-10-14 ENCOUNTER — TELEPHONE (OUTPATIENT)
Dept: CARDIOLOGY | Facility: CLINIC | Age: 37
End: 2022-10-14

## 2022-10-14 RX ORDER — ATORVASTATIN CALCIUM 40 MG/1
40 TABLET, FILM COATED ORAL NIGHTLY
Qty: 90 TABLET | Refills: 1 | Status: SHIPPED | OUTPATIENT
Start: 2022-10-14 | End: 2023-03-31 | Stop reason: SDUPTHER

## 2022-10-14 RX ORDER — ASPIRIN 81 MG/1
81 TABLET ORAL DAILY
Qty: 160 TABLET | Refills: 0 | Status: SHIPPED | OUTPATIENT
Start: 2022-10-14 | End: 2023-03-31 | Stop reason: SDUPTHER

## 2022-10-14 RX ORDER — RANOLAZINE 500 MG/1
500 TABLET, EXTENDED RELEASE ORAL EVERY 12 HOURS SCHEDULED
Qty: 180 TABLET | Refills: 1 | Status: SHIPPED | OUTPATIENT
Start: 2022-10-14

## 2022-10-14 RX ORDER — LISINOPRIL 5 MG/1
5 TABLET ORAL
Qty: 90 TABLET | Refills: 1 | Status: SHIPPED | OUTPATIENT
Start: 2022-10-14

## 2022-10-14 RX ORDER — ISOSORBIDE MONONITRATE 30 MG/1
30 TABLET, EXTENDED RELEASE ORAL
Qty: 90 TABLET | Refills: 1 | Status: SHIPPED | OUTPATIENT
Start: 2022-10-14

## 2022-10-14 RX ORDER — METOPROLOL TARTRATE 50 MG/1
50 TABLET, FILM COATED ORAL 2 TIMES DAILY
Qty: 180 TABLET | Refills: 1 | Status: SHIPPED | OUTPATIENT
Start: 2022-10-14

## 2022-10-14 NOTE — TELEPHONE ENCOUNTER
Caller: Tramaine Gates    Relationship: Self    Best call back number: [phone]    What is the best time to reach you: AFTER NOON    Who are you requesting to speak with (clinical staff, provider,  specific staff member): ANY    What was the call regarding: MEDICATION REFILL    Do you require a callback: YES    PT IS NEEDING HIS WHOLE LIST OF MEDICATIONS REFILLED AND ALL OF THEM CURRENTLY HAVE 0 REMAINING REFILLS.

## 2022-10-19 ENCOUNTER — READMISSION MANAGEMENT (OUTPATIENT)
Dept: CALL CENTER | Facility: HOSPITAL | Age: 37
End: 2022-10-19

## 2022-10-19 NOTE — OUTREACH NOTE
Medical Week 4 Survey    Flowsheet Row Responses   [facility] facility patient discharged from? [facility]   Does the patient have one of the following disease processes/diagnoses(primary or secondary)? Other   Week 4 attempt successful? No          CLARICE A - Registered Nurse

## 2022-10-31 DIAGNOSIS — F90.9 ATTENTION DEFICIT HYPERACTIVITY DISORDER (ADHD), UNSPECIFIED ADHD TYPE: ICD-10-CM

## 2022-11-01 RX ORDER — DEXTROAMPHETAMINE SACCHARATE, AMPHETAMINE ASPARTATE, DEXTROAMPHETAMINE SULFATE AND AMPHETAMINE SULFATE 2.5; 2.5; 2.5; 2.5 MG/1; MG/1; MG/1; MG/1
10 TABLET ORAL 2 TIMES DAILY
Qty: 60 TABLET | Refills: 0 | Status: SHIPPED | OUTPATIENT
Start: 2022-11-01 | End: 2022-11-29 | Stop reason: SDUPTHER

## 2022-11-01 NOTE — TELEPHONE ENCOUNTER
Last OV 07/6/22  Last refill 10/2/22  I have reviewed the patients controlled substance prescription history, as maintained in the [address] [place] [address] prescription monitoring program, so that the prescription(s) for a  controlled substance can be given.

## 2022-11-06 ENCOUNTER — HOSPITAL ENCOUNTER (EMERGENCY)
Age: 37
Discharge: HOME | End: 2022-11-06
Attending: EMERGENCY MEDICINE | Admitting: EMERGENCY MEDICINE
Payer: COMMERCIAL

## 2022-11-06 ENCOUNTER — APPOINTMENT (OUTPATIENT)
Dept: GENERAL RADIOLOGY | Age: 37
End: 2022-11-06
Payer: COMMERCIAL

## 2022-11-06 VITALS
HEIGHT: 70 IN | DIASTOLIC BLOOD PRESSURE: 93 MMHG | RESPIRATION RATE: 19 BRPM | OXYGEN SATURATION: 99 % | TEMPERATURE: 99 F | BODY MASS INDEX: 30.06 KG/M2 | SYSTOLIC BLOOD PRESSURE: 137 MMHG | WEIGHT: 210 LBS | HEART RATE: 83 BPM

## 2022-11-06 DIAGNOSIS — M75.22 BICEPS TENDONITIS ON LEFT: Primary | ICD-10-CM

## 2022-11-06 PROCEDURE — 96372 THER/PROPH/DIAG INJ SC/IM: CPT

## 2022-11-06 PROCEDURE — 6360000002 HC RX W HCPCS: Performed by: EMERGENCY MEDICINE

## 2022-11-06 PROCEDURE — 99284 EMERGENCY DEPT VISIT MOD MDM: CPT

## 2022-11-06 PROCEDURE — 73030 X-RAY EXAM OF SHOULDER: CPT

## 2022-11-06 RX ORDER — CYCLOBENZAPRINE HCL 10 MG
10 TABLET ORAL NIGHTLY PRN
Qty: 10 TABLET | Refills: 0 | Status: SHIPPED | OUTPATIENT
Start: 2022-11-06 | End: 2022-11-16

## 2022-11-06 RX ORDER — NAPROXEN 500 MG/1
500 TABLET ORAL 2 TIMES DAILY WITH MEALS
Qty: 40 TABLET | Refills: 0 | Status: SHIPPED | OUTPATIENT
Start: 2022-11-06 | End: 2022-11-26

## 2022-11-06 RX ORDER — PREDNISONE 20 MG/1
20 TABLET ORAL 2 TIMES DAILY
Qty: 10 TABLET | Refills: 0 | Status: SHIPPED | OUTPATIENT
Start: 2022-11-06 | End: 2022-11-11

## 2022-11-06 RX ORDER — METHYLPREDNISOLONE SODIUM SUCCINATE 125 MG/2ML
125 INJECTION, POWDER, LYOPHILIZED, FOR SOLUTION INTRAMUSCULAR; INTRAVENOUS ONCE
Status: COMPLETED | OUTPATIENT
Start: 2022-11-06 | End: 2022-11-06

## 2022-11-06 RX ADMIN — METHYLPREDNISOLONE SODIUM SUCCINATE 125 MG: 125 INJECTION, POWDER, FOR SOLUTION INTRAMUSCULAR; INTRAVENOUS at 22:15

## 2022-11-06 ASSESSMENT — PAIN - FUNCTIONAL ASSESSMENT: PAIN_FUNCTIONAL_ASSESSMENT: 0-10

## 2022-11-06 ASSESSMENT — PAIN SCALES - GENERAL: PAINLEVEL_OUTOF10: 6

## 2022-11-06 ASSESSMENT — PAIN DESCRIPTION - LOCATION: LOCATION: SHOULDER

## 2022-11-06 ASSESSMENT — PAIN DESCRIPTION - ORIENTATION: ORIENTATION: LEFT

## 2022-11-06 NOTE — Clinical Note
Ivy Tovar was seen and treated in our emergency department on 11/6/2022. He may return to work on 11/14/2022. No lifting with the left arm anything over 10 pounds. If you have any questions or concerns, please don't hesitate to call.       Aishwarya Jay, DO

## 2022-11-06 NOTE — Clinical Note
Ryan Child was seen and treated in our emergency department on 11/6/2022. He may return to work on 11/14/2022. No lifting with the left arm anything over 10 pounds. If you have any questions or concerns, please don't hesitate to call.       Stefano Strong, DO

## 2022-11-07 NOTE — ED PROVIDER NOTES
Emergency Department Provider Note                   PCP:                Terri Winston MD               Age: 42 y.o. Sex: male       ICD-10-CM    1. Biceps tendonitis on left  M75.22           DISPOSITION Decision To Discharge 11/06/2022 09:39:12 PM       MDM  Number of Diagnoses or Management Options  Diagnosis management comments: Sprain, strain, tendon injury, contusion,    Abrasion, laceration, neurovascular injury, foreign body    Fracture, open fracture, dislocation, joint separation, articular surface injury,         Amount and/or Complexity of Data Reviewed  Tests in the radiology section of CPT®: ordered and reviewed  Tests in the medicine section of CPT®: reviewed and ordered  Decide to obtain previous medical records or to obtain history from someone other than the patient: yes  Independent visualization of images, tracings, or specimens: yes               Orders Placed This Encounter   Procedures    XR SHOULDER LEFT (MIN 2 VIEWS)        Medications   methylPREDNISolone sodium (SOLU-MEDROL) injection 125 mg (has no administration in time range)       New Prescriptions    CYCLOBENZAPRINE (FLEXERIL) 10 MG TABLET    Take 1 tablet by mouth nightly as needed for Muscle spasms    NAPROXEN (NAPROSYN) 500 MG TABLET    Take 1 tablet by mouth 2 times daily (with meals) for 20 days    PREDNISONE (DELTASONE) 20 MG TABLET    Take 1 tablet by mouth 2 times daily for 5 days        Daryle Hides is a 42 y.o. male who presents to the Emergency Department with chief complaint of    Chief Complaint   Patient presents with    Shoulder Injury      64-year-old male presenting to the emergency department today complaining of left anterior shoulder pain. The patient states that about a week to 10 days ago he was in the gym doing some  press when he started having shoulder pain.   He laid off doing shoulder exercises over the last week but went back to the gym today and tried to do the same exercise and it reaggravated the injury. The patient says it now feels worse and he feels weak in that arm because it hurts to move. All other systems reviewed and are negative unless otherwise stated in the history of present illness section. Review of Systems   Constitutional: Negative. Musculoskeletal:  Positive for joint swelling. Skin: Negative. Neurological: Negative. Hematological: Negative. Past Medical History:   Diagnosis Date    Elevated BP without diagnosis of hypertension 5/11/2021    Hypertension         Past Surgical History:   Procedure Laterality Date    HERNIA REPAIR Right 2006        Family History   Problem Relation Age of Onset    Cancer Father     Lung Disease Father     ADHD Son     Heart Failure Mother     No Known Problems Brother     No Known Problems Sister     Hypertension Mother         Social History     Socioeconomic History    Marital status:      Spouse name: None    Number of children: None    Years of education: None    Highest education level: None   Tobacco Use    Smoking status: Never    Smokeless tobacco: Never   Vaping Use    Vaping Use: Never used   Substance and Sexual Activity    Alcohol use: No    Drug use: No        Allergies: Patient has no known allergies. Previous Medications    AMPHETAMINE-DEXTROAMPHETAMINE (ADDERALL) 10 MG TABLET    Take 1 tablet by mouth 2 times daily for 30 days. DICLOFENAC SODIUM (VOLTAREN) 1 % GEL    Apply 2 g topically 4 times daily        Vitals signs and nursing note reviewed. Patient Vitals for the past 4 hrs:   Temp Pulse Resp BP SpO2   11/06/22 1940 99 °F (37.2 °C) 83 19 (!) 137/93 99 %          Physical Exam     GENERAL:The patient has Body mass index is 30.13 kg/m². Well-hydrated. No acute distress. VITAL SIGNS: Heart rate, blood pressure, respiratory rate reviewed as recorded in  nurse's notes  EYES: Pupils reactive. Extraocular motion intact. No conjunctival redness or drainage.   LUNGS: No accessory muscle use  CARDIOVASCULAR: Regular rate and rhythm  EXTREMITIES: The patient has decreased strength in the left glenohumeral joint secondary to pain. He has tenderness palpation over the left anterior glenohumeral joint with a positive speeds test.  The patient does not have any passive limitations to range of motion. Negative empty can test.  NEUROLOGIC: Cranial nerve exam reveals face is symmetrical, tongue is midline  speech is clear. No focal deficits noted  SKIN: No rash or petechiae. Good skin turgor palpated. PSYCHIATRIC: Alert and oriented. Appropriate behavior and judgment. Procedures        Results for orders placed or performed during the hospital encounter of 11/06/22   XR SHOULDER LEFT (MIN 2 VIEWS)    Narrative    History: Left shoulder pain. Injury. EXAM: 3 views left shoulder    FINDINGS: No fracture, dislocation, or additional acute bony abnormality. Impression    No acute findings. XR SHOULDER LEFT (MIN 2 VIEWS)   Final Result   No acute findings. Voice dictation software was used during the making of this note. This software is not perfect and grammatical and other typographical errors may be present. This note has not been completely proofread for errors.        Court DO Odette  11/06/22 0532

## 2022-11-07 NOTE — ED NOTES
I have reviewed discharge instructions with the patient. The patient verbalized understanding. Patient left ED via Discharge Method: ambulatory to Home with ( self). Opportunity for questions and clarification provided. Patient given 2 scripts. To continue your aftercare when you leave the hospital, you may receive an automated call from our care team to check in on how you are doing. This is a free service and part of our promise to provide the best care and service to meet your aftercare needs.  If you have questions, or wish to unsubscribe from this service please call [phone]. Thank you for Choosing our [facility] Emergency Department.        Jeramie Phillips RN  11/06/22 1825

## 2022-11-07 NOTE — DISCHARGE INSTRUCTIONS
Schedule outpatient follow-up with orthopedics in the next 7 to 10 days for repeat evaluation. Take the muscle relaxer at nighttime for the next 2 to 3 days then as needed. Take the full course of steroids for the next 5 days, then start taking the naproxen 1-2 times a day with food as needed. Do not take the naproxen and the steroid at the same time. Use ice over your shoulder to decrease inflammation. No heavy lifting anything over 10 pounds with left arm until cleared by orthopedics or your family doctor. Do the stretching exercises like we discussed.

## 2022-11-07 NOTE — ED NOTES
Pt c/o left shoulder after working out at the gym last week.   States that the pain has progressively gotten worse over the past few days     Ja Grant RN  11/06/22 9072

## 2022-11-07 NOTE — ED TRIAGE NOTES
Patient was working out last weak and injured his left shoulder. Patient has been trying to take it easy but pain is getting worse so patient came to ER. Patient reports pain from left shoulder radiating down into left elbow.

## 2022-11-10 ENCOUNTER — OFFICE VISIT (OUTPATIENT)
Dept: ORTHOPEDIC SURGERY | Age: 37
End: 2022-11-10
Payer: COMMERCIAL

## 2022-11-10 DIAGNOSIS — M75.22 BICEPS TENDONITIS ON LEFT: Primary | ICD-10-CM

## 2022-11-10 PROCEDURE — 99204 OFFICE O/P NEW MOD 45 MIN: CPT | Performed by: STUDENT IN AN ORGANIZED HEALTH CARE EDUCATION/TRAINING PROGRAM

## 2022-11-10 NOTE — PROGRESS NOTES
Name: Caridad Tinoco  YOB: 1985  Gender: male  MRN: [id number]  Date of Encounter:  11/10/2022       CHIEF COMPLAINT:     Chief Complaint   Patient presents with    Shoulder Pain     Left shoulder        SUBJECTIVE/OBJECTIVE:      HPI:    Patient is a 42 y.o. pleasant male who presents today for a new evaluation of his left shoulder. He reports months of left anterior shoulder pain. 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 He has pain with pressing and curls. Pain radiates down to his elbow. He has no neck pain. He denies acute injury. He has not had any pop, bruising, or deformity noted. He has not tried any treatment for this. The right shoulder has had the same issue but is not as severe. He is RHD. PAST HISTORY:   Past medical, surgical, family, social history and allergies reviewed by me. Pertinent history:   Tobacco use:  reports that he has never smoked. He has never used smokeless tobacco.  Diabetes: none  CKD: no  Anticoagulation: no      REVIEW OF SYSTEMS:   As noted in HPI. PHYSICAL EXAMINATION:     Gen: Well-developed, no acute distress   HEENT: NC/AT, EOMI   Neck: Trachea midline, normal ROM   CV: Regular rhythm by palpation of distal pulse, normal capillary refill   Pulm: No respiratory distress, no stridor   Psychiatric: Well oriented, normal mood and affect. Skin: No rashes, lesions or ulcers, normal temperature, turgor, and texture on uninvolved extremity.       ORTHO EXAM:    Left Shoulder:     Inspection: No deformity, No ecchymosis   ROM: Active forward flexion 180, abduction 170, Internal rotation L4, External rotation 40  Tenderness: Bicipital groove  Strength: Abduction 4+/5, External rotation 4+/5, Internal rotation 4+/5  Provocative tests: Negative Empty can; Positive Yergasons, Speeds  Normal capillary refill / 2+ radial pulse   Sensation intact to light touch        DIAGNOSTIC IMAGING:     X-ray 3 vw LEFT shoulder AP external / AP internal rotation / scapular Y taken previously    Findings: No acute fracture or dislocation. No degenerative changes. No effusion. Impression: Normal 3 view of shoulder. I independently interpreted XR ordered by a different physician, taken from an outside facility    ASSESSMENT/PLAN:   1. Biceps tendonitis on left       We discussed the source of his pain, biceps anatomy, expected treatment and outcomes. I have advised he go to physical therapy. His RTC is also weakened bilaterally and he would benefit from work on shoulder and scapula for improvement of biceps pain. Advised to take oral NSAIDs regularly for 5 days then PRN after, and use topical voltaren. We can consider biceps tendon sheath injection if he is not improving or having issues with therapy. Reeval in 6 weeks. Orders / medications today:   Orders Placed This Encounter   Procedures    Ambulatory referral to Physical Therapy     Referral Priority:   Routine     Referral Type:   Eval and Treat     Referral Reason:   Patient Preference     Number of Visits Requested:   1      Follow up: Return in about 6 weeks (around 12/22/2022). The patient expressed understanding and agreed with the plan. Eliceo Pritchett MD   [facility]     This document was created using voice recognition software so frequent mistakes are possible. For any concerns about the wording of this document, please contact its creator for further clarification.

## 2022-11-15 ENCOUNTER — OFFICE VISIT (OUTPATIENT)
Dept: CARDIOLOGY | Facility: CLINIC | Age: 37
End: 2022-11-15

## 2022-11-15 VITALS
SYSTOLIC BLOOD PRESSURE: 154 MMHG | BODY MASS INDEX: 39.94 KG/M2 | HEART RATE: 81 BPM | HEIGHT: 70 IN | WEIGHT: 279 LBS | DIASTOLIC BLOOD PRESSURE: 88 MMHG | OXYGEN SATURATION: 99 %

## 2022-11-15 DIAGNOSIS — I21.19 ACUTE INFERIOR MYOCARDIAL INFARCTION: ICD-10-CM

## 2022-11-15 DIAGNOSIS — I25.10 CAD, MULTIPLE VESSEL: ICD-10-CM

## 2022-11-15 DIAGNOSIS — I10 PRIMARY HYPERTENSION: Primary | ICD-10-CM

## 2022-11-15 DIAGNOSIS — E78.2 MIXED HYPERLIPIDEMIA: ICD-10-CM

## 2022-11-15 PROCEDURE — 99214 OFFICE O/P EST MOD 30 MIN: CPT | Performed by: INTERNAL MEDICINE

## 2022-11-15 RX ORDER — PRASUGREL 10 MG/1
TABLET, FILM COATED ORAL
Qty: 35 TABLET | Refills: 5 | Status: SHIPPED | OUTPATIENT
Start: 2022-11-15

## 2022-11-15 NOTE — PROGRESS NOTES
Cardiology Clinic Note  Moises Haddad MD, PhD    Subjective:     Encounter Date:11/15/2022      Patient ID: Tramaine Gates is a 37 y.o. male.    Chief Complaint:  Chief Complaint   Patient presents with   • Follow-up       HPI:  I the pleasure to see this 37-year-old gentleman in clinic today status post hospitalization with non-STEMI chest pain, left heart catheterization was performed albeit after recent bypass surgery.  There was patent LIMA to LAD with mid LAD occlusion chronically, there was patent left main into circumflex with occluded obtuse marginal branch, patent SVG to marginal branch, RCA diffusely diseased with tandem 90% lesions with patent stent distally flanked by subtotal occlusion, diffusely diseased and closing SVG to RCA.  He underwent 3 tandem overlapping Xience drug-eluting stents to salvage the vein graft given severely small and atretic native RCA with poor long-term patency.  He tolerated the procedure well.  He presents back really complaining of significant dyspnea on exertion.  He continues to gain weight his BMI is over 40 now.  He seems with mildly depressed affect over his chronic health conditions.  His blood pressure is at goal today less than 140 systolic.  His heart rate is okay, EF is preserved at 60% by hospitalization.  He has been on medical therapy with long-acting nitrate, aspirin atorvastatin metoprolol and Ranexa.  He is now on aspirin and ticagrelor as well with recent PCI.  No other complaints today    Review of systems otherwise negative x14 point review of systems except was mentioned above      Historical data copied forward from previous encounters in EMR including the history, exam, and assessment/plan has been reviewed and is unchanged unless noted otherwise.    Cardiac medicines reviewed with risk, benefits, and necessity of each discussed.    Risk and benefit of cardiac testing reviewed including death heart attack stroke pain bleeding infection need for  vascular /cardiovascular surgery were discussed and the patient       Exam  Vitals reviewed below  Regular rate and rhythm with no rubs murmurs gallops  No heave no lift  No clubbing cyanosis or edema  Obese morbidly BMI is 40, soft nontender nondistended  Normal radial pulses normal cap refill  Intact grossly  Normocephalic/atraumatic pupils equal round  No carotid bruits or JVD        Chest pain    CAD, multiple vessel    S/P CABG x 3     1. Chest Pain  - [facility] 9/15/2022: three-vessel coronary disease, patent LIMA to LAD, closed SVG to OM, heavily diseased SVG to RCA with poor candidacy for native vessel intervention  - s/p Overlapping Xience drug-eluting stents 3.5 x 38 x 3 stents in overlapping fashion postdilated to 3.7 mm at 16 to 18 radha, reduction stenosis to 0% residual, improvement DARRYL-3 flow via the vein graft to the distal RCA and RPDA  - ASA / Brilinta, change Brilinta with shortness of air to Effient with loading dose 60 mg x 1 then 10 mg p.o. daily thereafter  - statin therapy high intensity  - preserved LVEF     2. CAD  - s/p STEMI 6/14/2022: [facility] showed three vessel CAD- he underwent ASHLEE placement to the culprit lesion in the RCA.  LAD had 80-90% proximal stenosis and LCx had 40-50% in distal LCx/OM.  Mr. Gates underwent repeat cardiac cath on 6/16/2022 and received ASHLEE to LAD  - repeat admission 5 days later showing in stent thrombosis sent for CABG  - s/p 3V CABG 6/29/2022 (LIMA-LAD, SVG-OMofLCx, SVG-RPDA)     3. Elevated LFTs  - 9/14/2022 AST / ALT 20/26; 9/16/2022 AST/AL 71/130  - Repeat labs as outpatient, follow-up with GI as well, possibly not related to statin but steatosis     4. HTN, controlled     5. HLD, high intensity statin therapy     6. Obesity     Changed to Effient today from Brilinta  Refer for cardiac rehab  Discussed diet exercise weight loss heart healthy low-cholesterol low-sodium diet with carb restriction    Extensive counseling provided today   home blood pressure logs 1 20-1  "30 systolic, today slightly higher but anxious to be here he says        Objective:         /88   Pulse 81   Ht 177.8 cm (70\")   Wt 127 kg (279 lb)   SpO2 99%   BMI 40.03 kg/m²         The pleasure to be involved in this patient's cardiovascular care.  Please call with any questions or concerns  Moises Haddad MD, PhD    Most recent EKG as reviewed and interpreted by me:  Procedures     Most recent echo as reviewed and interpreted by me:  Results for orders placed during the hospital encounter of 06/22/22    Adult Transthoracic Echo Limited W/ Cont if Necessary Per Protocol    Interpretation Summary  · Left ventricular systolic function is normal.  · Left ventricular ejection fraction is 55 to 60%  · Basal inferior wall aneurysm is noted      Most recent stress test as reviewed and interpreted by me:      Most recent cardiac catheterization as reviewed interpreted by me:  Results for orders placed during the hospital encounter of 09/14/22    Cardiac Catheterization/Vascular Study    Narrative  Primary operative Moises Haddad MD, PhD  Psychiatric cardiology  Date of service 9-    Procedure  1.  Left heart catheterization coronary and bypass angiography, LV gram in GALLO position  2.  Percutaneous coronary convention SVG to distal RCA PDA, overlapping 3.5 x 38 Xience drug-eluting stents x3, postdilated 16 radha at 3.7 mm, DARRYL I flow pre-, DARRYL-3 flow post, reduction stenosis from tandem subtotal lesions, 0% stenosis    Indication  Unstable angina    After informed consent patient was brought to the Cath Lab sterilely prepped and draped in usual fashion with exposure of the right groin for right common femoral arterial access via micropuncture modified Seldinger technique with placement of a 6 Estonian sheath.  035 guidewire to the aortic valve followed by JL 4 and JR4 catheters as well as selective left and right coronary angiography, JR4 was used for bypass angiography of SVG to RPDA, SVG to obtuse " marginal and LIMA to LAD.  Pigtail catheter was used across aortic valve followed by hand-injection for LV gram EDP assessment and pullback assessment of the transaortic valve gradient.  LIMA to the LAD was open with native LAD  in the midportion, SVG to the obtuse marginal is closed with native flow from the circumflex into obtuse marginal branch in antegrade fashion but tortuous and diffusely diseased with limited runoff this would be treated medically.  Native RCA has patent stents in the proximal as well as distal portion however the intervening segment is severely diseased in the distal to the stent there is subtotal occlusion with right angle takeoff and tenting at the SVG anastomosis and RPDA poor candidate for native vessel revascularization with very small caliber stents.  Decision was made to intervene on the SVG body proximal mid and distal portion of the SVG graft to RPDA.  Patient was heparinized with 100 units/kg of heparin for ACT greater than 250.  He was on a background therapy of aspirin and Brilinta already.  A 6 Albanian multipurpose guide was used engage the SVG followed by run-through wire to the distal RPDA, ostial portion was predilated secondary to subtotal occlusion with 2.5 x 25 balloon up to 12 radha followed by stenting and distal to proximal fashion from the distal portion of the graft but not including the anastomosis with 3.5 x 38 Xience drug-eluting stent deployed at 12 to 14 radha for 45 seconds overlapped proximally with another 3.5 x 38 and yet another 3.5 x 38 Xience drug-eluting stent in overlapping fashion ultimately postdilated 3.7 mm with a stent balloon at 1618 radha with great angiographic results with great antegrade improvement in perfusion both the retrograde RCA as well as antegrade RPDA.  Anastomosis was left to be treated medically that also had at least 60 to 70% narrowing but would be a small caliber stent with runoff and would jailed the retrograde portion.  Angio-Seal  was used to close right common femoral arteriotomy after the procedure with immediate hemostasis and means of distal pulses.  He left Cath Lab chest pain-free hemodynamically electrically stable alert talking to staff neurologic gross intact bilaterally    Complications none  Blood loss less than 10 cc  Contrast 210 cc total  Moderate conscious sedation time of 1 hour and 10 minutes with IV Versed and fentanyl administered by registered nurse with complete ECG pulse oximetry and hemodynamic monitor throughout the entirety the case observed by me    Findings  1.  Opening pressure was 116/80, closing pressure was unchanged  2.  LVEDP normal at 8-10  3.  LV function normal 60%  Before normal transaortic valve gradient    Angiography  Left main medium caliber vessel, no significant disease  2.  LAD is a medium caliber vessel with ostial 40% narrowing, there is a takeoff of small diffusely diseased diagonal branches and then there is a  in the midportion of the RCA and side previous stent  3.  LIMA to LAD widely patent, anastomosis widely patent, distal runoff is small caliber vessel tortuosity but no significant obstructive disease greater than 30% with diffuse luminal irregularities in the native LAD  4.  Circumflex in the proximal midportion widely patent with diffuse luminal irregularities less than 20%, in the distal obtuse marginal branch there is tenting secondary to prior anastomosis of vein graft, this portion there is 70 to 80% narrowing diffusely and diffusely diseased 50 to 60% downstream with moderate tortuosity but DARRYL-3 flow  5.  SVG to circumflex/obtuse marginal was closed  6.  Native RCA has a patent stent in the proximal portion as well as mid to distal portion however the intervening segment is diffusely diseased 80%, distal RCA is greater than 90% at the takeoff of the RPDA and PLV branches and there is tenting with anastomosis of SVG graft with limited runoff to the RPDA, there is a right angle  at the tenting portion anastomosis with poor candidacy for small caliber stent in overlapping fashion which would also require PCI to the intervening segment between stents poor long-term patency estimated  7.  SVG to RCA has an ostial proximal greater than 95% stenosis followed by atresia of the graft distally until immediately prior to the anastomosis.  The anastomosis appears to have 70% stenosis and small caliber runoff to the RPDA and retrograde to the RCA proper.    Conclusions recommendations  1 three-vessel coronary disease, patent LIMA to LAD, closed SVG to OM, heavily diseased but rescue able SVG to RCA with poor candidacy for native vessel intervention  2.  Overlapping Xience drug-eluting stents 3.5 x 38 x 3 stents in overlapping fashion postdilated to 3.7 mm at 16 to 18 radha, reduction stenosis to 0% residual, improvement DARRYL-3 flow via the vein graft to the distal RCA and RPDA    Continue aspirin Brilinta uninterrupted  Likely should be on P2 Y 12 inhibitor indefinitely with Brilinta or Effient given young age and diffuse disease  Smoking cessation  Optimize medical therapy  Antianginals if indicated, long-acting nitrates versus Ranexa for small vessel disease        Moises Haddad MD, PhD    The following portions of the patient's history were reviewed and updated as appropriate: allergies, current medications, past family history, past medical history, past social history, past surgical history and problem list.      ROS:  14 point review of systems negative except as mentioned above    Current Outpatient Medications:   •  albuterol sulfate  (90 Base) MCG/ACT inhaler, Inhale 2 puffs Every 4 (Four) Hours As Needed for Wheezing., Disp: , Rfl:   •  aspirin 81 MG EC tablet, Take 1 tablet by mouth Daily., Disp: 160 tablet, Rfl: 0  •  atorvastatin (LIPITOR) 40 MG tablet, Take 1 tablet by mouth Every Night., Disp: 90 tablet, Rfl: 1  •  isosorbide mononitrate (IMDUR) 30 MG 24 hr tablet, Take 1 tablet  by mouth Daily., Disp: 90 tablet, Rfl: 1  •  levothyroxine (SYNTHROID, LEVOTHROID) 50 MCG tablet, Take 50 mcg by mouth Daily., Disp: , Rfl:   •  lisinopril (PRINIVIL,ZESTRIL) 5 MG tablet, Take 1 tablet by mouth Daily., Disp: 90 tablet, Rfl: 1  •  metoprolol tartrate (LOPRESSOR) 50 MG tablet, Take 1 tablet by mouth 2 (Two) Times a Day., Disp: 180 tablet, Rfl: 1  •  nitroglycerin (NITROSTAT) 0.4 MG SL tablet, Place 1 tablet under the tongue Every 5 (Five) Minutes As Needed for Chest Pain (Only if SBP Greater Than 100). Take no more than 3 doses in 15 minutes., Disp: 30 tablet, Rfl: 0  •  ranolazine (RANEXA) 500 MG 12 hr tablet, Take 1 tablet by mouth Every 12 (Twelve) Hours., Disp: 180 tablet, Rfl: 1  •  HYDROcodone-acetaminophen (NORCO) 5-325 MG per tablet, Take 1 tablet by mouth Every 4 (Four) Hours As Needed for Moderate Pain ., Disp: 40 tablet, Rfl: 0  •  prasugrel (EFFIENT) 10 MG tablet, Take 6 tablets by mouth for the first dose and then take one tablet by mouth daily thereafter., Disp: 35 tablet, Rfl: 5    Problem List:  Patient Active Problem List   Diagnosis   • Hypertension   • Peptic ulcer   • Asthma   • Blepharitis   • Chest pain   • Chronic cholecystitis   • Contact with and (suspected) exposure to covid-19   • Corneal ulcer   • Costochondritis   • Cough   • Disorder of thyroid   • Gastroesophageal reflux disease   • Hyperlipidemia   • Migraine headache   • Panic attack   • Acute inferior myocardial infarction (HCC)   • CAD, multiple vessel   • Bipolar II disorder (HCC)   • Hx of brief reactive psychosis   • Generalized anxiety disorder with panic attacks   • Rage attacks   • Chest pain, unspecified type   • S/P CABG x 3     Past Medical History:  Past Medical History:   Diagnosis Date   • Acute inferior myocardial infarction (HCC) 06/14/2022   • Allergic    • Asthma 01/27/2022   • CAD, multiple vessel 06/14/2022   • Gastroesophageal reflux disease 01/27/2022   • Hx of complete eye exam 2016   •  Hyperlipidemia 2022   • Hypertension    • Migraine headache 2022   • Panic attack 2022   • Peptic ulcer 2017     Past Surgical History:  Past Surgical History:   Procedure Laterality Date   • CARDIAC CATHETERIZATION N/A 2022    Procedure: Left Heart Cath;  Surgeon: Matthieu Del Toro MD;  Location:  [facility];  Service: Cardiology;  Laterality: N/A;   • CARDIAC CATHETERIZATION N/A 2022    Procedure: Percutaneous Coronary Intervention;  Surgeon: Matthieu Del Toro MD;  Location:  [facility];  Service: Cardiovascular;  Laterality: N/A;   • CARDIAC CATHETERIZATION N/A 2022    Procedure: Left Heart Cath possible PCI, atherectomy, hemodynamic support;  Surgeon: Moises Haddad MD;  Location:  [facility];  Service: Cardiology;  Laterality: N/A;   • CARDIAC CATHETERIZATION N/A 09/15/2022    Procedure: Left Heart Cath;  Surgeon: Moises Haddad MD;  Location:  [facility];  Service: Cardiology;  Laterality: N/A;   • CARDIAC CATHETERIZATION  9/15/2022   • CHOLECYSTECTOMY  2019   • CORONARY ARTERY BYPASS GRAFT N/A 2022    Procedure: CORONARY ARTERY BYPASS GRAFTING;  Surgeon: Pablo Ball MD;  Location: [facility];  Service: Cardiothoracic;  Laterality: N/A;  CABG X 3(2 vein grafts, 1 LIMA graft)   • CORONARY ARTERY BYPASS GRAFT  2022   • CORONARY STENT PLACEMENT     • MANDIBLE SURGERY       Social History:  Social History     Socioeconomic History   • Marital status: Single   Tobacco Use   • Smoking status: Former     Packs/day: 1.50     Years: 23.00     Pack years: 34.50     Types: Cigarettes     Start date: 1996     Quit date: 2022     Years since quittin.4   • Smokeless tobacco: Never   Vaping Use   • Vaping Use: Never used   Substance and Sexual Activity   • Alcohol use: Not Currently   • Drug use: Yes     Frequency: 7.0 times per week     Types: Marijuana   • Sexual  activity: Yes     Partners: Female     Allergies:  Allergies   Allergen Reactions   • Shellfish-Derived Products Unknown - High Severity     Immunizations:    There is no immunization history on file for this patient.         In-Office Procedure(s):  No orders to display        ASCVD RIsk Score::  The ASCVD Risk score (Ngiel BAKER, et al., 2019) failed to calculate for the following reasons:    The 2019 ASCVD risk score is only valid for ages 40 to 79    The patient has a prior MI or stroke diagnosis    Imaging:    Results for orders placed during the hospital encounter of 09/14/22    XR Chest 1 View    Narrative  XR CHEST 1 VW-    Date of Exam: 9/14/2022 1:10 PM    Indication: Chest Pain Protocol.    Comparison: 7/1/2022    Technique: A single view of the chest was obtained.    FINDINGS:    The heart size and pulmonary vessels are within normal limits. The  lungs are clear bilaterally. There are no pleural effusions. Bony thorax  is unremarkable.    Impression  1. No acute cardiopulmonary disease.      Electronically Signed By-Roman Montano MD On:9/14/2022 2:01 PM  This report was finalized on [id number] by  Roman Montano MD.       Results for orders placed during the hospital encounter of 07/05/22    CT Angiogram Chest Pulmonary Embolism    Narrative  CT ANGIOGRAM CHEST WITH IV CONTRAST    INDICATION: Shortness of breath    COMPARISON: None available.    PROCEDURE: Multiple axial CT images were obtained of the chest after IV administration of 97 mL Isovue 370..  Coronal and sagittal reformations as well as MIP images were obtained.  CT dose lowering techniques were used, to include: automated exposure  control, adjustment for patient size, and or use of iterative reconstruction.    FINDINGS:    Cardiovascular: No filling defects are seen in the pulmonary arteries. No focal aortic aneurysm or evidence of aortic dissection is seen.    Mediastinum/Jenny: Postsurgical findings from median sternotomy. Small amount  of retrosternal stranding and gas from recent median sternotomy.    Lungs/Pleura :  Small pleural effusions with basilar atelectasis.    Chest wall and Axilla: Unremarkable.    Bones:  No acute focal bony abnormality seen.    Upper abdomen: No focal abnormalities seen.    Impression  1. No pulmonary embolus.  2. Constellation of findings from recent median sternotomy without apparent complication.  3. Small pleural effusions.    Electronically signed by:  Keven Purvis M.D.  7/5/2022 4:09 AM      Results for orders placed during the hospital encounter of 07/05/22    CT Angiogram Chest Pulmonary Embolism    Narrative  CT ANGIOGRAM CHEST WITH IV CONTRAST    INDICATION: Shortness of breath    COMPARISON: None available.    PROCEDURE: Multiple axial CT images were obtained of the chest after IV administration of 97 mL Isovue 370..  Coronal and sagittal reformations as well as MIP images were obtained.  CT dose lowering techniques were used, to include: automated exposure  control, adjustment for patient size, and or use of iterative reconstruction.    FINDINGS:    Cardiovascular: No filling defects are seen in the pulmonary arteries. No focal aortic aneurysm or evidence of aortic dissection is seen.    Mediastinum/Jenny: Postsurgical findings from median sternotomy. Small amount of retrosternal stranding and gas from recent median sternotomy.    Lungs/Pleura :  Small pleural effusions with basilar atelectasis.    Chest wall and Axilla: Unremarkable.    Bones:  No acute focal bony abnormality seen.    Upper abdomen: No focal abnormalities seen.    Impression  1. No pulmonary embolus.  2. Constellation of findings from recent median sternotomy without apparent complication.  3. Small pleural effusions.    Electronically signed by:  Keven Purvis M.D.  7/5/2022 4:09 AM      Lab Review:   No results displayed because visit has over 200 results.      Admission on 07/05/2022, Discharged on 07/05/2022   Component  Date Value   • QT Interval 07/05/2022 311    • Glucose 07/05/2022 109 (H)    • BUN 07/05/2022 10    • Creatinine 07/05/2022 0.84    • Sodium 07/05/2022 135 (L)    • Potassium 07/05/2022 4.0    • Chloride 07/05/2022 100    • CO2 07/05/2022 21.0 (L)    • Calcium 07/05/2022 9.1    • Total Protein 07/05/2022 6.8    • Albumin 07/05/2022 3.90    • ALT (SGPT) 07/05/2022 54 (H)    • AST (SGOT) 07/05/2022 36    • Alkaline Phosphatase 07/05/2022 115    • Total Bilirubin 07/05/2022 0.6    • Globulin 07/05/2022 2.9    • A/G Ratio 07/05/2022 1.3    • BUN/Creatinine Ratio 07/05/2022 11.9    • Anion Gap 07/05/2022 14.0    • eGFR 07/05/2022 114.5    • proBNP 07/05/2022 1,184.0 (H)    • Procalcitonin 07/05/2022 0.10    • Troponin T 07/05/2022 0.416 (C)    • WBC 07/05/2022 7.50    • RBC 07/05/2022 3.89 (L)    • Hemoglobin 07/05/2022 11.2 (L)    • Hematocrit 07/05/2022 33.3 (L)    • MCV 07/05/2022 85.7    • MCH 07/05/2022 28.7    • MCHC 07/05/2022 33.5    • RDW 07/05/2022 13.5    • RDW-SD 07/05/2022 40.3    • MPV 07/05/2022 6.5    • Platelets 07/05/2022 419    • Neutrophil % 07/05/2022 78.0 (H)    • Lymphocyte % 07/05/2022 12.6 (L)    • Monocyte % 07/05/2022 5.8    • Eosinophil % 07/05/2022 3.0    • Basophil % 07/05/2022 0.6    • Neutrophils, Absolute 07/05/2022 5.90    • Lymphocytes, Absolute 07/05/2022 0.90    • Monocytes, Absolute 07/05/2022 0.40    • Eosinophils, Absolute 07/05/2022 0.20    • Basophils, Absolute 07/05/2022 0.00    • nRBC 07/05/2022 0.0    • Protime 07/05/2022 10.5    • INR 07/05/2022 1.02    • PTT 07/05/2022 30.2 (L)    • Lactate 07/05/2022 0.9    • Troponin T 07/05/2022 0.417 (C)    • QT Interval 07/05/2022 320    No results displayed because visit has over 200 results.      No results displayed because visit has over 200 results.      Admission on 06/08/2022, Discharged on 06/08/2022   Component Date Value   • QT Interval 06/07/2022 338    • Extra Tube 06/08/2022 Hold for add-ons.    • Extra Tube 06/08/2022 hold  for add-on    • Extra Tube 06/08/2022 Hold for add-ons.    • Extra Tube 06/08/2022 Hold for add-ons.    • Glucose 06/08/2022 107 (H)    • BUN 06/08/2022 9    • Creatinine 06/08/2022 0.90    • Sodium 06/08/2022 133 (L)    • Potassium 06/08/2022 3.7    • Chloride 06/08/2022 99    • CO2 06/08/2022 21.0 (L)    • Calcium 06/08/2022 9.1    • BUN/Creatinine Ratio 06/08/2022 10.0    • Anion Gap 06/08/2022 13.0    • eGFR 06/08/2022 112.1    • Troponin T 06/08/2022 <0.010    • WBC 06/08/2022 8.80    • RBC 06/08/2022 5.61    • Hemoglobin 06/08/2022 16.4    • Hematocrit 06/08/2022 47.6    • MCV 06/08/2022 84.8    • MCH 06/08/2022 29.3    • MCHC 06/08/2022 34.5    • RDW 06/08/2022 13.1    • RDW-SD 06/08/2022 38.9    • MPV 06/08/2022 6.5    • Platelets 06/08/2022 338    • Neutrophil % 06/08/2022 64.5    • Lymphocyte % 06/08/2022 28.9    • Monocyte % 06/08/2022 4.5 (L)    • Eosinophil % 06/08/2022 0.8    • Basophil % 06/08/2022 1.3    • Neutrophils, Absolute 06/08/2022 5.70    • Lymphocytes, Absolute 06/08/2022 2.50    • Monocytes, Absolute 06/08/2022 0.40    • Eosinophils, Absolute 06/08/2022 0.10    • Basophils, Absolute 06/08/2022 0.10    • nRBC 06/08/2022 0.0      Recent labs reviewed and interpreted for clinical significance and application            Level of Care:           Moises Haddad MD  11/15/22  .

## 2022-11-29 DIAGNOSIS — F90.9 ATTENTION DEFICIT HYPERACTIVITY DISORDER (ADHD), UNSPECIFIED ADHD TYPE: ICD-10-CM

## 2022-11-30 RX ORDER — DEXTROAMPHETAMINE SACCHARATE, AMPHETAMINE ASPARTATE, DEXTROAMPHETAMINE SULFATE AND AMPHETAMINE SULFATE 2.5; 2.5; 2.5; 2.5 MG/1; MG/1; MG/1; MG/1
10 TABLET ORAL 2 TIMES DAILY
Qty: 60 TABLET | Refills: 0 | Status: SHIPPED | OUTPATIENT
Start: 2022-11-30 | End: 2022-12-30

## 2022-11-30 NOTE — TELEPHONE ENCOUNTER
Last OV 7/6/22  Last refill 11/1/22  I have reviewed the patients controlled substance prescription history, as maintained in the [place] prescription monitoring program, so that the prescription(s) for a  controlled substance can be given.

## 2022-12-06 ENCOUNTER — OFFICE VISIT (OUTPATIENT)
Dept: FAMILY MEDICINE CLINIC | Facility: CLINIC | Age: 37
End: 2022-12-06
Payer: COMMERCIAL

## 2022-12-06 VITALS
BODY MASS INDEX: 33.79 KG/M2 | TEMPERATURE: 98.9 F | SYSTOLIC BLOOD PRESSURE: 132 MMHG | DIASTOLIC BLOOD PRESSURE: 84 MMHG | HEART RATE: 101 BPM | WEIGHT: 236 LBS | RESPIRATION RATE: 20 BRPM | HEIGHT: 70 IN

## 2022-12-06 DIAGNOSIS — S46.811A STRAIN OF DELTOID MUSCLE, RIGHT, INITIAL ENCOUNTER: Primary | ICD-10-CM

## 2022-12-06 PROCEDURE — 99213 OFFICE O/P EST LOW 20 MIN: CPT | Performed by: FAMILY MEDICINE

## 2022-12-06 RX ORDER — PREDNISONE 10 MG/1
TABLET ORAL
Qty: 21 EACH | Refills: 0 | Status: SHIPPED | OUTPATIENT
Start: 2022-12-06

## 2022-12-06 ASSESSMENT — ENCOUNTER SYMPTOMS
BLOOD IN STOOL: 0
ABDOMINAL PAIN: 0
CHEST TIGHTNESS: 0
SHORTNESS OF BREATH: 0

## 2022-12-06 ASSESSMENT — PATIENT HEALTH QUESTIONNAIRE - PHQ9
SUM OF ALL RESPONSES TO PHQ QUESTIONS 1-9: 0
SUM OF ALL RESPONSES TO PHQ QUESTIONS 1-9: 0
1. LITTLE INTEREST OR PLEASURE IN DOING THINGS: 0
SUM OF ALL RESPONSES TO PHQ QUESTIONS 1-9: 0
2. FEELING DOWN, DEPRESSED OR HOPELESS: 0
SUM OF ALL RESPONSES TO PHQ9 QUESTIONS 1 & 2: 0
SUM OF ALL RESPONSES TO PHQ QUESTIONS 1-9: 0

## 2022-12-06 NOTE — PROGRESS NOTES
[place]  _______________________________________  MD Inocencia Waldron, TRAVIS Monique MD Judye Carne, MD    [address] , [address] [place] [address]  Phone: [phone]  Fax: [phone]    Deng Martinez (:  1985) is a 42 y.o. male,Established patient, here for evaluation of the following chief complaint(s):  Shoulder Injury (Right side after a work out on .)         ASSESSMENT/PLAN:    1. Strain of deltoid muscle, right, initial encounter  If this does not get better with prednisone taper, ICE, voltaren, consider sending to ortho to r/o AC separation. Out of work the rest of this week, will have to fill out FMLA for him, will be on the lookout for that. 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 Similar pain about 10-15 years ago. No history of shoulder separation. He stateshe can get his arm over his head with a deal of effort. Not treating it with anything. Has missed now 3 days of work, not off again untl . Has to lift heavy pieces of equipment at work. Vitals:    22 1604   BP: 132/84   Pulse: (!) 101   Resp: 20   Temp: 98.9 °F (37.2 °C)         Review of Systems   Constitutional:  Negative for chills and fever. Respiratory:  Negative for chest tightness and shortness of breath. Gastrointestinal:  Negative for abdominal pain and blood in stool. Genitourinary:  Negative for hematuria. Musculoskeletal:  Positive for arthralgias. Objective   Physical Exam  Vitals and nursing note reviewed. Constitutional:       General: He is not in acute distress. Appearance: Normal appearance. He is not ill-appearing. HENT:      Head: Normocephalic and atraumatic.       Right Ear: External ear normal.      Left Ear: External ear normal.      Mouth/Throat:      Mouth: Mucous membranes are moist.   Eyes:      General: No scleral icterus. Right eye: No discharge. Left eye: No discharge. Extraocular Movements: Extraocular movements intact. Pupils: Pupils are equal, round, and reactive to light. Cardiovascular:      Rate and Rhythm: Normal rate and regular rhythm. Pulses: Normal pulses. Heart sounds: No murmur heard. No friction rub. No gallop. Pulmonary:      Effort: Pulmonary effort is normal. No respiratory distress. Breath sounds: Normal breath sounds. Abdominal:      General: Abdomen is flat. Bowel sounds are normal.      Palpations: Abdomen is soft. Musculoskeletal:         General: Tenderness present. No swelling. Normal range of motion. Cervical back: Normal range of motion and neck supple. No rigidity. Right lower leg: No edema. Left lower leg: No edema. Comments: R shoulder:   Neg empty can  Full ROM with pain initiating abduction  TTP over medial and posterior deltoid, no effusion   Skin:     General: Skin is warm and dry. Coloration: Skin is not pale. Neurological:      General: No focal deficit present. Mental Status: He is alert and oriented to person, place, and time. Mental status is at baseline. Psychiatric:         Mood and Affect: Mood normal.         Behavior: Behavior normal.         Thought Content: Thought content normal.                An electronic signature was used to authenticate this note.     --Karla Green MD

## 2022-12-06 NOTE — LETTER
[address] [facility] [address] [address]  Phone: [phone]  Fax: [phone]       To Whom It May Concern,    Due to a non work related injury, it is my recommendation that Mr. Corcoran Spare stay out of work until Monday, 12/11/22 to give his shoulder time to heal.       Sincerely,         Judy Joseph MD

## 2022-12-12 ENCOUNTER — OFFICE VISIT (OUTPATIENT)
Dept: FAMILY MEDICINE CLINIC | Facility: CLINIC | Age: 37
End: 2022-12-12
Payer: COMMERCIAL

## 2022-12-12 VITALS
SYSTOLIC BLOOD PRESSURE: 133 MMHG | HEIGHT: 70 IN | DIASTOLIC BLOOD PRESSURE: 82 MMHG | WEIGHT: 236 LBS | BODY MASS INDEX: 33.79 KG/M2

## 2022-12-12 DIAGNOSIS — K21.9 GASTROESOPHAGEAL REFLUX DISEASE WITHOUT ESOPHAGITIS: ICD-10-CM

## 2022-12-12 DIAGNOSIS — M25.511 ACUTE PAIN OF RIGHT SHOULDER: Primary | ICD-10-CM

## 2022-12-12 PROCEDURE — 99214 OFFICE O/P EST MOD 30 MIN: CPT | Performed by: FAMILY MEDICINE

## 2022-12-12 RX ORDER — MELOXICAM 15 MG/1
15 TABLET ORAL DAILY
Qty: 30 TABLET | Refills: 3 | Status: SHIPPED | OUTPATIENT
Start: 2022-12-12

## 2022-12-12 ASSESSMENT — PATIENT HEALTH QUESTIONNAIRE - PHQ9
SUM OF ALL RESPONSES TO PHQ QUESTIONS 1-9: 0
SUM OF ALL RESPONSES TO PHQ QUESTIONS 1-9: 0
1. LITTLE INTEREST OR PLEASURE IN DOING THINGS: 0
SUM OF ALL RESPONSES TO PHQ QUESTIONS 1-9: 0
2. FEELING DOWN, DEPRESSED OR HOPELESS: 0
SUM OF ALL RESPONSES TO PHQ QUESTIONS 1-9: 0
SUM OF ALL RESPONSES TO PHQ9 QUESTIONS 1 & 2: 0

## 2022-12-12 ASSESSMENT — ENCOUNTER SYMPTOMS
ABDOMINAL PAIN: 0
BLOOD IN STOOL: 0
CHEST TIGHTNESS: 0
SHORTNESS OF BREATH: 0

## 2022-12-12 NOTE — LETTER
[address] [facility] [address] [address]  Phone: [phone]  Fax: [phone]    Karolyn Gallardo MD        December 12, 2022     Patient: Deng Martinez   YOB: 1985   Date of Visit: 12/12/2022       To Whom It May Concern: It is my medical opinion that Cedrick Velez should remain out of work until 12/26/22. We can get him back earlier if his shoulder improves. If you have any questions or concerns, please don't hesitate to call.     Sincerely,        Karolyn Gallardo MD

## 2022-12-12 NOTE — PROGRESS NOTES
[place]  _______________________________________  MD Preeti Villeda, DO Job Lehman, TRAVIS Grady, MD Bree Chicas MD    [address] , [address] [place] [address] Avenue  Phone: [phone]  Fax: [phone]    Radha Gipson (:  1985) is a 42 y.o. male,Established patient, here for evaluation of the following chief complaint(s):  Shoulder Pain (Not any better from Visit on )         ASSESSMENT/PLAN:    1. Acute pain of right shoulder  This is looking like bursitis or AC JJ problem now. Prednisone had no effect on him. Starting daily meloxicam. Take with food because of GERD. Sending to ortho as this young man is very physical at work and needs this right ASAP. I considered injecting him for bursitis, but was nervous about it not working and then ortho having to wait before putting another shot in his shoulder. Will defer to them on that. Appreciate their help. I wrote him out for another 2 weeks to give us time to get this to cool off. Continue ICE 20min QH, rest, add mobic.   - Claudean Carry, Rhina Bue., MD, [address]  - meloxicam (MOBIC) 15 MG tablet; Take 1 tablet by mouth daily  Dispense: 30 tablet; Refill: 3    2. Gastroesophageal reflux disease without esophagitis  He will let me know if the mobic is really upsetting his stomach. Can take 1-2 pepcid a day to keep things controlled for now. FU PRN      Subjective   SUBJECTIVE/OBJECTIVE:      Pt who I initially thought may have a deltoid mm strain on the R shoulder did not improve at all with prednisone. Pain continues to be in the [facility] joing area/origin of the anterior deltoid. BP Readings from Last 3 Encounters:   22 133/82   22 132/84   22 (!) 137/93     He does have some mild GERD. Review of Systems   Constitutional:  Negative for chills and fever. Respiratory:  Negative for chest tightness and shortness of breath.     Gastrointestinal: Negative for abdominal pain and blood in stool. Genitourinary:  Negative for hematuria. Musculoskeletal:  Positive for arthralgias. Objective   Physical Exam  Vitals and nursing note reviewed. Constitutional:       General: He is not in acute distress. Appearance: Normal appearance. He is not ill-appearing. HENT:      Head: Normocephalic and atraumatic. Right Ear: External ear normal.      Left Ear: External ear normal.      Mouth/Throat:      Mouth: Mucous membranes are moist.   Eyes:      General: No scleral icterus. Right eye: No discharge. Left eye: No discharge. Extraocular Movements: Extraocular movements intact. Pupils: Pupils are equal, round, and reactive to light. Cardiovascular:      Rate and Rhythm: Normal rate and regular rhythm. Pulses: Normal pulses. Heart sounds: No murmur heard. No friction rub. No gallop. Pulmonary:      Effort: Pulmonary effort is normal. No respiratory distress. Breath sounds: Normal breath sounds. Abdominal:      General: Abdomen is flat. Bowel sounds are normal.      Palpations: Abdomen is soft. Musculoskeletal:         General: Tenderness present. No swelling. Normal range of motion. Cervical back: Normal range of motion and neck supple. No rigidity. Right lower leg: No edema. Left lower leg: No edema. Comments: R shoulder:   Neg empty can  Full ROM with pain initiating abduction  TTP over R AC JJ and lateral R shoulder   Skin:     General: Skin is warm and dry. Coloration: Skin is not pale. Neurological:      General: No focal deficit present. Mental Status: He is alert and oriented to person, place, and time. Mental status is at baseline. Psychiatric:         Mood and Affect: Mood normal.         Behavior: Behavior normal.         Thought Content: Thought content normal.                An electronic signature was used to authenticate this note.     --Annika Bruenr MD

## 2023-01-03 RX ORDER — LEVOTHYROXINE SODIUM 0.05 MG/1
50 TABLET ORAL DAILY
Qty: 90 TABLET | Refills: 0 | OUTPATIENT
Start: 2023-01-03

## 2023-02-23 RX ORDER — NITROGLYCERIN 0.4 MG/1
0.4 TABLET SUBLINGUAL
Qty: 30 TABLET | Refills: 0 | Status: CANCELLED | OUTPATIENT
Start: 2023-02-23

## 2023-02-27 RX ORDER — NITROGLYCERIN 0.4 MG/1
0.4 TABLET SUBLINGUAL
Qty: 25 TABLET | Refills: 0 | Status: SHIPPED | OUTPATIENT
Start: 2023-02-27

## 2023-03-01 ENCOUNTER — APPOINTMENT (OUTPATIENT)
Dept: GENERAL RADIOLOGY | Facility: HOSPITAL | Age: 38
End: 2023-03-01
Payer: MEDICAID

## 2023-03-01 ENCOUNTER — HOSPITAL ENCOUNTER (OUTPATIENT)
Facility: HOSPITAL | Age: 38
Discharge: HOME | End: 2023-03-04
Attending: EMERGENCY MEDICINE | Admitting: INTERNAL MEDICINE
Payer: MEDICAID

## 2023-03-01 DIAGNOSIS — I20.0 UNSTABLE ANGINA: ICD-10-CM

## 2023-03-01 DIAGNOSIS — R07.9 CHEST PAIN, UNSPECIFIED TYPE: Primary | ICD-10-CM

## 2023-03-01 LAB
ALBUMIN SERPL-MCNC: 4.6 G/DL (ref 3.5–5.2)
ALBUMIN/GLOB SERPL: 1.6 G/DL
ALP SERPL-CCNC: 118 U/L (ref 39–117)
ALT SERPL W P-5'-P-CCNC: 22 U/L (ref 1–41)
ANION GAP SERPL CALCULATED.3IONS-SCNC: 12 MMOL/L (ref 5–15)
APTT PPP: 29.1 SECONDS (ref 24–31)
AST SERPL-CCNC: 17 U/L (ref 1–40)
BASOPHILS # BLD AUTO: 0.1 10*3/MM3 (ref 0–0.2)
BASOPHILS NFR BLD AUTO: 1.2 % (ref 0–1.5)
BILIRUB SERPL-MCNC: 0.5 MG/DL (ref 0–1.2)
BUN SERPL-MCNC: 13 MG/DL (ref 6–20)
BUN/CREAT SERPL: 12.1 (ref 7–25)
CALCIUM SPEC-SCNC: 9.1 MG/DL (ref 8.6–10.5)
CHLORIDE SERPL-SCNC: 101 MMOL/L (ref 98–107)
CO2 SERPL-SCNC: 22 MMOL/L (ref 22–29)
CREAT SERPL-MCNC: 1.07 MG/DL (ref 0.76–1.27)
DEPRECATED RDW RBC AUTO: 42.9 FL (ref 37–54)
EGFRCR SERPLBLD CKD-EPI 2021: 90.5 ML/MIN/1.73
EOSINOPHIL # BLD AUTO: 0 10*3/MM3 (ref 0–0.4)
EOSINOPHIL NFR BLD AUTO: 0.5 % (ref 0.3–6.2)
ERYTHROCYTE [DISTWIDTH] IN BLOOD BY AUTOMATED COUNT: 13.7 % (ref 12.3–15.4)
GEN 5 2HR TROPONIN T REFLEX: 6 NG/L
GLOBULIN UR ELPH-MCNC: 2.9 GM/DL
GLUCOSE SERPL-MCNC: 90 MG/DL (ref 65–99)
HCT VFR BLD AUTO: 43.5 % (ref 37.5–51)
HGB BLD-MCNC: 14.7 G/DL (ref 13–17.7)
INR PPP: 1.03 (ref 0.93–1.1)
LYMPHOCYTES # BLD AUTO: 1.9 10*3/MM3 (ref 0.7–3.1)
LYMPHOCYTES NFR BLD AUTO: 24 % (ref 19.6–45.3)
MCH RBC QN AUTO: 28.8 PG (ref 26.6–33)
MCHC RBC AUTO-ENTMCNC: 33.9 G/DL (ref 31.5–35.7)
MCV RBC AUTO: 85 FL (ref 79–97)
MONOCYTES # BLD AUTO: 0.5 10*3/MM3 (ref 0.1–0.9)
MONOCYTES NFR BLD AUTO: 6 % (ref 5–12)
NEUTROPHILS NFR BLD AUTO: 5.4 10*3/MM3 (ref 1.7–7)
NEUTROPHILS NFR BLD AUTO: 68.3 % (ref 42.7–76)
NRBC BLD AUTO-RTO: 0 /100 WBC (ref 0–0.2)
PLATELET # BLD AUTO: 302 10*3/MM3 (ref 140–450)
PMV BLD AUTO: 7.4 FL (ref 6–12)
POTASSIUM SERPL-SCNC: 3.9 MMOL/L (ref 3.5–5.2)
PROT SERPL-MCNC: 7.5 G/DL (ref 6–8.5)
PROTHROMBIN TIME: 10.6 SECONDS (ref 9.6–11.7)
RBC # BLD AUTO: 5.12 10*6/MM3 (ref 4.14–5.8)
SODIUM SERPL-SCNC: 135 MMOL/L (ref 136–145)
TROPONIN T DELTA: -1 NG/L
TROPONIN T SERPL HS-MCNC: 7 NG/L
WBC NRBC COR # BLD: 7.8 10*3/MM3 (ref 3.4–10.8)

## 2023-03-01 PROCEDURE — 85730 THROMBOPLASTIN TIME PARTIAL: CPT | Performed by: NURSE PRACTITIONER

## 2023-03-01 PROCEDURE — 93005 ELECTROCARDIOGRAM TRACING: CPT | Performed by: EMERGENCY MEDICINE

## 2023-03-01 PROCEDURE — 25010000002 MORPHINE PER 10 MG: Performed by: NURSE PRACTITIONER

## 2023-03-01 PROCEDURE — 93005 ELECTROCARDIOGRAM TRACING: CPT

## 2023-03-01 PROCEDURE — 96366 THER/PROPH/DIAG IV INF ADDON: CPT

## 2023-03-01 PROCEDURE — G0378 HOSPITAL OBSERVATION PER HR: HCPCS

## 2023-03-01 PROCEDURE — 99285 EMERGENCY DEPT VISIT HI MDM: CPT

## 2023-03-01 PROCEDURE — 84484 ASSAY OF TROPONIN QUANT: CPT | Performed by: NURSE PRACTITIONER

## 2023-03-01 PROCEDURE — 85610 PROTHROMBIN TIME: CPT | Performed by: NURSE PRACTITIONER

## 2023-03-01 PROCEDURE — 71045 X-RAY EXAM CHEST 1 VIEW: CPT

## 2023-03-01 PROCEDURE — 96375 TX/PRO/DX INJ NEW DRUG ADDON: CPT

## 2023-03-01 PROCEDURE — 96365 THER/PROPH/DIAG IV INF INIT: CPT

## 2023-03-01 PROCEDURE — 85025 COMPLETE CBC W/AUTO DIFF WBC: CPT | Performed by: NURSE PRACTITIONER

## 2023-03-01 PROCEDURE — 80053 COMPREHEN METABOLIC PANEL: CPT | Performed by: NURSE PRACTITIONER

## 2023-03-01 PROCEDURE — 25010000002 ONDANSETRON PER 1 MG: Performed by: NURSE PRACTITIONER

## 2023-03-01 RX ORDER — ONDANSETRON 2 MG/ML
4 INJECTION INTRAMUSCULAR; INTRAVENOUS ONCE
Status: COMPLETED | OUTPATIENT
Start: 2023-03-01 | End: 2023-03-01

## 2023-03-01 RX ORDER — SODIUM CHLORIDE 9 MG/ML
40 INJECTION, SOLUTION INTRAVENOUS AS NEEDED
Status: DISCONTINUED | OUTPATIENT
Start: 2023-03-01 | End: 2023-03-04 | Stop reason: HOSPADM

## 2023-03-01 RX ORDER — CHOLECALCIFEROL (VITAMIN D3) 125 MCG
5 CAPSULE ORAL NIGHTLY PRN
Status: DISCONTINUED | OUTPATIENT
Start: 2023-03-01 | End: 2023-03-04 | Stop reason: HOSPADM

## 2023-03-01 RX ORDER — ONDANSETRON 2 MG/ML
4 INJECTION INTRAMUSCULAR; INTRAVENOUS EVERY 6 HOURS PRN
Status: DISCONTINUED | OUTPATIENT
Start: 2023-03-01 | End: 2023-03-04 | Stop reason: HOSPADM

## 2023-03-01 RX ORDER — SODIUM CHLORIDE 0.9 % (FLUSH) 0.9 %
10 SYRINGE (ML) INJECTION EVERY 12 HOURS SCHEDULED
Status: DISCONTINUED | OUTPATIENT
Start: 2023-03-01 | End: 2023-03-04 | Stop reason: HOSPADM

## 2023-03-01 RX ORDER — SODIUM CHLORIDE 0.9 % (FLUSH) 0.9 %
10 SYRINGE (ML) INJECTION AS NEEDED
Status: DISCONTINUED | OUTPATIENT
Start: 2023-03-01 | End: 2023-03-04 | Stop reason: HOSPADM

## 2023-03-01 RX ORDER — NITROGLYCERIN 20 MG/100ML
10-50 INJECTION INTRAVENOUS
Status: DISCONTINUED | OUTPATIENT
Start: 2023-03-01 | End: 2023-03-02

## 2023-03-01 RX ORDER — ACETAMINOPHEN 325 MG/1
650 TABLET ORAL EVERY 4 HOURS PRN
Status: DISCONTINUED | OUTPATIENT
Start: 2023-03-01 | End: 2023-03-04 | Stop reason: HOSPADM

## 2023-03-01 RX ORDER — ASPIRIN 325 MG
325 TABLET ORAL ONCE
Status: COMPLETED | OUTPATIENT
Start: 2023-03-01 | End: 2023-03-01

## 2023-03-01 RX ADMIN — Medication 10 ML: at 22:20

## 2023-03-01 RX ADMIN — NITROGLYCERIN 10 MCG/MIN: 20 INJECTION INTRAVENOUS at 21:31

## 2023-03-01 RX ADMIN — MORPHINE SULFATE 4 MG: 4 INJECTION INTRAVENOUS at 22:16

## 2023-03-01 RX ADMIN — ONDANSETRON 4 MG: 2 INJECTION INTRAMUSCULAR; INTRAVENOUS at 22:16

## 2023-03-01 RX ADMIN — ASPIRIN 325 MG: 325 TABLET ORAL at 21:31

## 2023-03-02 DIAGNOSIS — I20.0 UNSTABLE ANGINA: Primary | ICD-10-CM

## 2023-03-02 LAB
ACT BLD: 408 SECONDS (ref 89–137)
ANION GAP SERPL CALCULATED.3IONS-SCNC: 11 MMOL/L (ref 5–15)
BASOPHILS # BLD AUTO: 0 10*3/MM3 (ref 0–0.2)
BASOPHILS NFR BLD AUTO: 0.5 % (ref 0–1.5)
BUN SERPL-MCNC: 12 MG/DL (ref 6–20)
BUN/CREAT SERPL: 12 (ref 7–25)
CALCIUM SPEC-SCNC: 8.9 MG/DL (ref 8.6–10.5)
CHLORIDE SERPL-SCNC: 101 MMOL/L (ref 98–107)
CO2 SERPL-SCNC: 25 MMOL/L (ref 22–29)
CREAT SERPL-MCNC: 1 MG/DL (ref 0.76–1.27)
DEPRECATED RDW RBC AUTO: 41.1 FL (ref 37–54)
EGFRCR SERPLBLD CKD-EPI 2021: 98.2 ML/MIN/1.73
EOSINOPHIL # BLD AUTO: 0.1 10*3/MM3 (ref 0–0.4)
EOSINOPHIL NFR BLD AUTO: 0.9 % (ref 0.3–6.2)
ERYTHROCYTE [DISTWIDTH] IN BLOOD BY AUTOMATED COUNT: 13.5 % (ref 12.3–15.4)
GLUCOSE SERPL-MCNC: 95 MG/DL (ref 65–99)
HCT VFR BLD AUTO: 41.7 % (ref 37.5–51)
HGB BLD-MCNC: 13.7 G/DL (ref 13–17.7)
LYMPHOCYTES # BLD AUTO: 2 10*3/MM3 (ref 0.7–3.1)
LYMPHOCYTES NFR BLD AUTO: 30.2 % (ref 19.6–45.3)
MCH RBC QN AUTO: 28.4 PG (ref 26.6–33)
MCHC RBC AUTO-ENTMCNC: 32.9 G/DL (ref 31.5–35.7)
MCV RBC AUTO: 86.5 FL (ref 79–97)
MONOCYTES # BLD AUTO: 0.4 10*3/MM3 (ref 0.1–0.9)
MONOCYTES NFR BLD AUTO: 5.6 % (ref 5–12)
NEUTROPHILS NFR BLD AUTO: 4.2 10*3/MM3 (ref 1.7–7)
NEUTROPHILS NFR BLD AUTO: 62.8 % (ref 42.7–76)
NRBC BLD AUTO-RTO: 0.1 /100 WBC (ref 0–0.2)
PLATELET # BLD AUTO: 262 10*3/MM3 (ref 140–450)
PMV BLD AUTO: 7.2 FL (ref 6–12)
POTASSIUM SERPL-SCNC: 3.6 MMOL/L (ref 3.5–5.2)
QT INTERVAL: 417 MS
RBC # BLD AUTO: 4.82 10*6/MM3 (ref 4.14–5.8)
SODIUM SERPL-SCNC: 137 MMOL/L (ref 136–145)
WBC NRBC COR # BLD: 6.7 10*3/MM3 (ref 3.4–10.8)

## 2023-03-02 PROCEDURE — 92937 PRQ TRLUML REVSC CAB GRF 1: CPT | Performed by: INTERNAL MEDICINE

## 2023-03-02 PROCEDURE — 25010000002 HEPARIN (PORCINE) PER 1000 UNITS: Performed by: INTERNAL MEDICINE

## 2023-03-02 PROCEDURE — C1769 GUIDE WIRE: HCPCS | Performed by: INTERNAL MEDICINE

## 2023-03-02 PROCEDURE — C1894 INTRO/SHEATH, NON-LASER: HCPCS | Performed by: INTERNAL MEDICINE

## 2023-03-02 PROCEDURE — 93005 ELECTROCARDIOGRAM TRACING: CPT | Performed by: EMERGENCY MEDICINE

## 2023-03-02 PROCEDURE — 25010000002 METHYLPREDNISOLONE PER 125 MG: Performed by: INTERNAL MEDICINE

## 2023-03-02 PROCEDURE — 99152 MOD SED SAME PHYS/QHP 5/>YRS: CPT | Performed by: INTERNAL MEDICINE

## 2023-03-02 PROCEDURE — G0378 HOSPITAL OBSERVATION PER HR: HCPCS

## 2023-03-02 PROCEDURE — 93459 L HRT ART/GRFT ANGIO: CPT | Performed by: INTERNAL MEDICINE

## 2023-03-02 PROCEDURE — 25010000002 ONDANSETRON PER 1 MG: Performed by: EMERGENCY MEDICINE

## 2023-03-02 PROCEDURE — 85025 COMPLETE CBC W/AUTO DIFF WBC: CPT | Performed by: EMERGENCY MEDICINE

## 2023-03-02 PROCEDURE — C1760 CLOSURE DEV, VASC: HCPCS | Performed by: INTERNAL MEDICINE

## 2023-03-02 PROCEDURE — 80048 BASIC METABOLIC PNL TOTAL CA: CPT | Performed by: EMERGENCY MEDICINE

## 2023-03-02 PROCEDURE — 93010 ELECTROCARDIOGRAM REPORT: CPT | Performed by: INTERNAL MEDICINE

## 2023-03-02 PROCEDURE — 25010000002 MORPHINE PER 10 MG: Performed by: EMERGENCY MEDICINE

## 2023-03-02 PROCEDURE — C1887 CATHETER, GUIDING: HCPCS | Performed by: INTERNAL MEDICINE

## 2023-03-02 PROCEDURE — 96366 THER/PROPH/DIAG IV INF ADDON: CPT

## 2023-03-02 PROCEDURE — 25010000002 MIDAZOLAM PER 1 MG: Performed by: INTERNAL MEDICINE

## 2023-03-02 PROCEDURE — 25510000001 IOPAMIDOL PER 1 ML: Performed by: INTERNAL MEDICINE

## 2023-03-02 PROCEDURE — 25010000002 DIPHENHYDRAMINE PER 50 MG: Performed by: INTERNAL MEDICINE

## 2023-03-02 PROCEDURE — C1725 CATH, TRANSLUMIN NON-LASER: HCPCS | Performed by: INTERNAL MEDICINE

## 2023-03-02 PROCEDURE — 99233 SBSQ HOSP IP/OBS HIGH 50: CPT | Performed by: INTERNAL MEDICINE

## 2023-03-02 PROCEDURE — 25010000002 FUROSEMIDE PER 20 MG: Performed by: INTERNAL MEDICINE

## 2023-03-02 PROCEDURE — 99153 MOD SED SAME PHYS/QHP EA: CPT | Performed by: INTERNAL MEDICINE

## 2023-03-02 PROCEDURE — 96376 TX/PRO/DX INJ SAME DRUG ADON: CPT

## 2023-03-02 PROCEDURE — 25010000002 PROCHLORPERAZINE 10 MG/2ML SOLUTION: Performed by: PHYSICIAN ASSISTANT

## 2023-03-02 PROCEDURE — 85347 COAGULATION TIME ACTIVATED: CPT

## 2023-03-02 PROCEDURE — 25010000002 ONDANSETRON PER 1 MG: Performed by: INTERNAL MEDICINE

## 2023-03-02 PROCEDURE — 96375 TX/PRO/DX INJ NEW DRUG ADDON: CPT

## 2023-03-02 PROCEDURE — 25010000002 FENTANYL CITRATE (PF) 100 MCG/2ML SOLUTION: Performed by: INTERNAL MEDICINE

## 2023-03-02 RX ORDER — RANOLAZINE 500 MG/1
500 TABLET, EXTENDED RELEASE ORAL EVERY 12 HOURS SCHEDULED
Status: DISCONTINUED | OUTPATIENT
Start: 2023-03-02 | End: 2023-03-04 | Stop reason: HOSPADM

## 2023-03-02 RX ORDER — HYDROCODONE BITARTRATE AND ACETAMINOPHEN 5; 325 MG/1; MG/1
1 TABLET ORAL EVERY 4 HOURS PRN
Status: DISCONTINUED | OUTPATIENT
Start: 2023-03-02 | End: 2023-03-02 | Stop reason: SDUPTHER

## 2023-03-02 RX ORDER — ASPIRIN 81 MG/1
81 TABLET, CHEWABLE ORAL DAILY
Status: DISCONTINUED | OUTPATIENT
Start: 2023-03-02 | End: 2023-03-02

## 2023-03-02 RX ORDER — MIDAZOLAM HYDROCHLORIDE 1 MG/ML
INJECTION INTRAMUSCULAR; INTRAVENOUS
Status: DISCONTINUED | OUTPATIENT
Start: 2023-03-02 | End: 2023-03-02 | Stop reason: HOSPADM

## 2023-03-02 RX ORDER — CILOSTAZOL 100 MG/1
50 TABLET ORAL 2 TIMES DAILY
Status: DISCONTINUED | OUTPATIENT
Start: 2023-03-02 | End: 2023-03-04 | Stop reason: HOSPADM

## 2023-03-02 RX ORDER — PANTOPRAZOLE SODIUM 40 MG/1
40 TABLET, DELAYED RELEASE ORAL ONCE
Status: COMPLETED | OUTPATIENT
Start: 2023-03-02 | End: 2023-03-02

## 2023-03-02 RX ORDER — ASPIRIN 81 MG/1
81 TABLET ORAL DAILY
Status: DISCONTINUED | OUTPATIENT
Start: 2023-03-02 | End: 2023-03-04 | Stop reason: HOSPADM

## 2023-03-02 RX ORDER — LIDOCAINE HYDROCHLORIDE 10 MG/ML
INJECTION, SOLUTION EPIDURAL; INFILTRATION; INTRACAUDAL; PERINEURAL
Status: DISCONTINUED | OUTPATIENT
Start: 2023-03-02 | End: 2023-03-02 | Stop reason: HOSPADM

## 2023-03-02 RX ORDER — ATORVASTATIN CALCIUM 40 MG/1
40 TABLET, FILM COATED ORAL NIGHTLY
Status: DISCONTINUED | OUTPATIENT
Start: 2023-03-02 | End: 2023-03-04 | Stop reason: HOSPADM

## 2023-03-02 RX ORDER — NITROGLYCERIN 5 MG/ML
INJECTION, SOLUTION INTRAVENOUS
Status: DISCONTINUED | OUTPATIENT
Start: 2023-03-02 | End: 2023-03-02 | Stop reason: HOSPADM

## 2023-03-02 RX ORDER — LISINOPRIL 5 MG/1
5 TABLET ORAL
Status: DISCONTINUED | OUTPATIENT
Start: 2023-03-02 | End: 2023-03-04 | Stop reason: HOSPADM

## 2023-03-02 RX ORDER — ATORVASTATIN CALCIUM 40 MG/1
40 TABLET, FILM COATED ORAL ONCE
Status: COMPLETED | OUTPATIENT
Start: 2023-03-02 | End: 2023-03-02

## 2023-03-02 RX ORDER — ONDANSETRON 2 MG/ML
INJECTION INTRAMUSCULAR; INTRAVENOUS
Status: DISCONTINUED | OUTPATIENT
Start: 2023-03-02 | End: 2023-03-02 | Stop reason: HOSPADM

## 2023-03-02 RX ORDER — FUROSEMIDE 10 MG/ML
20 INJECTION INTRAMUSCULAR; INTRAVENOUS ONCE
Status: COMPLETED | OUTPATIENT
Start: 2023-03-02 | End: 2023-03-02

## 2023-03-02 RX ORDER — FENTANYL CITRATE 50 UG/ML
INJECTION, SOLUTION INTRAMUSCULAR; INTRAVENOUS
Status: DISCONTINUED | OUTPATIENT
Start: 2023-03-02 | End: 2023-03-02 | Stop reason: HOSPADM

## 2023-03-02 RX ORDER — RANOLAZINE 500 MG/1
500 TABLET, EXTENDED RELEASE ORAL ONCE
Status: COMPLETED | OUTPATIENT
Start: 2023-03-02 | End: 2023-03-02

## 2023-03-02 RX ORDER — DIPHENHYDRAMINE HYDROCHLORIDE 50 MG/ML
INJECTION INTRAMUSCULAR; INTRAVENOUS
Status: DISCONTINUED | OUTPATIENT
Start: 2023-03-02 | End: 2023-03-02 | Stop reason: HOSPADM

## 2023-03-02 RX ORDER — PRASUGREL 10 MG/1
TABLET, FILM COATED ORAL
Status: DISCONTINUED | OUTPATIENT
Start: 2023-03-02 | End: 2023-03-02 | Stop reason: HOSPADM

## 2023-03-02 RX ORDER — SODIUM CHLORIDE 9 MG/ML
1 INJECTION, SOLUTION INTRAVENOUS CONTINUOUS
Status: DISCONTINUED | OUTPATIENT
Start: 2023-03-02 | End: 2023-03-02

## 2023-03-02 RX ORDER — HEPARIN SODIUM 1000 [USP'U]/ML
INJECTION, SOLUTION INTRAVENOUS; SUBCUTANEOUS
Status: DISCONTINUED | OUTPATIENT
Start: 2023-03-02 | End: 2023-03-02 | Stop reason: HOSPADM

## 2023-03-02 RX ORDER — PRASUGREL 10 MG/1
10 TABLET, FILM COATED ORAL DAILY
Status: DISCONTINUED | OUTPATIENT
Start: 2023-03-02 | End: 2023-03-04 | Stop reason: HOSPADM

## 2023-03-02 RX ORDER — POTASSIUM CHLORIDE 20 MEQ/1
40 TABLET, EXTENDED RELEASE ORAL ONCE
Status: COMPLETED | OUTPATIENT
Start: 2023-03-02 | End: 2023-03-02

## 2023-03-02 RX ORDER — METHYLPREDNISOLONE SODIUM SUCCINATE 125 MG/2ML
INJECTION, POWDER, LYOPHILIZED, FOR SOLUTION INTRAMUSCULAR; INTRAVENOUS
Status: DISCONTINUED | OUTPATIENT
Start: 2023-03-02 | End: 2023-03-02 | Stop reason: HOSPADM

## 2023-03-02 RX ORDER — HYDROCODONE BITARTRATE AND ACETAMINOPHEN 5; 325 MG/1; MG/1
1 TABLET ORAL EVERY 4 HOURS PRN
Status: DISCONTINUED | OUTPATIENT
Start: 2023-03-02 | End: 2023-03-04 | Stop reason: HOSPADM

## 2023-03-02 RX ORDER — ACETAMINOPHEN 325 MG/1
650 TABLET ORAL EVERY 4 HOURS PRN
Status: DISCONTINUED | OUTPATIENT
Start: 2023-03-02 | End: 2023-03-04 | Stop reason: HOSPADM

## 2023-03-02 RX ORDER — PROCHLORPERAZINE EDISYLATE 5 MG/ML
10 INJECTION INTRAMUSCULAR; INTRAVENOUS ONCE
Status: COMPLETED | OUTPATIENT
Start: 2023-03-02 | End: 2023-03-02

## 2023-03-02 RX ORDER — LEVOTHYROXINE SODIUM 0.05 MG/1
50 TABLET ORAL DAILY
Status: DISCONTINUED | OUTPATIENT
Start: 2023-03-02 | End: 2023-03-04 | Stop reason: HOSPADM

## 2023-03-02 RX ORDER — ALBUTEROL SULFATE 2.5 MG/3ML
2.5 SOLUTION RESPIRATORY (INHALATION) EVERY 6 HOURS PRN
Status: DISCONTINUED | OUTPATIENT
Start: 2023-03-02 | End: 2023-03-04 | Stop reason: HOSPADM

## 2023-03-02 RX ORDER — SODIUM CHLORIDE 9 MG/ML
INJECTION, SOLUTION INTRAVENOUS
Status: COMPLETED | OUTPATIENT
Start: 2023-03-02 | End: 2023-03-02

## 2023-03-02 RX ORDER — ISOSORBIDE MONONITRATE 30 MG/1
30 TABLET, EXTENDED RELEASE ORAL
Status: DISCONTINUED | OUTPATIENT
Start: 2023-03-02 | End: 2023-03-04 | Stop reason: HOSPADM

## 2023-03-02 RX ADMIN — PRASUGREL 10 MG: 10 TABLET, FILM COATED ORAL at 10:23

## 2023-03-02 RX ADMIN — ONDANSETRON 4 MG: 2 INJECTION INTRAMUSCULAR; INTRAVENOUS at 12:00

## 2023-03-02 RX ADMIN — Medication 10 ML: at 22:42

## 2023-03-02 RX ADMIN — PANTOPRAZOLE SODIUM 40 MG: 40 TABLET, DELAYED RELEASE ORAL at 00:50

## 2023-03-02 RX ADMIN — ONDANSETRON 4 MG: 2 INJECTION INTRAMUSCULAR; INTRAVENOUS at 02:55

## 2023-03-02 RX ADMIN — ATORVASTATIN CALCIUM 40 MG: 40 TABLET, FILM COATED ORAL at 22:43

## 2023-03-02 RX ADMIN — ACETAMINOPHEN 650 MG: 325 TABLET, FILM COATED ORAL at 10:24

## 2023-03-02 RX ADMIN — LEVOTHYROXINE SODIUM 50 MCG: 50 TABLET ORAL at 10:23

## 2023-03-02 RX ADMIN — HYDROCODONE BITARTRATE AND ACETAMINOPHEN 1 TABLET: 5; 325 TABLET ORAL at 00:50

## 2023-03-02 RX ADMIN — ASPIRIN 81 MG: 81 TABLET, COATED ORAL at 07:14

## 2023-03-02 RX ADMIN — MORPHINE SULFATE 4 MG: 4 INJECTION, SOLUTION INTRAMUSCULAR; INTRAVENOUS at 02:54

## 2023-03-02 RX ADMIN — PROCHLORPERAZINE EDISYLATE 10 MG: 5 INJECTION INTRAMUSCULAR; INTRAVENOUS at 07:34

## 2023-03-02 RX ADMIN — MORPHINE SULFATE 4 MG: 4 INJECTION, SOLUTION INTRAMUSCULAR; INTRAVENOUS at 15:27

## 2023-03-02 RX ADMIN — Medication 10 ML: at 10:24

## 2023-03-02 RX ADMIN — CILOSTAZOL 50 MG: 100 TABLET ORAL at 23:19

## 2023-03-02 RX ADMIN — RANOLAZINE 500 MG: 500 TABLET, EXTENDED RELEASE ORAL at 00:50

## 2023-03-02 RX ADMIN — POTASSIUM CHLORIDE 40 MEQ: 1500 TABLET, EXTENDED RELEASE ORAL at 22:44

## 2023-03-02 RX ADMIN — FUROSEMIDE 20 MG: 10 INJECTION, SOLUTION INTRAMUSCULAR; INTRAVENOUS at 22:44

## 2023-03-02 RX ADMIN — HYDROCODONE BITARTRATE AND ACETAMINOPHEN 1 TABLET: 5; 325 TABLET ORAL at 10:24

## 2023-03-02 RX ADMIN — MORPHINE SULFATE 4 MG: 4 INJECTION, SOLUTION INTRAMUSCULAR; INTRAVENOUS at 07:15

## 2023-03-02 RX ADMIN — RANOLAZINE 500 MG: 500 TABLET, EXTENDED RELEASE ORAL at 10:23

## 2023-03-02 RX ADMIN — ATORVASTATIN CALCIUM 40 MG: 40 TABLET, FILM COATED ORAL at 00:50

## 2023-03-02 RX ADMIN — RANOLAZINE 500 MG: 500 TABLET, EXTENDED RELEASE ORAL at 22:43

## 2023-03-02 RX ADMIN — ONDANSETRON 4 MG: 2 INJECTION INTRAMUSCULAR; INTRAVENOUS at 22:38

## 2023-03-02 NOTE — PLAN OF CARE
Problem: Adult Inpatient Plan of Care  Goal: Absence of Hospital-Acquired Illness or Injury  Intervention: Identify and Manage Fall Risk  Recent Flowsheet Documentation  Taken 3/2/2023 0000 by Sammie Hastings, RN  Safety Promotion/Fall Prevention: safety round/check completed   Goal Outcome Evaluation:            Pt had continuous chest pressure though night. Pt states nitro not alleviating pain. Pt states morphine alleviates pain for 2-5 minutes. Tridil maintained @ 30. Pt npo since mn. Pt states he sees Giovani for cardiology.

## 2023-03-02 NOTE — ED PROVIDER NOTES
Subjective   History of Present Illness  Patient is a 37-year-old white male with history of hypertension, high cholesterol, prior MI with subsequent CABG.  He also reports multiple stents.  Presents today from work with complaints of chest pain.  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  States it radiated into his left arm and up into the left side of his neck.  He reports associated shortness of breath and nausea.  States this pain feels similar to the pain he had with his prior MI.  Denies any fever chills cough or congestion.  Denies alleviating or exacerbating factors.  States he did take a couple nitroglycerin prior to arrival with minimal relief.        Review of Systems   Respiratory: Positive for shortness of breath.    Cardiovascular: Positive for chest pain.   Gastrointestinal: Positive for nausea.   All other systems reviewed and are negative.      Past Medical History:   Diagnosis Date   • Acute inferior myocardial infarction (HCC) 06/14/2022   • Allergic    • Asthma 01/27/2022   • CAD, multiple vessel 06/14/2022   • Gastroesophageal reflux disease 01/27/2022   • Hx of complete eye exam 2016   • Hyperlipidemia 01/27/2022   • Hypertension    • Migraine headache 01/27/2022   • Panic attack 01/27/2022   • Peptic ulcer 09/14/2017       Allergies   Allergen Reactions   • Shellfish-Derived Products Unknown - High Severity       Past Surgical History:   Procedure Laterality Date   • CARDIAC CATHETERIZATION N/A 06/14/2022    Procedure: Left Heart Cath;  Surgeon: Matthieu Del Toro MD;  Location: [facility] CATH INVASIVE LOCATION;  Service: Cardiology;  Laterality: N/A;   • CARDIAC CATHETERIZATION N/A 06/16/2022    Procedure: Percutaneous Coronary Intervention;  Surgeon: Matthieu Del Toro MD;  Location: [facility] CATH INVASIVE LOCATION;  Service: Cardiovascular;  Laterality: N/A;   • CARDIAC CATHETERIZATION N/A 06/23/2022     Procedure: Left Heart Cath possible PCI, atherectomy, hemodynamic support;  Surgeon: Moises Haddad MD;  Location: [facility] CATH INVASIVE LOCATION;  Service: Cardiology;  Laterality: N/A;   • CARDIAC CATHETERIZATION N/A 09/15/2022    Procedure: Left Heart Cath;  Surgeon: Moises Haddad MD;  Location:  [facility] CATH INVASIVE LOCATION;  Service: Cardiology;  Laterality: N/A;   • CARDIAC CATHETERIZATION  9/15/2022   • CHOLECYSTECTOMY  2019   • CORONARY ARTERY BYPASS GRAFT N/A 2022    Procedure: CORONARY ARTERY BYPASS GRAFTING;  Surgeon: Pablo Ball MD;  Location: [facility] CVOR;  Service: Cardiothoracic;  Laterality: N/A;  CABG X 3(2 vein grafts, 1 LIMA graft)   • CORONARY ARTERY BYPASS GRAFT  2022   • CORONARY STENT PLACEMENT     • MANDIBLE SURGERY         Family History   Problem Relation Age of Onset   • Heart disease Mother    • Heart attack Mother    • Hypertension Mother    • Heart failure Father    • Cirrhosis Maternal Grandfather    • Heart attack Maternal Grandmother         a       Social History     Socioeconomic History   • Marital status: Single   Tobacco Use   • Smoking status: Former     Packs/day: 1.50     Years: 23.00     Pack years: 34.50     Types: Cigarettes     Start date: 1996     Quit date: 2022     Years since quittin.6   • Smokeless tobacco: Never   Vaping Use   • Vaping Use: Never used   Substance and Sexual Activity   • Alcohol use: Not Currently   • Drug use: Yes     Frequency: 7.0 times per week     Types: Marijuana   • Sexual activity: Yes     Partners: Female           Objective   Physical Exam  Vital signs and triage nurse note reviewed.  Constitutional: Awake, alert; well-developed and well-nourished. No acute distress is noted.  Appears uncomfortable.  HEENT: Normocephalic, atraumatic; pupils are PERRL with intact EOM; oropharynx is pink and moist without exudate or erythema.  No drooling or pooling of oral secretions.  Neck: Supple, full  range of motion without pain; no cervical lymphadenopathy. Normal phonation.  Cardiovascular: Regular rate and rhythm, normal S1-S2.  No murmur noted.  Pulmonary: Respiratory effort regular nonlabored, breath sounds clear to auscultation all fields.  Abdomen: Soft, nontender, nondistended with normoactive bowel sounds; no rebound or guarding.  Musculoskeletal: Independent range of motion of all extremities with no palpable tenderness or edema.  Neuro: Alert oriented x3, speech is clear and appropriate, GCS 15.    Skin: Flesh tone, warm, dry, intact; no erythematous or petechial rash or lesion.      Procedures           ED Course      Labs Reviewed   COMPREHENSIVE METABOLIC PANEL - Abnormal; Notable for the following components:       Result Value    Sodium 135 (*)     Alkaline Phosphatase 118 (*)     All other components within normal limits    Narrative:     GFR Normal >60  Chronic Kidney Disease <60  Kidney Failure <15     PROTIME-INR - Normal   APTT - Normal   TROPONIN - Normal    Narrative:     High Sensitive Troponin T Reference Range:  <10.0 ng/L- Negative Female for AMI  <15.0 ng/L- Negative Male for AMI  >=10 - Abnormal Female indicating possible myocardial injury.  >=15 - Abnormal Male indicating possible myocardial injury.   Clinicians would have to utilize clinical acumen, EKG, Troponin, and serial changes to determine if it is an Acute Myocardial Infarction or myocardial injury due to an underlying chronic condition.        CBC WITH AUTO DIFFERENTIAL - Normal   HIGH SENSITIVITIY TROPONIN T 2HR   BASIC METABOLIC PANEL   CBC WITH AUTO DIFFERENTIAL   CBC AND DIFFERENTIAL    Narrative:     The following orders were created for panel order CBC & Differential.  Procedure                               Abnormality         Status                     ---------                               -----------         ------                     CBC Auto Differential[[id number]]        Normal              Final result                  Please view results for these tests on the individual orders.     XR Chest 1 View    Result Date: 3/1/2023  1.No active disease. Electronically Signed: Shukri Hollis  3/1/2023 9:33 PM EST  Workstation ID: [id number]    Medications   sodium chloride 0.9 % flush 10 mL (has no administration in time range)   nitroglycerin (TRIDIL) 200 mcg/ml infusion (20 mcg/min Intravenous Rate/Dose Change 3/1/23 2207)   sodium chloride 0.9 % flush 10 mL (10 mL Intravenous Given 3/1/23 2220)   sodium chloride 0.9 % flush 10 mL (has no administration in time range)   sodium chloride 0.9 % infusion 40 mL (has no administration in time range)   acetaminophen (TYLENOL) tablet 650 mg (has no administration in time range)   ondansetron (ZOFRAN) injection 4 mg (has no administration in time range)   melatonin tablet 5 mg (has no administration in time range)   morphine injection 4 mg (has no administration in time range)   aspirin tablet 325 mg (325 mg Oral Given 3/1/23 2131)   morphine injection 4 mg (4 mg Intravenous Given 3/1/23 2216)   ondansetron (ZOFRAN) injection 4 mg (4 mg Intravenous Given 3/1/23 2216)                                            Medical Decision Making  Patient presents today from work with complaints of sudden onset of left-sided chest pain with radiation into his arm and up into his neck.  He states it feels similar to pain he had with his prior MI.    He had above exam evaluation.  He is placed on continuous cardiac monitor.  An IV was established.  Labs EKG chest x-ray were obtained.  He was given an aspirin and was started on IV nitroglycerin.    Work-up: EKG reviewed and interpreted by myself and corroborated by Dr. Bowles shows sinus rhythm with ventricular rate of 69, no acute ST or T wave changes.  CBC and metabolic panel are unremarkable.  High-sensitivity troponin is within normal limits at 7.  Chest x-ray reviewed and interpreted by myself but corroborated by the radiologist shows no active  disease.    On reexamination, patient resting quietly and in no distress.  He has no new complaints.  Still complains of some pain in the left side of his chest.  He was given a dose of IV morphine.    He remains hemodynamically stable.    He will be placed in observation for further cardiac monitoring and evaluation.    Diagnosis and treatment plan discussed with patient.  Patient agreeable to plan.       Chest pain, unspecified type: chronic illness or injury  Amount and/or Complexity of Data Reviewed  Labs: ordered.  Radiology: ordered.  ECG/medicine tests: ordered.      Risk  OTC drugs.  Prescription drug management.  Decision regarding hospitalization.          Final diagnoses:   Chest pain, unspecified type       ED Disposition  ED Disposition     ED Disposition   Decision to Admit    Condition   --    Comment   --             No follow-up provider specified.       Medication List      No changes were made to your prescriptions during this visit.          Amaris Babin, YOUSIF  03/01/23 8724

## 2023-03-02 NOTE — CASE MANAGEMENT/SOCIAL WORK
Discharge Planning Assessment  [facility]     Patient Name: Tramaine Gates  MRN: [id number]  Today's Date: 3/2/2023    Admit Date: 3/1/2023    Plan: Home at discharge. SW consulted for financial/food needs.   Discharge Needs Assessment     Row Name 03/02/23 1350       Living Environment    People in Home alone    Current Living Arrangements home    Primary Care Provided by self    Provides Primary Care For no one    Family Caregiver if Needed none    Able to Return to Prior Arrangements yes       Resource/Environmental Concerns    Resource/Environmental Concerns none    Transportation Concerns none       Transition Planning    Patient/Family Anticipates Transition to home    Patient/Family Anticipated Services at Transition none    Transportation Anticipated health plan transportation  [phone]       Discharge Needs Assessment    Readmission Within the Last 30 Days no previous admission in last 30 days    Equipment Currently Used at Home none    Concerns to be Addressed discharge planning    Anticipated Changes Related to Illness none    Equipment Needed After Discharge none               Discharge Plan     Row Name 03/02/23 8934       Plan    Plan Home at discharge. SW consulted for financial/food needs.    Patient/Family in Agreement with Plan yes    Plan Comments Patient lives at home alone. Patient doesn't drive, will need [employer] set up for transport ([phone]). Patient performs ADL. PCP and pharmacy confirmed. Patient expressed financial assistance for utilities and food, CM consulted SW. Patient denies HH and/or rehab needs. D/C barriers: cardiology following, Nitro gtt.               Demographic Summary     Row Name 03/02/23 2007       General Information    Admission Type observation    Arrived From emergency department    Referral Source admission list    Reason for Consult discharge planning    Preferred Language English               Functional Status     Row Name 03/02/23 0588        Functional Status    Usual Activity Tolerance good    Current Activity Tolerance moderate       Functional Status, IADL    Medications independent    Meal Preparation independent    Housekeeping independent    Laundry independent    Shopping independent              Social Determinants of Health     Tobacco Use: Medium Risk   • Smoking Tobacco Use: Former   • Smokeless Tobacco Use: Never   • Passive Exposure: Not on file   Alcohol Use: Not At Risk   • Frequency of Alcohol Consumption: Never   • Average Number of Drinks: Patient does not drink   • Frequency of Binge Drinking: Never   Financial Resource Strain: Medium Risk   • Difficulty of Paying Living Expenses: Somewhat hard   Food Insecurity: Food Insecurity Present   • Worried About Running Out of Food in the Last Year: Sometimes true   • Ran Out of Food in the Last Year: Sometimes true   Transportation Needs: Unmet Transportation Needs   • Lack of Transportation (Medical): Yes   • Lack of Transportation (Non-Medical): Yes   Physical Activity: Inactive   • Days of Exercise per Week: 0 days   • Minutes of Exercise per Session: 0 min   Stress: Not on file   Social Connections: Not on file   Intimate Partner Violence: Not on file   Depression: Not at risk   • PHQ-2 Score: 2   Housing Stability: Not on file     Met with patient in room wearing PPE: mask.    Maintained distance greater than six feet and spent less than 15 minutes in the room.            Julissa Ch RN

## 2023-03-02 NOTE — CONSULTS
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  He has been on nitro drip for relief, troponins were unremarkable, EKG without ischemic changes, he is lying bed comfortable encounter.  He says this is identical to what he had prior to previous intervention which resolved after intervention typical in nature for him.  No recent fevers chill sweats nausea vomiting or diarrhea    Review of systems otherwise negative x14 point review of systems except as mentioned above  Historical data copied forward from previous encounters in EMR is unchanged    EKG reviewed and interpreted by me demonstrates sinus rhythm with nonspecific abnormalities, unchanged from prior, rate 60    Past Medical History:   Diagnosis Date   • Acute inferior myocardial infarction (HCC) 06/14/2022   • Allergic    • Asthma 01/27/2022   • CAD, multiple vessel 06/14/2022   • Gastroesophageal reflux disease 01/27/2022   • Hx of complete eye exam 2016   • Hyperlipidemia 01/27/2022   • Hypertension    • Migraine headache 01/27/2022   • Panic attack 01/27/2022   •  Peptic ulcer 2017     Past Surgical History:   Procedure Laterality Date   • CARDIAC CATHETERIZATION N/A 2022    Procedure: Left Heart Cath;  Surgeon: Matthieu Del Toro MD;  Location:  [facility];  Service: Cardiology;  Laterality: N/A;   • CARDIAC CATHETERIZATION N/A 2022    Procedure: Percutaneous Coronary Intervention;  Surgeon: Matthieu Del Toro MD;  Location:  [facility];  Service: Cardiovascular;  Laterality: N/A;   • CARDIAC CATHETERIZATION N/A 2022    Procedure: Left Heart Cath possible PCI, atherectomy, hemodynamic support;  Surgeon: Moises Haddad MD;  Location:  [facility];  Service: Cardiology;  Laterality: N/A;   • CARDIAC CATHETERIZATION N/A 09/15/2022    Procedure: Left Heart Cath;  Surgeon: Moises Haddad MD;  Location:  [facility];  Service: Cardiology;  Laterality: N/A;   • CARDIAC CATHETERIZATION  9/15/2022   • CHOLECYSTECTOMY     • CORONARY ARTERY BYPASS GRAFT N/A 2022    Procedure: CORONARY ARTERY BYPASS GRAFTING;  Surgeon: Pablo Ball MD;  Location: [facility];  Service: Cardiothoracic;  Laterality: N/A;  CABG X 3(2 vein grafts, 1 LIMA graft)   • CORONARY ARTERY BYPASS GRAFT  2022   • CORONARY STENT PLACEMENT     • MANDIBLE SURGERY       Family History   Problem Relation Age of Onset   • Heart disease Mother    • Heart attack Mother    • Hypertension Mother    • Heart failure Father    • Cirrhosis Maternal Grandfather    • Heart attack Maternal Grandmother         a     Social History     Tobacco Use   • Smoking status: Former     Packs/day: 1.50     Years: 23.00     Pack years: 34.50     Types: Cigarettes     Start date: 1996     Quit date: 2022     Years since quittin.6   • Smokeless tobacco: Never   Vaping Use   • Vaping Use: Never used   Substance Use Topics   • Alcohol use: Not Currently   • Drug use: Yes     Frequency: 7.0 times per week      Types: Marijuana     Medications Prior to Admission   Medication Sig Dispense Refill Last Dose   • albuterol sulfate  (90 Base) MCG/ACT inhaler Inhale 2 puffs Every 4 (Four) Hours As Needed for Wheezing.   Past Month   • aspirin 81 MG EC tablet Take 1 tablet by mouth Daily. 160 tablet 0 3/1/2023   • atorvastatin (LIPITOR) 40 MG tablet Take 1 tablet by mouth Every Night. 90 tablet 1 2/28/2023   • HYDROcodone-acetaminophen (NORCO) 5-325 MG per tablet Take 1 tablet by mouth Every 4 (Four) Hours As Needed for Moderate Pain . 40 tablet 0 3/1/2023   • isosorbide mononitrate (IMDUR) 30 MG 24 hr tablet Take 1 tablet by mouth Daily. 90 tablet 1 3/1/2023   • levothyroxine (SYNTHROID, LEVOTHROID) 50 MCG tablet TAKE 1 TABLET BY MOUTH EVERY DAY 90 tablet 0 3/1/2023   • lisinopril (PRINIVIL,ZESTRIL) 5 MG tablet Take 1 tablet by mouth Daily. 90 tablet 1 3/1/2023   • metoprolol tartrate (LOPRESSOR) 50 MG tablet Take 1 tablet by mouth 2 (Two) Times a Day. 180 tablet 1 3/1/2023   • nitroglycerin (NITROSTAT) 0.4 MG SL tablet Place 1 tablet under the tongue Every 5 (Five) Minutes As Needed for Chest Pain (Only if SBP Greater Than 100). Take no more than 3 doses in 15 minutes. 25 tablet 0 3/1/2023   • prasugrel (EFFIENT) 10 MG tablet Take 6 tablets by mouth for the first dose and then take one tablet by mouth daily thereafter. (Patient taking differently: Take 1 tablet by mouth Daily. Take 6 tablets by mouth for the first dose and then take one tablet by mouth daily thereafter.) 35 tablet 5 3/1/2023   • ranolazine (RANEXA) 500 MG 12 hr tablet Take 1 tablet by mouth Every 12 (Twelve) Hours. 180 tablet 1 3/1/2023     Allergies:  Shellfish-derived products    REVIEW OF SYSTEMS:  Please see the above history of present illness for pertinent positives and negatives.  The remainder of the patient's systems have been reviewed and are negative.     Vital Signs  Temp:  [97.5 °F (36.4 °C)-98.2 °F (36.8 °C)] 97.5 °F (36.4 °C)  Heart Rate:   "[53-81] 53  Resp:  [16-18] 17  BP: ()/() 117/66    Flowsheet Rows    Flowsheet Row First Filed Value   Admission Height 177.8 cm (70\") Documented at 03/01/2023 2036   Admission Weight 124 kg (272 lb 11.3 oz) Documented at 03/01/2023 2036           Physical Exam:  Physical Exam   Constitutional: Patient appears well-developed and well-nourished and in no acute distress   HEENT:   Head: Normocephalic and atraumatic.   Eyes:  Pupils are equal, round, and reactive to light. EOM are intact. Sclerae are anicteric and noninjected.  Mouth and Throat: Patient has moist mucous membranes. Oropharynx is clear of any erythema or exudate.     Neck: Neck supple. No JVD present. No thyromegaly present. No lymphadenopathy present.  Cardiovascular: Regular rate, regular rhythm, S1 normal and S2 normal.  Exam reveals no gallop and no friction rub.  Stable 1 out of 6 murmur heard.  Sternotomy clean and dry  Pulmonary/Chest: Lungs are clear to auscultation bilaterally. No respiratory distress. No wheezes. No rhonchi. No rales.   Abdominal: Obese soft. Bowel sounds are normal. . There is no hepatosplenomegaly. There is no tenderness. Nondistended  Musculoskeletal: Normal muscle tone  Extremities: No edema. Pulses are palpable in all 4 extremities.  Neurological: Patient is alert and oriented to person, place, and time. Cranial nerves II-XII are grossly intact with no focal deficits.  Skin: Skin is warm. No rash noted. Nails show no clubbing.  No cyanosis or erythema.     Results Review:    I reviewed the patient's new clinical results.  Lab Results (most recent)     Procedure Component Value Units Date/Time    Basic Metabolic Panel [[id number]]  (Normal) Collected: 03/02/23 0607    Specimen: Blood from Arm, Right Updated: 03/02/23 0647     Glucose 95 mg/dL      BUN 12 mg/dL      Creatinine 1.00 mg/dL      Sodium 137 mmol/L      Potassium 3.6 mmol/L      Chloride 101 mmol/L      CO2 25.0 mmol/L      Calcium 8.9 mg/dL      " BUN/Creatinine Ratio 12.0     Anion Gap 11.0 mmol/L      eGFR 98.2 mL/min/1.73     Narrative:      GFR Normal >60  Chronic Kidney Disease <60  Kidney Failure <15      CBC Auto Differential [[id number]]  (Normal) Collected: 03/02/23 0607    Specimen: Blood from Arm, Right Updated: 03/02/23 0626     WBC 6.70 10*3/mm3      RBC 4.82 10*6/mm3      Hemoglobin 13.7 g/dL      Hematocrit 41.7 %      MCV 86.5 fL      MCH 28.4 pg      MCHC 32.9 g/dL      RDW 13.5 %      RDW-SD 41.1 fl      MPV 7.2 fL      Platelets 262 10*3/mm3      Neutrophil % 62.8 %      Lymphocyte % 30.2 %      Monocyte % 5.6 %      Eosinophil % 0.9 %      Basophil % 0.5 %      Neutrophils, Absolute 4.20 10*3/mm3      Lymphocytes, Absolute 2.00 10*3/mm3      Monocytes, Absolute 0.40 10*3/mm3      Eosinophils, Absolute 0.10 10*3/mm3      Basophils, Absolute 0.00 10*3/mm3      nRBC 0.1 /100 WBC     High Sensitivity Troponin T 2Hr [[id number]]  (Normal) Collected: 03/01/23 2331    Specimen: Blood from Arm, Right Updated: 03/01/23 2359     HS Troponin T 6 ng/L      Troponin T Delta -1 ng/L     Narrative:      High Sensitive Troponin T Reference Range:  <10.0 ng/L- Negative Female for AMI  <15.0 ng/L- Negative Male for AMI  >=10 - Abnormal Female indicating possible myocardial injury.  >=15 - Abnormal Male indicating possible myocardial injury.   Clinicians would have to utilize clinical acumen, EKG, Troponin, and serial changes to determine if it is an Acute Myocardial Infarction or myocardial injury due to an underlying chronic condition.         Comprehensive Metabolic Panel [[id number]]  (Abnormal) Collected: 03/01/23 2105    Specimen: Blood Updated: 03/01/23 2135     Glucose 90 mg/dL      BUN 13 mg/dL      Creatinine 1.07 mg/dL      Sodium 135 mmol/L      Potassium 3.9 mmol/L      Chloride 101 mmol/L      CO2 22.0 mmol/L      Calcium 9.1 mg/dL      Total Protein 7.5 g/dL      Albumin 4.6 g/dL      ALT (SGPT) 22 U/L      AST (SGOT) 17 U/L      Alkaline  Phosphatase 118 U/L      Total Bilirubin 0.5 mg/dL      Globulin 2.9 gm/dL      A/G Ratio 1.6 g/dL      BUN/Creatinine Ratio 12.1     Anion Gap 12.0 mmol/L      eGFR 90.5 mL/min/1.73     Narrative:      GFR Normal >60  Chronic Kidney Disease <60  Kidney Failure <15      High Sensitivity Troponin T [[id number]]  (Normal) Collected: 03/01/23 2105    Specimen: Blood Updated: 03/01/23 2135     HS Troponin T 7 ng/L     Narrative:      High Sensitive Troponin T Reference Range:  <10.0 ng/L- Negative Female for AMI  <15.0 ng/L- Negative Male for AMI  >=10 - Abnormal Female indicating possible myocardial injury.  >=15 - Abnormal Male indicating possible myocardial injury.   Clinicians would have to utilize clinical acumen, EKG, Troponin, and serial changes to determine if it is an Acute Myocardial Infarction or myocardial injury due to an underlying chronic condition.         Protime-INR [[id number]]  (Normal) Collected: 03/01/23 2105    Specimen: Blood Updated: 03/01/23 2128     Protime 10.6 Seconds      INR 1.03    aPTT [[id number]]  (Normal) Collected: 03/01/23 2105    Specimen: Blood Updated: 03/01/23 2128     PTT 29.1 seconds     CBC & Differential [[id number]]  (Normal) Collected: 03/01/23 2105    Specimen: Blood Updated: 03/01/23 2112    Narrative:      The following orders were created for panel order CBC & Differential.  Procedure                               Abnormality         Status                     ---------                               -----------         ------                     CBC Auto Differential[[id number]]        Normal              Final result                 Please view results for these tests on the individual orders.    CBC Auto Differential [[id number]]  (Normal) Collected: 03/01/23 2105    Specimen: Blood Updated: 03/01/23 2112     WBC 7.80 10*3/mm3      RBC 5.12 10*6/mm3      Hemoglobin 14.7 g/dL      Hematocrit 43.5 %      MCV 85.0 fL      MCH 28.8 pg      MCHC 33.9 g/dL      RDW 13.7 %       RDW-SD 42.9 fl      MPV 7.4 fL      Platelets 302 10*3/mm3      Neutrophil % 68.3 %      Lymphocyte % 24.0 %      Monocyte % 6.0 %      Eosinophil % 0.5 %      Basophil % 1.2 %      Neutrophils, Absolute 5.40 10*3/mm3      Lymphocytes, Absolute 1.90 10*3/mm3      Monocytes, Absolute 0.50 10*3/mm3      Eosinophils, Absolute 0.00 10*3/mm3      Basophils, Absolute 0.10 10*3/mm3      nRBC 0.0 /100 WBC           Imaging Results (Most Recent)     Procedure Component Value Units Date/Time    XR Chest 1 View [[id number]] Collected: 03/01/23 2132     Updated: 03/01/23 2135    Narrative:      XR CHEST 1 VW    Date of Exam: 3/1/2023 9:04 PM EST    Indication: Chest pain.    Comparison: 9/14/2022    Findings:    LUNGS: No focal consolidation. No pleural effusion.  PNEUMOTHORAX: None.    HEART SIZE: Normal.         Impression:      1.No active disease.    Electronically Signed: Shukri Hollis    3/1/2023 9:33 PM EST    Workstation ID: [id number]        reviewed    ECG/EMG Results (most recent)     Procedure Component Value Units Date/Time    ECG 12 Lead Chest Pain [[id number]] Collected: 03/01/23 2038     Updated: 03/01/23 2039     QT Interval 376 ms     Narrative:      HEART RATE= 69  bpm  RR Interval= 872  ms  IL Interval= 125  ms  P Horizontal Axis= 30  deg  P Front Axis= 26  deg  QRSD Interval= 79  ms  QT Interval= 376  ms  QRS Axis= 4  deg  T Wave Axis= 23  deg  - NORMAL ECG -  Sinus rhythm  Electronically Signed By:   Date and Time of Study: 2023-03-01 20:38:50    ECG 12 Lead Chest Pain [[id number]] Collected: 03/02/23 0728     Updated: 03/02/23 0730     QT Interval 417 ms     Narrative:      HEART RATE= 68  bpm  RR Interval= 888  ms  IL Interval= 130  ms  P Horizontal Axis= 19  deg  P Front Axis= 53  deg  QRSD Interval= 85  ms  QT Interval= 417  ms  QRS Axis= 20  deg  T Wave Axis= 24  deg  - NORMAL ECG -  Sinus rhythm  When compared with ECG of 01-Mar-2023 20:38:50,  No significant change  Electronically Signed By:   Date and  Time of Study: 2023-03-02 07:28:40    SCANNED - TELEMETRY   [[id number]] Resulted: 03/01/23     Updated: 03/02/23 0738    SCANNED - TELEMETRY   [[id number]] Resulted: 03/01/23     Updated: 03/02/23 0800        reviewed    Assessment & Plan     Unstable angina  Multivessel CAD native and bypass graft disease  Essential hypertension  Hyperlipidemia  History of mood disorder    Nitro drip continues  Schedule left heart catheterization for native and bypass graft angiography with prior overlapping stents x3 to SVG to RCA with distal small vessel disease not amenable intervention secondary to size of the vessels  Check for de dora lesion  Continue P2 Y12 inhibitor and aspirin likely indefinitely  High intensity statin  Beta-blockers antianginals as tolerated  Repeat 2D echo    Further recommendations to follow findings    Risks and benefits of cardiac catheterization including death heart attack stroke plane bleeding infection need for vascular cardiovascular were discussed and he wished to proceed with recurrence of his typical anginal symptoms and prior vein graft intervention    I discussed the patient's findings and my recommendations with patient and staff    Moises Haddad MD  03/02/23  17:04 EST

## 2023-03-02 NOTE — ED NOTES
Pt. 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 Nothing makes it better or worse. Nitro sublingual taken with temporary relief.

## 2023-03-02 NOTE — CASE MANAGEMENT/SOCIAL WORK
Social Work Assessment  [facility]     Patient Name: Tramaine Gates  MRN: [id number]  Today's Date: 3/2/2023    Admit Date: 3/1/2023       Legal     Row Name 03/02/23 1552       Financial Resource Strain    How hard is it for you to pay for the very basics like food, housing, medical care, and heating? Somewhat       Financial/Legal    Source of Income salary/wages    Financial/Environmental Concerns unable to afford food    Application for Public Assistance not applied    Finance Comments LSW met with patient at bedside. Patient reported he works at [employer] but has been off of work for the past week. He needs assistance with paying for his utilities and with affording groceries.  LSW started the application for TANF (food stamps) and printed out the application for patient to finish the application to submit. Also provided patient with the [place] Resource guide that has food pantries, housing assistance, transportation assistance, and utlitlity assistance listed. No other concerns at this time.              Met with patient in room wearing PPE: mask.  Maintained distance greater than six feet and spent less than 15 minutes in the room.    YING Olivares, [place]    Phone: [phone]  Cell: [phone]  Fax: [phone]  [email or web address]

## 2023-03-02 NOTE — H&P
"[facility] H&P    Patient Name: Tramaine Gates  : 1985  MRN: [id number]  Primary Care Physician: Daniela Hernandez FNP  Date of admission: 3/1/2023     Patient Care Team:  Daniela Hernandez FNP as PCP - General (Family Medicine)  Ollie Dunn MD as Consulting Physician (Gastroenterology)          Subjective   History Present Illness     Chief Complaint:   Chief Complaint   Patient presents with   • Chest Pain       History of Present Illness  Obtained from ED provider HPI on 3/1/2023:  Patient is a 37-year-old white male with history of hypertension, high cholesterol, prior MI with subsequent CABG.  He also reports multiple stents.  Presents today from work with complaints of chest pain.  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  States it radiated into his left arm and up into the left side of his neck.  He reports associated shortness of breath and nausea.  States this pain feels similar to the pain he had with his prior MI.  Denies any fever chills cough or congestion.  Denies alleviating or exacerbating factors.  States he did take a couple nitroglycerin prior to arrival with minimal relief.     3/2/2023:  Patient confirms the HPI noted above including approximately 1 week history of intermittent episodes of chest pain which became significantly worse on the day of presentation located on the left side of his chest radiating towards his left arm and neck described as a intermittent sharp pain which was severe with a constant \"squeezing\" pain.  Some associated diaphoresis as well as dyspnea and nausea without vomiting were also present but he denies any fever, cough, palpitations or peripheral edema.  He confirms compliance with all of his outpatient medical therapies and notes a strong personal history of CAD including previous CABG and stent placement.      ROS "   Review of Systems   Constitutional: Positive for diaphoresis.   HENT: Negative.    Eyes: Negative.    Cardiovascular: Positive for chest pain.   Respiratory: Positive for shortness of breath.    Skin: Negative.    Musculoskeletal: Negative.    Gastrointestinal: Positive for nausea. Negative for vomiting.   Genitourinary: Negative.    Neurological: Negative.    Psychiatric/Behavioral: Negative.         Personal History     Past Medical History:   Past Medical History:   Diagnosis Date   • Acute inferior myocardial infarction (HCC) 06/14/2022   • Allergic    • Asthma 01/27/2022   • CAD, multiple vessel 06/14/2022   • Gastroesophageal reflux disease 01/27/2022   • Hx of complete eye exam 2016   • Hyperlipidemia 01/27/2022   • Hypertension    • Migraine headache 01/27/2022   • Panic attack 01/27/2022   • Peptic ulcer 09/14/2017       Surgical History:      Past Surgical History:   Procedure Laterality Date   • CARDIAC CATHETERIZATION N/A 06/14/2022    Procedure: Left Heart Cath;  Surgeon: Matthieu Del Toro MD;  Location: [facility] CATH INVASIVE LOCATION;  Service: Cardiology;  Laterality: N/A;   • CARDIAC CATHETERIZATION N/A 06/16/2022    Procedure: Percutaneous Coronary Intervention;  Surgeon: Matthieu Del Toro MD;  Location: [facility] CATH INVASIVE LOCATION;  Service: Cardiovascular;  Laterality: N/A;   • CARDIAC CATHETERIZATION N/A 06/23/2022    Procedure: Left Heart Cath possible PCI, atherectomy, hemodynamic support;  Surgeon: Moises Haddad MD;  Location: [facility] CATH INVASIVE LOCATION;  Service: Cardiology;  Laterality: N/A;   • CARDIAC CATHETERIZATION N/A 09/15/2022    Procedure: Left Heart Cath;  Surgeon: Moises Haddad MD;  Location: [facility] CATH INVASIVE LOCATION;  Service: Cardiology;  Laterality: N/A;   • CARDIAC CATHETERIZATION  9/15/2022   • CHOLECYSTECTOMY  2019   • CORONARY ARTERY BYPASS GRAFT N/A 06/29/2022    Procedure: CORONARY ARTERY BYPASS GRAFTING;  Surgeon: Pablo Ball MD;   Location: [facility];  Service: Cardiothoracic;  Laterality: N/A;  CABG X 3(2 vein grafts, 1 LIMA graft)   • CORONARY ARTERY BYPASS GRAFT  6/29/2022   • CORONARY STENT PLACEMENT     • MANDIBLE SURGERY             Family History: family history includes Cirrhosis in his maternal grandfather; Heart attack in his maternal grandmother and mother; Heart disease in his mother; Heart failure in his father; Hypertension in his mother. Otherwise pertinent FHx was reviewed and unremarkable.     Social History:  reports that he quit smoking about 8 months ago. His smoking use included cigarettes. He started smoking about 27 years ago. He has a 34.50 pack-year smoking history. He has never used smokeless tobacco. He reports that he does not currently use alcohol. He reports current drug use. Frequency: 7.00 times per week. Drug: Marijuana.      Medications:  Prior to Admission medications    Medication Sig Start Date End Date Taking? Authorizing Provider   albuterol sulfate  (90 Base) MCG/ACT inhaler Inhale 2 puffs Every 4 (Four) Hours As Needed for Wheezing.   Yes Provider, MD Adolfo   aspirin 81 MG EC tablet Take 1 tablet by mouth Daily. 10/14/22  Yes Moises Haddad MD   atorvastatin (LIPITOR) 40 MG tablet Take 1 tablet by mouth Every Night. 10/14/22  Yes Moises Haddad MD   HYDROcodone-acetaminophen (NORCO) 5-325 MG per tablet Take 1 tablet by mouth Every 4 (Four) Hours As Needed for Moderate Pain . 8/25/22  Yes Catrachita Fowler APRN   isosorbide mononitrate (IMDUR) 30 MG 24 hr tablet Take 1 tablet by mouth Daily. 10/14/22  Yes Moises Haddad MD   levothyroxine (SYNTHROID, LEVOTHROID) 50 MCG tablet TAKE 1 TABLET BY MOUTH EVERY DAY 1/3/23  Yes Daniela Hernandez FNP   lisinopril (PRINIVIL,ZESTRIL) 5 MG tablet Take 1 tablet by mouth Daily. 10/14/22  Yes Moises Haddad MD   metoprolol tartrate (LOPRESSOR) 50 MG tablet Take 1 tablet by mouth 2 (Two) Times a Day.  10/14/22  Yes Moises Haddad MD   nitroglycerin (NITROSTAT) 0.4 MG SL tablet Place 1 tablet under the tongue Every 5 (Five) Minutes As Needed for Chest Pain (Only if SBP Greater Than 100). Take no more than 3 doses in 15 minutes. 2/27/23  Yes Pranav Lazaro MD   prasugrel (EFFIENT) 10 MG tablet Take 6 tablets by mouth for the first dose and then take one tablet by mouth daily thereafter.  Patient taking differently: Take 1 tablet by mouth Daily. Take 6 tablets by mouth for the first dose and then take one tablet by mouth daily thereafter. 11/15/22  Yes Moises Haddad MD   ranolazine (RANEXA) 500 MG 12 hr tablet Take 1 tablet by mouth Every 12 (Twelve) Hours. 10/14/22  Yes Moises Haddad MD       Allergies:    Allergies   Allergen Reactions   • Shellfish-Derived Products Unknown - High Severity       Objective   Objective     Vital Signs  Temp:  [97.5 °F (36.4 °C)-98.2 °F (36.8 °C)] 97.5 °F (36.4 °C)  Heart Rate:  [53-81] 53  Resp:  [16-18] 17  BP: ()/() 117/66  SpO2:  [94 %-100 %] 98 %  on   ;   Device (Oxygen Therapy): room air  Body mass index is 39.13 kg/m².    Physical Exam  Physical Exam  Vitals reviewed.   Constitutional:       General: He is not in acute distress.     Appearance: Normal appearance. He is obese. He is not ill-appearing, toxic-appearing or diaphoretic.   HENT:      Head: Normocephalic.      Right Ear: External ear normal.      Left Ear: External ear normal.      Nose: Nose normal.      Mouth/Throat:      Mouth: Mucous membranes are moist.   Eyes:      Extraocular Movements: Extraocular movements intact.   Cardiovascular:      Rate and Rhythm: Normal rate and regular rhythm.      Pulses: Normal pulses.      Heart sounds: Normal heart sounds.   Pulmonary:      Effort: Pulmonary effort is normal.      Breath sounds: Normal breath sounds.   Abdominal:      General: Bowel sounds are normal.      Palpations: Abdomen is soft.      Tenderness: There is no  abdominal tenderness.   Musculoskeletal:         General: Normal range of motion.      Cervical back: Normal range of motion.      Right lower leg: No edema.      Left lower leg: No edema.   Skin:     General: Skin is warm and dry.      Capillary Refill: Capillary refill takes less than 2 seconds.   Neurological:      General: No focal deficit present.      Mental Status: He is alert and oriented to person, place, and time.   Psychiatric:         Mood and Affect: Mood normal.         Behavior: Behavior normal.         Thought Content: Thought content normal.         Judgment: Judgment normal.     Results Review:  I have personally reviewed most recent cardiac tracings, lab results and radiology images and interpretations and agree with findings, most notably: Troponin, CBC, CMP, chest x-ray and EKG.    Results from last 7 days   Lab Units 03/02/23  0607 03/01/23  2105   WBC 10*3/mm3 6.70 7.80   HEMOGLOBIN g/dL 13.7 14.7   HEMATOCRIT % 41.7 43.5   PLATELETS 10*3/mm3 262 302   INR   --  1.03     Results from last 7 days   Lab Units 03/02/23  0607 03/01/23  2331 03/01/23  2105   SODIUM mmol/L 137  --  135*   POTASSIUM mmol/L 3.6  --  3.9   CHLORIDE mmol/L 101  --  101   CO2 mmol/L 25.0  --  22.0   BUN mg/dL 12  --  13   CREATININE mg/dL 1.00  --  1.07   GLUCOSE mg/dL 95  --  90   CALCIUM mg/dL 8.9  --  9.1   ALT (SGPT) U/L  --   --  22   AST (SGOT) U/L  --   --  17   HSTROP T ng/L  --  6 7     Estimated Creatinine Clearance: 131 mL/min (by C-G formula based on SCr of 1 mg/dL).  Brief Urine Lab Results  (Last result in the past 365 days)      Color   Clarity   Blood   Leuk Est   Nitrite   Protein   CREAT   Urine HCG        06/25/22 0407 Yellow   Clear   Negative   Negative   Negative   Negative                 Microbiology Results (last 10 days)     ** No results found for the last 240 hours. **          ECG/EMG Results (most recent)     Procedure Component Value Units Date/Time    ECG 12 Lead Chest Pain [[id number]]  Collected: 03/01/23 2038     Updated: 03/01/23 2039     QT Interval 376 ms     Narrative:      HEART RATE= 69  bpm  RR Interval= 872  ms  MI Interval= 125  ms  P Horizontal Axis= 30  deg  P Front Axis= 26  deg  QRSD Interval= 79  ms  QT Interval= 376  ms  QRS Axis= 4  deg  T Wave Axis= 23  deg  - NORMAL ECG -  Sinus rhythm  Electronically Signed By:   Date and Time of Study: 2023-03-01 20:38:50    ECG 12 Lead Chest Pain [[id number]] Collected: 03/02/23 0728     Updated: 03/02/23 0730     QT Interval 417 ms     Narrative:      HEART RATE= 68  bpm  RR Interval= 888  ms  MI Interval= 130  ms  P Horizontal Axis= 19  deg  P Front Axis= 53  deg  QRSD Interval= 85  ms  QT Interval= 417  ms  QRS Axis= 20  deg  T Wave Axis= 24  deg  - NORMAL ECG -  Sinus rhythm  When compared with ECG of 01-Mar-2023 20:38:50,  No significant change  Electronically Signed By:   Date and Time of Study: 2023-03-02 07:28:40    SCANNED - TELEMETRY   [[id number]] Resulted: 03/01/23     Updated: 03/02/23 0738    SCANNED - TELEMETRY   [[id number]] Resulted: 03/01/23     Updated: 03/02/23 0800          Results for orders placed during the hospital encounter of 06/22/22    Duplex Vein Mapping Lower Extremity - Bilateral CAR    Interpretation Summary  · The left greater saphenous vein is patent and of adequate size in the thigh.  · The left greater saphenous vein is patent and of adequate size in the calf.      Results for orders placed during the hospital encounter of 06/22/22    Adult Transthoracic Echo Limited W/ Cont if Necessary Per Protocol    Interpretation Summary  · Left ventricular systolic function is normal.  · Left ventricular ejection fraction is 55 to 60%  · Basal inferior wall aneurysm is noted      XR Chest 1 View    Result Date: 3/1/2023  1.No active disease. Electronically Signed: Shukri Hollis  3/1/2023 9:33 PM EST  Workstation ID: [id number]        Estimated Creatinine Clearance: 131 mL/min (by C-G formula based on SCr of 1  mg/dL).    Assessment & Plan   Assessment/Plan       Active Hospital Problems    Diagnosis  POA   • **Chest pain [R07.9]  Yes   • Unstable angina (HCC) [I20.0]  Unknown      Resolved Hospital Problems   No resolved problems to display.     Chest pain        Lab Results   Component Value Date     TROPONINT 6 03/01/2023     TROPONINT 7 03/01/2023     TROPONINT <0.010 09/15/2022   -Chest X-ray: Shows no active disease  -Most recent EKG shows sinus rhythm at 68 without obvious acute changes though some mild ST depression is noted in lead III and a QTc of 4 and 43  -In the ED pt given 324 mg aspirin, Tridil infusion and morphine  -Cardiology consulted, catheterization planned for 3/2/2023  -Telemetry  -NPO  -Continue Imdur, Ranexa, Effient, Lipitor and aspirin     Hypertension  -Well controlled       BP Readings from Last 1 Encounters:   03/02/23 132/79   - Continue lisinopril  - Monitor while admitted     Hyperlipidemia  -Statin     Obesity (BMI: 39.13)  -Encourage diet lifestyle modification              VTE Prophylaxis -   Mechanical Order History:      Ordered        03/01/23 2218  Place Sequential Compression Device  Once            03/01/23 2218  Maintain Sequential Compression Device  Continuous                    Pharmalogical Order History:     None          CODE STATUS:    Code Status and Medical Interventions:   Ordered at: 03/02/23 0638     Code Status (Patient has no pulse and is not breathing):    CPR (Attempt to Resuscitate)     Medical Interventions (Patient has pulse or is breathing):    Full Support     Release to patient:    Routine Release       This patient has been examined wearing personal protective equipment.     I discussed the patient's findings and my recommendations with patient and nursing staff.      Signature:Electronically signed by Keven Nash PA-C, 03/02/23, 5:19 PM EST.

## 2023-03-02 NOTE — PLAN OF CARE
Goal Outcome Evaluation:              Outcome Evaluation: Cardiology following. Plan for heart cath at 1800. pt continues c/o chest pain--tridil drip continue. Will monitor.

## 2023-03-03 LAB
ANION GAP SERPL CALCULATED.3IONS-SCNC: 14 MMOL/L (ref 5–15)
APTT PPP: 30.6 SECONDS (ref 24–31)
BUN SERPL-MCNC: 12 MG/DL (ref 6–20)
BUN/CREAT SERPL: 10.1 (ref 7–25)
CALCIUM SPEC-SCNC: 9.7 MG/DL (ref 8.6–10.5)
CHLORIDE SERPL-SCNC: 97 MMOL/L (ref 98–107)
CHOLEST SERPL-MCNC: 187 MG/DL (ref 0–200)
CO2 SERPL-SCNC: 24 MMOL/L (ref 22–29)
CREAT SERPL-MCNC: 1.19 MG/DL (ref 0.76–1.27)
DEPRECATED RDW RBC AUTO: 40.7 FL (ref 37–54)
EGFRCR SERPLBLD CKD-EPI 2021: 79.7 ML/MIN/1.73
ERYTHROCYTE [DISTWIDTH] IN BLOOD BY AUTOMATED COUNT: 13.4 % (ref 12.3–15.4)
GLUCOSE SERPL-MCNC: 122 MG/DL (ref 65–99)
HBA1C MFR BLD: 5.5 % (ref 4.8–5.6)
HCT VFR BLD AUTO: 48.1 % (ref 37.5–51)
HDLC SERPL-MCNC: 43 MG/DL (ref 40–60)
HGB BLD-MCNC: 15.7 G/DL (ref 13–17.7)
LDLC SERPL CALC-MCNC: 126 MG/DL (ref 0–100)
LDLC/HDLC SERPL: 2.9 {RATIO}
MCH RBC QN AUTO: 28.4 PG (ref 26.6–33)
MCHC RBC AUTO-ENTMCNC: 32.6 G/DL (ref 31.5–35.7)
MCV RBC AUTO: 87.1 FL (ref 79–97)
PLATELET # BLD AUTO: 315 10*3/MM3 (ref 140–450)
PMV BLD AUTO: 7.5 FL (ref 6–12)
POTASSIUM SERPL-SCNC: 4.3 MMOL/L (ref 3.5–5.2)
RBC # BLD AUTO: 5.52 10*6/MM3 (ref 4.14–5.8)
SODIUM SERPL-SCNC: 135 MMOL/L (ref 136–145)
TRIGL SERPL-MCNC: 96 MG/DL (ref 0–150)
VLDLC SERPL-MCNC: 18 MG/DL (ref 5–40)
WBC NRBC COR # BLD: 6.5 10*3/MM3 (ref 3.4–10.8)

## 2023-03-03 PROCEDURE — 93005 ELECTROCARDIOGRAM TRACING: CPT | Performed by: INTERNAL MEDICINE

## 2023-03-03 PROCEDURE — 80048 BASIC METABOLIC PNL TOTAL CA: CPT | Performed by: INTERNAL MEDICINE

## 2023-03-03 PROCEDURE — G0378 HOSPITAL OBSERVATION PER HR: HCPCS

## 2023-03-03 PROCEDURE — 85027 COMPLETE CBC AUTOMATED: CPT | Performed by: INTERNAL MEDICINE

## 2023-03-03 PROCEDURE — 25010000002 ONDANSETRON PER 1 MG: Performed by: INTERNAL MEDICINE

## 2023-03-03 PROCEDURE — 83036 HEMOGLOBIN GLYCOSYLATED A1C: CPT | Performed by: INTERNAL MEDICINE

## 2023-03-03 PROCEDURE — 25010000002 MORPHINE PER 10 MG: Performed by: HOSPITALIST

## 2023-03-03 PROCEDURE — 63710000001 PROMETHAZINE PER 25 MG: Performed by: INTERNAL MEDICINE

## 2023-03-03 PROCEDURE — 85730 THROMBOPLASTIN TIME PARTIAL: CPT | Performed by: INTERNAL MEDICINE

## 2023-03-03 PROCEDURE — 80061 LIPID PANEL: CPT | Performed by: INTERNAL MEDICINE

## 2023-03-03 PROCEDURE — 99233 SBSQ HOSP IP/OBS HIGH 50: CPT | Performed by: INTERNAL MEDICINE

## 2023-03-03 RX ORDER — CILOSTAZOL 100 MG/1
50 TABLET ORAL 2 TIMES DAILY
Qty: 30 TABLET | Refills: 6 | Status: SHIPPED | OUTPATIENT
Start: 2023-03-03

## 2023-03-03 RX ORDER — PROMETHAZINE HYDROCHLORIDE 25 MG/1
25 TABLET ORAL EVERY 6 HOURS PRN
Status: DISCONTINUED | OUTPATIENT
Start: 2023-03-03 | End: 2023-03-04 | Stop reason: HOSPADM

## 2023-03-03 RX ORDER — DILTIAZEM HYDROCHLORIDE 120 MG/1
120 CAPSULE, COATED, EXTENDED RELEASE ORAL
Status: DISCONTINUED | OUTPATIENT
Start: 2023-03-03 | End: 2023-03-04

## 2023-03-03 RX ORDER — METOPROLOL TARTRATE 50 MG/1
50 TABLET, FILM COATED ORAL EVERY 12 HOURS SCHEDULED
Status: DISCONTINUED | OUTPATIENT
Start: 2023-03-03 | End: 2023-03-04 | Stop reason: HOSPADM

## 2023-03-03 RX ORDER — PANTOPRAZOLE SODIUM 40 MG/1
40 TABLET, DELAYED RELEASE ORAL
Status: DISCONTINUED | OUTPATIENT
Start: 2023-03-03 | End: 2023-03-04 | Stop reason: HOSPADM

## 2023-03-03 RX ADMIN — Medication 10 ML: at 20:59

## 2023-03-03 RX ADMIN — ONDANSETRON 4 MG: 2 INJECTION INTRAMUSCULAR; INTRAVENOUS at 10:47

## 2023-03-03 RX ADMIN — LISINOPRIL 5 MG: 5 TABLET ORAL at 08:01

## 2023-03-03 RX ADMIN — ATORVASTATIN CALCIUM 40 MG: 40 TABLET, FILM COATED ORAL at 20:59

## 2023-03-03 RX ADMIN — METOPROLOL TARTRATE 50 MG: 50 TABLET, FILM COATED ORAL at 11:04

## 2023-03-03 RX ADMIN — METOPROLOL TARTRATE 50 MG: 50 TABLET, FILM COATED ORAL at 20:59

## 2023-03-03 RX ADMIN — HYDROCODONE BITARTRATE AND ACETAMINOPHEN 1 TABLET: 5; 325 TABLET ORAL at 04:40

## 2023-03-03 RX ADMIN — RANOLAZINE 500 MG: 500 TABLET, EXTENDED RELEASE ORAL at 08:01

## 2023-03-03 RX ADMIN — HYDROCODONE BITARTRATE AND ACETAMINOPHEN 1 TABLET: 5; 325 TABLET ORAL at 16:43

## 2023-03-03 RX ADMIN — LEVOTHYROXINE SODIUM 50 MCG: 50 TABLET ORAL at 08:01

## 2023-03-03 RX ADMIN — Medication 5 MG: at 21:00

## 2023-03-03 RX ADMIN — CILOSTAZOL 50 MG: 100 TABLET ORAL at 08:01

## 2023-03-03 RX ADMIN — ISOSORBIDE MONONITRATE 30 MG: 30 TABLET, EXTENDED RELEASE ORAL at 08:01

## 2023-03-03 RX ADMIN — PANTOPRAZOLE SODIUM 40 MG: 40 TABLET, DELAYED RELEASE ORAL at 08:46

## 2023-03-03 RX ADMIN — PRASUGREL 10 MG: 10 TABLET, FILM COATED ORAL at 09:57

## 2023-03-03 RX ADMIN — PROMETHAZINE HYDROCHLORIDE 25 MG: 25 TABLET ORAL at 21:00

## 2023-03-03 RX ADMIN — HYDROCODONE BITARTRATE AND ACETAMINOPHEN 1 TABLET: 5; 325 TABLET ORAL at 21:00

## 2023-03-03 RX ADMIN — HYDROCODONE BITARTRATE AND ACETAMINOPHEN 1 TABLET: 5; 325 TABLET ORAL at 12:42

## 2023-03-03 RX ADMIN — HYDROCODONE BITARTRATE AND ACETAMINOPHEN 1 TABLET: 5; 325 TABLET ORAL at 00:09

## 2023-03-03 RX ADMIN — PROMETHAZINE HYDROCHLORIDE 25 MG: 25 TABLET ORAL at 11:09

## 2023-03-03 RX ADMIN — MORPHINE SULFATE 4 MG: 4 INJECTION, SOLUTION INTRAMUSCULAR; INTRAVENOUS at 06:30

## 2023-03-03 RX ADMIN — CILOSTAZOL 50 MG: 100 TABLET ORAL at 20:59

## 2023-03-03 RX ADMIN — ACETAMINOPHEN 650 MG: 325 TABLET, FILM COATED ORAL at 01:39

## 2023-03-03 RX ADMIN — RANOLAZINE 500 MG: 500 TABLET, EXTENDED RELEASE ORAL at 20:59

## 2023-03-03 RX ADMIN — MORPHINE SULFATE 4 MG: 4 INJECTION, SOLUTION INTRAMUSCULAR; INTRAVENOUS at 02:05

## 2023-03-03 RX ADMIN — ONDANSETRON 4 MG: 2 INJECTION INTRAMUSCULAR; INTRAVENOUS at 04:41

## 2023-03-03 RX ADMIN — MORPHINE SULFATE 4 MG: 4 INJECTION, SOLUTION INTRAMUSCULAR; INTRAVENOUS at 10:01

## 2023-03-03 RX ADMIN — ASPIRIN 81 MG: 81 TABLET, COATED ORAL at 09:57

## 2023-03-03 NOTE — PROGRESS NOTES
Cardiology [place]        LOS:  LOS: 0 days   Patient Name: Tramaine Gates  Age/Sex: 37 y.o. male  : 1985  MRN: [id number]    Day of Service: 23   Length of Stay: 0  Encounter Provider: YOUSIF Garces  Place of Service: [facility] CARDIOLOGY  Patient Care Team:  Daniela Hernandez FNP as PCP - General (Family Medicine)  Ollie Dunn MD as Consulting Physician (Gastroenterology)    Subjective:     Chief Complaint: f/u unstable angina    Subjective: s/p C yesterday receiving POBA of the SVG-RCA inside the stented segment secondary to what appeared to be > 70% in-stent restenosis distally vs. Sub occlusive thrombus  Patients beta blocker restarted,    Chest pain-free overnight  Some hypertension and tachycardia this morning  Restarted beta-blocker, lisinopril, isosorbide  We will continue on cilostazol to try to prevent in-stent restenosis  Continue aspirin Effient  Compliance discussed at length    Current Medications:   Scheduled Meds:aspirin, 81 mg, Oral, Daily  atorvastatin, 40 mg, Oral, Nightly  cilostazol, 50 mg, Oral, BID  isosorbide mononitrate, 30 mg, Oral, Q24H  levothyroxine, 50 mcg, Oral, Daily  lisinopril, 5 mg, Oral, Q24H  metoprolol tartrate, 50 mg, Oral, Q12H  pantoprazole, 40 mg, Oral, Q AM  prasugrel, 10 mg, Oral, Daily  ranolazine, 500 mg, Oral, Q12H  sodium chloride, 10 mL, Intravenous, Q12H      Continuous Infusions:     Allergies:  Allergies   Allergen Reactions   • Shellfish-Derived Products Unknown - High Severity       Review of Systems   Constitutional: Negative for chills, diaphoresis and malaise/fatigue.   Cardiovascular: Negative for chest pain, dyspnea on exertion, irregular heartbeat, leg swelling, near-syncope, orthopnea, palpitations, paroxysmal nocturnal dyspnea and syncope.   Respiratory: Negative for cough, shortness of breath, sleep disturbances due to breathing and sputum production.    Gastrointestinal: Negative  for change in bowel habit.   Genitourinary: Negative for urgency.   Neurological: Positive for dizziness and light-headedness. Negative for headaches.   Psychiatric/Behavioral: Negative for altered mental status.         Objective:     Temp:  [97.5 °F (36.4 °C)-98 °F (36.7 °C)] 97.8 °F (36.6 °C)  Heart Rate:  [] 123  Resp:  [10-19] 18  BP: (102-168)/() 140/97     Intake/Output Summary (Last 24 hours) at 3/3/2023 1052  Last data filed at 3/3/2023 0300  Gross per 24 hour   Intake 1220 ml   Output 1600 ml   Net -380 ml     Body mass index is 38.59 kg/m².      03/01/23 2036 03/02/23 2213 03/03/23  0519   Weight: 124 kg (272 lb 11.3 oz) 122 kg (268 lb 1.3 oz) 122 kg (268 lb 15.4 oz)         General Appearance:    Alert, cooperative, in no acute distress                                Head: Atraumatic, normocephalic, PERRLA               Neck:   supple, trachea midline, no thyromegaly, no carotid bruit, no JVD   Lungs:     Clear to auscultation, respirations regular, even and               unlabored    Heart:    Regular rhythm and tachycardic rate, normal S1 and S2   Abdomen:     Normal bowel sounds, no masses, no organomegaly, soft  nontender, nondistended, no guarding, no rebound  tenderness   Extremities:   Moves all extremities well, no edema, no cyanosis, no  redness   Pulses:   Pulses palpable and equal bilaterally   Skin:   No bleeding, bruising or rash   Neurologic:   Awake, alert, oriented x3     Groin stable, mild bruising tenderness, no thrill, no hematoma    Lab Review:   Results from last 7 days   Lab Units 03/03/23  0218 03/02/23  0607 03/01/23  2105   SODIUM mmol/L 135* 137 135*   POTASSIUM mmol/L 4.3 3.6 3.9   CHLORIDE mmol/L 97* 101 101   CO2 mmol/L 24.0 25.0 22.0   BUN mg/dL 12 12 13   CREATININE mg/dL 1.19 1.00 1.07   GLUCOSE mg/dL 122* 95 90   CALCIUM mg/dL 9.7 8.9 9.1   AST (SGOT) U/L  --   --  17   ALT (SGPT) U/L  --   --  22     Results from last 7 days   Lab Units 03/01/23  5050  03/01/23 2105   HSTROP T ng/L 6 7     Results from last 7 days   Lab Units 03/03/23  0218 03/02/23  0607   WBC 10*3/mm3 6.50 6.70   HEMOGLOBIN g/dL 15.7 13.7   HEMATOCRIT % 48.1 41.7   PLATELETS 10*3/mm3 315 262     Results from last 7 days   Lab Units 03/03/23  0418 03/01/23 2105   INR   --  1.03   APTT seconds 30.6 29.1         Results from last 7 days   Lab Units 03/03/23  0218   CHOLESTEROL mg/dL 187   TRIGLYCERIDES mg/dL 96   HDL CHOL mg/dL 43               Recent Radiology:  Imaging Results (Most Recent)     Procedure Component Value Units Date/Time    XR Chest 1 View [[id number]] Collected: 03/01/23 2132     Updated: 03/01/23 2135    Narrative:      XR CHEST 1 VW    Date of Exam: 3/1/2023 9:04 PM EST    Indication: Chest pain.    Comparison: 9/14/2022    Findings:    LUNGS: No focal consolidation. No pleural effusion.  PNEUMOTHORAX: None.    HEART SIZE: Normal.         Impression:      1.No active disease.    Electronically Signed: Shukri Hollis    3/1/2023 9:33 PM EST    Workstation ID: [id number]          ECHOCARDIOGRAM:    Results for orders placed during the hospital encounter of 06/22/22    Adult Transthoracic Echo Limited W/ Cont if Necessary Per Protocol    Interpretation Summary  · Left ventricular systolic function is normal.  · Left ventricular ejection fraction is 55 to 60%  · Basal inferior wall aneurysm is noted        I reviewed the patient's new clinical results.    EKG:      Assessment:       Chest pain    Unstable angina (HCC)    1. Chest Pain  - [facility] 9/15/2022: three-vessel coronary disease, patent LIMA to LAD, closed SVG to OM, heavily diseased SVG to RCA with poor candidacy for native vessel intervention  - s/p Overlapping Xience drug-eluting stents 3.5 x 38 x 3 stents in overlapping fashion postdilated to 3.7 mm at 16 to 18 radha, reduction stenosis to 0% residual, improvement DARRYL-3 flow via the vein graft to the distal RCA and RPDA  - [facility] 3/2/2023: Culprit appeared to be SVG to RCA distal  lesion 70 to 80% hazy possibly atherothrombotic as well as proximal ostial ISR.  Other vascular areas with LIMA to LAD, native LAD, circumflex and native RCA.  Unchanged angiographically compared to prior case 9 months ago  Continue aspirin and Effient uninterrupted  High intensity statin, goal-directed medical therapy for diffuse native and bypass graft CAD  - preserved LVEF     2. CAD  - s/p STEMI 6/14/2022: [facility] showed three vessel CAD- he underwent ASHLEE placement to the culprit lesion in the RCA.  LAD had 80-90% proximal stenosis and LCx had 40-50% in distal LCx/OM.  Mr. Gates underwent repeat cardiac cath on 6/16/2022 and received ASHLEE to LAD  - repeat admission 5 days later showing in stent thrombosis sent for CABG  - s/p 3V CABG 6/29/2022 (LIMA-LAD, SVG-OMofLCx, SVG-RPDA)  - this admission s/p POBA to the distal portion of the SVG to RCA inside the stented segment secondary to what appears to be greater than 70% in-stent restenosis distally versus subocclusive thrombus, ostial proximal greater than 50% restenosis, reduced to less than 10% distally, remains about 20% proximally at the ostium, improved angiographic runoff which was DARRYL II preoperatively    3.  HTN     4. HLD     5. Obesity    Plan:   Continue DAPT with ASA / Effient, add cilostazol twice daily for now  Continue statin therapy  Restart beta blocker  Blood pressure control with imdur, metoprolol, lisinopril  Received 1 x dose IV lasix, good UOP, EDP was elevated at around 20 yesterday and left heart cath  Increase ranexa to relieve chest pain  Up out of bed this afternoon, groin site stable, no hematoma or thrill  Hopefully d/c later this afternoon vs. Tomorrow depending how patient is feeling / HR/b/p etc    Moises Haddad MD, PhD    Greater than 50 minutes of face-to-face with the patient as well as in coordination of care, discussion of cath results, in-stent restenosis, multivessel CAD, small vessel disease not amenable to intervention,  medications, cilostazol, discussed hypertension, goals of care, when to seek medical attention as well as otherwise.  Charting, nursing coordination of care on the floor.    Moises Haddad MD, PhD          Izabella Villareal, YOUSIF  03/03/23  10:52 EST

## 2023-03-03 NOTE — CASE MANAGEMENT/SOCIAL WORK
Continued Stay Note  [facility]     Patient Name: Tramaine Gates  MRN: [id number]  Today's Date: 3/3/2023    Admit Date: 3/1/2023    Plan: Return home. May need transport through Medicaid. [phone].   Discharge Plan     Row Name 03/03/23 1514       Plan    Plan Return home. May need transport through Medicaid. [phone].    Row Name 03/03/23 1513       Plan    Plan Comments Barriers to d/c: Cardiology following, s/p heart cath 3/2.    Row Name 03/03/23 1346       Plan    Plan Home at discharge.                    Expected Discharge Date and Time     Expected Discharge Date Expected Discharge Time    Mar 4, 2023             Sushma Ag RN

## 2023-03-03 NOTE — PLAN OF CARE
Goal Outcome Evaluation:         Pt had elevated HR and BP this am and cardiology restarted home metoprolol. HR and BP is improved. Pt has complained of dizziness and nausea all day. Zofran and phenergan given for nausea. Pt also complaining of pain at the heart cath site. Pt was on a femstop all night and removed this am.       Problem: Adult Inpatient Plan of Care  Goal: Plan of Care Review  Outcome: Ongoing, Progressing  Goal: Patient-Specific Goal (Individualized)  Outcome: Ongoing, Progressing  Goal: Absence of Hospital-Acquired Illness or Injury  Outcome: Ongoing, Progressing  Intervention: Identify and Manage Fall Risk  Recent Flowsheet Documentation  Taken 3/3/2023 1200 by Cynthia Geronimo RN  Safety Promotion/Fall Prevention:   activity supervised   clutter free environment maintained   nonskid shoes/slippers when out of bed   safety round/check completed  Taken 3/3/2023 1000 by Cynthia Geronimo RN  Safety Promotion/Fall Prevention:   activity supervised   clutter free environment maintained   nonskid shoes/slippers when out of bed   safety round/check completed  Taken 3/3/2023 0800 by Cynthia Geronimo RN  Safety Promotion/Fall Prevention:   activity supervised   clutter free environment maintained   nonskid shoes/slippers when out of bed   safety round/check completed  Intervention: Prevent Infection  Recent Flowsheet Documentation  Taken 3/3/2023 1000 by Cynthia Geronimo RN  Infection Prevention:   equipment surfaces disinfected   hand hygiene promoted  Taken 3/3/2023 0800 by Cynthia Geronimo RN  Infection Prevention:   hand hygiene promoted   personal protective equipment utilized  Goal: Optimal Comfort and Wellbeing  Outcome: Ongoing, Progressing  Intervention: Monitor Pain and Promote Comfort  Recent Flowsheet Documentation  Taken 3/3/2023 1242 by Cynthia Geronimo RN  Pain Management Interventions: see MAR  Taken 3/3/2023 1001 by Cynthia Geronimo RN  Pain Management Interventions:  see MAR  Goal: Readiness for Transition of Care  Outcome: Ongoing, Progressing

## 2023-03-03 NOTE — CONSULTS
[facility] Medicine Services   Consult Note    Patient Name: Tramaine Gates  : 1985  MRN: [id number]  Primary Care Physician:  Daniela Hernandez FNP  Referring Physician: Daniela Hobson*  Date of admission: 3/1/2023  Date and Time of Care: 23  at 17:19 EST     Consults    Subjective      Reason for Consult/ Chief Complaint: Chest pressure, diaphoresis    Consult Requested By: ED    History of Present Illness: Tramaine Gates is a 37 y.o. male came in with an episode of diaphoresis, chest pain, and SOB that began as heaviness substernally while he was at work. Patient has hx of CAD and had similar pressure being felt at that time. He came in for eval and Cardiology was concerned for unstable angina so he underwent LHC. Prior to that, patient had EKG without ischemic changes, flat and unelevated trops, and lying comfortably after nitro drip was started.     His LHC results :  yesterday receiving POBA of the SVG-RCA inside the stented segment secondary to what appeared to be > 70% in-stent restenosis distally vs. Sub occlusive thrombus    Patient was hypertensive and tachycardic in the AM so home BB was started.     Review of Systems   Constitutional: Positive for malaise/fatigue. Negative for chills, fever, weight gain and weight loss.   HENT: Negative for hearing loss, nosebleeds and sore throat.    Eyes: Negative for blurred vision, pain and redness.   Cardiovascular: Positive for chest pain, dyspnea on exertion and palpitations. Negative for leg swelling and syncope.   Respiratory: Positive for shortness of breath. Negative for cough and sputum production.    Endocrine: Negative for polyuria.   Skin: Negative for color change, rash and suspicious lesions.   Musculoskeletal: Negative for back pain, joint pain and muscle weakness.   Gastrointestinal: Negative for abdominal pain, anorexia, diarrhea, dysphagia, heartburn, melena, nausea and vomiting.   Genitourinary:  Negative for dysuria, frequency and urgency.   Neurological: Negative for dizziness, numbness and weakness.   Psychiatric/Behavioral: Negative for memory loss. The patient does not have insomnia and is not nervous/anxious.         Personal History     Past Medical History:   Diagnosis Date   • Acute inferior myocardial infarction (HCC) 06/14/2022   • Allergic    • Asthma 01/27/2022   • CAD, multiple vessel 06/14/2022   • Gastroesophageal reflux disease 01/27/2022   • Hx of complete eye exam 2016   • Hyperlipidemia 01/27/2022   • Hypertension    • Migraine headache 01/27/2022   • Panic attack 01/27/2022   • Peptic ulcer 09/14/2017       Past Surgical History:   Procedure Laterality Date   • CARDIAC CATHETERIZATION N/A 06/14/2022    Procedure: Left Heart Cath;  Surgeon: Matthieu Del Toro MD;  Location:  [facility]AS[facility];  Service: Cardiology;  Laterality: N/A;   • CARDIAC CATHETERIZATION N/A 06/16/2022    Procedure: Percutaneous Coronary Intervention;  Surgeon: Matthieu Del Toro MD;  Location: [facility] CATH INVASIVE LOCATION;  Service: Cardiovascular;  Laterality: N/A;   • CARDIAC CATHETERIZATION N/A 06/23/2022    Procedure: Left Heart Cath possible PCI, atherectomy, hemodynamic support;  Surgeon: Moises Haddad MD;  Location:  [facility];  Service: Cardiology;  Laterality: N/A;   • CARDIAC CATHETERIZATION N/A 09/15/2022    Procedure: Left Heart Cath;  Surgeon: Moises Haddad MD;  Location:  [facility];  Service: Cardiology;  Laterality: N/A;   • CARDIAC CATHETERIZATION  9/15/2022   • CHOLECYSTECTOMY  2019   • CORONARY ARTERY BYPASS GRAFT N/A 06/29/2022    Procedure: CORONARY ARTERY BYPASS GRAFTING;  Surgeon: Pablo Ball MD;  Location: [facility] CVOR;  Service: Cardiothoracic;  Laterality: N/A;  CABG X 3(2 vein grafts, 1 LIMA graft)   • CORONARY ARTERY BYPASS GRAFT  6/29/2022   • CORONARY STENT PLACEMENT     • MANDIBLE SURGERY         Family History:  family history includes Cirrhosis in his maternal grandfather; Heart attack in his maternal grandmother and mother; Heart disease in his mother; Heart failure in his father; Hypertension in his mother. Otherwise pertinent FHx was reviewed and not pertinent to current issue.    Social History:  reports that he quit smoking about 8 months ago. His smoking use included cigarettes. He started smoking about 27 years ago. He has a 34.50 pack-year smoking history. He has never used smokeless tobacco. He reports that he does not currently use alcohol. He reports current drug use. Frequency: 7.00 times per week. Drug: Marijuana.    Home Medications:   HYDROcodone-acetaminophen, albuterol sulfate HFA, aspirin, atorvastatin, cilostazol, isosorbide mononitrate, levothyroxine, lisinopril, metoprolol tartrate, nitroglycerin, prasugrel, and ranolazine    Allergies:  Allergies   Allergen Reactions   • Shellfish-Derived Products Unknown - High Severity         Objective      Vitals:  Temp:  [96.7 °F (35.9 °C)-98 °F (36.7 °C)] 98 °F (36.7 °C)  Heart Rate:  [] 71  Resp:  [10-19] 16  BP: (116-168)/() 116/75  Flow (L/min):  [2] 2    Physical Exam  Vitals and nursing note reviewed.   Constitutional:       General: He is in acute distress.      Appearance: Normal appearance. He is obese. He is ill-appearing.      Comments: Mild distress due to chest pressure   HENT:      Head: Normocephalic and atraumatic.      Mouth/Throat:      Mouth: Mucous membranes are moist.      Pharynx: Oropharynx is clear.   Eyes:      Extraocular Movements: Extraocular movements intact.      Conjunctiva/sclera: Conjunctivae normal.      Pupils: Pupils are equal, round, and reactive to light.   Cardiovascular:      Rate and Rhythm: Regular rhythm. Tachycardia present.      Pulses: Normal pulses.      Heart sounds: Normal heart sounds.      Comments: Chest discomfort is nonpalpable  Pulmonary:      Effort: Pulmonary effort is normal. No respiratory  distress.      Breath sounds: No wheezing.      Comments: Slightly diminished BS due to body habitus  Abdominal:      General: Abdomen is flat. Bowel sounds are normal. There is no distension.      Palpations: Abdomen is soft.      Tenderness: There is no abdominal tenderness. There is no guarding.   Musculoskeletal:         General: No swelling, tenderness or signs of injury.      Cervical back: Normal range of motion and neck supple.   Skin:     General: Skin is warm and dry.      Capillary Refill: Capillary refill takes less than 2 seconds.   Neurological:      General: No focal deficit present.      Mental Status: He is alert and oriented to person, place, and time. Mental status is at baseline.      Motor: No weakness.   Psychiatric:         Mood and Affect: Mood normal.         Behavior: Behavior normal.         Judgment: Judgment normal.          Result Review    Result Review:  I have personally reviewed the results from the time of this admission to 3/3/2023 17:19 EST and agree with these findings:  [x]  Laboratory  [x]  Microbiology  [x]  Radiology  [x]  EKG/Telemetry   [x]  Cardiology/Vascular   []  Pathology  []  Old records  []  Other:  Most notable findings include: Possible restenosis of Stent v sb occlusive thrumbus      Assessment & Plan        Active Hospital Problems:  Active Hospital Problems    Diagnosis    • **Chest pain    • Unstable angina (HCC)      Plan:     Unstable Angina  -- LHC showing greater than 70% restenosis  -- Patient to be continued on aspirin, Effient.  Cilostazol also to prevent in-stent restenosis.  -- Consider esophageal spasm, start Cardizem  mg    Hypertensive urgency  -- Potentially from rebound when medications were not restarted on time.  -- Restarted patient's home Imdur, lisinopril, metoprolol    Class II obesity  -- Advised 5 to 7% weight loss    Hypothyroidism, HLD, GERD  -- Continue home meds    This patient has been examined wearing appropriate Personal  Protective Equipment and discussed with Nurse. 03/03/23      Signature: Electronically signed by Susie Tabares DO, 03/03/23, 17:19 EST.  [facility]ist Team

## 2023-03-04 ENCOUNTER — READMISSION MANAGEMENT (OUTPATIENT)
Dept: CALL CENTER | Facility: HOSPITAL | Age: 38
End: 2023-03-04
Payer: MEDICAID

## 2023-03-04 VITALS
OXYGEN SATURATION: 98 % | DIASTOLIC BLOOD PRESSURE: 63 MMHG | TEMPERATURE: 98 F | HEIGHT: 70 IN | RESPIRATION RATE: 16 BRPM | BODY MASS INDEX: 39.14 KG/M2 | SYSTOLIC BLOOD PRESSURE: 97 MMHG | HEART RATE: 71 BPM | WEIGHT: 273.37 LBS

## 2023-03-04 PROBLEM — R07.9 CHEST PAIN: Status: RESOLVED | Noted: 2020-09-22 | Resolved: 2023-03-04

## 2023-03-04 PROBLEM — I20.0 UNSTABLE ANGINA: Status: RESOLVED | Noted: 2023-03-01 | Resolved: 2023-03-04

## 2023-03-04 PROCEDURE — 99233 SBSQ HOSP IP/OBS HIGH 50: CPT | Performed by: INTERNAL MEDICINE

## 2023-03-04 PROCEDURE — G0378 HOSPITAL OBSERVATION PER HR: HCPCS

## 2023-03-04 RX ORDER — DILTIAZEM HYDROCHLORIDE 120 MG/1
120 CAPSULE, COATED, EXTENDED RELEASE ORAL
Qty: 30 CAPSULE | Refills: 0 | Status: SHIPPED | OUTPATIENT
Start: 2023-03-05 | End: 2023-03-31

## 2023-03-04 RX ADMIN — ISOSORBIDE MONONITRATE 30 MG: 30 TABLET, EXTENDED RELEASE ORAL at 08:32

## 2023-03-04 RX ADMIN — HYDROCODONE BITARTRATE AND ACETAMINOPHEN 1 TABLET: 5; 325 TABLET ORAL at 04:23

## 2023-03-04 RX ADMIN — PANTOPRAZOLE SODIUM 40 MG: 40 TABLET, DELAYED RELEASE ORAL at 04:24

## 2023-03-04 RX ADMIN — Medication 10 ML: at 08:34

## 2023-03-04 RX ADMIN — METOPROLOL TARTRATE 50 MG: 50 TABLET, FILM COATED ORAL at 08:32

## 2023-03-04 RX ADMIN — LEVOTHYROXINE SODIUM 50 MCG: 50 TABLET ORAL at 08:32

## 2023-03-04 RX ADMIN — CILOSTAZOL 50 MG: 100 TABLET ORAL at 08:32

## 2023-03-04 RX ADMIN — HYDROCODONE BITARTRATE AND ACETAMINOPHEN 1 TABLET: 5; 325 TABLET ORAL at 08:32

## 2023-03-04 RX ADMIN — HYDROCODONE BITARTRATE AND ACETAMINOPHEN 1 TABLET: 5; 325 TABLET ORAL at 12:42

## 2023-03-04 RX ADMIN — DILTIAZEM HYDROCHLORIDE 120 MG: 120 CAPSULE, COATED, EXTENDED RELEASE ORAL at 08:32

## 2023-03-04 RX ADMIN — PRASUGREL 10 MG: 10 TABLET, FILM COATED ORAL at 08:32

## 2023-03-04 RX ADMIN — ASPIRIN 81 MG: 81 TABLET, COATED ORAL at 08:32

## 2023-03-04 RX ADMIN — RANOLAZINE 500 MG: 500 TABLET, EXTENDED RELEASE ORAL at 08:32

## 2023-03-04 NOTE — PROGRESS NOTES
Cardiology [facility]        LOS:  LOS: 0 days   Patient Name: Tramaine Gates  Age/Sex: 37 y.o. male  : 1985  MRN: [id number]    Day of Service: 23   Length of Stay: 0  Encounter Provider: Moises Haddad MD  Place of Service: [facility] CARDIOLOGY  Patient Care Team:  Daniela Hernandez FNP as PCP - General (Family Medicine)  Ollie Dunn MD as Consulting Physician (Gastroenterology)    Subjective:     Chief Complaint: f/u unstable angina    Subjective: s/p [facility] this admission receiving POBA of the SVG-RCA inside the stented segment secondary to what appeared to be > 70% in-stent restenosis distally vs. Sub occlusive thrombus  Patients beta blocker restarted,    Encourage out of bed  Blood pressures 90s to 110 systolic  Would stop diltiazem with marginal pressures  Continue long-acting nitrate and Ranexa  Follow-up cardiology in 30 days    Okay for discharge today  Med rec discussed with the patient  Compliance discussed at length    Current Medications:   Scheduled Meds:aspirin, 81 mg, Oral, Daily  atorvastatin, 40 mg, Oral, Nightly  cilostazol, 50 mg, Oral, BID  dilTIAZem CD, 120 mg, Oral, Q24H  isosorbide mononitrate, 30 mg, Oral, Q24H  levothyroxine, 50 mcg, Oral, Daily  lisinopril, 5 mg, Oral, Q24H  metoprolol tartrate, 50 mg, Oral, Q12H  pantoprazole, 40 mg, Oral, Q AM  prasugrel, 10 mg, Oral, Daily  ranolazine, 500 mg, Oral, Q12H  sodium chloride, 10 mL, Intravenous, Q12H      Continuous Infusions:     Allergies:  Allergies   Allergen Reactions   • Shellfish-Derived Products Unknown - High Severity       Review of Systems   Constitutional: Negative for chills, diaphoresis and malaise/fatigue.   Cardiovascular: Negative for chest pain, dyspnea on exertion, irregular heartbeat, leg swelling, near-syncope, orthopnea, palpitations, paroxysmal nocturnal dyspnea and syncope.   Respiratory: Negative for cough, shortness of breath, sleep disturbances  due to breathing and sputum production.    Gastrointestinal: Negative for change in bowel habit.   Genitourinary: Negative for urgency.   Neurological: Positive for dizziness and light-headedness. Negative for headaches.   Psychiatric/Behavioral: Negative for altered mental status.         Objective:     Temp:  [96.7 °F (35.9 °C)-98.2 °F (36.8 °C)] 98 °F (36.7 °C)  Heart Rate:  [59-88] 71  Resp:  [11-16] 16  BP: ()/(53-76) 97/63     Intake/Output Summary (Last 24 hours) at 3/4/2023 1217  Last data filed at 3/4/2023 1100  Gross per 24 hour   Intake 960 ml   Output 700 ml   Net 260 ml     Body mass index is 39.22 kg/m².      03/02/23  2213 03/03/23  0519 03/04/23  0500   Weight: 122 kg (268 lb 1.3 oz) 122 kg (268 lb 15.4 oz) 124 kg (273 lb 5.9 oz)         General Appearance:    Alert, cooperative, in no acute distress                                Head: Atraumatic, normocephalic, PERRLA               Neck:   supple, trachea midline, no thyromegaly, no carotid bruit, no JVD   Lungs:     Clear to auscultation, respirations regular, even and               unlabored    Heart:    Regular rhythm and tachycardic rate, normal S1 and S2   Abdomen:     Normal bowel sounds, no masses, no organomegaly, soft  nontender, nondistended, no guarding, no rebound  tenderness   Extremities:   Moves all extremities well, no edema, no cyanosis, no  redness   Pulses:   Pulses palpable and equal bilaterally   Skin:   No bleeding, bruising or rash   Neurologic:   Awake, alert, oriented x3     Groin stable, mild bruising tenderness, no thrill, no hematoma  Unchanged from prior encounter    Lab Review:   Results from last 7 days   Lab Units 03/03/23  0218 03/02/23  0607 03/01/23  2105   SODIUM mmol/L 135* 137 135*   POTASSIUM mmol/L 4.3 3.6 3.9   CHLORIDE mmol/L 97* 101 101   CO2 mmol/L 24.0 25.0 22.0   BUN mg/dL 12 12 13   CREATININE mg/dL 1.19 1.00 1.07   GLUCOSE mg/dL 122* 95 90   CALCIUM mg/dL 9.7 8.9 9.1   AST (SGOT) U/L  --   --   17   ALT (SGPT) U/L  --   --  22     Results from last 7 days   Lab Units 03/01/23  2331 03/01/23  2105   HSTROP T ng/L 6 7     Results from last 7 days   Lab Units 03/03/23  0218 03/02/23  0607   WBC 10*3/mm3 6.50 6.70   HEMOGLOBIN g/dL 15.7 13.7   HEMATOCRIT % 48.1 41.7   PLATELETS 10*3/mm3 315 262     Results from last 7 days   Lab Units 03/03/23  0418 03/01/23  2105   INR   --  1.03   APTT seconds 30.6 29.1         Results from last 7 days   Lab Units 03/03/23  0218   CHOLESTEROL mg/dL 187   TRIGLYCERIDES mg/dL 96   HDL CHOL mg/dL 43               Recent Radiology:  Imaging Results (Most Recent)     Procedure Component Value Units Date/Time    XR Chest 1 View [[id number]] Collected: 03/01/23 2132     Updated: 03/01/23 2135    Narrative:      XR CHEST 1 VW    Date of Exam: 3/1/2023 9:04 PM EST    Indication: Chest pain.    Comparison: 9/14/2022    Findings:    LUNGS: No focal consolidation. No pleural effusion.  PNEUMOTHORAX: None.    HEART SIZE: Normal.         Impression:      1.No active disease.    Electronically Signed: Shukri Hollis    3/1/2023 9:33 PM EST    Workstation ID: [id number]          ECHOCARDIOGRAM:    Results for orders placed during the hospital encounter of 06/22/22    Adult Transthoracic Echo Limited W/ Cont if Necessary Per Protocol    Interpretation Summary  · Left ventricular systolic function is normal.  · Left ventricular ejection fraction is 55 to 60%  · Basal inferior wall aneurysm is noted        I reviewed the patient's new clinical results.    EKG:      Assessment:       * No active hospital problems. *    1. Chest Pain  - [facility] 9/15/2022: three-vessel coronary disease, patent LIMA to LAD, closed SVG to OM, heavily diseased SVG to RCA with poor candidacy for native vessel intervention  - s/p Overlapping Xience drug-eluting stents 3.5 x 38 x 3 stents in overlapping fashion postdilated to 3.7 mm at 16 to 18 radha, reduction stenosis to 0% residual, improvement DARRYL-3 flow via the vein graft  to the distal RCA and RPDA  - [facility] 3/2/2023: Culprit appeared to be SVG to RCA distal lesion 70 to 80% hazy possibly atherothrombotic as well as proximal ostial ISR.  Other vascular areas with LIMA to LAD, native LAD, circumflex and native RCA.  Unchanged angiographically compared to prior case 9 months ago  Continue aspirin and Effient uninterrupted  High intensity statin, goal-directed medical therapy for diffuse native and bypass graft CAD  - preserved LVEF     2. CAD  - s/p STEMI 6/14/2022: [facility] showed three vessel CAD- he underwent ASHLEE placement to the culprit lesion in the RCA.  LAD had 80-90% proximal stenosis and LCx had 40-50% in distal LCx/OM.  Mr. Gates underwent repeat cardiac cath on 6/16/2022 and received ASHLEE to LAD  - repeat admission 5 days later showing in stent thrombosis sent for CABG  - s/p 3V CABG 6/29/2022 (LIMA-LAD, SVG-OMofLCx, SVG-RPDA)  - this admission s/p POBA to the distal portion of the SVG to RCA inside the stented segment secondary to what appears to be greater than 70% in-stent restenosis distally versus subocclusive thrombus, ostial proximal greater than 50% restenosis, reduced to less than 10% distally, remains about 20% proximally at the ostium, improved angiographic runoff which was DARRYL II preoperatively    3.  HTN     4. HLD     5. Obesity    Plan:   Continue DAPT with ASA / Effient, add cilostazol twice daily for now  Stop diltiazem with marginal blood pressures  Continue Ranexa and nitrate  Continue statin therapy  Continue beta blocker  Blood pressure control with imdur, metoprolol, lisinopril if needed  Hopefully d/c later this afternoon     Moises Haddad MD, PhD    Greater than 50 minutes of face-to-face with the patient as well as in coordination of care, discussion of cath results, in-stent restenosis, multivessel CAD, small vessel disease not amenable to intervention, medications, cilostazol, discussed hypertension, goals of care, when to seek medical attention as well  as otherwise.  Charting, nursing coordination of care on the floor.    Moises Haddad MD, PhD          Moises Haddad MD  03/04/23  12:17 EST

## 2023-03-04 NOTE — DISCHARGE SUMMARY
[facility] Medicine Services  Discharge Summary    Date of Service: 23   Patient Name: Tramaine Gates  : 1985  MRN: [id number]    Date of Admission: 3/1/2023  Discharge Diagnosis: Esophageal Spasm, Stable Angina  Date of Discharge:  23   Primary Care Physician: Daniela Hernandez FNP      Presenting Problem:   Chest pain [R07.9]  Chest pain, unspecified type [R07.9]    Active and Resolved Hospital Problems:  Active Hospital Problems   No active problems to display.      Resolved Hospital Problems    Diagnosis POA   • **Chest pain [R07.9] Yes   • Unstable angina (HCC) [I20.0] Unknown         Hospital Course     Hospital Course:  Tramaine Gates is a 37 y.o. male came in with episode of diaphoresis, crushing chest pressure similar to previous episode resulting in cardiac intervention.  Patient had LHC here which found greater than 70% blockage in his SVG-RCA.  Potential for restenosis versus subocclusive thrombus.  Cilostazol was added twice daily to help prevent further obstruction.  Patient has been continued on Effient and aspirin as well.  Patient also restarted on Cardizem 120 mg ER daily in case his anginal symptoms were due to esophageal spasm.  Patient symptoms were significantly improved with nitro drip.  At time of discharge, he had been chest pain-free for more than 24 hours.  Patient understanding that weight loss, medications compliance, dietary compliance are key to helping improve his life expectancy and quality of life.        DISCHARGE Follow Up Recommendations for labs and diagnostics: None at this time      Reasons For Change In Medications and Indications for New Medications:      Day of Discharge     Vital Signs:  Temp:  [96.7 °F (35.9 °C)-98.2 °F (36.8 °C)] 98 °F (36.7 °C)  Heart Rate:  [] 71  Resp:  [11-16] 16  BP: ()/() 97/63    Physical Exam:  Physical Exam  Vitals and nursing note reviewed.   Constitutional:       General:  He is not in acute distress.     Appearance: Normal appearance. He is obese. He is not ill-appearing.   HENT:      Head: Normocephalic and atraumatic.      Mouth/Throat:      Mouth: Mucous membranes are moist.      Pharynx: Oropharynx is clear.   Eyes:      Extraocular Movements: Extraocular movements intact.      Conjunctiva/sclera: Conjunctivae normal.      Pupils: Pupils are equal, round, and reactive to light.   Cardiovascular:      Rate and Rhythm: Normal rate and regular rhythm.      Pulses: Normal pulses.      Heart sounds: Normal heart sounds.   Pulmonary:      Effort: Pulmonary effort is normal. No respiratory distress.      Breath sounds: Normal breath sounds. No wheezing.   Abdominal:      General: Abdomen is flat. Bowel sounds are normal. There is no distension.      Palpations: Abdomen is soft.      Tenderness: There is no abdominal tenderness. There is no guarding.   Musculoskeletal:         General: No swelling, tenderness or signs of injury.      Cervical back: Normal range of motion and neck supple.   Skin:     General: Skin is warm and dry.      Capillary Refill: Capillary refill takes less than 2 seconds.   Neurological:      General: No focal deficit present.      Mental Status: He is alert and oriented to person, place, and time. Mental status is at baseline.      Motor: No weakness.      Gait: Gait normal.   Psychiatric:         Mood and Affect: Mood normal.         Behavior: Behavior normal.         Judgment: Judgment normal.           Pertinent  and/or Most Recent Results     LAB RESULTS:      Lab 03/03/23  0418 03/03/23  0218 03/02/23  0607 03/01/23  2105   WBC  --  6.50 6.70 7.80   HEMOGLOBIN  --  15.7 13.7 14.7   HEMATOCRIT  --  48.1 41.7 43.5   PLATELETS  --  315 262 302   NEUTROS ABS  --   --  4.20 5.40   LYMPHS ABS  --   --  2.00 1.90   MONOS ABS  --   --  0.40 0.50   EOS ABS  --   --  0.10 0.00   MCV  --  87.1 86.5 85.0   PROTIME  --   --   --  10.6   APTT 30.6  --   --  29.1          Lab 03/03/23  0218 03/02/23  0607 03/01/23  2105   SODIUM 135* 137 135*   POTASSIUM 4.3 3.6 3.9   CHLORIDE 97* 101 101   CO2 24.0 25.0 22.0   ANION GAP 14.0 11.0 12.0   BUN 12 12 13   CREATININE 1.19 1.00 1.07   EGFR 79.7 98.2 90.5   GLUCOSE 122* 95 90   CALCIUM 9.7 8.9 9.1   HEMOGLOBIN A1C 5.50  --   --          Lab 03/01/23 2105   TOTAL PROTEIN 7.5   ALBUMIN 4.6   GLOBULIN 2.9   ALT (SGPT) 22   AST (SGOT) 17   BILIRUBIN 0.5   ALK PHOS 118*         Lab 03/01/23  2331 03/01/23 2105   HSTROP T 6 7   PROTIME  --  10.6   INR  --  1.03         Lab 03/03/23  0218   CHOLESTEROL 187   LDL CHOL 126*   HDL CHOL 43   TRIGLYCERIDES 96             Brief Urine Lab Results  (Last result in the past 365 days)      Color   Clarity   Blood   Leuk Est   Nitrite   Protein   CREAT   Urine HCG        06/25/22 0407 Yellow   Clear   Negative   Negative   Negative   Negative               Microbiology Results (last 10 days)     ** No results found for the last 240 hours. **          XR Chest 1 View    Result Date: 3/1/2023  Impression: 1.No active disease. Electronically Signed: Shukri Hollis  3/1/2023 9:33 PM EST  Workstation ID: [id number]      Results for orders placed during the hospital encounter of 06/22/22    Duplex Vein Mapping Lower Extremity - Bilateral CAR    Interpretation Summary  · The left greater saphenous vein is patent and of adequate size in the thigh.  · The left greater saphenous vein is patent and of adequate size in the calf.      Results for orders placed during the hospital encounter of 06/22/22    Duplex Vein Mapping Lower Extremity - Bilateral CAR    Interpretation Summary  · The left greater saphenous vein is patent and of adequate size in the thigh.  · The left greater saphenous vein is patent and of adequate size in the calf.      Results for orders placed during the hospital encounter of 06/22/22    Adult Transthoracic Echo Limited W/ Cont if Necessary Per Protocol    Interpretation Summary  · Left  ventricular systolic function is normal.  · Left ventricular ejection fraction is 55 to 60%  · Basal inferior wall aneurysm is noted      Labs Pending at Discharge:      Procedures Performed  Procedure(s):  Left Heart Cath         Consults:   Consults     Date and Time Order Name Status Description    3/2/2023  7:06 PM Inpatient Hospitalist Consult      3/1/2023 10:18 PM Inpatient Cardiology Consult Completed             Discharge Details        Discharge Medications      New Medications      Instructions Start Date   cilostazol 100 MG tablet  Commonly known as: PLETAL   50 mg, Oral, 2 Times Daily      dilTIAZem  MG 24 hr capsule  Commonly known as: CARDIZEM CD   120 mg, Oral, Every 24 Hours Scheduled   Start Date: March 5, 2023        Changes to Medications      Instructions Start Date   prasugrel 10 MG tablet  Commonly known as: EFFIENT  What changed:   · how much to take  · how to take this  · when to take this   Take 6 tablets by mouth for the first dose and then take one tablet by mouth daily thereafter.         Continue These Medications      Instructions Start Date   albuterol sulfate  (90 Base) MCG/ACT inhaler  Commonly known as: PROVENTIL HFA;VENTOLIN HFA;PROAIR HFA   2 puffs, Inhalation, Every 4 Hours PRN      Aspirin Low Dose 81 MG EC tablet  Generic drug: aspirin   81 mg, Oral, Daily      atorvastatin 40 MG tablet  Commonly known as: LIPITOR   40 mg, Oral, Nightly      HYDROcodone-acetaminophen 5-325 MG per tablet  Commonly known as: NORCO   1 tablet, Oral, Every 4 Hours PRN      isosorbide mononitrate 30 MG 24 hr tablet  Commonly known as: IMDUR   30 mg, Oral, Every 24 Hours Scheduled      levothyroxine 50 MCG tablet  Commonly known as: SYNTHROID, LEVOTHROID   TAKE 1 TABLET BY MOUTH EVERY DAY      lisinopril 5 MG tablet  Commonly known as: PRINIVIL,ZESTRIL   5 mg, Oral, Every 24 Hours Scheduled      metoprolol tartrate 50 MG tablet  Commonly known as: LOPRESSOR   50 mg, Oral, 2 Times  Daily      nitroglycerin 0.4 MG SL tablet  Commonly known as: NITROSTAT   0.4 mg, Sublingual, Every 5 Minutes PRN, Take no more than 3 doses in 15 minutes.      ranolazine 500 MG 12 hr tablet  Commonly known as: RANEXA   500 mg, Oral, Every 12 Hours Scheduled             Allergies   Allergen Reactions   • Shellfish-Derived Products Unknown - High Severity         Discharge Disposition: Stable to DC to Home      Diet:  Hospital:  Diet Order   Procedures   • Diet: Cardiac Diets; Healthy Heart (2-3 Na+); Texture: Regular Texture (IDDSI 7); Fluid Consistency: Thin (IDDSI 0)         Discharge Activity:   Activity Instructions     Activity as Tolerated              CODE STATUS:  Code Status and Medical Interventions:   Ordered at: 03/02/23 1911     Level Of Support Discussed With:    Patient     Code Status (Patient has no pulse and is not breathing):    CPR (Attempt to Resuscitate)     Medical Interventions (Patient has pulse or is breathing):    Full Support     Release to patient:    Routine Release         Future Appointments   Date Time Provider Department Center   5/22/2023  3:45 PM Moises Haddad MD [facility]       Additional Instructions for the Follow-ups that You Need to Schedule     Call MD With Problems / Concerns   As directed      Instructions: Call [phone] or email [email or web address] for problems or concerns.    Order Comments: Instructions: Call [phone] or email [email or web address] for problems or concerns.          Discharge Follow-up with PCP   As directed       Currently Documented PCP:    Daniela Hernandez FNP    PCP Phone Number:    [phone]     Follow Up Details: Esophageal spasm, stable angina               Time spent on Discharge including face to face service:  35 minutes    This patient has been examined wearing appropriate Personal Protective Equipment and discussed with Nurse. 03/04/23      Signature: Electronically signed by Susie HOPSON  DO Raysa, 03/04/23, 11:20 EST.  [facility] Hospitalist Team

## 2023-03-04 NOTE — PLAN OF CARE
Goal Outcome Evaluation:         Patient cooperative with care, R groin sheath site dressing remains in place with no swelling discoloration or drainage noted to the area. Femostop was removed on dayshift without incident.  Administered requested prn's with HS meds per patient request, all effective.  Patient was able to rest comfortably this shift as his pain was controlled.

## 2023-03-05 LAB — QT INTERVAL: 376 MS

## 2023-03-05 NOTE — OUTREACH NOTE
Prep Survey    Flowsheet Row Responses   [facility] facility patient discharged from? [facility]   Is LACE score < 7 ? No   Eligibility Readm Mgmt   Discharge diagnosis chest pain   Does the patient have one of the following disease processes/diagnoses(primary or secondary)? Other   Does the patient have Home health ordered? No   Is there a DME ordered? No   Prep survey completed? Yes          Izabel MESSINA - Registered Nurse

## 2023-03-06 NOTE — CASE MANAGEMENT/SOCIAL WORK
Case Management Discharge Note                Selected Continued Care - Discharged on 3/4/2023 Admission date: 3/1/2023 - Discharge disposition: Home or Self Care            Transportation Services  Private: Car    Final Discharge Disposition Code: 01 - home or self-care

## 2023-03-08 ENCOUNTER — READMISSION MANAGEMENT (OUTPATIENT)
Dept: CALL CENTER | Facility: HOSPITAL | Age: 38
End: 2023-03-08
Payer: MEDICAID

## 2023-03-08 NOTE — OUTREACH NOTE
Medical Week 1 Survey    Flowsheet Row Responses   [facility] facility patient discharged from? [facility]   Does the patient have one of the following disease processes/diagnoses(primary or secondary)? Other   Week 1 attempt successful? No   Unsuccessful attempts Attempt 1          Sylvia Bocanegra Registered Nurse

## 2023-03-09 ENCOUNTER — APPOINTMENT (OUTPATIENT)
Dept: ULTRASOUND IMAGING | Facility: HOSPITAL | Age: 38
End: 2023-03-09
Payer: MEDICAID

## 2023-03-09 ENCOUNTER — HOSPITAL ENCOUNTER (EMERGENCY)
Facility: HOSPITAL | Age: 38
Discharge: HOME | End: 2023-03-09
Attending: EMERGENCY MEDICINE | Admitting: EMERGENCY MEDICINE
Payer: MEDICAID

## 2023-03-09 VITALS
HEART RATE: 67 BPM | HEIGHT: 70 IN | OXYGEN SATURATION: 96 % | SYSTOLIC BLOOD PRESSURE: 98 MMHG | RESPIRATION RATE: 20 BRPM | BODY MASS INDEX: 39.26 KG/M2 | DIASTOLIC BLOOD PRESSURE: 67 MMHG | TEMPERATURE: 98.3 F | WEIGHT: 274.25 LBS

## 2023-03-09 DIAGNOSIS — N50.82 SCROTAL PAIN: Primary | ICD-10-CM

## 2023-03-09 LAB
ANION GAP SERPL CALCULATED.3IONS-SCNC: 12 MMOL/L (ref 5–15)
APTT PPP: 29.1 SECONDS (ref 24–31)
BASOPHILS # BLD AUTO: 0.1 10*3/MM3 (ref 0–0.2)
BASOPHILS NFR BLD AUTO: 1 % (ref 0–1.5)
BUN SERPL-MCNC: 13 MG/DL (ref 6–20)
BUN/CREAT SERPL: 12.1 (ref 7–25)
CALCIUM SPEC-SCNC: 8.7 MG/DL (ref 8.6–10.5)
CHLORIDE SERPL-SCNC: 98 MMOL/L (ref 98–107)
CO2 SERPL-SCNC: 25 MMOL/L (ref 22–29)
CREAT SERPL-MCNC: 1.07 MG/DL (ref 0.76–1.27)
DEPRECATED RDW RBC AUTO: 42.4 FL (ref 37–54)
EGFRCR SERPLBLD CKD-EPI 2021: 90.5 ML/MIN/1.73
EOSINOPHIL # BLD AUTO: 0.1 10*3/MM3 (ref 0–0.4)
EOSINOPHIL NFR BLD AUTO: 0.9 % (ref 0.3–6.2)
ERYTHROCYTE [DISTWIDTH] IN BLOOD BY AUTOMATED COUNT: 13.5 % (ref 12.3–15.4)
GLUCOSE SERPL-MCNC: 112 MG/DL (ref 65–99)
HCT VFR BLD AUTO: 39.8 % (ref 37.5–51)
HGB BLD-MCNC: 13.5 G/DL (ref 13–17.7)
INR PPP: 0.97 (ref 0.93–1.1)
LYMPHOCYTES # BLD AUTO: 2.5 10*3/MM3 (ref 0.7–3.1)
LYMPHOCYTES NFR BLD AUTO: 21.7 % (ref 19.6–45.3)
MCH RBC QN AUTO: 29.1 PG (ref 26.6–33)
MCHC RBC AUTO-ENTMCNC: 33.9 G/DL (ref 31.5–35.7)
MCV RBC AUTO: 85.8 FL (ref 79–97)
MONOCYTES # BLD AUTO: 0.6 10*3/MM3 (ref 0.1–0.9)
MONOCYTES NFR BLD AUTO: 5.5 % (ref 5–12)
NEUTROPHILS NFR BLD AUTO: 70.9 % (ref 42.7–76)
NEUTROPHILS NFR BLD AUTO: 8.1 10*3/MM3 (ref 1.7–7)
NRBC BLD AUTO-RTO: 0.2 /100 WBC (ref 0–0.2)
PLATELET # BLD AUTO: 333 10*3/MM3 (ref 140–450)
PMV BLD AUTO: 7.5 FL (ref 6–12)
POTASSIUM SERPL-SCNC: 3.9 MMOL/L (ref 3.5–5.2)
PROTHROMBIN TIME: 10 SECONDS (ref 9.6–11.7)
RBC # BLD AUTO: 4.64 10*6/MM3 (ref 4.14–5.8)
SODIUM SERPL-SCNC: 135 MMOL/L (ref 136–145)
WBC NRBC COR # BLD: 11.4 10*3/MM3 (ref 3.4–10.8)

## 2023-03-09 PROCEDURE — 80048 BASIC METABOLIC PNL TOTAL CA: CPT | Performed by: NURSE PRACTITIONER

## 2023-03-09 PROCEDURE — 85025 COMPLETE CBC W/AUTO DIFF WBC: CPT | Performed by: NURSE PRACTITIONER

## 2023-03-09 PROCEDURE — 76870 US EXAM SCROTUM: CPT

## 2023-03-09 PROCEDURE — 25010000002 KETOROLAC TROMETHAMINE PER 15 MG: Performed by: NURSE PRACTITIONER

## 2023-03-09 PROCEDURE — 85730 THROMBOPLASTIN TIME PARTIAL: CPT | Performed by: NURSE PRACTITIONER

## 2023-03-09 PROCEDURE — 85610 PROTHROMBIN TIME: CPT | Performed by: NURSE PRACTITIONER

## 2023-03-09 PROCEDURE — 25010000002 MORPHINE PER 10 MG: Performed by: NURSE PRACTITIONER

## 2023-03-09 PROCEDURE — 25010000002 ONDANSETRON PER 1 MG: Performed by: NURSE PRACTITIONER

## 2023-03-09 PROCEDURE — 93976 VASCULAR STUDY: CPT

## 2023-03-09 PROCEDURE — 36415 COLL VENOUS BLD VENIPUNCTURE: CPT

## 2023-03-09 PROCEDURE — 25010000002 HYDROMORPHONE 1 MG/ML SOLUTION: Performed by: NURSE PRACTITIONER

## 2023-03-09 PROCEDURE — 96375 TX/PRO/DX INJ NEW DRUG ADDON: CPT

## 2023-03-09 PROCEDURE — 96374 THER/PROPH/DIAG INJ IV PUSH: CPT

## 2023-03-09 PROCEDURE — 99283 EMERGENCY DEPT VISIT LOW MDM: CPT

## 2023-03-09 RX ORDER — MORPHINE SULFATE 2 MG/ML
2 INJECTION, SOLUTION INTRAMUSCULAR; INTRAVENOUS ONCE
Status: COMPLETED | OUTPATIENT
Start: 2023-03-09 | End: 2023-03-09

## 2023-03-09 RX ORDER — MORPHINE SULFATE 2 MG/ML
2 INJECTION, SOLUTION INTRAMUSCULAR; INTRAVENOUS ONCE
Status: DISCONTINUED | OUTPATIENT
Start: 2023-03-09 | End: 2023-03-09

## 2023-03-09 RX ORDER — KETOROLAC TROMETHAMINE 15 MG/ML
15 INJECTION, SOLUTION INTRAMUSCULAR; INTRAVENOUS ONCE
Status: COMPLETED | OUTPATIENT
Start: 2023-03-09 | End: 2023-03-09

## 2023-03-09 RX ORDER — ONDANSETRON 2 MG/ML
4 INJECTION INTRAMUSCULAR; INTRAVENOUS ONCE
Status: COMPLETED | OUTPATIENT
Start: 2023-03-09 | End: 2023-03-09

## 2023-03-09 RX ORDER — SODIUM CHLORIDE 0.9 % (FLUSH) 0.9 %
10 SYRINGE (ML) INJECTION AS NEEDED
Status: DISCONTINUED | OUTPATIENT
Start: 2023-03-09 | End: 2023-03-09 | Stop reason: HOSPADM

## 2023-03-09 RX ADMIN — MORPHINE SULFATE 2 MG: 2 INJECTION, SOLUTION INTRAMUSCULAR; INTRAVENOUS at 02:28

## 2023-03-09 RX ADMIN — HYDROMORPHONE HYDROCHLORIDE 0.5 MG: 1 INJECTION, SOLUTION INTRAMUSCULAR; INTRAVENOUS; SUBCUTANEOUS at 03:37

## 2023-03-09 RX ADMIN — KETOROLAC TROMETHAMINE 15 MG: 15 INJECTION, SOLUTION INTRAMUSCULAR; INTRAVENOUS at 03:37

## 2023-03-09 RX ADMIN — ONDANSETRON 4 MG: 2 INJECTION INTRAMUSCULAR; INTRAVENOUS at 02:28

## 2023-03-09 NOTE — ED PROVIDER NOTES
Subjective   History of Present Illness  Patient is a 37-year-old white male with history of hypertension high cholesterol and CAD.  He underwent right groin heart cath 1 week ago.  States that since that time he has had increasing pain to the right groin, mostly in the right testicle with swelling to the right testicle and scrotum.  Denies any difficulty urinating.  Denies any fever chills numbness tingling or weakness in the extremity.  Patient re daily aspirin         Review of Systems   Constitutional: Negative for chills and fever.   Genitourinary: Positive for scrotal swelling and testicular pain. Negative for decreased urine volume, difficulty urinating, dysuria and urgency.   Neurological: Negative for weakness and numbness.       Past Medical History:   Diagnosis Date   • Acute inferior myocardial infarction (HCC) 06/14/2022   • Allergic    • Asthma 01/27/2022   • CAD, multiple vessel 06/14/2022   • Gastroesophageal reflux disease 01/27/2022   • Hx of complete eye exam 2016   • Hyperlipidemia 01/27/2022   • Hypertension    • Migraine headache 01/27/2022   • Panic attack 01/27/2022   • Peptic ulcer 09/14/2017       Allergies   Allergen Reactions   • Shellfish-Derived Products Unknown - High Severity       Past Surgical History:   Procedure Laterality Date   • CARDIAC CATHETERIZATION N/A 06/14/2022    Procedure: Left Heart Cath;  Surgeon: Matthieu Del Toro MD;  Location: [facility] CATH INVASIVE LOCATION;  Service: Cardiology;  Laterality: N/A;   • CARDIAC CATHETERIZATION N/A 06/16/2022    Procedure: Percutaneous Coronary Intervention;  Surgeon: Matthieu Del Toro MD;  Location: [facility] CATH INVASIVE LOCATION;  Service: Cardiovascular;  Laterality: N/A;   • CARDIAC CATHETERIZATION N/A 06/23/2022    Procedure: Left Heart Cath possible PCI, atherectomy, hemodynamic support;  Surgeon: Moises Haddad MD;  Location: [facility] CATH INVASIVE LOCATION;  Service: Cardiology;  Laterality: N/A;   • CARDIAC CATHETERIZATION N/A  09/15/2022    Procedure: Left Heart Cath;  Surgeon: Moises Haddad MD;  Location: [facility] CATH INVASIVE LOCATION;  Service: Cardiology;  Laterality: N/A;   • CARDIAC CATHETERIZATION  9/15/2022   • CARDIAC CATHETERIZATION N/A 3/2/2023    Procedure: Left Heart Cath;  Surgeon: Moises Haddad MD;  Location:  [facility] CATH INVASIVE LOCATION;  Service: Cardiology;  Laterality: N/A;   • CHOLECYSTECTOMY     • CORONARY ARTERY BYPASS GRAFT N/A 2022    Procedure: CORONARY ARTERY BYPASS GRAFTING;  Surgeon: Pablo Ball MD;  Location: [facility] CVOR;  Service: Cardiothoracic;  Laterality: N/A;  CABG X 3(2 vein grafts, 1 LIMA graft)   • CORONARY ARTERY BYPASS GRAFT  2022   • CORONARY STENT PLACEMENT     • MANDIBLE SURGERY         Family History   Problem Relation Age of Onset   • Heart disease Mother    • Heart attack Mother    • Hypertension Mother    • Heart failure Father    • Cirrhosis Maternal Grandfather    • Heart attack Maternal Grandmother         a       Social History     Socioeconomic History   • Marital status: Single   Tobacco Use   • Smoking status: Former     Packs/day: 1.50     Years: 23.00     Pack years: 34.50     Types: Cigarettes     Start date: 1996     Quit date: 2022     Years since quittin.7   • Smokeless tobacco: Never   Vaping Use   • Vaping Use: Never used   Substance and Sexual Activity   • Alcohol use: Not Currently   • Drug use: Yes     Frequency: 7.0 times per week     Types: Marijuana   • Sexual activity: Yes     Partners: Female           Objective   Physical Exam  Vital signs and triage nurse note reviewed.  Constitutional: Awake, alert; well-developed and well-nourished. No acute distress is noted.  HEENT: Normocephalic, atraumatic; pupils are PERRL with intact EOM; oropharynx is pink and moist without exudate or erythema.  No drooling or pooling of oral secretions.  Neck: Supple, full range of motion without pain; no cervical lymphadenopathy.  Normal phonation.  Cardiovascular: Regular rate and rhythm, normal S1-S2.  No murmur noted.  Pulmonary: Respiratory effort regular nonlabored, breath sounds clear to auscultation all fields.  Abdomen: Soft, nontender, nondistended with normoactive bowel sounds; no rebound or guarding.  There is scant bruising noted at the right groin/inguinal fold.  There is no edema noted to this area.  No overlying erythema.  There is some mild tenderness over the right hemiscrotum with questionable edema over the right hemiscrotum.  There is no significant ecchymosis to this area.  There is no overlying erythema.  No palpable masses.  Musculoskeletal: Independent range of motion of all extremities with no palpable tenderness or edema.  There is good cap refill and sensation distally in both lower extremities.  Neuro: Alert oriented x3, speech is clear and appropriate, GCS 15.    Skin: Flesh tone, warm, dry, intact; no erythematous or petechial rash or lesion.      Procedures           ED Course      Labs Reviewed   BASIC METABOLIC PANEL - Abnormal; Notable for the following components:       Result Value    Glucose 112 (*)     Sodium 135 (*)     All other components within normal limits    Narrative:     GFR Normal >60  Chronic Kidney Disease <60  Kidney Failure <15     CBC WITH AUTO DIFFERENTIAL - Abnormal; Notable for the following components:    WBC 11.40 (*)     Neutrophils, Absolute 8.10 (*)     All other components within normal limits   PROTIME-INR - Normal   APTT - Normal   URINALYSIS W/ MICROSCOPIC IF INDICATED (NO CULTURE)   CBC AND DIFFERENTIAL    Narrative:     The following orders were created for panel order CBC & Differential.  Procedure                               Abnormality         Status                     ---------                               -----------         ------                     CBC Auto Differential[[id number]]        Abnormal            Final result                 Please view results for these  tests on the individual orders.     US Scrotum & Testicles    Result Date: 3/9/2023  1. Normal testicles without torsion. 2. Small bilateral hydroceles. 3. Small left spermatocele. Electronically signed by:  Moises Markham M.D.  3/9/2023 1:48 AM Mountain Time    Medications   sodium chloride 0.9 % flush 10 mL (has no administration in time range)   morphine injection 2 mg (2 mg Intravenous Given 3/9/23 0228)   ondansetron (ZOFRAN) injection 4 mg (4 mg Intravenous Given 3/9/23 0228)   ketorolac (TORADOL) injection 15 mg (15 mg Intravenous Given 3/9/23 0337)   HYDROmorphone (DILAUDID) injection 0.5 mg (0.5 mg Intravenous Given 3/9/23 0337)                                          Medical Decision Making  Patient presents with complaints of worsening pain in the right hemiscrotum and testicle after right heart cath 1 week ago.  No redness fever difficulty urinating or other complaint.    He had above exam evaluation.  IV was established.  Labs and scrotal/testicular ultrasound was obtained.  He was given a dose of morphine for pain.  He was subsequently given a dose of Dilaudid for pain control.    Work-up: CBC significant for WBC of 11.4, no bands.  Metabolic panel unremarkable.  Scrotal ultrasound reveals normal testicles without torsion, small bilateral hydroceles, small left spermatocele.    On reexamination patient is resting quietly and in no distress.  He has no new complaints.  Of note, he states that he did go back to work today and was standing on his feet a lot thinks this may have caused increase in his pain and swelling.  Reevaluation of genital exam does not reveal any significant ecchymosis, no significant edema.    Diagnosis and treatment plan discussed with patient.  Patient agreeable to plan.   I discussed findings with patient who voices understanding of discharge instructions, signs and symptoms requiring return to ED; discharged improved and in stable condition with follow up for  re-evaluation.      Amount and/or Complexity of Data Reviewed  Labs: ordered.  Radiology: ordered.      Risk  Prescription drug management.          Final diagnoses:   Scrotal pain       ED Disposition  ED Disposition     ED Disposition   Discharge    Condition   Stable    Comment   --             Daniela Hernandez FNP  [address] [facility] [place] [address] [phone]          Moises Haddad MD  [facility] [address] [place] [address] [phone]               Medication List      Changed    prasugrel 10 MG tablet  Commonly known as: EFFIENT  Take 6 tablets by mouth for the first dose and then take one tablet by mouth daily thereafter.  What changed:   · how much to take  · how to take this  · when to take this             Amaris Babin, APRN  03/09/23 [id number]

## 2023-03-09 NOTE — DISCHARGE INSTRUCTIONS
Scrotal support.  Avoid prolonged standing and walking.  Continue current medications.  Follow-up with your primary care provider and cardiologist.  Return for new or worsening symptoms.

## 2023-03-09 NOTE — ED NOTES
Pt encouraged to give urine sample, states he can not go and would like something to drink. Provider stated he must remain NPO. Pt also requested pain medication. Provider aware. Provider aware pt states he can not urinate at this time

## 2023-03-09 NOTE — ED NOTES
Pt given urinal and informed a urine sample is needed. Pt stated he used the bathroom before coming to the ER and does not feel like he has to go right now.

## 2023-03-10 LAB — QT INTERVAL: 350 MS

## 2023-03-14 ENCOUNTER — READMISSION MANAGEMENT (OUTPATIENT)
Dept: CALL CENTER | Facility: HOSPITAL | Age: 38
End: 2023-03-14
Payer: MEDICAID

## 2023-03-14 NOTE — OUTREACH NOTE
Medical Week 2 Survey    Flowsheet Row Responses   [facility] facility patient discharged from? [facility]   Does the patient have one of the following disease processes/diagnoses(primary or secondary)? Other   Week 2 attempt successful? No   Unsuccessful attempts Attempt 1          Maria Del Carmen Bocanegra Registered Nurse

## 2023-03-22 ENCOUNTER — READMISSION MANAGEMENT (OUTPATIENT)
Dept: CALL CENTER | Facility: HOSPITAL | Age: 38
End: 2023-03-22
Payer: MEDICAID

## 2023-03-22 NOTE — OUTREACH NOTE
Medical Week 3 Survey    Flowsheet Row Responses   [facility] facility patient discharged from? [facility]   Does the patient have one of the following disease processes/diagnoses(primary or secondary)? Other   Week 3 attempt successful? No   Unsuccessful attempts Attempt 1   Discharge diagnosis chest pain          Mechelle LOPEZ - Registered Nurse

## 2023-03-27 ENCOUNTER — READMISSION MANAGEMENT (OUTPATIENT)
Dept: CALL CENTER | Facility: HOSPITAL | Age: 38
End: 2023-03-27
Payer: MEDICAID

## 2023-03-27 NOTE — OUTREACH NOTE
Medical Week 3 Survey    Flowsheet Row Responses   [facility] facility patient discharged from? [facility]   Does the patient have one of the following disease processes/diagnoses(primary or secondary)? Other   Week 3 attempt successful? No   Unsuccessful attempts Attempt 2          Dayna MESSINA - Licensed Nurse

## 2023-03-31 RX ORDER — ATORVASTATIN CALCIUM 40 MG/1
40 TABLET, FILM COATED ORAL NIGHTLY
Qty: 90 TABLET | Refills: 1 | Status: SHIPPED | OUTPATIENT
Start: 2023-03-31

## 2023-03-31 RX ORDER — ASPIRIN 81 MG/1
81 TABLET ORAL DAILY
Qty: 90 TABLET | Refills: 0 | Status: SHIPPED | OUTPATIENT
Start: 2023-03-31

## 2023-04-16 ENCOUNTER — HOSPITAL ENCOUNTER (EMERGENCY)
Age: 38
Discharge: HOME | End: 2023-04-16
Attending: EMERGENCY MEDICINE
Payer: COMMERCIAL

## 2023-04-16 VITALS
WEIGHT: 250 LBS | BODY MASS INDEX: 35.79 KG/M2 | HEART RATE: 98 BPM | TEMPERATURE: 98.8 F | RESPIRATION RATE: 16 BRPM | OXYGEN SATURATION: 96 % | HEIGHT: 70 IN | DIASTOLIC BLOOD PRESSURE: 74 MMHG | SYSTOLIC BLOOD PRESSURE: 161 MMHG

## 2023-04-16 DIAGNOSIS — S39.012A LUMBAR STRAIN, INITIAL ENCOUNTER: Primary | ICD-10-CM

## 2023-04-16 LAB
ALBUMIN SERPL-MCNC: 4.3 G/DL (ref 3.5–5)
ALBUMIN/GLOB SERPL: 1.2 (ref 0.4–1.6)
ALP SERPL-CCNC: 92 U/L (ref 45–117)
ALT SERPL-CCNC: 39 U/L (ref 13–61)
ANION GAP SERPL CALC-SCNC: 8 MMOL/L (ref 2–11)
APPEARANCE UR: CLEAR
AST SERPL-CCNC: 27 U/L (ref 15–37)
BASOPHILS # BLD: 0.1 K/UL (ref 0–0.2)
BASOPHILS NFR BLD: 1 % (ref 0–2)
BILIRUB SERPL-MCNC: 0.4 MG/DL (ref 0.2–1.1)
BILIRUB UR QL: NEGATIVE
BUN SERPL-MCNC: 15 MG/DL (ref 7–18)
CALCIUM SERPL-MCNC: 9.4 MG/DL (ref 8.3–10.4)
CHLORIDE SERPL-SCNC: 104 MMOL/L (ref 98–107)
CO2 SERPL-SCNC: 28 MMOL/L (ref 21–32)
COLOR UR: YELLOW
CREAT SERPL-MCNC: 1.03 MG/DL (ref 0.8–1.5)
DIFFERENTIAL METHOD BLD: NORMAL
EOSINOPHIL # BLD: 0.2 K/UL (ref 0–0.8)
EOSINOPHIL NFR BLD: 2 % (ref 0.5–7.8)
ERYTHROCYTE [DISTWIDTH] IN BLOOD BY AUTOMATED COUNT: 13.3 % (ref 11.9–14.6)
GLOBULIN SER CALC-MCNC: 3.6 G/DL (ref 2.8–4.5)
GLUCOSE SERPL-MCNC: 97 MG/DL (ref 65–100)
GLUCOSE UR STRIP.AUTO-MCNC: NEGATIVE MG/DL
HCT VFR BLD AUTO: 44 % (ref 41.1–50.3)
HGB BLD-MCNC: 14.3 G/DL (ref 13.6–17.2)
HGB UR QL STRIP: NEGATIVE
IMM GRANULOCYTES # BLD AUTO: 0 K/UL (ref 0–0.5)
IMM GRANULOCYTES NFR BLD AUTO: 0 % (ref 0–5)
KETONES UR QL STRIP.AUTO: NEGATIVE MG/DL
LEUKOCYTE ESTERASE UR QL STRIP.AUTO: NEGATIVE
LIPASE SERPL-CCNC: 29 U/L (ref 13–60)
LYMPHOCYTES # BLD: 2.7 K/UL (ref 0.5–4.6)
LYMPHOCYTES NFR BLD: 37 % (ref 13–44)
MCH RBC QN AUTO: 29.1 PG (ref 26.1–32.9)
MCHC RBC AUTO-ENTMCNC: 32.5 G/DL (ref 31.4–35)
MCV RBC AUTO: 89.4 FL (ref 82–102)
MONOCYTES # BLD: 0.9 K/UL (ref 0.1–1.3)
MONOCYTES NFR BLD: 11 % (ref 4–12)
NEUTS SEG # BLD: 3.6 K/UL (ref 1.7–8.2)
NEUTS SEG NFR BLD: 48 % (ref 43–78)
NITRITE UR QL STRIP.AUTO: NEGATIVE
NRBC # BLD: 0 K/UL (ref 0–0.2)
PH UR STRIP: 7.5 (ref 5–9)
PLATELET # BLD AUTO: 275 K/UL (ref 150–450)
PMV BLD AUTO: 9.6 FL (ref 9.4–12.3)
POTASSIUM SERPL-SCNC: 3.8 MMOL/L (ref 3.5–5.1)
PROT SERPL-MCNC: 7.9 G/DL (ref 6.4–8.2)
PROT UR STRIP-MCNC: NEGATIVE MG/DL
RBC # BLD AUTO: 4.92 M/UL (ref 4.23–5.6)
SODIUM SERPL-SCNC: 140 MMOL/L (ref 133–143)
SP GR UR REFRACTOMETRY: 1.02 (ref 1–1.02)
UROBILINOGEN UR QL STRIP.AUTO: 0.2 EU/DL (ref 0.2–1)
WBC # BLD AUTO: 7.5 K/UL (ref 4.3–11.1)

## 2023-04-16 PROCEDURE — 85025 COMPLETE CBC W/AUTO DIFF WBC: CPT

## 2023-04-16 PROCEDURE — 83690 ASSAY OF LIPASE: CPT

## 2023-04-16 PROCEDURE — 81003 URINALYSIS AUTO W/O SCOPE: CPT

## 2023-04-16 PROCEDURE — 99283 EMERGENCY DEPT VISIT LOW MDM: CPT

## 2023-04-16 PROCEDURE — 80053 COMPREHEN METABOLIC PANEL: CPT

## 2023-04-16 RX ORDER — METHOCARBAMOL 750 MG/1
750 TABLET, FILM COATED ORAL 4 TIMES DAILY PRN
Qty: 40 TABLET | Refills: 0 | Status: SHIPPED | OUTPATIENT
Start: 2023-04-16 | End: 2023-04-26

## 2023-04-16 ASSESSMENT — LIFESTYLE VARIABLES
HOW MANY STANDARD DRINKS CONTAINING ALCOHOL DO YOU HAVE ON A TYPICAL DAY: 1 OR 2
HOW OFTEN DO YOU HAVE A DRINK CONTAINING ALCOHOL: MONTHLY OR LESS

## 2023-04-16 ASSESSMENT — PAIN - FUNCTIONAL ASSESSMENT
PAIN_FUNCTIONAL_ASSESSMENT: ACTIVITIES ARE NOT PREVENTED
PAIN_FUNCTIONAL_ASSESSMENT: 0-10

## 2023-04-16 ASSESSMENT — PAIN SCALES - GENERAL: PAINLEVEL_OUTOF10: 6

## 2023-04-16 ASSESSMENT — PAIN DESCRIPTION - FREQUENCY: FREQUENCY: CONTINUOUS

## 2023-04-16 ASSESSMENT — PAIN DESCRIPTION - PAIN TYPE: TYPE: ACUTE PAIN

## 2023-04-16 ASSESSMENT — PAIN DESCRIPTION - ORIENTATION: ORIENTATION: RIGHT

## 2023-04-16 ASSESSMENT — PAIN DESCRIPTION - LOCATION: LOCATION: BACK

## 2023-04-16 ASSESSMENT — PAIN DESCRIPTION - DESCRIPTORS: DESCRIPTORS: SHARP

## 2023-04-24 RX ORDER — LEVOTHYROXINE SODIUM 0.05 MG/1
50 TABLET ORAL DAILY
Qty: 90 TABLET | Refills: 0 | Status: CANCELLED | OUTPATIENT
Start: 2023-04-24

## 2023-04-25 RX ORDER — LISINOPRIL 5 MG/1
5 TABLET ORAL
Qty: 90 TABLET | Refills: 1 | Status: SHIPPED | OUTPATIENT
Start: 2023-04-25

## 2023-04-25 RX ORDER — RANOLAZINE 500 MG/1
500 TABLET, EXTENDED RELEASE ORAL EVERY 12 HOURS SCHEDULED
Qty: 180 TABLET | Refills: 1 | Status: SHIPPED | OUTPATIENT
Start: 2023-04-25

## 2023-04-25 RX ORDER — METOPROLOL TARTRATE 50 MG/1
50 TABLET, FILM COATED ORAL 2 TIMES DAILY
Qty: 180 TABLET | Refills: 1 | Status: SHIPPED | OUTPATIENT
Start: 2023-04-25

## 2023-04-25 RX ORDER — ISOSORBIDE MONONITRATE 30 MG/1
30 TABLET, EXTENDED RELEASE ORAL
Qty: 90 TABLET | Refills: 1 | Status: SHIPPED | OUTPATIENT
Start: 2023-04-25

## 2023-04-25 RX ORDER — LEVOTHYROXINE SODIUM 0.05 MG/1
50 TABLET ORAL DAILY
Qty: 90 TABLET | Refills: 0 | Status: CANCELLED | OUTPATIENT
Start: 2023-04-24

## 2023-04-26 RX ORDER — LEVOTHYROXINE SODIUM 0.05 MG/1
50 TABLET ORAL DAILY
Qty: 90 TABLET | Refills: 0 | Status: CANCELLED | OUTPATIENT
Start: 2023-04-24

## 2023-05-19 ENCOUNTER — OFFICE VISIT (OUTPATIENT)
Dept: FAMILY MEDICINE CLINIC | Facility: CLINIC | Age: 38
End: 2023-05-19
Payer: COMMERCIAL

## 2023-05-19 VITALS
SYSTOLIC BLOOD PRESSURE: 140 MMHG | HEIGHT: 70 IN | BODY MASS INDEX: 37.51 KG/M2 | DIASTOLIC BLOOD PRESSURE: 100 MMHG | WEIGHT: 262 LBS

## 2023-05-19 DIAGNOSIS — I10 PRIMARY HYPERTENSION: Primary | ICD-10-CM

## 2023-05-19 DIAGNOSIS — M53.86 SCIATICA ASSOCIATED WITH DISORDER OF LUMBAR SPINE: ICD-10-CM

## 2023-05-19 PROCEDURE — 99214 OFFICE O/P EST MOD 30 MIN: CPT | Performed by: FAMILY MEDICINE

## 2023-05-19 PROCEDURE — 3080F DIAST BP >= 90 MM HG: CPT | Performed by: FAMILY MEDICINE

## 2023-05-19 PROCEDURE — 3075F SYST BP GE 130 - 139MM HG: CPT | Performed by: FAMILY MEDICINE

## 2023-05-19 RX ORDER — HYDROCHLOROTHIAZIDE 12.5 MG/1
12.5 CAPSULE, GELATIN COATED ORAL EVERY MORNING
Qty: 90 CAPSULE | Refills: 1 | Status: SHIPPED | OUTPATIENT
Start: 2023-05-19

## 2023-05-19 RX ORDER — PREDNISONE 20 MG/1
TABLET ORAL
Qty: 18 TABLET | Refills: 0 | Status: SHIPPED | OUTPATIENT
Start: 2023-05-19

## 2023-05-19 SDOH — ECONOMIC STABILITY: FOOD INSECURITY: WITHIN THE PAST 12 MONTHS, YOU WORRIED THAT YOUR FOOD WOULD RUN OUT BEFORE YOU GOT MONEY TO BUY MORE.: NEVER TRUE

## 2023-05-19 SDOH — ECONOMIC STABILITY: FOOD INSECURITY: WITHIN THE PAST 12 MONTHS, THE FOOD YOU BOUGHT JUST DIDN'T LAST AND YOU DIDN'T HAVE MONEY TO GET MORE.: NEVER TRUE

## 2023-05-19 SDOH — ECONOMIC STABILITY: INCOME INSECURITY: HOW HARD IS IT FOR YOU TO PAY FOR THE VERY BASICS LIKE FOOD, HOUSING, MEDICAL CARE, AND HEATING?: NOT HARD AT ALL

## 2023-05-19 SDOH — ECONOMIC STABILITY: HOUSING INSECURITY
IN THE LAST 12 MONTHS, WAS THERE A TIME WHEN YOU DID NOT HAVE A STEADY PLACE TO SLEEP OR SLEPT IN A SHELTER (INCLUDING NOW)?: NO

## 2023-05-19 ASSESSMENT — ENCOUNTER SYMPTOMS
ABDOMINAL PAIN: 0
SHORTNESS OF BREATH: 0
BLOOD IN STOOL: 0
CHEST TIGHTNESS: 0
BACK PAIN: 1

## 2023-05-19 ASSESSMENT — PATIENT HEALTH QUESTIONNAIRE - PHQ9
1. LITTLE INTEREST OR PLEASURE IN DOING THINGS: 0
SUM OF ALL RESPONSES TO PHQ QUESTIONS 1-9: 0
SUM OF ALL RESPONSES TO PHQ QUESTIONS 1-9: 0
2. FEELING DOWN, DEPRESSED OR HOPELESS: 0
SUM OF ALL RESPONSES TO PHQ9 QUESTIONS 1 & 2: 0
SUM OF ALL RESPONSES TO PHQ QUESTIONS 1-9: 0
SUM OF ALL RESPONSES TO PHQ QUESTIONS 1-9: 0

## 2023-05-19 NOTE — PROGRESS NOTES
[facility]  _______________________________________  MD Lindsay Wagoner DO Elizabeth King, MD Velia Vance MD    [address] [place] [address]  Phone: [phone]  Fax: [phone]    Guille Piña (:  1985) is a 37 y.o. male,Established patient, here for evaluation of the following chief complaint(s):  Back Pain (X 1 week )         ASSESSMENT/PLAN:    1. Primary hypertension  Will start HCTZ, may help a little with any edema in his low back as well. Explained how this works and why he should avoid salt. Recheck in a month with labs.   - hydroCHLOROthiazide (MICROZIDE) 12.5 MG capsule; Take 1 capsule by mouth every morning  Dispense: 90 capsule; Refill: 1    2. Sciatica associated with disorder of lumbar spine  Prednisone taper. Take with food. Out of work for the next 4 days.   If he is still unable to work by , check XR, refer to PT, etc.   Activity as tolerated.   - predniSONE (DELTASONE) 20 MG tablet; Take by mouth: 3 pills a day for 3 days, then 2 pills a day for 3 days, then 1 pill a day for 2 days, then 1/2 pill a day for 2 days.  Dispense: 18 tablet; Refill: 0    FU 4w    Subjective   SUBJECTIVE/OBJECTIVE:    Male 38YO with hx of weight lifting injuries her with back pain:    States he has pain in the low back and R leg, traces L5 down ot this calf. On further questioning he can feel it sometimes in the middle three toes. Taking ibuprofen, has been taking 600 TID for a week. That helps for a few hours. No known injury that started this.     Of note, BP is up again now two visits in a row.   BP Readings from Last 3 Encounters:   23 (!) 140/100   23 (!) 161/74   22 133/82     Lab Results   Component Value Date/Time     2023 01:52 PM    K 3.8 2023 01:52 PM     2023 01:52 PM    CO2 28 2023 01:52 PM    BUN 15 2023 01:52 PM    CREATININE 1.03 2023

## 2023-05-24 ENCOUNTER — PATIENT MESSAGE (OUTPATIENT)
Dept: FAMILY MEDICINE CLINIC | Facility: CLINIC | Age: 38
End: 2023-05-24

## 2023-05-25 NOTE — TELEPHONE ENCOUNTER
From: Gila Jimenez  To: Dr. Singh Janessa: 5/24/2023 12:15 PM EDT  Subject: Return to work     I was supposed to return to work yesterday but my back is still giving me some problems. I was seeing if I could get an extension on my note for work or if I needed to make an appointment to come back in.  Thanks

## 2023-06-02 RX ORDER — ASPIRIN 81 MG/1
81 TABLET ORAL DAILY
Qty: 90 TABLET | Refills: 0 | Status: CANCELLED | OUTPATIENT
Start: 2023-06-02

## 2023-06-02 RX ORDER — ASPIRIN 81 MG/1
81 TABLET ORAL DAILY
Qty: 90 TABLET | Refills: 0 | Status: SHIPPED | OUTPATIENT
Start: 2023-06-02

## 2023-06-16 PROBLEM — I10 PRIMARY HYPERTENSION: Status: ACTIVE | Noted: 2023-06-16

## 2023-07-17 DIAGNOSIS — F90.0 ATTENTION-DEFICIT HYPERACTIVITY DISORDER, PREDOMINANTLY INATTENTIVE TYPE: ICD-10-CM

## 2023-07-18 RX ORDER — DEXTROAMPHETAMINE SACCHARATE, AMPHETAMINE ASPARTATE, DEXTROAMPHETAMINE SULFATE AND AMPHETAMINE SULFATE 2.5; 2.5; 2.5; 2.5 MG/1; MG/1; MG/1; MG/1
10 TABLET ORAL 2 TIMES DAILY
Qty: 60 TABLET | Refills: 0 | Status: SHIPPED | OUTPATIENT
Start: 2023-07-18 | End: 2023-08-17

## 2023-07-21 RX ORDER — LEVOTHYROXINE SODIUM 0.05 MG/1
50 TABLET ORAL DAILY
Qty: 90 TABLET | Refills: 0 | Status: CANCELLED | OUTPATIENT
Start: 2023-06-29

## 2023-07-24 RX ORDER — LEVOTHYROXINE SODIUM 0.05 MG/1
50 TABLET ORAL DAILY
Qty: 90 TABLET | Refills: 0 | Status: CANCELLED | OUTPATIENT
Start: 2023-06-29

## 2023-07-26 RX ORDER — LEVOTHYROXINE SODIUM 0.05 MG/1
50 TABLET ORAL DAILY
Qty: 90 TABLET | Refills: 0 | Status: CANCELLED | OUTPATIENT
Start: 2023-06-29

## 2023-07-28 RX ORDER — LEVOTHYROXINE SODIUM 0.05 MG/1
50 TABLET ORAL DAILY
Qty: 90 TABLET | Refills: 0 | Status: CANCELLED | OUTPATIENT
Start: 2023-06-29

## 2023-07-31 RX ORDER — LEVOTHYROXINE SODIUM 0.05 MG/1
50 TABLET ORAL DAILY
Qty: 90 TABLET | Refills: 0 | Status: CANCELLED | OUTPATIENT
Start: 2023-06-29

## 2023-08-02 RX ORDER — LEVOTHYROXINE SODIUM 0.05 MG/1
50 TABLET ORAL DAILY
Qty: 90 TABLET | Refills: 0 | Status: CANCELLED | OUTPATIENT
Start: 2023-06-29

## 2023-08-04 RX ORDER — LEVOTHYROXINE SODIUM 0.05 MG/1
50 TABLET ORAL DAILY
Qty: 90 TABLET | Refills: 0 | Status: CANCELLED | OUTPATIENT
Start: 2023-06-29

## 2023-08-07 RX ORDER — LEVOTHYROXINE SODIUM 0.05 MG/1
50 TABLET ORAL DAILY
Qty: 90 TABLET | Refills: 0 | Status: CANCELLED | OUTPATIENT
Start: 2023-06-29

## 2023-08-09 RX ORDER — LEVOTHYROXINE SODIUM 0.05 MG/1
50 TABLET ORAL DAILY
Qty: 90 TABLET | Refills: 0 | Status: CANCELLED | OUTPATIENT
Start: 2023-06-29

## 2023-08-11 RX ORDER — LEVOTHYROXINE SODIUM 0.05 MG/1
50 TABLET ORAL DAILY
Qty: 90 TABLET | Refills: 0 | Status: CANCELLED | OUTPATIENT
Start: 2023-06-29

## 2023-08-14 RX ORDER — LEVOTHYROXINE SODIUM 0.05 MG/1
50 TABLET ORAL DAILY
Qty: 90 TABLET | Refills: 0 | Status: CANCELLED | OUTPATIENT
Start: 2023-06-29

## 2023-08-16 ENCOUNTER — HOSPITAL ENCOUNTER (EMERGENCY)
Age: 38
Discharge: HOME | End: 2023-08-16
Attending: EMERGENCY MEDICINE

## 2023-08-16 VITALS
BODY MASS INDEX: 35.79 KG/M2 | SYSTOLIC BLOOD PRESSURE: 130 MMHG | RESPIRATION RATE: 16 BRPM | HEIGHT: 70 IN | HEART RATE: 57 BPM | TEMPERATURE: 98.3 F | DIASTOLIC BLOOD PRESSURE: 86 MMHG | OXYGEN SATURATION: 97 % | WEIGHT: 250 LBS

## 2023-08-16 DIAGNOSIS — K52.9 GASTROENTERITIS: Primary | ICD-10-CM

## 2023-08-16 DIAGNOSIS — F90.0 ATTENTION-DEFICIT HYPERACTIVITY DISORDER, PREDOMINANTLY INATTENTIVE TYPE: ICD-10-CM

## 2023-08-16 DIAGNOSIS — R11.2 NAUSEA VOMITING AND DIARRHEA: ICD-10-CM

## 2023-08-16 DIAGNOSIS — R19.7 NAUSEA VOMITING AND DIARRHEA: ICD-10-CM

## 2023-08-16 LAB
ALBUMIN SERPL-MCNC: 4.3 G/DL (ref 3.5–5)
ALBUMIN/GLOB SERPL: 1.4 (ref 0.4–1.6)
ALP SERPL-CCNC: 84 U/L (ref 45–117)
ALT SERPL-CCNC: 45 U/L (ref 13–61)
ANION GAP SERPL CALC-SCNC: 8 MMOL/L (ref 2–11)
APPEARANCE UR: CLEAR
AST SERPL-CCNC: 33 U/L (ref 15–37)
BASOPHILS # BLD: 0 K/UL (ref 0–0.2)
BASOPHILS NFR BLD: 1 % (ref 0–2)
BILIRUB SERPL-MCNC: 0.2 MG/DL (ref 0.2–1.1)
BILIRUB UR QL: NEGATIVE
BUN SERPL-MCNC: 10 MG/DL (ref 6–23)
CALCIUM SERPL-MCNC: 9.2 MG/DL (ref 8.3–10.4)
CHLORIDE SERPL-SCNC: 105 MMOL/L (ref 98–107)
CO2 SERPL-SCNC: 26 MMOL/L (ref 21–32)
COLOR UR: YELLOW
CREAT SERPL-MCNC: 1.07 MG/DL (ref 0.8–1.5)
DIFFERENTIAL METHOD BLD: NORMAL
EOSINOPHIL # BLD: 0.2 K/UL (ref 0–0.8)
EOSINOPHIL NFR BLD: 2 % (ref 0.5–7.8)
ERYTHROCYTE [DISTWIDTH] IN BLOOD BY AUTOMATED COUNT: 13.1 % (ref 11.9–14.6)
GLOBULIN SER CALC-MCNC: 3.1 G/DL (ref 2.8–4.5)
GLUCOSE SERPL-MCNC: 119 MG/DL (ref 65–100)
GLUCOSE UR STRIP.AUTO-MCNC: NEGATIVE MG/DL
HCT VFR BLD AUTO: 45.2 % (ref 41.1–50.3)
HGB BLD-MCNC: 14.8 G/DL (ref 13.6–17.2)
HGB UR QL STRIP: NEGATIVE
IMM GRANULOCYTES # BLD AUTO: 0 K/UL (ref 0–0.5)
IMM GRANULOCYTES NFR BLD AUTO: 0 % (ref 0–5)
KETONES UR QL STRIP.AUTO: NEGATIVE MG/DL
LEUKOCYTE ESTERASE UR QL STRIP.AUTO: NEGATIVE
LIPASE SERPL-CCNC: 41 U/L (ref 13–60)
LYMPHOCYTES # BLD: 2.9 K/UL (ref 0.5–4.6)
LYMPHOCYTES NFR BLD: 42 % (ref 13–44)
MCH RBC QN AUTO: 29.2 PG (ref 26.1–32.9)
MCHC RBC AUTO-ENTMCNC: 32.7 G/DL (ref 31.4–35)
MCV RBC AUTO: 89.3 FL (ref 82–102)
MONOCYTES # BLD: 0.6 K/UL (ref 0.1–1.3)
MONOCYTES NFR BLD: 9 % (ref 4–12)
NEUTS SEG # BLD: 3.2 K/UL (ref 1.7–8.2)
NEUTS SEG NFR BLD: 46 % (ref 43–78)
NITRITE UR QL STRIP.AUTO: NEGATIVE
NRBC # BLD: 0 K/UL (ref 0–0.2)
PH UR STRIP: 5 (ref 5–9)
PLATELET # BLD AUTO: 295 K/UL (ref 150–450)
PMV BLD AUTO: 9.9 FL (ref 9.4–12.3)
POTASSIUM SERPL-SCNC: 4.6 MMOL/L (ref 3.5–5.1)
PROT SERPL-MCNC: 7.4 G/DL (ref 6.4–8.2)
PROT UR STRIP-MCNC: NEGATIVE MG/DL
RBC # BLD AUTO: 5.06 M/UL (ref 4.23–5.6)
SODIUM SERPL-SCNC: 139 MMOL/L (ref 133–143)
SP GR UR REFRACTOMETRY: 1.02 (ref 1–1.02)
UROBILINOGEN UR QL STRIP.AUTO: 0.2 EU/DL (ref 0.2–1)
WBC # BLD AUTO: 6.9 K/UL (ref 4.3–11.1)

## 2023-08-16 PROCEDURE — 85025 COMPLETE CBC W/AUTO DIFF WBC: CPT

## 2023-08-16 PROCEDURE — 80053 COMPREHEN METABOLIC PANEL: CPT

## 2023-08-16 PROCEDURE — 96374 THER/PROPH/DIAG INJ IV PUSH: CPT

## 2023-08-16 PROCEDURE — 99284 EMERGENCY DEPT VISIT MOD MDM: CPT

## 2023-08-16 PROCEDURE — 6370000000 HC RX 637 (ALT 250 FOR IP): Performed by: EMERGENCY MEDICINE

## 2023-08-16 PROCEDURE — 81003 URINALYSIS AUTO W/O SCOPE: CPT

## 2023-08-16 PROCEDURE — 83690 ASSAY OF LIPASE: CPT

## 2023-08-16 PROCEDURE — 6360000002 HC RX W HCPCS: Performed by: EMERGENCY MEDICINE

## 2023-08-16 PROCEDURE — 2580000003 HC RX 258: Performed by: EMERGENCY MEDICINE

## 2023-08-16 RX ORDER — 0.9 % SODIUM CHLORIDE 0.9 %
1000 INTRAVENOUS SOLUTION INTRAVENOUS ONCE
Status: COMPLETED | OUTPATIENT
Start: 2023-08-16 | End: 2023-08-16

## 2023-08-16 RX ORDER — LEVOTHYROXINE SODIUM 0.05 MG/1
50 TABLET ORAL DAILY
Qty: 90 TABLET | Refills: 0 | Status: CANCELLED | OUTPATIENT
Start: 2023-06-29

## 2023-08-16 RX ORDER — LIDOCAINE HYDROCHLORIDE 20 MG/ML
15 SOLUTION OROPHARYNGEAL
Status: COMPLETED | OUTPATIENT
Start: 2023-08-16 | End: 2023-08-16

## 2023-08-16 RX ORDER — ONDANSETRON 2 MG/ML
4 INJECTION INTRAMUSCULAR; INTRAVENOUS
Status: COMPLETED | OUTPATIENT
Start: 2023-08-16 | End: 2023-08-16

## 2023-08-16 RX ORDER — HYOSCYAMINE SULFATE 0.125 MG
125 TABLET ORAL EVERY 4 HOURS PRN
Qty: 20 TABLET | Refills: 0 | Status: SHIPPED | OUTPATIENT
Start: 2023-08-16

## 2023-08-16 RX ORDER — DEXTROAMPHETAMINE SACCHARATE, AMPHETAMINE ASPARTATE, DEXTROAMPHETAMINE SULFATE AND AMPHETAMINE SULFATE 2.5; 2.5; 2.5; 2.5 MG/1; MG/1; MG/1; MG/1
10 TABLET ORAL 2 TIMES DAILY
Qty: 60 TABLET | Refills: 0 | Status: SHIPPED | OUTPATIENT
Start: 2023-08-16 | End: 2023-09-15

## 2023-08-16 RX ORDER — ONDANSETRON 4 MG/1
4 TABLET, ORALLY DISINTEGRATING ORAL 3 TIMES DAILY PRN
Qty: 21 TABLET | Refills: 0 | Status: SHIPPED | OUTPATIENT
Start: 2023-08-16

## 2023-08-16 RX ORDER — MAGNESIUM HYDROXIDE/ALUMINUM HYDROXICE/SIMETHICONE 120; 1200; 1200 MG/30ML; MG/30ML; MG/30ML
30 SUSPENSION ORAL
Status: COMPLETED | OUTPATIENT
Start: 2023-08-16 | End: 2023-08-16

## 2023-08-16 RX ADMIN — SODIUM CHLORIDE 1000 ML: 9 INJECTION, SOLUTION INTRAVENOUS at 07:16

## 2023-08-16 RX ADMIN — ONDANSETRON 4 MG: 2 INJECTION INTRAMUSCULAR; INTRAVENOUS at 07:16

## 2023-08-16 RX ADMIN — LIDOCAINE HYDROCHLORIDE 15 ML: 20 SOLUTION ORAL; TOPICAL at 08:56

## 2023-08-16 RX ADMIN — ALUMINUM HYDROXIDE, MAGNESIUM HYDROXIDE, AND SIMETHICONE 30 ML: 200; 200; 20 SUSPENSION ORAL at 08:56

## 2023-08-16 RX ADMIN — HYOSCYAMINE SULFATE 125 MCG: 0.12 TABLET ORAL; SUBLINGUAL at 08:56

## 2023-08-16 ASSESSMENT — PAIN - FUNCTIONAL ASSESSMENT: PAIN_FUNCTIONAL_ASSESSMENT: NONE - DENIES PAIN

## 2023-08-16 ASSESSMENT — LIFESTYLE VARIABLES
HOW MANY STANDARD DRINKS CONTAINING ALCOHOL DO YOU HAVE ON A TYPICAL DAY: PATIENT DOES NOT DRINK
HOW OFTEN DO YOU HAVE A DRINK CONTAINING ALCOHOL: NEVER

## 2023-08-16 NOTE — ED NOTES
Nausea, vomiting and diarrhea that began yesterday.   Pt states that he does work outside in the [employer], [place]  08/16/23 [id number]

## 2023-08-16 NOTE — ED TRIAGE NOTES
Pt ambulatory to ED with c/o vomiting, diarrhea, fever onset yesterday morning. Pt afebrile during triage. Pt reports taking pepto with no relief. Pt states he is unable to keep anything down aside from gatorade. No acute distress noted at present. No vomiting at present.

## 2023-08-16 NOTE — ED PROVIDER NOTES
Albumin/Globulin Ratio 1.4 0.4 - 1.6     Lipase   Result Value Ref Range    Lipase 41 13 - 60 U/L   Urinalysis   Result Value Ref Range    Color, UA YELLOW      Appearance CLEAR      Specific Gravity, UA 1.020 1.001 - 1.023      pH, Urine 5.0 5.0 - 9.0      Protein, UA Negative NEG mg/dL    Glucose, UA Negative mg/dL    Ketones, Urine Negative NEG mg/dL    Bilirubin Urine Negative NEG      Blood, Urine Negative NEG      Urobilinogen, Urine 0.2 0.2 - 1.0 EU/dL    Nitrite, Urine Negative NEG      Leukocyte Esterase, Urine Negative NEG          No orders to display         Voice dictation software was used during the making of this note. This software is not perfect and grammatical and other typographical errors may be present. This note has not been completely proofread for errors.      Jay Villalta MD  08/16/23 1037

## 2023-08-18 RX ORDER — LEVOTHYROXINE SODIUM 0.05 MG/1
50 TABLET ORAL DAILY
Qty: 90 TABLET | Refills: 0 | Status: CANCELLED | OUTPATIENT
Start: 2023-06-29

## 2023-08-21 RX ORDER — LEVOTHYROXINE SODIUM 0.05 MG/1
50 TABLET ORAL DAILY
Qty: 90 TABLET | Refills: 0 | Status: CANCELLED | OUTPATIENT
Start: 2023-06-29

## 2023-08-22 RX ORDER — LEVOTHYROXINE SODIUM 0.05 MG/1
50 TABLET ORAL DAILY
Qty: 90 TABLET | Refills: 0 | Status: CANCELLED | OUTPATIENT
Start: 2023-06-29

## 2023-08-24 RX ORDER — LEVOTHYROXINE SODIUM 0.05 MG/1
50 TABLET ORAL DAILY
Qty: 90 TABLET | Refills: 0 | Status: CANCELLED | OUTPATIENT
Start: 2023-06-29

## 2023-08-28 RX ORDER — LEVOTHYROXINE SODIUM 0.05 MG/1
50 TABLET ORAL DAILY
Qty: 90 TABLET | Refills: 0 | Status: CANCELLED | OUTPATIENT
Start: 2023-06-29

## 2023-08-30 RX ORDER — LEVOTHYROXINE SODIUM 0.05 MG/1
50 TABLET ORAL DAILY
Qty: 90 TABLET | Refills: 0 | Status: CANCELLED | OUTPATIENT
Start: 2023-06-29

## 2023-09-01 RX ORDER — LEVOTHYROXINE SODIUM 0.05 MG/1
50 TABLET ORAL DAILY
Qty: 90 TABLET | Refills: 0 | Status: CANCELLED | OUTPATIENT
Start: 2023-06-29

## 2023-09-06 RX ORDER — LEVOTHYROXINE SODIUM 0.05 MG/1
50 TABLET ORAL DAILY
Qty: 90 TABLET | Refills: 0 | Status: CANCELLED | OUTPATIENT
Start: 2023-06-29

## 2023-09-07 RX ORDER — PRASUGREL 10 MG/1
TABLET, FILM COATED ORAL
Qty: 35 TABLET | Refills: 5 | Status: CANCELLED | OUTPATIENT
Start: 2023-09-07

## 2023-09-08 RX ORDER — LEVOTHYROXINE SODIUM 0.05 MG/1
50 TABLET ORAL DAILY
Qty: 90 TABLET | Refills: 0 | Status: CANCELLED | OUTPATIENT
Start: 2023-06-29

## 2023-09-08 RX ORDER — PRASUGREL 10 MG/1
10 TABLET, FILM COATED ORAL DAILY
Qty: 30 TABLET | Refills: 5 | Status: SHIPPED | OUTPATIENT
Start: 2023-09-08

## 2023-09-11 RX ORDER — LEVOTHYROXINE SODIUM 0.05 MG/1
50 TABLET ORAL DAILY
Qty: 90 TABLET | Refills: 0 | Status: CANCELLED | OUTPATIENT
Start: 2023-06-29

## 2023-09-13 RX ORDER — LEVOTHYROXINE SODIUM 0.05 MG/1
50 TABLET ORAL DAILY
Qty: 90 TABLET | Refills: 0 | Status: CANCELLED | OUTPATIENT
Start: 2023-06-29

## 2023-09-14 DIAGNOSIS — F90.0 ATTENTION-DEFICIT HYPERACTIVITY DISORDER, PREDOMINANTLY INATTENTIVE TYPE: ICD-10-CM

## 2023-09-14 RX ORDER — DEXTROAMPHETAMINE SACCHARATE, AMPHETAMINE ASPARTATE, DEXTROAMPHETAMINE SULFATE AND AMPHETAMINE SULFATE 2.5; 2.5; 2.5; 2.5 MG/1; MG/1; MG/1; MG/1
10 TABLET ORAL 2 TIMES DAILY
Qty: 60 TABLET | Refills: 0 | Status: SHIPPED | OUTPATIENT
Start: 2023-09-14 | End: 2023-10-14

## 2023-09-15 RX ORDER — LEVOTHYROXINE SODIUM 0.05 MG/1
50 TABLET ORAL DAILY
Qty: 90 TABLET | Refills: 0 | Status: CANCELLED | OUTPATIENT
Start: 2023-06-29

## 2023-09-18 RX ORDER — LEVOTHYROXINE SODIUM 0.05 MG/1
50 TABLET ORAL DAILY
Qty: 90 TABLET | Refills: 0 | Status: CANCELLED | OUTPATIENT
Start: 2023-06-29

## 2023-09-20 RX ORDER — LEVOTHYROXINE SODIUM 0.05 MG/1
50 TABLET ORAL DAILY
Qty: 90 TABLET | Refills: 0 | Status: CANCELLED | OUTPATIENT
Start: 2023-06-29

## 2023-09-22 RX ORDER — LEVOTHYROXINE SODIUM 0.05 MG/1
50 TABLET ORAL DAILY
Qty: 90 TABLET | Refills: 0 | Status: CANCELLED | OUTPATIENT
Start: 2023-06-29

## 2023-09-25 RX ORDER — ATORVASTATIN CALCIUM 20 MG/1
TABLET, FILM COATED ORAL EVERY 24 HOURS
COMMUNITY
End: 2023-09-26

## 2023-09-25 RX ORDER — OMEPRAZOLE 40 MG/1
CAPSULE, DELAYED RELEASE ORAL EVERY 24 HOURS
COMMUNITY

## 2023-09-25 RX ORDER — LEVOTHYROXINE SODIUM 0.05 MG/1
50 TABLET ORAL DAILY
Qty: 90 TABLET | Refills: 0 | Status: CANCELLED | OUTPATIENT
Start: 2023-06-29

## 2023-09-26 ENCOUNTER — OFFICE VISIT (OUTPATIENT)
Dept: CARDIOLOGY | Facility: CLINIC | Age: 38
End: 2023-09-26
Payer: MEDICAID

## 2023-09-26 VITALS
BODY MASS INDEX: 40.23 KG/M2 | HEART RATE: 61 BPM | SYSTOLIC BLOOD PRESSURE: 123 MMHG | RESPIRATION RATE: 18 BRPM | DIASTOLIC BLOOD PRESSURE: 86 MMHG | HEIGHT: 70 IN | OXYGEN SATURATION: 98 % | WEIGHT: 281 LBS

## 2023-09-26 DIAGNOSIS — I25.10 CAD, MULTIPLE VESSEL: Primary | ICD-10-CM

## 2023-09-26 DIAGNOSIS — E78.2 MIXED HYPERLIPIDEMIA: ICD-10-CM

## 2023-09-26 DIAGNOSIS — I10 PRIMARY HYPERTENSION: ICD-10-CM

## 2023-09-26 PROCEDURE — 3074F SYST BP LT 130 MM HG: CPT | Performed by: NURSE PRACTITIONER

## 2023-09-26 PROCEDURE — 3079F DIAST BP 80-89 MM HG: CPT | Performed by: NURSE PRACTITIONER

## 2023-09-26 PROCEDURE — 99213 OFFICE O/P EST LOW 20 MIN: CPT | Performed by: NURSE PRACTITIONER

## 2023-09-26 PROCEDURE — 1159F MED LIST DOCD IN RCRD: CPT | Performed by: NURSE PRACTITIONER

## 2023-09-26 PROCEDURE — 93000 ELECTROCARDIOGRAM COMPLETE: CPT | Performed by: NURSE PRACTITIONER

## 2023-09-26 PROCEDURE — 1160F RVW MEDS BY RX/DR IN RCRD: CPT | Performed by: NURSE PRACTITIONER

## 2023-09-26 RX ORDER — EVOLOCUMAB 140 MG/ML
INJECTION, SOLUTION SUBCUTANEOUS
Qty: 2 ML | Refills: 0 | Status: SHIPPED | OUTPATIENT
Start: 2023-09-26

## 2023-09-26 RX ORDER — LISINOPRIL 5 MG/1
5 TABLET ORAL
Qty: 90 TABLET | Refills: 3 | Status: SHIPPED | OUTPATIENT
Start: 2023-09-26

## 2023-09-26 RX ORDER — METOPROLOL TARTRATE 50 MG/1
50 TABLET, FILM COATED ORAL 2 TIMES DAILY
Qty: 180 TABLET | Refills: 3 | Status: SHIPPED | OUTPATIENT
Start: 2023-09-26

## 2023-09-26 RX ORDER — CILOSTAZOL 50 MG/1
50 TABLET ORAL 2 TIMES DAILY
Qty: 180 TABLET | Refills: 3 | Status: SHIPPED | OUTPATIENT
Start: 2023-09-26

## 2023-09-26 RX ORDER — ROSUVASTATIN CALCIUM 40 MG/1
40 TABLET, COATED ORAL DAILY
Qty: 30 TABLET | Refills: 11 | Status: SHIPPED | OUTPATIENT
Start: 2023-09-26

## 2023-09-26 RX ORDER — ISOSORBIDE MONONITRATE 60 MG/1
60 TABLET, EXTENDED RELEASE ORAL DAILY
Qty: 90 TABLET | Refills: 3 | Status: SHIPPED | OUTPATIENT
Start: 2023-09-26

## 2023-09-26 RX ORDER — RANOLAZINE 1000 MG/1
1000 TABLET, EXTENDED RELEASE ORAL 2 TIMES DAILY
Qty: 180 TABLET | Refills: 3 | Status: SHIPPED | OUTPATIENT
Start: 2023-09-26

## 2023-09-26 RX ORDER — PRASUGREL 10 MG/1
10 TABLET, FILM COATED ORAL DAILY
Qty: 90 TABLET | Refills: 3 | Status: SHIPPED | OUTPATIENT
Start: 2023-09-26

## 2023-09-26 NOTE — PROGRESS NOTES
Cardiology Office Follow Up Visit      Primary Care Provider:  Daniela Hernandez FNP    Reason for f/u:     Coronary artery disease  Previous CABG  Previous balloon angioplasty  Hypertension  Dyslipidemia      Subjective     CC:    Patient reports chronic chest pain but it is at his baseline    History of Present Illness       Tramaine Gates is a 38 y.o. male.  Patient is a 38-year-old male who is routinely seen by Dr. Haddad.He was in the hospital in March 2023 and has not been seen in the office since.    Patient had previous cardiac catheterization in September 2022 and had PCI via the vein graft to the distal RCA and RPDA.    In June 2022 the patient had a ST segment elevation MI.  He had drug-eluting stent placement to the RCA.  There was 80 to 90% stenosis in the LAD, left circumflex had 40 to 50% in the distal left circumflex/OM.  He had repeat admission 5 days later showing in-stent thrombosis and he was sent for CABG.  On June 29, 2022 he underwent three-vessel CABG with LIMA to the LAD, saphenous vein graft to the OM branch of the circumflex and saphenous vein graft to the RPDA.    In March 2023 he presented with complaints of chest pain worrisome for unstable angina.  He underwent cardiac catheterization and had balloon angioplasty of the distal portion of the saphenous vein graft to the RCA inside the stented segment other vascular areas the LIMA to the LAD, native LAD, circumflex and native RCA were unchanged angiographically compared to his previous cath.    He was started on Ranexa, long-acting nitroglycerin and Effient was continued as well as Pletal and aspirin added.    Patient reports he recently ran out of his Effient and was out of it for 1 week.  He has now been back on it for several weeks.    He complains of chronic chest pain that he has had for many years that is at his baseline.  It is not worsened from baseline.    He reports he is try to get his insurance to approve Repatha as  he has been intolerant to statins.          ASSESSMENT/PLAN:      Diagnoses and all orders for this visit:    1. CAD, multiple vessel (Primary)    2. Primary hypertension    3. Mixed hyperlipidemia    Other orders  -     rosuvastatin (CRESTOR) 40 MG tablet; Take 1 tablet by mouth daily.  Dispense: 30 tablet; Refill: 11  -     ranolazine (RANEXA) 1000 MG 12 hr tablet; Take 1 tablet by mouth 2 (Two) Times a Day.  Dispense: 180 tablet; Refill: 3  -     cilostazol (PLETAL) 50 MG tablet; Take 1 tablet by mouth 2 (Two) Times a Day.  Dispense: 180 tablet; Refill: 3  -     Evolocumab (Repatha SureClick) solution auto-injector SureClick injection; Inject 140 mg total (1 mL) into the skin every 14 (fourteen) days.  Dispense: 2 mL; Refill: 0  -     isosorbide mononitrate (IMDUR) 60 MG 24 hr tablet; Take 1 tablet by mouth daily.  Dispense: 90 tablet; Refill: 3  -     lisinopril (PRINIVIL,ZESTRIL) 5 MG tablet; Take 1 tablet by mouth Daily.  Dispense: 90 tablet; Refill: 3  -     metoprolol tartrate (LOPRESSOR) 50 MG tablet; Take 1 tablet by mouth 2 (Two) Times a Day.  Dispense: 180 tablet; Refill: 3  -     prasugrel (EFFIENT) 10 MG tablet; Take 1 tablet by mouth Daily. Take 6 tablets by mouth for the first dose and then take one tablet by mouth daily thereafter.  Dispense: 90 tablet; Refill: 3            MEDICAL DECISION MAKING:      Continue the current medications as prescribed.  I have ensured that he has refills on all of his cardiac medications.  We have discussed not letting anything run out.  He has been advised to contact the office if he gets into issue in the future with refills or filling prescriptions.    His blood pressure stable on current medications.    EKG today shows sinus rhythm with no ST or T wave segment abnormalities.    The patient's been advised to have scheduled follow-up with Dr. Haddad in 6 months.  If he develops any new or worsening problems of asked him to return sooner.      Past Medical History:    Diagnosis Date    Acute inferior myocardial infarction 06/14/2022    Allergic     Asthma 01/27/2022    CAD, multiple vessel 06/14/2022    Gastroesophageal reflux disease 01/27/2022    Hx of complete eye exam 2016    Hyperlipidemia 01/27/2022    Hypertension     Migraine headache 01/27/2022    Panic attack 01/27/2022    Peptic ulcer 09/14/2017       Past Surgical History:   Procedure Laterality Date    CARDIAC CATHETERIZATION N/A 06/14/2022    Procedure: Left Heart Cath;  Surgeon: Matthieu Del Toro MD;  Location:  [facility];  Service: Cardiology;  Laterality: N/A;    CARDIAC CATHETERIZATION N/A 06/16/2022    Procedure: Percutaneous Coronary Intervention;  Surgeon: Matthieu Del Toro MD;  Location:  [facility];  Service: Cardiovascular;  Laterality: N/A;    CARDIAC CATHETERIZATION N/A 06/23/2022    Procedure: Left Heart Cath possible PCI, atherectomy, hemodynamic support;  Surgeon: Moises Haddad MD;  Location:  [facility];  Service: Cardiology;  Laterality: N/A;    CARDIAC CATHETERIZATION N/A 09/15/2022    Procedure: Left Heart Cath;  Surgeon: Moises Haddad MD;  Location:  [facility];  Service: Cardiology;  Laterality: N/A;    CARDIAC CATHETERIZATION  9/15/2022    CARDIAC CATHETERIZATION N/A 3/2/2023    Procedure: Left Heart Cath;  Surgeon: Moises Haddad MD;  Location:  [facility];  Service: Cardiology;  Laterality: N/A;    CHOLECYSTECTOMY  2019    CORONARY ARTERY BYPASS GRAFT N/A 06/29/2022    Procedure: CORONARY ARTERY BYPASS GRAFTING;  Surgeon: Pablo Ball MD;  [facility];  Service: Cardiothoracic;  Laterality: N/A;  CABG X 3(2 vein grafts, 1 LIMA graft)    CORONARY ARTERY BYPASS GRAFT  6/29/2022    CORONARY STENT PLACEMENT      MANDIBLE SURGERY           Current Outpatient Medications:     aspirin (Aspirin Low Dose) 81 MG EC tablet, Take 1 tablet by mouth Daily., Disp: 90  tablet, Rfl: 0    cilostazol (PLETAL) 50 MG tablet, Take 1 tablet by mouth 2 (Two) Times a Day., Disp: 180 tablet, Rfl: 3    Evolocumab (Repatha SureClick) solution auto-injector SureClick injection, Inject 140 mg total (1 mL) into the skin every 14 (fourteen) days., Disp: 2 mL, Rfl: 0    isosorbide mononitrate (IMDUR) 60 MG 24 hr tablet, Take 1 tablet by mouth daily., Disp: 90 tablet, Rfl: 3    levothyroxine (SYNTHROID, LEVOTHROID) 50 MCG tablet, TAKE 1 TABLET BY MOUTH EVERY DAY, Disp: 90 tablet, Rfl: 0    lisinopril (PRINIVIL,ZESTRIL) 5 MG tablet, Take 1 tablet by mouth Daily., Disp: 90 tablet, Rfl: 3    metoprolol tartrate (LOPRESSOR) 50 MG tablet, Take 1 tablet by mouth 2 (Two) Times a Day., Disp: 180 tablet, Rfl: 3    nitroglycerin (NITROSTAT) 0.4 MG SL tablet, Place 1 tablet under the tongue Every 5 (Five) Minutes As Needed for Chest Pain (Only if SBP Greater Than 100). Take no more than 3 doses in 15 minutes., Disp: 25 tablet, Rfl: 0    omeprazole (priLOSEC) 40 MG capsule, Daily., Disp: , Rfl:     prasugrel (EFFIENT) 10 MG tablet, Take 1 tablet by mouth Daily. Take 6 tablets by mouth for the first dose and then take one tablet by mouth daily thereafter., Disp: 90 tablet, Rfl: 3    ranolazine (RANEXA) 1000 MG 12 hr tablet, Take 1 tablet by mouth 2 (Two) Times a Day., Disp: 180 tablet, Rfl: 3    rosuvastatin (CRESTOR) 40 MG tablet, Take 1 tablet by mouth daily., Disp: 30 tablet, Rfl: 11    Social History     Socioeconomic History    Marital status: Single   Tobacco Use    Smoking status: Former     Packs/day: 1.50     Years: 23.00     Pack years: 34.50     Types: Cigarettes     Start date: 1996     Quit date: 2022     Years since quittin.2    Smokeless tobacco: Never   Vaping Use    Vaping Use: Never used   Substance and Sexual Activity    Alcohol use: Not Currently    Drug use: Yes     Frequency: 7.0 times per week     Types: Marijuana    Sexual activity: Yes     Partners: Female       Family  "History   Problem Relation Age of Onset    Heart disease Mother     Heart attack Mother     Hypertension Mother     Heart failure Father     Cirrhosis Maternal Grandfather     Heart attack Maternal Grandmother         a       The following portions of the patient's history were reviewed and updated as appropriate: allergies, current medications, past family history, past medical history, past social history, past surgical history and problem list.    Review of Systems   Constitutional: Positive for malaise/fatigue.   Cardiovascular:  Positive for chest pain.   Neurological:  Positive for paresthesias.   All other systems reviewed and are negative.    Pertinent items are noted in HPI, all other systems reviewed and negative    /86 (BP Location: Left arm, Patient Position: Sitting, Cuff Size: Large Adult)   Pulse 61   Resp 18   Ht 177.8 cm (70\")   Wt 127 kg (281 lb)   SpO2 98%   BMI 40.32 kg/m² .  Objective     Vitals reviewed.   Constitutional:       General: Not in acute distress.     Appearance: Normal appearance. Well-developed.   Eyes:      Pupils: Pupils are equal, round, and reactive to light.   HENT:      Head: Normocephalic and atraumatic.   Neck:      Vascular: No JVD.   Pulmonary:      Effort: Pulmonary effort is normal.      Breath sounds: Normal breath sounds.   Cardiovascular:      Normal rate. Regular rhythm.   Pulses:     Intact distal pulses.   Edema:     Peripheral edema absent.   Abdominal:      General: There is no distension.      Palpations: Abdomen is soft.      Tenderness: There is no abdominal tenderness.   Musculoskeletal: Normal range of motion.      Cervical back: Normal range of motion and neck supple. Skin:     General: Skin is warm and dry.   Neurological:      Mental Status: Alert and oriented to person, place, and time.           ECG 12 Lead    Date/Time: 9/26/2023 4:12 PM  Performed by: Pat Ruiz APRN  Authorized by: Pat Ruiz APRN   Comparison: compared " with previous ECG   Similar to previous ECG  Rhythm: sinus rhythm  Rate: normal  BPM: 61  Conduction: conduction normal  ST Segments: ST segments normal  T Waves: T waves normal  QRS axis: normal  Other findings: low voltage    Clinical impression: non-specific ECG        EKG ordered by and reviewed by me in office

## 2023-09-28 RX ORDER — LEVOTHYROXINE SODIUM 0.05 MG/1
50 TABLET ORAL DAILY
Qty: 90 TABLET | Refills: 0 | Status: CANCELLED | OUTPATIENT
Start: 2023-06-29

## 2023-10-16 DIAGNOSIS — F90.0 ATTENTION-DEFICIT HYPERACTIVITY DISORDER, PREDOMINANTLY INATTENTIVE TYPE: ICD-10-CM

## 2023-10-18 RX ORDER — DEXTROAMPHETAMINE SACCHARATE, AMPHETAMINE ASPARTATE, DEXTROAMPHETAMINE SULFATE AND AMPHETAMINE SULFATE 2.5; 2.5; 2.5; 2.5 MG/1; MG/1; MG/1; MG/1
10 TABLET ORAL 2 TIMES DAILY
Qty: 60 TABLET | Refills: 0 | Status: SHIPPED | OUTPATIENT
Start: 2023-10-18 | End: 2023-11-17

## 2023-10-30 ENCOUNTER — OFFICE VISIT (OUTPATIENT)
Dept: OCCUPATIONAL MEDICINE | Age: 38
End: 2023-10-30

## 2023-10-30 VITALS
HEART RATE: 77 BPM | TEMPERATURE: 98 F | RESPIRATION RATE: 16 BRPM | OXYGEN SATURATION: 98 % | WEIGHT: 245 LBS | DIASTOLIC BLOOD PRESSURE: 82 MMHG | HEIGHT: 70 IN | SYSTOLIC BLOOD PRESSURE: 130 MMHG | BODY MASS INDEX: 35.07 KG/M2

## 2023-10-30 DIAGNOSIS — Z02.1 DRUG TESTING, PRE-EMPLOYMENT: ICD-10-CM

## 2023-10-30 DIAGNOSIS — J06.9 VIRAL URI: ICD-10-CM

## 2023-10-30 DIAGNOSIS — Z02.1 PHYSICAL EXAM, PRE-EMPLOYMENT: ICD-10-CM

## 2023-10-30 DIAGNOSIS — R09.82 POST-NASAL DRIP: Primary | ICD-10-CM

## 2023-10-30 LAB
11-NOR-9-THC-9-COOH, POC: NEGATIVE
AMPHETAMINE, URINE, POC: NORMAL
BENZODIAZEPINES, URINE, POC: NEGATIVE
BENZOYLECGONINE, URINE, POC: NEGATIVE
METHADONE, URINE, POC: NEGATIVE
METHAMPHETAMINE, URINE, POC: NEGATIVE
MORPHINE, URINE, POC: NEGATIVE
PHENCYCLIDINE, URINE, POC: NEGATIVE
URINALYSIS COLOR, POC: YELLOW

## 2023-10-30 RX ORDER — NALOXONE HYDROCHLORIDE 4 MG/.1ML
4 SPRAY NASAL
COMMUNITY
Start: 2023-05-12 | End: 2023-10-30 | Stop reason: ALTCHOICE

## 2023-10-30 RX ORDER — FLUTICASONE PROPIONATE 50 MCG
2 SPRAY, SUSPENSION (ML) NASAL DAILY
Qty: 16 G | Refills: 0 | Status: SHIPPED | OUTPATIENT
Start: 2023-10-30

## 2023-10-30 ASSESSMENT — ENCOUNTER SYMPTOMS
ABDOMINAL PAIN: 0
COUGH: 1
SHORTNESS OF BREATH: 0
DIARRHEA: 0
NAUSEA: 0
VOMITING: 0
CHEST TIGHTNESS: 0
SORE THROAT: 1
TROUBLE SWALLOWING: 0

## 2023-11-02 RX ORDER — NITROGLYCERIN 0.4 MG/1
0.4 TABLET SUBLINGUAL
Qty: 25 TABLET | Refills: 0 | Status: CANCELLED | OUTPATIENT
Start: 2023-11-02

## 2023-11-02 RX ORDER — NITROGLYCERIN 0.4 MG/1
0.4 TABLET SUBLINGUAL
Qty: 25 TABLET | Refills: 0 | Status: SHIPPED | OUTPATIENT
Start: 2023-11-02

## 2023-11-07 RX ORDER — ASPIRIN 81 MG/1
81 TABLET ORAL DAILY
Qty: 90 TABLET | Refills: 0 | Status: SHIPPED | OUTPATIENT
Start: 2023-11-07

## 2023-11-12 ENCOUNTER — HOSPITAL ENCOUNTER (EMERGENCY)
Age: 38
Discharge: HOME | End: 2023-11-12

## 2023-11-12 VITALS
WEIGHT: 240 LBS | HEART RATE: 80 BPM | TEMPERATURE: 98 F | BODY MASS INDEX: 34.36 KG/M2 | HEIGHT: 70 IN | RESPIRATION RATE: 16 BRPM | SYSTOLIC BLOOD PRESSURE: 150 MMHG | DIASTOLIC BLOOD PRESSURE: 77 MMHG | OXYGEN SATURATION: 99 %

## 2023-11-12 DIAGNOSIS — M25.512 ACUTE PAIN OF LEFT SHOULDER: ICD-10-CM

## 2023-11-12 DIAGNOSIS — M79.602 LEFT ARM PAIN: Primary | ICD-10-CM

## 2023-11-12 PROCEDURE — 99283 EMERGENCY DEPT VISIT LOW MDM: CPT

## 2023-11-12 RX ORDER — DICLOFENAC SODIUM 75 MG/1
75 TABLET, DELAYED RELEASE ORAL 2 TIMES DAILY
Qty: 60 TABLET | Refills: 0 | Status: SHIPPED | OUTPATIENT
Start: 2023-11-12

## 2023-11-12 RX ORDER — METHYLPREDNISOLONE 4 MG/1
TABLET ORAL
Qty: 1 KIT | Refills: 0 | Status: SHIPPED | OUTPATIENT
Start: 2023-11-12 | End: 2023-11-18

## 2023-11-12 ASSESSMENT — LIFESTYLE VARIABLES
HOW OFTEN DO YOU HAVE A DRINK CONTAINING ALCOHOL: NEVER
HOW MANY STANDARD DRINKS CONTAINING ALCOHOL DO YOU HAVE ON A TYPICAL DAY: PATIENT DOES NOT DRINK

## 2023-11-12 ASSESSMENT — PAIN - FUNCTIONAL ASSESSMENT: PAIN_FUNCTIONAL_ASSESSMENT: 0-10

## 2023-11-12 ASSESSMENT — PAIN SCALES - GENERAL: PAINLEVEL_OUTOF10: 7

## 2023-11-12 NOTE — DISCHARGE INSTRUCTIONS
Take medication as prescribed. As we discussed, your pain is consistent with irritation or compression of a nerve in your shoulder. Over-the-counter lidocaine patches may be helpful. Apply ice to your shoulder for 10 or 15 minutes at a time 3-4 times a day. If symptoms are not improving, please follow-up with orthopedics or your primary care provider. Return to the emergency department for any new, worsening, or concerning symptoms.

## 2023-11-12 NOTE — ED TRIAGE NOTES
Pt ambulatory to ED with c/o left shoulder and arm pain and numbness/tingling in left thumb since Tuesday. Described \"pins and needles\" in the thumb.  Reports several shoulder injuries in the past.

## 2023-11-12 NOTE — ED PROVIDER NOTES
Emergency Department Provider Note       PCP: Shantal Michelle MD   Age: 34 y.o. Sex: male     DISPOSITION Decision To Discharge 11/12/2023 01:45:39 PM       ICD-10-CM    1. Left arm pain  [facility]602 [facility]      2. Acute pain of left shoulder  M25.512 [facility]          Medical Decision Making     Complexity of Problems Addressed:  Complexity of Problem: 1 acute, uncomplicated illness or injury. Data Reviewed and Analyzed:  I independently ordered and reviewed each unique test.             Discussion of management or test interpretation. Well-appearing 66-year-old male presents emergency department today with complaint of anterior left shoulder pain that radiates down the arm into the thumb. He appears in no acute distress. He is neurovascularly intact of the left upper extremity. Appears to have normal range of motion and strength in the shoulder and arm. Through shared decision-making, no imaging obtained today as he has not had any injury or trauma. We discussed conservative treatments with NSAID and steroid. Encouraged follow-up with PCP. We will also refer to orthopedics if needed. Return precautions discussed. Risk of Complications and/or Morbidity of Patient Management:  Prescription drug management performed and Shared medical decision making was utilized in creating the patients health plan today. History      66-year-old male presents emergency department today with complaint of pain in the anterior left shoulder that radiates down the left arm into the thumb. He states that the pain is tingling and numb into his thumb. He denies any falls, trauma, or known injury. He does report previous injuries to the shoulder but denies any surgeries in the past.  He states he was at the gym this morning and was trying to do light weight flies and felt more pain in the shoulder.   He

## 2023-11-16 DIAGNOSIS — F90.0 ATTENTION-DEFICIT HYPERACTIVITY DISORDER, PREDOMINANTLY INATTENTIVE TYPE: ICD-10-CM

## 2023-11-16 RX ORDER — DEXTROAMPHETAMINE SACCHARATE, AMPHETAMINE ASPARTATE, DEXTROAMPHETAMINE SULFATE AND AMPHETAMINE SULFATE 2.5; 2.5; 2.5; 2.5 MG/1; MG/1; MG/1; MG/1
10 TABLET ORAL 2 TIMES DAILY
Qty: 60 TABLET | Refills: 0 | Status: SHIPPED | OUTPATIENT
Start: 2023-11-16 | End: 2023-12-16

## 2023-12-14 ASSESSMENT — PATIENT HEALTH QUESTIONNAIRE - PHQ9
SUM OF ALL RESPONSES TO PHQ QUESTIONS 1-9: 0
1. LITTLE INTEREST OR PLEASURE IN DOING THINGS: NOT AT ALL
SUM OF ALL RESPONSES TO PHQ9 QUESTIONS 1 & 2: 0
1. LITTLE INTEREST OR PLEASURE IN DOING THINGS: 0
SUM OF ALL RESPONSES TO PHQ QUESTIONS 1-9: 0
2. FEELING DOWN, DEPRESSED OR HOPELESS: 0
2. FEELING DOWN, DEPRESSED OR HOPELESS: NOT AT ALL
SUM OF ALL RESPONSES TO PHQ9 QUESTIONS 1 & 2: 0

## 2023-12-15 ENCOUNTER — OFFICE VISIT (OUTPATIENT)
Dept: FAMILY MEDICINE CLINIC | Facility: CLINIC | Age: 38
End: 2023-12-15
Payer: COMMERCIAL

## 2023-12-15 VITALS
WEIGHT: 245 LBS | HEIGHT: 70 IN | DIASTOLIC BLOOD PRESSURE: 88 MMHG | BODY MASS INDEX: 35.07 KG/M2 | SYSTOLIC BLOOD PRESSURE: 135 MMHG | HEART RATE: 78 BPM

## 2023-12-15 DIAGNOSIS — F90.0 ATTENTION-DEFICIT HYPERACTIVITY DISORDER, PREDOMINANTLY INATTENTIVE TYPE: Primary | ICD-10-CM

## 2023-12-15 DIAGNOSIS — I10 PRIMARY HYPERTENSION: ICD-10-CM

## 2023-12-15 DIAGNOSIS — U07.1 COVID-19: ICD-10-CM

## 2023-12-15 LAB
ALBUMIN SERPL-MCNC: 3.7 G/DL (ref 3.5–5)
ALBUMIN/GLOB SERPL: 0.9 (ref 0.4–1.6)
ALP SERPL-CCNC: 94 U/L (ref 50–136)
ALT SERPL-CCNC: 48 U/L (ref 12–65)
ANION GAP SERPL CALC-SCNC: 4 MMOL/L (ref 2–11)
AST SERPL-CCNC: 26 U/L (ref 15–37)
BILIRUB SERPL-MCNC: 0.2 MG/DL (ref 0.2–1.1)
BUN SERPL-MCNC: 12 MG/DL (ref 6–23)
CALCIUM SERPL-MCNC: 8.9 MG/DL (ref 8.3–10.4)
CHLORIDE SERPL-SCNC: 107 MMOL/L (ref 103–113)
CHOLEST SERPL-MCNC: 168 MG/DL
CO2 SERPL-SCNC: 27 MMOL/L (ref 21–32)
CREAT SERPL-MCNC: 1.2 MG/DL (ref 0.8–1.5)
GLOBULIN SER CALC-MCNC: 4.2 G/DL (ref 2.8–4.5)
GLUCOSE SERPL-MCNC: 104 MG/DL (ref 65–100)
HDLC SERPL-MCNC: 33 MG/DL (ref 40–60)
HDLC SERPL: 5.1
LDLC SERPL CALC-MCNC: 106.6 MG/DL
POTASSIUM SERPL-SCNC: 4.3 MMOL/L (ref 3.5–5.1)
PROT SERPL-MCNC: 7.9 G/DL (ref 6.3–8.2)
SODIUM SERPL-SCNC: 138 MMOL/L (ref 136–146)
TRIGL SERPL-MCNC: 142 MG/DL (ref 35–150)
VLDLC SERPL CALC-MCNC: 28.4 MG/DL (ref 6–23)

## 2023-12-15 PROCEDURE — 99214 OFFICE O/P EST MOD 30 MIN: CPT | Performed by: FAMILY MEDICINE

## 2023-12-15 PROCEDURE — 3075F SYST BP GE 130 - 139MM HG: CPT | Performed by: FAMILY MEDICINE

## 2023-12-15 PROCEDURE — 3079F DIAST BP 80-89 MM HG: CPT | Performed by: FAMILY MEDICINE

## 2023-12-15 RX ORDER — DEXTROAMPHETAMINE SACCHARATE, AMPHETAMINE ASPARTATE, DEXTROAMPHETAMINE SULFATE AND AMPHETAMINE SULFATE 2.5; 2.5; 2.5; 2.5 MG/1; MG/1; MG/1; MG/1
10 TABLET ORAL 2 TIMES DAILY
Qty: 60 TABLET | Refills: 0 | Status: SHIPPED | OUTPATIENT
Start: 2023-12-15 | End: 2024-01-14

## 2023-12-18 ENCOUNTER — HOSPITAL ENCOUNTER (OUTPATIENT)
Facility: HOSPITAL | Age: 38
Setting detail: OBSERVATION
Discharge: NURSING, REHAB OR LONG-TERM CARE | End: 2023-12-20
Attending: EMERGENCY MEDICINE | Admitting: INTERNAL MEDICINE
Payer: MEDICAID

## 2023-12-18 DIAGNOSIS — R07.9 CHEST PAIN, UNSPECIFIED TYPE: Primary | ICD-10-CM

## 2023-12-18 DIAGNOSIS — I20.0 UNSTABLE ANGINA: ICD-10-CM

## 2023-12-18 PROCEDURE — 93005 ELECTROCARDIOGRAM TRACING: CPT | Performed by: EMERGENCY MEDICINE

## 2023-12-18 PROCEDURE — 99284 EMERGENCY DEPT VISIT MOD MDM: CPT

## 2023-12-18 PROCEDURE — 93005 ELECTROCARDIOGRAM TRACING: CPT

## 2023-12-18 NOTE — Clinical Note
Level of Care: Telemetry [5]   Admitting Physician: JOIE SHARMA [[id number]]   Attending Physician: JOIE SHARMA [[id number]]   Bed Request Comments: pcu

## 2023-12-19 ENCOUNTER — APPOINTMENT (OUTPATIENT)
Dept: NUCLEAR MEDICINE | Facility: HOSPITAL | Age: 38
End: 2023-12-19
Payer: MEDICAID

## 2023-12-19 ENCOUNTER — APPOINTMENT (OUTPATIENT)
Dept: GENERAL RADIOLOGY | Facility: HOSPITAL | Age: 38
End: 2023-12-19
Payer: MEDICAID

## 2023-12-19 ENCOUNTER — APPOINTMENT (OUTPATIENT)
Dept: CARDIOLOGY | Facility: HOSPITAL | Age: 38
End: 2023-12-19
Payer: MEDICAID

## 2023-12-19 PROBLEM — R07.9 CHEST PAIN: Status: ACTIVE | Noted: 2023-12-19

## 2023-12-19 LAB
ANION GAP SERPL CALCULATED.3IONS-SCNC: 14 MMOL/L (ref 5–15)
APTT PPP: 28.5 SECONDS (ref 61–76.5)
BASOPHILS # BLD AUTO: 0.1 10*3/MM3 (ref 0–0.2)
BASOPHILS NFR BLD AUTO: 1.1 % (ref 0–1.5)
BH CV ECHO MEAS - ACS: 2.3 CM
BH CV ECHO MEAS - AO MAX PG: 7.5 MMHG
BH CV ECHO MEAS - AO MEAN PG: 3 MMHG
BH CV ECHO MEAS - AO V2 MAX: 137 CM/SEC
BH CV ECHO MEAS - AO V2 VTI: 25.8 CM
BH CV ECHO MEAS - AVA(I,D): 2.9 CM2
BH CV ECHO MEAS - EDV(CUBED): 85.2 ML
BH CV ECHO MEAS - EDV(MOD-SP4): 122 ML
BH CV ECHO MEAS - EF(MOD-BP): 66 %
BH CV ECHO MEAS - EF(MOD-SP4): 66.3 %
BH CV ECHO MEAS - ESV(CUBED): 27 ML
BH CV ECHO MEAS - ESV(MOD-SP4): 41.1 ML
BH CV ECHO MEAS - FS: 31.8 %
BH CV ECHO MEAS - IVS/LVPW: 0.91 CM
BH CV ECHO MEAS - IVSD: 1 CM
BH CV ECHO MEAS - LA DIMENSION: 3.5 CM
BH CV ECHO MEAS - LAT PEAK E' VEL: 10.2 CM/SEC
BH CV ECHO MEAS - LV DIASTOLIC VOL/BSA (35-75): 48.6 CM2
BH CV ECHO MEAS - LV MASS(C)D: 158.2 GRAMS
BH CV ECHO MEAS - LV MAX PG: 4.8 MMHG
BH CV ECHO MEAS - LV MEAN PG: 2 MMHG
BH CV ECHO MEAS - LV SYSTOLIC VOL/BSA (12-30): 16.4 CM2
BH CV ECHO MEAS - LV V1 MAX: 109 CM/SEC
BH CV ECHO MEAS - LV V1 VTI: 21.5 CM
BH CV ECHO MEAS - LVIDD: 4.4 CM
BH CV ECHO MEAS - LVIDS: 3 CM
BH CV ECHO MEAS - LVOT AREA: 3.5 CM2
BH CV ECHO MEAS - LVOT DIAM: 2.1 CM
BH CV ECHO MEAS - LVPWD: 1.1 CM
BH CV ECHO MEAS - MED PEAK E' VEL: 7 CM/SEC
BH CV ECHO MEAS - MV A MAX VEL: 64.5 CM/SEC
BH CV ECHO MEAS - MV DEC SLOPE: 348 CM/SEC2
BH CV ECHO MEAS - MV DEC TIME: 0.22 SEC
BH CV ECHO MEAS - MV E MAX VEL: 96.4 CM/SEC
BH CV ECHO MEAS - MV E/A: 1.49
BH CV ECHO MEAS - MV MAX PG: 5.9 MMHG
BH CV ECHO MEAS - MV MEAN PG: 2 MMHG
BH CV ECHO MEAS - MV P1/2T: 106 MSEC
BH CV ECHO MEAS - MV V2 VTI: 33 CM
BH CV ECHO MEAS - MVA(P1/2T): 2.07 CM2
BH CV ECHO MEAS - MVA(VTI): 2.26 CM2
BH CV ECHO MEAS - PA ACC TIME: 0.11 SEC
BH CV ECHO MEAS - PA V2 MAX: 101 CM/SEC
BH CV ECHO MEAS - PULM DIAS VEL: 61 CM/SEC
BH CV ECHO MEAS - PULM S/D: 1.17
BH CV ECHO MEAS - PULM SYS VEL: 71.1 CM/SEC
BH CV ECHO MEAS - RAP SYSTOLE: 3 MMHG
BH CV ECHO MEAS - RV MAX PG: 2.25 MMHG
BH CV ECHO MEAS - RV V1 MAX: 75 CM/SEC
BH CV ECHO MEAS - RV V1 VTI: 17.9 CM
BH CV ECHO MEAS - RVDD: 3.1 CM
BH CV ECHO MEAS - RVSP: 13.6 MMHG
BH CV ECHO MEAS - SI(MOD-SP4): 32.2 ML/M2
BH CV ECHO MEAS - SV(LVOT): 74.5 ML
BH CV ECHO MEAS - SV(MOD-SP4): 80.9 ML
BH CV ECHO MEAS - TR MAX PG: 10.6 MMHG
BH CV ECHO MEAS - TR MAX VEL: 163 CM/SEC
BH CV ECHO MEASUREMENTS AVERAGE E/E' RATIO: 11.21
BH CV NUCLEAR PRIOR STUDY: 3
BH CV REST NUCLEAR ISOTOPE DOSE: 11 MCI
BH CV STRESS BP STAGE 1: NORMAL
BH CV STRESS BP STAGE 2: NORMAL
BH CV STRESS COMMENTS STAGE 1: NORMAL
BH CV STRESS COMMENTS STAGE 2: NORMAL
BH CV STRESS DOSE REGADENOSON STAGE 1: 0.4
BH CV STRESS DURATION MIN STAGE 1: 0
BH CV STRESS DURATION MIN STAGE 2: 4
BH CV STRESS DURATION SEC STAGE 1: 10
BH CV STRESS DURATION SEC STAGE 2: 0
BH CV STRESS HR STAGE 1: 81
BH CV STRESS HR STAGE 2: 90
BH CV STRESS NUCLEAR ISOTOPE DOSE: 33 MCI
BH CV STRESS PROTOCOL 1: NORMAL
BH CV STRESS RECOVERY BP: NORMAL MMHG
BH CV STRESS RECOVERY HR: 84 BPM
BH CV STRESS STAGE 1: 1
BH CV STRESS STAGE 2: 2
BH CV XLRA - TDI S': 11.4 CM/SEC
BUN SERPL-MCNC: 11 MG/DL (ref 6–20)
BUN/CREAT SERPL: 9.4 (ref 7–25)
CALCIUM SPEC-SCNC: 9.4 MG/DL (ref 8.6–10.5)
CHLORIDE SERPL-SCNC: 99 MMOL/L (ref 98–107)
CO2 SERPL-SCNC: 24 MMOL/L (ref 22–29)
CREAT SERPL-MCNC: 1.17 MG/DL (ref 0.76–1.27)
DEPRECATED RDW RBC AUTO: 40.7 FL (ref 37–54)
EGFRCR SERPLBLD CKD-EPI 2021: 80.8 ML/MIN/1.73
EOSINOPHIL # BLD AUTO: 0 10*3/MM3 (ref 0–0.4)
EOSINOPHIL NFR BLD AUTO: 0.5 % (ref 0.3–6.2)
ERYTHROCYTE [DISTWIDTH] IN BLOOD BY AUTOMATED COUNT: 13.4 % (ref 12.3–15.4)
GEN 5 2HR TROPONIN T REFLEX: <6 NG/L
GLUCOSE SERPL-MCNC: 111 MG/DL (ref 65–99)
HCT VFR BLD AUTO: 43.2 % (ref 37.5–51)
HGB BLD-MCNC: 14.5 G/DL (ref 13–17.7)
HOLD SPECIMEN: NORMAL
INR PPP: 0.97 (ref 0.93–1.1)
LEFT ATRIUM VOLUME INDEX: 16.2 ML/M2
LV EF NUC BP: 84 %
LYMPHOCYTES # BLD AUTO: 1.7 10*3/MM3 (ref 0.7–3.1)
LYMPHOCYTES NFR BLD AUTO: 24.4 % (ref 19.6–45.3)
MAXIMAL PREDICTED HEART RATE: 180 BPM
MCH RBC QN AUTO: 29.5 PG (ref 26.6–33)
MCHC RBC AUTO-ENTMCNC: 33.6 G/DL (ref 31.5–35.7)
MCV RBC AUTO: 87.7 FL (ref 79–97)
MONOCYTES # BLD AUTO: 0.4 10*3/MM3 (ref 0.1–0.9)
MONOCYTES NFR BLD AUTO: 5.1 % (ref 5–12)
NEUTROPHILS NFR BLD AUTO: 4.9 10*3/MM3 (ref 1.7–7)
NEUTROPHILS NFR BLD AUTO: 68.9 % (ref 42.7–76)
NRBC BLD AUTO-RTO: 0 /100 WBC (ref 0–0.2)
PERCENT MAX PREDICTED HR: 50 %
PLATELET # BLD AUTO: 299 10*3/MM3 (ref 140–450)
PMV BLD AUTO: 7.1 FL (ref 6–12)
POTASSIUM SERPL-SCNC: 4.1 MMOL/L (ref 3.5–5.2)
PROTHROMBIN TIME: 10.6 SECONDS (ref 9.6–11.7)
QT INTERVAL: 360 MS
QTC INTERVAL: 427 MS
RBC # BLD AUTO: 4.92 10*6/MM3 (ref 4.14–5.8)
SINUS: 2.8 CM
SODIUM SERPL-SCNC: 137 MMOL/L (ref 136–145)
STRESS BASELINE BP: NORMAL MMHG
STRESS BASELINE HR: 66 BPM
STRESS PERCENT HR: 59 %
STRESS POST PEAK BP: NORMAL MMHG
STRESS POST PEAK HR: 90 BPM
STRESS TARGET HR: 153 BPM
TROPONIN T DELTA: NORMAL
TROPONIN T SERPL HS-MCNC: <6 NG/L
TROPONIN T SERPL HS-MCNC: <6 NG/L
WBC NRBC COR # BLD AUTO: 7.1 10*3/MM3 (ref 3.4–10.8)

## 2023-12-19 PROCEDURE — 96365 THER/PROPH/DIAG IV INF INIT: CPT

## 2023-12-19 PROCEDURE — 96375 TX/PRO/DX INJ NEW DRUG ADDON: CPT

## 2023-12-19 PROCEDURE — 96376 TX/PRO/DX INJ SAME DRUG ADON: CPT

## 2023-12-19 PROCEDURE — 85730 THROMBOPLASTIN TIME PARTIAL: CPT | Performed by: EMERGENCY MEDICINE

## 2023-12-19 PROCEDURE — 85025 COMPLETE CBC W/AUTO DIFF WBC: CPT | Performed by: EMERGENCY MEDICINE

## 2023-12-19 PROCEDURE — G0378 HOSPITAL OBSERVATION PER HR: HCPCS

## 2023-12-19 PROCEDURE — 93017 CV STRESS TEST TRACING ONLY: CPT

## 2023-12-19 PROCEDURE — 25010000002 REGADENOSON 0.4 MG/5ML SOLUTION: Performed by: INTERNAL MEDICINE

## 2023-12-19 PROCEDURE — 25010000002 ONDANSETRON PER 1 MG: Performed by: NURSE PRACTITIONER

## 2023-12-19 PROCEDURE — 0 TECHNETIUM TETROFOSMIN KIT: Performed by: INTERNAL MEDICINE

## 2023-12-19 PROCEDURE — 93306 TTE W/DOPPLER COMPLETE: CPT

## 2023-12-19 PROCEDURE — A9502 TC99M TETROFOSMIN: HCPCS | Performed by: INTERNAL MEDICINE

## 2023-12-19 PROCEDURE — 85610 PROTHROMBIN TIME: CPT | Performed by: EMERGENCY MEDICINE

## 2023-12-19 PROCEDURE — 93306 TTE W/DOPPLER COMPLETE: CPT | Performed by: STUDENT IN AN ORGANIZED HEALTH CARE EDUCATION/TRAINING PROGRAM

## 2023-12-19 PROCEDURE — 99284 EMERGENCY DEPT VISIT MOD MDM: CPT

## 2023-12-19 PROCEDURE — 25010000002 HYDROMORPHONE 1 MG/ML SOLUTION: Performed by: INTERNAL MEDICINE

## 2023-12-19 PROCEDURE — 78452 HT MUSCLE IMAGE SPECT MULT: CPT | Performed by: STUDENT IN AN ORGANIZED HEALTH CARE EDUCATION/TRAINING PROGRAM

## 2023-12-19 PROCEDURE — 78452 HT MUSCLE IMAGE SPECT MULT: CPT

## 2023-12-19 PROCEDURE — 84484 ASSAY OF TROPONIN QUANT: CPT | Performed by: NURSE PRACTITIONER

## 2023-12-19 PROCEDURE — 25010000002 NITROGLYCERIN 200 MCG/ML SOLUTION: Performed by: EMERGENCY MEDICINE

## 2023-12-19 PROCEDURE — 96366 THER/PROPH/DIAG IV INF ADDON: CPT

## 2023-12-19 PROCEDURE — 36415 COLL VENOUS BLD VENIPUNCTURE: CPT | Performed by: NURSE PRACTITIONER

## 2023-12-19 PROCEDURE — 99214 OFFICE O/P EST MOD 30 MIN: CPT | Performed by: STUDENT IN AN ORGANIZED HEALTH CARE EDUCATION/TRAINING PROGRAM

## 2023-12-19 PROCEDURE — 93018 CV STRESS TEST I&R ONLY: CPT | Performed by: STUDENT IN AN ORGANIZED HEALTH CARE EDUCATION/TRAINING PROGRAM

## 2023-12-19 PROCEDURE — 25010000002 SULFUR HEXAFLUORIDE MICROSPH 60.7-25 MG RECONSTITUTED SUSPENSION: Performed by: INTERNAL MEDICINE

## 2023-12-19 PROCEDURE — 84484 ASSAY OF TROPONIN QUANT: CPT | Performed by: EMERGENCY MEDICINE

## 2023-12-19 PROCEDURE — 25010000002 HYDROMORPHONE 1 MG/ML SOLUTION: Performed by: NURSE PRACTITIONER

## 2023-12-19 PROCEDURE — 71045 X-RAY EXAM CHEST 1 VIEW: CPT

## 2023-12-19 PROCEDURE — 80048 BASIC METABOLIC PNL TOTAL CA: CPT | Performed by: EMERGENCY MEDICINE

## 2023-12-19 PROCEDURE — 25010000002 PROCHLORPERAZINE 10 MG/2ML SOLUTION: Performed by: INTERNAL MEDICINE

## 2023-12-19 RX ORDER — ONDANSETRON 2 MG/ML
4 INJECTION INTRAMUSCULAR; INTRAVENOUS EVERY 6 HOURS PRN
Status: DISCONTINUED | OUTPATIENT
Start: 2023-12-19 | End: 2023-12-20 | Stop reason: HOSPADM

## 2023-12-19 RX ORDER — ISOSORBIDE MONONITRATE 60 MG/1
120 TABLET, EXTENDED RELEASE ORAL
Status: DISCONTINUED | OUTPATIENT
Start: 2023-12-19 | End: 2023-12-20 | Stop reason: HOSPADM

## 2023-12-19 RX ORDER — ASPIRIN 325 MG
325 TABLET ORAL ONCE
Status: COMPLETED | OUTPATIENT
Start: 2023-12-19 | End: 2023-12-19

## 2023-12-19 RX ORDER — LEVOTHYROXINE SODIUM 0.05 MG/1
50 TABLET ORAL
Status: DISCONTINUED | OUTPATIENT
Start: 2023-12-20 | End: 2023-12-20 | Stop reason: HOSPADM

## 2023-12-19 RX ORDER — METOPROLOL TARTRATE 50 MG/1
50 TABLET, FILM COATED ORAL ONCE
Status: COMPLETED | OUTPATIENT
Start: 2023-12-19 | End: 2023-12-19

## 2023-12-19 RX ORDER — PROCHLORPERAZINE EDISYLATE 5 MG/ML
10 INJECTION INTRAMUSCULAR; INTRAVENOUS EVERY 6 HOURS PRN
Status: DISCONTINUED | OUTPATIENT
Start: 2023-12-19 | End: 2023-12-20 | Stop reason: HOSPADM

## 2023-12-19 RX ORDER — SODIUM CHLORIDE 0.9 % (FLUSH) 0.9 %
10 SYRINGE (ML) INJECTION AS NEEDED
Status: DISCONTINUED | OUTPATIENT
Start: 2023-12-19 | End: 2023-12-20 | Stop reason: HOSPADM

## 2023-12-19 RX ORDER — NITROGLYCERIN 0.4 MG/1
0.4 TABLET SUBLINGUAL
Status: DISCONTINUED | OUTPATIENT
Start: 2023-12-19 | End: 2023-12-20 | Stop reason: HOSPADM

## 2023-12-19 RX ORDER — REGADENOSON 0.08 MG/ML
0.4 INJECTION, SOLUTION INTRAVENOUS
Status: COMPLETED | OUTPATIENT
Start: 2023-12-19 | End: 2023-12-19

## 2023-12-19 RX ORDER — LORAZEPAM 2 MG/ML
1 INJECTION INTRAMUSCULAR ONCE AS NEEDED
Status: COMPLETED | OUTPATIENT
Start: 2023-12-19 | End: 2023-12-20

## 2023-12-19 RX ORDER — PANTOPRAZOLE SODIUM 40 MG/1
40 TABLET, DELAYED RELEASE ORAL
Status: DISCONTINUED | OUTPATIENT
Start: 2023-12-20 | End: 2023-12-20 | Stop reason: HOSPADM

## 2023-12-19 RX ORDER — CILOSTAZOL 100 MG/1
50 TABLET ORAL 2 TIMES DAILY
Status: DISCONTINUED | OUTPATIENT
Start: 2023-12-19 | End: 2023-12-20 | Stop reason: HOSPADM

## 2023-12-19 RX ORDER — CILOSTAZOL 100 MG/1
50 TABLET ORAL ONCE
Status: COMPLETED | OUTPATIENT
Start: 2023-12-19 | End: 2023-12-19

## 2023-12-19 RX ORDER — LISINOPRIL 5 MG/1
5 TABLET ORAL
Status: DISCONTINUED | OUTPATIENT
Start: 2023-12-19 | End: 2023-12-20 | Stop reason: HOSPADM

## 2023-12-19 RX ORDER — METOPROLOL SUCCINATE 50 MG/1
50 TABLET, EXTENDED RELEASE ORAL
Status: DISCONTINUED | OUTPATIENT
Start: 2023-12-19 | End: 2023-12-20

## 2023-12-19 RX ORDER — ASPIRIN 81 MG/1
81 TABLET ORAL DAILY
Status: DISCONTINUED | OUTPATIENT
Start: 2023-12-20 | End: 2023-12-20 | Stop reason: HOSPADM

## 2023-12-19 RX ORDER — NITROGLYCERIN 20 MG/100ML
10-50 INJECTION INTRAVENOUS
Status: DISCONTINUED | OUTPATIENT
Start: 2023-12-19 | End: 2023-12-19

## 2023-12-19 RX ORDER — RANOLAZINE 500 MG/1
1000 TABLET, EXTENDED RELEASE ORAL ONCE
Status: COMPLETED | OUTPATIENT
Start: 2023-12-19 | End: 2023-12-19

## 2023-12-19 RX ORDER — OXYCODONE HYDROCHLORIDE 5 MG/1
5 TABLET ORAL EVERY 4 HOURS PRN
Status: DISCONTINUED | OUTPATIENT
Start: 2023-12-19 | End: 2023-12-19

## 2023-12-19 RX ADMIN — HYDROMORPHONE HYDROCHLORIDE 0.5 MG: 1 INJECTION, SOLUTION INTRAMUSCULAR; INTRAVENOUS; SUBCUTANEOUS at 01:52

## 2023-12-19 RX ADMIN — ASPIRIN 325 MG ORAL TABLET 325 MG: 325 PILL ORAL at 00:29

## 2023-12-19 RX ADMIN — TETROFOSMIN 1 DOSE: 1.38 INJECTION, POWDER, LYOPHILIZED, FOR SOLUTION INTRAVENOUS at 12:45

## 2023-12-19 RX ADMIN — ISOSORBIDE MONONITRATE 120 MG: 60 TABLET, EXTENDED RELEASE ORAL at 14:39

## 2023-12-19 RX ADMIN — NITROGLYCERIN 50 MCG/MIN: 20 INJECTION INTRAVENOUS at 09:03

## 2023-12-19 RX ADMIN — HYDROMORPHONE HYDROCHLORIDE 0.5 MG: 1 INJECTION, SOLUTION INTRAMUSCULAR; INTRAVENOUS; SUBCUTANEOUS at 05:08

## 2023-12-19 RX ADMIN — LISINOPRIL 5 MG: 5 TABLET ORAL at 18:15

## 2023-12-19 RX ADMIN — SULFUR HEXAFLUORIDE 5 ML: KIT at 13:54

## 2023-12-19 RX ADMIN — REGADENOSON 0.4 MG: 0.08 INJECTION, SOLUTION INTRAVENOUS at 12:45

## 2023-12-19 RX ADMIN — PROCHLORPERAZINE EDISYLATE 10 MG: 5 INJECTION INTRAMUSCULAR; INTRAVENOUS at 21:39

## 2023-12-19 RX ADMIN — TETROFOSMIN 1 DOSE: 1.38 INJECTION, POWDER, LYOPHILIZED, FOR SOLUTION INTRAVENOUS at 11:43

## 2023-12-19 RX ADMIN — ONDANSETRON 4 MG: 2 INJECTION INTRAMUSCULAR; INTRAVENOUS at 09:00

## 2023-12-19 RX ADMIN — RANOLAZINE 1000 MG: 500 TABLET, EXTENDED RELEASE ORAL at 01:13

## 2023-12-19 RX ADMIN — CILOSTAZOL 50 MG: 100 TABLET ORAL at 01:14

## 2023-12-19 RX ADMIN — HYDROMORPHONE HYDROCHLORIDE 0.5 MG: 1 INJECTION, SOLUTION INTRAMUSCULAR; INTRAVENOUS; SUBCUTANEOUS at 14:40

## 2023-12-19 RX ADMIN — METOPROLOL TARTRATE 50 MG: 50 TABLET ORAL at 01:13

## 2023-12-19 RX ADMIN — ONDANSETRON 4 MG: 2 INJECTION INTRAMUSCULAR; INTRAVENOUS at 15:12

## 2023-12-19 RX ADMIN — NITROGLYCERIN 10 MCG/MIN: 20 INJECTION INTRAVENOUS at 00:26

## 2023-12-19 RX ADMIN — METOPROLOL SUCCINATE 50 MG: 50 TABLET, EXTENDED RELEASE ORAL at 18:15

## 2023-12-19 RX ADMIN — ONDANSETRON 4 MG: 2 INJECTION INTRAMUSCULAR; INTRAVENOUS at 02:33

## 2023-12-19 RX ADMIN — CILOSTAZOL 50 MG: 100 TABLET ORAL at 21:23

## 2023-12-19 NOTE — PLAN OF CARE
Problem: Pain Acute  Goal: Acceptable Pain Control and Functional Ability  Outcome: Ongoing, Progressing   Goal Outcome Evaluation:            Patient arrived to the unit and a complete head to toe assessment was completed as well as a skin assessment. The patient did not have any wounds. The patient remains on room air. The patient complained of nausea and was given PRN medication. See MAR. Will continue to monitor.

## 2023-12-19 NOTE — PLAN OF CARE
Goal Outcome EvaluationPt cont on NTG gtt at 40 mcq.  States c/po is still on going but improved. BP tolerating well.  Will cont to monitor

## 2023-12-19 NOTE — CONSULTS
[facility]        Subjective:     Encounter Date:12/18/2023      Patient ID: Tramaine Gates is a 38 y.o. male.    Chief Complaint: chest pain    Referring Physician: Long Padilla MD     HPI:  Tramaine Gates is a 38 y.o. male who presents with chest pain.  Mr. Gates is a patient of Dr. Haddad. Pmh includes early CAD s/p CABG, HTN, hLD, Bipolar disorder, panic attacks.     In June 2022 the patient had a ST segment elevation MI.  He had drug-eluting stent placement to the RCA.  There was 80 to 90% stenosis in the LAD, left circumflex had 40 to 50% in the distal left circumflex/OM.  He had repeat admission 5 days later showing in-stent thrombosis and he was sent for CABG.  On June 29, 2022 he underwent three-vessel CABG with LIMA to the LAD, saphenous vein graft to the OM branch of the circumflex and saphenous vein graft to the RPDA.     In March 2023 he presented with complaints of chest pain worrisome for unstable angina.  He underwent cardiac catheterization and had balloon angioplasty of the distal portion of the saphenous vein graft to the RCA inside the stented segment other vascular areas the LIMA to the LAD, native LAD, circumflex and native RCA were unchanged angiographically compared to his previous cath.     He was started on Ranexa, long-acting nitroglycerin and Effient was continued as well as Pletal and aspirin added.    Mr. Gates presents with chest pain that radiates down arm and neck. He has experienced this discomfort intermittently over the last several days. He has mild shortness of breath as well. He is also nauseated. Work up reveals normal HS troponin  No acute ischemic changes  He was on nitro gtt initially.  Increased imdur.       Past Medical History:   Diagnosis Date    Acute inferior myocardial infarction 06/14/2022    Allergic     Asthma 01/27/2022    CAD, multiple vessel 06/14/2022    Gastroesophageal reflux disease 01/27/2022    Hx of complete eye exam 2016    Hyperlipidemia  01/27/2022    Hypertension     Migraine headache 01/27/2022    Panic attack 01/27/2022    Peptic ulcer 09/14/2017       Past Surgical History:   Procedure Laterality Date    CARDIAC CATHETERIZATION N/A 06/14/2022    Procedure: Left Heart Cath;  Surgeon: Matthieu Del Toro MD;  Location:  [facility];  Service: Cardiology;  Laterality: N/A;    CARDIAC CATHETERIZATION N/A 06/16/2022    Procedure: Percutaneous Coronary Intervention;  Surgeon: Matthieu Del Toro MD;  Location:  [facility];  Service: Cardiovascular;  Laterality: N/A;    CARDIAC CATHETERIZATION N/A 06/23/2022    Procedure: Left Heart Cath possible PCI, atherectomy, hemodynamic support;  Surgeon: Moises Haddad MD;  Location:  [facility];  Service: Cardiology;  Laterality: N/A;    CARDIAC CATHETERIZATION N/A 09/15/2022    Procedure: Left Heart Cath;  Surgeon: Moises Haddad MD;  Location:  [facility];  Service: Cardiology;  Laterality: N/A;    CARDIAC CATHETERIZATION  9/15/2022    CARDIAC CATHETERIZATION N/A 3/2/2023    Procedure: Left Heart Cath;  Surgeon: Moises Haddad MD;  Location:  [facility];  Service: Cardiology;  Laterality: N/A;    CHOLECYSTECTOMY  2019    CORONARY ARTERY BYPASS GRAFT N/A 06/29/2022    Procedure: CORONARY ARTERY BYPASS GRAFTING;  Surgeon: Pablo Ball MD;  Location: [facility];  Service: Cardiothoracic;  Laterality: N/A;  CABG X 3(2 vein grafts, 1 LIMA graft)    CORONARY ARTERY BYPASS GRAFT  6/29/2022    CORONARY STENT PLACEMENT      MANDIBLE SURGERY         Family History   Problem Relation Age of Onset    Heart disease Mother     Heart attack Mother     Hypertension Mother     Heart failure Father     Cirrhosis Maternal Grandfather     Heart attack Maternal Grandmother         a       Social History     Socioeconomic History    Marital status: Single   Tobacco Use    Smoking status: Former     Packs/day: 1.50  "    Years: 23.00     Additional pack years: 0.00     Total pack years: 34.50     Types: Cigarettes     Start date: 1996     Quit date: 2022     Years since quittin.4    Smokeless tobacco: Never   Vaping Use    Vaping Use: Never used   Substance and Sexual Activity    Alcohol use: Not Currently    Drug use: Yes     Frequency: 7.0 times per week     Types: Marijuana    Sexual activity: Yes     Partners: Female         Allergies   Allergen Reactions    Shellfish-Derived Products Unknown - High Severity       Current Medications:   Scheduled Meds:   Continuous Infusions:nitroglycerin, 10-50 mcg/min, Last Rate: 40 mcg/min (23 7178)        Review of Systems   Constitutional: Negative for chills, diaphoresis and malaise/fatigue.   Cardiovascular:  Positive for chest pain. Negative for dyspnea on exertion, irregular heartbeat, leg swelling, near-syncope, orthopnea, palpitations, paroxysmal nocturnal dyspnea and syncope.   Respiratory:  Positive for shortness of breath. Negative for cough, sleep disturbances due to breathing and sputum production.    Gastrointestinal:  Positive for nausea. Negative for change in bowel habit.   Genitourinary:  Negative for urgency.   Neurological:  Negative for dizziness and headaches.   Psychiatric/Behavioral:  Negative for altered mental status.             Objective:         /92 (BP Location: Left arm, Patient Position: Lying)   Pulse 56   Temp 97.5 °F (36.4 °C) (Oral)   Resp 14   Ht 177.8 cm (70\")   Wt (!) 140 kg (309 lb 4.9 oz)   SpO2 94%   BMI 44.38 kg/m²     Physical Exam:  General Appearance:    Alert, cooperative, in no acute distress                                Head: Atraumatic, normocephalic, PERRLA               Neck:   supple, trachea midline, no thyromegaly, no carotid bruit, no JVD   Lungs:     Clear to auscultation,respirations regular, even and               unlabored    Heart:    Regular rhythm and normal rate, normal S1 and S2, no       " "murmur, no gallop, no rub, no click   Abdomen:     Normal bowel sounds, no masses, no organomegaly, soft  nontender, nondistended, no guarding, no rebound  tenderness   Extremities:   Moves all extremities well, no edema, no cyanosis, no  redness   Pulses:   Pulses palpable and equal bilaterally   Skin:   No bleeding, bruising or rash   Neurologic:   Awake, alert, oriented x3                 ASCVD Risk Score::  The ASCVD Risk score (Nigel BAKER, et al., 2019) failed to calculate for the following reasons:    The patient has a prior MI or stroke diagnosis      Lab Review:     Results from last 7 days   Lab Units 12/19/23  0020   SODIUM mmol/L 137   POTASSIUM mmol/L 4.1   CHLORIDE mmol/L 99   CO2 mmol/L 24.0   BUN mg/dL 11   CREATININE mg/dL 1.17   GLUCOSE mg/dL 111*   CALCIUM mg/dL 9.4     Results from last 7 days   Lab Units 12/19/23  0020   HSTROP T ng/L <6     Results from last 7 days   Lab Units 12/19/23  0020   WBC 10*3/mm3 7.10   HEMOGLOBIN g/dL 14.5   HEMATOCRIT % 43.2   PLATELETS 10*3/mm3 299     Results from last 7 days   Lab Units 12/19/23  0020   INR  0.97   APTT seconds 28.5*               Invalid input(s): \"LDLCALC\"            Recent Radiology:  Imaging Results (Most Recent)       Procedure Component Value Units Date/Time    XR Chest 1 View [[id number]] Collected: 12/19/23 0103     Updated: 12/19/23 0105    Narrative:      XR CHEST 1 VW    Date of Exam: 12/19/2023 1:00 AM EST    Indication: Chest pain    Comparison: 3/1/2023.    Findings:  There are no airspace consolidations. No pleural fluid. No pneumothorax. The pulmonary vasculature appears within normal limits. The cardiac and mediastinal silhouette appear unremarkable. No acute osseous abnormality identified. Median sternotomy wires   are present.      Impression:      Impression:  No acute cardiopulmonary process.      Electronically Signed: Lani Espinosa MD    12/19/2023 1:03 AM EST    Workstation ID: [id number]              ECHOCARDIOGRAM:    Results " for orders placed during the hospital encounter of 06/22/22    Adult Transthoracic Echo Limited W/ Cont if Necessary Per Protocol    Interpretation Summary  · Left ventricular systolic function is normal.  · Left ventricular ejection fraction is 55 to 60%  · Basal inferior wall aneurysm is noted                  Assessment:         Active Hospital Problems    Diagnosis  POA    Chest pain [R07.9]  Yes    S/P CABG x 3 [Z95.1]  Not Applicable    Chest pain, unspecified type [R07.9]  Yes    Bipolar II disorder [F31.81]  Yes    Generalized anxiety disorder with panic attacks [F41.1, F41.0]  Yes    CAD, multiple vessel [I25.10]  Yes     Added automatically from request for surgery [id number]      Disorder of thyroid [E07.9]  Yes    Gastroesophageal reflux disease [K21.9]  Yes    Hyperlipidemia [E78.5]  Yes    Hypertension [I10]  Yes     1. Chest Pain     2. CAD  - s/p STEMI 6/14/2022: LHC showed three vessel CAD- he underwent ASHLEE placement to the culprit lesion in the RCA.  LAD had 80-90% proximal stenosis and LCx had 40-50% in distal LCx/OM.  Mr. Gates underwent repeat cardiac cath on 6/16/2022 and received ASHLEE to LAD  - repeat admission 5 days later showing in stent thrombosis sent for CABG  - s/p 3V CABG 6/29/2022 (LIMA-LAD, SVG-OMofLCx, SVG-RPDA)  - admission 3/2023 s/p POBA to the distal portion of the SVG to RCA inside the stented segment secondary to what appears to be greater than 70% in-stent restenosis distally versus subocclusive thrombus, ostial proximal greater than 50% restenosis, reduced to less than 10% distally, remains about 20% proximally at the ostium, improved angiographic runoff which was DARRYL II preoperatively     3.  HTN     4. HLD     5. Obesity     Plan:   CE negative, obtain another troponin  Increase ZOE, increase imdur to 120mg daily, continue ranexa  Will check 2D ECHO  Stress test ordered once second troponin resulted         YOUSIF Garces  12/19/23  08:51 EST    Addendum  Patient feeling  nauseous, mentions he has not had anything to eat for about 2 days, with opioids given in the hospital for chest pain.  Currently chest pain-free.  Echocardiogram with normal ejection fraction with no obvious regional wall motion abnormalities.  Nuclear stress test without evidence of myocardial ischemia.  Will optimize antianginals.  Will reassess tomorrow    Patient seen and examined and findings are verified.  All data is reviewed by me personally.  Assessment and plan formulated by advanced practitioner was done after discussion with attending.  I spent more than 50% of time in taking care of the patient.    Soni Vu MD

## 2023-12-19 NOTE — ED PROVIDER NOTES
Subjective   History of Present Illness  Chief complaint chest pain    History of present illness this is a 38-year-old gentleman who has history of heart disease with bypass surgery and 9 stents complaint of 1 week history of increasing tightness in the chest and neck and left arm shortness of breath.  Intermittently 1 week better with nitroglycerin but recurs with exertion.  He denies any fever chills sweats cough congestion recent injury illness flus viruses or vaccinations no leg pain or swelling.      Review of Systems   Constitutional:  Negative for chills and fever.   Respiratory:  Positive for chest tightness and shortness of breath.    Cardiovascular:  Positive for chest pain. Negative for palpitations.   Gastrointestinal:  Negative for abdominal pain and vomiting.   Musculoskeletal:  Positive for neck pain. Negative for back pain.   Skin:  Negative for wound.   Neurological:  Negative for dizziness and light-headedness.   Psychiatric/Behavioral:  Negative for confusion.        Past Medical History:   Diagnosis Date    Acute inferior myocardial infarction 06/14/2022    Allergic     Asthma 01/27/2022    CAD, multiple vessel 06/14/2022    Gastroesophageal reflux disease 01/27/2022    Hx of complete eye exam 2016    Hyperlipidemia 01/27/2022    Hypertension     Migraine headache 01/27/2022    Panic attack 01/27/2022    Peptic ulcer 09/14/2017       Allergies   Allergen Reactions    Shellfish-Derived Products Unknown - High Severity       Past Surgical History:   Procedure Laterality Date    CARDIAC CATHETERIZATION N/A 06/14/2022    Procedure: Left Heart Cath;  Surgeon: Matthieu Del Toro MD;  Location: [facility] CATH INVASIVE LOCATION;  Service: Cardiology;  Laterality: N/A;    CARDIAC CATHETERIZATION N/A 06/16/2022    Procedure: Percutaneous Coronary Intervention;  Surgeon: Matthieu Del Toro MD;  Location: [facility] CATH INVASIVE LOCATION;  Service: Cardiovascular;  Laterality: N/A;    CARDIAC CATHETERIZATION N/A 06/23/2022     Procedure: Left Heart Cath possible PCI, atherectomy, hemodynamic support;  Surgeon: Moises Haddad MD;  Location: [facility] CATH INVASIVE LOCATION;  Service: Cardiology;  Laterality: N/A;    CARDIAC CATHETERIZATION N/A 09/15/2022    Procedure: Left Heart Cath;  Surgeon: Moises Haddad MD;  Location: [facility] CATH INVASIVE LOCATION;  Service: Cardiology;  Laterality: N/A;    CARDIAC CATHETERIZATION  9/15/2022    CARDIAC CATHETERIZATION N/A 3/2/2023    Procedure: Left Heart Cath;  Surgeon: Moises Haddad MD;  Location: [facility] CATH INVASIVE LOCATION;  Service: Cardiology;  Laterality: N/A;    CHOLECYSTECTOMY  2019    CORONARY ARTERY BYPASS GRAFT N/A 2022    Procedure: CORONARY ARTERY BYPASS GRAFTING;  Surgeon: Pablo Ball MD;  Location: [facility] CV[facility];  Service: Cardiothoracic;  Laterality: N/A;  CABG X 3(2 vein grafts, 1 LIMA graft)    CORONARY ARTERY BYPASS GRAFT  2022    CORONARY STENT PLACEMENT      MANDIBLE SURGERY         Family History   Problem Relation Age of Onset    Heart disease Mother     Heart attack Mother     Hypertension Mother     Heart failure Father     Cirrhosis Maternal Grandfather     Heart attack Maternal Grandmother         a       Social History     Socioeconomic History    Marital status: Single   Tobacco Use    Smoking status: Former     Packs/day: 1.50     Years: 23.00     Additional pack years: 0.00     Total pack years: 34.50     Types: Cigarettes     Start date: 1996     Quit date: 2022     Years since quittin.4    Smokeless tobacco: Never   Vaping Use    Vaping Use: Never used   Substance and Sexual Activity    Alcohol use: Not Currently    Drug use: Yes     Frequency: 7.0 times per week     Types: Marijuana    Sexual activity: Yes     Partners: Female     Prior to Admission medications    Medication Sig Start Date End Date Taking? Authorizing Provider   albuterol sulfate  (90 Base) MCG/ACT inhaler Inhale 2 puffs by  mouth as directed every four to six hours as needed for coughing, shortness of breath, wheezing 12/11/23      aspirin (Aspirin Low Dose) 81 MG EC tablet Take 1 tablet by mouth Daily. 6/2/23   Moises Haddad MD   aspirin (Aspirin Low Dose) 81 MG EC tablet Take 1 tablet by mouth Daily. 11/7/23   Moises Haddad MD   cilostazol (PLETAL) 50 MG tablet Take 1 tablet by mouth 2 (Two) Times a Day. 9/26/23   Pat Ruiz APRN   Evolocumab (Repatha SureClick) solution auto-injector SureClick injection Inject 140 mg total (1 mL) into the skin every 14 (fourteen) days. 9/26/23   Pat Ruiz APRN   isosorbide mononitrate (IMDUR) 60 MG 24 hr tablet Take 1 tablet by mouth daily. 9/26/23   Pat Ruiz APRN   levothyroxine (SYNTHROID, LEVOTHROID) 50 MCG tablet TAKE 1 TABLET BY MOUTH EVERY DAY 9/28/23      lisinopril (PRINIVIL,ZESTRIL) 5 MG tablet Take 1 tablet by mouth Daily. 9/26/23   Pat Ruiz APRN   metoprolol tartrate (LOPRESSOR) 50 MG tablet Take 1 tablet by mouth 2 (Two) Times a Day. 9/26/23   Pat Ruiz APRN   nitroglycerin (NITROSTAT) 0.4 MG SL tablet Place 1 tablet under the tongue Every 5 (Five) Minutes As Needed for Chest Pain (Only if SBP Greater Than 100). Take no more than 3 doses in 15 minutes. 11/2/23   Moises Haddad MD   omeprazole (priLOSEC) 40 MG capsule Daily.    ProviderAdolfo MD   prasugrel (EFFIENT) 10 MG tablet Take 1 tablet by mouth Daily. Take 6 tablets by mouth for the first dose and then take one tablet by mouth daily thereafter. 9/26/23   Pat Ruiz APRN   ranolazine (RANEXA) 1000 MG 12 hr tablet Take 1 tablet by mouth 2 (Two) Times a Day. 9/26/23   Pat Ruiz APRN   rosuvastatin (CRESTOR) 40 MG tablet Take 1 tablet by mouth daily. 9/26/23   Pat Ruiz APRN   Semaglutide-Weight Management (Wegovy) 0.25 MG/0.5ML solution auto-injector Inject 1/4 mg subcutaneously once a week 9/27/23      dilTIAZem CD (CARDIZEM CD)  120 MG 24 hr capsule Take 1 capsule by mouth Daily for 30 days. 3/5/23 3/31/23  Susie Tabares, DO          Objective   Physical Exam  Constitutional 38-year-old male awake alert no acute distress resting comfortably at this time triage vital signs reviewed.  HEENT extraocular muscles are intact pupils equal round react sclera clear neck supple no adenopathy no JVD no bruits lungs clear no retraction heart regular without murmur abdomen soft nontender good bowel sounds no peritoneal findings or pulsatile masses extremities pulses equal upper and lower extremities no edema cords or Homans' sign no evidence of DVT skin warm and dry without rashes or cellulitic changes neurologic awake alert and follows commands motor strength normal without focal weakness  Procedures           ED Course      Results for orders placed or performed during the hospital encounter of 12/18/23   Basic Metabolic Panel    Specimen: Blood   Result Value Ref Range    Glucose 111 (H) 65 - 99 mg/dL    BUN 11 6 - 20 mg/dL    Creatinine 1.17 0.76 - 1.27 mg/dL    Sodium 137 136 - 145 mmol/L    Potassium 4.1 3.5 - 5.2 mmol/L    Chloride 99 98 - 107 mmol/L    CO2 24.0 22.0 - 29.0 mmol/L    Calcium 9.4 8.6 - 10.5 mg/dL    BUN/Creatinine Ratio 9.4 7.0 - 25.0    Anion Gap 14.0 5.0 - 15.0 mmol/L    eGFR 80.8 >60.0 mL/min/1.73   Single High Sensitivity Troponin T    Specimen: Blood   Result Value Ref Range    HS Troponin T <6 <22 ng/L   Protime-INR    Specimen: Blood   Result Value Ref Range    Protime 10.6 9.6 - 11.7 Seconds    INR 0.97 0.93 - 1.10   aPTT    Specimen: Blood   Result Value Ref Range    PTT 28.5 (L) 61.0 - 76.5 seconds   CBC Auto Differential    Specimen: Blood   Result Value Ref Range    WBC 7.10 3.40 - 10.80 10*3/mm3    RBC 4.92 4.14 - 5.80 10*6/mm3    Hemoglobin 14.5 13.0 - 17.7 g/dL    Hematocrit 43.2 37.5 - 51.0 %    MCV 87.7 79.0 - 97.0 fL    MCH 29.5 26.6 - 33.0 pg    MCHC 33.6 31.5 - 35.7 g/dL    RDW 13.4 12.3 - 15.4 %    RDW-SD  40.7 37.0 - 54.0 fl    MPV 7.1 6.0 - 12.0 fL    Platelets 299 140 - 450 10*3/mm3    Neutrophil % 68.9 42.7 - 76.0 %    Lymphocyte % 24.4 19.6 - 45.3 %    Monocyte % 5.1 5.0 - 12.0 %    Eosinophil % 0.5 0.3 - 6.2 %    Basophil % 1.1 0.0 - 1.5 %    Neutrophils, Absolute 4.90 1.70 - 7.00 10*3/mm3    Lymphocytes, Absolute 1.70 0.70 - 3.10 10*3/mm3    Monocytes, Absolute 0.40 0.10 - 0.90 10*3/mm3    Eosinophils, Absolute 0.00 0.00 - 0.40 10*3/mm3    Basophils, Absolute 0.10 0.00 - 0.20 10*3/mm3    nRBC 0.0 0.0 - 0.2 /100 WBC   ECG 12 Lead Chest Pain   Result Value Ref Range    QT Interval 360 ms    QTC Interval 427 ms   Gold Top - SST   Result Value Ref Range    Extra Tube Hold for add-ons.      XR Chest 1 View    Result Date: 12/19/2023  Impression: No acute cardiopulmonary process. Electronically Signed: Lani Espinosa MD  12/19/2023 1:03 AM EST  Workstation ID: [id number]   Medications   sodium chloride 0.9 % flush 10 mL (has no administration in time range)   nitroglycerin (TRIDIL) 200 mcg/ml infusion (25 mcg/min Intravenous Rate/Dose Change 12/19/23 0122)   nitroglycerin (NITROSTAT) SL tablet 0.4 mg (has no administration in time range)   HYDROmorphone (DILAUDID) injection 0.5 mg (has no administration in time range)   aspirin tablet 325 mg (325 mg Oral Given 12/19/23 0029)   ranolazine (RANEXA) 12 hr tablet 1,000 mg (1,000 mg Oral Given 12/19/23 0113)   cilostazol (PLETAL) tablet 50 mg (50 mg Oral Given 12/19/23 0114)   metoprolol tartrate (LOPRESSOR) tablet 50 mg (50 mg Oral Given 12/19/23 0113)                 HEART Score: 4                    EKG my interpretation normal sinus rhythm rate of 84 left atrial enlargement QTc of 427 no acute ST elevation or ischemic changes unchanged from previous EKG from 3/3/2023 borderline          Medical Decision Making  Medical decision making IV established monitor placement review of sinus rhythm.  Patient started on IV nitroglycerin aspirin p.o.  EKG obtained my independent  review normal sinus rhythm rate of 84 left atrial enlargement it is unchanged from 3/3/2023 to borderline EKG but no acute ischemic changes noted.  Chest x-ray my independent review do not see pneumonia pneumothorax or acute findings radiology unremarkable.  Labs obtained independent reviewed by me basic metabolic profile troponins coags CBC unremarkable.  Patient repeat exam is resting company no distress feeling better with IV nitroglycerin.  He had been given aspirin he was given his nighttime medicines he has already taken his the Effient today.  The patient was started on his Ranexa Pletal and Lopressor.  He was made aware of the findings I do not see evidence acute ST elevation myocardial infarction DVT pulmonary embolism air dissection pleural effusion pericardial effusion myocarditis pericarditis sepsis or pneumonia.  Not a complete list of all possibilities.  Record reviewed he had ended here in March when he was seen by cardiology this is March 2023 had some medication changes by his cardiologist at this time.  Hospitalist nurse practitioner notified case discussed and patient will be admitted for further workup.  Stable otherwise improved ER course    Problems Addressed:  Chest pain, unspecified type: complicated acute illness or injury  Unstable angina: complicated acute illness or injury    Amount and/or Complexity of Data Reviewed  Labs: ordered. Decision-making details documented in ED Course.  Radiology: ordered and independent interpretation performed. Decision-making details documented in ED Course.  ECG/medicine tests: ordered and independent interpretation performed. Decision-making details documented in ED Course.    Risk  Drug therapy requiring intensive monitoring for toxicity.  Decision regarding hospitalization.        Final diagnoses:   Chest pain, unspecified type   Unstable angina       ED Disposition  ED Disposition       ED Disposition   Decision to Admit    Condition   --    Comment    Level of Care: Med/Surg [1]   Diagnosis: Chest pain [[id number]]   Admitting Physician: JOIE SHARMA [[id number]]   Attending Physician: JOIE SHARMA [[id number]]   Bed Request Comments: cardiac monitor                 No follow-up provider specified.       Medication List      No changes were made to your prescriptions during this visit.            Antwan Garay MD  12/19/23 0139

## 2023-12-19 NOTE — PROGRESS NOTES
Same Day Progress Note:    Patient presents to the hospital with complaints of chest pain.  He has an extensive CAD history including CABG in June 2022.  His first 2 sets of cardiac enzymes are normal.  EKG does not show any evidence of ischemic changes.  Cardiology has evaluated the patient and will plan for stress test.  If the stress test is abnormal, he will need to proceed with C.      Electronically signed by Long Padilla MD, 12/19/23, 1:58 PM EST.

## 2023-12-19 NOTE — NURSING NOTE
Pt c/o of c/p 8/10 with some nausea  increased NTG to 50 mcq and zofran given.  Denies diaphoresis and dizziness.   will cont to monitor

## 2023-12-19 NOTE — H&P
"[facility] Medicine Services  History & Physical    Patient Name: Tramaine Gates  : 1985  MRN: [id number]  Primary Care Physician:  Daniela Hernandez FNP  Date of admission: 2023  Date and Time of Service: 23  at 0130    Subjective      Chief Complaint: chest pain     History of Present Illness: Tramaine Gates is a  very pleasant 38 y.o. male with a CMH of early onset coronary artery disease, status post PCI , myocardial infarction in 2022,, in-stent thrombosis and had a CABG 2022, last heart cath 2023 with  angioplasty, primary hypertension, hyperlipidemia, GERD, anxiety disorder with panic attacks, thyroid disorder, asthma, morbid obesity,who presented to [facility] on 2023 with complaints of chest pain which radiated down his left arm and left neck. He reports it has been going on for a week and has gotten progressively worse.He also reports a deep nerve pain at his incision site describes as \" like putting a battery on your tongue\", which is intermittent.He reports some nausea without vomiting, denies diaphoresis , dizziness or dyspnea.     Labs today show high-sensitivity troponin less than 6, EKG shows sinus rhythm heart rate 84 no acute ST elevation or depression, chest  x-ray per radiology shows no acute cardiopulmonary process.  He was started on a Tridil drip in the emergency department and cardiology has been consulted.      Review of Systems   Constitutional: Negative.    HENT: Negative.     Eyes: Negative.    Respiratory: Negative.     Cardiovascular:  Positive for chest pain.   Gastrointestinal:  Positive for nausea.   Endocrine: Negative.    Genitourinary: Negative.    Musculoskeletal: Negative.    Skin: Negative.    Allergic/Immunologic: Negative.    Neurological: Negative.    Hematological: Negative.    Psychiatric/Behavioral: Negative.     All other systems reviewed and are negative.      Personal " History     Past Medical History:   Diagnosis Date    Acute inferior myocardial infarction 06/14/2022    Allergic     Asthma 01/27/2022    CAD, multiple vessel 06/14/2022    Gastroesophageal reflux disease 01/27/2022    Hx of complete eye exam 2016    Hyperlipidemia 01/27/2022    Hypertension     Migraine headache 01/27/2022    Panic attack 01/27/2022    Peptic ulcer 09/14/2017       Past Surgical History:   Procedure Laterality Date    CARDIAC CATHETERIZATION N/A 06/14/2022    Procedure: Left Heart Cath;  Surgeon: Matthieu Del Toro MD;  Location:  [facility];  Service: Cardiology;  Laterality: N/A;    CARDIAC CATHETERIZATION N/A 06/16/2022    Procedure: Percutaneous Coronary Intervention;  Surgeon: Matthieu Del Toro MD;  Location:  [facility];  Service: Cardiovascular;  Laterality: N/A;    CARDIAC CATHETERIZATION N/A 06/23/2022    Procedure: Left Heart Cath possible PCI, atherectomy, hemodynamic support;  Surgeon: Moises Haddad MD;  Location:  [facility];  Service: Cardiology;  Laterality: N/A;    CARDIAC CATHETERIZATION N/A 09/15/2022    Procedure: Left Heart Cath;  Surgeon: Moises Haddad MD;  Location:  [facility];  Service: Cardiology;  Laterality: N/A;    CARDIAC CATHETERIZATION  9/15/2022    CARDIAC CATHETERIZATION N/A 3/2/2023    Procedure: Left Heart Cath;  Surgeon: Moises Haddad MD;  Location:  [facility];  Service: Cardiology;  Laterality: N/A;    CHOLECYSTECTOMY  2019    CORONARY ARTERY BYPASS GRAFT N/A 06/29/2022    Procedure: CORONARY ARTERY BYPASS GRAFTING;  Surgeon: Pablo Ball MD;  Location: [facility];  Service: Cardiothoracic;  Laterality: N/A;  CABG X 3(2 vein grafts, 1 LIMA graft)    CORONARY ARTERY BYPASS GRAFT  6/29/2022    CORONARY STENT PLACEMENT      MANDIBLE SURGERY         Family History: family history includes Cirrhosis in his maternal grandfather; Heart attack  in his maternal grandmother and mother; Heart disease in his mother; Heart failure in his father; Hypertension in his mother. Otherwise pertinent FHx was reviewed and not pertinent to current issue.    Social History:  reports that he quit smoking about 17 months ago. His smoking use included cigarettes. He started smoking about 27 years ago. He has a 34.50 pack-year smoking history. He has never used smokeless tobacco. He reports that he does not currently use alcohol. He reports current drug use. Frequency: 7.00 times per week. Drug: Marijuana.    Home Medications:  Prior to Admission Medications       Prescriptions Last Dose Informant Patient Reported? Taking?    albuterol sulfate  (90 Base) MCG/ACT inhaler   No No    Inhale 2 puffs by mouth as directed every four to six hours as needed for coughing, shortness of breath, wheezing    aspirin (Aspirin Low Dose) 81 MG EC tablet   No No    Take 1 tablet by mouth Daily.    aspirin (Aspirin Low Dose) 81 MG EC tablet   No No    Take 1 tablet by mouth Daily.    cilostazol (PLETAL) 50 MG tablet   No No    Take 1 tablet by mouth 2 (Two) Times a Day.    Evolocumab (Repatha SureClick) solution auto-injector SureClick injection   No No    Inject 140 mg total (1 mL) into the skin every 14 (fourteen) days.    isosorbide mononitrate (IMDUR) 60 MG 24 hr tablet   No No    Take 1 tablet by mouth daily.    levothyroxine (SYNTHROID, LEVOTHROID) 50 MCG tablet   No No    TAKE 1 TABLET BY MOUTH EVERY DAY    lisinopril (PRINIVIL,ZESTRIL) 5 MG tablet   No No    Take 1 tablet by mouth Daily.    metoprolol tartrate (LOPRESSOR) 50 MG tablet   No No    Take 1 tablet by mouth 2 (Two) Times a Day.    nitroglycerin (NITROSTAT) 0.4 MG SL tablet   No No    Place 1 tablet under the tongue Every 5 (Five) Minutes As Needed for Chest Pain (Only if SBP Greater Than 100). Take no more than 3 doses in 15 minutes.    omeprazole (priLOSEC) 40 MG capsule   Yes No    Daily.    prasugrel (EFFIENT) 10  MG tablet   No No    Take 1 tablet by mouth Daily. Take 6 tablets by mouth for the first dose and then take one tablet by mouth daily thereafter.    ranolazine (RANEXA) 1000 MG 12 hr tablet   No No    Take 1 tablet by mouth 2 (Two) Times a Day.    rosuvastatin (CRESTOR) 40 MG tablet   No No    Take 1 tablet by mouth daily.    Semaglutide-Weight Management (Wegovy) 0.25 MG/0.5ML solution auto-injector   No No    Inject 1/4 mg subcutaneously once a week              Allergies:  Allergies   Allergen Reactions    Shellfish-Derived Products Unknown - High Severity       Objective      Vitals:   Temp:  [97.8 °F (36.6 °C)] 97.8 °F (36.6 °C)  Heart Rate:  [72-95] 80  Resp:  [24] 24  BP: (129-155)/(74-95) 144/86  Body mass index is 44.38 kg/m².  Physical Exam  Vitals reviewed.   Constitutional:       Appearance: Normal appearance. He is obese.   HENT:      Head: Normocephalic and atraumatic.      Right Ear: External ear normal.      Left Ear: External ear normal.      Nose: Nose normal.      Mouth/Throat:      Mouth: Mucous membranes are moist.   Eyes:      Extraocular Movements: Extraocular movements intact.   Cardiovascular:      Rate and Rhythm: Normal rate and regular rhythm.      Pulses: Normal pulses.      Heart sounds: Normal heart sounds.   Pulmonary:      Effort: Pulmonary effort is normal.      Breath sounds: Normal breath sounds.   Abdominal:      Palpations: Abdomen is soft.   Genitourinary:     Comments: deferred  Musculoskeletal:         General: Normal range of motion.      Cervical back: Normal range of motion and neck supple.   Skin:     General: Skin is warm and dry.   Neurological:      General: No focal deficit present.      Mental Status: He is alert and oriented to person, place, and time.   Psychiatric:         Mood and Affect: Mood normal.         Behavior: Behavior normal.         Thought Content: Thought content normal.         Judgment: Judgment normal.         Diagnostic Data:  Lab Results (last  24 hours)       Procedure Component Value Units Date/Time    Extra Tubes [[id number]] Collected: 12/19/23 0020    Specimen: Blood, Venous Line Updated: 12/19/23 0131    Narrative:      The following orders were created for panel order Extra Tubes.  Procedure                               Abnormality         Status                     ---------                               -----------         ------                     Gold Top - SST[[id number]]                                   Final result                 Please view results for these tests on the individual orders.    Gold Top - SST [[id number]] Collected: 12/19/23 0020    Specimen: Blood Updated: 12/19/23 0131     Extra Tube Hold for add-ons.     Comment: Auto resulted.       Protime-INR [[id number]]  (Normal) Collected: 12/19/23 0020    Specimen: Blood Updated: 12/19/23 0045     Protime 10.6 Seconds      INR 0.97    aPTT [[id number]]  (Abnormal) Collected: 12/19/23 0020    Specimen: Blood Updated: 12/19/23 0045     PTT 28.5 seconds     Basic Metabolic Panel [[id number]]  (Abnormal) Collected: 12/19/23 0020    Specimen: Blood Updated: 12/19/23 0045     Glucose 111 mg/dL      BUN 11 mg/dL      Creatinine 1.17 mg/dL      Sodium 137 mmol/L      Potassium 4.1 mmol/L      Chloride 99 mmol/L      CO2 24.0 mmol/L      Calcium 9.4 mg/dL      BUN/Creatinine Ratio 9.4     Anion Gap 14.0 mmol/L      eGFR 80.8 mL/min/1.73     Narrative:      GFR Normal >60  Chronic Kidney Disease <60  Kidney Failure <15      Single High Sensitivity Troponin T [[id number]]  (Normal) Collected: 12/19/23 0020    Specimen: Blood Updated: 12/19/23 0045     HS Troponin T <6 ng/L     Narrative:      High Sensitive Troponin T Reference Range:  <14.0 ng/L- Negative Female for AMI  <22.0 ng/L- Negative Male for AMI  >=14 - Abnormal Female indicating possible myocardial injury.  >=22 - Abnormal Male indicating possible myocardial injury.   Clinicians would have to utilize clinical acumen, EKG, Troponin, and  serial changes to determine if it is an Acute Myocardial Infarction or myocardial injury due to an underlying chronic condition.         CBC & Differential [[id number]]  (Normal) Collected: 12/19/23 0020    Specimen: Blood Updated: 12/19/23 0026    Narrative:      The following orders were created for panel order CBC & Differential.  Procedure                               Abnormality         Status                     ---------                               -----------         ------                     CBC Auto Differential[[id number]]        Normal              Final result                 Please view results for these tests on the individual orders.    CBC Auto Differential [[id number]]  (Normal) Collected: 12/19/23 0020    Specimen: Blood Updated: 12/19/23 0026     WBC 7.10 10*3/mm3      RBC 4.92 10*6/mm3      Hemoglobin 14.5 g/dL      Hematocrit 43.2 %      MCV 87.7 fL      MCH 29.5 pg      MCHC 33.6 g/dL      RDW 13.4 %      RDW-SD 40.7 fl      MPV 7.1 fL      Platelets 299 10*3/mm3      Neutrophil % 68.9 %      Lymphocyte % 24.4 %      Monocyte % 5.1 %      Eosinophil % 0.5 %      Basophil % 1.1 %      Neutrophils, Absolute 4.90 10*3/mm3      Lymphocytes, Absolute 1.70 10*3/mm3      Monocytes, Absolute 0.40 10*3/mm3      Eosinophils, Absolute 0.00 10*3/mm3      Basophils, Absolute 0.10 10*3/mm3      nRBC 0.0 /100 WBC              Imaging Results (Last 24 Hours)       Procedure Component Value Units Date/Time    XR Chest 1 View [[id number]] Collected: 12/19/23 0103     Updated: 12/19/23 0105    Narrative:      XR CHEST 1 VW    Date of Exam: 12/19/2023 1:00 AM EST    Indication: Chest pain    Comparison: 3/1/2023.    Findings:  There are no airspace consolidations. No pleural fluid. No pneumothorax. The pulmonary vasculature appears within normal limits. The cardiac and mediastinal silhouette appear unremarkable. No acute osseous abnormality identified. Median sternotomy wires   are present.      Impression:       Impression:  No acute cardiopulmonary process.      Electronically Signed: Lani Espinosa MD    12/19/2023 1:03 AM EST    Workstation ID: [id number]              Assessment & Plan        This is a 38 y.o. male with:    Active and Resolved Problems  Active Hospital Problems    Diagnosis  POA    Chest pain, unspecified type [R07.9]  Yes     Priority: High    S/P CABG x 3 [Z95.1]  Not Applicable     Priority: Medium    CAD, multiple vessel [I25.10]  Yes     Priority: Medium    Chest pain [R07.9]  Yes    Bipolar II disorder [F31.81]  Yes    Generalized anxiety disorder with panic attacks [F41.1, F41.0]  Yes    Disorder of thyroid [E07.9]  Yes    Gastroesophageal reflux disease [K21.9]  Yes    Hyperlipidemia [E78.5]  Yes    Hypertension [I10]  Yes      Resolved Hospital Problems   No resolved problems to display.     Chest pain with PMH CAD, CABG, PCI and angioplasty, troponin negative, EKG no acute ST elevation or depression, on Tridil drip, continuous cardiac monitoring, cardiology consulted.On home aspirin, Pletal, Imdur, Effient , Ranexa , took home dose, reorder once verified     Bipolar II disorder ,/  generalized anxiety disorder  no current home meds on current list     Thyroid disorder - reorder home levothyroxine once dosage verified     GERD- took home omeprazole tonight , formulary substitute here      HLD- on statin reorder once verified     Hypertension- on home lisinopril, metoprolol reorder once verified     Obesity BMI 43, was on Semaglutide, hold for now       DVT prophylaxis:  No DVT prophylaxis order currently exists.    The patient desires to be as follows:    CODE STATUS:    Code Status (Patient has no pulse and is not breathing): CPR (Attempt to Resuscitate)  Medical Interventions (Patient has pulse or is breathing): Full Support          Admission Status:  I believe this patient meets observation status.    Expected Length of Stay: 1-2 days         I discussed the patient's findings and my  recommendations with patient.      Signature:     This document has been electronically signed by YOUSIF Hopkins on December 19, 2023 02:01 [facility] Hospitalist Team

## 2023-12-19 NOTE — CASE MANAGEMENT/SOCIAL WORK
Continued Stay Note  [facility]     Patient Name: Tramaine Gates  MRN: [id number]  Today's Date: 12/19/2023    Admit Date: 12/18/2023    Plan: Need CM assessment   Discharge Plan       Row Name 12/19/23 1158       Plan    Plan Need CM assessment    Plan Comments attempted CM assessment patient currently resting asked to come back at later time                          Sonia Hernandes RN

## 2023-12-20 VITALS
DIASTOLIC BLOOD PRESSURE: 75 MMHG | HEIGHT: 70 IN | HEART RATE: 84 BPM | WEIGHT: 309 LBS | TEMPERATURE: 98 F | BODY MASS INDEX: 44.24 KG/M2 | OXYGEN SATURATION: 94 % | RESPIRATION RATE: 23 BRPM | SYSTOLIC BLOOD PRESSURE: 113 MMHG

## 2023-12-20 LAB
QT INTERVAL: 380 MS
QTC INTERVAL: 449 MS
TROPONIN T SERPL HS-MCNC: <6 NG/L

## 2023-12-20 PROCEDURE — 84484 ASSAY OF TROPONIN QUANT: CPT | Performed by: STUDENT IN AN ORGANIZED HEALTH CARE EDUCATION/TRAINING PROGRAM

## 2023-12-20 PROCEDURE — 93010 ELECTROCARDIOGRAM REPORT: CPT | Performed by: INTERNAL MEDICINE

## 2023-12-20 PROCEDURE — 93005 ELECTROCARDIOGRAM TRACING: CPT | Performed by: INTERNAL MEDICINE

## 2023-12-20 PROCEDURE — 96375 TX/PRO/DX INJ NEW DRUG ADDON: CPT

## 2023-12-20 PROCEDURE — 25010000002 LORAZEPAM PER 2 MG: Performed by: NURSE PRACTITIONER

## 2023-12-20 PROCEDURE — G0378 HOSPITAL OBSERVATION PER HR: HCPCS

## 2023-12-20 PROCEDURE — 99232 SBSQ HOSP IP/OBS MODERATE 35: CPT | Performed by: STUDENT IN AN ORGANIZED HEALTH CARE EDUCATION/TRAINING PROGRAM

## 2023-12-20 PROCEDURE — 96376 TX/PRO/DX INJ SAME DRUG ADON: CPT

## 2023-12-20 PROCEDURE — 25010000002 ONDANSETRON PER 1 MG: Performed by: NURSE PRACTITIONER

## 2023-12-20 PROCEDURE — 25010000002 MORPHINE PER 10 MG: Performed by: INTERNAL MEDICINE

## 2023-12-20 RX ORDER — MORPHINE SULFATE 2 MG/ML
1 INJECTION, SOLUTION INTRAMUSCULAR; INTRAVENOUS ONCE
Status: COMPLETED | OUTPATIENT
Start: 2023-12-20 | End: 2023-12-20

## 2023-12-20 RX ORDER — METOPROLOL SUCCINATE 50 MG/1
100 TABLET, EXTENDED RELEASE ORAL
Status: DISCONTINUED | OUTPATIENT
Start: 2023-12-21 | End: 2023-12-20 | Stop reason: HOSPADM

## 2023-12-20 RX ORDER — METOPROLOL SUCCINATE 100 MG/1
100 TABLET, EXTENDED RELEASE ORAL
Qty: 30 TABLET | Refills: 0 | Status: SHIPPED | OUTPATIENT
Start: 2023-12-21

## 2023-12-20 RX ORDER — PRASUGREL 10 MG/1
10 TABLET, FILM COATED ORAL DAILY
Status: DISCONTINUED | OUTPATIENT
Start: 2023-12-20 | End: 2023-12-20 | Stop reason: HOSPADM

## 2023-12-20 RX ORDER — AMLODIPINE BESYLATE 5 MG/1
5 TABLET ORAL DAILY
Qty: 30 TABLET | Refills: 0 | Status: SHIPPED | OUTPATIENT
Start: 2023-12-20

## 2023-12-20 RX ORDER — PANTOPRAZOLE SODIUM 40 MG/1
40 TABLET, DELAYED RELEASE ORAL
Status: DISCONTINUED | OUTPATIENT
Start: 2023-12-21 | End: 2023-12-20 | Stop reason: SDUPTHER

## 2023-12-20 RX ORDER — PANTOPRAZOLE SODIUM 40 MG/1
40 TABLET, DELAYED RELEASE ORAL
Qty: 30 TABLET | Refills: 0 | Status: SHIPPED | OUTPATIENT
Start: 2023-12-21

## 2023-12-20 RX ORDER — ISOSORBIDE MONONITRATE 120 MG/1
120 TABLET, EXTENDED RELEASE ORAL
Qty: 30 TABLET | Refills: 0 | Status: SHIPPED | OUTPATIENT
Start: 2023-12-21

## 2023-12-20 RX ADMIN — NITROGLYCERIN 0.4 MG: 0.4 TABLET SUBLINGUAL at 07:49

## 2023-12-20 RX ADMIN — LORAZEPAM 1 MG: 2 INJECTION INTRAMUSCULAR; INTRAVENOUS at 00:04

## 2023-12-20 RX ADMIN — CILOSTAZOL 50 MG: 100 TABLET ORAL at 11:28

## 2023-12-20 RX ADMIN — ISOSORBIDE MONONITRATE 120 MG: 60 TABLET, EXTENDED RELEASE ORAL at 11:29

## 2023-12-20 RX ADMIN — ASPIRIN 81 MG: 81 TABLET ORAL at 11:30

## 2023-12-20 RX ADMIN — NITROGLYCERIN 0.4 MG: 0.4 TABLET SUBLINGUAL at 07:41

## 2023-12-20 RX ADMIN — METOPROLOL SUCCINATE 50 MG: 50 TABLET, EXTENDED RELEASE ORAL at 11:29

## 2023-12-20 RX ADMIN — ONDANSETRON 4 MG: 2 INJECTION INTRAMUSCULAR; INTRAVENOUS at 11:39

## 2023-12-20 RX ADMIN — LISINOPRIL 5 MG: 5 TABLET ORAL at 11:29

## 2023-12-20 RX ADMIN — NITROGLYCERIN 0.4 MG: 0.4 TABLET SUBLINGUAL at 07:30

## 2023-12-20 RX ADMIN — MORPHINE SULFATE 1 MG: 2 INJECTION, SOLUTION INTRAMUSCULAR; INTRAVENOUS at 11:40

## 2023-12-20 NOTE — PROGRESS NOTES
Referring Provider: Sorin Yap MD     Patient Care Team:  Daniela Hernandez FNP as PCP - General (Family Medicine)  Ollie Dunn MD as Consulting Physician (Gastroenterology)      SUBJECTIVE    Mentions feeling anxious,, had a panic attack yesterday requiring Ativan which caused chest pain.    ROS  Review of all systems negative except as indicated above    Personal History:    Past Medical History:   Diagnosis Date    Acute inferior myocardial infarction 06/14/2022    Allergic     Asthma 01/27/2022    CAD, multiple vessel 06/14/2022    Gastroesophageal reflux disease 01/27/2022    Hx of complete eye exam 2016    Hyperlipidemia 01/27/2022    Hypertension     Migraine headache 01/27/2022    Panic attack 01/27/2022    Peptic ulcer 09/14/2017       Past Surgical History:   Procedure Laterality Date    CARDIAC CATHETERIZATION N/A 06/14/2022    Procedure: Left Heart Cath;  Surgeon: Matthieu Del Toro MD;  Location: [facility] CATH INVASIVE LOCATION;  Service: Cardiology;  Laterality: N/A;    CARDIAC CATHETERIZATION N/A 06/16/2022    Procedure: Percutaneous Coronary Intervention;  Surgeon: Matthieu Del Toro MD;  Location: [facility] CATH INVASIVE LOCATION;  Service: Cardiovascular;  Laterality: N/A;    CARDIAC CATHETERIZATION N/A 06/23/2022    Procedure: Left Heart Cath possible PCI, atherectomy, hemodynamic support;  Surgeon: Moises Haddad MD;  Location: [facility] CATH INVASIVE LOCATION;  Service: Cardiology;  Laterality: N/A;    CARDIAC CATHETERIZATION N/A 09/15/2022    Procedure: Left Heart Cath;  Surgeon: Moises Haddad MD;  Location: [facility] CATH INVASIVE LOCATION;  Service: Cardiology;  Laterality: N/A;    CARDIAC CATHETERIZATION  9/15/2022    CARDIAC CATHETERIZATION N/A 3/2/2023    Procedure: Left Heart Cath;  Surgeon: Moises Haddad MD;  Location: [facility] CATH INVASIVE LOCATION;  Service: Cardiology;  Laterality: N/A;    CHOLECYSTECTOMY  2019    CORONARY ARTERY BYPASS GRAFT N/A  2022    Procedure: CORONARY ARTERY BYPASS GRAFTING;  Surgeon: Pablo Ball MD;  Location: [facility];  Service: Cardiothoracic;  Laterality: N/A;  CABG X 3(2 vein grafts, 1 LIMA graft)    CORONARY ARTERY BYPASS GRAFT  2022    CORONARY STENT PLACEMENT      MANDIBLE SURGERY         Family History   Problem Relation Age of Onset    Heart disease Mother     Heart attack Mother     Hypertension Mother     Heart failure Father     Cirrhosis Maternal Grandfather     Heart attack Maternal Grandmother         a       Social History     Tobacco Use    Smoking status: Former     Packs/day: 1.50     Years: 23.00     Additional pack years: 0.00     Total pack years: 34.50     Types: Cigarettes     Start date: 1996     Quit date: 2022     Years since quittin.4    Smokeless tobacco: Never   Vaping Use    Vaping Use: Never used   Substance Use Topics    Alcohol use: Not Currently    Drug use: Yes     Frequency: 7.0 times per week     Types: Marijuana        Home meds:  Prior to Admission medications    Medication Sig Start Date End Date Taking? Authorizing Provider   aspirin (Aspirin Low Dose) 81 MG EC tablet Take 1 tablet by mouth Daily. 23  Yes Moises Haddad MD   aspirin (Aspirin Low Dose) 81 MG EC tablet Take 1 tablet by mouth Daily. 23  Yes Moises Haddad MD   cilostazol (PLETAL) 50 MG tablet Take 1 tablet by mouth 2 (Two) Times a Day. 23  Yes Pat Ruiz APRN   isosorbide mononitrate (IMDUR) 60 MG 24 hr tablet Take 1 tablet by mouth daily. 23  Yes Pat Ruiz APRN   levothyroxine (SYNTHROID, LEVOTHROID) 50 MCG tablet TAKE 1 TABLET BY MOUTH EVERY DAY 23  Yes    lisinopril (PRINIVIL,ZESTRIL) 5 MG tablet Take 1 tablet by mouth Daily. 23  Yes Pat Ruiz APRN   metoprolol tartrate (LOPRESSOR) 50 MG tablet Take 1 tablet by mouth 2 (Two) Times a Day. 23  Yes Pat Ruiz APRN   nitroglycerin (NITROSTAT) 0.4 MG SL tablet  "Place 1 tablet under the tongue Every 5 (Five) Minutes As Needed for Chest Pain (Only if SBP Greater Than 100). Take no more than 3 doses in 15 minutes. 11/2/23  Yes Moises Haddad MD   omeprazole (priLOSEC) 40 MG capsule Daily.   Yes Adolfo Zuniga MD   prasugrel (EFFIENT) 10 MG tablet Take 1 tablet by mouth Daily. Take 6 tablets by mouth for the first dose and then take one tablet by mouth daily thereafter. 9/26/23  Yes Pat Ruiz APRN   ranolazine (RANEXA) 1000 MG 12 hr tablet Take 1 tablet by mouth 2 (Two) Times a Day. 9/26/23  Yes Pat Ruiz APRN   rosuvastatin (CRESTOR) 40 MG tablet Take 1 tablet by mouth daily. 9/26/23  Yes Pat Ruiz APRN   albuterol sulfate  (90 Base) MCG/ACT inhaler Inhale 2 puffs by mouth as directed every four to six hours as needed for coughing, shortness of breath, wheezing 12/11/23      dilTIAZem CD (CARDIZEM CD) 120 MG 24 hr capsule Take 1 capsule by mouth Daily for 30 days. 3/5/23 3/31/23  Susie Tabares,        Allergies:  Shellfish-derived products      OBJECTIVE    Vital Signs  Vitals:    12/20/23 0729 12/20/23 0741 12/20/23 0749 12/20/23 1129   BP: 133/67 113/71 110/73 137/86   BP Location: Left arm      Patient Position: Lying      Pulse:  86 88    Resp: 15      Temp:       TempSrc:       SpO2:       Weight:       Height:           Flowsheet Rows      Flowsheet Row First Filed Value   Admission Height 177.8 cm (70\") Documented at 12/18/2023 2350   Admission Weight 140 kg (309 lb 4.9 oz) Documented at 12/18/2023 2350              Intake/Output Summary (Last 24 hours) at 12/20/2023 1610  Last data filed at 12/20/2023 0700  Gross per 24 hour   Intake --   Output 650 ml   Net -650 ml              Physical Exam:  General-no acute distress  No elevated JVP noted.  Cardiovascular-S1-S2 normal, no murmurs noted.  Respiratory-normal breath sounds, no wheezing/crackles.  GI-abdomen is soft and nontender  No pedal edema        Results " Review:    BNP        TROPONIN  Results from last 7 days   Lab Units 12/20/23  0817   HSTROP T ng/L <6       CoAg  Results from last 7 days   Lab Units 12/19/23  0020   INR  0.97   APTT seconds 28.5*       Creatinine Clearance  Estimated Creatinine Clearance: 118.5 mL/min (by C-G formula based on SCr of 1.17 mg/dL).      Radiology  XR Chest 1 View    Result Date: 12/19/2023  Impression: No acute cardiopulmonary process. Electronically Signed: Lani Espinosa MD  12/19/2023 1:03 AM EST  Workstation ID: [id number]       EKG  I personally viewed and interpreted the patient's EKG/Telemetry data:  ECG 12 Lead Chest Pain   Preliminary Result   HEART RATE= 84  bpm   RR Interval= 716  ms   WV Interval= 117  ms   P Horizontal Axis= 6  deg   P Front Axis= 64  deg   QRSD Interval= 83  ms   QT Interval= 380  ms   QTcB= 449  ms   QRS Axis= 16  deg   T Wave Axis= 50  deg   - NORMAL ECG -   Sinus rhythm   Electronically Signed By:    Date and Time of Study: 2023-12-20 08:19:11      ECG 12 Lead Chest Pain   Final Result   HEART RATE= 84  bpm   RR Interval= 712  ms   WV Interval= 116  ms   P Horizontal Axis= -2  deg   P Front Axis= 52  deg   QRSD Interval= 80  ms   QT Interval= 360  ms   QTcB= 427  ms   QRS Axis= 14  deg   T Wave Axis= 49  deg   - BORDERLINE ECG -   Sinus rhythm   Probable left atrial enlargement   When compared with ECG of 03-Mar-2023 6:10:00,   No significant change   Electronically Signed By: Antwan Garay (KELSEY) 19-Dec-2023 05:40:39   Date and Time of Study: 2023-12-19 00:00:20      SCANNED - TELEMETRY     Final Result      SCANNED - TELEMETRY     Final Result      SCANNED - TELEMETRY     Final Result      SCANNED - TELEMETRY     Final Result      SCANNED - TELEMETRY     Final Result      SCANNED - TELEMETRY     Final Result            Echocardiogram:    Results for orders placed during the hospital encounter of 12/18/23    Adult Transthoracic Echo Complete W/ Cont if Necessary Per Protocol    Interpretation  Summary  Normal left and right ventricular size and systolic function  Normal left ventricular diastolic function  No significant valve disease noted.        Stress Test:  Results for orders placed during the hospital encounter of 12/18/23    Stress Test With Myocardial Perfusion One Day    Interpretation Summary  1. Negative Regadenoson Electrocardiography: There is no ECG evidence of myocardial ischemia.  2.  Adequate blood-pressure response to regadenoson.  3. No regadenoson-induced arrhythmias  4. Normal SPECT Myocardial Perfusion Imaging: There is no scintigraphic evidence of myocardial ischemia or scarring.  5. Overall left ventricular function is preserved, with a normal ejection fraction and no regional asynergy.         Cardiac Catheterization:  Results for orders placed during the hospital encounter of 03/01/23    Cardiac Catheterization/Vascular Study    Narrative   Moises Haddad MD, PhD  [facility]  Date of service 3-2-2023    Procedure  1.  Left heart catheterization coronary angiography left ventriculography In GALLO position, imaging of native and bypass grafts  2.,  Balloon angioplasty 4.0 x 20 NC balloon of the distal portion of the SVG to RCA inside the stented segment secondary to what appears to be greater than 70% in-stent restenosis distally versus subocclusive thrombus, ostial proximal greater than 50% restenosis, reduced to less than 10% distally, remains about 20% proximally at the ostium, improved angiographic runoff which was DARRYL II preoperatively, DARRYL-3 post procedurally    Indication  Unstable angina, unable for intervention to the native RCA    After informed consent patient brought to the Cath Lab sterilely prepped and draped in usual fashion with exposure of the right groin for right common femoral arterial access via micropuncture modified Seldinger technique with placement of a 6 Yemeni sheath.  035 guidewires advanced aortic valve followed by  "diagnostic JL 4 and JR4 catheters for selective left and right coronary angiography, JR4 was used to image the LIMA to LAD, multipurpose catheter was used to image SVG to RCA, SVG to diagonal was noted to be closed.  There appeared to be haziness at the ostial proximal portion of the stent in the SVG as well as what appeared to be slow flow distal to a bend in the distal portion of the graft also with haziness and what appeared to be minimum 60% stenosis possibly up to 80 and some angiographic views.  Decision was made given inability to revascularize native RCA with diffuse small vessel disease and small caliber vessels to intervene with balloon angioplasty in the stented segment of the SVG graft.  Patient was heparinized with 100 units/kg of heparin, he was on a background therapy of aspirin and Effient at baseline, ACT was greater than 250, multipurpose guide was used engage the vein graft followed by run-through wire to the distal portion of the graft, 4.0 x 20 NC balloon was used to dilate the proximal portion of the graft up to 16 radha for a minute and a half with prolonged inflation given significant stenosis at the ostial portion of the graft, there was great angiographic improvement of this portion of the graft with better stent expansion, angiography further revealed downstream stenosis in the distal portion of the graft, the balloon was then readvanced to the distal portion followed by 2 inflations up to 14 radha distal proximal fashion at the culprit portion reducing stenosis to 0% residual and improving distal runoff into again small vessel PDA and retrogradely into the RCA.  Final angiography revealed no distal wire trauma no edge dissection, DARRYL-3 flow in the graft as well as into the RPDA which did have some degree of competitive flow from the native RCA although small vessel and diffusely diseased.  All catheters\" removed.  6 Bengali Angio-Seal was used to close the arteriotomy with immediate " hemostasis and Meints of distal pulses.  He left Cath Lab chest pain-free hemodynamically electrically stable alert talkative staff neurologic grossly intact bilaterally    Complications none  Blood loss less than 10 cc  Contrast 160 cc  Sedation time of 1 hour    Findings  1.  Opening aortic pressure of  Closing pressure  LVEDP elevated 20-25  LV function normal 60%  Normal transaortic valve gradient on pullback    Angiography  1.  Left main normal takeoff, 10% luminal regularities leading into LAD that is subtotally occluded in the midportion, circumflex widely patent  2.  LAD diffusely diseased 80 to 90% the proximal portion, widely patent LIMA to LAD in the midportion, distal portion of the LAD has diffuse luminal regularities but is very small caliber less than 2 mm in diameter, anastomosis is widely patent for the LIMA  Over 3 LIMA to LAD widely patent, no disease  3.  SVG to diagonal branch is closed  4.  SVG to RCA has ostial 60 to 70% but hazy in appearance, distally also had 70 to 80% hazy lesion suspicious for ISR versus subacute thrombus which was greatly improved by balloon angioplasty, runoff both retrograde into the PDA and anterograde with very small vessel anastomosis likely best for medical therapy  5.  RCA natively diffusely diseased with patent stents in the proximal midportion, the intervening portion between proximal and distal stents is much less than 1 mm in diameter, marginal branches open and otherwise distal portion of vessel not amenable to intervention secondary to severe small caliber and very poor long-term patency  6.  Circumflex widely patent, obtuse marginal branch has diffuse small vessel disease tandem 80% lesions and tortuous not amenable to intervention secondary to small caliber size poor distal runoff and very poor long-term patency, thus medical treatment at this point, unchanged angiographically from prior case 8 to 9 months ago at the time of SVG revascularization with  overlapping stents    Conclusions recommendations  1.  Unstable angina  Culprit appeared to be SVG to RCA distal lesion 70 to 80% hazy possibly atherothrombotic as well as proximal ostial ISR.  Other vascular areas with LIMA to LAD, native LAD, circumflex and native RCA.  Unchanged angiographically compared to prior case 9 months ago  Continue aspirin and Effient uninterrupted  High intensity statin, goal-directed medical therapy for diffuse native and bypass graft CAD  We will advance her Ranexa and long-acting nitrates for small vessel disease    further management per clinical course    Moises Haddad MD, PhD  .         Other:         ASSESSMENT & PLAN:      Chest pain  Coronary artery disease status post CABG X 3/9/2022-LIMA-LAD, SVG-OM, SVG-PDA.  S/p POBA to SVG-RCA with Dr. Haddad March 2023  On and off chest pain for about 2 months, not related to exertion, usually last for 5-10 seconds  Troponin negative, ECG with no acute ST-T wave changes, transthoracic echocardiogram with normal LVEF no regional wall motion abnormalities, no evidence of myocardial ischemia on nuclear stress test.  Plan  - Continue baby aspirin and prasugrel.  - Change Lopressor to metoprolol succinate formulation 100 mg daily  - Increase Imdur to 120 mg daily  - Continue cilostazol  - Continue Ranexa 1 g twice daily  - Consider adding calcium channel blocker for added antianginal effect as outpatient  - Outpatient follow with Dr. Haddad in 2 weeks  - Evaluate noncardiac causes of chest pain, will change omeprazole to pantoprazole for better efficacy    History of hypertension  Continue Imdur 120, lisinopril 5 mg, Toprol.    Close outpatient follow-up with primary cardiologist Dr. Haddad      EMR Dragon/Transcription disclaimer:  Much of this encounter note is an electronic transcription/translation of spoken language to printed text. The electronic translation of spoken language may permit erroneous, or at times, nonsensical words or phrases  to be inadvertently transcribed; Although I have reviewed the note for such errors, some may still exist.    Soni Vu MD  12/20/23  16:10 EST

## 2023-12-20 NOTE — DISCHARGE SUMMARY
[facility] Medicine Services  DISCHARGE SUMMARY    Patient Name: Tramaine Gates  : 1985  MRN: [id number]    Date of Admission: 2023  Discharge Diagnosis:  1.  Fleeting chest pain  2.  CAD, status post CABG (3/22)  3.  Hypertension  4.  Bipolar disorder  5.  HLD  6.  Hypothyroidism  7.  Morbid obesity, BMI of 44    Date of Discharge:    2023    Primary Care Physician: Daniela Hernandez FNP      Presenting Problem:   Unstable angina [I20.0]  Chest pain [R07.9]  Chest pain, unspecified type [R07.9]    Active and Resolved Hospital Problems:  Active Hospital Problems    Diagnosis POA    Chest pain [R07.9] Yes    S/P CABG x 3 [Z95.1] Not Applicable    Chest pain, unspecified type [R07.9] Yes    Bipolar II disorder [F31.81] Yes    Generalized anxiety disorder with panic attacks [F41.1, F41.0] Yes    CAD, multiple vessel [I25.10] Yes    Disorder of thyroid [E07.9] Yes    Gastroesophageal reflux disease [K21.9] Yes    Hyperlipidemia [E78.5] Yes    Hypertension [I10] Yes      Resolved Hospital Problems   No resolved problems to display.         Hospital Course     Brief HPI/Hospital course: 38-year-old pleasant morbidly obese male with a BMI of 44, CAD, status post CABG () hypertension, HLD, hypothyroidism, history of asthma, depression/anxiety.  He was admitted on 2023, presented to [facility] ED complaining of chest pain, radiating to his left and left neck.  He describes chest pain as intermittent, on and off for the past few weeks with nausea but no vomiting.  Denies shortness of breath, no diaphoresis, no abdominal pain, dizziness or lightheadedness.  Cardiac biomarkers with troponin is unremarkable x 3 and EKG showed no ST elevation or depression.  Chest x-ray showed no acute cardiopulmonary process.  In ED, he was treated with Tridil drip, and cardiologist consulted.  Patient was seen in consultation by cardiologist with recommendation.  No evidence of  acute ACS and chest discomfort was most likely noncardiac in origin.  Cardiologist recommended switching to a different class of PPI from omeprazole to Protonix and cardiac medication adjustment made, and was switched to  Toprol- mg daily and increase Imdur to 120 mg and to continue with Ranexa as outlined in discharge summary.  On discharge, patient is hemodynamically stable and chest pain-free.   patient regarding weight reduction, decrease caloric intake and exercise as tolerated.  Patient will require 30-day or more at SNF for continued treatment      DISCHARGE Follow Up Recommendations for labs and diagnostics:    He is to follow-up with PCP and cardiologist as scheduled    Reasons For Change In Medications and Indications for New Medications:      Day of Discharge     Vital Signs:  Temp:  [97.6 °F (36.4 °C)-97.9 °F (36.6 °C)] 97.9 °F (36.6 °C)  Heart Rate:  [64-88] 88  Resp:  [12-15] 15  BP: (110-137)/(64-86) 137/86      Physical Exam  Constitutional:       General: He is not in acute distress.     Appearance: He is obese. He is not ill-appearing.   HENT:      Head: Normocephalic.      Right Ear: Tympanic membrane normal.      Nose: Nose normal.      Mouth/Throat:      Mouth: Mucous membranes are moist.   Eyes:      Pupils: Pupils are equal, round, and reactive to light.   Cardiovascular:      Rate and Rhythm: Normal rate.      Pulses: Normal pulses.   Pulmonary:      Effort: Pulmonary effort is normal.   Abdominal:      Palpations: Abdomen is soft.   Musculoskeletal:         General: Normal range of motion.      Cervical back: Neck supple.   Skin:     General: Skin is warm.   Neurological:      General: No focal deficit present.      Mental Status: He is alert.   Psychiatric:         Mood and Affect: Mood normal.            Pertinent  and/or Most Recent Results     LAB RESULTS:      Lab 12/19/23  0020   WBC 7.10   HEMOGLOBIN 14.5   HEMATOCRIT 43.2   PLATELETS 299   NEUTROS ABS 4.90   LYMPHS ABS  1.70   MONOS ABS 0.40   EOS ABS 0.00   MCV 87.7   PROTIME 10.6   APTT 28.5*         Lab 12/19/23  0020   SODIUM 137   POTASSIUM 4.1   CHLORIDE 99   CO2 24.0   ANION GAP 14.0   BUN 11   CREATININE 1.17   EGFR 80.8   GLUCOSE 111*   CALCIUM 9.4             Lab 12/20/23  0817 12/19/23  1954 12/19/23  1453 12/19/23  0020   HSTROP T <6 <6 <6 <6   PROTIME  --   --   --  10.6   INR  --   --   --  0.97                 Brief Urine Lab Results       None          Microbiology Results (last 10 days)       ** No results found for the last 240 hours. **            XR Chest 1 View    Result Date: 12/19/2023  Impression: Impression: No acute cardiopulmonary process. Electronically Signed: Lani Espinosa MD  12/19/2023 1:03 AM EST  Workstation ID: [id number]     Results for orders placed during the hospital encounter of 06/22/22    Duplex Vein Mapping Lower Extremity - Bilateral CAR    Interpretation Summary  · The left greater saphenous vein is patent and of adequate size in the thigh.  · The left greater saphenous vein is patent and of adequate size in the calf.      Results for orders placed during the hospital encounter of 06/22/22    Duplex Vein Mapping Lower Extremity - Bilateral CAR    Interpretation Summary  · The left greater saphenous vein is patent and of adequate size in the thigh.  · The left greater saphenous vein is patent and of adequate size in the calf.      Results for orders placed during the hospital encounter of 12/18/23    Adult Transthoracic Echo Complete W/ Cont if Necessary Per Protocol    Interpretation Summary  Normal left and right ventricular size and systolic function  Normal left ventricular diastolic function  No significant valve disease noted.      Labs Pending at Discharge:      Procedures Performed           Consults:   Consults       Date and Time Order Name Status Description    12/19/2023  8:30 AM Inpatient Cardiology Consult Completed     12/19/2023 12:50 AM Hospitalist (on-call MD unless  specified)                Discharge Details        Discharge Medications        New Medications        Instructions Start Date   amLODIPine 5 MG tablet  Commonly known as: Norvasc   5 mg, Oral, Daily      metoprolol succinate  MG 24 hr tablet  Commonly known as: TOPROL-XL   100 mg, Oral, Every 24 Hours Scheduled   Start Date: December 21, 2023     pantoprazole 40 MG EC tablet  Commonly known as: PROTONIX  Replaces: omeprazole 40 MG capsule   40 mg, Oral, Every Early Morning   Start Date: December 21, 2023            Changes to Medications        Instructions Start Date   isosorbide mononitrate 120 MG 24 hr tablet  Commonly known as: IMDUR  What changed:   medication strength  how much to take  when to take this   120 mg, Oral, Every 24 Hours Scheduled   Start Date: December 21, 2023            Continue These Medications        Instructions Start Date   albuterol sulfate  (90 Base) MCG/ACT inhaler  Commonly known as: PROVENTIL HFA;VENTOLIN HFA;PROAIR HFA   Inhale 2 puffs by mouth as directed every four to six hours as needed for coughing, shortness of breath, wheezing      Aspirin Low Dose 81 MG EC tablet  Generic drug: aspirin   81 mg, Oral, Daily      Aspirin Low Dose 81 MG EC tablet  Generic drug: aspirin   81 mg, Oral, Daily      cilostazol 50 MG tablet  Commonly known as: PLETAL   50 mg, Oral, 2 Times Daily      levothyroxine 50 MCG tablet  Commonly known as: SYNTHROID, LEVOTHROID   TAKE 1 TABLET BY MOUTH EVERY DAY      lisinopril 5 MG tablet  Commonly known as: PRINIVIL,ZESTRIL   5 mg, Oral, Every 24 Hours Scheduled      nitroglycerin 0.4 MG SL tablet  Commonly known as: NITROSTAT   0.4 mg, Sublingual, Every 5 Minutes PRN, Take no more than 3 doses in 15 minutes.      prasugrel 10 MG tablet  Commonly known as: EFFIENT   10 mg, Oral, Daily, Take 6 tablets by mouth for the first dose and then take one tablet by mouth daily thereafter.      ranolazine 1000 MG 12 hr tablet  Commonly known as: RANEXA    1,000 mg, Oral, 2 Times Daily      rosuvastatin 40 MG tablet  Commonly known as: CRESTOR   Take 1 tablet by mouth daily.             Stop These Medications      metoprolol tartrate 50 MG tablet  Commonly known as: LOPRESSOR     omeprazole 40 MG capsule  Commonly known as: priLOSEC  Replaced by: pantoprazole 40 MG EC tablet              Allergies   Allergen Reactions    Shellfish-Derived Products Unknown - High Severity         Discharge Disposition:   Home or Self Care    Diet:  Hospital:  Diet Order   Procedures    Diet: Cardiac Diets; Healthy Heart (2-3 Na+); Texture: Regular Texture (IDDSI 7); Fluid Consistency: Thin (IDDSI 0)         Discharge Activity: Ad harley.        CODE STATUS:  Code Status and Medical Interventions:   Ordered at: 12/19/23 0104     Code Status (Patient has no pulse and is not breathing):    CPR (Attempt to Resuscitate)     Medical Interventions (Patient has pulse or is breathing):    Full Support         Future Appointments   Date Time Provider Department Center   3/26/2024  3:30 PM Moises Haddad MD [facility]           Time spent on Discharge including face to face service:  > 30 minutes    This patient has been  and discussed with . 12/20/23      Signature:

## 2023-12-20 NOTE — CASE MANAGEMENT/SOCIAL WORK
Discharge Planning Assessment   Tavo     Patient Name: Tramaine Gates  MRN: [id number]  Today's Date: 12/20/2023    Admit Date: 12/18/2023    Plan: D/C Plan: Home with S.O.   Discharge Needs Assessment       Row Name 12/20/23 1416       Living Environment    People in Home significant other;child(kelly), dependent    Name(s) of People in Home [email or web address]    Current Living Arrangements apartment    Potentially Unsafe Housing Conditions none    In the past 12 months has the electric, gas, oil, or water company threatened to shut off services in your home? No    Primary Care Provided by self    Provides Primary Care For no one    Family Caregiver if Needed significant other    Quality of Family Relationships supportive;stressful    Able to Return to Prior Arrangements yes       Resource/Environmental Concerns    Resource/Environmental Concerns other (see comments)  [facility]DI resource given    Transportation Concerns none       Transition Planning    Patient/Family Anticipates Transition to home with family    Patient/Family Anticipated Services at Transition none    Transportation Anticipated family or friend will provide       Discharge Needs Assessment    Readmission Within the Last 30 Days no previous admission in last 30 days    Equipment Currently Used at Home none    Concerns to be Addressed basic needs;financial/insurance    Anticipated Changes Related to Illness none    Equipment Needed After Discharge none    Provided Post Acute Provider List? N/A    Current Discharge Risk financial support inadequate                   Discharge Plan       Row Name 12/20/23 1423       Plan    Plan D/C Plan: Home with S.O.    Plan Comments Met with pt.and his S.O.at bedside. Confirmed PCP and Pharm. No problems paying for medications. Has ROSSANA. His S.O. has ROSSANA for current preganancy. She will likely lose after baby is born because she is over resouced since she is employed full-time.  Pt has been in pursuit of disability  since June due to cardiac issues.  Unable to hold down a job. They have big concerns related to maternity leave without income. No active plan how they will continue to pay for thier bills and take care of children.  Have requested S.W. assistance to provide any addtional resources available.   This writer did  give brochure on [employer] to assist with disability income process.  Barrier to D/C: Cardiac Clearance.                  Continued Care and Services - Admitted Since 12/18/2023    Coordination has not been started for this encounter.          Demographic Summary       Row Name 12/20/23 1410       General Information    Admission Type observation    Arrived From emergency department    Referral Source admission list    Reason for Consult discharge planning    Preferred Language English                   Functional Status       Row Name 12/20/23 1412       Functional Status    Usual Activity Tolerance moderate    Current Activity Tolerance fair       Functional Status, IADL    Medications independent    Meal Preparation assistive person    Housekeeping assistive person    Laundry assistive person    Shopping assistive person       Mental Status    General Appearance WDL WDL       Mental Status Summary    Recent Changes in Mental Status/Cognitive Functioning no changes       Employment/    Employment Status unemployed;other (see comments)  pursuing disability    Current or Previous Occupation not applicable                          Marisa Cheney RN

## 2023-12-20 NOTE — PLAN OF CARE
Problem: Pain Acute  Goal: Acceptable Pain Control and Functional Ability  Outcome: Met  Intervention: Prevent or Manage Pain  Recent Flowsheet Documentation  Taken 12/20/2023 1600 by Rosa Wilkes LPN  Medication Review/Management: medications reviewed  Taken 12/20/2023 1500 by Rosa Wilkes LPN  Medication Review/Management: medications reviewed  Taken 12/20/2023 1400 by Rosa Wilkes LPN  Medication Review/Management: medications reviewed  Taken 12/20/2023 1000 by Rosa Wilkes LPN  Medication Review/Management: medications reviewed  Taken 12/20/2023 0800 by Rosa Wilkes LPN  Medication Review/Management: medications reviewed  Intervention: Develop Pain Management Plan  Recent Flowsheet Documentation  Taken 12/20/2023 0800 by Rosa Wilkes LPN  Pain Management Interventions: see MAR  Intervention: Optimize Psychosocial Wellbeing  Recent Flowsheet Documentation  Taken 12/20/2023 0800 by Rosa Wilkes LPN  Diversional Activities:   television   smartphone     Problem: Chest Pain  Goal: Resolution of Chest Pain Symptoms  Outcome: Met  Intervention: Manage Acute Chest Pain  Recent Flowsheet Documentation  Taken 12/20/2023 0800 by Rosa Wilkes LPN  Chest Pain Intervention:   cardiac monitoring continued   12-lead ECG obtained     Problem: Hypertension Comorbidity  Goal: Blood Pressure in Desired Range  Outcome: Met  Intervention: Maintain Blood Pressure Management  Recent Flowsheet Documentation  Taken 12/20/2023 1600 by Rosa Wilkes LPN  Medication Review/Management: medications reviewed  Taken 12/20/2023 1500 by Rosa Wilkes LPN  Medication Review/Management: medications reviewed  Taken 12/20/2023 1400 by Rosa Wilkes LPN  Medication Review/Management: medications reviewed  Taken 12/20/2023 1000 by Rosa Wilkes LPN  Medication Review/Management: medications reviewed  Taken 12/20/2023 0800 by Rosa Wilkes LPN  Medication Review/Management: medications reviewed   Goal  Outcome Evaluation:

## 2023-12-21 NOTE — CASE MANAGEMENT/SOCIAL WORK
Case Management Discharge Note      Final Note: Routine Home    Provided Post Acute Provider List?: N/A    Selected Continued Care - Discharged on 12/20/2023 Admission date: 12/18/2023 - Discharge disposition: Skilled Nursing Facility (DC - External)           Transportation Services  Private: Car    Final Discharge Disposition Code: 01 - home or self-care

## 2024-01-10 ENCOUNTER — TELEPHONE (OUTPATIENT)
Dept: CARDIOLOGY | Facility: CLINIC | Age: 39
End: 2024-01-10
Payer: MEDICAID

## 2024-01-10 RX ORDER — METOPROLOL SUCCINATE 100 MG/1
100 TABLET, EXTENDED RELEASE ORAL
Qty: 30 TABLET | Refills: 0 | Status: SHIPPED | OUTPATIENT
Start: 2024-01-10

## 2024-01-10 RX ORDER — ISOSORBIDE MONONITRATE 120 MG/1
120 TABLET, EXTENDED RELEASE ORAL
Qty: 30 TABLET | Refills: 0 | Status: SHIPPED | OUTPATIENT
Start: 2024-01-10

## 2024-01-10 RX ORDER — AMLODIPINE BESYLATE 5 MG/1
5 TABLET ORAL DAILY
Qty: 30 TABLET | Refills: 0 | Status: SHIPPED | OUTPATIENT
Start: 2024-01-10

## 2024-01-10 NOTE — TELEPHONE ENCOUNTER
Caller: Tramaine Gates A    Relationship to patient: Self    Best call back number: [phone]    Patient is needing: PATIENT WAS DISCHARGED FROM [facility] ON 12.20.23 AND WAS CONSULTED BY DR. ASHRAF. PATIENT STATED THAT HE WAS PRESCRIBED MEDICATIONS FOR 2 WEEKS AND HE WILL BE OUT OF THEM. PATIENT HAS APPOINTMENT WITH DR. COTE ON 03.26.24 BUT THINKS THAT HE NEEDS TO BE SEEN SOONER. PLEASE CONTACT PATIENT TO ADVISE. THANK YOU.

## 2024-01-10 NOTE — TELEPHONE ENCOUNTER
Got the patient moved up to 1/15/24. Please send his med refills to [facility] pharmacy.

## 2024-01-15 ENCOUNTER — OFFICE VISIT (OUTPATIENT)
Dept: CARDIOLOGY | Facility: CLINIC | Age: 39
End: 2024-01-15
Payer: MEDICAID

## 2024-01-15 VITALS
BODY MASS INDEX: 44.81 KG/M2 | HEIGHT: 70 IN | WEIGHT: 313 LBS | RESPIRATION RATE: 18 BRPM | DIASTOLIC BLOOD PRESSURE: 96 MMHG | SYSTOLIC BLOOD PRESSURE: 142 MMHG | HEART RATE: 77 BPM

## 2024-01-15 DIAGNOSIS — E78.2 MIXED HYPERLIPIDEMIA: ICD-10-CM

## 2024-01-15 DIAGNOSIS — I25.10 CAD, MULTIPLE VESSEL: Primary | ICD-10-CM

## 2024-01-15 DIAGNOSIS — I10 PRIMARY HYPERTENSION: ICD-10-CM

## 2024-01-15 DIAGNOSIS — I20.0 UNSTABLE ANGINA: ICD-10-CM

## 2024-01-18 DIAGNOSIS — F90.0 ATTENTION-DEFICIT HYPERACTIVITY DISORDER, PREDOMINANTLY INATTENTIVE TYPE: ICD-10-CM

## 2024-01-18 RX ORDER — DEXTROAMPHETAMINE SACCHARATE, AMPHETAMINE ASPARTATE, DEXTROAMPHETAMINE SULFATE AND AMPHETAMINE SULFATE 2.5; 2.5; 2.5; 2.5 MG/1; MG/1; MG/1; MG/1
10 TABLET ORAL 2 TIMES DAILY
Qty: 60 TABLET | Refills: 0 | Status: SHIPPED | OUTPATIENT
Start: 2024-01-18 | End: 2024-02-17

## 2024-01-18 NOTE — TELEPHONE ENCOUNTER
Last OV 12/15/23  Last refill 12/17/23  I have reviewed the patient’s controlled substance prescription history, as maintained in the [place] prescription monitoring program, so that the prescription(s) for a  controlled substance can be given.

## 2024-01-26 NOTE — PROGRESS NOTES
Cardiology Clinic Note  Moises Haddad MD, PhD    Subjective:     Encounter Date:01/15/2024      Patient ID: Tramaine Gates is a 38 y.o. male.    Chief Complaint:  Chief Complaint   Patient presents with    Hospital Follow Up Visit       HPI:    I the pleasure to see this 38-year-old gentleman in follow-up with history of multivessel bypass 2022, chest pain admission prompting intervention SVG to RCA with proximal balloon angioplasty given in-stent restenosis.  The RCA graft is essentially overlapping stents all the way down to the anastomosis with the PDA with small vessel disease diffusely in the RCA natively not amenable to intervention given very small caliber size with significant in-stent restenosis.  Patient underwent multiple balloon angioplasty throughout the entirety of the stented segment from the distal back to proximal ostial SVG to the RCA improving antegrade runoff with in segment hazy appearing thrombotic lesion at that time.  LIMA to LAD is widely patent with small size distal LAD, circumflex natively was also widely patent with preserved LV systolic function.  He is back today otherwise doing well, he had a follow-up stress test in December which revealed no ECG evidence of ischemia, normal myocardial perfusion with no evidence of ischemia.  Otherwise doing well on goal-directed medical therapy without chest pain shortness of breath heart failure signs or symptoms at this time.  Second prevention goals diet and exercise all discussed today    Review of systems otherwise negative x 14 point review of systems except as mentioned above    Historical data copied forward from previous encounters in EMR including the history, exam, and assessment/plan has been reviewed and is unchanged unless noted otherwise.    Cardiac medicines reviewed with risk, benefits, and necessity of each discussed.    Risk and benefit of cardiac testing reviewed including death heart attack stroke pain bleeding infection need  "for vascular /cardiovascular surgery were discussed and the patient     Objective:         /96 (BP Location: Left arm, Patient Position: Sitting)   Pulse 77   Resp 18   Ht 177.8 cm (70\")   Wt (!) 142 kg (313 lb)   BMI 44.91 kg/m²     Physical Exam    Assessment:         1. Chest Pain with coronary artery disease status post remote bypass bypass 2022  - [facility] 9/15/2022: three-vessel coronary disease, patent LIMA to LAD, closed SVG to OM, heavily diseased SVG to RCA with poor candidacy for native vessel intervention  - s/p Overlapping Xience drug-eluting stents 3.5 x 38 x 3 stents in overlapping fashion postdilated to 3.7 mm at 16 to 18 radha, reduction stenosis to 0% residual, improvement DARRYL-3 flow via the vein graft to the distal RCA and RPDA  - [facility] 3/2/2023: Culprit appeared to be SVG to RCA distal lesion 70 to 80% hazy possibly atherothrombotic as well as proximal ostial ISR.  Other vascular areas with LIMA to LAD, native LAD, circumflex and native RCA.  Unchanged angiographically compared to prior case 9 months ago  Continue aspirin and Effient uninterrupted  High intensity statin, goal-directed medical therapy for diffuse native and bypass graft CAD  - preserved LVEF     2. CAD  - s/p STEMI 6/14/2022: [facility] showed three vessel CAD- he underwent ASHLEE placement to the culprit lesion in the RCA.  LAD had 80-90% proximal stenosis and LCx had 40-50% in distal LCx/OM.  Mr. Gates underwent repeat cardiac cath on 6/16/2022 and received ASHLEE to LAD  - repeat admission 5 days later showing in stent thrombosis sent for CABG  - s/p 3V CABG 6/29/2022 (LIMA-LAD, SVG-OMofLCx, SVG-RPDA)  - this admission s/p POBA to the distal portion of the SVG to RCA inside the stented segment secondary to what appears to be greater than 70% in-stent restenosis distally versus subocclusive thrombus, ostial proximal greater than 50% restenosis, reduced to less than 10% distally, remains about 20% proximally at the ostium, improved " angiographic runoff which was DARRYL II preoperatively    Today  Continue beta-blocker with metoprolol, lisinopril, Imdur, cilostazol 50 twice daily in addition to low-dose aspirin to help prevent in-stent restenosis and Effient  Ranexa for chest pain relief and high intensity statin  He is on appropriate goal-directed medical therapy with secondary prevention goals     3.  HTN controlled at home less than 140 systolic     4. HLD, high intensity statin aspirin and antiplatelets     5. Obesity    Continue present pharmacotherapy  Follow-up 6 months    The pleasure to be involved in this patient's cardiovascular care.  Please call with any questions or concerns  Moises Haddad MD, PhD    Most recent EKG as reviewed and interpreted by me:  Procedures     Most recent echo as reviewed and interpreted by me:  Results for orders placed during the hospital encounter of 12/18/23    Adult Transthoracic Echo Complete W/ Cont if Necessary Per Protocol    Interpretation Summary  Normal left and right ventricular size and systolic function  Normal left ventricular diastolic function  No significant valve disease noted.      Most recent stress test as reviewed and interpreted by me:  Results for orders placed during the hospital encounter of 12/18/23    Stress Test With Myocardial Perfusion One Day    Interpretation Summary  1. Negative Regadenoson Electrocardiography: There is no ECG evidence of myocardial ischemia.  2.  Adequate blood-pressure response to regadenoson.  3. No regadenoson-induced arrhythmias  4. Normal SPECT Myocardial Perfusion Imaging: There is no scintigraphic evidence of myocardial ischemia or scarring.  5. Overall left ventricular function is preserved, with a normal ejection fraction and no regional asynergy.      Most recent cardiac catheterization as reviewed interpreted by me:  Results for orders placed during the hospital encounter of 03/01/23    Cardiac Catheterization/Vascular Study    Narrative  Primary   Moises Haddad MD, PhD  [facility] cardiology  Date of service 3-2-2023    Procedure  1.  Left heart catheterization coronary angiography left ventriculography In GALLO position, imaging of native and bypass grafts  2.,  Balloon angioplasty 4.0 x 20 NC balloon of the distal portion of the SVG to RCA inside the stented segment secondary to what appears to be greater than 70% in-stent restenosis distally versus subocclusive thrombus, ostial proximal greater than 50% restenosis, reduced to less than 10% distally, remains about 20% proximally at the ostium, improved angiographic runoff which was DARRYL II preoperatively, DARRYL-3 post procedurally    Indication  Unstable angina, unable for intervention to the native RCA    After informed consent patient brought to the Cath Lab sterilely prepped and draped in usual fashion with exposure of the right groin for right common femoral arterial access via micropuncture modified Seldinger technique with placement of a 6 Albanian sheath.  035 guidewires advanced aortic valve followed by diagnostic JL 4 and JR4 catheters for selective left and right coronary angiography, JR4 was used to image the LIMA to LAD, multipurpose catheter was used to image SVG to RCA, SVG to diagonal was noted to be closed.  There appeared to be haziness at the ostial proximal portion of the stent in the SVG as well as what appeared to be slow flow distal to a bend in the distal portion of the graft also with haziness and what appeared to be minimum 60% stenosis possibly up to 80 and some angiographic views.  Decision was made given inability to revascularize native RCA with diffuse small vessel disease and small caliber vessels to intervene with balloon angioplasty in the stented segment of the SVG graft.  Patient was heparinized with 100 units/kg of heparin, he was on a background therapy of aspirin and Effient at baseline, ACT was greater than 250, multipurpose guide was used engage the vein  "graft followed by run-through wire to the distal portion of the graft, 4.0 x 20 NC balloon was used to dilate the proximal portion of the graft up to 16 radha for a minute and a half with prolonged inflation given significant stenosis at the ostial portion of the graft, there was great angiographic improvement of this portion of the graft with better stent expansion, angiography further revealed downstream stenosis in the distal portion of the graft, the balloon was then readvanced to the distal portion followed by 2 inflations up to 14 radha distal proximal fashion at the culprit portion reducing stenosis to 0% residual and improving distal runoff into again small vessel PDA and retrogradely into the RCA.  Final angiography revealed no distal wire trauma no edge dissection, DARRYL-3 flow in the graft as well as into the RPDA which did have some degree of competitive flow from the native RCA although small vessel and diffusely diseased.  All catheters\" removed.  6 Vietnamese Angio-Seal was used to close the arteriotomy with immediate hemostasis and Meints of distal pulses.  He left Cath Lab chest pain-free hemodynamically electrically stable alert talkative staff neurologic grossly intact bilaterally    Complications none  Blood loss less than 10 cc  Contrast 160 cc  Sedation time of 1 hour    Findings  1.  Opening aortic pressure of  Closing pressure  LVEDP elevated 20-25  LV function normal 60%  Normal transaortic valve gradient on pullback    Angiography  1.  Left main normal takeoff, 10% luminal regularities leading into LAD that is subtotally occluded in the midportion, circumflex widely patent  2.  LAD diffusely diseased 80 to 90% the proximal portion, widely patent LIMA to LAD in the midportion, distal portion of the LAD has diffuse luminal regularities but is very small caliber less than 2 mm in diameter, anastomosis is widely patent for the LIMA  Over 3 LIMA to LAD widely patent, no disease  3.  SVG to diagonal " branch is closed  4.  SVG to RCA has ostial 60 to 70% but hazy in appearance, distally also had 70 to 80% hazy lesion suspicious for ISR versus subacute thrombus which was greatly improved by balloon angioplasty, runoff both retrograde into the PDA and anterograde with very small vessel anastomosis likely best for medical therapy  5.  RCA natively diffusely diseased with patent stents in the proximal midportion, the intervening portion between proximal and distal stents is much less than 1 mm in diameter, marginal branches open and otherwise distal portion of vessel not amenable to intervention secondary to severe small caliber and very poor long-term patency  6.  Circumflex widely patent, obtuse marginal branch has diffuse small vessel disease tandem 80% lesions and tortuous not amenable to intervention secondary to small caliber size poor distal runoff and very poor long-term patency, thus medical treatment at this point, unchanged angiographically from prior case 8 to 9 months ago at the time of SVG revascularization with overlapping stents    Conclusions recommendations  1.  Unstable angina  Culprit appeared to be SVG to RCA distal lesion 70 to 80% hazy possibly atherothrombotic as well as proximal ostial ISR.  Other vascular areas with LIMA to LAD, native LAD, circumflex and native RCA.  Unchanged angiographically compared to prior case 9 months ago  Continue aspirin and Effient uninterrupted  High intensity statin, goal-directed medical therapy for diffuse native and bypass graft CAD  We will advance her Ranexa and long-acting nitrates for small vessel disease    further management per clinical course    Moises Haddad MD, PhD  .    The following portions of the patient's history were reviewed and updated as appropriate: allergies, current medications, past family history, past medical history, past social history, past surgical history, and problem list.      ROS:  14 point review of systems negative except  as mentioned above    Current Outpatient Medications:     albuterol sulfate  (90 Base) MCG/ACT inhaler, Inhale 2 puffs by mouth as directed every four to six hours as needed for coughing, shortness of breath, wheezing, Disp: 8.5 g, Rfl: 0    amLODIPine (Norvasc) 5 MG tablet, Take 1 tablet by mouth Daily., Disp: 30 tablet, Rfl: 0    aspirin (Aspirin Low Dose) 81 MG EC tablet, Take 1 tablet by mouth Daily., Disp: 90 tablet, Rfl: 0    cilostazol (PLETAL) 50 MG tablet, Take 1 tablet by mouth 2 (Two) Times a Day., Disp: 180 tablet, Rfl: 3    isosorbide mononitrate (IMDUR) 120 MG 24 hr tablet, Take 1 tablet by mouth Daily., Disp: 30 tablet, Rfl: 0    levothyroxine (SYNTHROID, LEVOTHROID) 50 MCG tablet, TAKE 1 TABLET BY MOUTH EVERY DAY, Disp: 90 tablet, Rfl: 1    lisinopril (PRINIVIL,ZESTRIL) 5 MG tablet, Take 1 tablet by mouth Daily., Disp: 90 tablet, Rfl: 3    metoprolol succinate XL (TOPROL-XL) 100 MG 24 hr tablet, Take 1 tablet by mouth Daily., Disp: 30 tablet, Rfl: 0    nitroglycerin (NITROSTAT) 0.4 MG SL tablet, Place 1 tablet under the tongue Every 5 (Five) Minutes As Needed for Chest Pain (Only if SBP Greater Than 100). Take no more than 3 doses in 15 minutes., Disp: 25 tablet, Rfl: 0    prasugrel (EFFIENT) 10 MG tablet, Take 1 tablet by mouth Daily. Take 6 tablets by mouth for the first dose and then take one tablet by mouth daily thereafter., Disp: 90 tablet, Rfl: 3    ranolazine (RANEXA) 1000 MG 12 hr tablet, Take 1 tablet by mouth 2 (Two) Times a Day., Disp: 180 tablet, Rfl: 3    rosuvastatin (CRESTOR) 40 MG tablet, Take 1 tablet by mouth daily., Disp: 30 tablet, Rfl: 11    aspirin (Aspirin Low Dose) 81 MG EC tablet, Take 1 tablet by mouth Daily., Disp: 90 tablet, Rfl: 0    Problem List:  Patient Active Problem List   Diagnosis    Hypertension    Peptic ulcer    Asthma    Blepharitis    Chronic cholecystitis    Contact with and (suspected) exposure to covid-19    Corneal ulcer    Costochondritis    Cough     Disorder of thyroid    Gastroesophageal reflux disease    Hyperlipidemia    Migraine headache    Panic attack    Acute inferior myocardial infarction    CAD, multiple vessel    Bipolar II disorder    Hx of brief reactive psychosis    Generalized anxiety disorder with panic attacks    Rage attacks    Chest pain, unspecified type    S/P CABG x 3    Chest pain     Past Medical History:  Past Medical History:   Diagnosis Date    Acute inferior myocardial infarction 06/14/2022    Allergic     Asthma 01/27/2022    CAD, multiple vessel 06/14/2022    Gastroesophageal reflux disease 01/27/2022    Hx of complete eye exam 2016    Hyperlipidemia 01/27/2022    Hypertension     Migraine headache 01/27/2022    Panic attack 01/27/2022    Peptic ulcer 09/14/2017     Past Surgical History:  Past Surgical History:   Procedure Laterality Date    CARDIAC CATHETERIZATION N/A 06/14/2022    Procedure: Left Heart Cath;  Surgeon: Matthieu Del Toro MD;  Location:  [facility];  Service: Cardiology;  Laterality: N/A;    CARDIAC CATHETERIZATION N/A 06/16/2022    Procedure: Percutaneous Coronary Intervention;  Surgeon: Matthieu Del Toro MD;  Location:  [facility];  Service: Cardiovascular;  Laterality: N/A;    CARDIAC CATHETERIZATION N/A 06/23/2022    Procedure: Left Heart Cath possible PCI, atherectomy, hemodynamic support;  Surgeon: Moises Haddad MD;  Location:  [facility];  Service: Cardiology;  Laterality: N/A;    CARDIAC CATHETERIZATION N/A 09/15/2022    Procedure: Left Heart Cath;  Surgeon: Moises Haddad MD;  Location:  [facility];  Service: Cardiology;  Laterality: N/A;    CARDIAC CATHETERIZATION  9/15/2022    CARDIAC CATHETERIZATION N/A 3/2/2023    Procedure: Left Heart Cath;  Surgeon: Moises Haddad MD;  Location:  [facility];  Service: Cardiology;  Laterality: N/A;    CHOLECYSTECTOMY  2019    CORONARY ARTERY BYPASS GRAFT  N/A 2022    Procedure: CORONARY ARTERY BYPASS GRAFTING;  Surgeon: Pablo Ball MD;  Location: [facility];  Service: Cardiothoracic;  Laterality: N/A;  CABG X 3(2 vein grafts, 1 LIMA graft)    CORONARY ARTERY BYPASS GRAFT  2022    CORONARY STENT PLACEMENT      MANDIBLE SURGERY       Social History:  Social History     Socioeconomic History    Marital status: Single   Tobacco Use    Smoking status: Former     Packs/day: 1.50     Years: 23.00     Additional pack years: 0.00     Total pack years: 34.50     Types: Cigarettes     Start date: 1996     Quit date: 2022     Years since quittin.5    Smokeless tobacco: Never   Vaping Use    Vaping Use: Never used   Substance and Sexual Activity    Alcohol use: Not Currently    Drug use: Yes     Frequency: 7.0 times per week     Types: Marijuana    Sexual activity: Yes     Partners: Female     Allergies:  Allergies   Allergen Reactions    Shellfish-Derived Products Unknown - High Severity     Immunizations:    There is no immunization history on file for this patient.         In-Office Procedure(s):  No orders to display        ASCVD RIsk Score::  The ASCVD Risk score (Nigel DK, et al., 2019) failed to calculate for the following reasons:    The patient has a prior MI or stroke diagnosis    Imaging:    Results for orders placed during the hospital encounter of 23    XR Chest 1 View    Narrative  XR CHEST 1 VW    Date of Exam: 2023 1:00 AM EST    Indication: Chest pain    Comparison: 3/1/2023.    Findings:  There are no airspace consolidations. No pleural fluid. No pneumothorax. The pulmonary vasculature appears within normal limits. The cardiac and mediastinal silhouette appear unremarkable. No acute osseous abnormality identified. Median sternotomy wires  are present.    Impression  Impression:  No acute cardiopulmonary process.      Electronically Signed: Lani Espinosa MD  2023 1:03 AM EST  Workstation ID: [id number]        Results for orders placed during the hospital encounter of 03/09/23    US Scrotum & Testicles    Narrative  SCROTAL ULTRASOUND    CLINICAL INDICATION: Scrotal pain.    COMPARISON: None.    PROCEDURE: Grayscale, color and spectral Doppler imaging of the scrotum was performed.    FINDINGS: The right testicle measures 3.0 x 2.4 x 4.6 cm and is without focal lesion. The left testicle measures 3.1 x 2.0 x 4.8 cm and is without focal lesion. Symmetric testicular echogenicity noted. Small bilateral hydroceles. 5 mm left spermatocele.  No hernia or varicocele is seen.    Normal color and spectral arterial and venous Doppler flow is identified in both testicles.    Impression  1. Normal testicles without torsion.  2. Small bilateral hydroceles.  3. Small left spermatocele.    Electronically signed by:  Moises Markham M.D.  3/9/2023 1:48 AM Mountain Time      Results for orders placed during the hospital encounter of 07/05/22    CT Angiogram Chest Pulmonary Embolism    Narrative  CT ANGIOGRAM CHEST WITH IV CONTRAST    INDICATION: Shortness of breath    COMPARISON: None available.    PROCEDURE: Multiple axial CT images were obtained of the chest after IV administration of 97 mL Isovue 370..  Coronal and sagittal reformations as well as MIP images were obtained.  CT dose lowering techniques were used, to include: automated exposure  control, adjustment for patient size, and or use of iterative reconstruction.    FINDINGS:    Cardiovascular: No filling defects are seen in the pulmonary arteries. No focal aortic aneurysm or evidence of aortic dissection is seen.    Mediastinum/Jenny: Postsurgical findings from median sternotomy. Small amount of retrosternal stranding and gas from recent median sternotomy.    Lungs/Pleura :  Small pleural effusions with basilar atelectasis.    Chest wall and Axilla: Unremarkable.    Bones:  No acute focal bony abnormality seen.    Upper abdomen: No focal abnormalities seen.    Impression  1. No  pulmonary embolus.  2. Constellation of findings from recent median sternotomy without apparent complication.  3. Small pleural effusions.    Electronically signed by:  Keven Purvis M.D.  7/5/2022 4:09 AM      Lab Review:   Admission on 12/18/2023, Discharged on 12/20/2023   Component Date Value    QT Interval 12/19/2023 360     QTC Interval 12/19/2023 427     Glucose 12/19/2023 111 (H)     BUN 12/19/2023 11     Creatinine 12/19/2023 1.17     Sodium 12/19/2023 137     Potassium 12/19/2023 4.1     Chloride 12/19/2023 99     CO2 12/19/2023 24.0     Calcium 12/19/2023 9.4     BUN/Creatinine Ratio 12/19/2023 9.4     Anion Gap 12/19/2023 14.0     eGFR 12/19/2023 80.8     HS Troponin T 12/19/2023 <6     Protime 12/19/2023 10.6     INR 12/19/2023 0.97     PTT 12/19/2023 28.5 (L)     WBC 12/19/2023 7.10     RBC 12/19/2023 4.92     Hemoglobin 12/19/2023 14.5     Hematocrit 12/19/2023 43.2     MCV 12/19/2023 87.7     MCH 12/19/2023 29.5     MCHC 12/19/2023 33.6     RDW 12/19/2023 13.4     RDW-SD 12/19/2023 40.7     MPV 12/19/2023 7.1     Platelets 12/19/2023 299     Neutrophil % 12/19/2023 68.9     Lymphocyte % 12/19/2023 24.4     Monocyte % 12/19/2023 5.1     Eosinophil % 12/19/2023 0.5     Basophil % 12/19/2023 1.1     Neutrophils, Absolute 12/19/2023 4.90     Lymphocytes, Absolute 12/19/2023 1.70     Monocytes, Absolute 12/19/2023 0.40     Eosinophils, Absolute 12/19/2023 0.00     Basophils, Absolute 12/19/2023 0.10     nRBC 12/19/2023 0.0     Extra Tube 12/19/2023 Hold for add-ons.     HS Troponin T 12/19/2023 <6     BH CV STRESS PROTOCOL 1 12/19/2023 Pharmacologic     Stage 1 12/19/2023 1.0     Duration Min Stage 1 12/19/2023 0     Duration Sec Stage 1 12/19/2023 10     Stress Dose Regadenoson * 12/19/2023 0.40     Stress Comments Stage 1 12/19/2023 10 sec bolus injection     Target HR (85%) 12/19/2023 153     Max. Pred. HR (100%) 12/19/2023 180     HR Stage 1 12/19/2023 81     BP Stage 1 12/19/2023 143/90      Stage 2 12/19/2023 2.0     HR Stage 2 12/19/2023 90     BP Stage 2 12/19/2023 145/101     Duration Min Stage 2 12/19/2023 4     Duration Sec Stage 2 12/19/2023 0     Stress Comments Stage 2 12/19/2023 recovery     Baseline HR 12/19/2023 66     Baseline BP 12/19/2023 143/90     Peak HR 12/19/2023 90     Peak BP 12/19/2023 171/102     Recovery HR 12/19/2023 84     Recovery BP 12/19/2023 159/102     Percent Max Pred HR 12/19/2023 50.00     Percent Target HR 12/19/2023 59     Nuclear Prior Study 12/19/2023 3.0     BH CV REST NUCLEAR ISOTO* 12/19/2023 11.0     BH CV STRESS NUCLEAR ISO* 12/19/2023 33.0     Nuc Stress EF 12/19/2023 84     LVIDd 12/19/2023 4.4     LVIDs 12/19/2023 3.0     IVSd 12/19/2023 1.00     LVPWd 12/19/2023 1.10     FS 12/19/2023 31.8     IVS/LVPW 12/19/2023 0.91     ESV(cubed) 12/19/2023 27.0     LV Sys Vol (BSA correcte* 12/19/2023 16.4     EDV(cubed) 12/19/2023 85.2     LV Sotelo Vol (BSA correct* 12/19/2023 48.6     LV mass(C)d 12/19/2023 158.2     LVOT area 12/19/2023 3.5     LVOT diam 12/19/2023 2.10     EDV(MOD-sp4) 12/19/2023 122.0     ESV(MOD-sp4) 12/19/2023 41.1     SV(MOD-sp4) 12/19/2023 80.9     SI(MOD-sp4) 12/19/2023 32.2     EF(MOD-sp4) 12/19/2023 66.3     MV E max lei 12/19/2023 96.4     MV A max lei 12/19/2023 64.5     MV dec time 12/19/2023 0.22     MV E/A 12/19/2023 1.49     LA ESV Index (BP) 12/19/2023 16.2     Med Peak E' Lei 12/19/2023 7.0     Lat Peak E' Lei 12/19/2023 10.2     TR max lei 12/19/2023 163.0     Avg E/e' ratio 12/19/2023 11.21     SV(LVOT) 12/19/2023 74.5     RVIDd 12/19/2023 3.1     RV S' 12/19/2023 11.4     LA dimension (2D)  12/19/2023 3.5     Pulm Sys Lei 12/19/2023 71.1     Pulm Sotelo Lei 12/19/2023 61.0     Pulm S/D 12/19/2023 1.17     LV V1 max 12/19/2023 109.0     LV V1 max PG 12/19/2023 4.8     LV V1 mean PG 12/19/2023 2.00     LV V1 VTI 12/19/2023 21.5     Ao pk lei 12/19/2023 137.0     Ao max PG 12/19/2023 7.5     Ao mean PG 12/19/2023 3.0     Ao V2 VTI  12/19/2023 25.8     BETTY(I,D) 12/19/2023 2.9     MV max PG 12/19/2023 5.9     MV mean PG 12/19/2023 2.00     MV V2 VTI 12/19/2023 33.0     MV P1/2t 12/19/2023 106.0     MVA(P1/2t) 12/19/2023 2.07     MVA(VTI) 12/19/2023 2.26     MV dec slope 12/19/2023 348.0     TR max PG 12/19/2023 10.6     RVSP(TR) 12/19/2023 13.6     RAP systole 12/19/2023 3.0     RV V1 max PG 12/19/2023 2.25     RV V1 max 12/19/2023 75.0     RV V1 VTI 12/19/2023 17.9     PA V2 max 12/19/2023 101.0     PA acc time 12/19/2023 0.11     ACS 12/19/2023 2.30     Sinus 12/19/2023 2.8     EF(MOD-bp) 12/19/2023 66.0     HS Troponin T 12/19/2023 <6     Troponin T Delta 12/19/2023      QT Interval 12/20/2023 380     QTC Interval 12/20/2023 449     HS Troponin T 12/20/2023 <6      Recent labs reviewed and interpreted for clinical significance and application            Level of Care:           Moises Haddad MD  01/26/24  .

## 2024-02-06 RX ORDER — AMLODIPINE BESYLATE 5 MG/1
5 TABLET ORAL DAILY
Qty: 30 TABLET | Refills: 0 | Status: CANCELLED | OUTPATIENT
Start: 2024-02-06

## 2024-02-06 RX ORDER — METOPROLOL SUCCINATE 100 MG/1
100 TABLET, EXTENDED RELEASE ORAL
Qty: 30 TABLET | Refills: 0 | Status: CANCELLED | OUTPATIENT
Start: 2024-02-06

## 2024-02-06 RX ORDER — ASPIRIN 81 MG/1
81 TABLET ORAL DAILY
Qty: 90 TABLET | Refills: 0 | Status: CANCELLED | OUTPATIENT
Start: 2024-02-06

## 2024-02-06 RX ORDER — NITROGLYCERIN 0.4 MG/1
0.4 TABLET SUBLINGUAL
Qty: 25 TABLET | Refills: 0 | Status: CANCELLED | OUTPATIENT
Start: 2024-02-06

## 2024-02-07 RX ORDER — AMLODIPINE BESYLATE 5 MG/1
5 TABLET ORAL DAILY
Qty: 30 TABLET | Refills: 0 | Status: CANCELLED | OUTPATIENT
Start: 2024-02-06

## 2024-02-07 RX ORDER — METOPROLOL SUCCINATE 100 MG/1
100 TABLET, EXTENDED RELEASE ORAL
Qty: 30 TABLET | Refills: 0 | Status: CANCELLED | OUTPATIENT
Start: 2024-02-06

## 2024-02-07 RX ORDER — NITROGLYCERIN 0.4 MG/1
0.4 TABLET SUBLINGUAL
Qty: 25 TABLET | Refills: 0 | Status: CANCELLED | OUTPATIENT
Start: 2024-02-06

## 2024-02-07 RX ORDER — ASPIRIN 81 MG/1
81 TABLET ORAL DAILY
Qty: 90 TABLET | Refills: 0 | Status: CANCELLED | OUTPATIENT
Start: 2024-02-06

## 2024-02-09 RX ORDER — NITROGLYCERIN 0.4 MG/1
0.4 TABLET SUBLINGUAL
Qty: 25 TABLET | Refills: 0 | Status: SHIPPED | OUTPATIENT
Start: 2024-02-09

## 2024-02-09 RX ORDER — AMLODIPINE BESYLATE 5 MG/1
5 TABLET ORAL DAILY
Qty: 30 TABLET | Refills: 0 | Status: SHIPPED | OUTPATIENT
Start: 2024-02-09

## 2024-02-09 RX ORDER — ASPIRIN 81 MG/1
81 TABLET ORAL DAILY
Qty: 90 TABLET | Refills: 0 | Status: SHIPPED | OUTPATIENT
Start: 2024-02-09

## 2024-02-09 RX ORDER — METOPROLOL SUCCINATE 100 MG/1
100 TABLET, EXTENDED RELEASE ORAL
Qty: 30 TABLET | Refills: 0 | Status: SHIPPED | OUTPATIENT
Start: 2024-02-09

## 2024-02-12 RX ORDER — ISOSORBIDE MONONITRATE 120 MG/1
120 TABLET, EXTENDED RELEASE ORAL
Qty: 30 TABLET | Refills: 0 | Status: SHIPPED | OUTPATIENT
Start: 2024-02-12

## 2024-02-13 ENCOUNTER — HOSPITAL ENCOUNTER (OUTPATIENT)
Dept: GENERAL RADIOLOGY | Age: 39
Discharge: HOME | End: 2024-02-16

## 2024-02-13 DIAGNOSIS — T14.90XA INJURY: ICD-10-CM

## 2024-02-13 PROCEDURE — 71046 X-RAY EXAM CHEST 2 VIEWS: CPT

## 2024-02-19 DIAGNOSIS — F90.0 ATTENTION-DEFICIT HYPERACTIVITY DISORDER, PREDOMINANTLY INATTENTIVE TYPE: ICD-10-CM

## 2024-02-19 RX ORDER — DEXTROAMPHETAMINE SACCHARATE, AMPHETAMINE ASPARTATE, DEXTROAMPHETAMINE SULFATE AND AMPHETAMINE SULFATE 2.5; 2.5; 2.5; 2.5 MG/1; MG/1; MG/1; MG/1
10 TABLET ORAL 2 TIMES DAILY
Qty: 60 TABLET | Refills: 0 | Status: SHIPPED | OUTPATIENT
Start: 2024-02-19 | End: 2024-03-20

## 2024-02-19 NOTE — TELEPHONE ENCOUNTER
Last OV 12/15/23  Last refill 1/18/24  I have reviewed the patient’s controlled substance prescription history, as maintained in the [place] prescription monitoring program, so that the prescription(s) for a  controlled substance can be given.

## 2024-03-12 RX ORDER — ISOSORBIDE MONONITRATE 120 MG/1
120 TABLET, EXTENDED RELEASE ORAL
Qty: 30 TABLET | Refills: 0 | Status: SHIPPED | OUTPATIENT
Start: 2024-03-12

## 2024-03-12 RX ORDER — METOPROLOL SUCCINATE 100 MG/1
100 TABLET, EXTENDED RELEASE ORAL
Qty: 30 TABLET | Refills: 0 | Status: SHIPPED | OUTPATIENT
Start: 2024-03-12

## 2024-03-19 DIAGNOSIS — F90.0 ATTENTION-DEFICIT HYPERACTIVITY DISORDER, PREDOMINANTLY INATTENTIVE TYPE: ICD-10-CM

## 2024-03-19 RX ORDER — DEXTROAMPHETAMINE SACCHARATE, AMPHETAMINE ASPARTATE, DEXTROAMPHETAMINE SULFATE AND AMPHETAMINE SULFATE 2.5; 2.5; 2.5; 2.5 MG/1; MG/1; MG/1; MG/1
10 TABLET ORAL 2 TIMES DAILY
Qty: 60 TABLET | Refills: 0 | Status: SHIPPED | OUTPATIENT
Start: 2024-03-19 | End: 2024-04-18

## 2024-03-19 RX ORDER — AMLODIPINE BESYLATE 5 MG/1
5 TABLET ORAL DAILY
Qty: 30 TABLET | Refills: 0 | Status: SHIPPED | OUTPATIENT
Start: 2024-03-19

## 2024-03-19 NOTE — TELEPHONE ENCOUNTER
Last OV 12/15/23  Follow up 6/17/24  Last refill 2/19/24  I have reviewed the patient’s controlled substance prescription history, as maintained in the [place] prescription monitoring program, so that the prescription(s) for a  controlled substance can be given.

## 2024-04-08 RX ORDER — ISOSORBIDE MONONITRATE 120 MG/1
120 TABLET, EXTENDED RELEASE ORAL
Qty: 30 TABLET | Refills: 0 | Status: SHIPPED | OUTPATIENT
Start: 2024-04-08

## 2024-04-08 RX ORDER — ASPIRIN 81 MG/1
81 TABLET ORAL DAILY
Qty: 90 TABLET | Refills: 0 | Status: SHIPPED | OUTPATIENT
Start: 2024-04-08

## 2024-04-08 RX ORDER — AMLODIPINE BESYLATE 5 MG/1
5 TABLET ORAL DAILY
Qty: 30 TABLET | Refills: 0 | Status: SHIPPED | OUTPATIENT
Start: 2024-04-08

## 2024-04-08 RX ORDER — METOPROLOL SUCCINATE 100 MG/1
100 TABLET, EXTENDED RELEASE ORAL
Qty: 30 TABLET | Refills: 0 | Status: SHIPPED | OUTPATIENT
Start: 2024-04-08

## 2024-04-19 DIAGNOSIS — F90.0 ATTENTION-DEFICIT HYPERACTIVITY DISORDER, PREDOMINANTLY INATTENTIVE TYPE: ICD-10-CM

## 2024-04-19 RX ORDER — DEXTROAMPHETAMINE SACCHARATE, AMPHETAMINE ASPARTATE, DEXTROAMPHETAMINE SULFATE AND AMPHETAMINE SULFATE 2.5; 2.5; 2.5; 2.5 MG/1; MG/1; MG/1; MG/1
10 TABLET ORAL 2 TIMES DAILY
Qty: 60 TABLET | Refills: 0 | Status: SHIPPED | OUTPATIENT
Start: 2024-04-19 | End: 2024-05-19

## 2024-04-19 NOTE — TELEPHONE ENCOUNTER
Last OV 12/15/24  Last refill 3/20/24  I have reviewed the patient’s controlled substance prescription history, as maintained in the [place] prescription monitoring program, so that the prescription(s) for a  controlled substance can be given.

## 2024-05-07 RX ORDER — METOPROLOL SUCCINATE 100 MG/1
100 TABLET, EXTENDED RELEASE ORAL
Qty: 30 TABLET | Refills: 0 | Status: SHIPPED | OUTPATIENT
Start: 2024-05-07

## 2024-05-07 RX ORDER — ISOSORBIDE MONONITRATE 120 MG/1
120 TABLET, EXTENDED RELEASE ORAL
Qty: 30 TABLET | Refills: 0 | Status: SHIPPED | OUTPATIENT
Start: 2024-05-07

## 2024-05-20 DIAGNOSIS — F90.0 ATTENTION-DEFICIT HYPERACTIVITY DISORDER, PREDOMINANTLY INATTENTIVE TYPE: ICD-10-CM

## 2024-05-20 RX ORDER — METOPROLOL SUCCINATE 100 MG/1
100 TABLET, EXTENDED RELEASE ORAL
Qty: 30 TABLET | Refills: 0 | Status: CANCELLED | OUTPATIENT
Start: 2024-05-20

## 2024-05-20 RX ORDER — ISOSORBIDE MONONITRATE 120 MG/1
120 TABLET, EXTENDED RELEASE ORAL
Qty: 30 TABLET | Refills: 0 | Status: CANCELLED | OUTPATIENT
Start: 2024-05-20

## 2024-05-20 RX ORDER — ASPIRIN 81 MG/1
81 TABLET ORAL DAILY
Qty: 90 TABLET | Refills: 0 | Status: CANCELLED | OUTPATIENT
Start: 2024-05-20

## 2024-05-20 RX ORDER — DEXTROAMPHETAMINE SACCHARATE, AMPHETAMINE ASPARTATE, DEXTROAMPHETAMINE SULFATE AND AMPHETAMINE SULFATE 2.5; 2.5; 2.5; 2.5 MG/1; MG/1; MG/1; MG/1
10 TABLET ORAL 2 TIMES DAILY
Qty: 60 TABLET | Refills: 0 | Status: SHIPPED | OUTPATIENT
Start: 2024-05-20 | End: 2024-06-19

## 2024-05-20 RX ORDER — NITROGLYCERIN 0.4 MG/1
0.4 TABLET SUBLINGUAL
Qty: 25 TABLET | Refills: 0 | Status: CANCELLED | OUTPATIENT
Start: 2024-05-20

## 2024-05-20 RX ORDER — AMLODIPINE BESYLATE 5 MG/1
5 TABLET ORAL DAILY
Qty: 30 TABLET | Refills: 0 | Status: CANCELLED | OUTPATIENT
Start: 2024-05-20

## 2024-05-20 NOTE — TELEPHONE ENCOUNTER
Last OV 12/15/23  Last refill 4/19/24  I have reviewed the patient’s controlled substance prescription history, as maintained in the [place] prescription monitoring program, so that the prescription(s) for a  controlled substance can be given.

## 2024-05-21 RX ORDER — NITROGLYCERIN 0.4 MG/1
0.4 TABLET SUBLINGUAL
Qty: 25 TABLET | Refills: 0 | Status: SHIPPED | OUTPATIENT
Start: 2024-05-21

## 2024-05-21 RX ORDER — ASPIRIN 81 MG/1
81 TABLET ORAL DAILY
Qty: 90 TABLET | Refills: 0 | Status: SHIPPED | OUTPATIENT
Start: 2024-05-21

## 2024-05-21 RX ORDER — AMLODIPINE BESYLATE 5 MG/1
5 TABLET ORAL DAILY
Qty: 30 TABLET | Refills: 0 | Status: SHIPPED | OUTPATIENT
Start: 2024-05-21

## 2024-05-21 RX ORDER — ISOSORBIDE MONONITRATE 120 MG/1
120 TABLET, EXTENDED RELEASE ORAL
Qty: 30 TABLET | Refills: 0 | Status: SHIPPED | OUTPATIENT
Start: 2024-05-21

## 2024-05-21 RX ORDER — METOPROLOL SUCCINATE 100 MG/1
100 TABLET, EXTENDED RELEASE ORAL
Qty: 30 TABLET | Refills: 0 | Status: SHIPPED | OUTPATIENT
Start: 2024-05-21

## 2024-05-30 DIAGNOSIS — F90.0 ATTENTION-DEFICIT HYPERACTIVITY DISORDER, PREDOMINANTLY INATTENTIVE TYPE: ICD-10-CM

## 2024-05-30 RX ORDER — DEXTROAMPHETAMINE SACCHARATE, AMPHETAMINE ASPARTATE, DEXTROAMPHETAMINE SULFATE AND AMPHETAMINE SULFATE 2.5; 2.5; 2.5; 2.5 MG/1; MG/1; MG/1; MG/1
10 TABLET ORAL 2 TIMES DAILY
Qty: 60 TABLET | Refills: 0 | Status: SHIPPED | OUTPATIENT
Start: 2024-05-30 | End: 2024-06-29

## 2024-06-06 RX ORDER — AMLODIPINE BESYLATE 5 MG/1
5 TABLET ORAL DAILY
Qty: 90 TABLET | Refills: 0 | Status: SHIPPED | OUTPATIENT
Start: 2024-06-06

## 2024-06-20 RX ORDER — ISOSORBIDE MONONITRATE 120 MG/1
120 TABLET, EXTENDED RELEASE ORAL
Qty: 30 TABLET | Refills: 0 | Status: SHIPPED | OUTPATIENT
Start: 2024-06-20

## 2024-06-20 RX ORDER — METOPROLOL SUCCINATE 100 MG/1
100 TABLET, EXTENDED RELEASE ORAL
Qty: 30 TABLET | Refills: 0 | Status: SHIPPED | OUTPATIENT
Start: 2024-06-20

## 2024-07-01 RX ORDER — CILOSTAZOL 50 MG/1
50 TABLET ORAL 2 TIMES DAILY
Qty: 180 TABLET | Refills: 3 | Status: SHIPPED | OUTPATIENT
Start: 2024-07-01

## 2024-07-01 RX ORDER — AMLODIPINE BESYLATE 5 MG/1
5 TABLET ORAL DAILY
Qty: 90 TABLET | Refills: 0 | Status: SHIPPED | OUTPATIENT
Start: 2024-07-01

## 2024-07-11 RX ORDER — ASPIRIN 81 MG/1
81 TABLET ORAL DAILY
Qty: 90 TABLET | Refills: 0 | Status: SHIPPED | OUTPATIENT
Start: 2024-07-11

## 2024-08-05 RX ORDER — NITROGLYCERIN 0.4 MG/1
0.4 TABLET SUBLINGUAL
Qty: 25 TABLET | Refills: 0 | Status: SHIPPED | OUTPATIENT
Start: 2024-08-05

## 2024-08-05 RX ORDER — PRASUGREL 10 MG/1
10 TABLET, FILM COATED ORAL DAILY
Qty: 90 TABLET | Refills: 3 | Status: SHIPPED | OUTPATIENT
Start: 2024-08-05

## 2024-08-05 RX ORDER — ISOSORBIDE MONONITRATE 120 MG/1
120 TABLET, EXTENDED RELEASE ORAL
Qty: 30 TABLET | Refills: 0 | Status: SHIPPED | OUTPATIENT
Start: 2024-08-05

## 2024-08-05 RX ORDER — LISINOPRIL 5 MG/1
5 TABLET ORAL
Qty: 90 TABLET | Refills: 3 | Status: SHIPPED | OUTPATIENT
Start: 2024-08-05

## 2024-08-05 RX ORDER — METOPROLOL SUCCINATE 100 MG/1
100 TABLET, EXTENDED RELEASE ORAL
Qty: 30 TABLET | Refills: 0 | Status: SHIPPED | OUTPATIENT
Start: 2024-08-05

## 2024-08-05 RX ORDER — ROSUVASTATIN CALCIUM 40 MG/1
40 TABLET, COATED ORAL DAILY
Qty: 30 TABLET | Refills: 11 | Status: SHIPPED | OUTPATIENT
Start: 2024-08-05

## 2024-09-03 RX ORDER — ISOSORBIDE MONONITRATE 120 MG/1
120 TABLET, EXTENDED RELEASE ORAL
Qty: 30 TABLET | Refills: 0 | Status: SHIPPED | OUTPATIENT
Start: 2024-09-03

## 2024-09-03 RX ORDER — METOPROLOL SUCCINATE 100 MG/1
100 TABLET, EXTENDED RELEASE ORAL
Qty: 30 TABLET | Refills: 0 | Status: SHIPPED | OUTPATIENT
Start: 2024-09-03

## 2024-09-18 RX ORDER — ISOSORBIDE MONONITRATE 120 MG/1
120 TABLET, EXTENDED RELEASE ORAL
Qty: 30 TABLET | Refills: 0 | Status: SHIPPED | OUTPATIENT
Start: 2024-09-18

## 2024-09-18 RX ORDER — METOPROLOL SUCCINATE 100 MG/1
100 TABLET, EXTENDED RELEASE ORAL
Qty: 30 TABLET | Refills: 0 | Status: SHIPPED | OUTPATIENT
Start: 2024-09-18

## 2024-10-02 RX ORDER — NITROGLYCERIN 0.4 MG/1
0.4 TABLET SUBLINGUAL
Qty: 25 TABLET | Refills: 0 | Status: CANCELLED | OUTPATIENT
Start: 2024-10-02

## 2024-10-03 RX ORDER — NITROGLYCERIN 0.4 MG/1
0.4 TABLET SUBLINGUAL
Qty: 25 TABLET | Refills: 0 | Status: ON HOLD | OUTPATIENT
Start: 2024-10-03

## 2024-10-04 ENCOUNTER — APPOINTMENT (OUTPATIENT)
Dept: CT IMAGING | Facility: HOSPITAL | Age: 39
End: 2024-10-04
Payer: MEDICAID

## 2024-10-04 ENCOUNTER — APPOINTMENT (OUTPATIENT)
Dept: GENERAL RADIOLOGY | Facility: HOSPITAL | Age: 39
End: 2024-10-04
Payer: MEDICAID

## 2024-10-04 ENCOUNTER — HOSPITAL ENCOUNTER (INPATIENT)
Facility: HOSPITAL | Age: 39
LOS: 4 days | Discharge: HOME | End: 2024-10-08
Attending: EMERGENCY MEDICINE | Admitting: INTERNAL MEDICINE
Payer: MEDICAID

## 2024-10-04 DIAGNOSIS — R07.9 CHEST PAIN, UNSPECIFIED TYPE: ICD-10-CM

## 2024-10-04 DIAGNOSIS — R06.03 ACUTE RESPIRATORY DISTRESS: Primary | ICD-10-CM

## 2024-10-04 DIAGNOSIS — A49.3 MYCOPLASMA INFECTION: ICD-10-CM

## 2024-10-04 PROBLEM — R06.00 DYSPNEA: Status: ACTIVE | Noted: 2024-10-04

## 2024-10-04 LAB
ALBUMIN SERPL-MCNC: 4.2 G/DL (ref 3.5–5.2)
ALBUMIN/GLOB SERPL: 1.4 G/DL
ALP SERPL-CCNC: 93 U/L (ref 39–117)
ALT SERPL W P-5'-P-CCNC: 24 U/L (ref 1–41)
ANION GAP SERPL CALCULATED.3IONS-SCNC: 14.4 MMOL/L (ref 5–15)
AST SERPL-CCNC: 20 U/L (ref 1–40)
B PARAPERT DNA SPEC QL NAA+PROBE: NOT DETECTED
B PERT DNA SPEC QL NAA+PROBE: NOT DETECTED
BASOPHILS # BLD AUTO: 0.03 10*3/MM3 (ref 0–0.2)
BASOPHILS NFR BLD AUTO: 0.4 % (ref 0–1.5)
BILIRUB SERPL-MCNC: 0.3 MG/DL (ref 0–1.2)
BUN SERPL-MCNC: 11 MG/DL (ref 6–20)
BUN/CREAT SERPL: 8.1 (ref 7–25)
C PNEUM DNA NPH QL NAA+NON-PROBE: NOT DETECTED
CALCIUM SPEC-SCNC: 9.4 MG/DL (ref 8.6–10.5)
CHLORIDE SERPL-SCNC: 100 MMOL/L (ref 98–107)
CO2 SERPL-SCNC: 19.6 MMOL/L (ref 22–29)
CREAT SERPL-MCNC: 1.35 MG/DL (ref 0.76–1.27)
DEPRECATED RDW RBC AUTO: 41.1 FL (ref 37–54)
EGFRCR SERPLBLD CKD-EPI 2021: 67.6 ML/MIN/1.73
EOSINOPHIL # BLD AUTO: 0.11 10*3/MM3 (ref 0–0.4)
EOSINOPHIL NFR BLD AUTO: 1.3 % (ref 0.3–6.2)
ERYTHROCYTE [DISTWIDTH] IN BLOOD BY AUTOMATED COUNT: 12.9 % (ref 12.3–15.4)
FLUAV SUBTYP SPEC NAA+PROBE: NOT DETECTED
FLUBV RNA ISLT QL NAA+PROBE: NOT DETECTED
GEN 5 2HR TROPONIN T REFLEX: 8 NG/L
GLOBULIN UR ELPH-MCNC: 2.9 GM/DL
GLUCOSE SERPL-MCNC: 146 MG/DL (ref 65–99)
HADV DNA SPEC NAA+PROBE: NOT DETECTED
HCOV 229E RNA SPEC QL NAA+PROBE: NOT DETECTED
HCOV HKU1 RNA SPEC QL NAA+PROBE: NOT DETECTED
HCOV NL63 RNA SPEC QL NAA+PROBE: NOT DETECTED
HCOV OC43 RNA SPEC QL NAA+PROBE: NOT DETECTED
HCT VFR BLD AUTO: 40.6 % (ref 37.5–51)
HGB BLD-MCNC: 13.3 G/DL (ref 13–17.7)
HMPV RNA NPH QL NAA+NON-PROBE: NOT DETECTED
HOLD SPECIMEN: NORMAL
HPIV1 RNA ISLT QL NAA+PROBE: NOT DETECTED
HPIV2 RNA SPEC QL NAA+PROBE: NOT DETECTED
HPIV3 RNA NPH QL NAA+PROBE: NOT DETECTED
HPIV4 P GENE NPH QL NAA+PROBE: NOT DETECTED
IMM GRANULOCYTES # BLD AUTO: 0.05 10*3/MM3 (ref 0–0.05)
IMM GRANULOCYTES NFR BLD AUTO: 0.6 % (ref 0–0.5)
LYMPHOCYTES # BLD AUTO: 1.49 10*3/MM3 (ref 0.7–3.1)
LYMPHOCYTES NFR BLD AUTO: 18 % (ref 19.6–45.3)
M PNEUMO IGG SER IA-ACNC: DETECTED
MCH RBC QN AUTO: 28.9 PG (ref 26.6–33)
MCHC RBC AUTO-ENTMCNC: 32.8 G/DL (ref 31.5–35.7)
MCV RBC AUTO: 88.1 FL (ref 79–97)
MONOCYTES # BLD AUTO: 0.29 10*3/MM3 (ref 0.1–0.9)
MONOCYTES NFR BLD AUTO: 3.5 % (ref 5–12)
NEUTROPHILS NFR BLD AUTO: 6.31 10*3/MM3 (ref 1.7–7)
NEUTROPHILS NFR BLD AUTO: 76.2 % (ref 42.7–76)
NRBC BLD AUTO-RTO: 0 /100 WBC (ref 0–0.2)
NT-PROBNP SERPL-MCNC: 236 PG/ML (ref 0–450)
PLATELET # BLD AUTO: 299 10*3/MM3 (ref 140–450)
PMV BLD AUTO: 8.8 FL (ref 6–12)
POTASSIUM SERPL-SCNC: 3.5 MMOL/L (ref 3.5–5.2)
PROT SERPL-MCNC: 7.1 G/DL (ref 6–8.5)
RBC # BLD AUTO: 4.61 10*6/MM3 (ref 4.14–5.8)
RHINOVIRUS RNA SPEC NAA+PROBE: NOT DETECTED
RSV RNA NPH QL NAA+NON-PROBE: NOT DETECTED
SARS-COV-2 RNA NPH QL NAA+NON-PROBE: NOT DETECTED
SODIUM SERPL-SCNC: 134 MMOL/L (ref 136–145)
TROPONIN T DELTA: -1 NG/L
TROPONIN T SERPL HS-MCNC: 9 NG/L
WBC NRBC COR # BLD AUTO: 8.28 10*3/MM3 (ref 3.4–10.8)

## 2024-10-04 PROCEDURE — 25810000003 SODIUM CHLORIDE 0.9 % SOLUTION: Performed by: PHYSICIAN ASSISTANT

## 2024-10-04 PROCEDURE — 93005 ELECTROCARDIOGRAM TRACING: CPT | Performed by: EMERGENCY MEDICINE

## 2024-10-04 PROCEDURE — 99285 EMERGENCY DEPT VISIT HI MDM: CPT

## 2024-10-04 PROCEDURE — 87040 BLOOD CULTURE FOR BACTERIA: CPT | Performed by: EMERGENCY MEDICINE

## 2024-10-04 PROCEDURE — 25010000002 AZITHROMYCIN PER 500 MG: Performed by: INTERNAL MEDICINE

## 2024-10-04 PROCEDURE — 25010000002 LORAZEPAM PER 2 MG: Performed by: EMERGENCY MEDICINE

## 2024-10-04 PROCEDURE — 25010000002 ONDANSETRON PER 1 MG: Performed by: PHYSICIAN ASSISTANT

## 2024-10-04 PROCEDURE — 25010000002 HYDROMORPHONE 1 MG/ML SOLUTION: Performed by: EMERGENCY MEDICINE

## 2024-10-04 PROCEDURE — 36415 COLL VENOUS BLD VENIPUNCTURE: CPT

## 2024-10-04 PROCEDURE — 94664 DEMO&/EVAL PT USE INHALER: CPT

## 2024-10-04 PROCEDURE — 94761 N-INVAS EAR/PLS OXIMETRY MLT: CPT

## 2024-10-04 PROCEDURE — 87147 CULTURE TYPE IMMUNOLOGIC: CPT | Performed by: EMERGENCY MEDICINE

## 2024-10-04 PROCEDURE — 94640 AIRWAY INHALATION TREATMENT: CPT

## 2024-10-04 PROCEDURE — 0202U NFCT DS 22 TRGT SARS-COV-2: CPT | Performed by: EMERGENCY MEDICINE

## 2024-10-04 PROCEDURE — 71250 CT THORAX DX C-: CPT

## 2024-10-04 PROCEDURE — 94799 UNLISTED PULMONARY SVC/PX: CPT

## 2024-10-04 PROCEDURE — 87154 CUL TYP ID BLD PTHGN 6+ TRGT: CPT | Performed by: EMERGENCY MEDICINE

## 2024-10-04 PROCEDURE — G0378 HOSPITAL OBSERVATION PER HR: HCPCS

## 2024-10-04 PROCEDURE — 25010000002 AZITHROMYCIN PER 500 MG: Performed by: PHYSICIAN ASSISTANT

## 2024-10-04 PROCEDURE — 85025 COMPLETE CBC W/AUTO DIFF WBC: CPT | Performed by: EMERGENCY MEDICINE

## 2024-10-04 PROCEDURE — 80053 COMPREHEN METABOLIC PANEL: CPT | Performed by: EMERGENCY MEDICINE

## 2024-10-04 PROCEDURE — 25810000003 SODIUM CHLORIDE 0.9 % SOLUTION 250 ML FLEX CONT: Performed by: INTERNAL MEDICINE

## 2024-10-04 PROCEDURE — 83880 ASSAY OF NATRIURETIC PEPTIDE: CPT | Performed by: EMERGENCY MEDICINE

## 2024-10-04 PROCEDURE — 84484 ASSAY OF TROPONIN QUANT: CPT | Performed by: EMERGENCY MEDICINE

## 2024-10-04 PROCEDURE — 25810000003 SODIUM CHLORIDE 0.9 % SOLUTION 250 ML FLEX CONT: Performed by: PHYSICIAN ASSISTANT

## 2024-10-04 PROCEDURE — 71045 X-RAY EXAM CHEST 1 VIEW: CPT

## 2024-10-04 RX ORDER — BISACODYL 10 MG
10 SUPPOSITORY, RECTAL RECTAL DAILY PRN
Status: DISCONTINUED | OUTPATIENT
Start: 2024-10-04 | End: 2024-10-08 | Stop reason: HOSPADM

## 2024-10-04 RX ORDER — NITROGLYCERIN 0.4 MG/1
0.4 TABLET SUBLINGUAL
Status: COMPLETED | OUTPATIENT
Start: 2024-10-04 | End: 2024-10-06

## 2024-10-04 RX ORDER — ROSUVASTATIN CALCIUM 10 MG/1
20 TABLET, COATED ORAL DAILY
Status: DISCONTINUED | OUTPATIENT
Start: 2024-10-04 | End: 2024-10-08 | Stop reason: HOSPADM

## 2024-10-04 RX ORDER — TRAMADOL HYDROCHLORIDE 50 MG/1
50 TABLET ORAL EVERY 6 HOURS PRN
Status: DISCONTINUED | OUTPATIENT
Start: 2024-10-04 | End: 2024-10-08 | Stop reason: HOSPADM

## 2024-10-04 RX ORDER — LEVOTHYROXINE SODIUM 50 UG/1
50 TABLET ORAL
Status: DISCONTINUED | OUTPATIENT
Start: 2024-10-04 | End: 2024-10-08 | Stop reason: HOSPADM

## 2024-10-04 RX ORDER — METOPROLOL SUCCINATE 50 MG/1
100 TABLET, EXTENDED RELEASE ORAL
Status: DISCONTINUED | OUTPATIENT
Start: 2024-10-04 | End: 2024-10-08 | Stop reason: HOSPADM

## 2024-10-04 RX ORDER — BISACODYL 5 MG/1
5 TABLET, DELAYED RELEASE ORAL DAILY PRN
Status: DISCONTINUED | OUTPATIENT
Start: 2024-10-04 | End: 2024-10-08 | Stop reason: HOSPADM

## 2024-10-04 RX ORDER — ASPIRIN 81 MG/1
81 TABLET ORAL DAILY
Status: DISCONTINUED | OUTPATIENT
Start: 2024-10-04 | End: 2024-10-08 | Stop reason: HOSPADM

## 2024-10-04 RX ORDER — LISINOPRIL 5 MG/1
5 TABLET ORAL
Status: DISCONTINUED | OUTPATIENT
Start: 2024-10-04 | End: 2024-10-08 | Stop reason: HOSPADM

## 2024-10-04 RX ORDER — ONDANSETRON 2 MG/ML
4 INJECTION INTRAMUSCULAR; INTRAVENOUS EVERY 6 HOURS PRN
Status: DISCONTINUED | OUTPATIENT
Start: 2024-10-04 | End: 2024-10-08 | Stop reason: HOSPADM

## 2024-10-04 RX ORDER — SODIUM CHLORIDE 0.9 % (FLUSH) 0.9 %
10 SYRINGE (ML) INJECTION AS NEEDED
Status: DISCONTINUED | OUTPATIENT
Start: 2024-10-04 | End: 2024-10-08 | Stop reason: HOSPADM

## 2024-10-04 RX ORDER — AMOXICILLIN 250 MG
2 CAPSULE ORAL 2 TIMES DAILY PRN
Status: DISCONTINUED | OUTPATIENT
Start: 2024-10-04 | End: 2024-10-08 | Stop reason: HOSPADM

## 2024-10-04 RX ORDER — PANTOPRAZOLE SODIUM 40 MG/1
40 TABLET, DELAYED RELEASE ORAL
Status: DISCONTINUED | OUTPATIENT
Start: 2024-10-05 | End: 2024-10-08 | Stop reason: HOSPADM

## 2024-10-04 RX ORDER — SODIUM CHLORIDE 0.9 % (FLUSH) 0.9 %
10 SYRINGE (ML) INJECTION EVERY 12 HOURS SCHEDULED
Status: DISCONTINUED | OUTPATIENT
Start: 2024-10-04 | End: 2024-10-08 | Stop reason: HOSPADM

## 2024-10-04 RX ORDER — LORAZEPAM 2 MG/ML
0.5 INJECTION INTRAMUSCULAR ONCE
Status: COMPLETED | OUTPATIENT
Start: 2024-10-04 | End: 2024-10-04

## 2024-10-04 RX ORDER — ALBUTEROL SULFATE 90 UG/1
2 INHALANT RESPIRATORY (INHALATION)
Status: DISCONTINUED | OUTPATIENT
Start: 2024-10-04 | End: 2024-10-08 | Stop reason: HOSPADM

## 2024-10-04 RX ORDER — IPRATROPIUM BROMIDE AND ALBUTEROL SULFATE 2.5; .5 MG/3ML; MG/3ML
3 SOLUTION RESPIRATORY (INHALATION) ONCE
Status: COMPLETED | OUTPATIENT
Start: 2024-10-04 | End: 2024-10-04

## 2024-10-04 RX ORDER — ROSUVASTATIN CALCIUM 40 MG/1
20 TABLET, COATED ORAL DAILY
COMMUNITY

## 2024-10-04 RX ORDER — DIPHENHYDRAMINE HCL 25 MG
25 CAPSULE ORAL EVERY 6 HOURS PRN
Status: DISCONTINUED | OUTPATIENT
Start: 2024-10-04 | End: 2024-10-08 | Stop reason: HOSPADM

## 2024-10-04 RX ORDER — AMLODIPINE BESYLATE 5 MG/1
5 TABLET ORAL DAILY
Status: DISCONTINUED | OUTPATIENT
Start: 2024-10-04 | End: 2024-10-08 | Stop reason: HOSPADM

## 2024-10-04 RX ORDER — KETOROLAC TROMETHAMINE 10 MG/1
15 TABLET, FILM COATED ORAL ONCE
Status: COMPLETED | OUTPATIENT
Start: 2024-10-04 | End: 2024-10-04

## 2024-10-04 RX ORDER — OMEPRAZOLE 40 MG/1
40 CAPSULE, DELAYED RELEASE ORAL DAILY
COMMUNITY

## 2024-10-04 RX ORDER — ALUMINA, MAGNESIA, AND SIMETHICONE 2400; 2400; 240 MG/30ML; MG/30ML; MG/30ML
15 SUSPENSION ORAL EVERY 6 HOURS PRN
Status: DISCONTINUED | OUTPATIENT
Start: 2024-10-04 | End: 2024-10-08 | Stop reason: HOSPADM

## 2024-10-04 RX ORDER — PRASUGREL 10 MG/1
10 TABLET, FILM COATED ORAL DAILY
Status: DISCONTINUED | OUTPATIENT
Start: 2024-10-04 | End: 2024-10-08 | Stop reason: HOSPADM

## 2024-10-04 RX ORDER — ONDANSETRON 4 MG/1
4 TABLET, ORALLY DISINTEGRATING ORAL EVERY 6 HOURS PRN
Status: DISCONTINUED | OUTPATIENT
Start: 2024-10-04 | End: 2024-10-08 | Stop reason: HOSPADM

## 2024-10-04 RX ORDER — SODIUM CHLORIDE 9 MG/ML
40 INJECTION, SOLUTION INTRAVENOUS AS NEEDED
Status: DISCONTINUED | OUTPATIENT
Start: 2024-10-04 | End: 2024-10-08 | Stop reason: HOSPADM

## 2024-10-04 RX ORDER — POLYETHYLENE GLYCOL 3350 17 G/17G
17 POWDER, FOR SOLUTION ORAL DAILY PRN
Status: DISCONTINUED | OUTPATIENT
Start: 2024-10-04 | End: 2024-10-08 | Stop reason: HOSPADM

## 2024-10-04 RX ORDER — ISOSORBIDE MONONITRATE 60 MG/1
120 TABLET, EXTENDED RELEASE ORAL
Status: DISCONTINUED | OUTPATIENT
Start: 2024-10-04 | End: 2024-10-08 | Stop reason: HOSPADM

## 2024-10-04 RX ORDER — CILOSTAZOL 100 MG/1
50 TABLET ORAL 2 TIMES DAILY
Status: DISCONTINUED | OUTPATIENT
Start: 2024-10-04 | End: 2024-10-08 | Stop reason: HOSPADM

## 2024-10-04 RX ORDER — ASPIRIN 325 MG
325 TABLET ORAL ONCE
Status: COMPLETED | OUTPATIENT
Start: 2024-10-04 | End: 2024-10-04

## 2024-10-04 RX ORDER — RANOLAZINE 500 MG/1
1000 TABLET, EXTENDED RELEASE ORAL 2 TIMES DAILY
Status: DISCONTINUED | OUTPATIENT
Start: 2024-10-04 | End: 2024-10-08 | Stop reason: HOSPADM

## 2024-10-04 RX ADMIN — IPRATROPIUM BROMIDE AND ALBUTEROL SULFATE 3 ML: .5; 3 SOLUTION RESPIRATORY (INHALATION) at 09:51

## 2024-10-04 RX ADMIN — KETOROLAC TROMETHAMINE 15 MG: 10 TABLET, FILM COATED ORAL at 21:32

## 2024-10-04 RX ADMIN — HYDROMORPHONE HYDROCHLORIDE 1 MG: 1 INJECTION, SOLUTION INTRAMUSCULAR; INTRAVENOUS; SUBCUTANEOUS at 10:56

## 2024-10-04 RX ADMIN — CILOSTAZOL 50 MG: 100 TABLET ORAL at 20:12

## 2024-10-04 RX ADMIN — ALBUTEROL SULFATE 2 PUFF: 108 AEROSOL, METERED RESPIRATORY (INHALATION) at 16:04

## 2024-10-04 RX ADMIN — SODIUM CHLORIDE 500 ML: 9 INJECTION, SOLUTION INTRAVENOUS at 15:45

## 2024-10-04 RX ADMIN — AZITHROMYCIN DIHYDRATE 500 MG: 500 INJECTION, POWDER, LYOPHILIZED, FOR SOLUTION INTRAVENOUS at 12:17

## 2024-10-04 RX ADMIN — LORAZEPAM 0.5 MG: 2 INJECTION INTRAMUSCULAR; INTRAVENOUS at 09:49

## 2024-10-04 RX ADMIN — NITROGLYCERIN 1 INCH: 20 OINTMENT TOPICAL at 10:58

## 2024-10-04 RX ADMIN — RANOLAZINE 1000 MG: 500 TABLET, FILM COATED, EXTENDED RELEASE ORAL at 20:12

## 2024-10-04 RX ADMIN — Medication 10 ML: at 15:44

## 2024-10-04 RX ADMIN — ISOSORBIDE MONONITRATE 120 MG: 60 TABLET, EXTENDED RELEASE ORAL at 14:15

## 2024-10-04 RX ADMIN — METOPROLOL SUCCINATE 100 MG: 50 TABLET, EXTENDED RELEASE ORAL at 14:15

## 2024-10-04 RX ADMIN — ASPIRIN 81 MG: 81 TABLET, COATED ORAL at 14:15

## 2024-10-04 RX ADMIN — NITROGLYCERIN 0.4 MG: 0.4 TABLET SUBLINGUAL at 09:49

## 2024-10-04 RX ADMIN — TRAMADOL HYDROCHLORIDE 50 MG: 50 TABLET ORAL at 15:44

## 2024-10-04 RX ADMIN — ALBUTEROL SULFATE 2 PUFF: 108 AEROSOL, METERED RESPIRATORY (INHALATION) at 18:55

## 2024-10-04 RX ADMIN — LEVOTHYROXINE SODIUM 50 MCG: 0.05 TABLET ORAL at 14:15

## 2024-10-04 RX ADMIN — Medication 10 ML: at 20:12

## 2024-10-04 RX ADMIN — NITROGLYCERIN 0.4 MG: 0.4 TABLET SUBLINGUAL at 10:17

## 2024-10-04 RX ADMIN — ASPIRIN 325 MG ORAL TABLET 325 MG: 325 PILL ORAL at 09:49

## 2024-10-04 RX ADMIN — LISINOPRIL 5 MG: 5 TABLET ORAL at 14:15

## 2024-10-04 RX ADMIN — AMLODIPINE BESYLATE 5 MG: 5 TABLET ORAL at 14:15

## 2024-10-04 RX ADMIN — ONDANSETRON 4 MG: 2 INJECTION, SOLUTION INTRAMUSCULAR; INTRAVENOUS at 14:13

## 2024-10-04 RX ADMIN — Medication 10 ML: at 14:19

## 2024-10-04 RX ADMIN — PRASUGREL 10 MG: 10 TABLET, FILM COATED ORAL at 15:44

## 2024-10-04 NOTE — NURSING NOTE
Pt states since admission chest pain 7/10, pt states this is normal for him, pt states he came to ED r/t apnea episode while sleeping, pt states his spouse noted him not breathing during sleep. Pt nauseated and given IV zofran during admission to OBS, pt states zofran was effective, pt states nausea is normal for him as well.

## 2024-10-04 NOTE — ED PROVIDER NOTES
Subjective   History of Present Illness  Complaint Short of breath cough chest pain    History of present illness this is a 39-year-old gentleman who scatted history of heart disease with bypass surgery and multiple stents who states the last couple days he had congestion and cough but no fever and increasing difficulty breathing tightness in his chest with some tingling in his left arm.  Worse with exertion and better with rest.  Patient denies any fever chills no recent long car ride plane ride immobilization or foreign travels leg pain or swelling.  No injury no one at home with similar illness he does have children in the home.      Review of Systems   Constitutional:  Negative for chills and fever.   HENT:  Positive for congestion.    Respiratory:  Positive for cough, chest tightness and shortness of breath.    Cardiovascular:  Positive for chest pain. Negative for palpitations and leg swelling.   Gastrointestinal:  Negative for abdominal pain and vomiting.   Musculoskeletal:  Negative for back pain and neck pain.   Skin:  Negative for rash.   Neurological:  Negative for dizziness and light-headedness.   Psychiatric/Behavioral:  Negative for confusion.        Past Medical History:   Diagnosis Date    Acute inferior myocardial infarction 06/14/2022    Allergic     Asthma 01/27/2022    CAD, multiple vessel 06/14/2022    Disorder of thyroid 01/27/2022    Gastroesophageal reflux disease 01/27/2022    Hx of complete eye exam 2016    Hyperlipidemia 01/27/2022    Hypertension     Migraine headache 01/27/2022    Panic attack 01/27/2022    Peptic ulcer 09/14/2017       Allergies   Allergen Reactions    Shellfish-Derived Products Unknown - High Severity       Past Surgical History:   Procedure Laterality Date    CARDIAC CATHETERIZATION N/A 06/14/2022    Procedure: Left Heart Cath;  Surgeon: Matthieu Del Toro MD;  Location: [facility] CATH INVASIVE LOCATION;  Service: Cardiology;  Laterality: N/A;    CARDIAC CATHETERIZATION N/A  2022    Procedure: Percutaneous Coronary Intervention;  Surgeon: Matthieu Del Toro MD;  Location: [facility] CATH INVASIVE LOCATION;  Service: Cardiovascular;  Laterality: N/A;    CARDIAC CATHETERIZATION N/A 2022    Procedure: Left Heart Cath possible PCI, atherectomy, hemodynamic support;  Surgeon: Moises Haddad MD;  Location: [facility] CATH INVASIVE LOCATION;  Service: Cardiology;  Laterality: N/A;    CARDIAC CATHETERIZATION N/A 09/15/2022    Procedure: Left Heart Cath;  Surgeon: Moises Haddad MD;  Location:  [facility] CATH INVASIVE LOCATION;  Service: Cardiology;  Laterality: N/A;    CARDIAC CATHETERIZATION  9/15/2022    CARDIAC CATHETERIZATION N/A 3/2/2023    Procedure: Left Heart Cath;  Surgeon: Moises Haddad MD;  Location: [facility] CATH INVASIVE LOCATION;  Service: Cardiology;  Laterality: N/A;    CHOLECYSTECTOMY  2019    CORONARY ARTERY BYPASS GRAFT N/A 2022    Procedure: CORONARY ARTERY BYPASS GRAFTING;  Surgeon: Pablo Ball MD;  Location: [facility] CVOR;  Service: Cardiothoracic;  Laterality: N/A;  CABG X 3(2 vein grafts, 1 LIMA graft)    CORONARY ARTERY BYPASS GRAFT  2022    CORONARY STENT PLACEMENT      MANDIBLE SURGERY         Family History   Problem Relation Age of Onset    Heart disease Mother     Heart attack Mother     Hypertension Mother     Heart failure Father     Cirrhosis Maternal Grandfather     Heart attack Maternal Grandmother         a       Social History     Socioeconomic History    Marital status: Single   Tobacco Use    Smoking status: Former     Current packs/day: 0.00     Average packs/day: 1.5 packs/day for 26.5 years (39.7 ttl pk-yrs)     Types: Cigarettes     Start date: 1996     Quit date: 2022     Years since quittin.2    Smokeless tobacco: Never   Vaping Use    Vaping status: Never Used   Substance and Sexual Activity    Alcohol use: Not Currently    Drug use: Yes     Frequency: 7.0 times per week     Types: Marijuana     Sexual activity: Yes     Partners: Female     Prior to Admission medications    Medication Sig Start Date End Date Taking? Authorizing Provider   albuterol sulfate  (90 Base) MCG/ACT inhaler Inhale 2 puffs by mouth as directed every four to six hours as needed for coughing, shortness of breath, wheezing 8/4/24      amLODIPine (Norvasc) 5 MG tablet Take 1 tablet by mouth Daily. 7/1/24   Moises Haddad MD   aspirin (Aspirin Low Dose) 81 MG EC tablet Take 1 tablet by mouth Daily. 5/21/24   Moises Haddad MD   aspirin (Aspirin Low Dose) 81 MG EC tablet Take 1 tablet by mouth Daily. 7/11/24   Moises Haddad MD   cilostazol (PLETAL) 100 MG tablet Take 0.5 tablets by mouth 2 (Two) Times a Day. 7/1/24   Pat Ruiz APRN   Evolocumab (Repatha SureClick) solution auto-injector SureClick injection Inject 140 mg total (1 mL) into the skin every 14 (fourteen) days. 6/5/24      isosorbide mononitrate (IMDUR) 120 MG 24 hr tablet Take 1 tablet by mouth Daily. 9/18/24   Moises Haddad MD   levothyroxine (SYNTHROID, LEVOTHROID) 50 MCG tablet TAKE 1 TABLET BY MOUTH EVERY DAY 7/1/24      lisinopril (PRINIVIL,ZESTRIL) 5 MG tablet Take 1 tablet by mouth Daily. 8/5/24   Pat Ruiz APRN   metoprolol succinate XL (TOPROL-XL) 100 MG 24 hr tablet Take 1 tablet by mouth Daily. 9/18/24   Moises Haddad MD   nitroglycerin (NITROSTAT) 0.4 MG SL tablet Place 1 tablet under the tongue Every 5 (Five) Minutes As Needed for Chest Pain (Only if SBP Greater Than 100). Take no more than 3 doses in 15 minutes. 10/3/24   Moises Haddad MD   omeprazole (priLOSEC) 20 MG capsule Take 1 capsule by mouth Daily.    Provider, MD Adolfo   prasugrel (EFFIENT) 10 MG tablet Take 1 tablet by mouth Daily. Take 6 tablets by mouth for the first dose and then take one tablet by mouth daily thereafter. 8/5/24   Pat Ruiz APRN   ranolazine (RANEXA) 1000 MG 12 hr tablet Take 1  tablet by mouth 2 (Two) Times a Day. 9/26/23   Pat Ruiz APRN   rosuvastatin (CRESTOR) 20 MG tablet Take 1 tablet by mouth Daily.    Provider, MD Adolfo   dilTIAZem CD (CARDIZEM CD) 120 MG 24 hr capsule Take 1 capsule by mouth Daily for 30 days. 3/5/23 3/31/23  Susie Tabares DO   rosuvastatin (CRESTOR) 40 MG tablet Take 1 tablet by mouth daily. 8/5/24 10/4/24  Pat Ruiz APRN          Objective   Physical Exam  Constitutional is a 39-year-old gentleman awake alert audibly wheezing triage vital signs reviewed sats are good at 98% on room air.  HEENT extraocular muscles are intact pupils equal round reactive sclera clear neck supple no adenopathy no JV no bruits no meningeal signs lungs diffuse scattered wheezes throughout no retraction no use of accessories heart regular without murmur rub abdomen soft nontender good bowel sounds no peritoneal findings or pulsatile masses extremities pulses equal upper and lower extremities no edema no cords no Homans' sign no evidence of DVT skin is warm and dry without rashes or cellulitic changes.  Neurologic awake alert and follows commands motor strength normal without focal weakness  Procedures           ED Course      Results for orders placed or performed during the hospital encounter of 10/04/24   Respiratory Panel PCR w/COVID-19(SARS-CoV-2) [facility] In-House, NP Swab in UTM/VTM, 2 HR TAT - Swab, Nasopharynx    Specimen: Nasopharynx; Swab   Result Value Ref Range    ADENOVIRUS, PCR Not Detected Not Detected    Coronavirus 229E Not Detected Not Detected    Coronavirus HKU1 Not Detected Not Detected    Coronavirus NL63 Not Detected Not Detected    Coronavirus OC43 Not Detected Not Detected    COVID19 Not Detected Not Detected - Ref. Range    Human Metapneumovirus Not Detected Not Detected    Human Rhinovirus/Enterovirus Not Detected Not Detected    Influenza A PCR Not Detected Not Detected    Influenza B PCR Not Detected Not Detected     Parainfluenza Virus 1 Not Detected Not Detected    Parainfluenza Virus 2 Not Detected Not Detected    Parainfluenza Virus 3 Not Detected Not Detected    Parainfluenza Virus 4 Not Detected Not Detected    RSV, PCR Not Detected Not Detected    Bordetella pertussis pcr Not Detected Not Detected    Bordetella parapertussis PCR Not Detected Not Detected    Chlamydophila pneumoniae PCR Not Detected Not Detected    Mycoplasma pneumo by PCR Detected (A) Not Detected   Comprehensive Metabolic Panel    Specimen: Blood   Result Value Ref Range    Glucose 146 (H) 65 - 99 mg/dL    BUN 11 6 - 20 mg/dL    Creatinine 1.35 (H) 0.76 - 1.27 mg/dL    Sodium 134 (L) 136 - 145 mmol/L    Potassium 3.5 3.5 - 5.2 mmol/L    Chloride 100 98 - 107 mmol/L    CO2 19.6 (L) 22.0 - 29.0 mmol/L    Calcium 9.4 8.6 - 10.5 mg/dL    Total Protein 7.1 6.0 - 8.5 g/dL    Albumin 4.2 3.5 - 5.2 g/dL    ALT (SGPT) 24 1 - 41 U/L    AST (SGOT) 20 1 - 40 U/L    Alkaline Phosphatase 93 39 - 117 U/L    Total Bilirubin 0.3 0.0 - 1.2 mg/dL    Globulin 2.9 gm/dL    A/G Ratio 1.4 g/dL    BUN/Creatinine Ratio 8.1 7.0 - 25.0    Anion Gap 14.4 5.0 - 15.0 mmol/L    eGFR 67.6 >60.0 mL/min/1.73   High Sensitivity Troponin T    Specimen: Blood   Result Value Ref Range    HS Troponin T 9 <22 ng/L   BNP    Specimen: Blood   Result Value Ref Range    proBNP 236.0 0.0 - 450.0 pg/mL   CBC Auto Differential    Specimen: Blood   Result Value Ref Range    WBC 8.28 3.40 - 10.80 10*3/mm3    RBC 4.61 4.14 - 5.80 10*6/mm3    Hemoglobin 13.3 13.0 - 17.7 g/dL    Hematocrit 40.6 37.5 - 51.0 %    MCV 88.1 79.0 - 97.0 fL    MCH 28.9 26.6 - 33.0 pg    MCHC 32.8 31.5 - 35.7 g/dL    RDW 12.9 12.3 - 15.4 %    RDW-SD 41.1 37.0 - 54.0 fl    MPV 8.8 6.0 - 12.0 fL    Platelets 299 140 - 450 10*3/mm3    Neutrophil % 76.2 (H) 42.7 - 76.0 %    Lymphocyte % 18.0 (L) 19.6 - 45.3 %    Monocyte % 3.5 (L) 5.0 - 12.0 %    Eosinophil % 1.3 0.3 - 6.2 %    Basophil % 0.4 0.0 - 1.5 %    Immature Grans % 0.6 (H)  0.0 - 0.5 %    Neutrophils, Absolute 6.31 1.70 - 7.00 10*3/mm3    Lymphocytes, Absolute 1.49 0.70 - 3.10 10*3/mm3    Monocytes, Absolute 0.29 0.10 - 0.90 10*3/mm3    Eosinophils, Absolute 0.11 0.00 - 0.40 10*3/mm3    Basophils, Absolute 0.03 0.00 - 0.20 10*3/mm3    Immature Grans, Absolute 0.05 0.00 - 0.05 10*3/mm3    nRBC 0.0 0.0 - 0.2 /100 WBC   ECG 12 Lead Dyspnea   Result Value Ref Range    QT Interval 366 ms    QTC Interval 445 ms     XR Chest 1 View    Result Date: 10/4/2024  Impression: No active disease. Electronically Signed: Kermit Mcwilliams MD  10/4/2024 10:34 AM EDT  Workstation ID: [id number]   Medications   sodium chloride 0.9 % flush 10 mL (has no administration in time range)   nitroglycerin (NITROSTAT) SL tablet 0.4 mg (0.4 mg Sublingual Given 10/4/24 1017)   azithromycin (ZITHROMAX) 500 mg in sodium chloride 0.9 % 250 mL IVPB-VTB (has no administration in time range)   aspirin tablet 325 mg (325 mg Oral Given 10/4/24 0949)   LORazepam (ATIVAN) injection 0.5 mg (0.5 mg Intravenous Given 10/4/24 0949)   ipratropium-albuterol (DUO-NEB) nebulizer solution 3 mL (3 mL Nebulization Given 10/4/24 0951)   nitroglycerin (NITROSTAT) ointment 1 inch (1 inch Topical Given 10/4/24 1058)   HYDROmorphone (DILAUDID) injection 1 mg (1 mg Intravenous Given 10/4/24 1056)                                    EKG my interpretation normal sinus rhythm rate of 89 left atrial enlargement QTc of 445 nonspecific T wave changes noted in anterior leads QTc 445 unchanged from 12/20/2023 abnormal          Medical Decision Making  Medical decision making.  IV established monitor placement review of sinus rhythm EKG obtained my interpretation normal sinus rhythm rate of 89 left atrial enlargement nonspecific T wave changes anterior leads unchanged from 12/20/2023.  Patient given sublingual nitroglycerin and aspirin p.o. and Ativan 0.5 mg IV.  Patient given a DuoNeb as well patient had a laboratory evaluation all independent reviewed by me  comprehensive metabolic profile unremarkable first troponin normal proBNP and CBC within normal limits.  Chest x-ray my independent review no pneumonia pneumothorax failure acute findings radiology review the same.  Patient on repeat exam is resting comfortably he had decreased wheezing but still some wheezing noted sats are good at 98% respiratory of 18 heart rate in 80s.  His pain was improving he was given Dilaudid 1 mg IV and 1 inch of Nitropaste.  Record review shows in December 2023 he had a stress test and echo which was reported to be unremarkable.  He is followed by Dr. Haddad.  This may be more respiratory issues has some cough and wheezing although he does have significant heart disease and diabetes and will have to consider angina as well I do not see evidence of acute myocardial infarction DVT pulmonary embolism aortic dissection pericarditis myocarditis pericardial effusion sepsis or bacteremia although not a complete list of all possibilities.  Respiratory panel is currently pending he be placed in observation he was made aware of the findings serial troponins cardiac consultation and further workup.  Provider notified case discussed stable unremarkable ER course.  Patient's respiratory panel returned positive for mycoplasma.  Patient had blood cultures added and was started on Zithromax 500 mg IV.    Problems Addressed:  Acute respiratory distress: complicated acute illness or injury  Chest pain, unspecified type: complicated acute illness or injury  Mycoplasma infection: complicated acute illness or injury    Amount and/or Complexity of Data Reviewed  External Data Reviewed: ECG.     Details: Stress test echo  Labs: ordered. Decision-making details documented in ED Course.  Radiology: ordered and independent interpretation performed. Decision-making details documented in ED Course.  ECG/medicine tests: ordered and independent interpretation performed. Decision-making details documented in ED  Course.    Risk  OTC drugs.  Prescription drug management.  Parenteral controlled substances.  Decision regarding hospitalization.        Final diagnoses:   Acute respiratory distress   Chest pain, unspecified type   Mycoplasma infection       ED Disposition  ED Disposition       ED Disposition   Decision to Admit    Condition   --    Comment   --               No follow-up provider specified.       Medication List        ASK your doctor about these medications      rosuvastatin 20 MG tablet  Commonly known as: CRESTOR  Ask about: Which instructions should I use?                 Antwan Garay MD  10/04/24 1108       Antwan Garay MD  10/04/24 1143

## 2024-10-04 NOTE — CASE MANAGEMENT/SOCIAL WORK
Discharge Planning Assessment  [facility]     Patient Name: Tramaine Gates  MRN: [id number]  Today's Date: 10/4/2024    Admit Date: 10/4/2024    Plan: Home with family   Discharge Needs Assessment       Row Name 10/04/24 1254       Living Environment    People in Home child(kelly), dependent;parent(s);other relative(s);significant other    Name(s) of People in Home mother Magi Lemons    Current Living Arrangements home    Potentially Unsafe Housing Conditions none    In the past 12 months has the electric, gas, oil, or water company threatened to shut off services in your home? No    Primary Care Provided by self    Provides Primary Care For no one    Family Caregiver if Needed significant other;parent(s)    Family Caregiver Names mother Magi Lemons    Quality of Family Relationships helpful;involved;supportive    Able to Return to Prior Arrangements yes       Resource/Environmental Concerns    Resource/Environmental Concerns none    Transportation Concerns none       Transportation Needs    In the past 12 months, has lack of transportation kept you from medical appointments or from getting medications? no    In the past 12 months, has lack of transportation kept you from meetings, work, or from getting things needed for daily living? No       Food Insecurity    Within the past 12 months, you worried that your food would run out before you got the money to buy more. Never true    Within the past 12 months, the food you bought just didn't last and you didn't have money to get more. Never true       Transition Planning    Patient/Family Anticipates Transition to home with family    Patient/Family Anticipated Services at Transition none    Transportation Anticipated family or friend will provide  mother Magi Lemons can transport at time of d/c.       Discharge Needs Assessment    Readmission Within the Last 30 Days no previous admission in last 30 days    Equipment Currently Used at  Home shower chair                   Discharge Plan       Row Name 10/04/24 1256       Plan    Plan Home with family    Patient/Family in Agreement with Plan yes    Plan Comments CM spoke to katy Redd at bedside. PCP and pharmacy at time of d/c. Pt. denies financial, transportation, or medication issues. Pt. agrees to M2B. Pt.'s mother Magi can transport at d/c. DC barriers: IV meds              Demographic Summary       Row Name 10/04/24 1238       General Information    Admission Type observation    Arrived From home    Required Notices Provided Observation Status Notice    Referral Source admission list    Reason for Consult discharge planning    Preferred Language English       Contact Information    Permission Granted to Share Info With     Contact Information Obtained for                 Functional Status       Row Name 10/04/24 1253       Functional Status    Usual Activity Tolerance moderate    Current Activity Tolerance fair       Physical Activity    On average, how many days per week do you engage in moderate to strenuous exercise (like a brisk walk)? 7 days    On average, how many minutes do you engage in exercise at this level? 60 min    Number of minutes of exercise per week 420       Functional Status, IADL    Medications independent    Meal Preparation assistive person  Malinda Lane    Housekeeping assistive person  Malinda Lane    Laundry assistive person  Malinda Lane    Shopping assistive person  Malinda Lane                 Social Determinants of Health     Tobacco Use: Medium Risk (10/4/2024)    Patient History     Smoking Tobacco Use: Former     Smokeless Tobacco Use: Never     Passive Exposure: Not on file   Alcohol Use: Not At Risk (10/4/2024)    AUDIT-C     Frequency of Alcohol Consumption: Never     Average Number of Drinks: Patient does not drink     Frequency of Binge Drinking: Never   Financial Resource Strain: Low Risk  (10/4/2024)    Overall  Financial Resource Strain (CARDIA)     Difficulty of Paying Living Expenses: Not hard at all   Food Insecurity: No Food Insecurity (10/4/2024)    Hunger Vital Sign     Worried About Running Out of Food in the Last Year: Never true     Ran Out of Food in the Last Year: Never true   Transportation Needs: No Transportation Needs (10/4/2024)    PRAPARE - Transportation     Lack of Transportation (Medical): No     Lack of Transportation (Non-Medical): No   Physical Activity: Sufficiently Active (10/4/2024)    Exercise Vital Sign     Days of Exercise per Week: 7 days     Minutes of Exercise per Session: 60 min   Stress: No Stress Concern Present (10/4/2024)    [employer] - Occupational Stress Questionnaire     Feeling of Stress : Not at all   Social Connections: Not At Risk (10/4/2024)    Family and Community Support     Help with Day-to-Day Activities: I don't need any help     Lonely or Isolated: Never   Interpersonal Safety: Not At Risk (10/4/2024)    Abuse Screen     Unsafe at Home or Work/School: no     Feels Threatened by Someone?: no     Does Anyone Keep You from Contacting Others or Doint Things Outside the Home?: no     Physical Sign of Abuse Present: no   Depression: Not at risk (10/4/2024)    PHQ-2     PHQ-2 Score: 0   Housing Stability: Not At Risk (10/4/2024)    Housing Stability     Current Living Arrangements: home     Potentially Unsafe Housing Conditions: none   Utilities: Not At Risk (10/4/2024)    [facility] Utilities     Threatened with loss of utilities: No   Health Literacy: Not At Risk (10/4/2024)    Education     Help with school or training?: No     Preferred Language: English   Employment: Not At Risk (10/4/2024)    Employment     Do you want help finding or keeping work or a job?: I do not need or want help   Disabilities: Not At Risk (10/4/2024)    Disabilities     Concentrating, Remembering, or Making Decisions Difficulty: no     Doing Errands Independently Difficulty:  lynsey Contreras RN    Office: [phone]  Fax: [phone]  [email or web address]

## 2024-10-05 LAB
ANION GAP SERPL CALCULATED.3IONS-SCNC: 10.8 MMOL/L (ref 5–15)
BACTERIA BLD CULT: ABNORMAL
BASOPHILS # BLD AUTO: 0.02 10*3/MM3 (ref 0–0.2)
BASOPHILS NFR BLD AUTO: 0.3 % (ref 0–1.5)
BOTTLE TYPE: ABNORMAL
BUN SERPL-MCNC: 14 MG/DL (ref 6–20)
BUN/CREAT SERPL: 11.9 (ref 7–25)
CALCIUM SPEC-SCNC: 9 MG/DL (ref 8.6–10.5)
CHLORIDE SERPL-SCNC: 103 MMOL/L (ref 98–107)
CO2 SERPL-SCNC: 22.2 MMOL/L (ref 22–29)
CREAT SERPL-MCNC: 1.18 MG/DL (ref 0.76–1.27)
D-LACTATE SERPL-SCNC: 1.3 MMOL/L (ref 0.5–2)
DEPRECATED RDW RBC AUTO: 41.9 FL (ref 37–54)
EGFRCR SERPLBLD CKD-EPI 2021: 79.5 ML/MIN/1.73
EOSINOPHIL # BLD AUTO: 0.09 10*3/MM3 (ref 0–0.4)
EOSINOPHIL NFR BLD AUTO: 1.4 % (ref 0.3–6.2)
ERYTHROCYTE [DISTWIDTH] IN BLOOD BY AUTOMATED COUNT: 13 % (ref 12.3–15.4)
GLUCOSE SERPL-MCNC: 89 MG/DL (ref 65–99)
HCT VFR BLD AUTO: 38.2 % (ref 37.5–51)
HGB BLD-MCNC: 12.2 G/DL (ref 13–17.7)
IMM GRANULOCYTES # BLD AUTO: 0.03 10*3/MM3 (ref 0–0.05)
IMM GRANULOCYTES NFR BLD AUTO: 0.5 % (ref 0–0.5)
LYMPHOCYTES # BLD AUTO: 1.61 10*3/MM3 (ref 0.7–3.1)
LYMPHOCYTES NFR BLD AUTO: 24.7 % (ref 19.6–45.3)
MCH RBC QN AUTO: 28.2 PG (ref 26.6–33)
MCHC RBC AUTO-ENTMCNC: 31.9 G/DL (ref 31.5–35.7)
MCV RBC AUTO: 88.4 FL (ref 79–97)
MONOCYTES # BLD AUTO: 0.32 10*3/MM3 (ref 0.1–0.9)
MONOCYTES NFR BLD AUTO: 4.9 % (ref 5–12)
MRSA DNA SPEC QL NAA+PROBE: NORMAL
NEUTROPHILS NFR BLD AUTO: 4.46 10*3/MM3 (ref 1.7–7)
NEUTROPHILS NFR BLD AUTO: 68.2 % (ref 42.7–76)
NRBC BLD AUTO-RTO: 0 /100 WBC (ref 0–0.2)
PLATELET # BLD AUTO: 283 10*3/MM3 (ref 140–450)
PMV BLD AUTO: 8.9 FL (ref 6–12)
POTASSIUM SERPL-SCNC: 4 MMOL/L (ref 3.5–5.2)
QT INTERVAL: 366 MS
QTC INTERVAL: 445 MS
RBC # BLD AUTO: 4.32 10*6/MM3 (ref 4.14–5.8)
SODIUM SERPL-SCNC: 136 MMOL/L (ref 136–145)
TROPONIN T SERPL HS-MCNC: 8 NG/L
TSH SERPL DL<=0.05 MIU/L-ACNC: 2.18 UIU/ML (ref 0.27–4.2)
WBC NRBC COR # BLD AUTO: 6.53 10*3/MM3 (ref 3.4–10.8)

## 2024-10-05 PROCEDURE — 94761 N-INVAS EAR/PLS OXIMETRY MLT: CPT

## 2024-10-05 PROCEDURE — 80048 BASIC METABOLIC PNL TOTAL CA: CPT | Performed by: PHYSICIAN ASSISTANT

## 2024-10-05 PROCEDURE — 94799 UNLISTED PULMONARY SVC/PX: CPT

## 2024-10-05 PROCEDURE — 84443 ASSAY THYROID STIM HORMONE: CPT

## 2024-10-05 PROCEDURE — 94664 DEMO&/EVAL PT USE INHALER: CPT

## 2024-10-05 PROCEDURE — 85025 COMPLETE CBC W/AUTO DIFF WBC: CPT | Performed by: PHYSICIAN ASSISTANT

## 2024-10-05 PROCEDURE — 25010000002 KETOROLAC TROMETHAMINE PER 15 MG: Performed by: PHYSICIAN ASSISTANT

## 2024-10-05 PROCEDURE — 25010000002 KETOROLAC TROMETHAMINE PER 15 MG

## 2024-10-05 PROCEDURE — 99222 1ST HOSP IP/OBS MODERATE 55: CPT | Performed by: INTERNAL MEDICINE

## 2024-10-05 PROCEDURE — 83605 ASSAY OF LACTIC ACID: CPT | Performed by: PHYSICIAN ASSISTANT

## 2024-10-05 PROCEDURE — 87641 MR-STAPH DNA AMP PROBE: CPT | Performed by: PHYSICIAN ASSISTANT

## 2024-10-05 PROCEDURE — 25810000003 SODIUM CHLORIDE 0.9 % SOLUTION

## 2024-10-05 PROCEDURE — 93010 ELECTROCARDIOGRAM REPORT: CPT | Performed by: INTERNAL MEDICINE

## 2024-10-05 PROCEDURE — 25810000003 SODIUM CHLORIDE 0.9 % SOLUTION 250 ML FLEX CONT: Performed by: PHYSICIAN ASSISTANT

## 2024-10-05 PROCEDURE — 25810000003 SODIUM CHLORIDE 0.9 % SOLUTION 250 ML FLEX CONT: Performed by: INTERNAL MEDICINE

## 2024-10-05 PROCEDURE — 25010000002 AZITHROMYCIN PER 500 MG: Performed by: INTERNAL MEDICINE

## 2024-10-05 PROCEDURE — 93005 ELECTROCARDIOGRAM TRACING: CPT

## 2024-10-05 PROCEDURE — 84484 ASSAY OF TROPONIN QUANT: CPT

## 2024-10-05 PROCEDURE — 25010000002 AZITHROMYCIN PER 500 MG: Performed by: PHYSICIAN ASSISTANT

## 2024-10-05 RX ORDER — KETOROLAC TROMETHAMINE 30 MG/ML
15 INJECTION, SOLUTION INTRAMUSCULAR; INTRAVENOUS ONCE
Status: COMPLETED | OUTPATIENT
Start: 2024-10-05 | End: 2024-10-05

## 2024-10-05 RX ORDER — SODIUM CHLORIDE 9 MG/ML
75 INJECTION, SOLUTION INTRAVENOUS CONTINUOUS
Status: DISPENSED | OUTPATIENT
Start: 2024-10-05 | End: 2024-10-05

## 2024-10-05 RX ORDER — KETOROLAC TROMETHAMINE 30 MG/ML
15 INJECTION, SOLUTION INTRAMUSCULAR; INTRAVENOUS EVERY 6 HOURS PRN
Status: DISCONTINUED | OUTPATIENT
Start: 2024-10-05 | End: 2024-10-05

## 2024-10-05 RX ORDER — KETOROLAC TROMETHAMINE 30 MG/ML
15 INJECTION, SOLUTION INTRAMUSCULAR; INTRAVENOUS EVERY 6 HOURS PRN
Status: DISCONTINUED | OUTPATIENT
Start: 2024-10-05 | End: 2024-10-08 | Stop reason: HOSPADM

## 2024-10-05 RX ORDER — KETOROLAC TROMETHAMINE 10 MG/1
10 TABLET, FILM COATED ORAL ONCE
Status: DISCONTINUED | OUTPATIENT
Start: 2024-10-05 | End: 2024-10-05

## 2024-10-05 RX ADMIN — METOPROLOL SUCCINATE 100 MG: 50 TABLET, EXTENDED RELEASE ORAL at 08:36

## 2024-10-05 RX ADMIN — Medication 10 ML: at 21:11

## 2024-10-05 RX ADMIN — ROSUVASTATIN 20 MG: 10 TABLET, FILM COATED ORAL at 08:36

## 2024-10-05 RX ADMIN — AZITHROMYCIN DIHYDRATE 500 MG: 500 INJECTION, POWDER, LYOPHILIZED, FOR SOLUTION INTRAVENOUS at 11:52

## 2024-10-05 RX ADMIN — CILOSTAZOL 50 MG: 100 TABLET ORAL at 21:09

## 2024-10-05 RX ADMIN — ALBUTEROL SULFATE 2 PUFF: 108 AEROSOL, METERED RESPIRATORY (INHALATION) at 15:58

## 2024-10-05 RX ADMIN — Medication 10 ML: at 08:35

## 2024-10-05 RX ADMIN — RANOLAZINE 1000 MG: 500 TABLET, FILM COATED, EXTENDED RELEASE ORAL at 08:36

## 2024-10-05 RX ADMIN — ASPIRIN 81 MG: 81 TABLET, COATED ORAL at 08:36

## 2024-10-05 RX ADMIN — Medication 10 ML: at 11:53

## 2024-10-05 RX ADMIN — AMLODIPINE BESYLATE 5 MG: 5 TABLET ORAL at 08:36

## 2024-10-05 RX ADMIN — ALBUTEROL SULFATE 2 PUFF: 108 AEROSOL, METERED RESPIRATORY (INHALATION) at 18:23

## 2024-10-05 RX ADMIN — LEVOTHYROXINE SODIUM 50 MCG: 0.05 TABLET ORAL at 06:52

## 2024-10-05 RX ADMIN — PRASUGREL 10 MG: 10 TABLET, FILM COATED ORAL at 08:36

## 2024-10-05 RX ADMIN — RANOLAZINE 1000 MG: 500 TABLET, FILM COATED, EXTENDED RELEASE ORAL at 21:09

## 2024-10-05 RX ADMIN — LISINOPRIL 5 MG: 5 TABLET ORAL at 08:36

## 2024-10-05 RX ADMIN — TRAMADOL HYDROCHLORIDE 50 MG: 50 TABLET ORAL at 23:44

## 2024-10-05 RX ADMIN — ISOSORBIDE MONONITRATE 120 MG: 60 TABLET, EXTENDED RELEASE ORAL at 08:36

## 2024-10-05 RX ADMIN — CILOSTAZOL 50 MG: 100 TABLET ORAL at 08:36

## 2024-10-05 RX ADMIN — KETOROLAC TROMETHAMINE 15 MG: 30 INJECTION, SOLUTION INTRAMUSCULAR at 09:26

## 2024-10-05 RX ADMIN — SODIUM CHLORIDE 75 ML/HR: 9 INJECTION, SOLUTION INTRAVENOUS at 17:21

## 2024-10-05 RX ADMIN — ALBUTEROL SULFATE 2 PUFF: 108 AEROSOL, METERED RESPIRATORY (INHALATION) at 12:37

## 2024-10-05 RX ADMIN — TRAMADOL HYDROCHLORIDE 50 MG: 50 TABLET ORAL at 16:35

## 2024-10-05 RX ADMIN — KETOROLAC TROMETHAMINE 15 MG: 30 INJECTION, SOLUTION INTRAMUSCULAR at 18:06

## 2024-10-05 NOTE — CONSULTS
"    [facility] Medicine Services  Consult Note    Patient Name: Tramaine Gates  : 1985  MRN: [id number]  Primary Care Physician:  Daniela Hernandez FNP  Referring Physician: No Known Provider  Date of admission: 10/4/2024  Date and Time of Care: 10-5-2024 at 1630    Inpatient Hospitalist Consult  Consult performed by: Johnna Borrero APRN  Consult ordered by: Yenni Guy PA-C        Reason for Consult/ Chief Complaint: assume care    Consult Requested By: Yenni Guy PA-C    Subjective:     History of Present Illness:   Per the documentation by Yenni Guy PA-C, dated 10-5-2024,    \"ED  39-year-old gentleman who scatted history of heart disease with bypass surgery and multiple stents who states the last couple days he had congestion and cough but no fever and increasing difficulty breathing tightness in his chest with some tingling in his left arm. Worse with exertion and better with rest. Patient denies any fever chills no recent long car ride plane ride immobilization or foreign travels leg pain or swelling. No injury no one at home with similar illness he does have children in the home.     Observation 10/5/24  Pt anxious. Cardiology consulted due to past medical hx and pt request. Abx for pneumonia. Mrsa negative. Blood cx pending\"    Assess patient at bedside. Patient complaining of severe left sided chest pain radiating down his left arm and up the left side of his neck. Patient states this is exactly how he felt when he needed stents in the past. I personally reached out to Dr. Carvajal who ordered for Nitro gtt to be started, however due to patient being hypotensive this was unable to be done. Dr. Carvajal was made aware and ordered to start patient on mIVF and he would review the results.     Review of Systems:   Review of Systems   Constitutional:  Positive for diaphoresis. Negative for chills, fatigue and fever.   Respiratory:  Negative for shortness of breath.  "   Cardiovascular:  Positive for chest pain.   Musculoskeletal:  Positive for neck pain.   Neurological:  Negative for dizziness and headaches.       Personal History:     Past Medical History:   Diagnosis Date    Acute inferior myocardial infarction 06/14/2022    Allergic     Asthma 01/27/2022    CAD, multiple vessel 06/14/2022    Disorder of thyroid 01/27/2022    Gastroesophageal reflux disease 01/27/2022    Hx of complete eye exam 2016    Hyperlipidemia 01/27/2022    Hypertension     Migraine headache 01/27/2022    Panic attack 01/27/2022    Peptic ulcer 09/14/2017       Past Surgical History:   Procedure Laterality Date    CARDIAC CATHETERIZATION N/A 06/14/2022    Procedure: Left Heart Cath;  Surgeon: Matthieu Del Toro MD;  Location: [facility] CATH INVASIVE LOCATION;  Service: Cardiology;  Laterality: N/A;    CARDIAC CATHETERIZATION N/A 06/16/2022    Procedure: Percutaneous Coronary Intervention;  Surgeon: Matthieu Del Toro MD;  Location: [facility] CATH INVASIVE LOCATION;  Service: Cardiovascular;  Laterality: N/A;    CARDIAC CATHETERIZATION N/A 06/23/2022    Procedure: Left Heart Cath possible PCI, atherectomy, hemodynamic support;  Surgeon: Moises Haddad MD;  Location: [facility] CATH INVASIVE LOCATION;  Service: Cardiology;  Laterality: N/A;    CARDIAC CATHETERIZATION N/A 09/15/2022    Procedure: Left Heart Cath;  Surgeon: Moises Haddad MD;  Location: [facility] CATH INVASIVE LOCATION;  Service: Cardiology;  Laterality: N/A;    CARDIAC CATHETERIZATION  9/15/2022    CARDIAC CATHETERIZATION N/A 3/2/2023    Procedure: Left Heart Cath;  Surgeon: Moises Haddad MD;  Location: [facility] CATH INVASIVE LOCATION;  Service: Cardiology;  Laterality: N/A;    CHOLECYSTECTOMY  2019    CORONARY ARTERY BYPASS GRAFT N/A 06/29/2022    Procedure: CORONARY ARTERY BYPASS GRAFTING;  Surgeon: Pablo Ball MD;  Location: [facility] CVOR;  Service: Cardiothoracic;  Laterality: N/A;  CABG X 3(2 vein grafts, 1 LIMA graft)     CORONARY ARTERY BYPASS GRAFT  6/29/2022    CORONARY STENT PLACEMENT      MANDIBLE SURGERY         Family History: family history includes Cirrhosis in his maternal grandfather; Heart attack in his maternal grandmother and mother; Heart disease in his mother; Heart failure in his father; Hypertension in his mother. Otherwise pertinent FHx was reviewed and not pertinent to current issue.    Social History:  reports that he quit smoking about 2 years ago. His smoking use included cigarettes. He started smoking about 28 years ago. He has a 39.7 pack-year smoking history. He has never used smokeless tobacco. He reports that he does not currently use alcohol. He reports current drug use. Frequency: 7.00 times per week. Drug: Marijuana.    Home Medications:   Evolocumab, albuterol sulfate HFA, amLODIPine, aspirin, cilostazol, dilTIAZem CD, isosorbide mononitrate, levothyroxine, lisinopril, metoprolol succinate XL, nitroglycerin, omeprazole, prasugrel, ranolazine, and rosuvastatin    Allergies:  Allergies   Allergen Reactions    Shellfish-Derived Products Unknown - High Severity         Objective:     Vital Signs  Temp:  [97.5 °F (36.4 °C)-98.4 °F (36.9 °C)] 98.1 °F (36.7 °C)  Heart Rate:  [61-79] 68  Resp:  [12-21] 15  BP: ()/(55-79) 97/55  Flow (L/min):  [1-2] 1   Body mass index is 44.29 kg/m².    Physical Exam  Physical Exam  General: 38yo male, Alert and oriented, morbidly obese, no acute distress.  HENT: Normocephalic, normal hearing, moist oral mucosa, no scleral icterus.  Neck: Supple, non-tender, no carotid bruits, no JVD, no LAD.  Lungs: Clear to auscultation, non-labored respiration.  Heart: RRR, no murmur, gallop or edema.  Abdomen: Soft, nontender, non-distended, + bowel sounds.  Musculoskeletal: Normal range of motion and strength, no tenderness or swelling.  Skin: Skin is warm, dry and pink, no rashes or lesions.  Psychiatric: Cooperative, appropriate mood and affect.      Scheduled Meds   albuterol  sulfate HFA, 2 puff, Inhalation, 4x Daily - RT  amLODIPine, 5 mg, Oral, Daily  aspirin, 81 mg, Oral, Daily  azithromycin, 500 mg, Intravenous, Q24H  cilostazol, 50 mg, Oral, BID  isosorbide mononitrate, 120 mg, Oral, Q24H  levothyroxine, 50 mcg, Oral, Q AM  lisinopril, 5 mg, Oral, Q24H  metoprolol succinate XL, 100 mg, Oral, Q24H  pantoprazole, 40 mg, Oral, Q AM  prasugrel, 10 mg, Oral, Daily  ranolazine, 1,000 mg, Oral, BID  rosuvastatin, 20 mg, Oral, Daily  sodium chloride, 10 mL, Intravenous, Q12H       PRN Meds     aluminum-magnesium hydroxide-simethicone    senna-docusate sodium **AND** polyethylene glycol **AND** bisacodyl **AND** bisacodyl    diphenhydrAMINE    nitroglycerin    ondansetron ODT **OR** ondansetron    [COMPLETED] Insert Peripheral IV **AND** sodium chloride    sodium chloride    sodium chloride    traMADol   Infusions         Diagnostic Data    Results from last 7 days   Lab Units 10/05/24  0039 10/04/24  0944   WBC 10*3/mm3 6.53 8.28   HEMOGLOBIN g/dL 12.2* 13.3   HEMATOCRIT % 38.2 40.6   PLATELETS 10*3/mm3 283 299   GLUCOSE mg/dL 89 146*   CREATININE mg/dL 1.18 1.35*   BUN mg/dL 14 11   SODIUM mmol/L 136 134*   POTASSIUM mmol/L 4.0 3.5   AST (SGOT) U/L  --  20   ALT (SGPT) U/L  --  24   ALK PHOS U/L  --  93   BILIRUBIN mg/dL  --  0.3   ANION GAP mmol/L 10.8 14.4       CT Chest Without Contrast Diagnostic    Result Date: 10/4/2024  Impression: Findings suggestive of pneumonia/pneumonitis involving the right lower lobe and to a lesser extent the right lower lobe. Electronically Signed: Rui Roberts DO  10/4/2024 7:39 PM EDT  Workstation ID: [id number]    XR Chest 1 View    Result Date: 10/4/2024  Impression: No active disease. Electronically Signed: Kermit Mcwilliams MD  10/4/2024 10:34 AM EDT  Workstation ID: [id number]       I reviewed the patient's new clinical results.    Assessment/Plan:     Active and Resolved Problems  Active Hospital Problems    Diagnosis  POA    **Dyspnea [R06.00]  Yes       Resolved Hospital Problems   No resolved problems to display.       Pneumonia  - cbc, bnp, bmp, troponin unremarkable  - chest xray reviewed and showing no acute disease  - ct chest reviewed and showing pneumonia right middle and lower lobe.   - RPP + mycoplasma pneumonia  - mrsa negative  - right arm blood culture + , poor sample, possible contamination, awaiting results  - azithromycin  - EKG sinus rate 89     Chest pain  - hx of CAD  - home meds ranexa, imdur  - cardiology consulted and recommends echo  - mIVF at 75cc/hr for 6h per Dr. Carvajal  - STAT EKG showed normal sinus  - STAT troponins pending     Hypertension  - Patient hypotensive  - Continue norvasc, lisinopril, metoprolol  - Monitor while admitted   - Will hold home norvasc as patient hypotensive     Hypothyroidism  - cont synthroid  - tsh pending    VTE Prophylaxis:  Mechanical VTE prophylaxis orders are present.         Code status is   Code Status and Medical Interventions: CPR (Attempt to Resuscitate); Full Support   Ordered at: 10/04/24 1148     Code Status (Patient has no pulse and is not breathing):    CPR (Attempt to Resuscitate)     Medical Interventions (Patient has pulse or is breathing):    Full Support       Plan for disposition: pending cardiology eval, 10/8/2024    Time: 30 minutes        Signature: Electronically signed by YOUSIF Cornelius, 10/05/24, 16:21 EDT.  [facility] Hospitalist Team

## 2024-10-05 NOTE — CONSULTS
Referring Provider: Antwan Garay MD  Reason for Consultation: 39-year-old male with known history of severe premature coronary artery disease with acute inferior wall MI status post three-vessel CABG in 2022 as well as PCI to the SVG-RCA.  Additional past medical history of dyslipidemia, hypertension, GERD, thyroid disorder, panic attacks.    Patient Care Team:  Daniela Hernandez FNP as PCP - General (Family Medicine)  Ollie Dunn MD as Consulting Physician (Gastroenterology)    Chief complaint chest pain, shortness of breath    Subjective .     History of present illness:  Tramaine Gates is a 39 y.o. male who presents with complaints of left-sided chest pain with radiation to his left neck and left arm associated with recent chest congestion and cough over the past 2-3 days.  He denies any fever.  He reports increasing difficulty breathing due to tightness in his chest.  Symptoms worse with exertion and improve with rest. Patient denies any lower extremity edema, dizziness, lightheadedness, orthopnea.  No GI complaints.  Cardiology was consulted primarily per patient request due to his chest discomfort and concern for worsening of his coronary disease.  The patient tells me that he has coughed so hard at home that it has caused him to pass out.  Viral panel was positive for mycoplasma pneumo virus.  He was admitted for aggressive treatment of his respiratory symptoms.  Upon my evaluation, he is lying supine in bed, appears to feel unwell but is in no respiratory distress.  He relays the story as above and states that his chest discomfort has somewhat improved this morning.    Objective data:  Troponin negative x 2  EKG-normal sinus rhythm  Blood cultures pending  Chest x-ray with no acute findings      ROS      Today, the patient has been without any chest discomfort, shortness of breath, palpitations, dizziness or syncope.  Denies having any headache, abdominal pain, nausea, vomiting,  diarrhea, constipation, loss of weight or loss of appetite.  Denies having any excessive bruising, hematuria or blood in the stool.    Review of all systems negative except as indicated      History  Past Medical History:   Diagnosis Date    Acute inferior myocardial infarction 06/14/2022    Allergic     Asthma 01/27/2022    CAD, multiple vessel 06/14/2022    Disorder of thyroid 01/27/2022    Gastroesophageal reflux disease 01/27/2022    Hx of complete eye exam 2016    Hyperlipidemia 01/27/2022    Hypertension     Migraine headache 01/27/2022    Panic attack 01/27/2022    Peptic ulcer 09/14/2017       Past Surgical History:   Procedure Laterality Date    CARDIAC CATHETERIZATION N/A 06/14/2022    Procedure: Left Heart Cath;  Surgeon: Matthieu Del Toro MD;  Location: [facility] CATH INVASIVE LOCATION;  Service: Cardiology;  Laterality: N/A;    CARDIAC CATHETERIZATION N/A 06/16/2022    Procedure: Percutaneous Coronary Intervention;  Surgeon: Matthieu Del Toro MD;  Location: [facility] CATH INVASIVE LOCATION;  Service: Cardiovascular;  Laterality: N/A;    CARDIAC CATHETERIZATION N/A 06/23/2022    Procedure: Left Heart Cath possible PCI, atherectomy, hemodynamic support;  Surgeon: Moises Haddad MD;  Location: [facility] CATH INVASIVE LOCATION;  Service: Cardiology;  Laterality: N/A;    CARDIAC CATHETERIZATION N/A 09/15/2022    Procedure: Left Heart Cath;  Surgeon: Moises Haddad MD;  Location: [facility] CATH INVASIVE LOCATION;  Service: Cardiology;  Laterality: N/A;    CARDIAC CATHETERIZATION  9/15/2022    CARDIAC CATHETERIZATION N/A 3/2/2023    Procedure: Left Heart Cath;  Surgeon: Moises Haddad MD;  Location: [facility] CATH INVASIVE LOCATION;  Service: Cardiology;  Laterality: N/A;    CHOLECYSTECTOMY  2019    CORONARY ARTERY BYPASS GRAFT N/A 06/29/2022    Procedure: CORONARY ARTERY BYPASS GRAFTING;  Surgeon: Pablo Ball MD;  Location: [facility] CVOR;  Service: Cardiothoracic;  Laterality: N/A;  CABG X  3(2 vein grafts, 1 LIMA graft)    CORONARY ARTERY BYPASS GRAFT  2022    CORONARY STENT PLACEMENT      MANDIBLE SURGERY         Family History   Problem Relation Age of Onset    Heart disease Mother     Heart attack Mother     Hypertension Mother     Heart failure Father     Cirrhosis Maternal Grandfather     Heart attack Maternal Grandmother         a       Social History     Tobacco Use    Smoking status: Former     Current packs/day: 0.00     Average packs/day: 1.5 packs/day for 26.5 years (39.7 ttl pk-yrs)     Types: Cigarettes     Start date: 1996     Quit date: 2022     Years since quittin.2    Smokeless tobacco: Never   Vaping Use    Vaping status: Never Used   Substance Use Topics    Alcohol use: Not Currently    Drug use: Yes     Frequency: 7.0 times per week     Types: Marijuana        Medications Prior to Admission   Medication Sig Dispense Refill Last Dose    albuterol sulfate  (90 Base) MCG/ACT inhaler Inhale 2 puffs by mouth as directed every four to six hours as needed for coughing, shortness of breath, wheezing 8.5 g 0     amLODIPine (Norvasc) 5 MG tablet Take 1 tablet by mouth Daily. 90 tablet 0     aspirin (Aspirin Low Dose) 81 MG EC tablet Take 1 tablet by mouth Daily. 90 tablet 0     aspirin (Aspirin Low Dose) 81 MG EC tablet Take 1 tablet by mouth Daily. 90 tablet 0     cilostazol (PLETAL) 100 MG tablet Take 0.5 tablets by mouth 2 (Two) Times a Day. 90 tablet 3     Evolocumab (Repatha SureClick) solution auto-injector SureClick injection Inject 140 mg total (1 mL) into the skin every 14 (fourteen) days. 2 mL 5     isosorbide mononitrate (IMDUR) 120 MG 24 hr tablet Take 1 tablet by mouth Daily. 30 tablet 0     levothyroxine (SYNTHROID, LEVOTHROID) 50 MCG tablet TAKE 1 TABLET BY MOUTH EVERY DAY 90 tablet 0     lisinopril (PRINIVIL,ZESTRIL) 5 MG tablet Take 1 tablet by mouth Daily. 90 tablet 3     metoprolol succinate XL (TOPROL-XL) 100 MG 24 hr tablet Take 1 tablet by mouth  Daily. 30 tablet 0     nitroglycerin (NITROSTAT) 0.4 MG SL tablet Place 1 tablet under the tongue Every 5 (Five) Minutes As Needed for Chest Pain (Only if SBP Greater Than 100). Take no more than 3 doses in 15 minutes. 25 tablet 0     omeprazole (priLOSEC) 20 MG capsule Take 1 capsule by mouth Daily.       prasugrel (EFFIENT) 10 MG tablet Take 1 tablet by mouth Daily. Take 6 tablets by mouth for the first dose and then take one tablet by mouth daily thereafter. 90 tablet 3     ranolazine (RANEXA) 1000 MG 12 hr tablet Take 1 tablet by mouth 2 (Two) Times a Day. 180 tablet 3     rosuvastatin (CRESTOR) 20 MG tablet Take 1 tablet by mouth Daily.            Shellfish-derived products    Scheduled Meds:albuterol sulfate HFA, 2 puff, Inhalation, 4x Daily - RT  amLODIPine, 5 mg, Oral, Daily  aspirin, 81 mg, Oral, Daily  azithromycin, 500 mg, Intravenous, Q24H  cilostazol, 50 mg, Oral, BID  isosorbide mononitrate, 120 mg, Oral, Q24H  levothyroxine, 50 mcg, Oral, Q AM  lisinopril, 5 mg, Oral, Q24H  metoprolol succinate XL, 100 mg, Oral, Q24H  pantoprazole, 40 mg, Oral, Q AM  prasugrel, 10 mg, Oral, Daily  ranolazine, 1,000 mg, Oral, BID  rosuvastatin, 20 mg, Oral, Daily  sodium chloride, 10 mL, Intravenous, Q12H      Continuous Infusions:   PRN Meds:.  aluminum-magnesium hydroxide-simethicone    senna-docusate sodium **AND** polyethylene glycol **AND** bisacodyl **AND** bisacodyl    diphenhydrAMINE    nitroglycerin    ondansetron ODT **OR** ondansetron    [COMPLETED] Insert Peripheral IV **AND** sodium chloride    sodium chloride    sodium chloride    traMADol    Objective     VITAL SIGNS  Vitals:    10/04/24 1859 10/04/24 2012 10/05/24 0017 10/05/24 0400   BP:  113/61 111/60 106/65   BP Location:  Right arm Right arm Right arm   Patient Position:  Lying Sitting Lying   Pulse: 67 72 75 61   Resp: 18 15 18 17   Temp:  97.5 °F (36.4 °C) 97.6 °F (36.4 °C) 97.7 °F (36.5 °C)   TempSrc:  Axillary Oral Oral   SpO2: 96% 96% 97% 93%  "  Weight:       Height:           Flowsheet Rows      Flowsheet Row First Filed Value   Admission Height 177.8 cm (70\") Documented at 10/04/2024 0924   Admission Weight 140 kg (308 lb 10.3 oz) Documented at 10/04/2024 0924              Intake/Output Summary (Last 24 hours) at 10/5/2024 0732  Last data filed at 10/5/2024 0400  Gross per 24 hour   Intake 720 ml   Output 1100 ml   Net -380 ml        TELEMETRY: Sinus rhythm    Physical Exam:  The patient is alert, oriented and in no distress.  Vital signs as noted above.  Exogenous obesity.  Head and neck revealed no carotid bruits or jugular venous distention.  No thyromegaly or lymphadenopathy is present  Lungs clear.  No wheezing.  Breath sounds are normal bilaterally.  Heart normal first and second heart sounds. No murmur.  No precordial rub is present.  No gallop is present.  Abdomen soft and nontender.  No organomegaly is present.  Extremities with good peripheral pulses without any pedal edema.  Skin warm and dry.  Musculoskeletal system is grossly normal  CNS grossly normal    Reviewed and updated.  Results Review:   I reviewed the patient's new clinical results.  Lab Results (last 24 hours)       Procedure Component Value Units Date/Time    Basic Metabolic Panel [[id number]]  (Normal) Collected: 10/05/24 0039    Specimen: Blood from Arm, Right Updated: 10/05/24 0142     Glucose 89 mg/dL      BUN 14 mg/dL      Creatinine 1.18 mg/dL      Sodium 136 mmol/L      Potassium 4.0 mmol/L      Chloride 103 mmol/L      CO2 22.2 mmol/L      Calcium 9.0 mg/dL      BUN/Creatinine Ratio 11.9     Anion Gap 10.8 mmol/L      eGFR 79.5 mL/min/1.73     Narrative:      GFR Normal >60  Chronic Kidney Disease <60  Kidney Failure <15      CBC & Differential [[id number]]  (Abnormal) Collected: 10/05/24 0039    Specimen: Blood from Arm, Right Updated: 10/05/24 0118    Narrative:      The following orders were created for panel order CBC & Differential.  Procedure                           "     Abnormality         Status                     ---------                               -----------         ------                     CBC Auto Differential[[id number]]        Abnormal            Final result                 Please view results for these tests on the individual orders.    CBC Auto Differential [[id number]]  (Abnormal) Collected: 10/05/24 0039    Specimen: Blood from Arm, Right Updated: 10/05/24 0118     WBC 6.53 10*3/mm3      RBC 4.32 10*6/mm3      Hemoglobin 12.2 g/dL      Hematocrit 38.2 %      MCV 88.4 fL      MCH 28.2 pg      MCHC 31.9 g/dL      RDW 13.0 %      RDW-SD 41.9 fl      MPV 8.9 fL      Platelets 283 10*3/mm3      Neutrophil % 68.2 %      Lymphocyte % 24.7 %      Monocyte % 4.9 %      Eosinophil % 1.4 %      Basophil % 0.3 %      Immature Grans % 0.5 %      Neutrophils, Absolute 4.46 10*3/mm3      Lymphocytes, Absolute 1.61 10*3/mm3      Monocytes, Absolute 0.32 10*3/mm3      Eosinophils, Absolute 0.09 10*3/mm3      Basophils, Absolute 0.02 10*3/mm3      Immature Grans, Absolute 0.03 10*3/mm3      nRBC 0.0 /100 WBC     High Sensitivity Troponin T 2Hr [[id number]]  (Normal) Collected: 10/04/24 1205    Specimen: Blood Updated: 10/04/24 1248     HS Troponin T 8 ng/L      Troponin T Delta -1 ng/L     Narrative:      High Sensitive Troponin T Reference Range:  <14.0 ng/L- Negative Female for AMI  <22.0 ng/L- Negative Male for AMI  >=14 - Abnormal Female indicating possible myocardial injury.  >=22 - Abnormal Male indicating possible myocardial injury.   Clinicians would have to utilize clinical acumen, EKG, Troponin, and serial changes to determine if it is an Acute Myocardial Infarction or myocardial injury due to an underlying chronic condition.         Extra Tubes [[id number]] Collected: 10/04/24 1021    Specimen: Blood, Venous Line Updated: 10/04/24 1231    Narrative:      The following orders were created for panel order Extra Tubes.  Procedure                               Abnormality          Status                     ---------                               -----------         ------                     [facility][[id number]]                                   Final result                 Please view results for these tests on the individual orders.    [facility] [[id number]] Collected: 10/04/24 1021    Specimen: Blood Updated: 10/04/24 1231     Extra Tube Hold for add-ons.     Comment: Auto resulted.       Blood Culture - Blood, Arm, Left [[id number]] Collected: 10/04/24 1205    Specimen: Blood from Arm, Left Updated: 10/04/24 1214    Blood Culture - Blood, Arm, Right [[id number]] Collected: 10/04/24 1205    Specimen: Blood from Arm, Right Updated: 10/04/24 1214    Respiratory Panel PCR w/COVID-19(SARS-CoV-2) FLASH/GONZALEZ/KELSEY/PAD/COR/YA In-House, NP Swab in UTM/VTM, 2 HR TAT - Swab, Nasopharynx [[id number]]  (Abnormal) Collected: 10/04/24 0944    Specimen: Swab from Nasopharynx Updated: 10/04/24 1112     ADENOVIRUS, PCR Not Detected     Coronavirus 229E Not Detected     Coronavirus HKU1 Not Detected     Coronavirus NL63 Not Detected     Coronavirus OC43 Not Detected     COVID19 Not Detected     Human Metapneumovirus Not Detected     Human Rhinovirus/Enterovirus Not Detected     Influenza A PCR Not Detected     Influenza B PCR Not Detected     Parainfluenza Virus 1 Not Detected     Parainfluenza Virus 2 Not Detected     Parainfluenza Virus 3 Not Detected     Parainfluenza Virus 4 Not Detected     RSV, PCR Not Detected     Bordetella pertussis pcr Not Detected     Bordetella parapertussis PCR Not Detected     Chlamydophila pneumoniae PCR Not Detected     Mycoplasma pneumo by PCR Detected    Narrative:      In the setting of a positive respiratory panel with a viral infection PLUS a negative procalcitonin without other underlying concern for bacterial infection, consider observing off antibiotics or discontinuation of antibiotics and continue supportive care. If the respiratory panel is positive for  atypical bacterial infection (Bordetella pertussis, Chlamydophila pneumoniae, or Mycoplasma pneumoniae), consider antibiotic de-escalation to target atypical bacterial infection.    Comprehensive Metabolic Panel [[id number]]  (Abnormal) Collected: 10/04/24 0944    Specimen: Blood Updated: 10/04/24 1016     Glucose 146 mg/dL      BUN 11 mg/dL      Creatinine 1.35 mg/dL      Sodium 134 mmol/L      Potassium 3.5 mmol/L      Chloride 100 mmol/L      CO2 19.6 mmol/L      Calcium 9.4 mg/dL      Total Protein 7.1 g/dL      Albumin 4.2 g/dL      ALT (SGPT) 24 U/L      AST (SGOT) 20 U/L      Alkaline Phosphatase 93 U/L      Total Bilirubin 0.3 mg/dL      Globulin 2.9 gm/dL      A/G Ratio 1.4 g/dL      BUN/Creatinine Ratio 8.1     Anion Gap 14.4 mmol/L      eGFR 67.6 mL/min/1.73     Narrative:      GFR Normal >60  Chronic Kidney Disease <60  Kidney Failure <15      High Sensitivity Troponin T [[id number]]  (Normal) Collected: 10/04/24 0944    Specimen: Blood Updated: 10/04/24 1016     HS Troponin T 9 ng/L     Narrative:      High Sensitive Troponin T Reference Range:  <14.0 ng/L- Negative Female for AMI  <22.0 ng/L- Negative Male for AMI  >=14 - Abnormal Female indicating possible myocardial injury.  >=22 - Abnormal Male indicating possible myocardial injury.   Clinicians would have to utilize clinical acumen, EKG, Troponin, and serial changes to determine if it is an Acute Myocardial Infarction or myocardial injury due to an underlying chronic condition.         BNP [[id number]]  (Normal) Collected: 10/04/24 0944    Specimen: Blood Updated: 10/04/24 1016     proBNP 236.0 pg/mL     Narrative:      This assay is used as an aid in the diagnosis of individuals suspected of having heart failure. It can be used as an aid in the diagnosis of acute decompensated heart failure (ADHF) in patients presenting with signs and symptoms of ADHF to the emergency department (ED). In addition, NT-proBNP of <300 pg/mL indicates ADHF is not  likely.    Age Range Result Interpretation  NT-proBNP Concentration (pg/mL:      <50             Positive            >450                   Gray                 300-450                    Negative             <300    50-75           Positive            >900                  Gray                300-900                  Negative            <300      >75             Positive            >1800                  Gray                300-1800                  Negative            <300    CBC & Differential [[id number]]  (Abnormal) Collected: 10/04/24 0944    Specimen: Blood Updated: 10/04/24 0950    Narrative:      The following orders were created for panel order CBC & Differential.  Procedure                               Abnormality         Status                     ---------                               -----------         ------                     CBC Auto Differential[[id number]]        Abnormal            Final result                 Please view results for these tests on the individual orders.    CBC Auto Differential [[id number]]  (Abnormal) Collected: 10/04/24 0944    Specimen: Blood Updated: 10/04/24 0950     WBC 8.28 10*3/mm3      RBC 4.61 10*6/mm3      Hemoglobin 13.3 g/dL      Hematocrit 40.6 %      MCV 88.1 fL      MCH 28.9 pg      MCHC 32.8 g/dL      RDW 12.9 %      RDW-SD 41.1 fl      MPV 8.8 fL      Platelets 299 10*3/mm3      Neutrophil % 76.2 %      Lymphocyte % 18.0 %      Monocyte % 3.5 %      Eosinophil % 1.3 %      Basophil % 0.4 %      Immature Grans % 0.6 %      Neutrophils, Absolute 6.31 10*3/mm3      Lymphocytes, Absolute 1.49 10*3/mm3      Monocytes, Absolute 0.29 10*3/mm3      Eosinophils, Absolute 0.11 10*3/mm3      Basophils, Absolute 0.03 10*3/mm3      Immature Grans, Absolute 0.05 10*3/mm3      nRBC 0.0 /100 WBC             Imaging Results (Last 24 Hours)       Procedure Component Value Units Date/Time    CT Chest Without Contrast Diagnostic [[id number]] Collected: 10/04/24 1935     Updated:  10/04/24 1941    Narrative:      CT CHEST WO CONTRAST DIAGNOSTIC    Date of Exam: 10/4/2024 5:57 PM EDT    Indication: pneumonia.    Comparison: 1/22/2024    Technique: Axial CT images were obtained of the chest without contrast administration.  Sagittal and coronal reconstructions were performed.  Automated exposure control and iterative reconstruction methods were used.      Findings:  Lower Neck: Unremarkable.    Hilum and Mediastinum: No pathologically enlarged lymph nodes.  Normal heart size.   No pericardial effusion.  Unremarkable thoracic aorta and pulmonary arteries.    Lung Parenchyma and Pleura: Mild patchy opacities and early consolidative changes noted within the right middle lobe. Additionally scattered tree-in-bud nodularities are also noted within the right lower lobe. Right lower lobe pulmonary nodule measuring   0.6 cm, unchanged since 2022 and most likely benign.  The central airways are patent. No pleural effusions or pneumothorax.    Upper Abdomen: No acute process.     Soft tissues: Unremarkable.    Osseous structures: No acute osseous abnormality.            Impression:      Impression:  Findings suggestive of pneumonia/pneumonitis involving the right lower lobe and to a lesser extent the right lower lobe.      Electronically Signed: Rui Roberts DO    10/4/2024 7:39 PM EDT    Workstation ID: [id number]    XR Chest 1 View [[id number]] Collected: 10/04/24 1033     Updated: 10/04/24 1036    Narrative:      XR CHEST 1 VW    Date of Exam: 10/4/2024 10:18 AM EDT    Indication: Chest pain with shortness of breath.    Comparison: 12/19/2023.    Findings:  The heart size is normal. The patient has had a previous CABG procedure. The pulmonary vascular markings are normal. The lungs and pleural spaces are clear of active disease. The bony thorax is normal for age.      Impression:      Impression:  No active disease.      Electronically Signed: Kermit Mcwilliams MD    10/4/2024 10:34 AM EDT    Workstation  ID: [id number]        LAB RESULTS (LAST 7 DAYS)    CBC  Results from last 7 days   Lab Units 10/05/24  0039 10/04/24  0944   WBC 10*3/mm3 6.53 8.28   RBC 10*6/mm3 4.32 4.61   HEMOGLOBIN g/dL 12.2* 13.3   HEMATOCRIT % 38.2 40.6   MCV fL 88.4 88.1   PLATELETS 10*3/mm3 283 299       BMP  Results from last 7 days   Lab Units 10/05/24  0039 10/04/24  0944   SODIUM mmol/L 136 134*   POTASSIUM mmol/L 4.0 3.5   CHLORIDE mmol/L 103 100   CO2 mmol/L 22.2 19.6*   BUN mg/dL 14 11   CREATININE mg/dL 1.18 1.35*   GLUCOSE mg/dL 89 146*       CMP   Results from last 7 days   Lab Units 10/05/24  0039 10/04/24  0944   SODIUM mmol/L 136 134*   POTASSIUM mmol/L 4.0 3.5   CHLORIDE mmol/L 103 100   CO2 mmol/L 22.2 19.6*   BUN mg/dL 14 11   CREATININE mg/dL 1.18 1.35*   GLUCOSE mg/dL 89 146*   ALBUMIN g/dL  --  4.2   BILIRUBIN mg/dL  --  0.3   ALK PHOS U/L  --  93   AST (SGOT) U/L  --  20   ALT (SGPT) U/L  --  24         BNP        TROPONIN  Results from last 7 days   Lab Units 10/04/24  1205   HSTROP T ng/L 8       CoAg        Creatinine Clearance  Estimated Creatinine Clearance: 116.3 mL/min (by C-G formula based on SCr of 1.18 mg/dL).    ABG        Radiology  CT Chest Without Contrast Diagnostic    Result Date: 10/4/2024  Impression: Findings suggestive of pneumonia/pneumonitis involving the right lower lobe and to a lesser extent the right lower lobe. Electronically Signed: Rui Roberts DO  10/4/2024 7:39 PM EDT  Workstation ID: [id number]    XR Chest 1 View    Result Date: 10/4/2024  Impression: No active disease. Electronically Signed: Kermit Mcwilliams MD  10/4/2024 10:34 AM EDT  Workstation ID: [id number]       EKG            I personally viewed and interpreted the patient's EKG/Telemetry data: Sinus rhythm    ECHOCARDIOGRAM:    Results for orders placed during the hospital encounter of 12/18/23    Adult Transthoracic Echo Complete W/ Cont if Necessary Per Protocol    Interpretation Summary  Normal left and right ventricular size and  systolic function  Normal left ventricular diastolic function  No significant valve disease noted.      STRESS TEST  Results for orders placed during the hospital encounter of 12/18/23    Stress Test With Myocardial Perfusion One Day    Interpretation Summary  1. Negative Regadenoson Electrocardiography: There is no ECG evidence of myocardial ischemia.  2.  Adequate blood-pressure response to regadenoson.  3. No regadenoson-induced arrhythmias  4. Normal SPECT Myocardial Perfusion Imaging: There is no scintigraphic evidence of myocardial ischemia or scarring.  5. Overall left ventricular function is preserved, with a normal ejection fraction and no regional asynergy.        Cardiolite (Tc-99m sestamibi) stress test    HEART CATHETERIZATION  Results for orders placed during the hospital encounter of 03/01/23    Cardiac Catheterization/Vascular Study    Conclusion   Moises Haddad MD, PhD  [facility] cardiology  Date of service 3-2-2023    Procedure  1.  Left heart catheterization coronary angiography left ventriculography In GALLO position, imaging of native and bypass grafts  2.,  Balloon angioplasty 4.0 x 20 NC balloon of the distal portion of the SVG to RCA inside the stented segment secondary to what appears to be greater than 70% in-stent restenosis distally versus subocclusive thrombus, ostial proximal greater than 50% restenosis, reduced to less than 10% distally, remains about 20% proximally at the ostium, improved angiographic runoff which was DARRYL II preoperatively, DARRYL-3 post procedurally    Indication  Unstable angina, unable for intervention to the native RCA    After informed consent patient brought to the Cath Lab sterilely prepped and draped in usual fashion with exposure of the right groin for right common femoral arterial access via micropuncture modified Seldinger technique with placement of a 6 Romansh sheath.  035 guidewires advanced aortic valve followed by diagnostic JL 4 and  "JR4 catheters for selective left and right coronary angiography, JR4 was used to image the LIMA to LAD, multipurpose catheter was used to image SVG to RCA, SVG to diagonal was noted to be closed.  There appeared to be haziness at the ostial proximal portion of the stent in the SVG as well as what appeared to be slow flow distal to a bend in the distal portion of the graft also with haziness and what appeared to be minimum 60% stenosis possibly up to 80 and some angiographic views.  Decision was made given inability to revascularize native RCA with diffuse small vessel disease and small caliber vessels to intervene with balloon angioplasty in the stented segment of the SVG graft.  Patient was heparinized with 100 units/kg of heparin, he was on a background therapy of aspirin and Effient at baseline, ACT was greater than 250, multipurpose guide was used engage the vein graft followed by run-through wire to the distal portion of the graft, 4.0 x 20 NC balloon was used to dilate the proximal portion of the graft up to 16 radha for a minute and a half with prolonged inflation given significant stenosis at the ostial portion of the graft, there was great angiographic improvement of this portion of the graft with better stent expansion, angiography further revealed downstream stenosis in the distal portion of the graft, the balloon was then readvanced to the distal portion followed by 2 inflations up to 14 radha distal proximal fashion at the culprit portion reducing stenosis to 0% residual and improving distal runoff into again small vessel PDA and retrogradely into the RCA.  Final angiography revealed no distal wire trauma no edge dissection, DARRYL-3 flow in the graft as well as into the RPDA which did have some degree of competitive flow from the native RCA although small vessel and diffusely diseased.  All catheters\" removed.  6 Maori Angio-Seal was used to close the arteriotomy with immediate hemostasis and Meints of " distal pulses.  He left Cath Lab chest pain-free hemodynamically electrically stable alert talkative staff neurologic grossly intact bilaterally    Complications none  Blood loss less than 10 cc  Contrast 160 cc  Sedation time of 1 hour    Findings  1.  Opening aortic pressure of  Closing pressure  LVEDP elevated 20-25  LV function normal 60%  Normal transaortic valve gradient on pullback    Angiography  1.  Left main normal takeoff, 10% luminal regularities leading into LAD that is subtotally occluded in the midportion, circumflex widely patent  2.  LAD diffusely diseased 80 to 90% the proximal portion, widely patent LIMA to LAD in the midportion, distal portion of the LAD has diffuse luminal regularities but is very small caliber less than 2 mm in diameter, anastomosis is widely patent for the LIMA  Over 3 LIMA to LAD widely patent, no disease  3.  SVG to diagonal branch is closed  4.  SVG to RCA has ostial 60 to 70% but hazy in appearance, distally also had 70 to 80% hazy lesion suspicious for ISR versus subacute thrombus which was greatly improved by balloon angioplasty, runoff both retrograde into the PDA and anterograde with very small vessel anastomosis likely best for medical therapy  5.  RCA natively diffusely diseased with patent stents in the proximal midportion, the intervening portion between proximal and distal stents is much less than 1 mm in diameter, marginal branches open and otherwise distal portion of vessel not amenable to intervention secondary to severe small caliber and very poor long-term patency  6.  Circumflex widely patent, obtuse marginal branch has diffuse small vessel disease tandem 80% lesions and tortuous not amenable to intervention secondary to small caliber size poor distal runoff and very poor long-term patency, thus medical treatment at this point, unchanged angiographically from prior case 8 to 9 months ago at the time of SVG revascularization with overlapping  stents    Conclusions recommendations  1.  Unstable angina  Culprit appeared to be SVG to RCA distal lesion 70 to 80% hazy possibly atherothrombotic as well as proximal ostial ISR.  Other vascular areas with LIMA to LAD, native LAD, circumflex and native RCA.  Unchanged angiographically compared to prior case 9 months ago  Continue aspirin and Effient uninterrupted  High intensity statin, goal-directed medical therapy for diffuse native and bypass graft CAD  We will advance her Ranexa and long-acting nitrates for small vessel disease    further management per clinical course    Moises Haddad MD, PhD  .      OTHER:     Assessment & Plan     Principal Problem:    Dyspnea  Assessment:  Respiratory viral illness with pneumonitis  Chest pain  Premature coronary artery disease status post three-vessel CABG following acute MI 2022  Percutaneous coronary intervention SVG-LAD  Primary hypertension  Mixed dyslipidemia  History of GERD    2D echocardiogram ordered  Patient has ruled out for acute coronary syndrome with negative serial cardiac enzymes and no acute ST segment deviation from baseline noted on EKG  Continue aspirin, amlodipine, isosorbide, ACE, BB, Effient, ranolazine, statin  Further recommendations per cardiologist      The patient was seen around 10 AM today.  Chart was reviewed in entirety.  Reviewed and agree with the assessment and plan as documented by nurse practitioner Marisol Welsh.  Have discussed care.  Presented with shortness of breath with acute respiratory viral illness and positive mycoplasma.  Patient had CABG in the past and intervention.  Patient is not having any chest discomfort.  EKG showed no acute changes.  Troponin levels are negative.  Last cardiac cath was 3/1/2023.  Close cardiac monitoring.    Further plan will depend on patient's progress.    Electronically signed by Mariia Carvajal MD, 10/05/24, 2:35 PM EDT.     Mariia Carvajal MD  10/05/24  07:32 EDT

## 2024-10-05 NOTE — H&P
[facility] H&P    Patient Name: Tramaine Gates  : 1985  MRN: [id number]  Primary Care Physician: Daniela Hernandez FNP  Date of admission: 10/4/2024     Patient Care Team:  Daniela Hernandez FNP as PCP - General (Family Medicine)  Ollie Dunn MD as Consulting Physician (Gastroenterology)          Subjective   History Present Illness     Chief Complaint:   Chief Complaint   Patient presents with    Chest Pain     Chest pain    History of Present Illness    ED  39-year-old gentleman who scatted history of heart disease with bypass surgery and multiple stents who states the last couple days he had congestion and cough but no fever and increasing difficulty breathing tightness in his chest with some tingling in his left arm. Worse with exertion and better with rest. Patient denies any fever chills no recent long car ride plane ride immobilization or foreign travels leg pain or swelling. No injury no one at home with similar illness he does have children in the home.    Observation 10/5/24  Pt anxious. Cardiology consulted due to past medical hx and pt request. Abx for pneumonia. Mrsa negative. Blood cx pending      ROS  Constitutional: Negative for fever.   Cardiovascular:  Positive for chest pain.   Respiratory:  Positive for cough and shortness of breath.          Personal History     Past Medical History:   Past Medical History:   Diagnosis Date    Acute inferior myocardial infarction 2022    Allergic     Asthma 2022    CAD, multiple vessel 2022    Disorder of thyroid 2022    Gastroesophageal reflux disease 2022    Hx of complete eye exam 2016    Hyperlipidemia 2022    Hypertension     Migraine headache 2022    Panic attack 2022    Peptic ulcer 2017       Surgical History:      Past Surgical History:   Procedure Laterality Date    CARDIAC CATHETERIZATION N/A 2022    Procedure: Left Heart Cath;  Surgeon: Alverto  Matthieu CARR MD;  Location:  [facility];  Service: Cardiology;  Laterality: N/A;    CARDIAC CATHETERIZATION N/A 06/16/2022    Procedure: Percutaneous Coronary Intervention;  Surgeon: Matthieu Del Toro MD;  Location:  [facility];  Service: Cardiovascular;  Laterality: N/A;    CARDIAC CATHETERIZATION N/A 06/23/2022    Procedure: Left Heart Cath possible PCI, atherectomy, hemodynamic support;  Surgeon: Moises Haddad MD;  Location: [facility] CATH INVASIVE LOCATION;  Service: Cardiology;  Laterality: N/A;    CARDIAC CATHETERIZATION N/A 09/15/2022    Procedure: Left Heart Cath;  Surgeon: Moises Haddad MD;  Location:  [facility];  Service: Cardiology;  Laterality: N/A;    CARDIAC CATHETERIZATION  9/15/2022    CARDIAC CATHETERIZATION N/A 3/2/2023    Procedure: Left Heart Cath;  Surgeon: Moises Haddad MD;  Location: [facility] CATH INVASIVE LOCATION;  Service: Cardiology;  Laterality: N/A;    CHOLECYSTECTOMY  2019    CORONARY ARTERY BYPASS GRAFT N/A 06/29/2022    Procedure: CORONARY ARTERY BYPASS GRAFTING;  Surgeon: Pablo Ball MD;  Location: [facility] CVOR;  Service: Cardiothoracic;  Laterality: N/A;  CABG X 3(2 vein grafts, 1 LIMA graft)    CORONARY ARTERY BYPASS GRAFT  6/29/2022    CORONARY STENT PLACEMENT      MANDIBLE SURGERY             Family History: family history includes Cirrhosis in his maternal grandfather; Heart attack in his maternal grandmother and mother; Heart disease in his mother; Heart failure in his father; Hypertension in his mother. Otherwise pertinent FHx was reviewed and unremarkable.     Social History:  reports that he quit smoking about 2 years ago. His smoking use included cigarettes. He started smoking about 28 years ago. He has a 39.7 pack-year smoking history. He has never used smokeless tobacco. He reports that he does not currently use alcohol. He reports current drug use. Frequency: 7.00 times per week. Drug:  Marijuana.      Medications:  Prior to Admission medications    Medication Sig Start Date End Date Taking? Authorizing Provider   albuterol sulfate  (90 Base) MCG/ACT inhaler Inhale 2 puffs by mouth as directed every four to six hours as needed for coughing, shortness of breath, wheezing 8/4/24      amLODIPine (Norvasc) 5 MG tablet Take 1 tablet by mouth Daily. 7/1/24   Moises Haddad MD   aspirin (Aspirin Low Dose) 81 MG EC tablet Take 1 tablet by mouth Daily. 5/21/24   Moises Haddad MD   aspirin (Aspirin Low Dose) 81 MG EC tablet Take 1 tablet by mouth Daily. 7/11/24   Moises Haddad MD   cilostazol (PLETAL) 100 MG tablet Take 0.5 tablets by mouth 2 (Two) Times a Day. 7/1/24   Pat Ruiz APRN   Evolocumab (Repatha SureClick) solution auto-injector SureClick injection Inject 140 mg total (1 mL) into the skin every 14 (fourteen) days. 6/5/24      isosorbide mononitrate (IMDUR) 120 MG 24 hr tablet Take 1 tablet by mouth Daily. 9/18/24   Moises Haddad MD   levothyroxine (SYNTHROID, LEVOTHROID) 50 MCG tablet TAKE 1 TABLET BY MOUTH EVERY DAY 7/1/24      lisinopril (PRINIVIL,ZESTRIL) 5 MG tablet Take 1 tablet by mouth Daily. 8/5/24   Pat Ruiz APRN   metoprolol succinate XL (TOPROL-XL) 100 MG 24 hr tablet Take 1 tablet by mouth Daily. 9/18/24   Moises Haddad MD   nitroglycerin (NITROSTAT) 0.4 MG SL tablet Place 1 tablet under the tongue Every 5 (Five) Minutes As Needed for Chest Pain (Only if SBP Greater Than 100). Take no more than 3 doses in 15 minutes. 10/3/24   Moises Haddad MD   omeprazole (priLOSEC) 20 MG capsule Take 1 capsule by mouth Daily.    Provider, MD Adolfo   prasugrel (EFFIENT) 10 MG tablet Take 1 tablet by mouth Daily. Take 6 tablets by mouth for the first dose and then take one tablet by mouth daily thereafter. 8/5/24   Pat Ruiz APRN   ranolazine (RANEXA) 1000 MG 12 hr tablet Take 1 tablet by mouth 2  (Two) Times a Day. 9/26/23   Pat Ruiz APRN   rosuvastatin (CRESTOR) 20 MG tablet Take 1 tablet by mouth Daily.    Provider, MD Adolfo   dilTIAZem CD (CARDIZEM CD) 120 MG 24 hr capsule Take 1 capsule by mouth Daily for 30 days. 3/5/23 3/31/23  Susie Tabares DO       Allergies:    Allergies   Allergen Reactions    Shellfish-Derived Products Unknown - High Severity       Objective   Objective     Vital Signs  Temp:  [97.5 °F (36.4 °C)-98.4 °F (36.9 °C)] 98.1 °F (36.7 °C)  Heart Rate:  [61-79] 67  Resp:  [14-21] 14  BP: ()/(55-79) 97/55  SpO2:  [93 %-99 %] 97 %  on  Flow (L/min):  [1-2] 1;   Device (Oxygen Therapy): nasal cannula  Body mass index is 44.29 kg/m².    Physical Exam       Appearance: Normal appearance.   Cardiovascular:      Rate and Rhythm: Normal rate and regular rhythm.   Pulmonary:      Effort: Pulmonary effort is normal.      Breath sounds: Wheezing present.   Neurological:      General: No focal deficit present.      Mental Status: He is alert and oriented to person, place, and time.   Psychiatric:         Mood and Affect: Mood normal.         Behavior: Behavior normal.        Results Review:  I have personally reviewed most recent cardiac tracings, lab results, microbiology results, and radiology images and interpretations and agree with findings, most notably: cbc, cmp, tsh, lactic, troponin, bnp, rpp, blood cx, chest xray, ct chest, ekg.    Results from last 7 days   Lab Units 10/05/24  0039   WBC 10*3/mm3 6.53   HEMOGLOBIN g/dL 12.2*   HEMATOCRIT % 38.2   PLATELETS 10*3/mm3 283     Results from last 7 days   Lab Units 10/05/24  0039 10/04/24  1205 10/04/24  0944   SODIUM mmol/L 136  --  134*   POTASSIUM mmol/L 4.0  --  3.5   CHLORIDE mmol/L 103  --  100   CO2 mmol/L 22.2  --  19.6*   BUN mg/dL 14  --  11   CREATININE mg/dL 1.18  --  1.35*   GLUCOSE mg/dL 89  --  146*   CALCIUM mg/dL 9.0  --  9.4   ALK PHOS U/L  --   --  93   ALT (SGPT) U/L  --   --  24   AST (SGOT) U/L  --    --  20   HSTROP T ng/L  --  8 9   PROBNP pg/mL  --   --  236.0     Estimated Creatinine Clearance: 116.3 mL/min (by C-G formula based on SCr of 1.18 mg/dL).  Brief Urine Lab Results       None            Microbiology Results (last 10 days)       Procedure Component Value - Date/Time    MRSA Screen, PCR (Inpatient) - Swab, Nares [[id number]]  (Normal) Collected: 10/05/24 0853    Lab Status: Final result Specimen: Swab from Nares Updated: 10/05/24 1032     MRSA PCR No MRSA Detected    Narrative:      The negative predictive value of this diagnostic test is high and should only be used to consider de-escalating anti-MRSA therapy. A positive result may indicate colonization with MRSA and must be correlated clinically.    Blood Culture - Blood, Arm, Left [[id number]]  (Normal) Collected: 10/04/24 1205    Lab Status: Preliminary result Specimen: Blood from Arm, Left Updated: 10/05/24 1216     Blood Culture No growth at 24 hours    Narrative:      Less than seven (7) mL's of blood was collected.  Insufficient quantity may yield false negative results.    Blood Culture - Blood, Arm, Right [[id number]]  (Abnormal) Collected: 10/04/24 1205    Lab Status: Preliminary result Specimen: Blood from Arm, Right Updated: 10/05/24 0827     Blood Culture Abnormal Stain     Gram Stain Aerobic Bottle Gram positive cocci in clusters    Narrative:      Less than seven (7) mL's of blood was collected.  Insufficient quantity may yield false negative results.    Blood Culture ID, PCR - Blood, Arm, Right [[id number]]  (Abnormal) Collected: 10/04/24 1205    Lab Status: Final result Specimen: Blood from Arm, Right Updated: 10/05/24 0826     BCID, PCR Staph spp, not aureus or lugdunensis. Identification by BCID2 PCR.     BOTTLE TYPE Aerobic Bottle    Respiratory Panel PCR w/COVID-19(SARS-CoV-2) [facility] In-House, NP Swab in UTM/VTM, 2 HR TAT - Swab, Nasopharynx [[id number]]  (Abnormal) Collected: 10/04/24 0944    Lab Status: Final  result Specimen: Swab from Nasopharynx Updated: 10/04/24 1112     ADENOVIRUS, PCR Not Detected     Coronavirus 229E Not Detected     Coronavirus HKU1 Not Detected     Coronavirus NL63 Not Detected     Coronavirus OC43 Not Detected     COVID19 Not Detected     Human Metapneumovirus Not Detected     Human Rhinovirus/Enterovirus Not Detected     Influenza A PCR Not Detected     Influenza B PCR Not Detected     Parainfluenza Virus 1 Not Detected     Parainfluenza Virus 2 Not Detected     Parainfluenza Virus 3 Not Detected     Parainfluenza Virus 4 Not Detected     RSV, PCR Not Detected     Bordetella pertussis pcr Not Detected     Bordetella parapertussis PCR Not Detected     Chlamydophila pneumoniae PCR Not Detected     Mycoplasma pneumo by PCR Detected    Narrative:      In the setting of a positive respiratory panel with a viral infection PLUS a negative procalcitonin without other underlying concern for bacterial infection, consider observing off antibiotics or discontinuation of antibiotics and continue supportive care. If the respiratory panel is positive for atypical bacterial infection (Bordetella pertussis, Chlamydophila pneumoniae, or Mycoplasma pneumoniae), consider antibiotic de-escalation to target atypical bacterial infection.            ECG/EMG Results (most recent)       Procedure Component Value Units Date/Time    Telemetry Scan [[id number]] Resulted: 10/04/24     Updated: 10/04/24 1742    Telemetry Scan [[id number]] Resulted: 10/04/24     Updated: 10/04/24 1810    Telemetry Scan [[id number]] Resulted: 10/04/24     Updated: 10/04/24 2318    Telemetry Scan [[id number]] Resulted: 10/04/24     Updated: 10/05/24 0114    ECG 12 Lead Dyspnea [[id number]] Collected: 10/04/24 0934     Updated: 10/05/24 0803     QT Interval 366 ms      QTC Interval 445 ms     Narrative:      HEART RATE=89  bpm  RR Bebqzmxt=228  ms  ID Kcvnmmcz=321  ms  P Horizontal Axis=-3  deg  P Front Axis=51  deg  QRSD Interval=82  ms  QT  Iysafvpy=960  ms  RCeU=260  ms  QRS Axis=3  deg  T Wave Axis=56  deg  - BORDERLINE ECG -  Sinus rhythm  Probable  left atrial enlargement  Low voltage, precordial leads  Electronically Signed By: Antwan Garay ([facility]) 2024-10-05 08:03:03  Date and Time of Study:2024-10-04 09:34:46            Results for orders placed during the hospital encounter of 06/22/22    Duplex Vein Mapping Lower Extremity - Bilateral CAR    Interpretation Summary  · The left greater saphenous vein is patent and of adequate size in the thigh.  · The left greater saphenous vein is patent and of adequate size in the calf.      Results for orders placed during the hospital encounter of 12/18/23    Adult Transthoracic Echo Complete W/ Cont if Necessary Per Protocol    Interpretation Summary  Normal left and right ventricular size and systolic function  Normal left ventricular diastolic function  No significant valve disease noted.      CT Chest Without Contrast Diagnostic    Result Date: 10/4/2024  Impression: Findings suggestive of pneumonia/pneumonitis involving the right lower lobe and to a lesser extent the right lower lobe. Electronically Signed: Rui Roberts DO  10/4/2024 7:39 PM EDT  Workstation ID: [id number]    XR Chest 1 View    Result Date: 10/4/2024  Impression: No active disease. Electronically Signed: Kermit Mcwilliams MD  10/4/2024 10:34 AM EDT  Workstation ID: [id number]       Estimated Creatinine Clearance: 116.3 mL/min (by C-G formula based on SCr of 1.18 mg/dL).    Assessment & Plan   Assessment/Plan       Active Hospital Problems    Diagnosis  POA    **Dyspnea [R06.00]  Yes      Resolved Hospital Problems   No resolved problems to display.       Pneumonia  - cbc, bnp, bmp, troponin unremarkable  - chest xray reviewed and showing no acute disease  - ct chest reviewed and showing pneumonia right middle and lower lobe.   - RPP + mycoplasma pneumonia  - mrsa negative  - right arm blood culture + , poor sample, possible contamination,  awaiting results  - azithromycin  - EKG sinus rate 89     Chest pain  - hx of CAD  - home meds ranexa, imdur  - cardiology consulted and recommends echo    Hypertension  -well Controlled   BP Readings from Last 1 Encounters:   10/05/24 97/55     - Continue norvasc, lisinopril, metoprolol  - Monitor while admitted       Hypothyroidism  - cont synthroid  - tsh pending      VTE Prophylaxis - Active VTE Prophylaxis  Mechanical:        Start        10/04/24 1323  Maintain Sequential Compression Device  Continuous                          Select Pharmacologic VTE Prophylaxis if Desired & Appropriate      CODE STATUS:    Code Status and Medical Interventions: CPR (Attempt to Resuscitate); Full Support   Ordered at: 10/04/24 1148     Code Status (Patient has no pulse and is not breathing):    CPR (Attempt to Resuscitate)     Medical Interventions (Patient has pulse or is breathing):    Full Support       This patient has been examined wearing personal protective equipment.     I discussed the patient's findings and my recommendations with patient and nursing staff.      Signature:Electronically signed by Yenni Guy PA-C, 10/05/24, 3:42 PM EDT.

## 2024-10-05 NOTE — PLAN OF CARE
Goal Outcome Evaluation:   A/O x4 2L NC while sleeping. C/O pain to RLL and to left chest into left shoulder informed PA and (NP for cardiology was at bedside also noted) new order for x1 dose pain medication given. DIM throughout with cough per patient productive. Adlib. Continues on IV ABT therapy. + BLD CX noted and informed to PA with new orders for MRSA nare swab                                            Pre auth for the botox a good contact number would be [phone]. Ask for Kai

## 2024-10-05 NOTE — PLAN OF CARE
Problem: Adult Inpatient Plan of Care  Goal: Plan of Care Review  10/5/2024 0437 by Loan Godinez, RN  Outcome: Progressing  Flowsheets (Taken 10/5/2024 0430)  Progress: improving  Plan of Care Reviewed With: patient  Outcome Evaluation: Patient having C/O chest pain this shift and having non-productive cough. Torodol effective. Patient requesting cardiology consult. Patient remains in droplet precautions and is resting in bed at this time breathes are even and not labored. Call light is in reach and po fluids are at bedside.  10/5/2024 0428 by Loan Godinez, RN  Outcome: Progressing  Flowsheets (Taken 10/5/2024 0426)  Progress: improving  Plan of Care Reviewed With: patient  Outcome Evaluation: Patient having C/O of chest pain this shift. Patient having non-productive cough.  10/5/2024 0416 by Loan Godinez, RN  Outcome: Progressing  Flowsheets (Taken 10/5/2024 0413)  Progress: no change  Plan of Care Reviewed With: patient  Outcome Evaluation: Patient having C/O of chest pain this shift. Medications effective. Patient having non-productive cough.  Goal: Patient-Specific Goal (Individualized)  10/5/2024 0437 by Loan Godinez, RN  Outcome: Progressing  10/5/2024 0428 by Loan Godinez RN  Outcome: Progressing  10/5/2024 0416 by Loan Godinez, RN  Outcome: Progressing  Goal: Absence of Hospital-Acquired Illness or Injury  10/5/2024 0437 by Loan Godinez, RN  Outcome: Progressing  10/5/2024 0428 by Loan Godinez, RN  Outcome: Progressing  10/5/2024 0416 by Loan Godinez RN  Outcome: Progressing  Intervention: Identify and Manage Fall Risk  Recent Flowsheet Documentation  Taken 10/5/2024 0300 by Loan Godinez, RN  Safety Promotion/Fall Prevention: safety round/check completed  Taken 10/5/2024 0200 by Loan Godinez, RN  Safety Promotion/Fall Prevention:   safety round/check completed   nonskid shoes/slippers when out of bed   clutter free environment maintained  Taken 10/5/2024 0017 by Loan Godinez,  RN  Safety Promotion/Fall Prevention:   safety round/check completed   nonskid shoes/slippers when out of bed   clutter free environment maintained   assistive device/personal items within reach  Taken 10/4/2024 2305 by Loan Godinez RN  Safety Promotion/Fall Prevention: safety round/check completed  Taken 10/4/2024 2211 by Loan Godinez RN  Safety Promotion/Fall Prevention:   safety round/check completed   room organization consistent   assistive device/personal items within reach   clutter free environment maintained  Taken 10/4/2024 2105 by Loan Godinez RN  Safety Promotion/Fall Prevention: safety round/check completed  Taken 10/4/2024 2000 by Loan Godinez RN  Safety Promotion/Fall Prevention:   safety round/check completed   room organization consistent   nonskid shoes/slippers when out of bed   fall prevention program maintained   clutter free environment maintained   assistive device/personal items within reach  Taken 10/4/2024 1905 by Loan Godinez RN  Safety Promotion/Fall Prevention: safety round/check completed  Intervention: Prevent Skin Injury  Recent Flowsheet Documentation  Taken 10/4/2024 2000 by Loan Godinez RN  Body Position: position changed independently  Skin Protection: adhesive use limited  Intervention: Prevent and Manage VTE (Venous Thromboembolism) Risk  Recent Flowsheet Documentation  Taken 10/4/2024 2000 by Loan Godinez RN  Activity Management: ambulated in room  VTE Prevention/Management: sequential compression devices off  Intervention: Prevent Infection  Recent Flowsheet Documentation  Taken 10/5/2024 0300 by Loan Godinez, RN  Infection Prevention:   hand hygiene promoted   personal protective equipment utilized   rest/sleep promoted   single patient room provided  Taken 10/5/2024 0200 by Loan Godinez, RN  Infection Prevention:   hand hygiene promoted   rest/sleep promoted   single patient room provided   personal protective equipment utilized  Taken 10/5/2024 0017  by Loan Godinez RN  Infection Prevention:   hand hygiene promoted   personal protective equipment utilized   rest/sleep promoted   single patient room provided  Taken 10/4/2024 2305 by Loan Godinez RN  Infection Prevention:   hand hygiene promoted   personal protective equipment utilized   rest/sleep promoted   single patient room provided  Taken 10/4/2024 2211 by Loan Godinez RN  Infection Prevention:   hand hygiene promoted   personal protective equipment utilized   rest/sleep promoted   single patient room provided  Taken 10/4/2024 2105 by Loan Godinez RN  Infection Prevention: hand hygiene promoted  Taken 10/4/2024 2000 by Lona Godinez RN  Infection Prevention:   hand hygiene promoted   single patient room provided   rest/sleep promoted   personal protective equipment utilized  Taken 10/4/2024 1905 by Loan Godinez RN  Infection Prevention:   hand hygiene promoted   personal protective equipment utilized   single patient room provided   rest/sleep promoted  Goal: Optimal Comfort and Wellbeing  10/5/2024 0437 by Loan Godinez RN  Outcome: Progressing  10/5/2024 0428 by Loan Godinez RN  Outcome: Progressing  10/5/2024 0416 by Loan Godinez RN  Outcome: Progressing  Intervention: Monitor Pain and Promote Comfort  Recent Flowsheet Documentation  Taken 10/5/2024 0017 by Loan Godinez RN  Pain Management Interventions:   care clustered   relaxation techniques promoted  Taken 10/4/2024 2211 by Loan Godinez RN  Pain Management Interventions: see MAR  Taken 10/4/2024 2132 by Loan Godinez RN  Pain Management Interventions: see MAR  Taken 10/4/2024 2000 by Loan Godinez RN  Pain Management Interventions: care clustered  Goal: Readiness for Transition of Care  10/5/2024 0437 by Loan Godinez RN  Outcome: Progressing  10/5/2024 0428 by Loan Godinez RN  Outcome: Progressing  10/5/2024 0416 by Loan Godinez RN  Outcome: Progressing     Problem: Chest Pain  Goal: Resolution of Chest Pain  Symptoms  10/5/2024 0437 by Loan Godinez, RN  Outcome: Progressing  10/5/2024 0428 by Loan Godinez, RN  Outcome: Progressing  10/5/2024 0416 by Loan Godinez, RN  Outcome: Progressing   Goal Outcome Evaluation:  Plan of Care Reviewed With: patient        Progress: improving  Outcome Evaluation: Patient having C/O chest pain this shift and having non-productive cough. Torodol effective. Patient requesting cardiology consult. Patient remains in droplet precautions and is resting in bed at this time breathes are even and not labored. Call light is in reach and po fluids are at bedside.

## 2024-10-06 ENCOUNTER — APPOINTMENT (OUTPATIENT)
Dept: CARDIOLOGY | Facility: HOSPITAL | Age: 39
End: 2024-10-06
Payer: MEDICAID

## 2024-10-06 LAB
ANION GAP SERPL CALCULATED.3IONS-SCNC: 9.8 MMOL/L (ref 5–15)
AORTIC DIMENSIONLESS INDEX: 0.91 (DI)
BACTERIA SPEC AEROBE CULT: ABNORMAL
BASOPHILS # BLD AUTO: 0.03 10*3/MM3 (ref 0–0.2)
BASOPHILS NFR BLD AUTO: 0.5 % (ref 0–1.5)
BH CV ECHO MEAS - AO MAX PG: 6.5 MMHG
BH CV ECHO MEAS - AO MEAN PG: 3 MMHG
BH CV ECHO MEAS - AO V2 MAX: 127 CM/SEC
BH CV ECHO MEAS - AO V2 VTI: 27.1 CM
BH CV ECHO MEAS - AVA(I,D): 3.8 CM2
BH CV ECHO MEAS - EDV(CUBED): 140.6 ML
BH CV ECHO MEAS - EDV(MOD-SP4): 103 ML
BH CV ECHO MEAS - EF(MOD-SP4): 64.2 %
BH CV ECHO MEAS - ESV(CUBED): 32.8 ML
BH CV ECHO MEAS - ESV(MOD-SP4): 36.9 ML
BH CV ECHO MEAS - FS: 38.5 %
BH CV ECHO MEAS - IVS/LVPW: 0.8 CM
BH CV ECHO MEAS - IVSD: 0.8 CM
BH CV ECHO MEAS - LA DIMENSION: 3.9 CM
BH CV ECHO MEAS - LAT PEAK E' VEL: 14.5 CM/SEC
BH CV ECHO MEAS - LV MASS(C)D: 169 GRAMS
BH CV ECHO MEAS - LV MAX PG: 5.3 MMHG
BH CV ECHO MEAS - LV MEAN PG: 2 MMHG
BH CV ECHO MEAS - LV V1 MAX: 115 CM/SEC
BH CV ECHO MEAS - LV V1 VTI: 22.5 CM
BH CV ECHO MEAS - LVIDD: 5.2 CM
BH CV ECHO MEAS - LVIDS: 3.2 CM
BH CV ECHO MEAS - LVOT AREA: 4.5 CM2
BH CV ECHO MEAS - LVOT DIAM: 2.4 CM
BH CV ECHO MEAS - LVPWD: 1 CM
BH CV ECHO MEAS - MED PEAK E' VEL: 11.4 CM/SEC
BH CV ECHO MEAS - MV A MAX VEL: 92.8 CM/SEC
BH CV ECHO MEAS - MV DEC SLOPE: 575.5 CM/SEC2
BH CV ECHO MEAS - MV DEC TIME: 0.25 SEC
BH CV ECHO MEAS - MV E MAX VEL: 106 CM/SEC
BH CV ECHO MEAS - MV E/A: 1.14
BH CV ECHO MEAS - MV MAX PG: 7.7 MMHG
BH CV ECHO MEAS - MV MEAN PG: 2 MMHG
BH CV ECHO MEAS - MV P1/2T: 75.8 MSEC
BH CV ECHO MEAS - MV V2 VTI: 36.9 CM
BH CV ECHO MEAS - MVA(P1/2T): 2.9 CM2
BH CV ECHO MEAS - MVA(VTI): 2.8 CM2
BH CV ECHO MEAS - PA V2 MAX: 98.5 CM/SEC
BH CV ECHO MEAS - PI END-D VEL: 89.8 CM/SEC
BH CV ECHO MEAS - QP/QS: 0.42
BH CV ECHO MEAS - RV MAX PG: 2.2 MMHG
BH CV ECHO MEAS - RV V1 MAX: 74.2 CM/SEC
BH CV ECHO MEAS - RV V1 VTI: 18.8 CM
BH CV ECHO MEAS - RVDD: 2 CM
BH CV ECHO MEAS - RVOT DIAM: 1.7 CM
BH CV ECHO MEAS - SV(LVOT): 101.8 ML
BH CV ECHO MEAS - SV(MOD-SP4): 66.1 ML
BH CV ECHO MEAS - SV(RVOT): 42.7 ML
BH CV ECHO MEAS - TAPSE (>1.6): 2.9 CM
BH CV ECHO MEASUREMENTS AVERAGE E/E' RATIO: 8.19
BUN SERPL-MCNC: 15 MG/DL (ref 6–20)
BUN/CREAT SERPL: 13.3 (ref 7–25)
CALCIUM SPEC-SCNC: 8.5 MG/DL (ref 8.6–10.5)
CHLORIDE SERPL-SCNC: 105 MMOL/L (ref 98–107)
CO2 SERPL-SCNC: 22.2 MMOL/L (ref 22–29)
CREAT SERPL-MCNC: 1.13 MG/DL (ref 0.76–1.27)
DEPRECATED RDW RBC AUTO: 41.3 FL (ref 37–54)
EGFRCR SERPLBLD CKD-EPI 2021: 83.7 ML/MIN/1.73
EOSINOPHIL # BLD AUTO: 0.08 10*3/MM3 (ref 0–0.4)
EOSINOPHIL NFR BLD AUTO: 1.4 % (ref 0.3–6.2)
ERYTHROCYTE [DISTWIDTH] IN BLOOD BY AUTOMATED COUNT: 12.9 % (ref 12.3–15.4)
GLUCOSE SERPL-MCNC: 91 MG/DL (ref 65–99)
GRAM STN SPEC: ABNORMAL
HCT VFR BLD AUTO: 35.9 % (ref 37.5–51)
HGB BLD-MCNC: 11.6 G/DL (ref 13–17.7)
IMM GRANULOCYTES # BLD AUTO: 0.07 10*3/MM3 (ref 0–0.05)
IMM GRANULOCYTES NFR BLD AUTO: 1.3 % (ref 0–0.5)
ISOLATED FROM: ABNORMAL
LYMPHOCYTES # BLD AUTO: 1.52 10*3/MM3 (ref 0.7–3.1)
LYMPHOCYTES NFR BLD AUTO: 27.4 % (ref 19.6–45.3)
MCH RBC QN AUTO: 28.5 PG (ref 26.6–33)
MCHC RBC AUTO-ENTMCNC: 32.3 G/DL (ref 31.5–35.7)
MCV RBC AUTO: 88.2 FL (ref 79–97)
MONOCYTES # BLD AUTO: 0.33 10*3/MM3 (ref 0.1–0.9)
MONOCYTES NFR BLD AUTO: 5.9 % (ref 5–12)
NEUTROPHILS NFR BLD AUTO: 3.52 10*3/MM3 (ref 1.7–7)
NEUTROPHILS NFR BLD AUTO: 63.5 % (ref 42.7–76)
NRBC BLD AUTO-RTO: 0 /100 WBC (ref 0–0.2)
PLATELET # BLD AUTO: 272 10*3/MM3 (ref 140–450)
PMV BLD AUTO: 9.2 FL (ref 6–12)
POTASSIUM SERPL-SCNC: 4 MMOL/L (ref 3.5–5.2)
RBC # BLD AUTO: 4.07 10*6/MM3 (ref 4.14–5.8)
SINUS: 3.2 CM
SODIUM SERPL-SCNC: 137 MMOL/L (ref 136–145)
WBC NRBC COR # BLD AUTO: 5.55 10*3/MM3 (ref 3.4–10.8)

## 2024-10-06 PROCEDURE — 80048 BASIC METABOLIC PNL TOTAL CA: CPT | Performed by: PHYSICIAN ASSISTANT

## 2024-10-06 PROCEDURE — 93306 TTE W/DOPPLER COMPLETE: CPT

## 2024-10-06 PROCEDURE — 94799 UNLISTED PULMONARY SVC/PX: CPT

## 2024-10-06 PROCEDURE — 94664 DEMO&/EVAL PT USE INHALER: CPT

## 2024-10-06 PROCEDURE — 85025 COMPLETE CBC W/AUTO DIFF WBC: CPT | Performed by: PHYSICIAN ASSISTANT

## 2024-10-06 PROCEDURE — 25010000002 KETOROLAC TROMETHAMINE PER 15 MG

## 2024-10-06 PROCEDURE — 99232 SBSQ HOSP IP/OBS MODERATE 35: CPT | Performed by: INTERNAL MEDICINE

## 2024-10-06 PROCEDURE — 93306 TTE W/DOPPLER COMPLETE: CPT | Performed by: INTERNAL MEDICINE

## 2024-10-06 PROCEDURE — 25810000003 SODIUM CHLORIDE 0.9 % SOLUTION: Performed by: INTERNAL MEDICINE

## 2024-10-06 PROCEDURE — 94761 N-INVAS EAR/PLS OXIMETRY MLT: CPT

## 2024-10-06 PROCEDURE — 25010000002 ONDANSETRON PER 1 MG: Performed by: PHYSICIAN ASSISTANT

## 2024-10-06 RX ORDER — SODIUM CHLORIDE 9 MG/ML
75 INJECTION, SOLUTION INTRAVENOUS CONTINUOUS
Status: DISPENSED | OUTPATIENT
Start: 2024-10-06 | End: 2024-10-07

## 2024-10-06 RX ORDER — AZITHROMYCIN 250 MG/1
250 TABLET, FILM COATED ORAL
Status: COMPLETED | OUTPATIENT
Start: 2024-10-06 | End: 2024-10-08

## 2024-10-06 RX ADMIN — ALBUTEROL SULFATE 2 PUFF: 108 AEROSOL, METERED RESPIRATORY (INHALATION) at 15:37

## 2024-10-06 RX ADMIN — Medication 10 ML: at 12:25

## 2024-10-06 RX ADMIN — PANTOPRAZOLE SODIUM 40 MG: 40 TABLET, DELAYED RELEASE ORAL at 05:40

## 2024-10-06 RX ADMIN — ALBUTEROL SULFATE 2 PUFF: 108 AEROSOL, METERED RESPIRATORY (INHALATION) at 18:35

## 2024-10-06 RX ADMIN — METOPROLOL SUCCINATE 100 MG: 50 TABLET, EXTENDED RELEASE ORAL at 09:03

## 2024-10-06 RX ADMIN — Medication 10 ML: at 16:12

## 2024-10-06 RX ADMIN — LEVOTHYROXINE SODIUM 50 MCG: 0.05 TABLET ORAL at 05:40

## 2024-10-06 RX ADMIN — ALBUTEROL SULFATE 2 PUFF: 108 AEROSOL, METERED RESPIRATORY (INHALATION) at 07:56

## 2024-10-06 RX ADMIN — ALBUTEROL SULFATE 2 PUFF: 108 AEROSOL, METERED RESPIRATORY (INHALATION) at 11:18

## 2024-10-06 RX ADMIN — KETOROLAC TROMETHAMINE 15 MG: 30 INJECTION, SOLUTION INTRAMUSCULAR at 16:12

## 2024-10-06 RX ADMIN — NITROGLYCERIN 0.4 MG: 0.4 TABLET SUBLINGUAL at 04:06

## 2024-10-06 RX ADMIN — LISINOPRIL 5 MG: 5 TABLET ORAL at 09:03

## 2024-10-06 RX ADMIN — KETOROLAC TROMETHAMINE 15 MG: 30 INJECTION, SOLUTION INTRAMUSCULAR at 09:26

## 2024-10-06 RX ADMIN — Medication 10 ML: at 09:26

## 2024-10-06 RX ADMIN — CILOSTAZOL 50 MG: 100 TABLET ORAL at 09:02

## 2024-10-06 RX ADMIN — TRAMADOL HYDROCHLORIDE 50 MG: 50 TABLET ORAL at 05:40

## 2024-10-06 RX ADMIN — ONDANSETRON 4 MG: 2 INJECTION, SOLUTION INTRAMUSCULAR; INTRAVENOUS at 04:07

## 2024-10-06 RX ADMIN — CILOSTAZOL 50 MG: 100 TABLET ORAL at 21:13

## 2024-10-06 RX ADMIN — AZITHROMYCIN DIHYDRATE 250 MG: 250 TABLET ORAL at 12:24

## 2024-10-06 RX ADMIN — KETOROLAC TROMETHAMINE 15 MG: 30 INJECTION, SOLUTION INTRAMUSCULAR at 22:57

## 2024-10-06 RX ADMIN — Medication 10 ML: at 21:10

## 2024-10-06 RX ADMIN — ASPIRIN 81 MG: 81 TABLET, COATED ORAL at 09:02

## 2024-10-06 RX ADMIN — PRASUGREL 10 MG: 10 TABLET, FILM COATED ORAL at 09:03

## 2024-10-06 RX ADMIN — ISOSORBIDE MONONITRATE 120 MG: 60 TABLET, EXTENDED RELEASE ORAL at 09:03

## 2024-10-06 RX ADMIN — ROSUVASTATIN 20 MG: 10 TABLET, FILM COATED ORAL at 09:03

## 2024-10-06 RX ADMIN — RANOLAZINE 1000 MG: 500 TABLET, FILM COATED, EXTENDED RELEASE ORAL at 21:12

## 2024-10-06 RX ADMIN — Medication 10 ML: at 09:03

## 2024-10-06 RX ADMIN — SODIUM CHLORIDE 75 ML/HR: 9 INJECTION, SOLUTION INTRAVENOUS at 12:24

## 2024-10-06 RX ADMIN — KETOROLAC TROMETHAMINE 15 MG: 30 INJECTION, SOLUTION INTRAMUSCULAR at 01:19

## 2024-10-06 RX ADMIN — RANOLAZINE 1000 MG: 500 TABLET, FILM COATED, EXTENDED RELEASE ORAL at 09:03

## 2024-10-06 NOTE — PROGRESS NOTES
[facility] MEDICINE SERVICE  DAILY PROGRESS NOTE    NAME: Tramaine Gates  : 1985  MRN: [id number]      LOS: 1 day     PROVIDER OF SERVICE: Long Padilla MD    Chief Complaint: Dyspnea    Subjective:     Interval History: Patient still complaining of constant chest pain over top the left side of his chest.  He denies it being worse with deep inspiration or cough.        Review of Systems:   Review of Systems    Objective:     Vital Signs  Temp:  [98 °F (36.7 °C)-98.2 °F (36.8 °C)] 98.1 °F (36.7 °C)  Heart Rate:  [57-92] 67  Resp:  [12-26] 16  BP: ()/(47-79) 102/63  Flow (L/min):  [1-2] 1   Body mass index is 44.19 kg/m².    Physical Exam  Physical Exam  General Appearance:  Alert, cooperative, no distress, appears stated age  Head:  Normocephalic, without obvious abnormality, atraumatic  Eyes:  PERRL, conjunctiva/corneas clear, EOM's intact, fundi benign, both eyes  Ears:  Normal TM's and external ear canals, both ears  Nose: Nares normal, septum midline, mucosa normal, no drainage or sinus tenderness  Throat: Lips, mucosa, and tongue normal; teeth and gums normal  Neck: Supple, symmetrical, trachea midline, no adenopathy, thyroid: not enlarged, symmetric, no tenderness/mass/nodules, no carotid bruit or JVD  Lungs:   Mild right-sided rhonchi, respirations unlabored  Heart:  Regular rate and rhythm, S1, S2 normal, no murmur, rub or gallop  Abdomen:  Soft, non-tender, bowel sounds active all four quadrants,  no masses, no organomegaly  Extremities: Extremities normal, atraumatic, no cyanosis or edema  Pulses: 2+ and symmetric  Skin: Skin color, texture, turgor normal, no rashes or lesions  Neurologic: Normal         Diagnostic Data    Results from last 7 days   Lab Units 10/06/24  0127 10/05/24  0039 10/04/24  0944   WBC 10*3/mm3 5.55   < > 8.28   HEMOGLOBIN g/dL 11.6*   < > 13.3   HEMATOCRIT % 35.9*   < > 40.6   PLATELETS 10*3/mm3 272   < > 299   GLUCOSE mg/dL 91   < > 146*   CREATININE mg/dL  1.13   < > 1.35*   BUN mg/dL 15   < > 11   SODIUM mmol/L 137   < > 134*   POTASSIUM mmol/L 4.0   < > 3.5   AST (SGOT) U/L  --   --  20   ALT (SGPT) U/L  --   --  24   ALK PHOS U/L  --   --  93   BILIRUBIN mg/dL  --   --  0.3   ANION GAP mmol/L 9.8   < > 14.4    < > = values in this interval not displayed.       CT Chest Without Contrast Diagnostic    Result Date: 10/4/2024  Impression: Findings suggestive of pneumonia/pneumonitis involving the right lower lobe and to a lesser extent the right lower lobe. Electronically Signed: Rui Roberts DO  10/4/2024 7:39 PM EDT  Workstation ID: [id number]       I reviewed the patient's new clinical results.    Assessment/Plan:     Active and Resolved Problems  Active Hospital Problems    Diagnosis  POA    **Dyspnea [R06.00]  Yes      Resolved Hospital Problems   No resolved problems to display.       Mycoplasma pneumonia  -RVP positive for mycoplasma on admission  -Continue azithromycin to complete 5-day course of treatment  -Continue pulmonary toileting with I-S, flutter valve   -CT of the chest confirms right middle and lower lobe pneumonia    Chest pain  -Patient has previous history of CAD with stent placement  -Continue home medications with Ranexa, Imdur  -Check echo   -Troponin negative  Would continue DAPT, statin therapy  -Defer to cardiology for any further ischemic workup    Hypertension  -Continue Toprol-XL, lisinopril but holding amlodipine due to borderline low blood pressure    Hypothyroidism  -Continue Synthroid    VTE Prophylaxis:  Mechanical VTE prophylaxis orders are present.             Disposition Planning:     Barriers to Discharge: Further cardiac workup  Anticipated Date of Discharge: 10/8  Place of Discharge: Home      Time: 45 minutes     Code Status and Medical Interventions: CPR (Attempt to Resuscitate); Full Support   Ordered at: 10/04/24 1148     Code Status (Patient has no pulse and is not breathing):    CPR (Attempt to Resuscitate)     Medical  Interventions (Patient has pulse or is breathing):    Full Support       Signature: Electronically signed by Long Padilla MD, 10/06/24, 11:23 EDT.  [facility]

## 2024-10-06 NOTE — PLAN OF CARE
Goal Outcome Evaluation:      A/O x4 RA while awake and 1L NC while sleeping. On IVF's and plan is for  to see patient tomorrow and decide if wants to do  heart cath as per patient request

## 2024-10-06 NOTE — PROGRESS NOTES
Referring Provider: Long Padilla MD    Reason for follow-up:  Chest discomfort  Status post stent     Patient Care Team:  Daniela Hernandez FNP as PCP - General (Family Medicine)  Ollie Dunn MD as Consulting Physician (Gastroenterology)    Subjective .      ROS  Mild chest discomfort.    Today, the patient has been without any shortness of breath, palpitations, dizziness or syncope.  Denies having any headache ,abdominal pain ,nausea, vomiting , diarrhea constipation, loss of weight or loss of appetite.  Denies having any excessive bruising ,hematuria or blood in the stool.    Review of all systems negative except as indicated    History  Past Medical History:   Diagnosis Date    Acute inferior myocardial infarction 06/14/2022    Allergic     Asthma 01/27/2022    CAD, multiple vessel 06/14/2022    Disorder of thyroid 01/27/2022    Gastroesophageal reflux disease 01/27/2022    Hx of complete eye exam 2016    Hyperlipidemia 01/27/2022    Hypertension     Migraine headache 01/27/2022    Panic attack 01/27/2022    Peptic ulcer 09/14/2017       Past Surgical History:   Procedure Laterality Date    CARDIAC CATHETERIZATION N/A 06/14/2022    Procedure: Left Heart Cath;  Surgeon: Matthieu Del Toro MD;  Location: [facility] CATH INVASIVE LOCATION;  Service: Cardiology;  Laterality: N/A;    CARDIAC CATHETERIZATION N/A 06/16/2022    Procedure: Percutaneous Coronary Intervention;  Surgeon: Matthieu Del Toro MD;  Location: [facility] CATH INVASIVE LOCATION;  Service: Cardiovascular;  Laterality: N/A;    CARDIAC CATHETERIZATION N/A 06/23/2022    Procedure: Left Heart Cath possible PCI, atherectomy, hemodynamic support;  Surgeon: Moises Haddad MD;  Location: [facility] CATH INVASIVE LOCATION;  Service: Cardiology;  Laterality: N/A;    CARDIAC CATHETERIZATION N/A 09/15/2022    Procedure: Left Heart Cath;  Surgeon: Moises Haddad MD;  Location: [facility] CATH INVASIVE LOCATION;  Service: Cardiology;  Laterality:  N/A;    CARDIAC CATHETERIZATION  9/15/2022    CARDIAC CATHETERIZATION N/A 3/2/2023    Procedure: Left Heart Cath;  Surgeon: Moises Haddad MD;  Location: [facility] CATH INVASIVE LOCATION;  Service: Cardiology;  Laterality: N/A;    CHOLECYSTECTOMY  2019    CORONARY ARTERY BYPASS GRAFT N/A 2022    Procedure: CORONARY ARTERY BYPASS GRAFTING;  Surgeon: Pablo Ball MD;  Location: [facility] CVOR;  Service: Cardiothoracic;  Laterality: N/A;  CABG X 3(2 vein grafts, 1 LIMA graft)    CORONARY ARTERY BYPASS GRAFT  2022    CORONARY STENT PLACEMENT      MANDIBLE SURGERY         Family History   Problem Relation Age of Onset    Heart disease Mother     Heart attack Mother     Hypertension Mother     Heart failure Father     Cirrhosis Maternal Grandfather     Heart attack Maternal Grandmother         a       Social History     Tobacco Use    Smoking status: Former     Current packs/day: 0.00     Average packs/day: 1.5 packs/day for 26.5 years (39.7 ttl pk-yrs)     Types: Cigarettes     Start date: 1996     Quit date: 2022     Years since quittin.2    Smokeless tobacco: Never   Vaping Use    Vaping status: Never Used   Substance Use Topics    Alcohol use: Not Currently    Drug use: Yes     Frequency: 7.0 times per week     Types: Marijuana        Medications Prior to Admission   Medication Sig Dispense Refill Last Dose    albuterol sulfate  (90 Base) MCG/ACT inhaler Inhale 2 puffs by mouth as directed every four to six hours as needed for coughing, shortness of breath, wheezing 8.5 g 0     amLODIPine (Norvasc) 5 MG tablet Take 1 tablet by mouth Daily. 90 tablet 0     aspirin (Aspirin Low Dose) 81 MG EC tablet Take 1 tablet by mouth Daily. 90 tablet 0     aspirin (Aspirin Low Dose) 81 MG EC tablet Take 1 tablet by mouth Daily. 90 tablet 0     cilostazol (PLETAL) 100 MG tablet Take 0.5 tablets by mouth 2 (Two) Times a Day. 90 tablet 3     Evolocumab (Repatha SureClick) solution  auto-injector SureClick injection Inject 140 mg total (1 mL) into the skin every 14 (fourteen) days. 2 mL 5     isosorbide mononitrate (IMDUR) 120 MG 24 hr tablet Take 1 tablet by mouth Daily. 30 tablet 0     levothyroxine (SYNTHROID, LEVOTHROID) 50 MCG tablet TAKE 1 TABLET BY MOUTH EVERY DAY 90 tablet 0     lisinopril (PRINIVIL,ZESTRIL) 5 MG tablet Take 1 tablet by mouth Daily. 90 tablet 3     metoprolol succinate XL (TOPROL-XL) 100 MG 24 hr tablet Take 1 tablet by mouth Daily. 30 tablet 0     nitroglycerin (NITROSTAT) 0.4 MG SL tablet Place 1 tablet under the tongue Every 5 (Five) Minutes As Needed for Chest Pain (Only if SBP Greater Than 100). Take no more than 3 doses in 15 minutes. 25 tablet 0     omeprazole (priLOSEC) 20 MG capsule Take 1 capsule by mouth Daily.       prasugrel (EFFIENT) 10 MG tablet Take 1 tablet by mouth Daily. Take 6 tablets by mouth for the first dose and then take one tablet by mouth daily thereafter. 90 tablet 3     ranolazine (RANEXA) 1000 MG 12 hr tablet Take 1 tablet by mouth 2 (Two) Times a Day. 180 tablet 3     rosuvastatin (CRESTOR) 20 MG tablet Take 1 tablet by mouth Daily.          Allergies  Shellfish-derived products    Scheduled Meds:albuterol sulfate HFA, 2 puff, Inhalation, 4x Daily - RT  [Held by provider] amLODIPine, 5 mg, Oral, Daily  aspirin, 81 mg, Oral, Daily  azithromycin, 500 mg, Intravenous, Q24H  cilostazol, 50 mg, Oral, BID  isosorbide mononitrate, 120 mg, Oral, Q24H  levothyroxine, 50 mcg, Oral, Q AM  lisinopril, 5 mg, Oral, Q24H  metoprolol succinate XL, 100 mg, Oral, Q24H  pantoprazole, 40 mg, Oral, Q AM  prasugrel, 10 mg, Oral, Daily  ranolazine, 1,000 mg, Oral, BID  rosuvastatin, 20 mg, Oral, Daily  sodium chloride, 10 mL, Intravenous, Q12H      Continuous Infusions:   PRN Meds:.  aluminum-magnesium hydroxide-simethicone    senna-docusate sodium **AND** polyethylene glycol **AND** bisacodyl **AND** bisacodyl    diphenhydrAMINE    ketorolac    ondansetron ODT  "**OR** ondansetron    [COMPLETED] Insert Peripheral IV **AND** sodium chloride    sodium chloride    sodium chloride    traMADol    Objective     VITAL SIGNS  Vitals:    10/06/24 0411 10/06/24 0412 10/06/24 0413 10/06/24 0723   BP:    111/79   BP Location:    Right arm   Patient Position:    Lying   Pulse: 71 67  70   Resp:   20 14   Temp:   98.2 °F (36.8 °C) 98.1 °F (36.7 °C)   TempSrc:   Oral Oral   SpO2: 97% 96%  97%   Weight:       Height:           Flowsheet Rows      Flowsheet Row First Filed Value   Admission Height 177.8 cm (70\") Documented at 10/04/2024 0924   Admission Weight 140 kg (308 lb 10.3 oz) Documented at 10/04/2024 0924              Intake/Output Summary (Last 24 hours) at 10/6/2024 0751  Last data filed at 10/5/2024 2000  Gross per 24 hour   Intake 480 ml   Output 1000 ml   Net -520 ml        TELEMETRY: Sinus rhythm    Physical Exam:  The patient is alert, oriented and in no distress.  Vital signs as noted above.  Head and neck revealed no carotid bruits or jugular venous distention.  No thyromegaly or lymphadenopathy is present  Lungs clear.  No wheezing.  Breath sounds are normal bilaterally.  Heart normal first and second heart sounds.  No murmur. No precordial rub is present.  No gallop is present.  Abdomen soft and nontender.  No organomegaly is present.  Extremities with good peripheral pulses without any pedal edema.  Skin warm and dry.  Musculoskeletal system is grossly normal  CNS grossly normal    Reviewed and updated.  Results Review:   I reviewed the patient's new clinical results.  Lab Results (last 24 hours)       Procedure Component Value Units Date/Time    Blood Culture - Blood, Arm, Right [[id number]]  (Abnormal) Collected: 10/04/24 1205    Specimen: Blood from Arm, Right Updated: 10/06/24 0631     Blood Culture Staphylococcus, coagulase negative     Isolated from Aerobic Bottle     Gram Stain Aerobic Bottle Gram positive cocci in clusters    Narrative:      Probable contaminant " requires clinical correlation, susceptibility not performed unless requested by physician.    Less than seven (7) mL's of blood was collected.  Insufficient quantity may yield false negative results.    Basic Metabolic Panel [[id number]]  (Abnormal) Collected: 10/06/24 0127    Specimen: Blood from Arm, Left Updated: 10/06/24 0231     Glucose 91 mg/dL      BUN 15 mg/dL      Creatinine 1.13 mg/dL      Sodium 137 mmol/L      Potassium 4.0 mmol/L      Comment: Specimen hemolyzed.  Result may be falsely elevated.        Chloride 105 mmol/L      CO2 22.2 mmol/L      Calcium 8.5 mg/dL      BUN/Creatinine Ratio 13.3     Anion Gap 9.8 mmol/L      eGFR 83.7 mL/min/1.73     Narrative:      GFR Normal >60  Chronic Kidney Disease <60  Kidney Failure <15      CBC & Differential [[id number]]  (Abnormal) Collected: 10/06/24 0127    Specimen: Blood from Arm, Left Updated: 10/06/24 0201    Narrative:      The following orders were created for panel order CBC & Differential.  Procedure                               Abnormality         Status                     ---------                               -----------         ------                     CBC Auto Differential[[id number]]        Abnormal            Final result                 Please view results for these tests on the individual orders.    CBC Auto Differential [[id number]]  (Abnormal) Collected: 10/06/24 0127    Specimen: Blood from Arm, Left Updated: 10/06/24 0201     WBC 5.55 10*3/mm3      RBC 4.07 10*6/mm3      Hemoglobin 11.6 g/dL      Hematocrit 35.9 %      MCV 88.2 fL      MCH 28.5 pg      MCHC 32.3 g/dL      RDW 12.9 %      RDW-SD 41.3 fl      MPV 9.2 fL      Platelets 272 10*3/mm3      Neutrophil % 63.5 %      Lymphocyte % 27.4 %      Monocyte % 5.9 %      Eosinophil % 1.4 %      Basophil % 0.5 %      Immature Grans % 1.3 %      Neutrophils, Absolute 3.52 10*3/mm3      Lymphocytes, Absolute 1.52 10*3/mm3      Monocytes, Absolute 0.33 10*3/mm3      Eosinophils, Absolute  0.08 10*3/mm3      Basophils, Absolute 0.03 10*3/mm3      Immature Grans, Absolute 0.07 10*3/mm3      nRBC 0.0 /100 WBC     High Sensitivity Troponin T [[id number]]  (Normal) Collected: 10/05/24 1648    Specimen: Blood from Arm, Left Updated: 10/05/24 1739     HS Troponin T 8 ng/L     Narrative:      High Sensitive Troponin T Reference Range:  <14.0 ng/L- Negative Female for AMI  <22.0 ng/L- Negative Male for AMI  >=14 - Abnormal Female indicating possible myocardial injury.  >=22 - Abnormal Male indicating possible myocardial injury.   Clinicians would have to utilize clinical acumen, EKG, Troponin, and serial changes to determine if it is an Acute Myocardial Infarction or myocardial injury due to an underlying chronic condition.         TSH Rfx On Abnormal To Free T4 [[id number]]  (Normal) Collected: 10/05/24 1648    Specimen: Blood from Arm, Left Updated: 10/05/24 1739     TSH 2.180 uIU/mL     Lactic Acid, Plasma [[id number]]  (Normal) Collected: 10/05/24 1648    Specimen: Blood from Arm, Left Updated: 10/05/24 1730     Lactate 1.3 mmol/L     Blood Culture - Blood, Arm, Left [[id number]]  (Normal) Collected: 10/04/24 1205    Specimen: Blood from Arm, Left Updated: 10/05/24 1216     Blood Culture No growth at 24 hours    Narrative:      Less than seven (7) mL's of blood was collected.  Insufficient quantity may yield false negative results.    MRSA Screen, PCR (Inpatient) - Swab, Nares [[id number]]  (Normal) Collected: 10/05/24 0853    Specimen: Swab from Nares Updated: 10/05/24 1032     MRSA PCR No MRSA Detected    Narrative:      The negative predictive value of this diagnostic test is high and should only be used to consider de-escalating anti-MRSA therapy. A positive result may indicate colonization with MRSA and must be correlated clinically.    Blood Culture ID, PCR - Blood, Arm, Right [[id number]]  (Abnormal) Collected: 10/04/24 1205    Specimen: Blood from Arm, Right Updated: 10/05/24 0826     BCID, PCR Staph  spp, not aureus or lugdunensis. Identification by BCID2 PCR.     BOTTLE TYPE Aerobic Bottle            Imaging Results (Last 24 Hours)       ** No results found for the last 24 hours. **        LAB RESULTS (LAST 7 DAYS)    CBC  Results from last 7 days   Lab Units 10/06/24  0127 10/05/24  0039 10/04/24  0944   WBC 10*3/mm3 5.55 6.53 8.28   RBC 10*6/mm3 4.07* 4.32 4.61   HEMOGLOBIN g/dL 11.6* 12.2* 13.3   HEMATOCRIT % 35.9* 38.2 40.6   MCV fL 88.2 88.4 88.1   PLATELETS 10*3/mm3 272 283 299       BMP  Results from last 7 days   Lab Units 10/06/24  0127 10/05/24  0039 10/04/24  0944   SODIUM mmol/L 137 136 134*   POTASSIUM mmol/L 4.0 4.0 3.5   CHLORIDE mmol/L 105 103 100   CO2 mmol/L 22.2 22.2 19.6*   BUN mg/dL 15 14 11   CREATININE mg/dL 1.13 1.18 1.35*   GLUCOSE mg/dL 91 89 146*       CMP   Results from last 7 days   Lab Units 10/06/24  0127 10/05/24  0039 10/04/24  0944   SODIUM mmol/L 137 136 134*   POTASSIUM mmol/L 4.0 4.0 3.5   CHLORIDE mmol/L 105 103 100   CO2 mmol/L 22.2 22.2 19.6*   BUN mg/dL 15 14 11   CREATININE mg/dL 1.13 1.18 1.35*   GLUCOSE mg/dL 91 89 146*   ALBUMIN g/dL  --   --  4.2   BILIRUBIN mg/dL  --   --  0.3   ALK PHOS U/L  --   --  93   AST (SGOT) U/L  --   --  20   ALT (SGPT) U/L  --   --  24         BNP        TROPONIN  Results from last 7 days   Lab Units 10/05/24  1648   HSTROP T ng/L 8       CoAg        Creatinine Clearance  Estimated Creatinine Clearance: 121.4 mL/min (by C-G formula based on SCr of 1.13 mg/dL).    ABG        Radiology  CT Chest Without Contrast Diagnostic    Result Date: 10/4/2024  Impression: Findings suggestive of pneumonia/pneumonitis involving the right lower lobe and to a lesser extent the right lower lobe. Electronically Signed: Rui Roberts DO  10/4/2024 7:39 PM EDT  Workstation ID: [id number]    XR Chest 1 View    Result Date: 10/4/2024  Impression: No active disease. Electronically Signed: Kermit Mcwilliams MD  10/4/2024 10:34 AM EDT  Workstation ID: [id number]          EKG                I personally viewed and interpreted the patient's EKG/Telemetry data: Normal sinus rhythm nonspecific ST-T wave changes    ECHOCARDIOGRAM:    Results for orders placed during the hospital encounter of 12/18/23    Adult Transthoracic Echo Complete W/ Cont if Necessary Per Protocol    Interpretation Summary  Normal left and right ventricular size and systolic function  Normal left ventricular diastolic function  No significant valve disease noted.          STRESS TEST  Results for orders placed during the hospital encounter of 12/18/23    Stress Test With Myocardial Perfusion One Day    Interpretation Summary  1. Negative Regadenoson Electrocardiography: There is no ECG evidence of myocardial ischemia.  2.  Adequate blood-pressure response to regadenoson.  3. No regadenoson-induced arrhythmias  4. Normal SPECT Myocardial Perfusion Imaging: There is no scintigraphic evidence of myocardial ischemia or scarring.  5. Overall left ventricular function is preserved, with a normal ejection fraction and no regional asynergy.        Cardiolite (Tc-99m sestamibi) stress test    CARDIAC CATHETERIZATION  Results for orders placed during the hospital encounter of 03/01/23    Cardiac Catheterization/Vascular Study    Conclusion   Moises Haddad MD, PhD  [facility]  Date of service 3-2-2023    Procedure  1.  Left heart catheterization coronary angiography left ventriculography In GALLO position, imaging of native and bypass grafts  2.,  Balloon angioplasty 4.0 x 20 NC balloon of the distal portion of the SVG to RCA inside the stented segment secondary to what appears to be greater than 70% in-stent restenosis distally versus subocclusive thrombus, ostial proximal greater than 50% restenosis, reduced to less than 10% distally, remains about 20% proximally at the ostium, improved angiographic runoff which was DARRYL II preoperatively, DARRYL-3 post  procedurally    Indication  Unstable angina, unable for intervention to the native RCA    After informed consent patient brought to the Cath Lab sterilely prepped and draped in usual fashion with exposure of the right groin for right common femoral arterial access via micropuncture modified Seldinger technique with placement of a 6 Serbian sheath.  035 guidewires advanced aortic valve followed by diagnostic JL 4 and JR4 catheters for selective left and right coronary angiography, JR4 was used to image the LIMA to LAD, multipurpose catheter was used to image SVG to RCA, SVG to diagonal was noted to be closed.  There appeared to be haziness at the ostial proximal portion of the stent in the SVG as well as what appeared to be slow flow distal to a bend in the distal portion of the graft also with haziness and what appeared to be minimum 60% stenosis possibly up to 80 and some angiographic views.  Decision was made given inability to revascularize native RCA with diffuse small vessel disease and small caliber vessels to intervene with balloon angioplasty in the stented segment of the SVG graft.  Patient was heparinized with 100 units/kg of heparin, he was on a background therapy of aspirin and Effient at baseline, ACT was greater than 250, multipurpose guide was used engage the vein graft followed by run-through wire to the distal portion of the graft, 4.0 x 20 NC balloon was used to dilate the proximal portion of the graft up to 16 radha for a minute and a half with prolonged inflation given significant stenosis at the ostial portion of the graft, there was great angiographic improvement of this portion of the graft with better stent expansion, angiography further revealed downstream stenosis in the distal portion of the graft, the balloon was then readvanced to the distal portion followed by 2 inflations up to 14 radha distal proximal fashion at the culprit portion reducing stenosis to 0% residual and improving distal  "runoff into again small vessel PDA and retrogradely into the RCA.  Final angiography revealed no distal wire trauma no edge dissection, DARRYL-3 flow in the graft as well as into the RPDA which did have some degree of competitive flow from the native RCA although small vessel and diffusely diseased.  All catheters\" removed.  6 Maltese Angio-Seal was used to close the arteriotomy with immediate hemostasis and Meints of distal pulses.  He left Cath Lab chest pain-free hemodynamically electrically stable alert talkative staff neurologic grossly intact bilaterally    Complications none  Blood loss less than 10 cc  Contrast 160 cc  Sedation time of 1 hour    Findings  1.  Opening aortic pressure of  Closing pressure  LVEDP elevated 20-25  LV function normal 60%  Normal transaortic valve gradient on pullback    Angiography  1.  Left main normal takeoff, 10% luminal regularities leading into LAD that is subtotally occluded in the midportion, circumflex widely patent  2.  LAD diffusely diseased 80 to 90% the proximal portion, widely patent LIMA to LAD in the midportion, distal portion of the LAD has diffuse luminal regularities but is very small caliber less than 2 mm in diameter, anastomosis is widely patent for the LIMA  Over 3 LIMA to LAD widely patent, no disease  3.  SVG to diagonal branch is closed  4.  SVG to RCA has ostial 60 to 70% but hazy in appearance, distally also had 70 to 80% hazy lesion suspicious for ISR versus subacute thrombus which was greatly improved by balloon angioplasty, runoff both retrograde into the PDA and anterograde with very small vessel anastomosis likely best for medical therapy  5.  RCA natively diffusely diseased with patent stents in the proximal midportion, the intervening portion between proximal and distal stents is much less than 1 mm in diameter, marginal branches open and otherwise distal portion of vessel not amenable to intervention secondary to severe small caliber and very poor " long-term patency  6.  Circumflex widely patent, obtuse marginal branch has diffuse small vessel disease tandem 80% lesions and tortuous not amenable to intervention secondary to small caliber size poor distal runoff and very poor long-term patency, thus medical treatment at this point, unchanged angiographically from prior case 8 to 9 months ago at the time of SVG revascularization with overlapping stents    Conclusions recommendations  1.  Unstable angina  Culprit appeared to be SVG to RCA distal lesion 70 to 80% hazy possibly atherothrombotic as well as proximal ostial ISR.  Other vascular areas with LIMA to LAD, native LAD, circumflex and native RCA.  Unchanged angiographically compared to prior case 9 months ago  Continue aspirin and Effient uninterrupted  High intensity statin, goal-directed medical therapy for diffuse native and bypass graft CAD  We will advance her Ranexa and long-acting nitrates for small vessel disease    further management per clinical course    Moises Haddad MD, PhD  .                OTHER:         Assessment & Plan     Principal Problem:    Dyspnea     39-year-old male with known history of severe premature coronary artery disease with acute inferior wall MI status post three-vessel CABG in 2022 as well as PCI to the SVG-RCA.  Additional past medical history of dyslipidemia, hypertension, GERD, thyroid disorder, panic attacks.       Respiratory viral illness with pneumonitis  Chest pain  Premature coronary artery disease status post three-vessel CABG following acute MI 2022  Percutaneous coronary intervention SVG-LAD  Primary hypertension  Mixed dyslipidemia  History of GERD     Patient has ruled out for acute coronary syndrome with negative serial cardiac enzymes and no acute ST segment deviation from baseline noted on EKG  Continue aspirin, amlodipine, isosorbide, ACE, BB, Effient, ranolazine, statin    Positive blood cultures with coagulase-negative staph.    Echocardiogram  10/6/2024 revealed   Structurally and functionally normal cardiac valves.  Left ventricular size and contractility is normal with ejection fraction of 60%.  Normal left ventricular diastolic function.  Right ventricle is normal in size and contractility with TAPSE of 2.87 cm..    Presented with shortness of breath with acute respiratory viral illness and positive mycoplasma.  Patient had CABG in the past and intervention.  Patient is not having any chest discomfort.  EKG showed no acute changes.  Troponin levels are negative.  Last cardiac cath was 3/1/2023.    Echocardiogram showed no pericardial effusion.  Normal left ventricular function.    Patient is having mild chest discomfort  EKG showed no acute changes.  Troponin levels continues to be normal.  Patient has borderline blood pressure.  IV fluids  Patient has normal left ventricular function.    Continue antibiotic therapy.  Close cardiac monitoring.    Medications were reviewed and updated.  Patient is on aspirin Pletal Imdur levothyroxine lisinopril metoprolol  mg a day pantoprazole Effient 10 mg daily Ranexa 1000 mg twice daily Crestor 20 mg p.o. nightly.  Dr. Haddad primary cardiologist will see the patient tomorrow and decide about need for repeat cardiac catheterization.    Have discussed with attending physician for coordination of care.     Further plan will depend on patient's progress.  ]]]]]]]]]]]]]]]]]]]]]        Mariia Carvajal MD  10/06/24  07:51 EDT

## 2024-10-06 NOTE — PLAN OF CARE
Goal Outcome Evaluation:   A/O x4 RA while awake and 1L NC while sleeping. Patient sleeping b/t care and continues to c/o chest pains -md aware PRN noted effective. Awaiting echo results

## 2024-10-06 NOTE — PLAN OF CARE
Problem: Adult Inpatient Plan of Care  Goal: Plan of Care Review  Outcome: Progressing  Flowsheets (Taken 10/6/2024 0441)  Progress: improving  Plan of Care Reviewed With: patient  Goal: Patient-Specific Goal (Individualized)  Outcome: Progressing  Goal: Absence of Hospital-Acquired Illness or Injury  Outcome: Progressing  Intervention: Identify and Manage Fall Risk  Recent Flowsheet Documentation  Taken 10/6/2024 0200 by Abi Santana RN  Safety Promotion/Fall Prevention:   activity supervised   clutter free environment maintained   fall prevention program maintained   lighting adjusted   nonskid shoes/slippers when out of bed   room organization consistent   safety round/check completed  Taken 10/6/2024 0000 by Abi Santana RN  Safety Promotion/Fall Prevention:   activity supervised   clutter free environment maintained   fall prevention program maintained   lighting adjusted   nonskid shoes/slippers when out of bed   room organization consistent   safety round/check completed  Taken 10/5/2024 2200 by Abi Santana RN  Safety Promotion/Fall Prevention:   activity supervised   clutter free environment maintained   fall prevention program maintained   lighting adjusted   nonskid shoes/slippers when out of bed   safety round/check completed  Taken 10/5/2024 2000 by Abi Santana RN  Safety Promotion/Fall Prevention:   activity supervised   clutter free environment maintained   fall prevention program maintained  Intervention: Prevent Skin Injury  Recent Flowsheet Documentation  Taken 10/6/2024 0200 by Abi Santana RN  Body Position: position changed independently  Taken 10/6/2024 0000 by Abi Santana RN  Body Position: position changed independently  Skin Protection: adhesive use limited  Taken 10/5/2024 2200 by Abi Santana RN  Body Position: position changed independently  Taken 10/5/2024 2000 by Abi Santana RN  Body Position: position changed independently  Skin Protection:  adhesive use limited  Intervention: Prevent and Manage VTE (Venous Thromboembolism) Risk  Recent Flowsheet Documentation  Taken 10/6/2024 0200 by Abi Santana RN  Activity Management: up ad harley  Taken 10/6/2024 0000 by Abi Santana RN  Activity Management: up ad harley  Range of Motion: active ROM (range of motion) encouraged  Taken 10/5/2024 2200 by Abi Santana RN  Activity Management: up ad harley  Taken 10/5/2024 2000 by Abi Santana RN  Activity Management: up ad harley  Range of Motion: active ROM (range of motion) encouraged  Intervention: Prevent Infection  Recent Flowsheet Documentation  Taken 10/6/2024 0200 by Abi Santana RN  Infection Prevention:   rest/sleep promoted   single patient room provided  Taken 10/6/2024 0000 by Abi Santana RN  Infection Prevention:   rest/sleep promoted   single patient room provided  Taken 10/5/2024 2200 by Abi Santana RN  Infection Prevention:   rest/sleep promoted   single patient room provided  Taken 10/5/2024 2000 by Abi Santana RN  Infection Prevention:   rest/sleep promoted   single patient room provided  Goal: Optimal Comfort and Wellbeing  Outcome: Progressing  Intervention: Monitor Pain and Promote Comfort  Recent Flowsheet Documentation  Taken 10/6/2024 0149 by Abi Santana RN  Pain Management Interventions: see MAR  Taken 10/6/2024 0014 by Abi Santana RN  Pain Management Interventions: see MAR  Taken 10/6/2024 0000 by Abi Santana RN  Pain Management Interventions: see MAR  Taken 10/5/2024 2344 by Abi Santana RN  Pain Management Interventions: see MAR  Intervention: Provide Person-Centered Care  Recent Flowsheet Documentation  Taken 10/5/2024 2000 by Abi Santana RN  Trust Relationship/Rapport:   care explained   questions answered   questions encouraged  Goal: Readiness for Transition of Care  Outcome: Progressing     Problem: Chest Pain  Goal: Resolution of Chest Pain Symptoms  Outcome:  Progressing  Intervention: Manage Acute Chest Pain  Recent Flowsheet Documentation  Taken 10/5/2024 2000 by Abi Santana RN  Chest Pain Intervention: cardiac monitoring continued   Goal Outcome Evaluation:  Plan of Care Reviewed With: patient        Progress: improving

## 2024-10-07 ENCOUNTER — TELEPHONE (OUTPATIENT)
Dept: CARDIOLOGY | Facility: CLINIC | Age: 39
End: 2024-10-07
Payer: MEDICAID

## 2024-10-07 LAB
ANION GAP SERPL CALCULATED.3IONS-SCNC: 7.4 MMOL/L (ref 5–15)
BASOPHILS # BLD AUTO: 0.03 10*3/MM3 (ref 0–0.2)
BASOPHILS NFR BLD AUTO: 0.5 % (ref 0–1.5)
BUN SERPL-MCNC: 8 MG/DL (ref 6–20)
BUN/CREAT SERPL: 7.5 (ref 7–25)
CALCIUM SPEC-SCNC: 8.6 MG/DL (ref 8.6–10.5)
CHLORIDE SERPL-SCNC: 107 MMOL/L (ref 98–107)
CO2 SERPL-SCNC: 24.6 MMOL/L (ref 22–29)
CREAT SERPL-MCNC: 1.06 MG/DL (ref 0.76–1.27)
DEPRECATED RDW RBC AUTO: 41.7 FL (ref 37–54)
EGFRCR SERPLBLD CKD-EPI 2021: 90.4 ML/MIN/1.73
EOSINOPHIL # BLD AUTO: 0.07 10*3/MM3 (ref 0–0.4)
EOSINOPHIL NFR BLD AUTO: 1.1 % (ref 0.3–6.2)
ERYTHROCYTE [DISTWIDTH] IN BLOOD BY AUTOMATED COUNT: 13 % (ref 12.3–15.4)
GLUCOSE SERPL-MCNC: 86 MG/DL (ref 65–99)
HCT VFR BLD AUTO: 36.4 % (ref 37.5–51)
HGB BLD-MCNC: 11.8 G/DL (ref 13–17.7)
IMM GRANULOCYTES # BLD AUTO: 0.04 10*3/MM3 (ref 0–0.05)
IMM GRANULOCYTES NFR BLD AUTO: 0.6 % (ref 0–0.5)
LYMPHOCYTES # BLD AUTO: 1.64 10*3/MM3 (ref 0.7–3.1)
LYMPHOCYTES NFR BLD AUTO: 26.3 % (ref 19.6–45.3)
MAGNESIUM SERPL-MCNC: 2 MG/DL (ref 1.6–2.6)
MCH RBC QN AUTO: 28.6 PG (ref 26.6–33)
MCHC RBC AUTO-ENTMCNC: 32.4 G/DL (ref 31.5–35.7)
MCV RBC AUTO: 88.1 FL (ref 79–97)
MONOCYTES # BLD AUTO: 0.27 10*3/MM3 (ref 0.1–0.9)
MONOCYTES NFR BLD AUTO: 4.3 % (ref 5–12)
NEUTROPHILS NFR BLD AUTO: 4.19 10*3/MM3 (ref 1.7–7)
NEUTROPHILS NFR BLD AUTO: 67.2 % (ref 42.7–76)
NRBC BLD AUTO-RTO: 0 /100 WBC (ref 0–0.2)
PLATELET # BLD AUTO: 277 10*3/MM3 (ref 140–450)
PMV BLD AUTO: 9 FL (ref 6–12)
POTASSIUM SERPL-SCNC: 4 MMOL/L (ref 3.5–5.2)
RBC # BLD AUTO: 4.13 10*6/MM3 (ref 4.14–5.8)
SODIUM SERPL-SCNC: 139 MMOL/L (ref 136–145)
TROPONIN T SERPL HS-MCNC: 8 NG/L
WBC NRBC COR # BLD AUTO: 6.24 10*3/MM3 (ref 3.4–10.8)

## 2024-10-07 PROCEDURE — 25010000002 MORPHINE PER 10 MG: Performed by: INTERNAL MEDICINE

## 2024-10-07 PROCEDURE — 93010 ELECTROCARDIOGRAM REPORT: CPT | Performed by: INTERNAL MEDICINE

## 2024-10-07 PROCEDURE — 63710000001 DIPHENHYDRAMINE PER 50 MG: Performed by: PHYSICIAN ASSISTANT

## 2024-10-07 PROCEDURE — 99232 SBSQ HOSP IP/OBS MODERATE 35: CPT | Performed by: INTERNAL MEDICINE

## 2024-10-07 PROCEDURE — 85025 COMPLETE CBC W/AUTO DIFF WBC: CPT | Performed by: PHYSICIAN ASSISTANT

## 2024-10-07 PROCEDURE — 25010000002 LORAZEPAM PER 2 MG: Performed by: NURSE PRACTITIONER

## 2024-10-07 PROCEDURE — 84484 ASSAY OF TROPONIN QUANT: CPT | Performed by: INTERNAL MEDICINE

## 2024-10-07 PROCEDURE — 83735 ASSAY OF MAGNESIUM: CPT

## 2024-10-07 PROCEDURE — 94761 N-INVAS EAR/PLS OXIMETRY MLT: CPT

## 2024-10-07 PROCEDURE — 94664 DEMO&/EVAL PT USE INHALER: CPT

## 2024-10-07 PROCEDURE — 80048 BASIC METABOLIC PNL TOTAL CA: CPT | Performed by: PHYSICIAN ASSISTANT

## 2024-10-07 PROCEDURE — 94799 UNLISTED PULMONARY SVC/PX: CPT

## 2024-10-07 PROCEDURE — 25010000002 KETOROLAC TROMETHAMINE PER 15 MG

## 2024-10-07 PROCEDURE — 93005 ELECTROCARDIOGRAM TRACING: CPT | Performed by: INTERNAL MEDICINE

## 2024-10-07 RX ORDER — LORAZEPAM 2 MG/ML
0.5 INJECTION INTRAMUSCULAR EVERY 8 HOURS PRN
Status: DISCONTINUED | OUTPATIENT
Start: 2024-10-07 | End: 2024-10-08 | Stop reason: HOSPADM

## 2024-10-07 RX ORDER — MORPHINE SULFATE 2 MG/ML
2 INJECTION, SOLUTION INTRAMUSCULAR; INTRAVENOUS EVERY 4 HOURS PRN
Status: DISCONTINUED | OUTPATIENT
Start: 2024-10-07 | End: 2024-10-08 | Stop reason: HOSPADM

## 2024-10-07 RX ORDER — PRASUGREL 10 MG/1
10 TABLET, FILM COATED ORAL DAILY
COMMUNITY

## 2024-10-07 RX ORDER — NITROGLYCERIN 0.4 MG/1
0.4 TABLET SUBLINGUAL
Status: DISCONTINUED | OUTPATIENT
Start: 2024-10-07 | End: 2024-10-08 | Stop reason: HOSPADM

## 2024-10-07 RX ADMIN — ISOSORBIDE MONONITRATE 120 MG: 60 TABLET, EXTENDED RELEASE ORAL at 08:34

## 2024-10-07 RX ADMIN — PRASUGREL 10 MG: 10 TABLET, FILM COATED ORAL at 08:34

## 2024-10-07 RX ADMIN — NITROGLYCERIN 0.4 MG: 0.4 TABLET SUBLINGUAL at 22:20

## 2024-10-07 RX ADMIN — ALBUTEROL SULFATE 2 PUFF: 108 AEROSOL, METERED RESPIRATORY (INHALATION) at 18:35

## 2024-10-07 RX ADMIN — LEVOTHYROXINE SODIUM 50 MCG: 0.05 TABLET ORAL at 06:08

## 2024-10-07 RX ADMIN — NITROGLYCERIN 0.4 MG: 0.4 TABLET SUBLINGUAL at 21:02

## 2024-10-07 RX ADMIN — MORPHINE SULFATE 2 MG: 2 INJECTION, SOLUTION INTRAMUSCULAR; INTRAVENOUS at 22:48

## 2024-10-07 RX ADMIN — DIPHENHYDRAMINE HYDROCHLORIDE 25 MG: 25 CAPSULE ORAL at 22:13

## 2024-10-07 RX ADMIN — TRAMADOL HYDROCHLORIDE 50 MG: 50 TABLET ORAL at 08:34

## 2024-10-07 RX ADMIN — NITROGLYCERIN 0.4 MG: 0.4 TABLET SUBLINGUAL at 22:12

## 2024-10-07 RX ADMIN — KETOROLAC TROMETHAMINE 15 MG: 30 INJECTION, SOLUTION INTRAMUSCULAR at 10:00

## 2024-10-07 RX ADMIN — ALBUTEROL SULFATE 2 PUFF: 108 AEROSOL, METERED RESPIRATORY (INHALATION) at 15:38

## 2024-10-07 RX ADMIN — ROSUVASTATIN 20 MG: 10 TABLET, FILM COATED ORAL at 08:34

## 2024-10-07 RX ADMIN — RANOLAZINE 1000 MG: 500 TABLET, FILM COATED, EXTENDED RELEASE ORAL at 20:58

## 2024-10-07 RX ADMIN — CILOSTAZOL 50 MG: 100 TABLET ORAL at 20:58

## 2024-10-07 RX ADMIN — RANOLAZINE 1000 MG: 500 TABLET, FILM COATED, EXTENDED RELEASE ORAL at 08:33

## 2024-10-07 RX ADMIN — Medication 10 ML: at 08:39

## 2024-10-07 RX ADMIN — LISINOPRIL 5 MG: 5 TABLET ORAL at 08:34

## 2024-10-07 RX ADMIN — CILOSTAZOL 50 MG: 100 TABLET ORAL at 08:34

## 2024-10-07 RX ADMIN — METOPROLOL SUCCINATE 100 MG: 50 TABLET, EXTENDED RELEASE ORAL at 08:33

## 2024-10-07 RX ADMIN — LORAZEPAM 0.5 MG: 2 INJECTION INTRAMUSCULAR; INTRAVENOUS at 23:30

## 2024-10-07 RX ADMIN — NITROGLYCERIN 0.4 MG: 0.4 TABLET SUBLINGUAL at 22:06

## 2024-10-07 RX ADMIN — AZITHROMYCIN DIHYDRATE 250 MG: 250 TABLET ORAL at 08:34

## 2024-10-07 RX ADMIN — Medication 10 ML: at 21:03

## 2024-10-07 RX ADMIN — ASPIRIN 81 MG: 81 TABLET, COATED ORAL at 08:34

## 2024-10-07 RX ADMIN — TRAMADOL HYDROCHLORIDE 50 MG: 50 TABLET ORAL at 21:02

## 2024-10-07 RX ADMIN — KETOROLAC TROMETHAMINE 15 MG: 30 INJECTION, SOLUTION INTRAMUSCULAR at 17:03

## 2024-10-07 RX ADMIN — ALBUTEROL SULFATE 2 PUFF: 108 AEROSOL, METERED RESPIRATORY (INHALATION) at 07:23

## 2024-10-07 RX ADMIN — PANTOPRAZOLE SODIUM 40 MG: 40 TABLET, DELAYED RELEASE ORAL at 06:08

## 2024-10-07 RX ADMIN — ALBUTEROL SULFATE 2 PUFF: 108 AEROSOL, METERED RESPIRATORY (INHALATION) at 11:51

## 2024-10-07 NOTE — CASE MANAGEMENT/SOCIAL WORK
Continued Stay Note  [facility]     Patient Name: Tramaine Gates  MRN: [id number]  Today's Date: 10/7/2024    Admit Date: 10/4/2024    Plan: Home with family   Discharge Plan       Row Name 10/07/24 7362       Plan    Plan Comments Barriers: Isolation for Mycoplasma Pneumonia.  Cardiology following. Mr. Gates will return home at discharge                      Daya DE LA CRUZ,RN Case Manager  [facility]  Phone: Desk- [phone] cell- [phone]

## 2024-10-07 NOTE — TELEPHONE ENCOUNTER
Caller: Tramaine Gates EMILIANA     Best call back number: [phone]     What is your medical concern? PT CALLING TO RSD APPT - PT STATES HE IS CURRENTLY IN THE HOSPITAL AS HE STARTED PASSING OUT ALONG WITH HIS CHEST PAIN HE HAD BEEN HAVING FOR A MONTH - WHEN PT WAS ADMITTED, THEY FOUND HE HAD BACTERIAL PNEUMONIA - PT HAS BEEN CONSULTED BY DR SERENITY BORREGO - HE HAS HAD AN ECHO AND TYPICAL CARDIAC TESTING, EXPECTING A STRESS TEST - HIS APPT WAS TOMORROW THE 8TH BUT HE WAS TOLD HE WAS GETTING DISCHARGED TOMORROW AND HE RSD TO THE 9TH @9AM - PT IS CURRENTLY STAYING IN [place] WITH FAMILY AS HE GETS BETTER AND IS ASKING IF POSSIBLE, TO BE SEEN  LATER IN DAY - PT STATES HE IS TRYING TO GET CHECKED THROUGH CARDIOLOGY, US SPECIFICALLY, AS THE HOSPITAL IS IGNORING THE CHEST PAIN AND FOCUSING ON HIS PNEUMONIA DIAGNOSIS - PT WANTED TO LET OFFICE KNOW OF HIS HOSPITAL STAY AND ASKS TO PLEASE ADVISE IF POSSIBLE TO SCHEDULE LATER IN DAY

## 2024-10-07 NOTE — PROGRESS NOTES
[facility] MEDICINE SERVICE  DAILY PROGRESS NOTE    NAME: Tramaine Gates  : 1985  MRN: [id number]      LOS: 2 days     PROVIDER OF SERVICE: El Ruelas MD    Chief Complaint: Dyspnea    Subjective:     39-year-old gentleman who scatted history of heart disease with bypass surgery and multiple stents who states the last couple days he had congestion and cough but no fever and increasing difficulty breathing tightness in his chest with some tingling in his left arm. Worse with exertion and better with rest. Patient denies any fever chills no recent long car ride plane ride immobilization or foreign travels leg pain or swelling. No injury no one at home with similar illness he does have children in the home.     Observation 10/5/24  Pt anxious. Cardiology consulted due to past medical hx and pt request. Abx for pneumonia. Mrsa negative. Blood cx pending     Interval History:   10/6/24-Patient still complaining of constant chest pain over top the left side of his chest.  He denies it being worse with deep inspiration or cough.  10/7/24 seen in bed NAD, c/o cough and chest pain    Review of Systems  Constitutional: Negative for fever.   Cardiovascular:  Positive for chest pain.   Respiratory:  Positive for cough and shortness of breath.    Objective:     Vital Signs  Temp:  [97.8 °F (36.6 °C)-98.2 °F (36.8 °C)] 97.9 °F (36.6 °C)  Heart Rate:  [61-85] 64  Resp:  [12-20] 12  BP: (104-135)/(65-98) 104/71  Flow (L/min):  [1] 1   Body mass index is 44.19 kg/m².    Physical Exam  General Appearance:  Alert, cooperative, no distress, appears stated age  Head:  Normocephalic, without obvious abnormality, atraumatic  Eyes:  PERRL, conjunctiva/corneas clear, EOM's intact, fundi benign, both eyes  Ears:  Normal TM's and external ear canals, both ears  Nose: Nares normal, septum midline, mucosa normal, no drainage or sinus tenderness  Throat: Lips, mucosa, and tongue normal; teeth and gums  normal  Neck: Supple, symmetrical, trachea midline, no adenopathy, thyroid: not enlarged, symmetric, no tenderness/mass/nodules, no carotid bruit or JVD  Lungs:   Mild right-sided rhonchi, respirations unlabored  Heart:  Regular rate and rhythm, S1, S2 normal, no murmur, rub or gallop  Abdomen:  Soft, non-tender, bowel sounds active all four quadrants,  no masses, no organomegaly  Extremities: Extremities normal, atraumatic, no cyanosis or edema  Pulses: 2+ and symmetric  Skin: Skin color, texture, turgor normal, no rashes or lesions  Neurologic: Normal         Diagnostic Data    Results from last 7 days   Lab Units 10/07/24  0202 10/05/24  0039 10/04/24  0944   WBC 10*3/mm3 6.24   < > 8.28   HEMOGLOBIN g/dL 11.8*   < > 13.3   HEMATOCRIT % 36.4*   < > 40.6   PLATELETS 10*3/mm3 277   < > 299   GLUCOSE mg/dL 86   < > 146*   CREATININE mg/dL 1.06   < > 1.35*   BUN mg/dL 8   < > 11   SODIUM mmol/L 139   < > 134*   POTASSIUM mmol/L 4.0   < > 3.5   AST (SGOT) U/L  --   --  20   ALT (SGPT) U/L  --   --  24   ALK PHOS U/L  --   --  93   BILIRUBIN mg/dL  --   --  0.3   ANION GAP mmol/L 7.4   < > 14.4    < > = values in this interval not displayed.       No radiology results for the last day      I reviewed the patient's new clinical results.    Assessment/Plan:     Active and Resolved Problems  Active Hospital Problems    Diagnosis  POA    **Dyspnea [R06.00]  Yes      Resolved Hospital Problems   No resolved problems to display.       Mycoplasma pneumonia  -RVP positive for mycoplasma on admission  -Continue azithromycin to complete 5-day course of treatment  -Continue pulmonary toileting with I-S, flutter valve   -CT of the chest confirms right middle and lower lobe pneumonia    Chest pain  -Patient has previous history of CAD with stent placement  -Continue home medications with Ranexa, Imdur  -Check echo   -Troponin negative  Would continue DAPT, statin therapy  -Defer to cardiology for any further ischemic  workup    Hypertension  -Continue Toprol-XL, lisinopril but holding amlodipine due to borderline low blood pressure    Hypothyroidism  -Continue Synthroid    VTE Prophylaxis:  Mechanical VTE prophylaxis orders are present.    Disposition Planning:   Barriers to Discharge: Further cardiac workup  Anticipated Date of Discharge: 10/8  Place of Discharge: Home    Time: 45 minutes     Code Status and Medical Interventions: CPR (Attempt to Resuscitate); Full Support   Ordered at: 10/04/24 1148     Code Status (Patient has no pulse and is not breathing):    CPR (Attempt to Resuscitate)     Medical Interventions (Patient has pulse or is breathing):    Full Support       Signature: Electronically signed by El Ruelas MD, 10/07/24, 12:20 EDT.  [facility], [place], operated by [facility] Hospitalist Team

## 2024-10-07 NOTE — PLAN OF CARE
Problem: Chest Pain  Goal: Resolution of Chest Pain Symptoms  Outcome: Not Progressing  Intervention: Manage Acute Chest Pain  Recent Flowsheet Documentation  Taken 10/6/2024 2000 by Shona Petty, RN  Chest Pain Intervention: cardiac monitoring continued   Goal Outcome Evaluation:      Pt medicated ofr left sided chest pain to see cardiology in am

## 2024-10-07 NOTE — PROGRESS NOTES
Referring Provider: El Ruelas MD    Reason for follow-up:  Chest discomfort  Status post stent     Patient Care Team:  Daniela Hernandez FNP as PCP - General (Family Medicine)  Ollie Dunn MD as Consulting Physician (Gastroenterology)    Subjective .      ROS      Today, the patient has been without any shortness of breath, palpitations, dizziness or syncope.  Denies having any headache ,abdominal pain ,nausea, vomiting , diarrhea constipation, loss of weight or loss of appetite.  Denies having any excessive bruising ,hematuria or blood in the stool.    Review of all systems negative except as indicated    History  Past Medical History:   Diagnosis Date    Acute inferior myocardial infarction 06/14/2022    Allergic     Asthma 01/27/2022    CAD, multiple vessel 06/14/2022    Disorder of thyroid 01/27/2022    Gastroesophageal reflux disease 01/27/2022    Hx of complete eye exam 2016    Hyperlipidemia 01/27/2022    Hypertension     Migraine headache 01/27/2022    Panic attack 01/27/2022    Peptic ulcer 09/14/2017       Past Surgical History:   Procedure Laterality Date    CARDIAC CATHETERIZATION N/A 06/14/2022    Procedure: Left Heart Cath;  Surgeon: Matthieu Del Toro MD;  Location: [facility] CATH INVASIVE LOCATION;  Service: Cardiology;  Laterality: N/A;    CARDIAC CATHETERIZATION N/A 06/16/2022    Procedure: Percutaneous Coronary Intervention;  Surgeon: Matthieu Del Toro MD;  Location: [facility] CATH INVASIVE LOCATION;  Service: Cardiovascular;  Laterality: N/A;    CARDIAC CATHETERIZATION N/A 06/23/2022    Procedure: Left Heart Cath possible PCI, atherectomy, hemodynamic support;  Surgeon: Moises Haddad MD;  Location: [facility] CATH INVASIVE LOCATION;  Service: Cardiology;  Laterality: N/A;    CARDIAC CATHETERIZATION N/A 09/15/2022    Procedure: Left Heart Cath;  Surgeon: Moises Haddad MD;  Location: [facility] CATH INVASIVE LOCATION;  Service: Cardiology;  Laterality: N/A;    CARDIAC  CATHETERIZATION  9/15/2022    CARDIAC CATHETERIZATION N/A 3/2/2023    Procedure: Left Heart Cath;  Surgeon: Moises Haddad MD;  Location: [facility] CATH INVASIVE LOCATION;  Service: Cardiology;  Laterality: N/A;    CHOLECYSTECTOMY  2019    CORONARY ARTERY BYPASS GRAFT N/A 2022    Procedure: CORONARY ARTERY BYPASS GRAFTING;  Surgeon: Pablo Ball MD;  Location: [facility] CVOR;  Service: Cardiothoracic;  Laterality: N/A;  CABG X 3(2 vein grafts, 1 LIMA graft)    CORONARY ARTERY BYPASS GRAFT  2022    CORONARY STENT PLACEMENT      MANDIBLE SURGERY         Family History   Problem Relation Age of Onset    Heart disease Mother     Heart attack Mother     Hypertension Mother     Heart failure Father     Cirrhosis Maternal Grandfather     Heart attack Maternal Grandmother         a       Social History     Tobacco Use    Smoking status: Former     Current packs/day: 0.00     Average packs/day: 1.5 packs/day for 26.5 years (39.7 ttl pk-yrs)     Types: Cigarettes     Start date: 1996     Quit date: 2022     Years since quittin.2    Smokeless tobacco: Never   Vaping Use    Vaping status: Never Used   Substance Use Topics    Alcohol use: Not Currently    Drug use: Yes     Frequency: 7.0 times per week     Types: Marijuana        Medications Prior to Admission   Medication Sig Dispense Refill Last Dose    albuterol sulfate  (90 Base) MCG/ACT inhaler Inhale 2 puffs by mouth as directed every four to six hours as needed for coughing, shortness of breath, wheezing 8.5 g 0     amLODIPine (Norvasc) 5 MG tablet Take 1 tablet by mouth Daily. 90 tablet 0     aspirin (Aspirin Low Dose) 81 MG EC tablet Take 1 tablet by mouth Daily. 90 tablet 0     cilostazol (PLETAL) 100 MG tablet Take 0.5 tablets by mouth 2 (Two) Times a Day. 90 tablet 3     Evolocumab (Repatha SureClick) solution auto-injector SureClick injection Inject 140 mg total (1 mL) into the skin every 14 (fourteen) days. 2 mL 5      isosorbide mononitrate (IMDUR) 120 MG 24 hr tablet Take 1 tablet by mouth Daily. 30 tablet 0     levothyroxine (SYNTHROID, LEVOTHROID) 50 MCG tablet TAKE 1 TABLET BY MOUTH EVERY DAY 90 tablet 0     lisinopril (PRINIVIL,ZESTRIL) 5 MG tablet Take 1 tablet by mouth Daily. 90 tablet 3     metoprolol succinate XL (TOPROL-XL) 100 MG 24 hr tablet Take 1 tablet by mouth Daily. 30 tablet 0     nitroglycerin (NITROSTAT) 0.4 MG SL tablet Place 1 tablet under the tongue Every 5 (Five) Minutes As Needed for Chest Pain (Only if SBP Greater Than 100). Take no more than 3 doses in 15 minutes. 25 tablet 0     omeprazole (priLOSEC) 20 MG capsule Take 1 capsule by mouth Daily.       prasugrel (EFFIENT) 10 MG tablet Take 1 tablet by mouth Daily.       ranolazine (RANEXA) 1000 MG 12 hr tablet Take 1 tablet by mouth 2 (Two) Times a Day. 180 tablet 3     rosuvastatin (CRESTOR) 40 MG tablet Take 0.5 tablets by mouth Daily.          Allergies  Shellfish-derived products    Scheduled Meds:albuterol sulfate HFA, 2 puff, Inhalation, 4x Daily - RT  [Held by provider] amLODIPine, 5 mg, Oral, Daily  aspirin, 81 mg, Oral, Daily  azithromycin, 250 mg, Oral, Q24H  cilostazol, 50 mg, Oral, BID  isosorbide mononitrate, 120 mg, Oral, Q24H  levothyroxine, 50 mcg, Oral, Q AM  lisinopril, 5 mg, Oral, Q24H  metoprolol succinate XL, 100 mg, Oral, Q24H  pantoprazole, 40 mg, Oral, Q AM  prasugrel, 10 mg, Oral, Daily  ranolazine, 1,000 mg, Oral, BID  rosuvastatin, 20 mg, Oral, Daily  sodium chloride, 10 mL, Intravenous, Q12H      Continuous Infusions:   PRN Meds:.  aluminum-magnesium hydroxide-simethicone    senna-docusate sodium **AND** polyethylene glycol **AND** bisacodyl **AND** bisacodyl    diphenhydrAMINE    ketorolac    ondansetron ODT **OR** ondansetron    [COMPLETED] Insert Peripheral IV **AND** sodium chloride    sodium chloride    sodium chloride    traMADol    Objective     VITAL SIGNS  Vitals:    10/07/24 0723 10/07/24 0726 10/07/24 0833 10/07/24  "1110   BP:   131/98 104/71   BP Location:    Left arm   Patient Position: Lying   Lying   Pulse: 68 85 70 70   Resp: 18 13  13   Temp:    97.9 °F (36.6 °C)   TempSrc:    Oral   SpO2: 97% 94%  99%   Weight:       Height:           Flowsheet Rows      Flowsheet Row First Filed Value   Admission Height 177.8 cm (70\") Documented at 10/04/2024 0924   Admission Weight 140 kg (308 lb 10.3 oz) Documented at 10/04/2024 0924              Intake/Output Summary (Last 24 hours) at 10/7/2024 1121  Last data filed at 10/7/2024 0900  Gross per 24 hour   Intake 900 ml   Output 2700 ml   Net -1800 ml        TELEMETRY: Sinus rhythm    Physical Exam:  The patient is alert, oriented and in no distress.  Vital signs as noted above.  Head and neck revealed no carotid bruits or jugular venous distention.  No thyromegaly or lymphadenopathy is present  Lungs clear.  No wheezing.  Breath sounds are normal bilaterally.  Heart normal first and second heart sounds.  No murmur. No precordial rub is present.  No gallop is present.  Abdomen soft and nontender.  No organomegaly is present.  Extremities with good peripheral pulses without any pedal edema.  Skin warm and dry.  Musculoskeletal system is grossly normal  CNS grossly normal    Reviewed and updated.  Results Review:   I reviewed the patient's new clinical results.  Lab Results (last 24 hours)       Procedure Component Value Units Date/Time    Magnesium [[id number]]  (Normal) Collected: 10/07/24 0202    Specimen: Blood from Arm, Left Updated: 10/07/24 0842     Magnesium 2.0 mg/dL     Basic Metabolic Panel [[id number]]  (Normal) Collected: 10/07/24 0202    Specimen: Blood from Arm, Left Updated: 10/07/24 0314     Glucose 86 mg/dL      BUN 8 mg/dL      Creatinine 1.06 mg/dL      Sodium 139 mmol/L      Potassium 4.0 mmol/L      Chloride 107 mmol/L      CO2 24.6 mmol/L      Calcium 8.6 mg/dL      BUN/Creatinine Ratio 7.5     Anion Gap 7.4 mmol/L      eGFR 90.4 mL/min/1.73     Narrative:      GFR " Normal >60  Chronic Kidney Disease <60  Kidney Failure <15      CBC & Differential [[id number]]  (Abnormal) Collected: 10/07/24 0202    Specimen: Blood from Arm, Left Updated: 10/07/24 0233    Narrative:      The following orders were created for panel order CBC & Differential.  Procedure                               Abnormality         Status                     ---------                               -----------         ------                     CBC Auto Differential[[id number]]        Abnormal            Final result                 Please view results for these tests on the individual orders.    CBC Auto Differential [[id number]]  (Abnormal) Collected: 10/07/24 0202    Specimen: Blood from Arm, Left Updated: 10/07/24 0233     WBC 6.24 10*3/mm3      RBC 4.13 10*6/mm3      Hemoglobin 11.8 g/dL      Hematocrit 36.4 %      MCV 88.1 fL      MCH 28.6 pg      MCHC 32.4 g/dL      RDW 13.0 %      RDW-SD 41.7 fl      MPV 9.0 fL      Platelets 277 10*3/mm3      Neutrophil % 67.2 %      Lymphocyte % 26.3 %      Monocyte % 4.3 %      Eosinophil % 1.1 %      Basophil % 0.5 %      Immature Grans % 0.6 %      Neutrophils, Absolute 4.19 10*3/mm3      Lymphocytes, Absolute 1.64 10*3/mm3      Monocytes, Absolute 0.27 10*3/mm3      Eosinophils, Absolute 0.07 10*3/mm3      Basophils, Absolute 0.03 10*3/mm3      Immature Grans, Absolute 0.04 10*3/mm3      nRBC 0.0 /100 WBC     Blood Culture - Blood, Arm, Left [[id number]]  (Normal) Collected: 10/04/24 1205    Specimen: Blood from Arm, Left Updated: 10/06/24 1215     Blood Culture No growth at 2 days    Narrative:      Less than seven (7) mL's of blood was collected.  Insufficient quantity may yield false negative results.            Imaging Results (Last 24 Hours)       ** No results found for the last 24 hours. **        LAB RESULTS (LAST 7 DAYS)    CBC  Results from last 7 days   Lab Units 10/07/24  0202 10/06/24  0127 10/05/24  0039 10/04/24  0944   WBC 10*3/mm3 6.24 5.55 6.53 8.28    RBC 10*6/mm3 4.13* 4.07* 4.32 4.61   HEMOGLOBIN g/dL 11.8* 11.6* 12.2* 13.3   HEMATOCRIT % 36.4* 35.9* 38.2 40.6   MCV fL 88.1 88.2 88.4 88.1   PLATELETS 10*3/mm3 277 272 283 299       BMP  Results from last 7 days   Lab Units 10/07/24  0202 10/06/24  0127 10/05/24  0039 10/04/24  0944   SODIUM mmol/L 139 137 136 134*   POTASSIUM mmol/L 4.0 4.0 4.0 3.5   CHLORIDE mmol/L 107 105 103 100   CO2 mmol/L 24.6 22.2 22.2 19.6*   BUN mg/dL 8 15 14 11   CREATININE mg/dL 1.06 1.13 1.18 1.35*   GLUCOSE mg/dL 86 91 89 146*   MAGNESIUM mg/dL 2.0  --   --   --        CMP   Results from last 7 days   Lab Units 10/07/24  0202 10/06/24  0127 10/05/24  0039 10/04/24  0944   SODIUM mmol/L 139 137 136 134*   POTASSIUM mmol/L 4.0 4.0 4.0 3.5   CHLORIDE mmol/L 107 105 103 100   CO2 mmol/L 24.6 22.2 22.2 19.6*   BUN mg/dL 8 15 14 11   CREATININE mg/dL 1.06 1.13 1.18 1.35*   GLUCOSE mg/dL 86 91 89 146*   ALBUMIN g/dL  --   --   --  4.2   BILIRUBIN mg/dL  --   --   --  0.3   ALK PHOS U/L  --   --   --  93   AST (SGOT) U/L  --   --   --  20   ALT (SGPT) U/L  --   --   --  24         BNP        TROPONIN  Results from last 7 days   Lab Units 10/05/24  1648   HSTROP T ng/L 8       CoAg        Creatinine Clearance  Estimated Creatinine Clearance: 129.5 mL/min (by C-G formula based on SCr of 1.06 mg/dL).    ABG        Radiology  No radiology results for the last day        EKG                I personally viewed and interpreted the patient's EKG/Telemetry data: Normal sinus rhythm nonspecific ST-T wave changes    ECHOCARDIOGRAM:    Results for orders placed during the hospital encounter of 10/04/24    Adult Transthoracic Echo Complete W/ Cont if Necessary Per Protocol    Interpretation Summary    Left ventricular ejection fraction appears to be 56 - 60%.    Indications  Chest pain    Technically satisfactory study.  Mitral valve is structurally normal.  Tricuspid valve is structurally normal.  Aortic valve is structurally normal.  Pulmonic valve  could not be well visualized.  No evidence for mitral tricuspid or aortic regurgitation is seen by Doppler study.  Left atrium is normal in size.  Right atrium is normal in size.  Left ventricle is normal in size and contractility with ejection fraction of 60%.  Normal left ventricular diastolic function.  Right ventricle is normal in size and contractility with TAPSE of 2.87 cm..  Atrial septum is intact.  Aorta is normal.  No pericardial effusion or intracardiac thrombus is seen.    Impression  Structurally and functionally normal cardiac valves.  Left ventricular size and contractility is normal with ejection fraction of 60%.  Normal left ventricular diastolic function.  Right ventricle is normal in size and contractility with TAPSE of 2.87 cm..          STRESS TEST  Results for orders placed during the hospital encounter of 12/18/23    Stress Test With Myocardial Perfusion One Day    Interpretation Summary  1. Negative Regadenoson Electrocardiography: There is no ECG evidence of myocardial ischemia.  2.  Adequate blood-pressure response to regadenoson.  3. No regadenoson-induced arrhythmias  4. Normal SPECT Myocardial Perfusion Imaging: There is no scintigraphic evidence of myocardial ischemia or scarring.  5. Overall left ventricular function is preserved, with a normal ejection fraction and no regional asynergy.        Cardiolite (Tc-99m sestamibi) stress test    CARDIAC CATHETERIZATION  Results for orders placed during the hospital encounter of 03/01/23    Cardiac Catheterization/Vascular Study    Conclusion   Moises Haddad MD, PhD  [facility]  Date of service 3-2-2023    Procedure  1.  Left heart catheterization coronary angiography left ventriculography In GALLO position, imaging of native and bypass grafts  2.,  Balloon angioplasty 4.0 x 20 NC balloon of the distal portion of the SVG to RCA inside the stented segment secondary to what appears to be greater than 70% in-stent  restenosis distally versus subocclusive thrombus, ostial proximal greater than 50% restenosis, reduced to less than 10% distally, remains about 20% proximally at the ostium, improved angiographic runoff which was DARRYL II preoperatively, DARRYL-3 post procedurally    Indication  Unstable angina, unable for intervention to the native RCA    After informed consent patient brought to the Cath Lab sterilely prepped and draped in usual fashion with exposure of the right groin for right common femoral arterial access via micropuncture modified Seldinger technique with placement of a 6 Sami sheath.  035 guidewires advanced aortic valve followed by diagnostic JL 4 and JR4 catheters for selective left and right coronary angiography, JR4 was used to image the LIMA to LAD, multipurpose catheter was used to image SVG to RCA, SVG to diagonal was noted to be closed.  There appeared to be haziness at the ostial proximal portion of the stent in the SVG as well as what appeared to be slow flow distal to a bend in the distal portion of the graft also with haziness and what appeared to be minimum 60% stenosis possibly up to 80 and some angiographic views.  Decision was made given inability to revascularize native RCA with diffuse small vessel disease and small caliber vessels to intervene with balloon angioplasty in the stented segment of the SVG graft.  Patient was heparinized with 100 units/kg of heparin, he was on a background therapy of aspirin and Effient at baseline, ACT was greater than 250, multipurpose guide was used engage the vein graft followed by run-through wire to the distal portion of the graft, 4.0 x 20 NC balloon was used to dilate the proximal portion of the graft up to 16 radha for a minute and a half with prolonged inflation given significant stenosis at the ostial portion of the graft, there was great angiographic improvement of this portion of the graft with better stent expansion, angiography further revealed  "downstream stenosis in the distal portion of the graft, the balloon was then readvanced to the distal portion followed by 2 inflations up to 14 radha distal proximal fashion at the culprit portion reducing stenosis to 0% residual and improving distal runoff into again small vessel PDA and retrogradely into the RCA.  Final angiography revealed no distal wire trauma no edge dissection, DARRYL-3 flow in the graft as well as into the RPDA which did have some degree of competitive flow from the native RCA although small vessel and diffusely diseased.  All catheters\" removed.  6 [place] Angio-Seal was used to close the arteriotomy with immediate hemostasis and Meints of distal pulses.  He left Cath Lab chest pain-free hemodynamically electrically stable alert talkative staff neurologic grossly intact bilaterally    Complications none  Blood loss less than 10 cc  Contrast 160 cc  Sedation time of 1 hour    Findings  1.  Opening aortic pressure of  Closing pressure  LVEDP elevated 20-25  LV function normal 60%  Normal transaortic valve gradient on pullback    Angiography  1.  Left main normal takeoff, 10% luminal regularities leading into LAD that is subtotally occluded in the midportion, circumflex widely patent  2.  LAD diffusely diseased 80 to 90% the proximal portion, widely patent LIMA to LAD in the midportion, distal portion of the LAD has diffuse luminal regularities but is very small caliber less than 2 mm in diameter, anastomosis is widely patent for the LIMA  Over 3 LIMA to LAD widely patent, no disease  3.  SVG to diagonal branch is closed  4.  SVG to RCA has ostial 60 to 70% but hazy in appearance, distally also had 70 to 80% hazy lesion suspicious for ISR versus subacute thrombus which was greatly improved by balloon angioplasty, runoff both retrograde into the PDA and anterograde with very small vessel anastomosis likely best for medical therapy  5.  RCA natively diffusely diseased with patent stents in the " proximal midportion, the intervening portion between proximal and distal stents is much less than 1 mm in diameter, marginal branches open and otherwise distal portion of vessel not amenable to intervention secondary to severe small caliber and very poor long-term patency  6.  Circumflex widely patent, obtuse marginal branch has diffuse small vessel disease tandem 80% lesions and tortuous not amenable to intervention secondary to small caliber size poor distal runoff and very poor long-term patency, thus medical treatment at this point, unchanged angiographically from prior case 8 to 9 months ago at the time of SVG revascularization with overlapping stents    Conclusions recommendations  1.  Unstable angina  Culprit appeared to be SVG to RCA distal lesion 70 to 80% hazy possibly atherothrombotic as well as proximal ostial ISR.  Other vascular areas with LIMA to LAD, native LAD, circumflex and native RCA.  Unchanged angiographically compared to prior case 9 months ago  Continue aspirin and Effient uninterrupted  High intensity statin, goal-directed medical therapy for diffuse native and bypass graft CAD  We will advance her Ranexa and long-acting nitrates for small vessel disease    further management per clinical course    Moises Haddad MD, PhD  .                OTHER:         Assessment & Plan     Principal Problem:    Dyspnea     39-year-old male with known history of severe premature coronary artery disease with acute inferior wall MI status post three-vessel CABG in 2022 as well as PCI to the SVG-RCA.  Additional past medical history of dyslipidemia, hypertension, GERD, thyroid disorder, panic attacks.       Respiratory viral illness with pneumonitis  Chest pain  Premature coronary artery disease status post three-vessel CABG following acute MI 2022  Percutaneous coronary intervention SVG-LAD  Primary hypertension  Mixed dyslipidemia  History of GERD     Patient has ruled out for acute coronary syndrome with  negative serial cardiac enzymes and no acute ST segment deviation from baseline noted on EKG  Continue aspirin, amlodipine, isosorbide, ACE, BB, Effient, ranolazine, statin    Positive blood cultures with coagulase-negative staph.    Echocardiogram 10/6/2024 revealed   Structurally and functionally normal cardiac valves.  Left ventricular size and contractility is normal with ejection fraction of 60%.  Normal left ventricular diastolic function.  Right ventricle is normal in size and contractility with TAPSE of 2.87 cm..    Presented with shortness of breath with acute respiratory viral illness and positive mycoplasma.  Patient had CABG in the past and intervention.  Patient is not having any chest discomfort.  EKG showed no acute changes.  Troponin levels are negative.  Last cardiac cath was 3/1/2023.    Echocardiogram showed no pericardial effusion.  Normal left ventricular function.    Chest discomfort is better.  EKG showed no acute changes.  Troponin levels continues to be normal.  Blood pressure is doing better.    Continue antibiotic therapy.  Close cardiac monitoring.    Medications were reviewed and updated.  Patient is on aspirin Pletal Imdur levothyroxine lisinopril metoprolol  mg a day pantoprazole Effient 10 mg daily Ranexa 1000 mg twice daily Crestor 20 mg p.o. nightly.    Okay with discharge plans from cardiac standpoint.  Follow-up with Dr. Haddad primary cardiologist.     Further plan will depend on patient's progress.    Reviewed and updated 10/7/2024.  ]]]]]]]]]]]]]]]]]]]]]        Mariia Carvajal MD  10/07/24  11:21 EDT

## 2024-10-07 NOTE — TELEPHONE ENCOUNTER
GAMALIELOM to set up patient for a follow up from hospital. Set up for 2pm with Dr. Haddad on 10/15. Told him to call back if that date or time doesn't work for him.

## 2024-10-08 ENCOUNTER — READMISSION MANAGEMENT (OUTPATIENT)
Dept: CALL CENTER | Facility: HOSPITAL | Age: 39
End: 2024-10-08
Payer: MEDICAID

## 2024-10-08 VITALS
RESPIRATION RATE: 14 BRPM | DIASTOLIC BLOOD PRESSURE: 63 MMHG | WEIGHT: 308 LBS | SYSTOLIC BLOOD PRESSURE: 113 MMHG | BODY MASS INDEX: 44.09 KG/M2 | HEIGHT: 70 IN | OXYGEN SATURATION: 98 % | HEART RATE: 65 BPM | TEMPERATURE: 98.1 F

## 2024-10-08 LAB
GEN 5 2HR TROPONIN T REFLEX: 8 NG/L
TROPONIN T DELTA: 0 NG/L
WHOLE BLOOD HOLD SPECIMEN: NORMAL

## 2024-10-08 PROCEDURE — 94799 UNLISTED PULMONARY SVC/PX: CPT

## 2024-10-08 PROCEDURE — 94664 DEMO&/EVAL PT USE INHALER: CPT

## 2024-10-08 PROCEDURE — 84484 ASSAY OF TROPONIN QUANT: CPT | Performed by: INTERNAL MEDICINE

## 2024-10-08 PROCEDURE — 94761 N-INVAS EAR/PLS OXIMETRY MLT: CPT

## 2024-10-08 RX ORDER — LOSARTAN POTASSIUM 25 MG/1
25 TABLET ORAL DAILY
Qty: 30 TABLET | Refills: 0 | Status: SHIPPED | OUTPATIENT
Start: 2024-10-08

## 2024-10-08 RX ORDER — AZITHROMYCIN 250 MG/1
TABLET, FILM COATED ORAL
Qty: 6 TABLET | Refills: 0 | Status: SHIPPED | OUTPATIENT
Start: 2024-10-08

## 2024-10-08 RX ADMIN — ISOSORBIDE MONONITRATE 120 MG: 60 TABLET, EXTENDED RELEASE ORAL at 09:08

## 2024-10-08 RX ADMIN — METOPROLOL SUCCINATE 100 MG: 50 TABLET, EXTENDED RELEASE ORAL at 09:12

## 2024-10-08 RX ADMIN — PRASUGREL 10 MG: 10 TABLET, FILM COATED ORAL at 09:09

## 2024-10-08 RX ADMIN — CILOSTAZOL 50 MG: 100 TABLET ORAL at 09:09

## 2024-10-08 RX ADMIN — LEVOTHYROXINE SODIUM 50 MCG: 0.05 TABLET ORAL at 05:15

## 2024-10-08 RX ADMIN — RANOLAZINE 1000 MG: 500 TABLET, FILM COATED, EXTENDED RELEASE ORAL at 09:08

## 2024-10-08 RX ADMIN — AZITHROMYCIN DIHYDRATE 250 MG: 250 TABLET ORAL at 09:08

## 2024-10-08 RX ADMIN — ALBUTEROL SULFATE 2 PUFF: 108 AEROSOL, METERED RESPIRATORY (INHALATION) at 06:51

## 2024-10-08 RX ADMIN — PANTOPRAZOLE SODIUM 40 MG: 40 TABLET, DELAYED RELEASE ORAL at 05:15

## 2024-10-08 RX ADMIN — LISINOPRIL 5 MG: 5 TABLET ORAL at 09:09

## 2024-10-08 RX ADMIN — ROSUVASTATIN 20 MG: 10 TABLET, FILM COATED ORAL at 09:08

## 2024-10-08 RX ADMIN — ASPIRIN 81 MG: 81 TABLET, COATED ORAL at 09:08

## 2024-10-08 RX ADMIN — Medication 10 ML: at 09:14

## 2024-10-08 NOTE — PLAN OF CARE
Goal Outcome Evaluation:  Plan of Care Reviewed With: patient        Progress: improving          Pt reported acute chest pain. Rapid response called. EKG normal, trop negative, nitro administered. Vitals are stable. Dr. Meléndez cardiology on call ordered morphine, and hospitalist ordered ativan prn. Pt reports no pain at this time and resting comfortably.

## 2024-10-08 NOTE — PAYOR COMM NOTE
"This is discharge notification for Tramaine Gates   Reference/Auth # [id number]   Pt discharged on 10/8/24    Pending inpatient authorization request    Holley Wright, RN, BSN  Utilization Review Nurse  [facility]  Direct & confidential phone # [phone]  Fax # [phone]      Tramaine Gates (39 y.o. Male)       Date of Birth   1985    Social Security Number       Address   [address] [place] [address] [place] [address]    Home Phone   [phone]    MRN   [id number]       Restorationist   None    Marital Status   Single                            Admission Date   10/4/24    Admission Type   Emergency    Admitting Provider   Jeronimo Zhong MD    Attending Provider       [facility]       Discharge Date   10/8/2024    Discharge Disposition   Home or Self Care    Discharge Destination                                 Attending Provider: (none)   Allergies: Shellfish-derived Products    Isolation: Droplet   Infection: Mycoplasma pneumonia (10/04/24)   Code Status: CPR    Ht: 177.8 cm (70\")   Wt: 140 kg (308 lb)    Admission Cmt: None   Principal Problem: Dyspnea [R06.00]                   Active Insurance as of 10/4/2024       Primary Coverage       Payor Plan Insurance Group Employer/Plan Group    [id number]       Payor Plan Address Payor Plan Phone Number Payor Plan Fax Number Effective Dates    MAIL STOP:   3/1/2022 - None Entered    [address] [address]       [facility] [address]         Subscriber Name Subscriber Birth Date Member ID       TRAMAINE GATES 1985 [id number]                     Emergency Contacts        (Rel.) Home Phone Work Phone Mobile Phone    Magi Oliver (Mother) [phone] -- [phone]    CATRACHO SHARMA (Significant Other) -- -- [phone]              Vital Signs (last day) before discharge       Date/Time Temp Temp src Pulse Resp BP Patient " Position SpO2    10/08/24 0808 98.1 (36.7) Oral 65 14 113/63 Lying 98    10/08/24 0652 -- -- 63 16 -- -- 98    10/08/24 0651 -- -- 58 16 -- -- 97    10/08/24 0354 97.8 (36.6) Axillary 68 11 117/80 Lying 100    10/07/24 2345 -- -- 64 18 127/75 Lying 97    10/07/24 2230 -- -- 66 -- -- -- 98    10/07/24 222 98.2 (36.8) Oral 72 18 149/90 Lying 99    10/07/24 2224 -- -- 72 -- -- -- 99    10/07/24 2221 -- -- 74 -- 155/95 -- 99    10/07/24 2220 -- -- 75 -- 155/95 -- 98    10/07/24 2219 -- -- 74 36 155/95 -- 100    10/07/24 2218 -- -- 81 -- 159/104 -- 97    10/07/24 2215 -- -- 74 -- 136/89 -- 96    10/07/24 2206 -- -- 62 -- 128/79 -- --    10/07/24 2155 98 (36.7) Oral 61 -- 128/79 -- 96    10/07/24 2058 -- -- 63 -- 128/86 -- 97    10/07/24 1837 -- -- 61 16 -- -- 97    10/07/24 1835 -- -- 60 16 -- -- 97    10/07/24 1541 -- -- 62 16 -- -- 98    10/07/24 1538 -- -- 60 10 -- Lying 99    10/07/24 1529 97.4 (36.3) Oral 65 11 102/62 Lying 95    10/07/24 1153 -- -- 64 12 -- -- 98    10/07/24 1151 -- -- 61 12 -- Lying 99    10/07/24 1110 97.9 (36.6) Oral 70 13 104/71 Lying 99    10/07/24 0833 -- -- 70 -- 131/98 -- --    10/07/24 07 -- -- 85 13 -- -- 94    10/07/24 0723 -- -- 68 18 -- Lying 97    10/07/24 0721 98.2 (36.8) Axillary 70 12 135/81 Lying 100    10/07/24 0411 97.9 (36.6) Oral -- 16 -- Lying 96    10/07/24 0047 98.2 (36.8) Oral 63 15 126/79 -- --             Physician Progress Notes (last 24 hours)        El Ruelas MD at 10/07/24 [facility]              [facility] MEDICINE SERVICE  DAILY PROGRESS NOTE    NAME: Tramaine Gates  : 1985  MRN: [id number]      LOS: 2 days     PROVIDER OF SERVICE: El Ruelas MD    Chief Complaint: Dyspnea    Subjective:     39-year-old gentleman who scatted history of heart disease with bypass surgery and multiple stents who states the last couple days he had congestion and cough but no fever and increasing difficulty breathing tightness in his chest with some  tingling in his left arm. Worse with exertion and better with rest. Patient denies any fever chills no recent long car ride plane ride immobilization or foreign travels leg pain or swelling. No injury no one at home with similar illness he does have children in the home.     Observation 10/5/24  Pt anxious. Cardiology consulted due to past medical hx and pt request. Abx for pneumonia. Mrsa negative. Blood cx pending     Interval History:   10/6/24-Patient still complaining of constant chest pain over top the left side of his chest.  He denies it being worse with deep inspiration or cough.  10/7/24 seen in bed NAD, c/o cough and chest pain    Review of Systems  Constitutional: Negative for fever.   Cardiovascular:  Positive for chest pain.   Respiratory:  Positive for cough and shortness of breath.    Objective:     Vital Signs  Temp:  [97.8 °F (36.6 °C)-98.2 °F (36.8 °C)] 97.9 °F (36.6 °C)  Heart Rate:  [61-85] 64  Resp:  [12-20] 12  BP: (104-135)/(65-98) 104/71  Flow (L/min):  [1] 1   Body mass index is 44.19 kg/m².    Physical Exam  General Appearance:  Alert, cooperative, no distress, appears stated age  Head:  Normocephalic, without obvious abnormality, atraumatic  Eyes:  PERRL, conjunctiva/corneas clear, EOM's intact, fundi benign, both eyes  Ears:  Normal TM's and external ear canals, both ears  Nose: Nares normal, septum midline, mucosa normal, no drainage or sinus tenderness  Throat: Lips, mucosa, and tongue normal; teeth and gums normal  Neck: Supple, symmetrical, trachea midline, no adenopathy, thyroid: not enlarged, symmetric, no tenderness/mass/nodules, no carotid bruit or JVD  Lungs:   Mild right-sided rhonchi, respirations unlabored  Heart:  Regular rate and rhythm, S1, S2 normal, no murmur, rub or gallop  Abdomen:  Soft, non-tender, bowel sounds active all four quadrants,  no masses, no organomegaly  Extremities: Extremities normal, atraumatic, no cyanosis or edema  Pulses: 2+ and  symmetric  Skin: Skin color, texture, turgor normal, no rashes or lesions  Neurologic: Normal         Diagnostic Data    Results from last 7 days   Lab Units 10/07/24  0202 10/05/24  0039 10/04/24  0944   WBC 10*3/mm3 6.24   < > 8.28   HEMOGLOBIN g/dL 11.8*   < > 13.3   HEMATOCRIT % 36.4*   < > 40.6   PLATELETS 10*3/mm3 277   < > 299   GLUCOSE mg/dL 86   < > 146*   CREATININE mg/dL 1.06   < > 1.35*   BUN mg/dL 8   < > 11   SODIUM mmol/L 139   < > 134*   POTASSIUM mmol/L 4.0   < > 3.5   AST (SGOT) U/L  --   --  20   ALT (SGPT) U/L  --   --  24   ALK PHOS U/L  --   --  93   BILIRUBIN mg/dL  --   --  0.3   ANION GAP mmol/L 7.4   < > 14.4    < > = values in this interval not displayed.       No radiology results for the last day      I reviewed the patient's new clinical results.    Assessment/Plan:     Active and Resolved Problems  Active Hospital Problems    Diagnosis  POA    **Dyspnea [R06.00]  Yes      Resolved Hospital Problems   No resolved problems to display.       Mycoplasma pneumonia  -RVP positive for mycoplasma on admission  -Continue azithromycin to complete 5-day course of treatment  -Continue pulmonary toileting with I-S, flutter valve   -CT of the chest confirms right middle and lower lobe pneumonia    Chest pain  -Patient has previous history of CAD with stent placement  -Continue home medications with Ranexa, Imdur  -Check echo   -Troponin negative  Would continue DAPT, statin therapy  -Defer to cardiology for any further ischemic workup    Hypertension  -Continue Toprol-XL, lisinopril but holding amlodipine due to borderline low blood pressure    Hypothyroidism  -Continue Synthroid    VTE Prophylaxis:  Mechanical VTE prophylaxis orders are present.    Disposition Planning:   Barriers to Discharge: Further cardiac workup  Anticipated Date of Discharge: 10/8  Place of Discharge: Home    Time: 45 minutes     Code Status and Medical Interventions: CPR (Attempt to Resuscitate); Full Support   Ordered at:  10/04/24 1148     Code Status (Patient has no pulse and is not breathing):    CPR (Attempt to Resuscitate)     Medical Interventions (Patient has pulse or is breathing):    Full Support       Signature: Electronically signed by El Ruelas MD, 10/07/24, 12:20 EDT.  [facility] [place] Hospitalist Team    Electronically signed by El Ruelas MD at 10/07/24 1221       Mariia Carvajal MD at 10/07/24 1121          Referring Provider: El Ruelas MD    Reason for follow-up:  Chest discomfort  Status post stent     Patient Care Team:  Daniela Hernandez FNP as PCP - General (Family Medicine)  Ollie Dunn MD as Consulting Physician (Gastroenterology)    Subjective .      ROS      Today, the patient has been without any shortness of breath, palpitations, dizziness or syncope.  Denies having any headache ,abdominal pain ,nausea, vomiting , diarrhea constipation, loss of weight or loss of appetite.  Denies having any excessive bruising ,hematuria or blood in the stool.    Review of all systems negative except as indicated    History  Past Medical History:   Diagnosis Date    Acute inferior myocardial infarction 06/14/2022    Allergic     Asthma 01/27/2022    CAD, multiple vessel 06/14/2022    Disorder of thyroid 01/27/2022    Gastroesophageal reflux disease 01/27/2022    Hx of complete eye exam 2016    Hyperlipidemia 01/27/2022    Hypertension     Migraine headache 01/27/2022    Panic attack 01/27/2022    Peptic ulcer 09/14/2017       Past Surgical History:   Procedure Laterality Date    CARDIAC CATHETERIZATION N/A 06/14/2022    Procedure: Left Heart Cath;  Surgeon: Matthieu Del Toro MD;  Location: [facility] CATH INVASIVE LOCATION;  Service: Cardiology;  Laterality: N/A;    CARDIAC CATHETERIZATION N/A 06/16/2022    Procedure: Percutaneous Coronary Intervention;  Surgeon: Matthieu Del Toro MD;  Location: [facility] CATH INVASIVE LOCATION;  Service: Cardiovascular;  Laterality: N/A;    CARDIAC  CATHETERIZATION N/A 2022    Procedure: Left Heart Cath possible PCI, atherectomy, hemodynamic support;  Surgeon: Moises Haddad MD;  Location: [facility] CATH INVASIVE LOCATION;  Service: Cardiology;  Laterality: N/A;    CARDIAC CATHETERIZATION N/A 09/15/2022    Procedure: Left Heart Cath;  Surgeon: Moises Haddad MD;  Location:  [facility] CATH INVASIVE LOCATION;  Service: Cardiology;  Laterality: N/A;    CARDIAC CATHETERIZATION  9/15/2022    CARDIAC CATHETERIZATION N/A 3/2/2023    Procedure: Left Heart Cath;  Surgeon: Moises Haddad MD;  Location: [facility] CATH INVASIVE LOCATION;  Service: Cardiology;  Laterality: N/A;    CHOLECYSTECTOMY  2019    CORONARY ARTERY BYPASS GRAFT N/A 2022    Procedure: CORONARY ARTERY BYPASS GRAFTING;  Surgeon: Pablo Ball MD;  Location: [facility] CVOR;  Service: Cardiothoracic;  Laterality: N/A;  CABG X 3(2 vein grafts, 1 LIMA graft)    CORONARY ARTERY BYPASS GRAFT  2022    CORONARY STENT PLACEMENT      MANDIBLE SURGERY         Family History   Problem Relation Age of Onset    Heart disease Mother     Heart attack Mother     Hypertension Mother     Heart failure Father     Cirrhosis Maternal Grandfather     Heart attack Maternal Grandmother         a       Social History     Tobacco Use    Smoking status: Former     Current packs/day: 0.00     Average packs/day: 1.5 packs/day for 26.5 years (39.7 ttl pk-yrs)     Types: Cigarettes     Start date: 1996     Quit date: 2022     Years since quittin.2    Smokeless tobacco: Never   Vaping Use    Vaping status: Never Used   Substance Use Topics    Alcohol use: Not Currently    Drug use: Yes     Frequency: 7.0 times per week     Types: Marijuana        Medications Prior to Admission   Medication Sig Dispense Refill Last Dose    albuterol sulfate  (90 Base) MCG/ACT inhaler Inhale 2 puffs by mouth as directed every four to six hours as needed for coughing, shortness of breath,  wheezing 8.5 g 0     amLODIPine (Norvasc) 5 MG tablet Take 1 tablet by mouth Daily. 90 tablet 0     aspirin (Aspirin Low Dose) 81 MG EC tablet Take 1 tablet by mouth Daily. 90 tablet 0     cilostazol (PLETAL) 100 MG tablet Take 0.5 tablets by mouth 2 (Two) Times a Day. 90 tablet 3     Evolocumab (Repatha SureClick) solution auto-injector SureClick injection Inject 140 mg total (1 mL) into the skin every 14 (fourteen) days. 2 mL 5     isosorbide mononitrate (IMDUR) 120 MG 24 hr tablet Take 1 tablet by mouth Daily. 30 tablet 0     levothyroxine (SYNTHROID, LEVOTHROID) 50 MCG tablet TAKE 1 TABLET BY MOUTH EVERY DAY 90 tablet 0     lisinopril (PRINIVIL,ZESTRIL) 5 MG tablet Take 1 tablet by mouth Daily. 90 tablet 3     metoprolol succinate XL (TOPROL-XL) 100 MG 24 hr tablet Take 1 tablet by mouth Daily. 30 tablet 0     nitroglycerin (NITROSTAT) 0.4 MG SL tablet Place 1 tablet under the tongue Every 5 (Five) Minutes As Needed for Chest Pain (Only if SBP Greater Than 100). Take no more than 3 doses in 15 minutes. 25 tablet 0     omeprazole (priLOSEC) 20 MG capsule Take 1 capsule by mouth Daily.       prasugrel (EFFIENT) 10 MG tablet Take 1 tablet by mouth Daily.       ranolazine (RANEXA) 1000 MG 12 hr tablet Take 1 tablet by mouth 2 (Two) Times a Day. 180 tablet 3     rosuvastatin (CRESTOR) 40 MG tablet Take 0.5 tablets by mouth Daily.          Allergies  Shellfish-derived products    Scheduled Meds:albuterol sulfate HFA, 2 puff, Inhalation, 4x Daily - RT  [Held by provider] amLODIPine, 5 mg, Oral, Daily  aspirin, 81 mg, Oral, Daily  azithromycin, 250 mg, Oral, Q24H  cilostazol, 50 mg, Oral, BID  isosorbide mononitrate, 120 mg, Oral, Q24H  levothyroxine, 50 mcg, Oral, Q AM  lisinopril, 5 mg, Oral, Q24H  metoprolol succinate XL, 100 mg, Oral, Q24H  pantoprazole, 40 mg, Oral, Q AM  prasugrel, 10 mg, Oral, Daily  ranolazine, 1,000 mg, Oral, BID  rosuvastatin, 20 mg, Oral, Daily  sodium chloride, 10 mL, Intravenous,  "Q12H      Continuous Infusions:   PRN Meds:.  aluminum-magnesium hydroxide-simethicone    senna-docusate sodium **AND** polyethylene glycol **AND** bisacodyl **AND** bisacodyl    diphenhydrAMINE    ketorolac    ondansetron ODT **OR** ondansetron    [COMPLETED] Insert Peripheral IV **AND** sodium chloride    sodium chloride    sodium chloride    traMADol    Objective     VITAL SIGNS  Vitals:    10/07/24 0723 10/07/24 0726 10/07/24 0833 10/07/24 1110   BP:   131/98 104/71   BP Location:    Left arm   Patient Position: Lying   Lying   Pulse: 68 85 70 70   Resp: 18 13  13   Temp:    97.9 °F (36.6 °C)   TempSrc:    Oral   SpO2: 97% 94%  99%   Weight:       Height:           Flowsheet Rows      Flowsheet Row First Filed Value   Admission Height 177.8 cm (70\") Documented at 10/04/2024 0924   Admission Weight 140 kg (308 lb 10.3 oz) Documented at 10/04/2024 0924              Intake/Output Summary (Last 24 hours) at 10/7/2024 1121  Last data filed at 10/7/2024 0900  Gross per 24 hour   Intake 900 ml   Output 2700 ml   Net -1800 ml        TELEMETRY: Sinus rhythm    Physical Exam:  The patient is alert, oriented and in no distress.  Vital signs as noted above.  Head and neck revealed no carotid bruits or jugular venous distention.  No thyromegaly or lymphadenopathy is present  Lungs clear.  No wheezing.  Breath sounds are normal bilaterally.  Heart normal first and second heart sounds.  No murmur. No precordial rub is present.  No gallop is present.  Abdomen soft and nontender.  No organomegaly is present.  Extremities with good peripheral pulses without any pedal edema.  Skin warm and dry.  Musculoskeletal system is grossly normal  CNS grossly normal    Reviewed and updated.  Results Review:   I reviewed the patient's new clinical results.  Lab Results (last 24 hours)       Procedure Component Value Units Date/Time    Magnesium [[id number]]  (Normal) Collected: 10/07/24 0202    Specimen: Blood from Arm, Left Updated: 10/07/24 " 0842     Magnesium 2.0 mg/dL     Basic Metabolic Panel [[id number]]  (Normal) Collected: 10/07/24 0202    Specimen: Blood from Arm, Left Updated: 10/07/24 0314     Glucose 86 mg/dL      BUN 8 mg/dL      Creatinine 1.06 mg/dL      Sodium 139 mmol/L      Potassium 4.0 mmol/L      Chloride 107 mmol/L      CO2 24.6 mmol/L      Calcium 8.6 mg/dL      BUN/Creatinine Ratio 7.5     Anion Gap 7.4 mmol/L      eGFR 90.4 mL/min/1.73     Narrative:      GFR Normal >60  Chronic Kidney Disease <60  Kidney Failure <15      CBC & Differential [[id number]]  (Abnormal) Collected: 10/07/24 0202    Specimen: Blood from Arm, Left Updated: 10/07/24 0233    Narrative:      The following orders were created for panel order CBC & Differential.  Procedure                               Abnormality         Status                     ---------                               -----------         ------                     CBC Auto Differential[[id number]]        Abnormal            Final result                 Please view results for these tests on the individual orders.    CBC Auto Differential [[id number]]  (Abnormal) Collected: 10/07/24 0202    Specimen: Blood from Arm, Left Updated: 10/07/24 0233     WBC 6.24 10*3/mm3      RBC 4.13 10*6/mm3      Hemoglobin 11.8 g/dL      Hematocrit 36.4 %      MCV 88.1 fL      MCH 28.6 pg      MCHC 32.4 g/dL      RDW 13.0 %      RDW-SD 41.7 fl      MPV 9.0 fL      Platelets 277 10*3/mm3      Neutrophil % 67.2 %      Lymphocyte % 26.3 %      Monocyte % 4.3 %      Eosinophil % 1.1 %      Basophil % 0.5 %      Immature Grans % 0.6 %      Neutrophils, Absolute 4.19 10*3/mm3      Lymphocytes, Absolute 1.64 10*3/mm3      Monocytes, Absolute 0.27 10*3/mm3      Eosinophils, Absolute 0.07 10*3/mm3      Basophils, Absolute 0.03 10*3/mm3      Immature Grans, Absolute 0.04 10*3/mm3      nRBC 0.0 /100 WBC     Blood Culture - Blood, Arm, Left [[id number]]  (Normal) Collected: 10/04/24 1205    Specimen: Blood from Arm, Left  Updated: 10/06/24 1215     Blood Culture No growth at 2 days    Narrative:      Less than seven (7) mL's of blood was collected.  Insufficient quantity may yield false negative results.            Imaging Results (Last 24 Hours)       ** No results found for the last 24 hours. **        LAB RESULTS (LAST 7 DAYS)    CBC  Results from last 7 days   Lab Units 10/07/24  0202 10/06/24  0127 10/05/24  0039 10/04/24  0944   WBC 10*3/mm3 6.24 5.55 6.53 8.28   RBC 10*6/mm3 4.13* 4.07* 4.32 4.61   HEMOGLOBIN g/dL 11.8* 11.6* 12.2* 13.3   HEMATOCRIT % 36.4* 35.9* 38.2 40.6   MCV fL 88.1 88.2 88.4 88.1   PLATELETS 10*3/mm3 277 272 283 299       BMP  Results from last 7 days   Lab Units 10/07/24  0202 10/06/24  0127 10/05/24  0039 10/04/24  0944   SODIUM mmol/L 139 137 136 134*   POTASSIUM mmol/L 4.0 4.0 4.0 3.5   CHLORIDE mmol/L 107 105 103 100   CO2 mmol/L 24.6 22.2 22.2 19.6*   BUN mg/dL 8 15 14 11   CREATININE mg/dL 1.06 1.13 1.18 1.35*   GLUCOSE mg/dL 86 91 89 146*   MAGNESIUM mg/dL 2.0  --   --   --        CMP   Results from last 7 days   Lab Units 10/07/24  0202 10/06/24  0127 10/05/24  0039 10/04/24  0944   SODIUM mmol/L 139 137 136 134*   POTASSIUM mmol/L 4.0 4.0 4.0 3.5   CHLORIDE mmol/L 107 105 103 100   CO2 mmol/L 24.6 22.2 22.2 19.6*   BUN mg/dL 8 15 14 11   CREATININE mg/dL 1.06 1.13 1.18 1.35*   GLUCOSE mg/dL 86 91 89 146*   ALBUMIN g/dL  --   --   --  4.2   BILIRUBIN mg/dL  --   --   --  0.3   ALK PHOS U/L  --   --   --  93   AST (SGOT) U/L  --   --   --  20   ALT (SGPT) U/L  --   --   --  24         BNP        TROPONIN  Results from last 7 days   Lab Units 10/05/24  1648   HSTROP T ng/L 8       CoAg        Creatinine Clearance  Estimated Creatinine Clearance: 129.5 mL/min (by C-G formula based on SCr of 1.06 mg/dL).    ABG        Radiology  No radiology results for the last day        EKG                I personally viewed and interpreted the patient's EKG/Telemetry data: Normal sinus rhythm nonspecific ST-T  wave changes    ECHOCARDIOGRAM:    Results for orders placed during the hospital encounter of 10/04/24    Adult Transthoracic Echo Complete W/ Cont if Necessary Per Protocol    Interpretation Summary    Left ventricular ejection fraction appears to be 56 - 60%.    Indications  Chest pain    Technically satisfactory study.  Mitral valve is structurally normal.  Tricuspid valve is structurally normal.  Aortic valve is structurally normal.  Pulmonic valve could not be well visualized.  No evidence for mitral tricuspid or aortic regurgitation is seen by Doppler study.  Left atrium is normal in size.  Right atrium is normal in size.  Left ventricle is normal in size and contractility with ejection fraction of 60%.  Normal left ventricular diastolic function.  Right ventricle is normal in size and contractility with TAPSE of 2.87 cm..  Atrial septum is intact.  Aorta is normal.  No pericardial effusion or intracardiac thrombus is seen.    Impression  Structurally and functionally normal cardiac valves.  Left ventricular size and contractility is normal with ejection fraction of 60%.  Normal left ventricular diastolic function.  Right ventricle is normal in size and contractility with TAPSE of 2.87 cm..          STRESS TEST  Results for orders placed during the hospital encounter of 12/18/23    Stress Test With Myocardial Perfusion One Day    Interpretation Summary  1. Negative Regadenoson Electrocardiography: There is no ECG evidence of myocardial ischemia.  2.  Adequate blood-pressure response to regadenoson.  3. No regadenoson-induced arrhythmias  4. Normal SPECT Myocardial Perfusion Imaging: There is no scintigraphic evidence of myocardial ischemia or scarring.  5. Overall left ventricular function is preserved, with a normal ejection fraction and no regional asynergy.        Cardiolite (Tc-99m sestamibi) stress test    CARDIAC CATHETERIZATION  Results for orders placed during the hospital encounter of  03/01/23    Cardiac Catheterization/Vascular Study    Conclusion   Moises Haddad MD, PhD  [facility] cardiology  Date of service 3-2-2023    Procedure  1.  Left heart catheterization coronary angiography left ventriculography In GALLO position, imaging of native and bypass grafts  2.,  Balloon angioplasty 4.0 x 20 NC balloon of the distal portion of the SVG to RCA inside the stented segment secondary to what appears to be greater than 70% in-stent restenosis distally versus subocclusive thrombus, ostial proximal greater than 50% restenosis, reduced to less than 10% distally, remains about 20% proximally at the ostium, improved angiographic runoff which was DARRYL II preoperatively, DARRYL-3 post procedurally    Indication  Unstable angina, unable for intervention to the native RCA    After informed consent patient brought to the Cath Lab sterilely prepped and draped in usual fashion with exposure of the right groin for right common femoral arterial access via micropuncture modified Seldinger technique with placement of a 6 Fijian sheath.  035 guidewires advanced aortic valve followed by diagnostic JL 4 and JR4 catheters for selective left and right coronary angiography, JR4 was used to image the LIMA to LAD, multipurpose catheter was used to image SVG to RCA, SVG to diagonal was noted to be closed.  There appeared to be haziness at the ostial proximal portion of the stent in the SVG as well as what appeared to be slow flow distal to a bend in the distal portion of the graft also with haziness and what appeared to be minimum 60% stenosis possibly up to 80 and some angiographic views.  Decision was made given inability to revascularize native RCA with diffuse small vessel disease and small caliber vessels to intervene with balloon angioplasty in the stented segment of the SVG graft.  Patient was heparinized with 100 units/kg of heparin, he was on a background therapy of aspirin and Effient at baseline,  "ACT was greater than 250, multipurpose guide was used engage the vein graft followed by run-through wire to the distal portion of the graft, 4.0 x 20 NC balloon was used to dilate the proximal portion of the graft up to 16 radha for a minute and a half with prolonged inflation given significant stenosis at the ostial portion of the graft, there was great angiographic improvement of this portion of the graft with better stent expansion, angiography further revealed downstream stenosis in the distal portion of the graft, the balloon was then readvanced to the distal portion followed by 2 inflations up to 14 radha distal proximal fashion at the culprit portion reducing stenosis to 0% residual and improving distal runoff into again small vessel PDA and retrogradely into the RCA.  Final angiography revealed no distal wire trauma no edge dissection, DARRYL-3 flow in the graft as well as into the RPDA which did have some degree of competitive flow from the native RCA although small vessel and diffusely diseased.  All catheters\" removed.  6 Taiwanese Angio-Seal was used to close the arteriotomy with immediate hemostasis and Meints of distal pulses.  He left Cath Lab chest pain-free hemodynamically electrically stable alert talkative staff neurologic grossly intact bilaterally    Complications none  Blood loss less than 10 cc  Contrast 160 cc  Sedation time of 1 hour    Findings  1.  Opening aortic pressure of  Closing pressure  LVEDP elevated 20-25  LV function normal 60%  Normal transaortic valve gradient on pullback    Angiography  1.  Left main normal takeoff, 10% luminal regularities leading into LAD that is subtotally occluded in the midportion, circumflex widely patent  2.  LAD diffusely diseased 80 to 90% the proximal portion, widely patent LIMA to LAD in the midportion, distal portion of the LAD has diffuse luminal regularities but is very small caliber less than 2 mm in diameter, anastomosis is widely patent for the " LIMA  Over 3 LIMA to LAD widely patent, no disease  3.  SVG to diagonal branch is closed  4.  SVG to RCA has ostial 60 to 70% but hazy in appearance, distally also had 70 to 80% hazy lesion suspicious for ISR versus subacute thrombus which was greatly improved by balloon angioplasty, runoff both retrograde into the PDA and anterograde with very small vessel anastomosis likely best for medical therapy  5.  RCA natively diffusely diseased with patent stents in the proximal midportion, the intervening portion between proximal and distal stents is much less than 1 mm in diameter, marginal branches open and otherwise distal portion of vessel not amenable to intervention secondary to severe small caliber and very poor long-term patency  6.  Circumflex widely patent, obtuse marginal branch has diffuse small vessel disease tandem 80% lesions and tortuous not amenable to intervention secondary to small caliber size poor distal runoff and very poor long-term patency, thus medical treatment at this point, unchanged angiographically from prior case 8 to 9 months ago at the time of SVG revascularization with overlapping stents    Conclusions recommendations  1.  Unstable angina  Culprit appeared to be SVG to RCA distal lesion 70 to 80% hazy possibly atherothrombotic as well as proximal ostial ISR.  Other vascular areas with LIMA to LAD, native LAD, circumflex and native RCA.  Unchanged angiographically compared to prior case 9 months ago  Continue aspirin and Effient uninterrupted  High intensity statin, goal-directed medical therapy for diffuse native and bypass graft CAD  We will advance her Ranexa and long-acting nitrates for small vessel disease    further management per clinical course    Moises Haddad MD, PhD  .                OTHER:         Assessment & Plan     Principal Problem:    Dyspnea     39-year-old male with known history of severe premature coronary artery disease with acute inferior wall MI status post  three-vessel CABG in 2022 as well as PCI to the SVG-RCA.  Additional past medical history of dyslipidemia, hypertension, GERD, thyroid disorder, panic attacks.       Respiratory viral illness with pneumonitis  Chest pain  Premature coronary artery disease status post three-vessel CABG following acute MI 2022  Percutaneous coronary intervention SVG-LAD  Primary hypertension  Mixed dyslipidemia  History of GERD     Patient has ruled out for acute coronary syndrome with negative serial cardiac enzymes and no acute ST segment deviation from baseline noted on EKG  Continue aspirin, amlodipine, isosorbide, ACE, BB, Effient, ranolazine, statin    Positive blood cultures with coagulase-negative staph.    Echocardiogram 10/6/2024 revealed   Structurally and functionally normal cardiac valves.  Left ventricular size and contractility is normal with ejection fraction of 60%.  Normal left ventricular diastolic function.  Right ventricle is normal in size and contractility with TAPSE of 2.87 cm..    Presented with shortness of breath with acute respiratory viral illness and positive mycoplasma.  Patient had CABG in the past and intervention.  Patient is not having any chest discomfort.  EKG showed no acute changes.  Troponin levels are negative.  Last cardiac cath was 3/1/2023.    Echocardiogram showed no pericardial effusion.  Normal left ventricular function.    Chest discomfort is better.  EKG showed no acute changes.  Troponin levels continues to be normal.  Blood pressure is doing better.    Continue antibiotic therapy.  Close cardiac monitoring.    Medications were reviewed and updated.  Patient is on aspirin Pletal Imdur levothyroxine lisinopril metoprolol  mg a day pantoprazole Effient 10 mg daily Ranexa 1000 mg twice daily Crestor 20 mg p.o. nightly.    Okay with discharge plans from cardiac standpoint.  Follow-up with Dr. Haddad primary cardiologist.     Further plan will depend on patient's progress.    Reviewed  and updated 10/7/2024.  ]]]]]]]]]]]]]]]]]]]]]        Mariia Carvajal MD  10/07/24  11:21 EDT                  Electronically signed by Mariia Carvajal MD at 10/07/24 1123       Consult Notes (last 24 hours)  Notes from 10/07/24 1057 through 10/08/24 1057   No notes of this type exist for this encounter.       Discharge Summary    No notes of this type exist for this encounter.

## 2024-10-08 NOTE — DISCHARGE SUMMARY
[facility] Medicine Services  Discharge Summary    Date of Service: 10/8/2024  Patient Name: Tramaine Gates  : 1985  MRN: [id number]    Date of Admission: 10/4/2024  Discharge Diagnosis: Dyspnea  Date of Discharge: 10/8/2024  Primary Care Physician: Daniela Hernandez FNP      Presenting Problem:   Acute respiratory distress [R06.03]  Dyspnea [R06.00]  Mycoplasma infection [A49.3]  Chest pain, unspecified type [R07.9]    Active and Resolved Hospital Problems:  Active Hospital Problems    Diagnosis POA    **Dyspnea [R06.00] Yes    Bipolar II disorder [F31.81] Yes    Hx of brief reactive psychosis [Z86.59] Not Applicable    Generalized anxiety disorder with panic attacks [F41.1, F41.0] Yes    Rage attacks [F69] Yes    CAD, multiple vessel [I25.10] Yes    Asthma [J45.909] Yes    Gastroesophageal reflux disease [K21.9] Yes    Migraine headache [G43.909] Yes    Panic attack [F41.0] Yes    Chronic cholecystitis [K81.1] Yes    Hypertension [I10] Yes      Resolved Hospital Problems   No resolved problems to display.         Mycoplasma pneumonia  -RVP positive for mycoplasma on admission  -Continue azithromycin to complete 5-day course of treatment  -Continue pulmonary toileting with I-S, flutter valve   -CT of the chest confirms right middle and lower lobe pneumonia     Chest pain  -Patient has previous history of CAD with stent placement  -Continue home medications with Ranexa, Imdur  -Check echo   -Troponin negative  Would continue DAPT, statin therapy  -Defer to cardiology for any further ischemic workup     Hypertension  -Continue Toprol-XL, lisinopril but holding amlodipine due to borderline low blood pressure     Hypothyroidism  -Continue Synthroid  Hospital Course     HPI:  39-year-old gentleman who scatted history of heart disease with bypass surgery and multiple stents who states the last couple days he had congestion and cough but no fever and increasing difficulty breathing  "tightness in his chest with some tingling in his left arm. Worse with exertion and better with rest. Patient denies any fever chills no recent long car ride plane ride immobilization or foreign travels leg pain or swelling. No injury no one at home with similar illness he does have children in the home.     Observation 10/5/24-Pt anxious. Cardiology consulted due to past medical hx and pt request. Abx for pneumonia. Mrsa negative. Blood cx pending  10/6/24-Patient still complaining of constant chest pain over top the left side of his chest.  He denies it being worse with deep inspiration or cough.  10/7/24 seen in bed NAD, c/o cough and chest pain\"  10/8/24 doing better today, will dc home, condition on dc stable    DISCHARGE Follow Up Recommendations for labs and diagnostics:   Follow with pcp in one week  Follow with cardiology in 2 weeks    Day of Discharge     Vital Signs:  Temp:  [97.4 °F (36.3 °C)-98.2 °F (36.8 °C)] 98.1 °F (36.7 °C)  Heart Rate:  [58-81] 65  Resp:  [10-36] 14  BP: (102-159)/() 113/63  Flow (L/min):  [1-2] 2    Physical Exam   General Appearance:  Alert, cooperative, no distress, appears stated age  Head:   Normocephalic, without obvious abnormality, atraumatic  Eyes:   PERRL, conjunctiva/corneas clear, EOM's intact, fundi benign, both eyes  Ears:    Normal TM's and external ear canals, both ears  Nose:Nares normal, septum midline, mucosa normal, no drainage or sinus tenderness  Throat:Lips, mucosa, and tongue normal; teeth and gums normal  Neck:Supple, symmetrical, trachea midline, no adenopathy, thyroid: not enlarged, symmetric, no tenderness/mass/nodules, no carotid bruit or JVD  Lungs:             Mild right-sided rhonchi, respirations unlabored  Heart:  Regular rate and rhythm, S1, S2 normal, no murmur, rub or gallop  Abdomen:  Soft, non-tender, bowel sounds active all four quadrants,  no masses, no organomegaly  Extremities:Extremities normal, atraumatic, no cyanosis or " edema  Pulses:2+ and symmetric  Skin:Skin color, texture, turgor normal, no rashes or lesions  Neurologic: Normal         Pertinent  and/or Most Recent Results     LAB RESULTS:      Lab 10/07/24  0202 10/06/24  0127 10/05/24  1648 10/05/24  0039 10/04/24  0944   WBC 6.24 5.55  --  6.53 8.28   HEMOGLOBIN 11.8* 11.6*  --  12.2* 13.3   HEMATOCRIT 36.4* 35.9*  --  38.2 40.6   PLATELETS 277 272  --  283 299   NEUTROS ABS 4.19 3.52  --  4.46 6.31   IMMATURE GRANS (ABS) 0.04 0.07*  --  0.03 0.05   LYMPHS ABS 1.64 1.52  --  1.61 1.49   MONOS ABS 0.27 0.33  --  0.32 0.29   EOS ABS 0.07 0.08  --  0.09 0.11   MCV 88.1 88.2  --  88.4 88.1   LACTATE  --   --  1.3  --   --          Lab 10/07/24  0202 10/06/24  0127 10/05/24  1648 10/05/24  0039 10/04/24  0944   SODIUM 139 137  --  136 134*   POTASSIUM 4.0 4.0  --  4.0 3.5   CHLORIDE 107 105  --  103 100   CO2 24.6 22.2  --  22.2 19.6*   ANION GAP 7.4 9.8  --  10.8 14.4   BUN 8 15  --  14 11   CREATININE 1.06 1.13  --  1.18 1.35*   EGFR 90.4 83.7  --  79.5 67.6   GLUCOSE 86 91  --  89 146*   CALCIUM 8.6 8.5*  --  9.0 9.4   MAGNESIUM 2.0  --   --   --   --    TSH  --   --  2.180  --   --          Lab 10/04/24  0944   TOTAL PROTEIN 7.1   ALBUMIN 4.2   GLOBULIN 2.9   ALT (SGPT) 24   AST (SGOT) 20   BILIRUBIN 0.3   ALK PHOS 93         Lab 10/08/24  0010 10/07/24  2229 10/05/24  1648 10/04/24  1205 10/04/24  0944   PROBNP  --   --   --   --  236.0   HSTROP T 8 8 8 8 9                 Brief Urine Lab Results       None          Microbiology Results (last 10 days)       Procedure Component Value - Date/Time    MRSA Screen, PCR (Inpatient) - Swab, Nares [[id number]]  (Normal) Collected: 10/05/24 0853    Lab Status: Final result Specimen: Swab from Nares Updated: 10/05/24 1032     MRSA PCR No MRSA Detected    Narrative:      The negative predictive value of this diagnostic test is high and should only be used to consider de-escalating anti-MRSA therapy. A positive result may indicate  colonization with MRSA and must be correlated clinically.    Blood Culture - Blood, Arm, Left [[id number]]  (Normal) Collected: 10/04/24 1205    Lab Status: Preliminary result Specimen: Blood from Arm, Left Updated: 10/07/24 1215     Blood Culture No growth at 3 days    Narrative:      Less than seven (7) mL's of blood was collected.  Insufficient quantity may yield false negative results.    Blood Culture - Blood, Arm, Right [[id number]]  (Abnormal) Collected: 10/04/24 1205    Lab Status: Final result Specimen: Blood from Arm, Right Updated: 10/06/24 0631     Blood Culture Staphylococcus, coagulase negative     Isolated from Aerobic Bottle     Gram Stain Aerobic Bottle Gram positive cocci in clusters    Narrative:      Probable contaminant requires clinical correlation, susceptibility not performed unless requested by physician.    Less than seven (7) mL's of blood was collected.  Insufficient quantity may yield false negative results.    Blood Culture ID, PCR - Blood, Arm, Right [[id number]]  (Abnormal) Collected: 10/04/24 1205    Lab Status: Final result Specimen: Blood from Arm, Right Updated: 10/05/24 0826     BCID, PCR Staph spp, not aureus or lugdunensis. Identification by BCID2 PCR.     BOTTLE TYPE Aerobic Bottle    Respiratory Panel PCR w/COVID-19(SARS-CoV-2) FLASH/GONZALEZ/KELSEY/PAD/COR/YA In-House, NP Swab in UTM/VTM, 2 HR TAT - Swab, Nasopharynx [[id number]]  (Abnormal) Collected: 10/04/24 0944    Lab Status: Final result Specimen: Swab from Nasopharynx Updated: 10/04/24 1112     ADENOVIRUS, PCR Not Detected     Coronavirus 229E Not Detected     Coronavirus HKU1 Not Detected     Coronavirus NL63 Not Detected     Coronavirus OC43 Not Detected     COVID19 Not Detected     Human Metapneumovirus Not Detected     Human Rhinovirus/Enterovirus Not Detected     Influenza A PCR Not Detected     Influenza B PCR Not Detected     Parainfluenza Virus 1 Not Detected     Parainfluenza Virus 2 Not Detected     Parainfluenza Virus 3  Not Detected     Parainfluenza Virus 4 Not Detected     RSV, PCR Not Detected     Bordetella pertussis pcr Not Detected     Bordetella parapertussis PCR Not Detected     Chlamydophila pneumoniae PCR Not Detected     Mycoplasma pneumo by PCR Detected    Narrative:      In the setting of a positive respiratory panel with a viral infection PLUS a negative procalcitonin without other underlying concern for bacterial infection, consider observing off antibiotics or discontinuation of antibiotics and continue supportive care. If the respiratory panel is positive for atypical bacterial infection (Bordetella pertussis, Chlamydophila pneumoniae, or Mycoplasma pneumoniae), consider antibiotic de-escalation to target atypical bacterial infection.            CT Chest Without Contrast Diagnostic    Result Date: 10/4/2024  Impression: Impression: Findings suggestive of pneumonia/pneumonitis involving the right lower lobe and to a lesser extent the right lower lobe. Electronically Signed: Rui Roberts DO  10/4/2024 7:39 PM EDT  Workstation ID: [id number]    XR Chest 1 View    Result Date: 10/4/2024  Impression: Impression: No active disease. Electronically Signed: Kermit Mcwilliams MD  10/4/2024 10:34 AM EDT  Workstation ID: [id number]     Results for orders placed during the hospital encounter of 06/22/22    Duplex Vein Mapping Lower Extremity - Bilateral CAR    Interpretation Summary  · The left greater saphenous vein is patent and of adequate size in the thigh.  · The left greater saphenous vein is patent and of adequate size in the calf.      Results for orders placed during the hospital encounter of 06/22/22    Duplex Vein Mapping Lower Extremity - Bilateral CAR    Interpretation Summary  · The left greater saphenous vein is patent and of adequate size in the thigh.  · The left greater saphenous vein is patent and of adequate size in the calf.      Results for orders placed during the hospital encounter of 10/04/24    Adult  Transthoracic Echo Complete W/ Cont if Necessary Per Protocol    Interpretation Summary    Left ventricular ejection fraction appears to be 56 - 60%.    Indications  Chest pain    Technically satisfactory study.  Mitral valve is structurally normal.  Tricuspid valve is structurally normal.  Aortic valve is structurally normal.  Pulmonic valve could not be well visualized.  No evidence for mitral tricuspid or aortic regurgitation is seen by Doppler study.  Left atrium is normal in size.  Right atrium is normal in size.  Left ventricle is normal in size and contractility with ejection fraction of 60%.  Normal left ventricular diastolic function.  Right ventricle is normal in size and contractility with TAPSE of 2.87 cm..  Atrial septum is intact.  Aorta is normal.  No pericardial effusion or intracardiac thrombus is seen.    Impression  Structurally and functionally normal cardiac valves.  Left ventricular size and contractility is normal with ejection fraction of 60%.  Normal left ventricular diastolic function.  Right ventricle is normal in size and contractility with TAPSE of 2.87 cm..      Labs Pending at Discharge:  Pending Labs       Order Current Status    Blood Culture - Blood, Arm, Left Preliminary result            Procedures Performed           Consults:   Consults       Date and Time Order Name Status Description    10/5/2024  3:48 PM Inpatient Hospitalist Consult Completed     10/5/2024  7:25 AM Inpatient Cardiology Consult                Discharge Details        Discharge Medications        New Medications        Instructions Start Date   azithromycin 250 MG tablet  Commonly known as: ZITHROMAX   Indications: Pneumonia      losartan 25 MG tablet  Commonly known as: Cozaar   25 mg, Oral, Daily             Continue These Medications        Instructions Start Date   albuterol sulfate  (90 Base) MCG/ACT inhaler  Commonly known as: PROVENTIL HFA;VENTOLIN HFA;PROAIR HFA   Inhale 2 puffs by mouth as  directed every four to six hours as needed for coughing, shortness of breath, wheezing      amLODIPine 5 MG tablet  Commonly known as: Norvasc   5 mg, Oral, Daily      aspirin 81 MG EC tablet  Commonly known as: Aspirin Low Dose   81 mg, Oral, Daily      cilostazol 100 MG tablet  Commonly known as: PLETAL   50 mg, Oral, 2 Times Daily      isosorbide mononitrate 120 MG 24 hr tablet  Commonly known as: IMDUR   120 mg, Oral, Every 24 Hours Scheduled      levothyroxine 50 MCG tablet  Commonly known as: SYNTHROID, LEVOTHROID   TAKE 1 TABLET BY MOUTH EVERY DAY      metoprolol succinate  MG 24 hr tablet  Commonly known as: TOPROL-XL   100 mg, Oral, Every 24 Hours Scheduled      nitroglycerin 0.4 MG SL tablet  Commonly known as: NITROSTAT   0.4 mg, Sublingual, Every 5 Minutes PRN, Take no more than 3 doses in 15 minutes.      omeprazole 20 MG capsule  Commonly known as: priLOSEC   20 mg, Oral, Daily      prasugrel 10 MG tablet  Commonly known as: EFFIENT   10 mg, Oral, Daily      ranolazine 1000 MG 12 hr tablet  Commonly known as: RANEXA   1,000 mg, Oral, 2 Times Daily      Repatha SureClick solution auto-injector SureClick injection  Generic drug: Evolocumab   Inject 140 mg total (1 mL) into the skin every 14 (fourteen) days.      rosuvastatin 40 MG tablet  Commonly known as: CRESTOR   20 mg, Oral, Daily             Stop These Medications      lisinopril 5 MG tablet  Commonly known as: PRINIVIL,ZESTRIL              Allergies   Allergen Reactions    Shellfish-Derived Products Unknown - High Severity         Discharge Disposition:   Home or Self Care    Diet:  Hospital:  Diet Order   Procedures    Diet: Regular/House; Fluid Consistency: Thin (IDDSI 0)         Discharge Activity:   Activity Instructions       Gradually Increase Activity Until at Pre-Hospitalization Level                CODE STATUS:  Code Status and Medical Interventions: CPR (Attempt to Resuscitate); Full Support   Ordered at: 10/04/24 1148     Code  Status (Patient has no pulse and is not breathing):    CPR (Attempt to Resuscitate)     Medical Interventions (Patient has pulse or is breathing):    Full Support         Future Appointments   Date Time Provider Department Center   10/14/2024  2:00 PM Moises Haddad MD [facility]       Additional Instructions for the Follow-ups that You Need to Schedule       Discharge Follow-up with PCP   As directed       Currently Documented PCP:    Daniela Hernandez FNP    PCP Phone Number:    [phone]     Follow Up Details: 1 week                Time spent on Discharge including face to face service:  35 minutes    Signature: Electronically signed by El Ruelas MD, 10/08/24, 08:20 EDT.  [facility] Hospitalist Team

## 2024-10-08 NOTE — PLAN OF CARE
Goal Outcome Evaluation:            Discharge instructions reviewed with patient.

## 2024-10-08 NOTE — CODE DOCUMENTATION
ICU Charge RN Michelle arrived to bedside at 2219. Patient was awake, oriented, and lying in bed clutching his chest in pain. Primary RN reported he was admitted for chest pain but workup thus far was unremarkable. No aggravating factors noted for this incident but pain seemed to be worse this time so RR was called. Had already given 2 doses of nitro sublingual and was giving the third now. 12 Lead EKG performed and showed SR, no ST elevation noted. Patient reported chest pain had gone from an 8 to a 6 following the 3rd dose of nitro. Vitals stable. Primary RN to call cardiology and update them. At this time patient will remain in room 225.

## 2024-10-09 LAB — BACTERIA SPEC AEROBE CULT: NORMAL

## 2024-10-09 NOTE — OUTREACH NOTE
Prep Survey      Flowsheet Row Responses   [facility] facility patient discharged from? [facility]   Is LACE score < 7 ? No   Eligibility Readm Mgmt   Discharge diagnosis Dyspnea   Does the patient have one of the following disease processes/diagnoses(primary or secondary)? Other   Does the patient have Home health ordered? No   Is there a DME ordered? No   Prep survey completed? Yes            CLARICE HOPSON - Registered Nurse

## 2024-10-10 LAB
QT INTERVAL: 404 MS
QT INTERVAL: 445 MS
QTC INTERVAL: 442 MS
QTC INTERVAL: 445 MS

## 2024-10-11 ENCOUNTER — READMISSION MANAGEMENT (OUTPATIENT)
Dept: CALL CENTER | Facility: HOSPITAL | Age: 39
End: 2024-10-11
Payer: MEDICAID

## 2024-10-11 NOTE — OUTREACH NOTE
Medical Week 1 Survey      Flowsheet Row Responses   [facility] facility patient discharged from? [facility]   Does the patient have one of the following disease processes/diagnoses(primary or secondary)? Other   Week 1 attempt successful? No   Unsuccessful attempts Attempt 1            Dayna MESSINA - Licensed Nurse

## 2024-10-14 ENCOUNTER — OFFICE VISIT (OUTPATIENT)
Dept: CARDIOLOGY | Facility: CLINIC | Age: 39
End: 2024-10-14
Payer: MEDICAID

## 2024-10-14 VITALS
RESPIRATION RATE: 18 BRPM | SYSTOLIC BLOOD PRESSURE: 130 MMHG | HEIGHT: 70 IN | HEART RATE: 100 BPM | BODY MASS INDEX: 43.81 KG/M2 | DIASTOLIC BLOOD PRESSURE: 84 MMHG | OXYGEN SATURATION: 96 % | WEIGHT: 306 LBS

## 2024-10-14 DIAGNOSIS — I25.10 CAD, MULTIPLE VESSEL: Primary | ICD-10-CM

## 2024-10-14 DIAGNOSIS — R06.09 DOE (DYSPNEA ON EXERTION): ICD-10-CM

## 2024-10-14 DIAGNOSIS — E66.01 MORBID OBESITY WITH BMI OF 40.0-44.9, ADULT: ICD-10-CM

## 2024-10-14 PROCEDURE — 3075F SYST BP GE 130 - 139MM HG: CPT | Performed by: INTERNAL MEDICINE

## 2024-10-14 PROCEDURE — 3079F DIAST BP 80-89 MM HG: CPT | Performed by: INTERNAL MEDICINE

## 2024-10-14 PROCEDURE — 99214 OFFICE O/P EST MOD 30 MIN: CPT | Performed by: INTERNAL MEDICINE

## 2024-10-14 NOTE — PROGRESS NOTES
Cardiology Clinic Note  Moises Haddad MD, PhD    Subjective:     Encounter Date:10/14/2024      Patient ID: Tramaine Gates is a 39 y.o. male.    Chief Complaint:  Chief Complaint   Patient presents with    Hospital Follow Up Visit       HPI:      I the pleasure to see this 39-year-old gentleman in follow-up with history of multivessel bypass 2022, chest pain admission prompting intervention SVG to RCA with proximal balloon angioplasty given in-stent restenosis.  The RCA graft is essentially overlapping stents all the way down to the anastomosis with the PDA with small vessel disease diffusely in the RCA natively not amenable to intervention given very small caliber size with significant in-stent restenosis.  Patient underwent multiple balloon angioplasty throughout the entirety of the stented segment from the distal back to proximal ostial SVG to the RCA improving antegrade runoff with in segment hazy appearing thrombotic lesion at that time.  LIMA to LAD is widely patent with small size distal LAD, circumflex natively was also widely patent with preserved LV systolic function.  He is back today with complaints of shortness of breath and chest discomfort, burning radiating to his left arm.  He has complicated cardiac history below with early CAD, he is regaining weight, still has poor diet, not exercising regularly.  He is wondering if he needs another heart cath.  After long discussion we decided for nuclear ischemic evaluation try to localize a least regional where he may be having issues.  In December 2023 he had a nuclear evaluation demonstrating normal stress and rest perfusion with no reversible ischemia.  He is agreeable to this as well as further lifestyle changes which are desperately needed with advanced CAD at a young age, refractory angina pectoris      Recent echo October 6, 2024  EF 55 to 60%  Normal atrial sizes, normal right left-sided pressures estimated noninvasively, normal valves with no  dysfunction, RV size normal, RV function normal    Stress evaluation December 2023, normal stress and rest perfusion, no evidence of ischemia and preserved EF normal regional and global wall motion      Cardiac catheterization March 2023, balloon angioplasty 4.0 x 20 NC balloon to the distal portion of the SVG-RCA inside the stented segment with greater than 70% ISR with subocclusive atherothrombotic occlusion, ostial proximal 50% stenosis reduced to less than 10% distally about 20% proximally.,  Angiographically unchanged LIMA to LAD, native LAD circumflex and RCA, widely patent circumflex, tortuous small caliber obtuse marginal branches 80% angiographically unchanged small vessel disease native RCA, LIMA to LAD widely patent, distal LAD diffuse luminal irregularities small caliber less than 2 mm in diameter, overall normal EF     Review of systems otherwise negative x 14 point review of systems except as mentioned above     Historical data copied forward from previous encounters in EMR including the history, exam, and assessment/plan has been reviewed and is unchanged unless noted otherwise.     Cardiac medicines reviewed with risk, benefits, and necessity of each discussed.     Risk and benefit of cardiac testing reviewed including death heart attack stroke pain bleeding infection need for vascular /cardiovascular surgery were discussed and the patient      Objective:         Objective  Vitals reviewed below     Physical Exam    Regular rate and rhythm no rubs gallops heave or lift       Assessment:         Assessment  1.  coronary artery disease status post remote bypass bypass 2022, chronic stable angina  - [facility] 9/15/2022: three-vessel coronary disease, patent LIMA to LAD, closed SVG to OM, heavily diseased SVG to RCA with poor candidacy for native vessel intervention  - s/p Overlapping Xience drug-eluting stents 3.5 x 38 x 3 stents in overlapping fashion postdilated to 3.7 mm at 16 to 18 radha, reduction stenosis  to 0% residual, improvement DARRYL-3 flow via the vein graft to the distal RCA and RPDA  - C 3/2/2023: Culprit appeared to be SVG to RCA distal lesion 70 to 80% hazy possibly atherothrombotic as well as proximal ostial ISR.  Other vascular areas with LIMA to LAD, native LAD, circumflex and native RCA.  Unchanged angiographically compared to prior case 9 months ago  Continue aspirin and Effient uninterrupted  Presently on max tolerated antianginals including Imdur Ranexa beta-blockers calcium channel blockers  High intensity statin, goal-directed medical therapy for diffuse native and bypass graft CAD  - preserved LVEF         2. CAD  - s/p STEMI 6/14/2022: [facility] showed three vessel CAD- he underwent ASHLEE placement to the culprit lesion in the RCA.  LAD had 80-90% proximal stenosis and LCx had 40-50% in distal LCx/OM.  Mr. Gates underwent repeat cardiac cath on 6/16/2022 and received ASHLEE to LAD  - repeat admission 5 days later showing in stent thrombosis sent for CABG  - s/p 3V CABG 6/29/2022 (LIMA-LAD, SVG-OMofLCx, SVG-RPDA)  -2023 POBA to the distal portion of the SVG to RCA inside the stented segment secondary to what appears to be greater than 70% in-stent restenosis distally versus subocclusive thrombus, ostial proximal greater than 50% restenosis, reduced to less than 10% distally, remains about 20% proximally at the ostium, improved angiographic runoff which was DARRYL II preoperatively     Today  Continue beta-blocker with metoprolol, lisinopril, Imdur, cilostazol 50 twice daily  to help prevent in-stent restenosis and Effient, can stop aspirin if continues on cilostazol and Effient  Ranexa for chest pain relief and high intensity statin  He is on appropriate goal-directed medical therapy with secondary prevention goals    Recurrence of angina burning chest pain  Reorder Lexiscan stress ,  localized regional ischemia if able provide potential target for revascularization  Lifestyle changes extensively discussed  "today    Bariatric referral     3.  HTN controlled at home less than 140 systolic     4. HLD, high intensity statin aspirin and antiplatelets     5. Obesity bariatric referral made today     Continue present pharmacotherapy  Follow-up 6 months       Objective:         /84 (BP Location: Right arm, Patient Position: Sitting)   Pulse 100   Resp 18   Ht 177.8 cm (70\")   Wt (!) 139 kg (306 lb)   SpO2 96%   BMI 43.91 kg/m²     Physical Exam    Assessment:         There are no diagnoses linked to this encounter.       Plan:              The pleasure to be involved in this patient's cardiovascular care.  Please call with any questions or concerns  Moises Haddad MD, PhD    Most recent EKG as reviewed and interpreted by me:  Procedures     Most recent echo as reviewed and interpreted by me:  Results for orders placed during the hospital encounter of 10/04/24    Adult Transthoracic Echo Complete W/ Cont if Necessary Per Protocol    Interpretation Summary    Left ventricular ejection fraction appears to be 56 - 60%.    Indications  Chest pain    Technically satisfactory study.  Mitral valve is structurally normal.  Tricuspid valve is structurally normal.  Aortic valve is structurally normal.  Pulmonic valve could not be well visualized.  No evidence for mitral tricuspid or aortic regurgitation is seen by Doppler study.  Left atrium is normal in size.  Right atrium is normal in size.  Left ventricle is normal in size and contractility with ejection fraction of 60%.  Normal left ventricular diastolic function.  Right ventricle is normal in size and contractility with TAPSE of 2.87 cm..  Atrial septum is intact.  Aorta is normal.  No pericardial effusion or intracardiac thrombus is seen.    Impression  Structurally and functionally normal cardiac valves.  Left ventricular size and contractility is normal with ejection fraction of 60%.  Normal left ventricular diastolic function.  Right ventricle is normal in size and " contractility with TAPSE of 2.87 cm..      Most recent stress test as reviewed and interpreted by me:  Results for orders placed during the hospital encounter of 12/18/23    Stress Test With Myocardial Perfusion One Day    Interpretation Summary  1. Negative Regadenoson Electrocardiography: There is no ECG evidence of myocardial ischemia.  2.  Adequate blood-pressure response to regadenoson.  3. No regadenoson-induced arrhythmias  4. Normal SPECT Myocardial Perfusion Imaging: There is no scintigraphic evidence of myocardial ischemia or scarring.  5. Overall left ventricular function is preserved, with a normal ejection fraction and no regional asynergy.      Most recent cardiac catheterization as reviewed interpreted by me:  Results for orders placed during the hospital encounter of 03/01/23    Cardiac Catheterization/Vascular Study    Conclusion   Moises Haddad MD, PhD  [facility] cardiology  Date of service 3-2-2023    Procedure  1.  Left heart catheterization coronary angiography left ventriculography In GALLO position, imaging of native and bypass grafts  2.,  Balloon angioplasty 4.0 x 20 NC balloon of the distal portion of the SVG to RCA inside the stented segment secondary to what appears to be greater than 70% in-stent restenosis distally versus subocclusive thrombus, ostial proximal greater than 50% restenosis, reduced to less than 10% distally, remains about 20% proximally at the ostium, improved angiographic runoff which was DARRYL II preoperatively, DARRYL-3 post procedurally    Indication  Unstable angina, unable for intervention to the native RCA    After informed consent patient brought to the Cath Lab sterilely prepped and draped in usual fashion with exposure of the right groin for right common femoral arterial access via micropuncture modified Seldinger technique with placement of a 6 [place] sheath.  035 guidewires advanced aortic valve followed by diagnostic JL 4 and JR4 catheters  "for selective left and right coronary angiography, JR4 was used to image the LIMA to LAD, multipurpose catheter was used to image SVG to RCA, SVG to diagonal was noted to be closed.  There appeared to be haziness at the ostial proximal portion of the stent in the SVG as well as what appeared to be slow flow distal to a bend in the distal portion of the graft also with haziness and what appeared to be minimum 60% stenosis possibly up to 80 and some angiographic views.  Decision was made given inability to revascularize native RCA with diffuse small vessel disease and small caliber vessels to intervene with balloon angioplasty in the stented segment of the SVG graft.  Patient was heparinized with 100 units/kg of heparin, he was on a background therapy of aspirin and Effient at baseline, ACT was greater than 250, multipurpose guide was used engage the vein graft followed by run-through wire to the distal portion of the graft, 4.0 x 20 NC balloon was used to dilate the proximal portion of the graft up to 16 radha for a minute and a half with prolonged inflation given significant stenosis at the ostial portion of the graft, there was great angiographic improvement of this portion of the graft with better stent expansion, angiography further revealed downstream stenosis in the distal portion of the graft, the balloon was then readvanced to the distal portion followed by 2 inflations up to 14 radha distal proximal fashion at the culprit portion reducing stenosis to 0% residual and improving distal runoff into again small vessel PDA and retrogradely into the RCA.  Final angiography revealed no distal wire trauma no edge dissection, DARRYL-3 flow in the graft as well as into the RPDA which did have some degree of competitive flow from the native RCA although small vessel and diffusely diseased.  All catheters\" removed.  6 [place] Angio-Seal was used to close the arteriotomy with immediate hemostasis and Meints of distal pulses.  He " left Cath Lab chest pain-free hemodynamically electrically stable alert talkative staff neurologic grossly intact bilaterally    Complications none  Blood loss less than 10 cc  Contrast 160 cc  Sedation time of 1 hour    Findings  1.  Opening aortic pressure of  Closing pressure  LVEDP elevated 20-25  LV function normal 60%  Normal transaortic valve gradient on pullback    Angiography  1.  Left main normal takeoff, 10% luminal regularities leading into LAD that is subtotally occluded in the midportion, circumflex widely patent  2.  LAD diffusely diseased 80 to 90% the proximal portion, widely patent LIMA to LAD in the midportion, distal portion of the LAD has diffuse luminal regularities but is very small caliber less than 2 mm in diameter, anastomosis is widely patent for the LIMA  Over 3 LIMA to LAD widely patent, no disease  3.  SVG to diagonal branch is closed  4.  SVG to RCA has ostial 60 to 70% but hazy in appearance, distally also had 70 to 80% hazy lesion suspicious for ISR versus subacute thrombus which was greatly improved by balloon angioplasty, runoff both retrograde into the PDA and anterograde with very small vessel anastomosis likely best for medical therapy  5.  RCA natively diffusely diseased with patent stents in the proximal midportion, the intervening portion between proximal and distal stents is much less than 1 mm in diameter, marginal branches open and otherwise distal portion of vessel not amenable to intervention secondary to severe small caliber and very poor long-term patency  6.  Circumflex widely patent, obtuse marginal branch has diffuse small vessel disease tandem 80% lesions and tortuous not amenable to intervention secondary to small caliber size poor distal runoff and very poor long-term patency, thus medical treatment at this point, unchanged angiographically from prior case 8 to 9 months ago at the time of SVG revascularization with overlapping stents    Conclusions  recommendations  1.  Unstable angina  Culprit appeared to be SVG to RCA distal lesion 70 to 80% hazy possibly atherothrombotic as well as proximal ostial ISR.  Other vascular areas with LIMA to LAD, native LAD, circumflex and native RCA.  Unchanged angiographically compared to prior case 9 months ago  Continue aspirin and Effient uninterrupted  High intensity statin, goal-directed medical therapy for diffuse native and bypass graft CAD  We will advance her Ranexa and long-acting nitrates for small vessel disease    further management per clinical course    Moises Haddad MD, PhD  .    The following portions of the patient's history were reviewed and updated as appropriate: allergies, current medications, past family history, past medical history, past social history, past surgical history, and problem list.      ROS:  14 point review of systems negative except as mentioned above    Current Outpatient Medications:     albuterol sulfate  (90 Base) MCG/ACT inhaler, Inhale 2 puffs by mouth as directed every four to six hours as needed for coughing, shortness of breath, wheezing, Disp: 8.5 g, Rfl: 0    amLODIPine (Norvasc) 5 MG tablet, Take 1 tablet by mouth Daily., Disp: 90 tablet, Rfl: 0    aspirin (Aspirin Low Dose) 81 MG EC tablet, Take 1 tablet by mouth Daily., Disp: 90 tablet, Rfl: 0    azithromycin (ZITHROMAX) 250 MG tablet, Take 2 tablets by mouth on day 1, then take 1 tablet by mouth once daily on days 2-5, Disp: 6 tablet, Rfl: 0    cilostazol (PLETAL) 100 MG tablet, Take 0.5 tablets by mouth 2 (Two) Times a Day., Disp: 90 tablet, Rfl: 3    Evolocumab (Repatha SureClick) solution auto-injector SureClick injection, Inject 140 mg total (1 mL) into the skin every 14 (fourteen) days., Disp: 2 mL, Rfl: 5    isosorbide mononitrate (IMDUR) 120 MG 24 hr tablet, Take 1 tablet by mouth Daily., Disp: 30 tablet, Rfl: 0    levothyroxine (SYNTHROID, LEVOTHROID) 50 MCG tablet, TAKE 1 TABLET BY MOUTH EVERY DAY, Disp: 90  tablet, Rfl: 0    losartan (Cozaar) 25 MG tablet, Take 1 tablet by mouth Daily., Disp: 30 tablet, Rfl: 0    metoprolol succinate XL (TOPROL-XL) 100 MG 24 hr tablet, Take 1 tablet by mouth Daily., Disp: 30 tablet, Rfl: 0    nitroglycerin (NITROSTAT) 0.4 MG SL tablet, Place 1 tablet under the tongue Every 5 (Five) Minutes As Needed for Chest Pain (Only if SBP Greater Than 100). Take no more than 3 doses in 15 minutes., Disp: 25 tablet, Rfl: 0    omeprazole (priLOSEC) 40 MG capsule, Take 1 capsule by mouth Daily., Disp: , Rfl:     prasugrel (EFFIENT) 10 MG tablet, Take 1 tablet by mouth Daily., Disp: , Rfl:     ranolazine (RANEXA) 1000 MG 12 hr tablet, Take 1 tablet by mouth 2 (Two) Times a Day., Disp: 180 tablet, Rfl: 3    rosuvastatin (CRESTOR) 40 MG tablet, Take 0.5 tablets by mouth Daily., Disp: , Rfl:     Problem List:  Patient Active Problem List   Diagnosis    Hypertension    Peptic ulcer    Asthma    Blepharitis    Chronic cholecystitis    Contact with and (suspected) exposure to covid-19    Corneal ulcer    Costochondritis    Cough    Disorder of thyroid    Gastroesophageal reflux disease    Hyperlipidemia    Migraine headache    Panic attack    Acute inferior myocardial infarction    CAD, multiple vessel    Bipolar II disorder    Hx of brief reactive psychosis    Generalized anxiety disorder with panic attacks    Rage attacks    Chest pain, unspecified type    S/P CABG x 3    Chest pain    Dyspnea     Past Medical History:  Past Medical History:   Diagnosis Date    Acute inferior myocardial infarction 06/14/2022    Allergic     Asthma 01/27/2022    CAD, multiple vessel 06/14/2022    Disorder of thyroid 01/27/2022    Gastroesophageal reflux disease 01/27/2022    Hx of complete eye exam 2016    Hyperlipidemia 01/27/2022    Hypertension     Migraine headache 01/27/2022    Panic attack 01/27/2022    Peptic ulcer 09/14/2017     Past Surgical History:  Past Surgical History:   Procedure Laterality Date    CARDIAC  CATHETERIZATION N/A 2022    Procedure: Left Heart Cath;  Surgeon: Matthieu Del Toro MD;  Location:  [facility] CATH [facility]VASIVE [facility];  Service: Cardiology;  Laterality: N/A;    CARDIAC CATHETERIZATION N/A 2022    Procedure: Percutaneous Coronary Intervention;  Surgeon: Matthieu Del Toro MD;  Location:  [facility]AS[facility];  Service: Cardiovascular;  Laterality: N/A;    CARDIAC CATHETERIZATION N/A 2022    Procedure: Left Heart Cath possible PCI, atherectomy, hemodynamic support;  Surgeon: Moises Haddad MD;  Location: [facility] CATH INVASIVE LOCATION;  Service: Cardiology;  Laterality: N/A;    CARDIAC CATHETERIZATION N/A 09/15/2022    Procedure: Left Heart Cath;  Surgeon: Moises Haddad MD;  Location: [facility] CATH INVASIVE LOCATION;  Service: Cardiology;  Laterality: N/A;    CARDIAC CATHETERIZATION  9/15/2022    CARDIAC CATHETERIZATION N/A 3/2/2023    Procedure: Left Heart Cath;  Surgeon: Moises Haddad MD;  Location: [facility] CATH INVASIVE LOCATION;  Service: Cardiology;  Laterality: N/A;    CHOLECYSTECTOMY  2019    CORONARY ARTERY BYPASS GRAFT N/A 2022    Procedure: CORONARY ARTERY BYPASS GRAFTING;  Surgeon: Pablo Ball MD;  Location: [facility] CVOR;  Service: Cardiothoracic;  Laterality: N/A;  CABG X 3(2 vein grafts, 1 LIMA graft)    CORONARY ARTERY BYPASS GRAFT  2022    CORONARY STENT PLACEMENT      MANDIBLE SURGERY       Social History:  Social History     Socioeconomic History    Marital status: Single   Tobacco Use    Smoking status: Former     Current packs/day: 0.00     Average packs/day: 1.5 packs/day for 26.5 years (39.7 ttl pk-yrs)     Types: Cigarettes     Start date: 1996     Quit date: 2022     Years since quittin.3    Smokeless tobacco: Never   Vaping Use    Vaping status: Never Used   Substance and Sexual Activity    Alcohol use: Not Currently    Drug use: Yes     Frequency: 7.0 times per week     Types: Marijuana    Sexual  activity: Yes     Partners: Female     Allergies:  Allergies   Allergen Reactions    Shellfish-Derived Products Unknown - High Severity     Immunizations:    There is no immunization history on file for this patient.         In-Office Procedure(s):  No orders to display        ASCVD RIsk Score::  The ASCVD Risk score (Nigel BAKER, et al., 2019) failed to calculate for the following reasons:    Risk score cannot be calculated because patient has a medical history suggesting prior/existing ASCVD    Imaging:    Results for orders placed during the hospital encounter of 10/04/24    XR Chest 1 View    Narrative  XR CHEST 1 VW    Date of Exam: 10/4/2024 10:18 AM EDT    Indication: Chest pain with shortness of breath.    Comparison: 12/19/2023.    Findings:  The heart size is normal. The patient has had a previous CABG procedure. The pulmonary vascular markings are normal. The lungs and pleural spaces are clear of active disease. The bony thorax is normal for age.    Impression  Impression:  No active disease.      Electronically Signed: Kermit Mcwilliams MD  10/4/2024 10:34 AM EDT  Workstation ID: [id number]       Results for orders placed during the hospital encounter of 10/04/24    CT Chest Without Contrast Diagnostic    Narrative  CT CHEST WO CONTRAST DIAGNOSTIC    Date of Exam: 10/4/2024 5:57 PM EDT    Indication: pneumonia.    Comparison: 1/22/2024    Technique: Axial CT images were obtained of the chest without contrast administration.  Sagittal and coronal reconstructions were performed.  Automated exposure control and iterative reconstruction methods were used.      Findings:  Lower Neck: Unremarkable.    Hilum and Mediastinum: No pathologically enlarged lymph nodes.  Normal heart size.   No pericardial effusion.  Unremarkable thoracic aorta and pulmonary arteries.    Lung Parenchyma and Pleura: Mild patchy opacities and early consolidative changes noted within the right middle lobe. Additionally scattered tree-in-bud  nodularities are also noted within the right lower lobe. Right lower lobe pulmonary nodule measuring  0.6 cm, unchanged since 2022 and most likely benign.  The central airways are patent. No pleural effusions or pneumothorax.    Upper Abdomen: No acute process.    Soft tissues: Unremarkable.    Osseous structures: No acute osseous abnormality.    Impression  Impression:  Findings suggestive of pneumonia/pneumonitis involving the right lower lobe and to a lesser extent the right lower lobe.      Electronically Signed: Rui Roberts DO  10/4/2024 7:39 PM EDT  Workstation ID: [id number]      Results for orders placed during the hospital encounter of 10/04/24    CT Chest Without Contrast Diagnostic    Narrative  CT CHEST WO CONTRAST DIAGNOSTIC    Date of Exam: 10/4/2024 5:57 PM EDT    Indication: pneumonia.    Comparison: 1/22/2024    Technique: Axial CT images were obtained of the chest without contrast administration.  Sagittal and coronal reconstructions were performed.  Automated exposure control and iterative reconstruction methods were used.      Findings:  Lower Neck: Unremarkable.    Hilum and Mediastinum: No pathologically enlarged lymph nodes.  Normal heart size.   No pericardial effusion.  Unremarkable thoracic aorta and pulmonary arteries.    Lung Parenchyma and Pleura: Mild patchy opacities and early consolidative changes noted within the right middle lobe. Additionally scattered tree-in-bud nodularities are also noted within the right lower lobe. Right lower lobe pulmonary nodule measuring  0.6 cm, unchanged since 2022 and most likely benign.  The central airways are patent. No pleural effusions or pneumothorax.    Upper Abdomen: No acute process.    Soft tissues: Unremarkable.    Osseous structures: No acute osseous abnormality.    Impression  Impression:  Findings suggestive of pneumonia/pneumonitis involving the right lower lobe and to a lesser extent the right lower lobe.      Electronically Signed:  Rui Roberts DO  10/4/2024 7:39 PM EDT  Workstation ID: [id number]      Lab Review:   No results displayed because visit has over 200 results.        Recent labs reviewed and interpreted for clinical significance and application            Level of Care:           Moises Haddad MD  10/14/24  .

## 2024-10-14 NOTE — H&P (VIEW-ONLY)
Cardiology Clinic Note  Moises Haddad MD, PhD    Subjective:     Encounter Date:10/14/2024      Patient ID: Tramaine Gates is a 39 y.o. male.    Chief Complaint:  Chief Complaint   Patient presents with    Hospital Follow Up Visit       HPI:      I the pleasure to see this 39-year-old gentleman in follow-up with history of multivessel bypass 2022, chest pain admission prompting intervention SVG to RCA with proximal balloon angioplasty given in-stent restenosis.  The RCA graft is essentially overlapping stents all the way down to the anastomosis with the PDA with small vessel disease diffusely in the RCA natively not amenable to intervention given very small caliber size with significant in-stent restenosis.  Patient underwent multiple balloon angioplasty throughout the entirety of the stented segment from the distal back to proximal ostial SVG to the RCA improving antegrade runoff with in segment hazy appearing thrombotic lesion at that time.  LIMA to LAD is widely patent with small size distal LAD, circumflex natively was also widely patent with preserved LV systolic function.  He is back today with complaints of shortness of breath and chest discomfort, burning radiating to his left arm.  He has complicated cardiac history below with early CAD, he is regaining weight, still has poor diet, not exercising regularly.  He is wondering if he needs another heart cath.  After long discussion we decided for nuclear ischemic evaluation try to localize a least regional where he may be having issues.  In December 2023 he had a nuclear evaluation demonstrating normal stress and rest perfusion with no reversible ischemia.  He is agreeable to this as well as further lifestyle changes which are desperately needed with advanced CAD at a young age, refractory angina pectoris      Recent echo October 6, 2024  EF 55 to 60%  Normal atrial sizes, normal right left-sided pressures estimated noninvasively, normal valves with no  dysfunction, RV size normal, RV function normal    Stress evaluation December 2023, normal stress and rest perfusion, no evidence of ischemia and preserved EF normal regional and global wall motion      Cardiac catheterization March 2023, balloon angioplasty 4.0 x 20 NC balloon to the distal portion of the SVG-RCA inside the stented segment with greater than 70% ISR with subocclusive atherothrombotic occlusion, ostial proximal 50% stenosis reduced to less than 10% distally about 20% proximally.,  Angiographically unchanged LIMA to LAD, native LAD circumflex and RCA, widely patent circumflex, tortuous small caliber obtuse marginal branches 80% angiographically unchanged small vessel disease native RCA, LIMA to LAD widely patent, distal LAD diffuse luminal irregularities small caliber less than 2 mm in diameter, overall normal EF     Review of systems otherwise negative x 14 point review of systems except as mentioned above     Historical data copied forward from previous encounters in EMR including the history, exam, and assessment/plan has been reviewed and is unchanged unless noted otherwise.     Cardiac medicines reviewed with risk, benefits, and necessity of each discussed.     Risk and benefit of cardiac testing reviewed including death heart attack stroke pain bleeding infection need for vascular /cardiovascular surgery were discussed and the patient      Objective:         Objective  Vitals reviewed below     Physical Exam    Regular rate and rhythm no rubs gallops heave or lift       Assessment:         Assessment  1.  coronary artery disease status post remote bypass bypass 2022, chronic stable angina  - [facility] 9/15/2022: three-vessel coronary disease, patent LIMA to LAD, closed SVG to OM, heavily diseased SVG to RCA with poor candidacy for native vessel intervention  - s/p Overlapping Xience drug-eluting stents 3.5 x 38 x 3 stents in overlapping fashion postdilated to 3.7 mm at 16 to 18 radha, reduction stenosis  to 0% residual, improvement DARRYL-3 flow via the vein graft to the distal RCA and RPDA  - C 3/2/2023: Culprit appeared to be SVG to RCA distal lesion 70 to 80% hazy possibly atherothrombotic as well as proximal ostial ISR.  Other vascular areas with LIMA to LAD, native LAD, circumflex and native RCA.  Unchanged angiographically compared to prior case 9 months ago  Continue aspirin and Effient uninterrupted  Presently on max tolerated antianginals including Imdur Ranexa beta-blockers calcium channel blockers  High intensity statin, goal-directed medical therapy for diffuse native and bypass graft CAD  - preserved LVEF         2. CAD  - s/p STEMI 6/14/2022: [facility] showed three vessel CAD- he underwent ASHLEE placement to the culprit lesion in the RCA.  LAD had 80-90% proximal stenosis and LCx had 40-50% in distal LCx/OM.  Mr. Gates underwent repeat cardiac cath on 6/16/2022 and received ASHLEE to LAD  - repeat admission 5 days later showing in stent thrombosis sent for CABG  - s/p 3V CABG 6/29/2022 (LIMA-LAD, SVG-OMofLCx, SVG-RPDA)  -2023 POBA to the distal portion of the SVG to RCA inside the stented segment secondary to what appears to be greater than 70% in-stent restenosis distally versus subocclusive thrombus, ostial proximal greater than 50% restenosis, reduced to less than 10% distally, remains about 20% proximally at the ostium, improved angiographic runoff which was DARRYL II preoperatively     Today  Continue beta-blocker with metoprolol, lisinopril, Imdur, cilostazol 50 twice daily  to help prevent in-stent restenosis and Effient, can stop aspirin if continues on cilostazol and Effient  Ranexa for chest pain relief and high intensity statin  He is on appropriate goal-directed medical therapy with secondary prevention goals    Recurrence of angina burning chest pain  Reorder Lexiscan stress ,  localized regional ischemia if able provide potential target for revascularization  Lifestyle changes extensively discussed  "today    Bariatric referral     3.  HTN controlled at home less than 140 systolic     4. HLD, high intensity statin aspirin and antiplatelets     5. Obesity bariatric referral made today     Continue present pharmacotherapy  Follow-up 6 months       Objective:         /84 (BP Location: Right arm, Patient Position: Sitting)   Pulse 100   Resp 18   Ht 177.8 cm (70\")   Wt (!) 139 kg (306 lb)   SpO2 96%   BMI 43.91 kg/m²     Physical Exam    Assessment:         There are no diagnoses linked to this encounter.       Plan:              The pleasure to be involved in this patient's cardiovascular care.  Please call with any questions or concerns  Moises Haddad MD, PhD    Most recent EKG as reviewed and interpreted by me:  Procedures     Most recent echo as reviewed and interpreted by me:  Results for orders placed during the hospital encounter of 10/04/24    Adult Transthoracic Echo Complete W/ Cont if Necessary Per Protocol    Interpretation Summary    Left ventricular ejection fraction appears to be 56 - 60%.    Indications  Chest pain    Technically satisfactory study.  Mitral valve is structurally normal.  Tricuspid valve is structurally normal.  Aortic valve is structurally normal.  Pulmonic valve could not be well visualized.  No evidence for mitral tricuspid or aortic regurgitation is seen by Doppler study.  Left atrium is normal in size.  Right atrium is normal in size.  Left ventricle is normal in size and contractility with ejection fraction of 60%.  Normal left ventricular diastolic function.  Right ventricle is normal in size and contractility with TAPSE of 2.87 cm..  Atrial septum is intact.  Aorta is normal.  No pericardial effusion or intracardiac thrombus is seen.    Impression  Structurally and functionally normal cardiac valves.  Left ventricular size and contractility is normal with ejection fraction of 60%.  Normal left ventricular diastolic function.  Right ventricle is normal in size and " contractility with TAPSE of 2.87 cm..      Most recent stress test as reviewed and interpreted by me:  Results for orders placed during the hospital encounter of 12/18/23    Stress Test With Myocardial Perfusion One Day    Interpretation Summary  1. Negative Regadenoson Electrocardiography: There is no ECG evidence of myocardial ischemia.  2.  Adequate blood-pressure response to regadenoson.  3. No regadenoson-induced arrhythmias  4. Normal SPECT Myocardial Perfusion Imaging: There is no scintigraphic evidence of myocardial ischemia or scarring.  5. Overall left ventricular function is preserved, with a normal ejection fraction and no regional asynergy.      Most recent cardiac catheterization as reviewed interpreted by me:  Results for orders placed during the hospital encounter of 03/01/23    Cardiac Catheterization/Vascular Study    Conclusion   Moises Haddad MD, PhD  [facility] cardiology  Date of service 3-2-2023    Procedure  1.  Left heart catheterization coronary angiography left ventriculography In GALLO position, imaging of native and bypass grafts  2.,  Balloon angioplasty 4.0 x 20 NC balloon of the distal portion of the SVG to RCA inside the stented segment secondary to what appears to be greater than 70% in-stent restenosis distally versus subocclusive thrombus, ostial proximal greater than 50% restenosis, reduced to less than 10% distally, remains about 20% proximally at the ostium, improved angiographic runoff which was DARRYL II preoperatively, DARRYL-3 post procedurally    Indication  Unstable angina, unable for intervention to the native RCA    After informed consent patient brought to the Cath Lab sterilely prepped and draped in usual fashion with exposure of the right groin for right common femoral arterial access via micropuncture modified Seldinger technique with placement of a 6 Bangl[place]hi sheath.  035 guidewires advanced aortic valve followed by diagnostic JL 4 and JR4 catheters  "for selective left and right coronary angiography, JR4 was used to image the LIMA to LAD, multipurpose catheter was used to image SVG to RCA, SVG to diagonal was noted to be closed.  There appeared to be haziness at the ostial proximal portion of the stent in the SVG as well as what appeared to be slow flow distal to a bend in the distal portion of the graft also with haziness and what appeared to be minimum 60% stenosis possibly up to 80 and some angiographic views.  Decision was made given inability to revascularize native RCA with diffuse small vessel disease and small caliber vessels to intervene with balloon angioplasty in the stented segment of the SVG graft.  Patient was heparinized with 100 units/kg of heparin, he was on a background therapy of aspirin and Effient at baseline, ACT was greater than 250, multipurpose guide was used engage the vein graft followed by run-through wire to the distal portion of the graft, 4.0 x 20 NC balloon was used to dilate the proximal portion of the graft up to 16 radha for a minute and a half with prolonged inflation given significant stenosis at the ostial portion of the graft, there was great angiographic improvement of this portion of the graft with better stent expansion, angiography further revealed downstream stenosis in the distal portion of the graft, the balloon was then readvanced to the distal portion followed by 2 inflations up to 14 radha distal proximal fashion at the culprit portion reducing stenosis to 0% residual and improving distal runoff into again small vessel PDA and retrogradely into the RCA.  Final angiography revealed no distal wire trauma no edge dissection, DARRYL-3 flow in the graft as well as into the RPDA which did have some degree of competitive flow from the native RCA although small vessel and diffusely diseased.  All catheters\" removed.  6 [place] Angio-Seal was used to close the arteriotomy with immediate hemostasis and Meints of distal pulses.  He " left Cath Lab chest pain-free hemodynamically electrically stable alert talkative staff neurologic grossly intact bilaterally    Complications none  Blood loss less than 10 cc  Contrast 160 cc  Sedation time of 1 hour    Findings  1.  Opening aortic pressure of  Closing pressure  LVEDP elevated 20-25  LV function normal 60%  Normal transaortic valve gradient on pullback    Angiography  1.  Left main normal takeoff, 10% luminal regularities leading into LAD that is subtotally occluded in the midportion, circumflex widely patent  2.  LAD diffusely diseased 80 to 90% the proximal portion, widely patent LIMA to LAD in the midportion, distal portion of the LAD has diffuse luminal regularities but is very small caliber less than 2 mm in diameter, anastomosis is widely patent for the LIMA  Over 3 LIMA to LAD widely patent, no disease  3.  SVG to diagonal branch is closed  4.  SVG to RCA has ostial 60 to 70% but hazy in appearance, distally also had 70 to 80% hazy lesion suspicious for ISR versus subacute thrombus which was greatly improved by balloon angioplasty, runoff both retrograde into the PDA and anterograde with very small vessel anastomosis likely best for medical therapy  5.  RCA natively diffusely diseased with patent stents in the proximal midportion, the intervening portion between proximal and distal stents is much less than 1 mm in diameter, marginal branches open and otherwise distal portion of vessel not amenable to intervention secondary to severe small caliber and very poor long-term patency  6.  Circumflex widely patent, obtuse marginal branch has diffuse small vessel disease tandem 80% lesions and tortuous not amenable to intervention secondary to small caliber size poor distal runoff and very poor long-term patency, thus medical treatment at this point, unchanged angiographically from prior case 8 to 9 months ago at the time of SVG revascularization with overlapping stents    Conclusions  recommendations  1.  Unstable angina  Culprit appeared to be SVG to RCA distal lesion 70 to 80% hazy possibly atherothrombotic as well as proximal ostial ISR.  Other vascular areas with LIMA to LAD, native LAD, circumflex and native RCA.  Unchanged angiographically compared to prior case 9 months ago  Continue aspirin and Effient uninterrupted  High intensity statin, goal-directed medical therapy for diffuse native and bypass graft CAD  We will advance her Ranexa and long-acting nitrates for small vessel disease    further management per clinical course    Moises Haddad MD, PhD  .    The following portions of the patient's history were reviewed and updated as appropriate: allergies, current medications, past family history, past medical history, past social history, past surgical history, and problem list.      ROS:  14 point review of systems negative except as mentioned above    Current Outpatient Medications:     albuterol sulfate  (90 Base) MCG/ACT inhaler, Inhale 2 puffs by mouth as directed every four to six hours as needed for coughing, shortness of breath, wheezing, Disp: 8.5 g, Rfl: 0    amLODIPine (Norvasc) 5 MG tablet, Take 1 tablet by mouth Daily., Disp: 90 tablet, Rfl: 0    aspirin (Aspirin Low Dose) 81 MG EC tablet, Take 1 tablet by mouth Daily., Disp: 90 tablet, Rfl: 0    azithromycin (ZITHROMAX) 250 MG tablet, Take 2 tablets by mouth on day 1, then take 1 tablet by mouth once daily on days 2-5, Disp: 6 tablet, Rfl: 0    cilostazol (PLETAL) 100 MG tablet, Take 0.5 tablets by mouth 2 (Two) Times a Day., Disp: 90 tablet, Rfl: 3    Evolocumab (Repatha SureClick) solution auto-injector SureClick injection, Inject 140 mg total (1 mL) into the skin every 14 (fourteen) days., Disp: 2 mL, Rfl: 5    isosorbide mononitrate (IMDUR) 120 MG 24 hr tablet, Take 1 tablet by mouth Daily., Disp: 30 tablet, Rfl: 0    levothyroxine (SYNTHROID, LEVOTHROID) 50 MCG tablet, TAKE 1 TABLET BY MOUTH EVERY DAY, Disp: 90  tablet, Rfl: 0    losartan (Cozaar) 25 MG tablet, Take 1 tablet by mouth Daily., Disp: 30 tablet, Rfl: 0    metoprolol succinate XL (TOPROL-XL) 100 MG 24 hr tablet, Take 1 tablet by mouth Daily., Disp: 30 tablet, Rfl: 0    nitroglycerin (NITROSTAT) 0.4 MG SL tablet, Place 1 tablet under the tongue Every 5 (Five) Minutes As Needed for Chest Pain (Only if SBP Greater Than 100). Take no more than 3 doses in 15 minutes., Disp: 25 tablet, Rfl: 0    omeprazole (priLOSEC) 40 MG capsule, Take 1 capsule by mouth Daily., Disp: , Rfl:     prasugrel (EFFIENT) 10 MG tablet, Take 1 tablet by mouth Daily., Disp: , Rfl:     ranolazine (RANEXA) 1000 MG 12 hr tablet, Take 1 tablet by mouth 2 (Two) Times a Day., Disp: 180 tablet, Rfl: 3    rosuvastatin (CRESTOR) 40 MG tablet, Take 0.5 tablets by mouth Daily., Disp: , Rfl:     Problem List:  Patient Active Problem List   Diagnosis    Hypertension    Peptic ulcer    Asthma    Blepharitis    Chronic cholecystitis    Contact with and (suspected) exposure to covid-19    Corneal ulcer    Costochondritis    Cough    Disorder of thyroid    Gastroesophageal reflux disease    Hyperlipidemia    Migraine headache    Panic attack    Acute inferior myocardial infarction    CAD, multiple vessel    Bipolar II disorder    Hx of brief reactive psychosis    Generalized anxiety disorder with panic attacks    Rage attacks    Chest pain, unspecified type    S/P CABG x 3    Chest pain    Dyspnea     Past Medical History:  Past Medical History:   Diagnosis Date    Acute inferior myocardial infarction 06/14/2022    Allergic     Asthma 01/27/2022    CAD, multiple vessel 06/14/2022    Disorder of thyroid 01/27/2022    Gastroesophageal reflux disease 01/27/2022    Hx of complete eye exam 2016    Hyperlipidemia 01/27/2022    Hypertension     Migraine headache 01/27/2022    Panic attack 01/27/2022    Peptic ulcer 09/14/2017     Past Surgical History:  Past Surgical History:   Procedure Laterality Date    CARDIAC  CATHETERIZATION N/A 2022    Procedure: Left Heart Cath;  Surgeon: Matthieu Del Toro MD;  Location:  [facility] CATH [facility]VASIVE [facility];  Service: Cardiology;  Laterality: N/A;    CARDIAC CATHETERIZATION N/A 2022    Procedure: Percutaneous Coronary Intervention;  Surgeon: Matthieu Del Toro MD;  Location:  [facility]AS[facility];  Service: Cardiovascular;  Laterality: N/A;    CARDIAC CATHETERIZATION N/A 2022    Procedure: Left Heart Cath possible PCI, atherectomy, hemodynamic support;  Surgeon: Moises Haddad MD;  Location: [facility] CATH INVASIVE LOCATION;  Service: Cardiology;  Laterality: N/A;    CARDIAC CATHETERIZATION N/A 09/15/2022    Procedure: Left Heart Cath;  Surgeon: Moises Haddad MD;  Location: [facility] CATH INVASIVE LOCATION;  Service: Cardiology;  Laterality: N/A;    CARDIAC CATHETERIZATION  9/15/2022    CARDIAC CATHETERIZATION N/A 3/2/2023    Procedure: Left Heart Cath;  Surgeon: Moises Haddad MD;  Location: [facility] CATH INVASIVE LOCATION;  Service: Cardiology;  Laterality: N/A;    CHOLECYSTECTOMY  2019    CORONARY ARTERY BYPASS GRAFT N/A 2022    Procedure: CORONARY ARTERY BYPASS GRAFTING;  Surgeon: Pablo Ball MD;  Location: [facility] CVOR;  Service: Cardiothoracic;  Laterality: N/A;  CABG X 3(2 vein grafts, 1 LIMA graft)    CORONARY ARTERY BYPASS GRAFT  2022    CORONARY STENT PLACEMENT      MANDIBLE SURGERY       Social History:  Social History     Socioeconomic History    Marital status: Single   Tobacco Use    Smoking status: Former     Current packs/day: 0.00     Average packs/day: 1.5 packs/day for 26.5 years (39.7 ttl pk-yrs)     Types: Cigarettes     Start date: 1996     Quit date: 2022     Years since quittin.3    Smokeless tobacco: Never   Vaping Use    Vaping status: Never Used   Substance and Sexual Activity    Alcohol use: Not Currently    Drug use: Yes     Frequency: 7.0 times per week     Types: Marijuana    Sexual  activity: Yes     Partners: Female     Allergies:  Allergies   Allergen Reactions    Shellfish-Derived Products Unknown - High Severity     Immunizations:    There is no immunization history on file for this patient.         In-Office Procedure(s):  No orders to display        ASCVD RIsk Score::  The ASCVD Risk score (Nigel BAKER, et al., 2019) failed to calculate for the following reasons:    Risk score cannot be calculated because patient has a medical history suggesting prior/existing ASCVD    Imaging:    Results for orders placed during the hospital encounter of 10/04/24    XR Chest 1 View    Narrative  XR CHEST 1 VW    Date of Exam: 10/4/2024 10:18 AM EDT    Indication: Chest pain with shortness of breath.    Comparison: 12/19/2023.    Findings:  The heart size is normal. The patient has had a previous CABG procedure. The pulmonary vascular markings are normal. The lungs and pleural spaces are clear of active disease. The bony thorax is normal for age.    Impression  Impression:  No active disease.      Electronically Signed: Kermit Mcwilliams MD  10/4/2024 10:34 AM EDT  Workstation ID: [id number]       Results for orders placed during the hospital encounter of 10/04/24    CT Chest Without Contrast Diagnostic    Narrative  CT CHEST WO CONTRAST DIAGNOSTIC    Date of Exam: 10/4/2024 5:57 PM EDT    Indication: pneumonia.    Comparison: 1/22/2024    Technique: Axial CT images were obtained of the chest without contrast administration.  Sagittal and coronal reconstructions were performed.  Automated exposure control and iterative reconstruction methods were used.      Findings:  Lower Neck: Unremarkable.    Hilum and Mediastinum: No pathologically enlarged lymph nodes.  Normal heart size.   No pericardial effusion.  Unremarkable thoracic aorta and pulmonary arteries.    Lung Parenchyma and Pleura: Mild patchy opacities and early consolidative changes noted within the right middle lobe. Additionally scattered tree-in-bud  nodularities are also noted within the right lower lobe. Right lower lobe pulmonary nodule measuring  0.6 cm, unchanged since 2022 and most likely benign.  The central airways are patent. No pleural effusions or pneumothorax.    Upper Abdomen: No acute process.    Soft tissues: Unremarkable.    Osseous structures: No acute osseous abnormality.    Impression  Impression:  Findings suggestive of pneumonia/pneumonitis involving the right lower lobe and to a lesser extent the right lower lobe.      Electronically Signed: Rui Roberts DO  10/4/2024 7:39 PM EDT  Workstation ID: [id number]      Results for orders placed during the hospital encounter of 10/04/24    CT Chest Without Contrast Diagnostic    Narrative  CT CHEST WO CONTRAST DIAGNOSTIC    Date of Exam: 10/4/2024 5:57 PM EDT    Indication: pneumonia.    Comparison: 1/22/2024    Technique: Axial CT images were obtained of the chest without contrast administration.  Sagittal and coronal reconstructions were performed.  Automated exposure control and iterative reconstruction methods were used.      Findings:  Lower Neck: Unremarkable.    Hilum and Mediastinum: No pathologically enlarged lymph nodes.  Normal heart size.   No pericardial effusion.  Unremarkable thoracic aorta and pulmonary arteries.    Lung Parenchyma and Pleura: Mild patchy opacities and early consolidative changes noted within the right middle lobe. Additionally scattered tree-in-bud nodularities are also noted within the right lower lobe. Right lower lobe pulmonary nodule measuring  0.6 cm, unchanged since 2022 and most likely benign.  The central airways are patent. No pleural effusions or pneumothorax.    Upper Abdomen: No acute process.    Soft tissues: Unremarkable.    Osseous structures: No acute osseous abnormality.    Impression  Impression:  Findings suggestive of pneumonia/pneumonitis involving the right lower lobe and to a lesser extent the right lower lobe.      Electronically Signed:  Rui Roberts DO  10/4/2024 7:39 PM EDT  Workstation ID: [id number]      Lab Review:   No results displayed because visit has over 200 results.        Recent labs reviewed and interpreted for clinical significance and application            Level of Care:           Moises Haddad MD  10/14/24  .

## 2024-10-15 ENCOUNTER — READMISSION MANAGEMENT (OUTPATIENT)
Dept: CALL CENTER | Facility: HOSPITAL | Age: 39
End: 2024-10-15
Payer: MEDICAID

## 2024-10-15 RX ORDER — PRASUGREL 10 MG/1
10 TABLET, FILM COATED ORAL DAILY
Qty: 90 TABLET | Refills: 3 | Status: CANCELLED | OUTPATIENT
Start: 2024-10-15

## 2024-10-15 NOTE — OUTREACH NOTE
Medical Week 1 Survey      Flowsheet Row Responses   [facility] facility patient discharged from? [facility]   Does the patient have one of the following disease processes/diagnoses(primary or secondary)? Other   Week 1 attempt successful? No   Unsuccessful attempts Attempt 2            Evelyn MESSINA - Registered Nurse

## 2024-10-16 ENCOUNTER — TELEPHONE (OUTPATIENT)
Dept: BARIATRICS/WEIGHT MGMT | Facility: CLINIC | Age: 39
End: 2024-10-16
Payer: MEDICAID

## 2024-10-16 RX ORDER — PRASUGREL 10 MG/1
10 TABLET, FILM COATED ORAL DAILY
Qty: 90 TABLET | Refills: 3 | Status: CANCELLED | OUTPATIENT
Start: 2024-10-15

## 2024-10-18 ENCOUNTER — READMISSION MANAGEMENT (OUTPATIENT)
Dept: CALL CENTER | Facility: HOSPITAL | Age: 39
End: 2024-10-18
Payer: MEDICAID

## 2024-10-18 RX ORDER — PRASUGREL 10 MG/1
10 TABLET, FILM COATED ORAL DAILY
Qty: 90 TABLET | Refills: 3 | Status: CANCELLED | OUTPATIENT
Start: 2024-10-15

## 2024-10-18 NOTE — OUTREACH NOTE
Medical Week 1 Survey      Flowsheet Row Responses   [facility] facility patient discharged from? [facility]   Does the patient have one of the following disease processes/diagnoses(primary or secondary)? Other   Week 1 attempt successful? No   Unsuccessful attempts Attempt 3  [All numbers listed were attempted-no answer]            Loan H - Registered Nurse

## 2024-10-21 RX ORDER — PRASUGREL 10 MG/1
10 TABLET, FILM COATED ORAL DAILY
Qty: 90 TABLET | Refills: 3 | Status: SHIPPED | OUTPATIENT
Start: 2024-10-21

## 2024-10-22 ENCOUNTER — HOSPITAL ENCOUNTER (OUTPATIENT)
Dept: NUCLEAR MEDICINE | Facility: HOSPITAL | Age: 39
Discharge: HOME | End: 2024-10-22
Payer: MEDICAID

## 2024-10-22 DIAGNOSIS — R06.09 DOE (DYSPNEA ON EXERTION): ICD-10-CM

## 2024-10-22 DIAGNOSIS — I25.10 CAD, MULTIPLE VESSEL: ICD-10-CM

## 2024-10-22 LAB
BH CV REST NUCLEAR ISOTOPE DOSE: 10 MCI
BH CV STRESS BP STAGE 1: NORMAL
BH CV STRESS BP STAGE 2: NORMAL
BH CV STRESS COMMENTS STAGE 1: NORMAL
BH CV STRESS COMMENTS STAGE 2: NORMAL
BH CV STRESS DOSE REGADENOSON STAGE 1: 0.4
BH CV STRESS DURATION MIN STAGE 1: 0
BH CV STRESS DURATION MIN STAGE 2: 4
BH CV STRESS DURATION SEC STAGE 1: 10
BH CV STRESS DURATION SEC STAGE 2: 0
BH CV STRESS HR STAGE 1: 69
BH CV STRESS HR STAGE 2: 103
BH CV STRESS NUCLEAR ISOTOPE DOSE: 33 MCI
BH CV STRESS PROTOCOL 1: NORMAL
BH CV STRESS RECOVERY BP: NORMAL MMHG
BH CV STRESS RECOVERY HR: 92 BPM
BH CV STRESS STAGE 1: 1
BH CV STRESS STAGE 2: 2
LV EF NUC BP: 83 %
MAXIMAL PREDICTED HEART RATE: 179 BPM
PERCENT MAX PREDICTED HR: 59.22 %
STRESS BASELINE BP: NORMAL MMHG
STRESS BASELINE HR: 69 BPM
STRESS PERCENT HR: 70 %
STRESS POST PEAK BP: NORMAL MMHG
STRESS POST PEAK HR: 106 BPM
STRESS TARGET HR: 152 BPM

## 2024-10-22 PROCEDURE — 78452 HT MUSCLE IMAGE SPECT MULT: CPT

## 2024-10-22 PROCEDURE — 0 TECHNETIUM SESTAMIBI: Performed by: INTERNAL MEDICINE

## 2024-10-22 PROCEDURE — 93016 CV STRESS TEST SUPVJ ONLY: CPT | Performed by: INTERNAL MEDICINE

## 2024-10-22 PROCEDURE — 93018 CV STRESS TEST I&R ONLY: CPT | Performed by: INTERNAL MEDICINE

## 2024-10-22 PROCEDURE — 93017 CV STRESS TEST TRACING ONLY: CPT

## 2024-10-22 PROCEDURE — A9500 TC99M SESTAMIBI: HCPCS | Performed by: INTERNAL MEDICINE

## 2024-10-22 PROCEDURE — 25010000002 REGADENOSON 0.4 MG/5ML SOLUTION: Performed by: INTERNAL MEDICINE

## 2024-10-22 PROCEDURE — 78452 HT MUSCLE IMAGE SPECT MULT: CPT | Performed by: INTERNAL MEDICINE

## 2024-10-22 RX ORDER — REGADENOSON 0.08 MG/ML
0.4 INJECTION, SOLUTION INTRAVENOUS
Status: COMPLETED | OUTPATIENT
Start: 2024-10-22 | End: 2024-10-22

## 2024-10-22 RX ADMIN — TECHNETIUM TC 99M SESTAMIBI 1 DOSE: 1 INJECTION INTRAVENOUS at 14:33

## 2024-10-22 RX ADMIN — TECHNETIUM TC 99M SESTAMIBI 1 DOSE: 1 INJECTION INTRAVENOUS at 13:35

## 2024-10-22 RX ADMIN — REGADENOSON 0.4 MG: 0.08 INJECTION, SOLUTION INTRAVENOUS at 14:33

## 2024-10-30 ENCOUNTER — TELEPHONE (OUTPATIENT)
Dept: CARDIOLOGY | Facility: CLINIC | Age: 39
End: 2024-10-30
Payer: MEDICAID

## 2024-10-30 NOTE — TELEPHONE ENCOUNTER
Per dr min, recommending LHC or coronary CTA for abn stress test. Spoke with patient he wants to talk with his wife and will call back today or tomorrow.

## 2024-10-31 RX ORDER — ISOSORBIDE MONONITRATE 120 MG/1
120 TABLET, EXTENDED RELEASE ORAL
Qty: 30 TABLET | Refills: 0 | Status: SHIPPED | OUTPATIENT
Start: 2024-10-31

## 2024-10-31 RX ORDER — METOPROLOL SUCCINATE 100 MG/1
100 TABLET, EXTENDED RELEASE ORAL
Qty: 30 TABLET | Refills: 0 | Status: SHIPPED | OUTPATIENT
Start: 2024-10-31

## 2024-11-01 DIAGNOSIS — I25.10 CAD, MULTIPLE VESSEL: ICD-10-CM

## 2024-11-01 DIAGNOSIS — R94.39 ABNORMAL STRESS TEST: Primary | ICD-10-CM

## 2024-11-04 ENCOUNTER — TELEPHONE (OUTPATIENT)
Dept: CARDIOLOGY | Facility: CLINIC | Age: 39
End: 2024-11-04
Payer: MEDICAID

## 2024-11-04 PROBLEM — R94.39 ABNORMAL STRESS TEST: Status: ACTIVE | Noted: 2024-11-01

## 2024-11-04 NOTE — TELEPHONE ENCOUNTER
Left message for patient to call office to go over heart cath date,time and instructions. Please transfer patient to office to speak to Rosi.

## 2024-11-11 RX ORDER — LOSARTAN POTASSIUM 25 MG/1
25 TABLET ORAL DAILY
Qty: 30 TABLET | Refills: 0 | Status: CANCELLED | OUTPATIENT
Start: 2024-11-11

## 2024-11-11 RX ORDER — LOSARTAN POTASSIUM 25 MG/1
25 TABLET ORAL DAILY
Qty: 30 TABLET | Refills: 0 | Status: SHIPPED | OUTPATIENT
Start: 2024-11-11

## 2024-11-11 NOTE — TELEPHONE ENCOUNTER
Caller: Tramaine Gates    Relationship: Self    Best call back number: [phone]    Requested Prescriptions:   Requested Prescriptions     Pending Prescriptions Disp Refills    losartan (Cozaar) 25 MG tablet 30 tablet 0     Sig: Take 1 tablet by mouth Daily.        Pharmacy where request should be sent: [facility]     Last office visit with prescribing clinician: 10/14/2024   Last telemedicine visit with prescribing clinician: Visit date not found   Next office visit with prescribing clinician: 4/14/2025     Additional details provided by patient:     Does the patient have less than a 3 day supply:  [x] Yes  [] No    Would you like a call back once the refill request has been completed: [] Yes [x] No    If the office needs to give you a call back, can they leave a voicemail: [x] Yes [] No    Abbey Can Rep   11/11/24 15:42 EST

## 2024-11-13 ENCOUNTER — HOSPITAL ENCOUNTER (INPATIENT)
Facility: HOSPITAL | Age: 39
LOS: 1 days | Discharge: HOME | End: 2024-11-16
Attending: INTERNAL MEDICINE | Admitting: INTERNAL MEDICINE
Payer: MEDICAID

## 2024-11-13 DIAGNOSIS — R94.39 ABNORMAL STRESS TEST: Primary | ICD-10-CM

## 2024-11-13 DIAGNOSIS — I25.10 CAD, MULTIPLE VESSEL: ICD-10-CM

## 2024-11-13 LAB
ACT BLD: 147 SECONDS (ref 89–137)
ACT BLD: 210 SECONDS (ref 89–137)
ALBUMIN SERPL-MCNC: 4.2 G/DL (ref 3.5–5.2)
ALBUMIN/GLOB SERPL: 1.4 G/DL
ALP SERPL-CCNC: 97 U/L (ref 39–117)
ALT SERPL W P-5'-P-CCNC: 19 U/L (ref 1–41)
ANION GAP SERPL CALCULATED.3IONS-SCNC: 9.8 MMOL/L (ref 5–15)
AST SERPL-CCNC: 20 U/L (ref 1–40)
BILIRUB SERPL-MCNC: 0.3 MG/DL (ref 0–1.2)
BUN SERPL-MCNC: 12 MG/DL (ref 6–20)
BUN/CREAT SERPL: 10 (ref 7–25)
CALCIUM SPEC-SCNC: 9.4 MG/DL (ref 8.6–10.5)
CHLORIDE SERPL-SCNC: 103 MMOL/L (ref 98–107)
CO2 SERPL-SCNC: 25.2 MMOL/L (ref 22–29)
CREAT SERPL-MCNC: 1.2 MG/DL (ref 0.76–1.27)
DEPRECATED RDW RBC AUTO: 41.3 FL (ref 37–54)
EGFRCR SERPLBLD CKD-EPI 2021: 77.9 ML/MIN/1.73
ERYTHROCYTE [DISTWIDTH] IN BLOOD BY AUTOMATED COUNT: 12.9 % (ref 12.3–15.4)
GLOBULIN UR ELPH-MCNC: 2.9 GM/DL
GLUCOSE SERPL-MCNC: 110 MG/DL (ref 65–99)
HCT VFR BLD AUTO: 41.5 % (ref 37.5–51)
HGB BLD-MCNC: 13.8 G/DL (ref 13–17.7)
INR PPP: 1 (ref 0.9–1.1)
MCH RBC QN AUTO: 29 PG (ref 26.6–33)
MCHC RBC AUTO-ENTMCNC: 33.3 G/DL (ref 31.5–35.7)
MCV RBC AUTO: 87.2 FL (ref 79–97)
PLATELET # BLD AUTO: 255 10*3/MM3 (ref 140–450)
PMV BLD AUTO: 9.1 FL (ref 6–12)
POTASSIUM SERPL-SCNC: 3.8 MMOL/L (ref 3.5–5.2)
PROT SERPL-MCNC: 7.1 G/DL (ref 6–8.5)
PROTHROMBIN TIME: 13.3 SECONDS (ref 11.7–14.2)
RBC # BLD AUTO: 4.76 10*6/MM3 (ref 4.14–5.8)
SODIUM SERPL-SCNC: 138 MMOL/L (ref 136–145)
WBC NRBC COR # BLD AUTO: 6.1 10*3/MM3 (ref 3.4–10.8)

## 2024-11-13 PROCEDURE — 02703ZZ DILATION OF CORONARY ARTERY, ONE ARTERY, PERCUTANEOUS APPROACH: ICD-10-PCS | Performed by: INTERNAL MEDICINE

## 2024-11-13 PROCEDURE — 25010000002 HEPARIN (PORCINE) PER 1000 UNITS: Performed by: INTERNAL MEDICINE

## 2024-11-13 PROCEDURE — 85027 COMPLETE CBC AUTOMATED: CPT | Performed by: INTERNAL MEDICINE

## 2024-11-13 PROCEDURE — C1894 INTRO/SHEATH, NON-LASER: HCPCS | Performed by: INTERNAL MEDICINE

## 2024-11-13 PROCEDURE — 25010000002 NITROGLYCERIN 5 MG/ML SOLUTION: Performed by: INTERNAL MEDICINE

## 2024-11-13 PROCEDURE — 80053 COMPREHEN METABOLIC PANEL: CPT | Performed by: INTERNAL MEDICINE

## 2024-11-13 PROCEDURE — 85610 PROTHROMBIN TIME: CPT | Performed by: INTERNAL MEDICINE

## 2024-11-13 PROCEDURE — B2131ZZ FLUOROSCOPY OF MULTIPLE CORONARY ARTERY BYPASS GRAFTS USING LOW OSMOLAR CONTRAST: ICD-10-PCS | Performed by: INTERNAL MEDICINE

## 2024-11-13 PROCEDURE — 25510000001 IOPAMIDOL PER 1 ML: Performed by: INTERNAL MEDICINE

## 2024-11-13 PROCEDURE — 93005 ELECTROCARDIOGRAM TRACING: CPT | Performed by: INTERNAL MEDICINE

## 2024-11-13 PROCEDURE — 25010000002 MIDAZOLAM PER 1 MG: Performed by: INTERNAL MEDICINE

## 2024-11-13 PROCEDURE — 93459 L HRT ART/GRFT ANGIO: CPT | Performed by: INTERNAL MEDICINE

## 2024-11-13 PROCEDURE — C1887 CATHETER, GUIDING: HCPCS | Performed by: INTERNAL MEDICINE

## 2024-11-13 PROCEDURE — 99153 MOD SED SAME PHYS/QHP EA: CPT | Performed by: INTERNAL MEDICINE

## 2024-11-13 PROCEDURE — 85347 COAGULATION TIME ACTIVATED: CPT

## 2024-11-13 PROCEDURE — 99152 MOD SED SAME PHYS/QHP 5/>YRS: CPT | Performed by: INTERNAL MEDICINE

## 2024-11-13 PROCEDURE — 25010000002 FENTANYL CITRATE (PF) 100 MCG/2ML SOLUTION: Performed by: INTERNAL MEDICINE

## 2024-11-13 PROCEDURE — 25010000002 LIDOCAINE 2% SOLUTION: Performed by: INTERNAL MEDICINE

## 2024-11-13 PROCEDURE — C1725 CATH, TRANSLUMIN NON-LASER: HCPCS | Performed by: INTERNAL MEDICINE

## 2024-11-13 PROCEDURE — 4A023N7 MEASUREMENT OF CARDIAC SAMPLING AND PRESSURE, LEFT HEART, PERCUTANEOUS APPROACH: ICD-10-PCS | Performed by: INTERNAL MEDICINE

## 2024-11-13 PROCEDURE — 25010000002 ONDANSETRON PER 1 MG: Performed by: INTERNAL MEDICINE

## 2024-11-13 PROCEDURE — 25010000002 DIPHENHYDRAMINE PER 50 MG: Performed by: INTERNAL MEDICINE

## 2024-11-13 PROCEDURE — 92937 PRQ TRLUML REVSC CAB GRF 1: CPT | Performed by: INTERNAL MEDICINE

## 2024-11-13 PROCEDURE — G0378 HOSPITAL OBSERVATION PER HR: HCPCS

## 2024-11-13 PROCEDURE — B2181ZZ FLUOROSCOPY OF LEFT INTERNAL MAMMARY BYPASS GRAFT USING LOW OSMOLAR CONTRAST: ICD-10-PCS | Performed by: INTERNAL MEDICINE

## 2024-11-13 PROCEDURE — C1769 GUIDE WIRE: HCPCS | Performed by: INTERNAL MEDICINE

## 2024-11-13 PROCEDURE — 25010000002 HYDROMORPHONE PER 4 MG: Performed by: INTERNAL MEDICINE

## 2024-11-13 PROCEDURE — 25010000002 METHYLPREDNISOLONE PER 125 MG: Performed by: INTERNAL MEDICINE

## 2024-11-13 PROCEDURE — B2111ZZ FLUOROSCOPY OF MULTIPLE CORONARY ARTERIES USING LOW OSMOLAR CONTRAST: ICD-10-PCS | Performed by: INTERNAL MEDICINE

## 2024-11-13 PROCEDURE — B2151ZZ FLUOROSCOPY OF LEFT HEART USING LOW OSMOLAR CONTRAST: ICD-10-PCS | Performed by: INTERNAL MEDICINE

## 2024-11-13 RX ORDER — HYDROMORPHONE HYDROCHLORIDE 1 MG/ML
0.5 INJECTION, SOLUTION INTRAMUSCULAR; INTRAVENOUS; SUBCUTANEOUS ONCE
Status: COMPLETED | OUTPATIENT
Start: 2024-11-13 | End: 2024-11-13

## 2024-11-13 RX ORDER — MIDAZOLAM HYDROCHLORIDE 1 MG/ML
INJECTION, SOLUTION INTRAMUSCULAR; INTRAVENOUS
Status: DISCONTINUED | OUTPATIENT
Start: 2024-11-13 | End: 2024-11-13 | Stop reason: HOSPADM

## 2024-11-13 RX ORDER — METHYLPREDNISOLONE SODIUM SUCCINATE 125 MG/2ML
125 INJECTION, POWDER, LYOPHILIZED, FOR SOLUTION INTRAMUSCULAR; INTRAVENOUS ONCE
Status: COMPLETED | OUTPATIENT
Start: 2024-11-13 | End: 2024-11-13

## 2024-11-13 RX ORDER — ISOSORBIDE MONONITRATE 30 MG/1
120 TABLET, EXTENDED RELEASE ORAL
Status: DISCONTINUED | OUTPATIENT
Start: 2024-11-13 | End: 2024-11-16 | Stop reason: HOSPADM

## 2024-11-13 RX ORDER — ASPIRIN 325 MG
TABLET ORAL
Status: DISCONTINUED | OUTPATIENT
Start: 2024-11-13 | End: 2024-11-13 | Stop reason: HOSPADM

## 2024-11-13 RX ORDER — SODIUM CHLORIDE 9 MG/ML
250 INJECTION, SOLUTION INTRAVENOUS ONCE AS NEEDED
Status: DISPENSED | OUTPATIENT
Start: 2024-11-13 | End: 2024-11-13

## 2024-11-13 RX ORDER — PRASUGREL 10 MG/1
10 TABLET, FILM COATED ORAL DAILY
Status: DISCONTINUED | OUTPATIENT
Start: 2024-11-13 | End: 2024-11-16 | Stop reason: HOSPADM

## 2024-11-13 RX ORDER — ASPIRIN 81 MG/1
81 TABLET ORAL DAILY
Status: DISCONTINUED | OUTPATIENT
Start: 2024-11-13 | End: 2024-11-16 | Stop reason: HOSPADM

## 2024-11-13 RX ORDER — ONDANSETRON 2 MG/ML
INJECTION INTRAMUSCULAR; INTRAVENOUS
Status: DISCONTINUED | OUTPATIENT
Start: 2024-11-13 | End: 2024-11-13 | Stop reason: HOSPADM

## 2024-11-13 RX ORDER — ONDANSETRON 2 MG/ML
4 INJECTION INTRAMUSCULAR; INTRAVENOUS EVERY 4 HOURS PRN
Status: DISCONTINUED | OUTPATIENT
Start: 2024-11-13 | End: 2024-11-15

## 2024-11-13 RX ORDER — NITROGLYCERIN 0.4 MG/1
0.4 TABLET SUBLINGUAL
Status: DISCONTINUED | OUTPATIENT
Start: 2024-11-13 | End: 2024-11-16 | Stop reason: HOSPADM

## 2024-11-13 RX ORDER — ROSUVASTATIN CALCIUM 10 MG/1
20 TABLET, COATED ORAL DAILY
Status: DISCONTINUED | OUTPATIENT
Start: 2024-11-14 | End: 2024-11-16 | Stop reason: HOSPADM

## 2024-11-13 RX ORDER — DIPHENHYDRAMINE HYDROCHLORIDE 50 MG/ML
25 INJECTION INTRAMUSCULAR; INTRAVENOUS ONCE
Status: COMPLETED | OUTPATIENT
Start: 2024-11-13 | End: 2024-11-13

## 2024-11-13 RX ORDER — PANTOPRAZOLE SODIUM 40 MG/1
40 TABLET, DELAYED RELEASE ORAL
Status: DISCONTINUED | OUTPATIENT
Start: 2024-11-14 | End: 2024-11-16 | Stop reason: HOSPADM

## 2024-11-13 RX ORDER — FAMOTIDINE 10 MG/ML
20 INJECTION, SOLUTION INTRAVENOUS ONCE
Status: COMPLETED | OUTPATIENT
Start: 2024-11-13 | End: 2024-11-13

## 2024-11-13 RX ORDER — AMLODIPINE BESYLATE 5 MG/1
5 TABLET ORAL DAILY
Status: DISCONTINUED | OUTPATIENT
Start: 2024-11-13 | End: 2024-11-16 | Stop reason: HOSPADM

## 2024-11-13 RX ORDER — PRASUGREL 10 MG/1
TABLET, FILM COATED ORAL
Status: DISCONTINUED | OUTPATIENT
Start: 2024-11-13 | End: 2024-11-13 | Stop reason: HOSPADM

## 2024-11-13 RX ORDER — NITROGLYCERIN 5 MG/ML
INJECTION, SOLUTION INTRAVENOUS
Status: DISCONTINUED | OUTPATIENT
Start: 2024-11-13 | End: 2024-11-13 | Stop reason: HOSPADM

## 2024-11-13 RX ORDER — HYDROCODONE BITARTRATE AND ACETAMINOPHEN 7.5; 325 MG/1; MG/1
1 TABLET ORAL ONCE AS NEEDED
Status: COMPLETED | OUTPATIENT
Start: 2024-11-13 | End: 2024-11-13

## 2024-11-13 RX ORDER — CILOSTAZOL 100 MG/1
50 TABLET ORAL 2 TIMES DAILY
Status: DISCONTINUED | OUTPATIENT
Start: 2024-11-13 | End: 2024-11-16 | Stop reason: HOSPADM

## 2024-11-13 RX ORDER — METOPROLOL SUCCINATE 50 MG/1
100 TABLET, EXTENDED RELEASE ORAL
Status: DISCONTINUED | OUTPATIENT
Start: 2024-11-13 | End: 2024-11-16 | Stop reason: HOSPADM

## 2024-11-13 RX ORDER — HYDROMORPHONE HYDROCHLORIDE 1 MG/ML
0.5 INJECTION, SOLUTION INTRAMUSCULAR; INTRAVENOUS; SUBCUTANEOUS EVERY 4 HOURS PRN
Status: DISCONTINUED | OUTPATIENT
Start: 2024-11-13 | End: 2024-11-16 | Stop reason: HOSPADM

## 2024-11-13 RX ORDER — IOPAMIDOL 755 MG/ML
INJECTION, SOLUTION INTRAVASCULAR
Status: DISCONTINUED | OUTPATIENT
Start: 2024-11-13 | End: 2024-11-13 | Stop reason: HOSPADM

## 2024-11-13 RX ORDER — SODIUM CHLORIDE 9 MG/ML
3 INJECTION, SOLUTION INTRAVENOUS CONTINUOUS
Status: DISPENSED | OUTPATIENT
Start: 2024-11-13 | End: 2024-11-13

## 2024-11-13 RX ORDER — LIDOCAINE HYDROCHLORIDE 20 MG/ML
INJECTION, SOLUTION INFILTRATION; PERINEURAL
Status: DISCONTINUED | OUTPATIENT
Start: 2024-11-13 | End: 2024-11-13 | Stop reason: HOSPADM

## 2024-11-13 RX ORDER — HEPARIN SODIUM 1000 [USP'U]/ML
INJECTION, SOLUTION INTRAVENOUS; SUBCUTANEOUS
Status: DISCONTINUED | OUTPATIENT
Start: 2024-11-13 | End: 2024-11-13 | Stop reason: HOSPADM

## 2024-11-13 RX ORDER — RANOLAZINE 500 MG/1
1000 TABLET, EXTENDED RELEASE ORAL 2 TIMES DAILY
Status: DISCONTINUED | OUTPATIENT
Start: 2024-11-13 | End: 2024-11-16 | Stop reason: HOSPADM

## 2024-11-13 RX ORDER — ACETAMINOPHEN 325 MG/1
650 TABLET ORAL EVERY 4 HOURS PRN
Status: DISCONTINUED | OUTPATIENT
Start: 2024-11-13 | End: 2024-11-16 | Stop reason: HOSPADM

## 2024-11-13 RX ORDER — FENTANYL CITRATE 50 UG/ML
INJECTION, SOLUTION INTRAMUSCULAR; INTRAVENOUS
Status: DISCONTINUED | OUTPATIENT
Start: 2024-11-13 | End: 2024-11-13 | Stop reason: HOSPADM

## 2024-11-13 RX ORDER — LOSARTAN POTASSIUM 25 MG/1
25 TABLET ORAL DAILY
Status: DISCONTINUED | OUTPATIENT
Start: 2024-11-13 | End: 2024-11-16 | Stop reason: HOSPADM

## 2024-11-13 RX ORDER — ALBUTEROL SULFATE 0.83 MG/ML
2.5 SOLUTION RESPIRATORY (INHALATION) EVERY 6 HOURS PRN
Status: DISCONTINUED | OUTPATIENT
Start: 2024-11-13 | End: 2024-11-16 | Stop reason: HOSPADM

## 2024-11-13 RX ORDER — LEVOTHYROXINE SODIUM 50 UG/1
50 TABLET ORAL DAILY
Status: DISCONTINUED | OUTPATIENT
Start: 2024-11-14 | End: 2024-11-16 | Stop reason: HOSPADM

## 2024-11-13 RX ADMIN — HYDROMORPHONE HYDROCHLORIDE 0.5 MG: 1 INJECTION, SOLUTION INTRAMUSCULAR; INTRAVENOUS; SUBCUTANEOUS at 14:10

## 2024-11-13 RX ADMIN — HYDROMORPHONE HYDROCHLORIDE 0.5 MG: 1 INJECTION, SOLUTION INTRAMUSCULAR; INTRAVENOUS; SUBCUTANEOUS at 15:45

## 2024-11-13 RX ADMIN — CILOSTAZOL 50 MG: 100 TABLET ORAL at 21:33

## 2024-11-13 RX ADMIN — ASPIRIN 81 MG: 81 TABLET, COATED ORAL at 16:07

## 2024-11-13 RX ADMIN — AMLODIPINE BESYLATE 5 MG: 5 TABLET ORAL at 16:07

## 2024-11-13 RX ADMIN — PRASUGREL 10 MG: 10 TABLET, FILM COATED ORAL at 16:07

## 2024-11-13 RX ADMIN — ONDANSETRON 4 MG: 2 INJECTION, SOLUTION INTRAMUSCULAR; INTRAVENOUS at 21:52

## 2024-11-13 RX ADMIN — METHYLPREDNISOLONE SODIUM SUCCINATE 125 MG: 125 INJECTION, POWDER, FOR SOLUTION INTRAMUSCULAR; INTRAVENOUS at 11:14

## 2024-11-13 RX ADMIN — METOPROLOL SUCCINATE 100 MG: 50 TABLET, EXTENDED RELEASE ORAL at 16:07

## 2024-11-13 RX ADMIN — LOSARTAN POTASSIUM 25 MG: 25 TABLET, FILM COATED ORAL at 16:07

## 2024-11-13 RX ADMIN — ISOSORBIDE MONONITRATE 120 MG: 60 TABLET, EXTENDED RELEASE ORAL at 16:07

## 2024-11-13 RX ADMIN — ACETAMINOPHEN 650 MG: 325 TABLET, FILM COATED ORAL at 18:48

## 2024-11-13 RX ADMIN — RANOLAZINE 1000 MG: 500 TABLET, FILM COATED, EXTENDED RELEASE ORAL at 20:45

## 2024-11-13 RX ADMIN — HYDROMORPHONE HYDROCHLORIDE 0.5 MG: 1 INJECTION, SOLUTION INTRAMUSCULAR; INTRAVENOUS; SUBCUTANEOUS at 21:52

## 2024-11-13 RX ADMIN — DIPHENHYDRAMINE HYDROCHLORIDE 25 MG: 50 INJECTION, SOLUTION INTRAMUSCULAR; INTRAVENOUS at 11:14

## 2024-11-13 RX ADMIN — FAMOTIDINE 20 MG: 10 INJECTION INTRAVENOUS at 11:14

## 2024-11-13 RX ADMIN — HYDROCODONE BITARTRATE AND ACETAMINOPHEN 1 TABLET: 7.5; 325 TABLET ORAL at 19:32

## 2024-11-13 NOTE — Clinical Note
First balloon inflation max pressure = 12 radha. First balloon inflation duration = 16 seconds. Second inflation of balloon - Max pressure = 12 radha. 2nd Inflation of balloon - Duration = 15 seconds. Third inflation of balloon - Max pressure = 12 radha. 3rd Inflation of balloon - Duration = 40 seconds. Fourth inflation of balloon - Max pressure = 12 radha. 4th Inflation of balloon - Duration = 18 seconds.

## 2024-11-13 NOTE — Clinical Note
First balloon inflation max pressure = 12 radha. First balloon inflation duration = 40 seconds. Second inflation of balloon - Max pressure = 10 radha. 2nd Inflation of balloon - Duration = 10 seconds. Third inflation of balloon - Max pressure = 12 radha. 3rd Inflation of balloon - Duration = 15 seconds. Fourth inflation of balloon - Max pressure = 12 radha. 4th Inflation of balloon - Duration = 20 seconds.

## 2024-11-13 NOTE — Clinical Note
First balloon inflation max pressure = 12 radha. First balloon inflation duration = 45 seconds. Second inflation of balloon - Max pressure = 12 radha. 2nd Inflation of balloon - Duration = 40 seconds. Third inflation of balloon - Max pressure = 12 radha. 3rd Inflation of balloon - Duration = 20 seconds. Fourth inflation of balloon - Max pressure = 12 radha. 4th Inflation of balloon - Duration = 16 seconds.

## 2024-11-13 NOTE — INTERVAL H&P NOTE
H&P reviewed. The patient was examined and there are no changes to the H&P.    Seen  HPI unchanged  Exam unchanged from prior 10/14/2024  Labs and vitals reviewed in EMR  No contraindications to contrast antiplatelets or anticoagulation  Recent benefits of cardiac catheterization for unstable angina discussed, death heart attack stroke pain bleeding infection need for vascular cardiovascular surgery were discussed and he wishes to proceed with escalating chest pain  Status post bypass surgery and failed grafts at young age  Poor candidate for redo    PCI and intervention SVG to PDA last year 2023 secondary to ISR, distal OM circumflex disease not amenable to intervention with tortuosity and small caliber vessel size and was treated medically at that time  Preserved EF    Moises Haddad implication

## 2024-11-14 ENCOUNTER — APPOINTMENT (OUTPATIENT)
Dept: CARDIOLOGY | Facility: HOSPITAL | Age: 39
End: 2024-11-14
Payer: MEDICAID

## 2024-11-14 LAB
ACT BLD: 337 SECONDS (ref 89–137)
ANION GAP SERPL CALCULATED.3IONS-SCNC: 9.4 MMOL/L (ref 5–15)
BASOPHILS # BLD AUTO: 0.01 10*3/MM3 (ref 0–0.2)
BASOPHILS NFR BLD AUTO: 0.1 % (ref 0–1.5)
BH CV RIGHT CFA PSA SCRIPTING YES PSA FINDING: 1
BH CV RIGHT GROIN PSA PROCEDURE SCRIPTING LRR: 1
BH CV RIGHT GROIN PSA PROCEDURE SCRIPTING LRR: 1
BH CV VAS PRELIMINARY FINDINGS SCRIPTING: 1
BH CV VAS R ACTIVE AREA: 1.6 CM
BH CV VAS R ACTIVE WIDTH: 1.6 CM
BH CV VAS R PSA NECK LENGTH: 1.8 CM
BH CV VAS R PSEUDOANEURYSM DEPTH: 2.5 CM
BUN SERPL-MCNC: 11 MG/DL (ref 6–20)
BUN/CREAT SERPL: 10.1 (ref 7–25)
CALCIUM SPEC-SCNC: 9.2 MG/DL (ref 8.6–10.5)
CHLORIDE SERPL-SCNC: 100 MMOL/L (ref 98–107)
CHOLEST SERPL-MCNC: 146 MG/DL (ref 0–200)
CO2 SERPL-SCNC: 26.6 MMOL/L (ref 22–29)
CREAT SERPL-MCNC: 1.09 MG/DL (ref 0.76–1.27)
DEPRECATED RDW RBC AUTO: 40.2 FL (ref 37–54)
DEPRECATED RDW RBC AUTO: NORMAL FL
EGFRCR SERPLBLD CKD-EPI 2021: 87.4 ML/MIN/1.73
EOSINOPHIL # BLD AUTO: 0 10*3/MM3 (ref 0–0.4)
EOSINOPHIL NFR BLD AUTO: 0 % (ref 0.3–6.2)
ERYTHROCYTE [DISTWIDTH] IN BLOOD BY AUTOMATED COUNT: 12.7 % (ref 12.3–15.4)
ERYTHROCYTE [DISTWIDTH] IN BLOOD BY AUTOMATED COUNT: NORMAL %
GLUCOSE SERPL-MCNC: 139 MG/DL (ref 65–99)
HBA1C MFR BLD: 5.57 % (ref 4.8–5.6)
HCT VFR BLD AUTO: 39.1 % (ref 37.5–51)
HCT VFR BLD AUTO: NORMAL %
HDLC SERPL-MCNC: 52 MG/DL (ref 40–60)
HGB BLD-MCNC: 13 G/DL (ref 13–17.7)
HGB BLD-MCNC: NORMAL G/DL
IMM GRANULOCYTES # BLD AUTO: 0.07 10*3/MM3 (ref 0–0.05)
IMM GRANULOCYTES NFR BLD AUTO: 0.7 % (ref 0–0.5)
LDLC SERPL CALC-MCNC: 78 MG/DL (ref 0–100)
LDLC/HDLC SERPL: 1.49 {RATIO}
LYMPHOCYTES # BLD AUTO: 0.81 10*3/MM3 (ref 0.7–3.1)
LYMPHOCYTES NFR BLD AUTO: 8.6 % (ref 19.6–45.3)
MCH RBC QN AUTO: 28.8 PG (ref 26.6–33)
MCH RBC QN AUTO: NORMAL PG
MCHC RBC AUTO-ENTMCNC: 33.2 G/DL (ref 31.5–35.7)
MCHC RBC AUTO-ENTMCNC: NORMAL G/DL
MCV RBC AUTO: 86.5 FL (ref 79–97)
MCV RBC AUTO: NORMAL FL
MONOCYTES # BLD AUTO: 0.15 10*3/MM3 (ref 0.1–0.9)
MONOCYTES NFR BLD AUTO: 1.6 % (ref 5–12)
NEUTROPHILS NFR BLD AUTO: 8.4 10*3/MM3 (ref 1.7–7)
NEUTROPHILS NFR BLD AUTO: 89 % (ref 42.7–76)
NRBC BLD AUTO-RTO: 0 /100 WBC (ref 0–0.2)
PLATELET # BLD AUTO: 282 10*3/MM3 (ref 140–450)
PLATELET # BLD AUTO: NORMAL 10*3/UL
PMV BLD AUTO: 9.3 FL (ref 6–12)
PMV BLD AUTO: NORMAL FL
POTASSIUM SERPL-SCNC: 4.3 MMOL/L (ref 3.5–5.2)
PROX PFA PSV RIGHT: 102 CM/SEC
PROX PFA PSV RIGHT: 115 CM/SEC
PROX SFA PSV RIGHT: 138 CM/SEC
PROX SFA PSV RIGHT: 99 CM/SEC
RBC # BLD AUTO: 4.52 10*6/MM3 (ref 4.14–5.8)
RBC # BLD AUTO: NORMAL 10*6/UL
RIGHT GROIN CFA SYS: 129 CM/SEC
RIGHT GROIN CFA SYS: 200 CM/SEC
SODIUM SERPL-SCNC: 136 MMOL/L (ref 136–145)
TRIGL SERPL-MCNC: 83 MG/DL (ref 0–150)
VLDLC SERPL-MCNC: 16 MG/DL (ref 5–40)
WBC NRBC COR # BLD AUTO: 9.44 10*3/MM3 (ref 3.4–10.8)
WBC NRBC COR # BLD AUTO: NORMAL 10*3/UL

## 2024-11-14 PROCEDURE — 83036 HEMOGLOBIN GLYCOSYLATED A1C: CPT | Performed by: INTERNAL MEDICINE

## 2024-11-14 PROCEDURE — 76942 ECHO GUIDE FOR BIOPSY: CPT

## 2024-11-14 PROCEDURE — 25010000002 HYDROMORPHONE PER 4 MG: Performed by: INTERNAL MEDICINE

## 2024-11-14 PROCEDURE — 80048 BASIC METABOLIC PNL TOTAL CA: CPT | Performed by: INTERNAL MEDICINE

## 2024-11-14 PROCEDURE — 93926 LOWER EXTREMITY STUDY: CPT

## 2024-11-14 PROCEDURE — 80061 LIPID PANEL: CPT | Performed by: INTERNAL MEDICINE

## 2024-11-14 PROCEDURE — 99222 1ST HOSP IP/OBS MODERATE 55: CPT | Performed by: NURSE PRACTITIONER

## 2024-11-14 PROCEDURE — 36002 PSEUDOANEURYSM INJECTION TRT: CPT | Performed by: SURGERY

## 2024-11-14 PROCEDURE — 25010000002 ONDANSETRON PER 1 MG: Performed by: INTERNAL MEDICINE

## 2024-11-14 PROCEDURE — 85025 COMPLETE CBC W/AUTO DIFF WBC: CPT | Performed by: INTERNAL MEDICINE

## 2024-11-14 PROCEDURE — 3E053GC INTRODUCTION OF OTHER THERAPEUTIC SUBSTANCE INTO PERIPHERAL ARTERY, PERCUTANEOUS APPROACH: ICD-10-PCS | Performed by: SURGERY

## 2024-11-14 PROCEDURE — 93926 LOWER EXTREMITY STUDY: CPT | Performed by: SURGERY

## 2024-11-14 PROCEDURE — G0378 HOSPITAL OBSERVATION PER HR: HCPCS

## 2024-11-14 PROCEDURE — 36002 PSEUDOANEURYSM INJECTION TRT: CPT

## 2024-11-14 PROCEDURE — 76942 ECHO GUIDE FOR BIOPSY: CPT | Performed by: SURGERY

## 2024-11-14 RX ORDER — HYDROCODONE BITARTRATE AND ACETAMINOPHEN 7.5; 325 MG/1; MG/1
1 TABLET ORAL EVERY 4 HOURS PRN
Status: DISCONTINUED | OUTPATIENT
Start: 2024-11-14 | End: 2024-11-16 | Stop reason: HOSPADM

## 2024-11-14 RX ORDER — HYDROCODONE BITARTRATE AND ACETAMINOPHEN 7.5; 325 MG/1; MG/1
1 TABLET ORAL EVERY 4 HOURS PRN
Status: DISCONTINUED | OUTPATIENT
Start: 2024-11-14 | End: 2024-11-14

## 2024-11-14 RX ADMIN — HYDROCODONE BITARTRATE AND ACETAMINOPHEN 1 TABLET: 7.5; 325 TABLET ORAL at 22:13

## 2024-11-14 RX ADMIN — RANOLAZINE 1000 MG: 500 TABLET, FILM COATED, EXTENDED RELEASE ORAL at 20:12

## 2024-11-14 RX ADMIN — ACETAMINOPHEN 650 MG: 325 TABLET, FILM COATED ORAL at 05:15

## 2024-11-14 RX ADMIN — ONDANSETRON 4 MG: 2 INJECTION, SOLUTION INTRAMUSCULAR; INTRAVENOUS at 20:33

## 2024-11-14 RX ADMIN — ASPIRIN 81 MG: 81 TABLET, COATED ORAL at 08:33

## 2024-11-14 RX ADMIN — HYDROMORPHONE HYDROCHLORIDE 0.5 MG: 1 INJECTION, SOLUTION INTRAMUSCULAR; INTRAVENOUS; SUBCUTANEOUS at 07:27

## 2024-11-14 RX ADMIN — HYDROMORPHONE HYDROCHLORIDE 0.5 MG: 1 INJECTION, SOLUTION INTRAMUSCULAR; INTRAVENOUS; SUBCUTANEOUS at 12:07

## 2024-11-14 RX ADMIN — CILOSTAZOL 50 MG: 100 TABLET ORAL at 09:25

## 2024-11-14 RX ADMIN — HYDROMORPHONE HYDROCHLORIDE 0.5 MG: 1 INJECTION, SOLUTION INTRAMUSCULAR; INTRAVENOUS; SUBCUTANEOUS at 20:12

## 2024-11-14 RX ADMIN — ROSUVASTATIN 20 MG: 10 TABLET, FILM COATED ORAL at 08:33

## 2024-11-14 RX ADMIN — HYDROCODONE BITARTRATE AND ACETAMINOPHEN 1 TABLET: 7.5; 325 TABLET ORAL at 18:26

## 2024-11-14 RX ADMIN — ONDANSETRON 4 MG: 2 INJECTION, SOLUTION INTRAMUSCULAR; INTRAVENOUS at 05:15

## 2024-11-14 RX ADMIN — PANTOPRAZOLE SODIUM 40 MG: 40 TABLET, DELAYED RELEASE ORAL at 05:15

## 2024-11-14 RX ADMIN — METOPROLOL SUCCINATE 100 MG: 50 TABLET, EXTENDED RELEASE ORAL at 08:34

## 2024-11-14 RX ADMIN — LEVOTHYROXINE SODIUM 50 MCG: 0.05 TABLET ORAL at 08:35

## 2024-11-14 RX ADMIN — ISOSORBIDE MONONITRATE 120 MG: 60 TABLET, EXTENDED RELEASE ORAL at 08:33

## 2024-11-14 RX ADMIN — RANOLAZINE 1000 MG: 500 TABLET, FILM COATED, EXTENDED RELEASE ORAL at 08:33

## 2024-11-14 RX ADMIN — ONDANSETRON 4 MG: 2 INJECTION, SOLUTION INTRAMUSCULAR; INTRAVENOUS at 14:14

## 2024-11-14 RX ADMIN — HYDROMORPHONE HYDROCHLORIDE 0.5 MG: 1 INJECTION, SOLUTION INTRAMUSCULAR; INTRAVENOUS; SUBCUTANEOUS at 03:27

## 2024-11-14 RX ADMIN — LOSARTAN POTASSIUM 25 MG: 25 TABLET, FILM COATED ORAL at 08:34

## 2024-11-14 RX ADMIN — CILOSTAZOL 50 MG: 100 TABLET ORAL at 20:12

## 2024-11-14 RX ADMIN — ONDANSETRON 4 MG: 2 INJECTION, SOLUTION INTRAMUSCULAR; INTRAVENOUS at 09:25

## 2024-11-14 RX ADMIN — AMLODIPINE BESYLATE 5 MG: 5 TABLET ORAL at 08:34

## 2024-11-14 RX ADMIN — PRASUGREL 10 MG: 10 TABLET, FILM COATED ORAL at 08:32

## 2024-11-14 RX ADMIN — HYDROMORPHONE HYDROCHLORIDE 0.5 MG: 1 INJECTION, SOLUTION INTRAMUSCULAR; INTRAVENOUS; SUBCUTANEOUS at 16:08

## 2024-11-14 NOTE — CASE MANAGEMENT/SOCIAL WORK
Discharge Planning Assessment  [facility]     Patient Name: Tramaine Gates  MRN: [id number]  Today's Date: 11/14/2024    Admit Date: 11/13/2024    Plan: DC Plan: Anticipate routine home with family. Lives with S.O., Mother, and Dependent Children. Effient AC plan.   Discharge Needs Assessment       Row Name 11/14/24 1611       Living Environment    People in Home parent(s);significant other;child(kelly), dependent    Name(s) of People in Home S.GANESH. Ariana Wolf and Mother Magi Benito    Current Living Arrangements home    Potentially Unsafe Housing Conditions none    In the past 12 months has the electric, gas, oil, or water company threatened to shut off services in your home? No    Primary Care Provided by self    Provides Primary Care For no one    Family Caregiver if Needed significant other;parent(s)    Family Caregiver Names S.ALISSON Wolf, Mother Magi Oliver    Quality of Family Relationships helpful;involved;supportive    Able to Return to Prior Arrangements yes       Resource/Environmental Concerns    Resource/Environmental Concerns none    Transportation Concerns other (see comments)  Car is old and unreliable. Borrows his mothers car if he can for appointments.       Transportation Needs    In the past 12 months, has lack of transportation kept you from medical appointments or from getting medications? yes    In the past 12 months, has lack of transportation kept you from meetings, work, or from getting things needed for daily living? Yes       Food Insecurity    Within the past 12 months, you worried that your food would run out before you got the money to buy more. Often true    Within the past 12 months, the food you bought just didn't last and you didn't have money to get more. Often true       Transition Planning    Patient/Family Anticipates Transition to home with family    Patient/Family Anticipated Services at Transition none    Transportation Anticipated car, drives self;family or friend  will provide       Discharge Needs Assessment    Readmission Within the Last 30 Days no previous admission in last 30 days    Concerns to be Addressed discharge planning;basic needs;financial/insurance    Anticipated Changes Related to Illness none    Equipment Needed After Discharge none    Provided Post Acute Provider List? N/A    Provided Post Acute Provider Quality & Resource List? N/A    Current Discharge Risk financial support inadequate                   Discharge Plan       Row Name 11/14/24 3718       Plan    Plan DC Plan: Anticipate routine home with family. Lives with S.O., Mother, and Dependent Children. Effient AC plan.    Patient/Family in Agreement with Plan yes    Provided Post Acute Provider List? N/A    Provided Post Acute Provider Quality & Resource List? N/A    Plan Comments CM spoke with patient at bedside to discuss admission assessment and discharge planning. Patient confirms PCP and pharmacy. Patient is already enrolled in meds to bed program. Patient denies any difficulty affording medications at this time. Pateint does have food insecurity and was not aware of how to apply for assistance or where food pantries are located. CM placed [facility] contact information on AVS and provided [facility] packet for additional resources. Patient states transportation is unreliable, but borrows his mother's vehicle to get to appointments. Patient denies any additional needs for services or DME at this time. Patient reports IADL's and drives occasionally. Patient mother will provide transportation when ready for DC. CM unable to complete SDOH screening due to transport arrival to take patient for imaging. CM reached out via telephone this afternoong but was unable to reach patient. Will complete in the morning.CM will continue to follow for any additional needs and adjust DC plan accordingly. DC Barriers: Cardiac monitoring, Vascular imaging results pending for possible pseudoaneurysm of Groin cath site.                Expected Discharge Date and Time       Expected Discharge Date Expected Discharge Time    Nov 15, 2024            Demographic Summary       Row Name 11/14/24 1610       General Information    Admission Type inpatient    Arrived From emergency department;home    Referral Source admission list    Reason for Consult discharge planning    Preferred Language English       Contact Information    Permission Granted to Share Info With                    Functional Status       Row Name 11/14/24 1610       Functional Status    Usual Activity Tolerance moderate    Current Activity Tolerance moderate       Physical Activity    On average, how many days per week do you engage in moderate to strenuous exercise (like a brisk walk)? 0 days    On average, how many minutes do you engage in exercise at this level? 0 min    Number of minutes of exercise per week 0       Functional Status, IADL    Medications independent    Meal Preparation independent    Housekeeping independent    Laundry independent    Shopping independent       Mental Status    General Appearance WDL WDL       Mental Status Summary    Recent Changes in Mental Status/Cognitive Functioning no changes       Employment/    Employment Status unemployed    Current or Previous Occupation not applicable                 Met with patient in room   Maintain distance greater than six feet and spent less than fifteen minutes in the room.      Amaris Welch RN    Office Phone: [phone]  Office Cell:     [phone]

## 2024-11-14 NOTE — CONSULTS
Name: Tramaine Gates ADMIT: 2024   : 1985  PCP: Daniela Hernandez FNP    MRN: [id number] LOS: 0 days   AGE/SEX: 39 y.o. male  ROOM: [address] [place] [address]      Patient Care Team:  Daniela Hernandez FNP as PCP - General (Family Medicine)  Ollie Dunn MD as Consulting Physician (Gastroenterology)  No chief complaint on file.      CC: Femoral pseudoaneurysm    Subjective     Inpatient Vascular Surgery Consult  Consult performed by: Viviane Lynn APRN  Consult ordered by: Moises Haddad MD          History of Present Illness  Tramaine Gates is a 39 y.o. male with a past medical history of Hypertension, GERD, hyperlipidemia, migraines, panic attack, CAD, and morbid obesity who presented to [facility] on 2024 for an elective left heart cath.  Patient was noted to have swelling of his right groin after the procedure and a duplex was performed.  Duplex shows a 1 to 2 cm pseudoaneurysm in the right common femoral artery.  Vascular surgery was consulted to evaluate.  Patient reports some soreness to his right groin, it is tender to touch.  He reports soreness along the entire right leg.  He remains neuromotor intact.    Review of Systems   Musculoskeletal:  Positive for myalgias.   Skin:         Swelling to the right groin       Past Medical History:   Diagnosis Date    Acute inferior myocardial infarction 2022    Allergic     Asthma 2022    CAD, multiple vessel 2022    Disorder of thyroid 2022    Gastroesophageal reflux disease 2022    Hx of complete eye exam 2016    Hyperlipidemia 2022    Hypertension     Migraine headache 2022    Panic attack 2022    Peptic ulcer 2017     Past Surgical History:   Procedure Laterality Date    CARDIAC CATHETERIZATION N/A 2022    Procedure: Left Heart Cath;  Surgeon: Matthieu Del Toro MD;  Location: [facility] INVASIVE LOCATION;  Service: Cardiology;  Laterality:  N/A;    CARDIAC CATHETERIZATION N/A 2022    Procedure: Percutaneous Coronary Intervention;  Surgeon: Matthieu Del Toro MD;  Location:  [facility] CATH INVASIVE LOCATION;  Service: Cardiovascular;  Laterality: N/A;    CARDIAC CATHETERIZATION N/A 2022    Procedure: Left Heart Cath possible PCI, atherectomy, hemodynamic support;  Surgeon: Moises Haddad MD;  Location:  [facility] INVASIVE LOCATION;  Service: Cardiology;  Laterality: N/A;    CARDIAC CATHETERIZATION N/A 09/15/2022    Procedure: Left Heart Cath;  Surgeon: Moises Haddad MD;  Location:  [facility] INVASIVE LOCATION;  Service: Cardiology;  Laterality: N/A;    CARDIAC CATHETERIZATION  9/15/2022    CARDIAC CATHETERIZATION N/A 3/2/2023    Procedure: Left Heart Cath;  Surgeon: Moises Haddad MD;  Location: [facility] CATH INVASIVE LOCATION;  Service: Cardiology;  Laterality: N/A;    CHOLECYSTECTOMY  2019    CORONARY ARTERY BYPASS GRAFT N/A 2022    Procedure: CORONARY ARTERY BYPASS GRAFTING;  Surgeon: Pablo Ball MD;  Location: [facility] CVOR;  Service: Cardiothoracic;  Laterality: N/A;  CABG X 3(2 vein grafts, 1 LIMA graft)    CORONARY ARTERY BYPASS GRAFT  2022    CORONARY STENT PLACEMENT      MANDIBLE SURGERY       Family History   Problem Relation Age of Onset    Heart disease Mother     Heart attack Mother     Hypertension Mother     Heart failure Father     Cirrhosis Maternal Grandfather     Heart attack Maternal Grandmother         a       Social History     Tobacco Use    Smoking status: Former     Current packs/day: 0.00     Average packs/day: 1.5 packs/day for 26.5 years (39.7 ttl pk-yrs)     Types: Cigarettes     Start date: 1996     Quit date: 2022     Years since quittin.4    Smokeless tobacco: Never   Vaping Use    Vaping status: Never Used   Substance Use Topics    Alcohol use: Not Currently    Drug use: Yes     Frequency: 7.0 times per week     Types: Marijuana     Medications Prior to  Admission   Medication Sig Dispense Refill Last Dose/Taking    albuterol sulfate  (90 Base) MCG/ACT inhaler Inhale 2 puffs by mouth as directed every four to six hours as needed for coughing, shortness of breath, wheezing 8.5 g 0 Taking    amLODIPine (Norvasc) 5 MG tablet Take 1 tablet by mouth Daily. 90 tablet 0 11/12/2024 Noon    aspirin (Aspirin Low Dose) 81 MG EC tablet Take 1 tablet by mouth Daily. 90 tablet 0 11/12/2024 Noon    cilostazol (PLETAL) 100 MG tablet Take 0.5 tablets by mouth 2 (Two) Times a Day. 90 tablet 3 11/13/2024 at 12:00 AM    Evolocumab (REPATHA) solution auto-injector SureClick injection Inject 1 mL under the skin into the appropriate area as directed Every 14 (Fourteen) Days.   11/3/2024    isosorbide mononitrate (IMDUR) 120 MG 24 hr tablet Take 1 tablet by mouth Daily. 30 tablet 0 11/12/2024 Noon    levothyroxine (SYNTHROID, LEVOTHROID) 50 MCG tablet TAKE 1 TABLET BY MOUTH EVERY DAY 90 tablet 0 11/13/2024 Morning    losartan (Cozaar) 25 MG tablet Take 1 tablet by mouth Daily. 30 tablet 0 Past Week    metoprolol succinate XL (TOPROL-XL) 100 MG 24 hr tablet Take 1 tablet by mouth Daily. 30 tablet 0 11/12/2024 Noon    nitroglycerin (NITROSTAT) 0.4 MG SL tablet Place 1 tablet under the tongue Every 5 (Five) Minutes As Needed for Chest Pain (Only if SBP Greater Than 100). Take no more than 3 doses in 15 minutes. 25 tablet 0 Past Week    omeprazole (priLOSEC) 40 MG capsule Take 1 capsule by mouth Daily.   11/12/2024 Noon    prasugrel (EFFIENT) 10 MG tablet Take 1 tablet by mouth Daily. 90 tablet 3 11/12/2024 Noon    ranolazine (RANEXA) 1000 MG 12 hr tablet Take 1 tablet by mouth 2 (Two) Times a Day. 180 tablet 3 11/13/2024 at 12:00 AM    rosuvastatin (CRESTOR) 40 MG tablet Take 0.5 tablets by mouth Daily.   11/13/2024 at 12:00 AM     amLODIPine, 5 mg, Oral, Daily  aspirin, 81 mg, Oral, Daily  cilostazol, 50 mg, Oral, BID  isosorbide mononitrate, 120 mg, Oral, Q24H  levothyroxine, 50 mcg,  Oral, Daily  losartan, 25 mg, Oral, Daily  metoprolol succinate XL, 100 mg, Oral, Q24H  pantoprazole, 40 mg, Oral, Q AM  prasugrel, 10 mg, Oral, Daily  ranolazine, 1,000 mg, Oral, BID  rosuvastatin, 20 mg, Oral, Daily           acetaminophen    albuterol    atropine    HYDROmorphone    nitroglycerin    ondansetron  Shellfish-derived products    Objective     Physical Exam:   NAD, alert and oriented  RRR  Lungs clear  Abd soft, benign  Vascular: Palpable bilateral femoral and pedal pulses  Skin: Soft swelling and ecchymosis to the right groin    Vital Signs and Labs:  Vital Signs Patient Vitals for the past 24 hrs:   BP Temp Temp src Pulse Resp SpO2   11/14/24 1209 118/68 98.1 °F (36.7 °C) Oral 81 16 95 %   11/14/24 0748 129/70 98 °F (36.7 °C) Oral 82 14 96 %   11/14/24 0700 -- -- -- 65 -- 96 %   11/14/24 0600 -- -- -- 79 -- 92 %   11/14/24 0500 -- -- -- 85 -- 94 %   11/14/24 0432 120/90 97.8 °F (36.6 °C) Oral -- 14 --   11/14/24 0400 -- -- -- 79 -- 92 %   11/14/24 0300 101/54 -- -- 72 -- (!) 88 %   11/14/24 0200 100/56 -- -- 68 -- 91 %   11/14/24 0100 120/53 -- -- 78 -- 92 %   11/14/24 0034 -- 98.1 °F (36.7 °C) Oral -- 12 --   11/14/24 0000 -- -- -- 69 -- 92 %   11/13/24 2300 109/67 -- -- 67 -- 95 %   11/13/24 2200 124/51 -- -- 71 -- 95 %   11/13/24 2100 123/63 -- -- 75 -- 95 %   11/13/24 2045 105/63 -- -- 71 -- --   11/13/24 2000 -- -- -- 71 -- 96 %   11/13/24 1900 134/77 -- -- 69 -- 96 %   11/13/24 1830 135/76 -- -- 67 -- 95 %   11/13/24 1800 138/95 -- -- 69 -- 96 %   11/13/24 1745 129/73 -- -- 70 -- 95 %   11/13/24 1730 130/73 -- -- 68 -- 96 %   11/13/24 1715 127/75 -- -- 60 -- 95 %   11/13/24 1710 130/78 -- -- 61 -- 95 %   11/13/24 1705 122/72 -- -- 64 -- 95 %   11/13/24 1700 135/77 -- -- 67 -- 96 %   11/13/24 1655 118/70 -- -- 64 -- 96 %   11/13/24 1650 128/76 -- -- 66 -- 96 %   11/13/24 1645 -- -- -- 64 -- 96 %   11/13/24 1640 -- -- -- 63 -- 96 %   11/13/24 1635 125/74 -- -- 69 -- 95 %   11/13/24 1630 -- -- --  67 -- 97 %   11/13/24 1620 132/88 -- -- 64 -- 97 %   11/13/24 1615 -- -- -- 66 -- 97 %   11/13/24 1605 135/75 -- -- 61 -- 95 %   11/13/24 1600 -- -- -- 66 -- 96 %   11/13/24 1550 128/81 -- -- 69 -- 95 %   11/13/24 1545 -- -- -- 62 -- 97 %   11/13/24 1535 131/73 -- -- 66 -- 96 %   11/13/24 1530 -- -- -- 60 -- 94 %   11/13/24 1520 124/72 -- -- 66 -- 96 %   11/13/24 1515 -- -- -- 63 -- 97 %   11/13/24 1505 129/75 -- -- 64 -- 95 %   11/13/24 1500 -- -- -- 67 -- 97 %   11/13/24 1445 -- -- -- 64 -- 97 %   11/13/24 1435 116/75 -- -- 64 -- 97 %   11/13/24 1430 -- -- -- 66 -- 95 %   11/13/24 1420 132/63 -- -- 64 -- 96 %   11/13/24 1415 -- -- -- 65 -- 95 %   11/13/24 1405 127/75 -- -- 67 -- 96 %   11/13/24 1400 -- -- -- 68 -- 95 %   11/13/24 1355 124/73 -- -- 64 -- 95 %   11/13/24 1350 124/73 -- -- 64 -- 95 %   11/13/24 1345 -- -- -- 62 -- 95 %   11/13/24 1340 -- -- -- 66 -- 96 %     BMI:  Body mass index is 44.7 kg/m².    CBC    Results from last 7 days   Lab Units 11/14/24  0540 11/13/24  1039   WBC 10*3/mm3 9.44 6.10   HEMOGLOBIN g/dL 13.0 13.8   PLATELETS 10*3/mm3 282 255     BMP   Results from last 7 days   Lab Units 11/14/24  0445 11/13/24  1039   SODIUM mmol/L 136 138   POTASSIUM mmol/L 4.3 3.8   CHLORIDE mmol/L 100 103   CO2 mmol/L 26.6 25.2   BUN mg/dL 11 12   CREATININE mg/dL 1.09 1.20   GLUCOSE mg/dL 139* 110*     Coag   Results from last 7 days   Lab Units 11/13/24  1039   INR  1.00     HbA1C   Lab Results   Component Value Date    HGBA1C 5.57 11/14/2024    HGBA1C 5.50 03/03/2023    HGBA1C 5.3 09/16/2022     Infection     Radiology(recent) No radiology results for the last day    VTE Prophylaxis:  No VTE prophylaxis order currently exists.        Active Hospital Problems    Diagnosis  POA    **Abnormal stress test [R94.39]  Unknown    CAD (coronary artery disease) [I25.10]  Yes    CAD, multiple vessel [I25.10]  Unknown      Resolved Hospital Problems   No resolved problems to display.       Assessment & Plan    Assessment / Plan     Abnormal stress test    CAD, multiple vessel    CAD (coronary artery disease)      39 y.o. male who presents for an elective left heart cath.  Post procedurally he was noted to have swelling to the right groin and duplex shows 1 to 2 cm right common femoral artery pseudoaneurysm.  He has no signs or symptoms of acute limb ischemia.  Palpable pedal pulses and his feet are warm to touch.  We will plan for ultrasound-guided thrombin injection this afternoon with Dr. Blanco.  We discussed that if thrombin injection is unsuccessful he will need open surgical repair.  Patient verbalized understanding.    Thank you for this consultation.        YOUSIF Sommers  [facility]  11/14/24   O: [phone]  F: [phone]

## 2024-11-14 NOTE — OP NOTE
Date of Admission:  11/13/2024  Today's Date:  11/14/24  Tong Blanco MD  [facility]    Preoperative Diagnosis:   Pseudoaneurysm the Right  groin.    Postoperative Diagnosis:  Same    Procedure Performed:   Duplex guided thrombin injection of the Right groin pseudoaneurysm    Surgeon:   Tong Blanco MD    Assistant:    None    Anesthesia:   Local    Estimated Blood Loss:   Minimal    Findings:    Successful ultrasound-guided right femoral pseudoaneurysm injection with cessation of flow in the pseudoaneurysm.  Palpable dorsalis pedis pulse patient    Complications:   none    Dispo:   to PACU    Indication for procedure:   39 y.o. male with pseudoaneurysm following cardiac catheterization yesterday.  Risk benefits discussed    Description of procedure:   In the vascular lab suite the patient's right groin was prepped and draped.  With the assistance of the vascular ultrasonographer's the right groin was mapped.  Anatomy was identified.  Local was infiltrated around the planned trajectory of the microneedle and microneedle was passed under B-mode imaging into the base of the pseudoaneurysm sac and injected with a total of about 1 cc of thrombin.  This abruptly stopped flow in the pseudoaneurysm.  Patient remained with palpable pedal pulses.  Patient overall tolerated procedure well.    Tong Blanco MD  11/14/24     Active Hospital Problems    Diagnosis  POA    **Abnormal stress test [R94.39]  Unknown    CAD (coronary artery disease) [I25.10]  Yes    CAD, multiple vessel [I25.10]  Unknown      Resolved Hospital Problems   No resolved problems to display.

## 2024-11-14 NOTE — DISCHARGE SUMMARY
Tramaine Gates  1985  [id number]        Discharge Summary    Date of Admission: 11/13/2024  Date of Discharge:  11/14/2024    Primary Discharge Diagnoses: CAD, in-stent restenosis        PCP  Patient Care Team:  Daniela Hernandez FNP as PCP - General (Family Medicine)  Ollie Dunn MD as Consulting Physician (Gastroenterology)    Consults:   Consults       No orders found for last 30 day(s).            Operations and Procedures Performed:  Procedure(s):  Left Heart Cath     Cardiac Catheterization/Vascular Study    Result Date: 11/13/2024  Narrative: Primary operative Moises Haddad MD, PhD [facility] cardiology Date of service 11- Procedure 1.  Left heart catheterization with native and bypass graft angiography, LV gram in GALLO position, transaortic valve gradient assessment 2.  Balloon angioplasty from the distal portion of the SVG to PDA secondary to 90% ISR distally tandem 70% ISR diffusely and ostial proximal 50% ISR, 4.0 x 20 NC balloon at 12 to 14 radha and distal proximal fashion with prolonged inflations reduction of stenosis from 90% to less than 10% residual throughout the graft, improvement from 50% down to around 20% at the ostial portion of the graft again secondary recurrent ISR, similar ISR locations too early 2023 Indication Chest pain, angina refractory to medical therapy known severe early CAD of native and bypass grafts After informed consent patient brought to the Cath Lab sterilely prepped and draped in usual fashion with expose the right groin for right common femoral arterial access via micropuncture and modified Salinger technique with placement of a 6 [place] sheath.  035 guidewire was advanced to eval for by diagnostic JL 4 and JR4 catheters for selective left and right coronary angiography respectively.  JR4 was used for native and bypass graft disease including LIMA to LAD, a multipurpose catheter was used for imaging SVG to RPDA.  There was ISR recurrent  in the SVG to RPDA and patient was heparinized with 100 units/kg of heparin on a background therapy of DAPT, a 6 Upper sorbian multipurpose guide used gauge the graft, run-through wire to the distal portion of the vessel, 4.0 x 20 NC balloon inside the stented segment up to 12 radha for 1 minute inflations x 3 distal to proximal, ostial proximal inflation 12 radha for 30 to 60 seconds.  Final angiography great angiographic improvement in distal runoff and ISR area, we avoided relining the stent with multiple overlapping stents already in this portion of the graft.  The distal portion of the graft was without significant disease, anastomosis appeared unchanged with both retrograde and anterograde limb disease but nonobstructive with DARRYL-3 flow poor candidate for PCI given small caliber and limited runoff and this was left to be treated medically.  Disease in the circumflex into obtuse marginal branch was unchanged and therefore will be treated medically, disease from the LAD into diagonal branch was unchanged will be treated medically, MORTON to LAD widely patent, SVG to obtuse marginal branch is  which is known, native RCA.  Angiographically improved with the proximal to mid stents in place with minimal ISR.  Preserved EF which was normal with normal transaortic valve gradient, mildly elevated filling pressures at 16-18 similar to prior invasive evaluation last year He left Cath Lab chest pain-free hemodynamically electrically stable at tach to staff neurologically gross intact bilaterally Final angiography revealed no distal wire trauma edge dissection or complications. Complications none Blood loss less than 10 cc Contrast 180 cc Sedation time of 1 hour 10 minutes Findings 1.  Opening aortic pressure of 121/71 with a mean of 95, closing pressure was unchanged 2.  LV pressure 109/5 with an EDP of 16-18, normal EF 60% Normal transaortic gradient Angiography 1 left main normal takeoff mild diffuse disease 20% 2.  The LAD  diffuse disease with stent in the proximal portion,  after the takeoff the diagonal branch, diagonal diffusely diseased 50 to 60% but with DARRYL-3 flow and tapering distal vessels not amenable to intervention with small vessel disease distally 3.  LIMA to LAD is widely patent, no anastomotic disease, appears angiographically unchanged from early 2023 angiogram, no flow-limiting stenosis of the graft anastomosis or native vessel 4.  Circumflex is widely patent, body of the circumflex with diffuse luminal regularities, the terminal obtuse marginal branch has tortuosity with 50% stenosis at the proximal portion the obtuse marginal as well as the midportion with tenting however this vein graft to this area is closed.  There is DARRYL-3 flow throughout with small vessel disease distally and this will less be treated medically.  More proximal close marginal branches are  and small caliber 5.  RCA small caliber vessel, diffuse disease,  in the midportion after the takeoff of the marginal branch, proximal and mid stents are patent with 10 to 20% ISR 6.  SVG to obtuse marginal branch  7.  SVG to midportion of the RPDA was patent with tandem 90 and 70% regions of ISR inside the stented segments in the mid to distal portion back to the midportion.  Ostial ISR 60 to 70% worse from prior balloon angioplasty the segment 2023 Conclusions recommendations 1 chest pain refractory to medical therapy recurrent after intervention 2023, similar ISR territories inside the SVG to RPDA, intervention as described with balloon angioplasty from the inside the stented portion distally back to the mid to proximal portion including ostial proximal portion with 4.0 x 20 NC balloon up to 12 to 14 radha with prolonged inflations with great improvement of angiographic runoff, stenosis to less than 10% residual in the body of the graft, ostial 30% residual.  Unchanged anastomosis and RPDA Continue DAPT Cilostazol is on board to try to prevent  ISR If recurrent would refer to [facility] for brachytherapy with poor candidate for antegrade revascularization or  revascularization via native RCA Optimize goal-directed medical therapy Observe overnight and discharge once discharge criteria met Sheath will be pulled once ACT is less than 175 Further recommendations follow findings and clinical course Moises Haddad MD, PhD     Stress Test With Myocardial Perfusion One Day    Result Date: 10/22/2024  Narrative:   Left ventricular ejection fraction is hyperdynamic (Calculated EF > 70%).   There is no prior study available for comparison. Or overall imaging Tracking okay EF 83% with normal size ventricle with normal regional global wall motion Stress imaging with abnormal counts at the apex and periapical segments with normal basal counts Resting images are better Would indicate reversibility at the apex concerning for ischemia versus artifact Given suboptimal overall image quality correlate with clinical symptomatology, consider coronary CT for definitive evaluation.   Findings consistent with a normal ECG stress test. Intermediate to high risk  study Abnormal nuclear imaging with stress-induced decreased counts at the apex and periapical segments concerning for anterior apical ischemia Difficult image quality, recommend coronary CT or invasive ischemic evaluation for further evaluation definitively Stress ECG did not restarted heart rate no changes at submaximal stress      Allergies:  is allergic to shellfish-derived products.        Discharge Medications:    amLODIPine, 5 mg, Oral, Daily  aspirin, 81 mg, Oral, Daily  cilostazol, 50 mg, Oral, BID  isosorbide mononitrate, 120 mg, Oral, Q24H  levothyroxine, 50 mcg, Oral, Daily  losartan, 25 mg, Oral, Daily  metoprolol succinate XL, 100 mg, Oral, Q24H  pantoprazole, 40 mg, Oral, Q AM  prasugrel, 10 mg, Oral, Daily  ranolazine, 1,000 mg, Oral, BID  rosuvastatin, 20 mg, Oral, Daily        History of Present  Illness:  See H&P and EMR    Hospital Course  Admitted status post balloon angioplasty of ISR of SVG to distal RCA PDA, uncomplicated course, tolerating medical therapy, had some groin pain which was treated.  No fevers chill sweats nausea vomiting or diarrhea.  Chest pain-free today    Able to be discharged today to home  Restrictions discussed  Medications discussed  DAPT  Referral to [facility] for brachytherapy should he have recurrent ISR which has been an issue in the distal portion of the SVG stents leading to the PDA, other regions including LIMA to LAD, circumflex obtuse marginals all angiographically unchanged with preserved EF and chronically elevated filling pressures.    Last Lab Results:   Lab Results (most recent)       Procedure Component Value Units Date/Time    POC Activated Clotting Time [[id number]]  (Abnormal) Collected: 11/13/24 1302    Specimen: Arterial Blood Updated: 11/14/24 0726     Activated Clotting Time  337 Seconds      Comment: Serial Number: [id number]:  [id number]       Hemoglobin A1c [[id number]]  (Normal) Collected: 11/14/24 0540    Specimen: Blood Updated: 11/14/24 0622     Hemoglobin A1C 5.57 %     CBC & Differential [[id number]]  (Abnormal) Collected: 11/14/24 0540    Specimen: Blood Updated: 11/14/24 0604    Narrative:      The following orders were created for panel order CBC & Differential.  Procedure                               Abnormality         Status                     ---------                               -----------         ------                     CBC Auto Differential[[id number]]        Abnormal            Final result                 Please view results for these tests on the individual orders.    CBC Auto Differential [[id number]]  (Abnormal) Collected: 11/14/24 0540    Specimen: Blood Updated: 11/14/24 0604     WBC 9.44 10*3/mm3      RBC 4.52 10*6/mm3      Hemoglobin 13.0 g/dL      Hematocrit 39.1 %      MCV 86.5 fL      MCH 28.8 pg      MCHC  33.2 g/dL      RDW 12.7 %      RDW-SD 40.2 fl      MPV 9.3 fL      Platelets 282 10*3/mm3      Neutrophil % 89.0 %      Lymphocyte % 8.6 %      Monocyte % 1.6 %      Eosinophil % 0.0 %      Basophil % 0.1 %      Immature Grans % 0.7 %      Neutrophils, Absolute 8.40 10*3/mm3      Lymphocytes, Absolute 0.81 10*3/mm3      Monocytes, Absolute 0.15 10*3/mm3      Eosinophils, Absolute 0.00 10*3/mm3      Basophils, Absolute 0.01 10*3/mm3      Immature Grans, Absolute 0.07 10*3/mm3      nRBC 0.0 /100 WBC     CBC (No Diff) [[id number]] Collected: 11/14/24 0449    Specimen: Blood Updated: 11/14/24 0531     WBC --     Comment: Spec. clotted  Corrected result. Previous result was 9.02 10*3/mm3 on 11/14/2024 at 0500 EST.        RBC --     Comment: Spec. clotted  Corrected result. Previous result was 4.34 10*6/mm3 on 11/14/2024 at 0500 EST.        Hemoglobin --     Comment: Spec. clotted  Corrected result. Previous result was 12.7 g/dL on 11/14/2024 at 0500 EST.        Hematocrit --     Comment: Spec. clotted  Corrected result. Previous result was 39.0 % on 11/14/2024 at 0500 EST.        MCV --     Comment: Spec. clotted  Corrected result. Previous result was 89.9 fL on 11/14/2024 at 0500 EST.        MCH --     Comment: Spec. clotted  Corrected result. Previous result was 29.3 pg on 11/14/2024 at 0500 EST.        MCHC --     Comment: Spec. clotted  Corrected result. Previous result was 32.6 g/dL on 11/14/2024 at 0500 EST.        RDW --     Comment: Spec. clotted  Corrected result. Previous result was 12.8 % on 11/14/2024 at 0500 EST.        RDW-SD --     Comment: Spec. clotted  Corrected result. Previous result was 42.1 fl on 11/14/2024 at 0500 EST.        MPV --     Comment: Spec. clotted  Corrected result. Previous result was 9.1 fL on 11/14/2024 at 0500 EST.        Platelets --     Comment: Spec. clotted  Corrected result. Previous result was 160 10*3/mm3 on 11/14/2024 at 0500 EST.       Basic Metabolic Panel [[id number]]   (Abnormal) Collected: 11/14/24 0445    Specimen: Blood from Arm, Right Updated: 11/14/24 0527     Glucose 139 mg/dL      BUN 11 mg/dL      Creatinine 1.09 mg/dL      Sodium 136 mmol/L      Potassium 4.3 mmol/L      Comment: Specimen hemolyzed.  Result may be falsely elevated.        Chloride 100 mmol/L      CO2 26.6 mmol/L      Calcium 9.2 mg/dL      BUN/Creatinine Ratio 10.1     Anion Gap 9.4 mmol/L      eGFR 87.4 mL/min/1.73     Narrative:      GFR Normal >60  Chronic Kidney Disease <60  Kidney Failure <15      Lipid Panel [[id number]] Collected: 11/14/24 0445    Specimen: Blood from Arm, Right Updated: 11/14/24 0522     Total Cholesterol 146 mg/dL      Triglycerides 83 mg/dL      HDL Cholesterol 52 mg/dL      LDL Cholesterol  78 mg/dL      VLDL Cholesterol 16 mg/dL      LDL/HDL Ratio 1.49    Narrative:      Cholesterol Reference Ranges  (U.S. Department of Health and Human Services ATP III Classifications)    Desirable          <200 mg/dL  Borderline High    200-239 mg/dL  High Risk          >240 mg/dL      Triglyceride Reference Ranges  (U.S. Department of Health and Human Services ATP III Classifications)    Normal           <150 mg/dL  Borderline High  150-199 mg/dL  High             200-499 mg/dL  Very High        >500 mg/dL    HDL Reference Ranges  (U.S. Department of Health and Human Services ATP III Classifications)    Low     <40 mg/dl (major risk factor for CHD)  High    >60 mg/dl ('negative' risk factor for CHD)        LDL Reference Ranges  (U.S. Department of Health and Human Services ATP III Classifications)    Optimal          <100 mg/dL  Near Optimal     100-129 mg/dL  Borderline High  130-159 mg/dL  High             160-189 mg/dL  Very High        >189 mg/dL    POC Activated Clotting Time [[id number]]  (Abnormal) Collected: 11/13/24 1647    Specimen: Blood Updated: 11/13/24 1651     Activated Clotting Time  147 Seconds      Comment: Serial Number: [id number]:  [id number]       Comprehensive Metabolic  Panel [[id number]]  (Abnormal) Collected: 11/13/24 1039    Specimen: Blood Updated: 11/13/24 1111     Glucose 110 mg/dL      BUN 12 mg/dL      Creatinine 1.20 mg/dL      Sodium 138 mmol/L      Potassium 3.8 mmol/L      Chloride 103 mmol/L      CO2 25.2 mmol/L      Calcium 9.4 mg/dL      Total Protein 7.1 g/dL      Albumin 4.2 g/dL      ALT (SGPT) 19 U/L      AST (SGOT) 20 U/L      Alkaline Phosphatase 97 U/L      Total Bilirubin 0.3 mg/dL      Globulin 2.9 gm/dL      A/G Ratio 1.4 g/dL      BUN/Creatinine Ratio 10.0     Anion Gap 9.8 mmol/L      eGFR 77.9 mL/min/1.73     Narrative:      GFR Normal >60  Chronic Kidney Disease <60  Kidney Failure <15      CBC (No Diff) [[id number]]  (Normal) Collected: 11/13/24 1039    Specimen: Blood Updated: 11/13/24 1102     WBC 6.10 10*3/mm3      RBC 4.76 10*6/mm3      Hemoglobin 13.8 g/dL      Hematocrit 41.5 %      MCV 87.2 fL      MCH 29.0 pg      MCHC 33.3 g/dL      RDW 12.9 %      RDW-SD 41.3 fl      MPV 9.1 fL      Platelets 255 10*3/mm3     Protime-INR [[id number]]  (Normal) Collected: 11/13/24 1039    Specimen: Blood Updated: 11/13/24 1101     Protime 13.3 Seconds      INR 1.00          Imaging Results (Most Recent)       None            PROCEDURES  Procedure(s):  Left Heart Cath    Condition on Discharge: Stable and ambulatory    Physical Exam at Discharge  Vital Signs  Temp:  [97.8 °F (36.6 °C)-98.1 °F (36.7 °C)] 97.8 °F (36.6 °C)  Heart Rate:  [60-85] 65  Resp:  [12-16] 14  BP: (100-142)/(51-97) 120/90    Physical Exam:  Physical Exam   Constitutional: Patient appears well-developed and well-nourished and in no acute distress   HEENT:   Head: Normocephalic and atraumatic.   Eyes:  Pupils are equal, round, and reactive to light. EOM are intact. Sclerae are anicteric and noninjected.  Mouth and Throat: Patient has moist mucous membranes. Oropharynx is clear of any erythema or exudate.     Neck: Neck supple. No JVD present. No thyromegaly present. No lymphadenopathy  present.  Cardiovascular: Regular rate, regular rhythm, S1 normal and S2 normal.  Exam reveals no gallop and no friction rub.  No murmur heard.  Pulmonary/Chest: Lungs are clear to auscultation bilaterally. No respiratory distress. No wheezes. No rhonchi. No rales.   Abdominal: Soft. Bowel sounds are normal. No distension and no mass. There is no hepatosplenomegaly. There is no tenderness.   Musculoskeletal: Normal muscle tone  Extremities: No edema. Pulses are palpable in all 4 extremities.  Neurological: Patient is alert and oriented to person, place, and time. Cranial nerves II-XII are grossly intact with no focal deficits.  Skin: Skin is warm. No rash noted. Nails show no clubbing.  No cyanosis or erythema.    Discharge Disposition  Home with follow-up    Discharge Diet:      Dietary Orders (From admission, onward)       Start     Ordered    11/13/24 1302  Diet: Cardiac; Healthy Heart (2-3 Na+); Fluid Consistency: Thin (IDDSI 0)  Diet Effective Now        References:    Diet Order [facility]   Question Answer Comment   Diets: Cardiac    Cardiac Diet: Healthy Heart (2-3 Na+)    Fluid Consistency: Thin (IDDSI 0)        11/13/24 1302                    Activity at Discharge:  No heavy lifting for 5 to 7 days    Pre-discharge education  As above      Follow-up Appointments  Future Appointments   Date Time Provider Department Center   4/14/2025  3:30 PM Moises Haddad MD [facility]         Test Results Pending at Discharge  Pending Results       Procedure [Order ID] Specimen - Date/Time    ECG 12 Lead Pre-Op / Pre-Procedure [[id number]]              Moises Haddad MD  11/14/24  07:31 EST    Greater than 35 minutes on totality of care including discharge summary encounter and medical decision making along with med rec

## 2024-11-14 NOTE — PROGRESS NOTES
Cardiology progress note  Moises Haddad MD, PhD    Patient Care Team:  Daniela Hernandez FNP as PCP - General (Family Medicine)  Ollie Dunn MD as Consulting Physician (Gastroenterology)      SUBJECTIVE: Seen, groin pain, imaging returned this morning with small pseudoaneurysm less than 2 cm  No other fevers chest pain shortness of breath diaphoresis  Tolerating medicines    Discussed with patient will need to stay here for another day, consult vascular surgery Dr. Carlson, hopefully thrombin injection possible    Further recommendations follow findings and clinical course  Medications optimized  DAPT on board  Rhythm stable  Blood pressure stable    REVIEW OF SYSTEMS: Some 14-point review of systems is negative except what is mentioned in the HPI relative to the current complaint.     PAST MEDICAL HISTORY:   Past Medical History:   Diagnosis Date    Acute inferior myocardial infarction 06/14/2022    Allergic     Asthma 01/27/2022    CAD, multiple vessel 06/14/2022    Disorder of thyroid 01/27/2022    Gastroesophageal reflux disease 01/27/2022    Hx of complete eye exam 2016    Hyperlipidemia 01/27/2022    Hypertension     Migraine headache 01/27/2022    Panic attack 01/27/2022    Peptic ulcer 09/14/2017       ALLERGIES:   Allergies   Allergen Reactions    Shellfish-Derived Products Unknown - High Severity         PAST SURGICAL HISTORY:   Past Surgical History:   Procedure Laterality Date    CARDIAC CATHETERIZATION N/A 06/14/2022    Procedure: Left Heart Cath;  Surgeon: Matthieu Del Toro MD;  Location: [facility] CATH INVASIVE LOCATION;  Service: Cardiology;  Laterality: N/A;    CARDIAC CATHETERIZATION N/A 06/16/2022    Procedure: Percutaneous Coronary Intervention;  Surgeon: Matthieu Del Toro MD;  Location: [facility] CATH INVASIVE LOCATION;  Service: Cardiovascular;  Laterality: N/A;    CARDIAC CATHETERIZATION N/A 06/23/2022    Procedure: Left Heart Cath possible PCI, atherectomy, hemodynamic support;  Surgeon:  Moises Haddad MD;  Location: [facility] CATH INVASIVE LOCATION;  Service: Cardiology;  Laterality: N/A;    CARDIAC CATHETERIZATION N/A 09/15/2022    Procedure: Left Heart Cath;  Surgeon: Moises Haddad MD;  Location: [facility] CATH INVASIVE LOCATION;  Service: Cardiology;  Laterality: N/A;    CARDIAC CATHETERIZATION  9/15/2022    CARDIAC CATHETERIZATION N/A 3/2/2023    Procedure: Left Heart Cath;  Surgeon: Moises Haddad MD;  Location: [facility] CATH INVASIVE LOCATION;  Service: Cardiology;  Laterality: N/A;    CHOLECYSTECTOMY  2019    CORONARY ARTERY BYPASS GRAFT N/A 2022    Procedure: CORONARY ARTERY BYPASS GRAFTING;  Surgeon: Pablo Ball MD;  Location: [facility] CVOR;  Service: Cardiothoracic;  Laterality: N/A;  CABG X 3(2 vein grafts, 1 LIMA graft)    CORONARY ARTERY BYPASS GRAFT  2022    CORONARY STENT PLACEMENT      MANDIBLE SURGERY            FAMILY HISTORY:   Family History   Problem Relation Age of Onset    Heart disease Mother     Heart attack Mother     Hypertension Mother     Heart failure Father     Cirrhosis Maternal Grandfather     Heart attack Maternal Grandmother         a         SOCIAL HISTORY:   Social History     Socioeconomic History    Marital status: Single   Tobacco Use    Smoking status: Former     Current packs/day: 0.00     Average packs/day: 1.5 packs/day for 26.5 years (39.7 ttl pk-yrs)     Types: Cigarettes     Start date: 1996     Quit date: 2022     Years since quittin.4    Smokeless tobacco: Never   Vaping Use    Vaping status: Never Used   Substance and Sexual Activity    Alcohol use: Not Currently    Drug use: Yes     Frequency: 7.0 times per week     Types: Marijuana    Sexual activity: Yes     Partners: Female       CURRENT MEDICATIONS:     Current Facility-Administered Medications:     acetaminophen (TYLENOL) tablet 650 mg, 650 mg, Oral, Q4H PRN, Moises Haddad MD, 650 mg at 24 0515    albuterol (PROVENTIL)  nebulizer solution 0.083% 2.5 mg/3mL, 2.5 mg, Nebulization, Q6H PRN, Moises Haddad MD    amLODIPine (NORVASC) tablet 5 mg, 5 mg, Oral, Daily, Moises Haddad MD, 5 mg at 11/13/24 1607    aspirin EC tablet 81 mg, 81 mg, Oral, Daily, Moises Haddad MD, 81 mg at 11/13/24 1607    atropine sulfate injection 0.5 mg, 0.5 mg, Intravenous, Q5 Min PRN, Moises Haddad MD    cilostazol (PLETAL) tablet 50 mg, 50 mg, Oral, BID, Moises Haddad MD, 50 mg at 11/13/24 2133    HYDROmorphone (DILAUDID) injection 0.5 mg, 0.5 mg, Intravenous, Q4H PRN, Coy Jimenes MD, 0.5 mg at 11/14/24 0727    isosorbide mononitrate (IMDUR) 24 hr tablet 120 mg, 120 mg, Oral, Q24H, Moises Haddad MD, 120 mg at 11/13/24 1607    levothyroxine (SYNTHROID, LEVOTHROID) tablet 50 mcg, 50 mcg, Oral, Daily, Moises Haddad MD    losartan (COZAAR) tablet 25 mg, 25 mg, Oral, Daily, Moises Haddad MD, 25 mg at 11/13/24 1607    metoprolol succinate XL (TOPROL-XL) 24 hr tablet 100 mg, 100 mg, Oral, Q24H, Moises Haddad MD, 100 mg at 11/13/24 1607    nitroglycerin (NITROSTAT) SL tablet 0.4 mg, 0.4 mg, Sublingual, Q5 Min PRN, Moises Haddad MD    ondansetron (ZOFRAN) injection 4 mg, 4 mg, Intravenous, Q4H PRN, Coy Jimenes MD, 4 mg at 11/14/24 0515    pantoprazole (PROTONIX) EC tablet 40 mg, 40 mg, Oral, Q AM, Moises Haddad MD, 40 mg at 11/14/24 0515    prasugrel (EFFIENT) tablet 10 mg, 10 mg, Oral, Daily, Moises Haddad MD, 10 mg at 11/13/24 1607    ranolazine (RANEXA) 12 hr tablet 1,000 mg, 1,000 mg, Oral, BID, Moises Haddad MD, 1,000 mg at 11/13/24 2045    rosuvastatin (CRESTOR) tablet 20 mg, 20 mg, Oral, Daily, Moises Haddad MD      DIAGNOSTIC DATA:     I reviewed the patient's new clinical results.    Lab Results (last 24 hours)       Procedure Component Value Units Date/Time    POC Activated Clotting Time  [[id number]]  (Abnormal) Collected: 11/13/24 1302    Specimen: Arterial Blood Updated: 11/14/24 0726     Activated Clotting Time  337 Seconds      Comment: Serial Number: [id number]:  [id number]       Hemoglobin A1c [[id number]]  (Normal) Collected: 11/14/24 0540    Specimen: Blood Updated: 11/14/24 0622     Hemoglobin A1C 5.57 %     CBC & Differential [[id number]]  (Abnormal) Collected: 11/14/24 0540    Specimen: Blood Updated: 11/14/24 0604    Narrative:      The following orders were created for panel order CBC & Differential.  Procedure                               Abnormality         Status                     ---------                               -----------         ------                     CBC Auto Differential[[id number]]        Abnormal            Final result                 Please view results for these tests on the individual orders.    CBC Auto Differential [[id number]]  (Abnormal) Collected: 11/14/24 0540    Specimen: Blood Updated: 11/14/24 0604     WBC 9.44 10*3/mm3      RBC 4.52 10*6/mm3      Hemoglobin 13.0 g/dL      Hematocrit 39.1 %      MCV 86.5 fL      MCH 28.8 pg      MCHC 33.2 g/dL      RDW 12.7 %      RDW-SD 40.2 fl      MPV 9.3 fL      Platelets 282 10*3/mm3      Neutrophil % 89.0 %      Lymphocyte % 8.6 %      Monocyte % 1.6 %      Eosinophil % 0.0 %      Basophil % 0.1 %      Immature Grans % 0.7 %      Neutrophils, Absolute 8.40 10*3/mm3      Lymphocytes, Absolute 0.81 10*3/mm3      Monocytes, Absolute 0.15 10*3/mm3      Eosinophils, Absolute 0.00 10*3/mm3      Basophils, Absolute 0.01 10*3/mm3      Immature Grans, Absolute 0.07 10*3/mm3      nRBC 0.0 /100 WBC     CBC (No Diff) [[id number]] Collected: 11/14/24 0449    Specimen: Blood Updated: 11/14/24 0531     WBC --     Comment: Spec. clotted  Corrected result. Previous result was 9.02 10*3/mm3 on 11/14/2024 at 0500 EST.        RBC --     Comment: Spec. clotted  Corrected result. Previous result was 4.34 10*6/mm3 on 11/14/2024 at 0500  EST.        Hemoglobin --     Comment: Spec. clotted  Corrected result. Previous result was 12.7 g/dL on 11/14/2024 at 0500 EST.        Hematocrit --     Comment: Spec. clotted  Corrected result. Previous result was 39.0 % on 11/14/2024 at 0500 EST.        MCV --     Comment: Spec. clotted  Corrected result. Previous result was 89.9 fL on 11/14/2024 at 0500 EST.        MCH --     Comment: Spec. clotted  Corrected result. Previous result was 29.3 pg on 11/14/2024 at 0500 EST.        MCHC --     Comment: Spec. clotted  Corrected result. Previous result was 32.6 g/dL on 11/14/2024 at 0500 EST.        RDW --     Comment: Spec. clotted  Corrected result. Previous result was 12.8 % on 11/14/2024 at 0500 EST.        RDW-SD --     Comment: Spec. clotted  Corrected result. Previous result was 42.1 fl on 11/14/2024 at 0500 EST.        MPV --     Comment: Spec. clotted  Corrected result. Previous result was 9.1 fL on 11/14/2024 at 0500 EST.        Platelets --     Comment: Spec. clotted  Corrected result. Previous result was 160 10*3/mm3 on 11/14/2024 at 0500 EST.       Basic Metabolic Panel [[id number]]  (Abnormal) Collected: 11/14/24 0445    Specimen: Blood from Arm, Right Updated: 11/14/24 0527     Glucose 139 mg/dL      BUN 11 mg/dL      Creatinine 1.09 mg/dL      Sodium 136 mmol/L      Potassium 4.3 mmol/L      Comment: Specimen hemolyzed.  Result may be falsely elevated.        Chloride 100 mmol/L      CO2 26.6 mmol/L      Calcium 9.2 mg/dL      BUN/Creatinine Ratio 10.1     Anion Gap 9.4 mmol/L      eGFR 87.4 mL/min/1.73     Narrative:      GFR Normal >60  Chronic Kidney Disease <60  Kidney Failure <15      Lipid Panel [[id number]] Collected: 11/14/24 0445    Specimen: Blood from Arm, Right Updated: 11/14/24 0522     Total Cholesterol 146 mg/dL      Triglycerides 83 mg/dL      HDL Cholesterol 52 mg/dL      LDL Cholesterol  78 mg/dL      VLDL Cholesterol 16 mg/dL      LDL/HDL Ratio 1.49    Narrative:      Cholesterol  Reference Ranges  (U.S. Department of Health and Human Services ATP III Classifications)    Desirable          <200 mg/dL  Borderline High    200-239 mg/dL  High Risk          >240 mg/dL      Triglyceride Reference Ranges  (U.S. Department of Health and Human Services ATP III Classifications)    Normal           <150 mg/dL  Borderline High  150-199 mg/dL  High             200-499 mg/dL  Very High        >500 mg/dL    HDL Reference Ranges  (U.S. Department of Health and Human Services ATP III Classifications)    Low     <40 mg/dl (major risk factor for CHD)  High    >60 mg/dl ('negative' risk factor for CHD)        LDL Reference Ranges  (U.S. Department of Health and Human Services ATP III Classifications)    Optimal          <100 mg/dL  Near Optimal     100-129 mg/dL  Borderline High  130-159 mg/dL  High             160-189 mg/dL  Very High        >189 mg/dL    POC Activated Clotting Time [[id number]]  (Abnormal) Collected: 11/13/24 1647    Specimen: Blood Updated: 11/13/24 1651     Activated Clotting Time  147 Seconds      Comment: Serial Number: [id number]:  [id number]       POC Activated Clotting Time [[id number]]  (Abnormal) Collected: 11/13/24 1515    Specimen: Arterial Blood Updated: 11/13/24 1538     Activated Clotting Time  210 Seconds      Comment: Serial Number: [id number]:  [id number]       Comprehensive Metabolic Panel [[id number]]  (Abnormal) Collected: 11/13/24 1039    Specimen: Blood Updated: 11/13/24 1111     Glucose 110 mg/dL      BUN 12 mg/dL      Creatinine 1.20 mg/dL      Sodium 138 mmol/L      Potassium 3.8 mmol/L      Chloride 103 mmol/L      CO2 25.2 mmol/L      Calcium 9.4 mg/dL      Total Protein 7.1 g/dL      Albumin 4.2 g/dL      ALT (SGPT) 19 U/L      AST (SGOT) 20 U/L      Alkaline Phosphatase 97 U/L      Total Bilirubin 0.3 mg/dL      Globulin 2.9 gm/dL      A/G Ratio 1.4 g/dL      BUN/Creatinine Ratio 10.0     Anion Gap 9.8 mmol/L      eGFR 77.9 mL/min/1.73     Narrative:      GFR Normal  ">60  Chronic Kidney Disease <60  Kidney Failure <15      CBC (No Diff) [[id number]]  (Normal) Collected: 11/13/24 1039    Specimen: Blood Updated: 11/13/24 1102     WBC 6.10 10*3/mm3      RBC 4.76 10*6/mm3      Hemoglobin 13.8 g/dL      Hematocrit 41.5 %      MCV 87.2 fL      MCH 29.0 pg      MCHC 33.3 g/dL      RDW 12.9 %      RDW-SD 41.3 fl      MPV 9.1 fL      Platelets 255 10*3/mm3     Protime-INR [[id number]]  (Normal) Collected: 11/13/24 1039    Specimen: Blood Updated: 11/13/24 1101     Protime 13.3 Seconds      INR 1.00            Imaging Results (Last 24 Hours)       ** No results found for the last 24 hours. **            Xray reviewed personally by physician.      ECG reviewed personally by physician  ECG/EMG Results (most recent)       Procedure Component Value Units Date/Time    ECG 12 Lead Chest Pain [[id number]] Collected: 11/13/24 1002     Updated: 11/13/24 1004     QT Interval 408 ms      QTC Interval 417 ms     Narrative:      HEART RATE=63  bpm  RR Hqpxohvq=047  ms  MO Nykftdym=951  ms  P Horizontal Axis=-6  deg  P Front Axis=38  deg  QRSD Interval=86  ms  QT Kfkqfoot=430  ms  EIdT=646  ms  QRS Axis=9  deg  T Wave Axis=55  deg  - BORDERLINE ECG -  Sinus rhythm  Low voltage, precordial leads  Consider anterior infarct  Date and Time of Study:2024-11-13 10:02:27              PHYSICAL EXAMINATION:  VITAL SIGNS: Temp:  [97.8 °F (36.6 °C)-98.1 °F (36.7 °C)] 98 °F (36.7 °C)  Heart Rate:  [60-85] 82  Resp:  [12-16] 14  BP: (100-142)/(51-97) 129/70   Flowsheet Rows      Flowsheet Row First Filed Value   Admission Height 177.8 cm (70\") Documented at 11/13/2024 1016   Admission Weight 141 kg (311 lb 8.2 oz) Documented at 11/13/2024 1016          GENERAL: Alert and oriented x3, afebrile. Vital signs stable, appeared comfortable, nondistressed, and appears stated age. Generalized weakness. Responds to questions appropriately.   HEENT: Normocephalic, atraumatic. Pupils equal, round and reactive to light. " Extraocular movements intact bilaterally. Trachea midline.  Mucous membranes are moist.   NECK: Supple. No JVD. Trachea midline, no LAD  CHEST: Clear to auscultation bilaterally anteriorly; no rale, rhonchi, or wheezes  HEART: Regular rate and rhythm. No rubs, murmurs or gallops.   ABDOMEN: Soft, nontender, nondistended. Bowel sounds are otherwise positive.   EXTREMITIES: No clubbing, cyanosis, or edema. Moves all extremities  SKIN: Warm and dry. No ecchymoses, petechiae or rashes.   MUSCULOSKELETAL: No bony abnormalities. Range of motion normal. No crepitus. No joint swelling or erythema.   NEUROLOGIC: Cranial nerves II-XII intact bilaterally. No focal neurologic deficits. Mini-Mental status exam was deferred.   LYMPHATICS: No lymphadenopathy.     ASSESSMENT AND PLAN:   CAD  Essential hypertension  Hyperlipidemia  Morbid obesity  Chronic stable angina  Recurrent ISR progressive for early disease    - s/p STEMI 6/14/2022: [facility] showed three vessel CAD- he underwent ASHLEE placement to the culprit lesion in the RCA.  LAD had 80-90% proximal stenosis and LCx had 40-50% in distal LCx/OM.  Mr. Gates underwent repeat cardiac cath on 6/16/2022 and received ASHLEE to LAD  - repeat admission 5 days later showing in stent thrombosis sent for CABG  - s/p 3V CABG 6/29/2022 (LIMA-LAD, SVG-OMofLCx, SVG-RPDA)    recurrent intervention and balloon angioplasty of SVG to RPDA status post initial stenting September 2022, stable but nonobstructive disease circumflex into obtuse marginal branch with close SVG to the obtuse marginal branch, patent LIMA to LAD, stable disease left main LAD into diagonal branch which is unbypassed,   - chronic stable angina  - [facility] 9/15/2022: three-vessel coronary disease, patent LIMA to LAD, closed SVG to OM, heavily diseased SVG to RCA with poor candidacy for native vessel intervention  - s/p Overlapping Xience drug-eluting stents 3.5 x 38 x 3 stents in overlapping fashion postdilated to 3.7 mm at 16 to 18 radha,  reduction stenosis to 0% residual, improvement DARRYL-3 flow via the vein graft to the distal RCA and RPDA  - C 3/2/2023: Culprit appeared to be SVG to RCA distal lesion 70 to 80% hazy possibly atherothrombotic as well as proximal ostial ISR.  Other vascular areas with LIMA to LAD, native LAD, circumflex and native RCA.  Unchanged angiographically compared to prior case 9 months ago  Left heart catheterization November 2024, SVG to RCA distal similar lesion 80 to 90%, mid to proximal 60 to 70%, successful balloon angioplasty 4.0 x 20 NC balloon recurrent inflations, resolution of all stenoses this admission  Continue aspirin and Effient uninterrupted  Presently on max tolerated antianginals including Imdur Ranexa beta-blockers calcium channel blockers  High intensity statin, goal-directed medical therapy for diffuse native and bypass graft CAD  - preserved LVEF     Continue aspirin Effient  Groin pain, pseudoaneurysm small less than 2 cm noted right groin  Consult vascular surgery for consideration of thrombin injection, no vascular closure device was utilized  Hemodynamics are stable  Chest pain-free  Blood pressure and heart rate are controlled     Further recommendations follow findings and clinical course  Moises Haddad MD  11/14/24  07:57 EST

## 2024-11-14 NOTE — DISCHARGE INSTR - OTHER ORDERS
Please contact the [facility] at [phone] for assistance with SNAP(Food Assistance) applications when you return home. Additional resource options have been provided to you in your [place] Packet to review. Thank You.

## 2024-11-15 ENCOUNTER — APPOINTMENT (OUTPATIENT)
Dept: CARDIOLOGY | Facility: HOSPITAL | Age: 39
End: 2024-11-15
Payer: MEDICAID

## 2024-11-15 LAB
ALBUMIN SERPL-MCNC: 4 G/DL (ref 3.5–5.2)
ALBUMIN/GLOB SERPL: 1.6 G/DL
ALP SERPL-CCNC: 74 U/L (ref 39–117)
ALT SERPL W P-5'-P-CCNC: 15 U/L (ref 1–41)
ANION GAP SERPL CALCULATED.3IONS-SCNC: 10.7 MMOL/L (ref 5–15)
AST SERPL-CCNC: 18 U/L (ref 1–40)
BASOPHILS # BLD AUTO: 0.02 10*3/MM3 (ref 0–0.2)
BASOPHILS NFR BLD AUTO: 0.3 % (ref 0–1.5)
BH CV RIGHT GROIN PSA PROCEDURE SCRIPTING LRR: 1
BILIRUB SERPL-MCNC: 0.2 MG/DL (ref 0–1.2)
BUN SERPL-MCNC: 15 MG/DL (ref 6–20)
BUN/CREAT SERPL: 13.8 (ref 7–25)
CALCIUM SPEC-SCNC: 8.7 MG/DL (ref 8.6–10.5)
CHLORIDE SERPL-SCNC: 103 MMOL/L (ref 98–107)
CO2 SERPL-SCNC: 23.3 MMOL/L (ref 22–29)
CREAT SERPL-MCNC: 1.09 MG/DL (ref 0.76–1.27)
DEPRECATED RDW RBC AUTO: 43.5 FL (ref 37–54)
EGFRCR SERPLBLD CKD-EPI 2021: 87.4 ML/MIN/1.73
EOSINOPHIL # BLD AUTO: 0.01 10*3/MM3 (ref 0–0.4)
EOSINOPHIL NFR BLD AUTO: 0.1 % (ref 0.3–6.2)
ERYTHROCYTE [DISTWIDTH] IN BLOOD BY AUTOMATED COUNT: 13.1 % (ref 12.3–15.4)
GLOBULIN UR ELPH-MCNC: 2.5 GM/DL
GLUCOSE SERPL-MCNC: 96 MG/DL (ref 65–99)
HCT VFR BLD AUTO: 40.2 % (ref 37.5–51)
HGB BLD-MCNC: 12.5 G/DL (ref 13–17.7)
IMM GRANULOCYTES # BLD AUTO: 0.04 10*3/MM3 (ref 0–0.05)
IMM GRANULOCYTES NFR BLD AUTO: 0.5 % (ref 0–0.5)
LYMPHOCYTES # BLD AUTO: 2.13 10*3/MM3 (ref 0.7–3.1)
LYMPHOCYTES NFR BLD AUTO: 26.9 % (ref 19.6–45.3)
MCH RBC QN AUTO: 28.3 PG (ref 26.6–33)
MCHC RBC AUTO-ENTMCNC: 31.1 G/DL (ref 31.5–35.7)
MCV RBC AUTO: 91.2 FL (ref 79–97)
MONOCYTES # BLD AUTO: 0.41 10*3/MM3 (ref 0.1–0.9)
MONOCYTES NFR BLD AUTO: 5.2 % (ref 5–12)
NEUTROPHILS NFR BLD AUTO: 5.31 10*3/MM3 (ref 1.7–7)
NEUTROPHILS NFR BLD AUTO: 67 % (ref 42.7–76)
NRBC BLD AUTO-RTO: 0 /100 WBC (ref 0–0.2)
PLATELET # BLD AUTO: 224 10*3/MM3 (ref 140–450)
PMV BLD AUTO: 9 FL (ref 6–12)
POTASSIUM SERPL-SCNC: 3.9 MMOL/L (ref 3.5–5.2)
PROT SERPL-MCNC: 6.5 G/DL (ref 6–8.5)
PROX PFA PSV RIGHT: 75 CM/SEC
PROX SFA PSV RIGHT: 69 CM/SEC
QT INTERVAL: 446 MS
QTC INTERVAL: 429 MS
RBC # BLD AUTO: 4.41 10*6/MM3 (ref 4.14–5.8)
RIGHT GROIN CFA SYS: 111 CM/SEC
SODIUM SERPL-SCNC: 137 MMOL/L (ref 136–145)
WBC NRBC COR # BLD AUTO: 7.92 10*3/MM3 (ref 3.4–10.8)

## 2024-11-15 PROCEDURE — 25010000002 ONDANSETRON PER 1 MG: Performed by: INTERNAL MEDICINE

## 2024-11-15 PROCEDURE — 80053 COMPREHEN METABOLIC PANEL: CPT | Performed by: NURSE PRACTITIONER

## 2024-11-15 PROCEDURE — 25010000002 HALOPERIDOL LACTATE PER 5 MG

## 2024-11-15 PROCEDURE — 99232 SBSQ HOSP IP/OBS MODERATE 35: CPT | Performed by: NURSE PRACTITIONER

## 2024-11-15 PROCEDURE — 93926 LOWER EXTREMITY STUDY: CPT

## 2024-11-15 PROCEDURE — 93005 ELECTROCARDIOGRAM TRACING: CPT

## 2024-11-15 PROCEDURE — 85025 COMPLETE CBC W/AUTO DIFF WBC: CPT | Performed by: NURSE PRACTITIONER

## 2024-11-15 PROCEDURE — 25010000002 HYDROMORPHONE PER 4 MG: Performed by: INTERNAL MEDICINE

## 2024-11-15 PROCEDURE — 93926 LOWER EXTREMITY STUDY: CPT | Performed by: SURGERY

## 2024-11-15 RX ORDER — METOCLOPRAMIDE HYDROCHLORIDE 5 MG/ML
10 INJECTION INTRAMUSCULAR; INTRAVENOUS EVERY 6 HOURS PRN
Status: DISCONTINUED | OUTPATIENT
Start: 2024-11-15 | End: 2024-11-15

## 2024-11-15 RX ORDER — METOCLOPRAMIDE HYDROCHLORIDE 5 MG/ML
10 INJECTION INTRAMUSCULAR; INTRAVENOUS EVERY 6 HOURS
Status: DISCONTINUED | OUTPATIENT
Start: 2024-11-15 | End: 2024-11-15

## 2024-11-15 RX ORDER — HALOPERIDOL 5 MG/ML
2 INJECTION INTRAMUSCULAR ONCE
Status: COMPLETED | OUTPATIENT
Start: 2024-11-15 | End: 2024-11-15

## 2024-11-15 RX ORDER — MECLIZINE HYDROCHLORIDE 25 MG/1
25 TABLET ORAL 3 TIMES DAILY PRN
Status: DISCONTINUED | OUTPATIENT
Start: 2024-11-15 | End: 2024-11-16 | Stop reason: HOSPADM

## 2024-11-15 RX ORDER — SCOLOPAMINE TRANSDERMAL SYSTEM 1 MG/1
1 PATCH, EXTENDED RELEASE TRANSDERMAL
Status: DISCONTINUED | OUTPATIENT
Start: 2024-11-15 | End: 2024-11-16 | Stop reason: HOSPADM

## 2024-11-15 RX ADMIN — RANOLAZINE 1000 MG: 500 TABLET, FILM COATED, EXTENDED RELEASE ORAL at 21:01

## 2024-11-15 RX ADMIN — ROSUVASTATIN 20 MG: 10 TABLET, FILM COATED ORAL at 09:31

## 2024-11-15 RX ADMIN — LEVOTHYROXINE SODIUM 50 MCG: 0.05 TABLET ORAL at 09:32

## 2024-11-15 RX ADMIN — RANOLAZINE 1000 MG: 500 TABLET, FILM COATED, EXTENDED RELEASE ORAL at 09:32

## 2024-11-15 RX ADMIN — CILOSTAZOL 50 MG: 100 TABLET ORAL at 21:02

## 2024-11-15 RX ADMIN — HYDROCODONE BITARTRATE AND ACETAMINOPHEN 1 TABLET: 7.5; 325 TABLET ORAL at 17:54

## 2024-11-15 RX ADMIN — ONDANSETRON 4 MG: 2 INJECTION, SOLUTION INTRAMUSCULAR; INTRAVENOUS at 00:14

## 2024-11-15 RX ADMIN — MECLIZINE HYDROCHLORIDE 25 MG: 25 TABLET ORAL at 17:01

## 2024-11-15 RX ADMIN — METOPROLOL SUCCINATE 100 MG: 50 TABLET, EXTENDED RELEASE ORAL at 09:32

## 2024-11-15 RX ADMIN — SCOPALAMINE 1 PATCH: 1 PATCH, EXTENDED RELEASE TRANSDERMAL at 15:40

## 2024-11-15 RX ADMIN — PRASUGREL 10 MG: 10 TABLET, FILM COATED ORAL at 09:32

## 2024-11-15 RX ADMIN — AMLODIPINE BESYLATE 5 MG: 5 TABLET ORAL at 09:32

## 2024-11-15 RX ADMIN — HYDROMORPHONE HYDROCHLORIDE 0.5 MG: 1 INJECTION, SOLUTION INTRAMUSCULAR; INTRAVENOUS; SUBCUTANEOUS at 21:31

## 2024-11-15 RX ADMIN — LOSARTAN POTASSIUM 25 MG: 25 TABLET, FILM COATED ORAL at 09:32

## 2024-11-15 RX ADMIN — HYDROCODONE BITARTRATE AND ACETAMINOPHEN 1 TABLET: 7.5; 325 TABLET ORAL at 11:30

## 2024-11-15 RX ADMIN — HYDROMORPHONE HYDROCHLORIDE 0.5 MG: 1 INJECTION, SOLUTION INTRAMUSCULAR; INTRAVENOUS; SUBCUTANEOUS at 09:38

## 2024-11-15 RX ADMIN — ONDANSETRON 4 MG: 2 INJECTION, SOLUTION INTRAMUSCULAR; INTRAVENOUS at 09:31

## 2024-11-15 RX ADMIN — ASPIRIN 81 MG: 81 TABLET, COATED ORAL at 09:32

## 2024-11-15 RX ADMIN — HYDROCODONE BITARTRATE AND ACETAMINOPHEN 1 TABLET: 7.5; 325 TABLET ORAL at 07:19

## 2024-11-15 RX ADMIN — HYDROMORPHONE HYDROCHLORIDE 0.5 MG: 1 INJECTION, SOLUTION INTRAMUSCULAR; INTRAVENOUS; SUBCUTANEOUS at 05:23

## 2024-11-15 RX ADMIN — ONDANSETRON 4 MG: 2 INJECTION, SOLUTION INTRAMUSCULAR; INTRAVENOUS at 05:30

## 2024-11-15 RX ADMIN — CILOSTAZOL 50 MG: 100 TABLET ORAL at 09:39

## 2024-11-15 RX ADMIN — HYDROCODONE BITARTRATE AND ACETAMINOPHEN 1 TABLET: 7.5; 325 TABLET ORAL at 02:06

## 2024-11-15 RX ADMIN — HYDROMORPHONE HYDROCHLORIDE 0.5 MG: 1 INJECTION, SOLUTION INTRAMUSCULAR; INTRAVENOUS; SUBCUTANEOUS at 00:11

## 2024-11-15 RX ADMIN — ISOSORBIDE MONONITRATE 120 MG: 60 TABLET, EXTENDED RELEASE ORAL at 09:31

## 2024-11-15 RX ADMIN — HYDROCODONE BITARTRATE AND ACETAMINOPHEN 1 TABLET: 7.5; 325 TABLET ORAL at 23:39

## 2024-11-15 RX ADMIN — HALOPERIDOL LACTATE 2 MG: 5 INJECTION, SOLUTION INTRAMUSCULAR at 13:37

## 2024-11-15 NOTE — CONSULTS
"    [facility] Medicine Services  Consult Note    Patient Name: Tramaine Gates  : 1985  MRN: [id number]  Primary Care Physician:  Daniela Hernandez FNP  Referring Physician: Moises Haddad,*  Date of admission: 2024  Date and Time of Care: 11/15/2024 at 1145    Inpatient Hospitalist Consult  Consult performed by: Johnna Borrero APRN  Consult ordered by: Pat Ruiz APRN          Reason for Consult/ Chief Complaint: Nausea vomiting    Consult Requested By: Pat DODD    Subjective:     History of Present Illness:   Per the documentation by Moises Haddad MD, dated 10-,  \"I the pleasure to see this 39-year-old gentleman in follow-up with history of multivessel bypass , chest pain admission prompting intervention SVG to RCA with proximal balloon angioplasty given in-stent restenosis.  The RCA graft is essentially overlapping stents all the way down to the anastomosis with the PDA with small vessel disease diffusely in the RCA natively not amenable to intervention given very small caliber size with significant in-stent restenosis.  Patient underwent multiple balloon angioplasty throughout the entirety of the stented segment from the distal back to proximal ostial SVG to the RCA improving antegrade runoff with in segment hazy appearing thrombotic lesion at that time.  LIMA to LAD is widely patent with small size distal LAD, circumflex natively was also widely patent with preserved LV systolic function.  He is back today with complaints of shortness of breath and chest discomfort, burning radiating to his left arm.  He has complicated cardiac history below with early CAD, he is regaining weight, still has poor diet, not exercising regularly.  He is wondering if he needs another heart cath.  After long discussion we decided for nuclear ischemic evaluation try to localize a least regional where he may be having issues.  In 2023 he had a nuclear " "evaluation demonstrating normal stress and rest perfusion with no reversible ischemia.  He is agreeable to this as well as further lifestyle changes which are desperately needed with advanced CAD at a young age, refractory angina pectoris \".     Patient was seen and examined by this provider.  Per patient, he has smoked THC since he was 14.  Reports he is an everyday THC user.  States that nausea vomiting is an ongoing issue, not a new problem.  Reports his nausea vomiting has been more uncontrolled since prior to this procedure but progressively has been worsening.  Reports there are no aggravating factors.  Patient is extremely thirsty and desires frequent sips of water.    Review of Systems:   Review of Systems   Respiratory:  Negative for shortness of breath.    Cardiovascular:  Negative for chest pain.   Gastrointestinal:  Positive for nausea and vomiting.   Neurological:  Negative for dizziness and headaches.       Personal History:     Past Medical History:   Diagnosis Date    Acute inferior myocardial infarction 06/14/2022    Allergic     Asthma 01/27/2022    CAD, multiple vessel 06/14/2022    Disorder of thyroid 01/27/2022    Gastroesophageal reflux disease 01/27/2022    Hx of complete eye exam 2016    Hyperlipidemia 01/27/2022    Hypertension     Migraine headache 01/27/2022    Panic attack 01/27/2022    Peptic ulcer 09/14/2017       Past Surgical History:   Procedure Laterality Date    CARDIAC CATHETERIZATION N/A 06/14/2022    Procedure: Left Heart Cath;  Surgeon: Matthieu Del Toro MD;  Location: [facility] CATH INVASIVE LOCATION;  Service: Cardiology;  Laterality: N/A;    CARDIAC CATHETERIZATION N/A 06/16/2022    Procedure: Percutaneous Coronary Intervention;  Surgeon: Matthieu Del Toro MD;  Location: [facility] CATH INVASIVE LOCATION;  Service: Cardiovascular;  Laterality: N/A;    CARDIAC CATHETERIZATION N/A 06/23/2022    Procedure: Left Heart Cath possible PCI, atherectomy, hemodynamic support;  Surgeon: Boo" Moises Jeong MD;  Location: [facility] CATH INVASIVE LOCATION;  Service: Cardiology;  Laterality: N/A;    CARDIAC CATHETERIZATION N/A 09/15/2022    Procedure: Left Heart Cath;  Surgeon: Moises Haddad MD;  Location:  [facility] CATH INVASIVE LOCATION;  Service: Cardiology;  Laterality: N/A;    CARDIAC CATHETERIZATION  9/15/2022    CARDIAC CATHETERIZATION N/A 3/2/2023    Procedure: Left Heart Cath;  Surgeon: Moises Haddad MD;  Location: [facility] CATH INVASIVE LOCATION;  Service: Cardiology;  Laterality: N/A;    CHOLECYSTECTOMY  2019    CORONARY ARTERY BYPASS GRAFT N/A 06/29/2022    Procedure: CORONARY ARTERY BYPASS GRAFTING;  Surgeon: Pablo Ball MD;  Location: [facility] CVOR;  Service: Cardiothoracic;  Laterality: N/A;  CABG X 3(2 vein grafts, 1 LIMA graft)    CORONARY ARTERY BYPASS GRAFT  6/29/2022    CORONARY STENT PLACEMENT      MANDIBLE SURGERY         Family History: family history includes Cirrhosis in his maternal grandfather; Heart attack in his maternal grandmother and mother; Heart disease in his mother; Heart failure in his father; Hypertension in his mother. Otherwise pertinent FHx was reviewed and not pertinent to current issue.    Social History:  reports that he quit smoking about 2 years ago. His smoking use included cigarettes. He started smoking about 28 years ago. He has a 39.7 pack-year smoking history. He has never used smokeless tobacco. He reports that he does not currently use alcohol. He reports current drug use. Frequency: 7.00 times per week. Drug: Marijuana.    Home Medications:   Evolocumab, albuterol sulfate HFA, amLODIPine, aspirin, cilostazol, dilTIAZem CD, isosorbide mononitrate, levothyroxine, losartan, metoprolol succinate XL, nitroglycerin, omeprazole, prasugrel, ranolazine, and rosuvastatin    Allergies:  Allergies   Allergen Reactions    Shellfish-Derived Products Unknown - High Severity         Objective:     Vital Signs  Temp:  [97.8 °F (36.6 °C)-98.3  °F (36.8 °C)] 97.8 °F (36.6 °C)  Heart Rate:  [49-77] 61  Resp:  [9-15] 10  BP: ()/(55-77) 113/71  Flow (L/min) (Oxygen Therapy):  [2] 2   Body mass index is 44.7 kg/m².    Physical Exam  Physical Exam  General: 38 yo male, Alert and oriented, morbidly obese, no acute distress.  HENT: Normocephalic, normal hearing, moist oral mucosa, no scleral icterus.  Neck: Supple, non-tender, no carotid bruits, no JVD, no LAD.  Lungs: Clear to auscultation, non-labored respiration.  Heart: RRR, no murmur, gallop or edema.  Abdomen: Soft, nontender, non-distended, + bowel sounds.  Musculoskeletal: Normal range of motion and strength, tenderness noted to the right groin.  Skin: Skin is warm, dry and pink, no rashes or lesions.  Bruising to right groin  Psychiatric: Cooperative, appropriate mood and affect.      Scheduled Meds   amLODIPine, 5 mg, Oral, Daily  aspirin, 81 mg, Oral, Daily  cilostazol, 50 mg, Oral, BID  isosorbide mononitrate, 120 mg, Oral, Q24H  levothyroxine, 50 mcg, Oral, Daily  losartan, 25 mg, Oral, Daily  metoprolol succinate XL, 100 mg, Oral, Q24H  pantoprazole, 40 mg, Oral, Q AM  prasugrel, 10 mg, Oral, Daily  ranolazine, 1,000 mg, Oral, BID  rosuvastatin, 20 mg, Oral, Daily  Scopolamine, 1 patch, Transdermal, Q72H       PRN Meds     acetaminophen    albuterol    atropine    HYDROcodone-acetaminophen    HYDROmorphone    metoclopramide    nitroglycerin   Infusions         Diagnostic Data    Results from last 7 days   Lab Units 11/14/24  0540 11/14/24  0445 11/13/24  1039   WBC 10*3/mm3 9.44  --  6.10   HEMOGLOBIN g/dL 13.0  --  13.8   HEMATOCRIT % 39.1  --  41.5   PLATELETS 10*3/mm3 282  --  255   GLUCOSE mg/dL  --  139* 110*   CREATININE mg/dL  --  1.09 1.20   BUN mg/dL  --  11 12   SODIUM mmol/L  --  136 138   POTASSIUM mmol/L  --  4.3 3.8   AST (SGOT) U/L  --   --  20   ALT (SGPT) U/L  --   --  19   ALK PHOS U/L  --   --  97   BILIRUBIN mg/dL  --   --  0.3   ANION GAP mmol/L  --  9.4 9.8       No  radiology results for the last day      I reviewed the patient's new clinical results.    Assessment/Plan:     Active and Resolved Problems  Active Hospital Problems    Diagnosis  POA    **Abnormal stress test [R94.39]  Unknown    CAD (coronary artery disease) [I25.10]  Yes    CAD, multiple vessel [I25.10]  Unknown      Resolved Hospital Problems   No resolved problems to display.       Nausea and Vomiting  Likely cannabis associated  Will try 2 mg haloperidol first- EKG pending to check QTC   If haldol does not work, can do reglan/scopolamine combo  If reglan/scopolamine combo does not work, will trial rectal phenergan  CLD until nausea improved      VTE Prophylaxis:  No VTE prophylaxis order currently exists.         Code status is   Code Status and Medical Interventions: CPR (Attempt to Resuscitate); Full Support   Ordered at: 11/13/24 1302     Level Of Support Discussed With:    Patient     Code Status (Patient has no pulse and is not breathing):    CPR (Attempt to Resuscitate)     Medical Interventions (Patient has pulse or is breathing):    Full Support       Plan for disposition: as per primary    Time: 30 minutes        Signature: Electronically signed by YOUSIF Cornelius, 11/15/24, 12:23 EST.  [facility] Hospitalist Team

## 2024-11-15 NOTE — PROGRESS NOTES
[facility] Vascular Surgery Progress Note    Name: Tramaine Gates ADMIT: 2024   : 1985  PCP: Daniela Hernandez FNP    MRN: [id number] LOS: 0 days   AGE/SEX: 39 y.o. male  ROOM: [address] [place] [address]    CC: Postop; status post thrombin injection of right CFA pseudoaneurysm on     Subjective     Patient resting in bed.  Reports some soreness to his right groin.  Denies any ischemic symptoms to his right leg.    Objective     Scheduled Medications:   amLODIPine, 5 mg, Oral, Daily  aspirin, 81 mg, Oral, Daily  cilostazol, 50 mg, Oral, BID  isosorbide mononitrate, 120 mg, Oral, Q24H  levothyroxine, 50 mcg, Oral, Daily  losartan, 25 mg, Oral, Daily  metoprolol succinate XL, 100 mg, Oral, Q24H  pantoprazole, 40 mg, Oral, Q AM  prasugrel, 10 mg, Oral, Daily  ranolazine, 1,000 mg, Oral, BID  rosuvastatin, 20 mg, Oral, Daily        Active Infusions:       As Needed Medications:    acetaminophen    albuterol    atropine    HYDROcodone-acetaminophen    HYDROmorphone    nitroglycerin    ondansetron    Vital Signs  Vitals:    11/15/24 0741   BP: 113/71   Pulse: 61   Resp: 10   Temp: 97.8 °F (36.6 °C)   SpO2: 95%      Body mass index is 44.7 kg/m².     Physical Exam:  NAD, alert and oriented  RRR  Lungs clear  Abd soft, benign  Vascular: Palpable bilateral femoral and pedal pulses  Skin: Warm to touch, lower extremities appear well-perfused; extensive, soft ecchymosis to the right groin    Results Review:     CBC    Results from last 7 days   Lab Units 24  0540 24  1039   WBC 10*3/mm3 9.44 6.10   HEMOGLOBIN g/dL 13.0 13.8   PLATELETS 10*3/mm3 282 255     BMP   Results from last 7 days   Lab Units 24  0445 24  1039   SODIUM mmol/L 136 138   POTASSIUM mmol/L 4.3 3.8   CHLORIDE mmol/L 100 103   CO2 mmol/L 26.6 25.2   BUN mg/dL 11 12   CREATININE mg/dL 1.09 1.20   GLUCOSE mg/dL 139* 110*     Coag   Results from last 7 days   Lab Units 24  1039   INR  1.00     HbA1C   Lab  Results   Component Value Date    HGBA1C 5.57 11/14/2024    HGBA1C 5.50 03/03/2023    HGBA1C 5.3 09/16/2022     Infection     Radiology(recent) No radiology results for the last day    VTE Prophylaxis:  No VTE prophylaxis order currently exists.         Problems:    Active Hospital Problems:  Active Hospital Problems    Diagnosis  POA    **Abnormal stress test [R94.39]  Unknown    CAD (coronary artery disease) [I25.10]  Yes    CAD, multiple vessel [I25.10]  Unknown      Resolved Hospital Problems   No resolved problems to display.        Assessment & Plan   Assessment / Plan     Abnormal stress test    CAD, multiple vessel    CAD (coronary artery disease)    39-year-old male who presents for an elective heart cath  Duplex of the right groin following heart cath shows a right CFA pseudoaneurysm  Status post thrombin injection, repeat duplex this morning shows successful thrombosis of right femoral pseudoaneurysm  Okay to increase activity and ambulate  Apply ice to groin as needed  He stable okay for discharge from a vascular surgery standpoint  We will sign off  Please call for any questions or concerns        YOUSIF Sommers  [facility]  11/15/24   O: [phone]  F: [phone]

## 2024-11-15 NOTE — CASE MANAGEMENT/SOCIAL WORK
Continued Stay Note  [facility]     Patient Name: Tramaine Gates  MRN: [id number]  Today's Date: 11/15/2024    Admit Date: 11/13/2024    Plan: DC Plan: Anticipate routine home with family. Lives with S.O., Mother, and Dependent Children. Effient AC plan.   Discharge Plan       Row Name 11/15/24 1527       Plan    Plan DC Plan: Anticipate routine home with family. Lives with S.O., Mother, and Dependent Children. Effient AC plan.    Patient/Family in Agreement with Plan yes    Provided Post Acute Provider List? N/A    Provided Post Acute Provider Quality & Resource List? N/A    Plan Comments CM reviewed chart documentation for clinical updates. Patient complaining of nausea and vomiting. Patient states he feels like he is drunk and spinning and just needs to put on the floor while CM at bedside. SDOH screen completed. Patient confirmed significant depression and is open to Psych eval for managment. CM notified MD, nursing, and SW of concern.CM will continue to follow for any additional needs and adjust DC plan accordingly. DC Barriers: Cardiac monitoring, O2@2L nc, N/V control, and monitor labs.                 Expected Discharge Date and Time       Expected Discharge Date Expected Discharge Time    Nov 16, 2024           Met with patient in room   Maintain distance greater than six feet and spent less than fifteen minutes in the room.      Amaris Welch RN    Office Phone: [phone]  Office Cell:     [phone]

## 2024-11-15 NOTE — SIGNIFICANT NOTE
Case Management/Social Work    Patient Name:  Tramaine Gates  YOB: 1985  MRN: [id number]  Admit Date:  11/13/2024        Social Drivers of Health     Tobacco Use: Medium Risk (11/13/2024)    Patient History     Smoking Tobacco Use: Former     Smokeless Tobacco Use: Never     Passive Exposure: Not on file   Alcohol Use: Not At Risk (11/13/2024)    AUDIT-C     Frequency of Alcohol Consumption: Never     Average Number of Drinks: Patient does not drink     Frequency of Binge Drinking: Never   Financial Resource Strain: Low Risk  (10/4/2024)    Overall Financial Resource Strain (CARDIA)     Difficulty of Paying Living Expenses: Not hard at all   Food Insecurity: Food Insecurity Present (11/14/2024)    Hunger Vital Sign     Worried About Running Out of Food in the Last Year: Often true     Ran Out of Food in the Last Year: Often true   Transportation Needs: Unmet Transportation Needs (11/14/2024)    PRAPARE - Transportation     Lack of Transportation (Medical): Yes     Lack of Transportation (Non-Medical): Yes   Physical Activity: Inactive (11/14/2024)    Exercise Vital Sign     Days of Exercise per Week: 0 days     Minutes of Exercise per Session: 0 min   Stress: No Stress Concern Present (10/4/2024)    [employer] - Occupational Stress Questionnaire     Feeling of Stress : Not at all   Social Connections: At Risk (11/15/2024)    Family and Community Support     Help with Day-to-Day Activities: I get all the help I need     Lonely or Isolated: Often   Interpersonal Safety: Not At Risk (11/15/2024)    Abuse Screen     Unsafe at Home or Work/School: no     Feels Threatened by Someone?: no     Does Anyone Keep You from Contacting Others or Doint Things Outside the Home?: no     Physical Sign of Abuse Present: no   Depression: At risk (11/15/2024)    PHQ-2     PHQ-2 Score: 6   Housing Stability: Not At Risk (11/15/2024)    Housing Stability     Current Living Arrangements: home      Potentially Unsafe Housing Conditions: none   Utilities: Not At Risk (11/14/2024)    [facility] Utilities     Threatened with loss of utilities: No   Health Literacy: Not At Risk (11/15/2024)    Education     Help with school or training?: No     Preferred Language: English   Employment: Not At Risk (11/15/2024)    Employment     Do you want help finding or keeping work or a job?: I do not need or want help   Disabilities: At Risk (11/15/2024)    Disabilities     Concentrating, Remembering, or Making Decisions Difficulty: yes     Doing Errands Independently Difficulty: yes         Electronically signed by:  Amaris Welch RN  11/15/24 14:18 EST    Office Phone: [phone]  Office Cell:     [phone]

## 2024-11-15 NOTE — PROGRESS NOTES
CARDIOLOGY FOLLOW-UP PROGRESS NOTE      Reason for follow-up:    CAD  S/p PCI  Right groin pseudoaneurysm     Attending: Moises Haddad,*      Subjective .     Right groin pain  Status post thrombin injection successfully  Chest pain-free  Some nausea     ROS  Pertinent items are noted in HPI, all other systems reviewed and negative  Allergies: Shellfish-derived products    Scheduled Meds:amLODIPine, 5 mg, Oral, Daily  aspirin, 81 mg, Oral, Daily  cilostazol, 50 mg, Oral, BID  isosorbide mononitrate, 120 mg, Oral, Q24H  levothyroxine, 50 mcg, Oral, Daily  losartan, 25 mg, Oral, Daily  metoprolol succinate XL, 100 mg, Oral, Q24H  pantoprazole, 40 mg, Oral, Q AM  prasugrel, 10 mg, Oral, Daily  ranolazine, 1,000 mg, Oral, BID  rosuvastatin, 20 mg, Oral, Daily        Continuous Infusions:     PRN Meds:.  acetaminophen    albuterol    atropine    HYDROcodone-acetaminophen    HYDROmorphone    nitroglycerin    ondansetron    Objective     VITAL SIGNS  Patient Vitals for the past 24 hrs:   BP Temp Temp src Pulse Resp SpO2   11/15/24 0741 113/71 97.8 °F (36.6 °C) Oral 61 10 95 %   11/15/24 0600 -- -- -- 56 -- 100 %   11/15/24 0532 -- -- -- 67 -- 95 %   11/15/24 0527 117/59 -- -- 71 -- 95 %   11/15/24 0524 -- 97.8 °F (36.6 °C) Oral 72 -- 95 %   11/15/24 0500 -- -- -- 55 -- 94 %   11/15/24 0400 -- -- -- 52 -- 97 %   11/15/24 0300 -- -- -- 55 -- 95 %   11/15/24 0207 -- -- -- 70 -- 97 %   11/15/24 0200 -- -- -- (!) 49 -- 94 %   11/15/24 0150 102/74 -- -- 76 -- 96 %   11/15/24 0100 -- -- -- 54 -- 91 %   11/15/24 0043 97/59 -- -- 52 12 91 %   11/15/24 0012 -- 98 °F (36.7 °C) Oral 62 -- 97 %   11/15/24 0000 97/59 -- -- 59 -- 95 %   11/14/24 2300 100/55 -- -- 63 -- 94 %   11/14/24 2200 110/60 -- -- 72 -- 95 %   11/14/24 2100 112/66 -- -- 70 -- 96 %   11/14/24 2008 105/69 98.3 °F (36.8 °C) Oral 72 9 95 %   11/14/24 1900 123/77 -- -- 74 -- 95 %   11/14/24 1800 92/61 -- -- 61 -- 92 %   11/14/24 1554 104/60 97.8 °F (36.6 °C)  "Axillary 77 15 97 %   11/14/24 1300 -- -- -- 64 -- 92 %   11/14/24 1209 118/68 98.1 °F (36.7 °C) Oral 81 16 95 %        Flowsheet Rows      Flowsheet Row First Filed Value   Admission Height 177.8 cm (70\") Documented at 11/13/2024 1016   Admission Weight 141 kg (311 lb 8.2 oz) Documented at 11/13/2024 1016            Body mass index is 44.7 kg/m².      Intake/Output Summary (Last 24 hours) at 11/15/2024 1043  Last data filed at 11/14/2024 2345  Gross per 24 hour   Intake 840 ml   Output 400 ml   Net 440 ml        TELEMETRY:     SR    Physical Exam:  Vitals reviewed.   Constitutional:       General: Not in acute distress.     Appearance: Normal appearance. Well-developed.   Eyes:      Pupils: Pupils are equal, round, and reactive to light.   HENT:      Head: Normocephalic and atraumatic.   Neck:      Vascular: No JVD.   Pulmonary:      Effort: Pulmonary effort is normal.      Breath sounds: Normal breath sounds.   Cardiovascular:      Normal rate. Regular rhythm.      Comments: Right groin with dark purple bruising.  Tender to palpation  Edema:     Peripheral edema absent.   Abdominal:      General: There is no distension.      Palpations: Abdomen is soft.      Tenderness: There is no abdominal tenderness.   Musculoskeletal: Normal range of motion.      Cervical back: Normal range of motion and neck supple. Skin:     General: Skin is warm and dry.   Neurological:      Mental Status: Alert and oriented to person, place, and time.     Scribed findings above      Results Review:   I reviewed the patient's new clinical results.    CBC    Results from last 7 days   Lab Units 11/14/24  0540 11/13/24  1039   WBC 10*3/mm3 9.44 6.10   HEMOGLOBIN g/dL 13.0 13.8   PLATELETS 10*3/mm3 282 255     BMP   Results from last 7 days   Lab Units 11/14/24  0445 11/13/24  1039   SODIUM mmol/L 136 138   POTASSIUM mmol/L 4.3 3.8   CHLORIDE mmol/L 100 103   CO2 mmol/L 26.6 25.2   BUN mg/dL 11 12   CREATININE mg/dL 1.09 1.20   GLUCOSE mg/dL " "139* 110*     Cr Clearance Estimated Creatinine Clearance: 126.1 mL/min (by C-G formula based on SCr of 1.09 mg/dL).  Coag   Results from last 7 days   Lab Units 11/13/24  1039   INR  1.00     HbA1C   Lab Results   Component Value Date    HGBA1C 5.57 11/14/2024    HGBA1C 5.50 03/03/2023    HGBA1C 5.3 09/16/2022     Blood Glucose No results found for: \"POCGLU\"  Infection     CMP   Results from last 7 days   Lab Units 11/14/24  0445 11/13/24  1039   SODIUM mmol/L 136 138   POTASSIUM mmol/L 4.3 3.8   CHLORIDE mmol/L 100 103   CO2 mmol/L 26.6 25.2   BUN mg/dL 11 12   CREATININE mg/dL 1.09 1.20   GLUCOSE mg/dL 139* 110*   ALBUMIN g/dL  --  4.2   BILIRUBIN mg/dL  --  0.3   ALK PHOS U/L  --  97   AST (SGOT) U/L  --  20   ALT (SGPT) U/L  --  19     ABG      UA      FABIOLA  No results found for: \"POCMETH\", \"POCAMPHET\", \"POCBARBITUR\", \"POCBENZO\", \"POCCOCAINE\", \"POCOPIATES\", \"POCOXYCODO\", \"POCPHENCYC\", \"POCPROPOXY\", \"POCTHC\", \"POCTRICYC\"  Lysis Labs   Results from last 7 days   Lab Units 11/14/24  0540 11/14/24 0445 11/13/24  1039   INR   --   --  1.00   HEMOGLOBIN g/dL 13.0  --  13.8   PLATELETS 10*3/mm3 282  --  255   CREATININE mg/dL  --  1.09 1.20     Radiology(recent) No radiology results for the last day    Imaging Results (Last 24 Hours)       ** No results found for the last 24 hours. **                  EKG           I personally viewed and interpreted the patient's EKG/Telemetry data:        ECHOCARDIOGRAM:     Results for orders placed during the hospital encounter of 10/04/24    Adult Transthoracic Echo Complete W/ Cont if Necessary Per Protocol    Interpretation Summary    Left ventricular ejection fraction appears to be 56 - 60%.    Indications  Chest pain    Technically satisfactory study.  Mitral valve is structurally normal.  Tricuspid valve is structurally normal.  Aortic valve is structurally normal.  Pulmonic valve could not be well visualized.  No evidence for mitral tricuspid or aortic regurgitation is seen " by Doppler study.  Left atrium is normal in size.  Right atrium is normal in size.  Left ventricle is normal in size and contractility with ejection fraction of 60%.  Normal left ventricular diastolic function.  Right ventricle is normal in size and contractility with TAPSE of 2.87 cm..  Atrial septum is intact.  Aorta is normal.  No pericardial effusion or intracardiac thrombus is seen.    Impression  Structurally and functionally normal cardiac valves.  Left ventricular size and contractility is normal with ejection fraction of 60%.  Normal left ventricular diastolic function.  Right ventricle is normal in size and contractility with TAPSE of 2.87 cm..        STRESS MYOVIEW:      CARDIAC CATHETERIZATION:      OTHER:         Assessment & Plan            Abnormal stress test    CAD, multiple vessel    CAD (coronary artery disease)        ASSESSMENT:    CAD/Prior STEMI PCI/CABG;   Cath s/p balloon angioplasty SVG RCA    Pseudoaneurysm right groin  S/p thrombin injection  Repeat duplex this am shows successful thrombosi of right femoral pseudoaneurysm  Vascular following    HTN  Stable on current Rx    Dyslipidemia  Continue statin    Obesity    Nausea/Vomiting as needed Zofran        PLAN:    Right groin is bruised.  Thrombin injection status post sealed pseudoaneurysm  Duplex without residual flow  Chest pain-free  Some nausea and vomiting, as needed Zofran  Recheck labs correct electrolytes as indicated    Home when able with outpatient follow-up        Moises Haddad MD, PhD      Pat Ruiz, YOUSIF  11/15/24  10:43 EST

## 2024-11-15 NOTE — PAYOR COMM NOTE
Outpatient to inpatient case    11/15/24 1302  Inpatient Admission  Once        Level of Care: Telemetry  Diagnosis: CAD (coronary artery disease) [424704]  Attending Physician: ALMAZ COTE [[id number]]  Certification: I Certify That Inpatient Hospital Services Are Medically Necessary For Greater Than 2 Midnights        Holley DE LA CRUZ, RN    Care Coordination   [place] [address]    [phone] office  [phone] fax  [email or web address]  [facility]      Vomiting [facility] Inpatient Care       Indications Met   Last updated by Holley Wright RN on 11/15/2024 1258     Review Status Created By   Primary Completed Holley Wright RN      Criteria Review   Vomiting [facility]G Inpatient Care     Overall Determination: Indications Met     Criteria:  [×] Admission is indicated for  1 or more  of the following :      [×] Severe pain requiring acute inpatient management          11/15/2024 12:58 PM              -- 11/15/2024 12:58 PM by Holley Wright RN --                                    (X) Severe pain requiring acute inpatient management, as indicated by  1 or more  of the following  (1) (2) (3) (4) (5) (6) (7):                  (X) Pain control regimen for next level of care not established (eg, in observation care), as indicated by  1 or more  of the following :                  (X) Sufficient pain control not achieved (eg, active adjustments to medications, dose, [C] and route are ongoing)     Notes:  -- 11/15/2024 12:58 PM by Holley Wright RN --      OUTPATIENT X 2 DAY                  Nausea and Vomiting      Likely cannabis associated      Will try 2 mg haloperidol first      If haldol does not work, can do reglan/scopolamine combo      If reglan/scopolamine combo does not work, will trial rectal phenergan      CLD until nausea improved                  NO LABS ON 11/15 AT THIS TIME                  CLEAR LIQUID  "DIET      PRN PO NORCO X 5 DOSES      PRN IV DILAUDID X 9 DOSES      PRN IV ZOFRAN X 8 DOSES      IV HALDOL X 1       Tramaine Gates (39 y.o. Male)       Date of Birth   1985    Social Security Number       Address   [address] [place] [address]    Home Phone   [phone]    MRN   [id number]       Orthodox   None    Marital Status   Single                            Admission Date   11/13/24    Admission Type   Elective    Admitting Provider   Moises Haddad MD    Attending Provider   Moises Haddad MD    Department, Room/Bed   [facility] CARDIOVASCULAR CARE UNIT, 3117/1       Discharge Date       Discharge Disposition       Discharge Destination                                 Attending Provider: Moises Haddad MD    Allergies: Shellfish-derived Products    Isolation: None   Infection: None   Code Status: CPR    Ht: 177.8 cm (70\")   Wt: 141 kg (311 lb 8.2 oz)    Admission Cmt: None   Principal Problem: Abnormal stress test [R94.39]                   Active Insurance as of 11/13/2024       Primary Coverage       Payor Plan Insurance Group Employer/Plan Group    [id number]       Payor Plan Address Payor Plan Phone Number Payor Plan Fax Number Effective Dates    MAIL STOP:   3/1/2022 - None Entered    [address] [address]       [facility] [id number]         Subscriber Name Subscriber Birth Date Member ID       TRAMAINE GATES 1985 [id number]                     Emergency Contacts        (Rel.) Home Phone Work Phone Mobile Phone    CATRACHO SHARMA (Significant Other) -- -- [phone]    BenitoMagi (Mother) [phone] -- [phone]                 History & Physical        Moises Haddad MD at 11/13/24 1154          H&P reviewed. The patient was examined and there are no changes to the H&P.    Electronically signed by Moises Haddad MD at 11/13/24 1152   " Source Note: H&P (View-Only)          Cardiology Clinic Note  Moises Haddad MD, PhD    Subjective:     Encounter Date:10/14/2024      Patient ID: Tramaine Gates is a 39 y.o. male.    Chief Complaint:  Chief Complaint   Patient presents with    Hospital Follow Up Visit       HPI:      I the pleasure to see this 39-year-old gentleman in follow-up with history of multivessel bypass 2022, chest pain admission prompting intervention SVG to RCA with proximal balloon angioplasty given in-stent restenosis.  The RCA graft is essentially overlapping stents all the way down to the anastomosis with the PDA with small vessel disease diffusely in the RCA natively not amenable to intervention given very small caliber size with significant in-stent restenosis.  Patient underwent multiple balloon angioplasty throughout the entirety of the stented segment from the distal back to proximal ostial SVG to the RCA improving antegrade runoff with in segment hazy appearing thrombotic lesion at that time.  LIMA to LAD is widely patent with small size distal LAD, circumflex natively was also widely patent with preserved LV systolic function.  He is back today with complaints of shortness of breath and chest discomfort, burning radiating to his left arm.  He has complicated cardiac history below with early CAD, he is regaining weight, still has poor diet, not exercising regularly.  He is wondering if he needs another heart cath.  After long discussion we decided for nuclear ischemic evaluation try to localize a least regional where he may be having issues.  In December 2023 he had a nuclear evaluation demonstrating normal stress and rest perfusion with no reversible ischemia.  He is agreeable to this as well as further lifestyle changes which are desperately needed with advanced CAD at a young age, refractory angina pectoris      Recent echo October 6, 2024  EF 55 to 60%  Normal atrial sizes, normal right left-sided pressures estimated  noninvasively, normal valves with no dysfunction, RV size normal, RV function normal    Stress evaluation December 2023, normal stress and rest perfusion, no evidence of ischemia and preserved EF normal regional and global wall motion      Cardiac catheterization March 2023, balloon angioplasty 4.0 x 20 NC balloon to the distal portion of the SVG-RCA inside the stented segment with greater than 70% ISR with subocclusive atherothrombotic occlusion, ostial proximal 50% stenosis reduced to less than 10% distally about 20% proximally.,  Angiographically unchanged LIMA to LAD, native LAD circumflex and RCA, widely patent circumflex, tortuous small caliber obtuse marginal branches 80% angiographically unchanged small vessel disease native RCA, LIMA to LAD widely patent, distal LAD diffuse luminal irregularities small caliber less than 2 mm in diameter, overall normal EF     Review of systems otherwise negative x 14 point review of systems except as mentioned above     Historical data copied forward from previous encounters in EMR including the history, exam, and assessment/plan has been reviewed and is unchanged unless noted otherwise.     Cardiac medicines reviewed with risk, benefits, and necessity of each discussed.     Risk and benefit of cardiac testing reviewed including death heart attack stroke pain bleeding infection need for vascular /cardiovascular surgery were discussed and the patient      Objective:         Objective  Vitals reviewed below     Physical Exam    Regular rate and rhythm no rubs gallops heave or lift       Assessment:         Assessment  1.  coronary artery disease status post remote bypass bypass 2022, chronic stable angina  - [facility] 9/15/2022: three-vessel coronary disease, patent LIMA to LAD, closed SVG to OM, heavily diseased SVG to RCA with poor candidacy for native vessel intervention  - s/p Overlapping Xience drug-eluting stents 3.5 x 38 x 3 stents in overlapping fashion postdilated to 3.7  mm at 16 to 18 radha, reduction stenosis to 0% residual, improvement DARRYL-3 flow via the vein graft to the distal RCA and RPDA  - [facility] 3/2/2023: Culprit appeared to be SVG to RCA distal lesion 70 to 80% hazy possibly atherothrombotic as well as proximal ostial ISR.  Other vascular areas with LIMA to LAD, native LAD, circumflex and native RCA.  Unchanged angiographically compared to prior case 9 months ago  Continue aspirin and Effient uninterrupted  Presently on max tolerated antianginals including Imdur Ranexa beta-blockers calcium channel blockers  High intensity statin, goal-directed medical therapy for diffuse native and bypass graft CAD  - preserved LVEF         2. CAD  - s/p STEMI 6/14/2022: [facility] showed three vessel CAD- he underwent ASHLEE placement to the culprit lesion in the RCA.  LAD had 80-90% proximal stenosis and LCx had 40-50% in distal LCx/OM.  Mr. Gates underwent repeat cardiac cath on 6/16/2022 and received ASHLEE to LAD  - repeat admission 5 days later showing in stent thrombosis sent for CABG  - s/p 3V CABG 6/29/2022 (LIMA-LAD, SVG-OMofLCx, SVG-RPDA)  -2023 POBA to the distal portion of the SVG to RCA inside the stented segment secondary to what appears to be greater than 70% in-stent restenosis distally versus subocclusive thrombus, ostial proximal greater than 50% restenosis, reduced to less than 10% distally, remains about 20% proximally at the ostium, improved angiographic runoff which was DARRYL II preoperatively     Today  Continue beta-blocker with metoprolol, lisinopril, Imdur, cilostazol 50 twice daily  to help prevent in-stent restenosis and Effient, can stop aspirin if continues on cilostazol and Effient  Ranexa for chest pain relief and high intensity statin  He is on appropriate goal-directed medical therapy with secondary prevention goals    Recurrence of angina burning chest pain  Reorder Lexiscan stress ,  localized regional ischemia if able provide potential target for  "revascularization  Lifestyle changes extensively discussed today    Bariatric referral     3.  HTN controlled at home less than 140 systolic     4. HLD, high intensity statin aspirin and antiplatelets     5. Obesity bariatric referral made today     Continue present pharmacotherapy  Follow-up 6 months       Objective:         /84 (BP Location: Right arm, Patient Position: Sitting)   Pulse 100   Resp 18   Ht 177.8 cm (70\")   Wt (!) 139 kg (306 lb)   SpO2 96%   BMI 43.91 kg/m²     Physical Exam    Assessment:         There are no diagnoses linked to this encounter.       Plan:              The pleasure to be involved in this patient's cardiovascular care.  Please call with any questions or concerns  Moises Haddad MD, PhD    Most recent EKG as reviewed and interpreted by me:  Procedures     Most recent echo as reviewed and interpreted by me:  Results for orders placed during the hospital encounter of 10/04/24    Adult Transthoracic Echo Complete W/ Cont if Necessary Per Protocol    Interpretation Summary    Left ventricular ejection fraction appears to be 56 - 60%.    Indications  Chest pain    Technically satisfactory study.  Mitral valve is structurally normal.  Tricuspid valve is structurally normal.  Aortic valve is structurally normal.  Pulmonic valve could not be well visualized.  No evidence for mitral tricuspid or aortic regurgitation is seen by Doppler study.  Left atrium is normal in size.  Right atrium is normal in size.  Left ventricle is normal in size and contractility with ejection fraction of 60%.  Normal left ventricular diastolic function.  Right ventricle is normal in size and contractility with TAPSE of 2.87 cm..  Atrial septum is intact.  Aorta is normal.  No pericardial effusion or intracardiac thrombus is seen.    Impression  Structurally and functionally normal cardiac valves.  Left ventricular size and contractility is normal with ejection fraction of 60%.  Normal left ventricular " diastolic function.  Right ventricle is normal in size and contractility with TAPSE of 2.87 cm..      Most recent stress test as reviewed and interpreted by me:  Results for orders placed during the hospital encounter of 12/18/23    Stress Test With Myocardial Perfusion One Day    Interpretation Summary  1. Negative Regadenoson Electrocardiography: There is no ECG evidence of myocardial ischemia.  2.  Adequate blood-pressure response to regadenoson.  3. No regadenoson-induced arrhythmias  4. Normal SPECT Myocardial Perfusion Imaging: There is no scintigraphic evidence of myocardial ischemia or scarring.  5. Overall left ventricular function is preserved, with a normal ejection fraction and no regional asynergy.      Most recent cardiac catheterization as reviewed interpreted by me:  Results for orders placed during the hospital encounter of 03/01/23    Cardiac Catheterization/Vascular Study    Conclusion   Moises Haddad MD, PhD  [facility]  Date of service 3-2-2023    Procedure  1.  Left heart catheterization coronary angiography left ventriculography In GALLO position, imaging of native and bypass grafts  2.,  Balloon angioplasty 4.0 x 20 NC balloon of the distal portion of the SVG to RCA inside the stented segment secondary to what appears to be greater than 70% in-stent restenosis distally versus subocclusive thrombus, ostial proximal greater than 50% restenosis, reduced to less than 10% distally, remains about 20% proximally at the ostium, improved angiographic runoff which was DARRYL II preoperatively, DARRYL-3 post procedurally    Indication  Unstable angina, unable for intervention to the native RCA    After informed consent patient brought to the Cath Lab sterilely prepped and draped in usual fashion with exposure of the right groin for right common femoral arterial access via micropuncture modified Seldinger technique with placement of a 6 Greek sheath.  035 guidewires advanced  "aortic valve followed by diagnostic JL 4 and JR4 catheters for selective left and right coronary angiography, JR4 was used to image the LIMA to LAD, multipurpose catheter was used to image SVG to RCA, SVG to diagonal was noted to be closed.  There appeared to be haziness at the ostial proximal portion of the stent in the SVG as well as what appeared to be slow flow distal to a bend in the distal portion of the graft also with haziness and what appeared to be minimum 60% stenosis possibly up to 80 and some angiographic views.  Decision was made given inability to revascularize native RCA with diffuse small vessel disease and small caliber vessels to intervene with balloon angioplasty in the stented segment of the SVG graft.  Patient was heparinized with 100 units/kg of heparin, he was on a background therapy of aspirin and Effient at baseline, ACT was greater than 250, multipurpose guide was used engage the vein graft followed by run-through wire to the distal portion of the graft, 4.0 x 20 NC balloon was used to dilate the proximal portion of the graft up to 16 radha for a minute and a half with prolonged inflation given significant stenosis at the ostial portion of the graft, there was great angiographic improvement of this portion of the graft with better stent expansion, angiography further revealed downstream stenosis in the distal portion of the graft, the balloon was then readvanced to the distal portion followed by 2 inflations up to 14 radha distal proximal fashion at the culprit portion reducing stenosis to 0% residual and improving distal runoff into again small vessel PDA and retrogradely into the RCA.  Final angiography revealed no distal wire trauma no edge dissection, DARRYL-3 flow in the graft as well as into the RPDA which did have some degree of competitive flow from the native RCA although small vessel and diffusely diseased.  All catheters\" removed.  6 Greek Angio-Seal was used to close the arteriotomy " with immediate hemostasis and Meints of distal pulses.  He left Cath Lab chest pain-free hemodynamically electrically stable alert talkative staff neurologic grossly intact bilaterally    Complications none  Blood loss less than 10 cc  Contrast 160 cc  Sedation time of 1 hour    Findings  1.  Opening aortic pressure of  Closing pressure  LVEDP elevated 20-25  LV function normal 60%  Normal transaortic valve gradient on pullback    Angiography  1.  Left main normal takeoff, 10% luminal regularities leading into LAD that is subtotally occluded in the midportion, circumflex widely patent  2.  LAD diffusely diseased 80 to 90% the proximal portion, widely patent LIMA to LAD in the midportion, distal portion of the LAD has diffuse luminal regularities but is very small caliber less than 2 mm in diameter, anastomosis is widely patent for the LIMA  Over 3 LIMA to LAD widely patent, no disease  3.  SVG to diagonal branch is closed  4.  SVG to RCA has ostial 60 to 70% but hazy in appearance, distally also had 70 to 80% hazy lesion suspicious for ISR versus subacute thrombus which was greatly improved by balloon angioplasty, runoff both retrograde into the PDA and anterograde with very small vessel anastomosis likely best for medical therapy  5.  RCA natively diffusely diseased with patent stents in the proximal midportion, the intervening portion between proximal and distal stents is much less than 1 mm in diameter, marginal branches open and otherwise distal portion of vessel not amenable to intervention secondary to severe small caliber and very poor long-term patency  6.  Circumflex widely patent, obtuse marginal branch has diffuse small vessel disease tandem 80% lesions and tortuous not amenable to intervention secondary to small caliber size poor distal runoff and very poor long-term patency, thus medical treatment at this point, unchanged angiographically from prior case 8 to 9 months ago at the time of SVG  revascularization with overlapping stents    Conclusions recommendations  1.  Unstable angina  Culprit appeared to be SVG to RCA distal lesion 70 to 80% hazy possibly atherothrombotic as well as proximal ostial ISR.  Other vascular areas with LIMA to LAD, native LAD, circumflex and native RCA.  Unchanged angiographically compared to prior case 9 months ago  Continue aspirin and Effient uninterrupted  High intensity statin, goal-directed medical therapy for diffuse native and bypass graft CAD  We will advance her Ranexa and long-acting nitrates for small vessel disease    further management per clinical course    Moises Haddad MD, PhD  .    The following portions of the patient's history were reviewed and updated as appropriate: allergies, current medications, past family history, past medical history, past social history, past surgical history, and problem list.      ROS:  14 point review of systems negative except as mentioned above    Current Outpatient Medications:     albuterol sulfate  (90 Base) MCG/ACT inhaler, Inhale 2 puffs by mouth as directed every four to six hours as needed for coughing, shortness of breath, wheezing, Disp: 8.5 g, Rfl: 0    amLODIPine (Norvasc) 5 MG tablet, Take 1 tablet by mouth Daily., Disp: 90 tablet, Rfl: 0    aspirin (Aspirin Low Dose) 81 MG EC tablet, Take 1 tablet by mouth Daily., Disp: 90 tablet, Rfl: 0    azithromycin (ZITHROMAX) 250 MG tablet, Take 2 tablets by mouth on day 1, then take 1 tablet by mouth once daily on days 2-5, Disp: 6 tablet, Rfl: 0    cilostazol (PLETAL) 100 MG tablet, Take 0.5 tablets by mouth 2 (Two) Times a Day., Disp: 90 tablet, Rfl: 3    Evolocumab (Repatha SureClick) solution auto-injector SureClick injection, Inject 140 mg total (1 mL) into the skin every 14 (fourteen) days., Disp: 2 mL, Rfl: 5    isosorbide mononitrate (IMDUR) 120 MG 24 hr tablet, Take 1 tablet by mouth Daily., Disp: 30 tablet, Rfl: 0    levothyroxine (SYNTHROID, LEVOTHROID)  50 MCG tablet, TAKE 1 TABLET BY MOUTH EVERY DAY, Disp: 90 tablet, Rfl: 0    losartan (Cozaar) 25 MG tablet, Take 1 tablet by mouth Daily., Disp: 30 tablet, Rfl: 0    metoprolol succinate XL (TOPROL-XL) 100 MG 24 hr tablet, Take 1 tablet by mouth Daily., Disp: 30 tablet, Rfl: 0    nitroglycerin (NITROSTAT) 0.4 MG SL tablet, Place 1 tablet under the tongue Every 5 (Five) Minutes As Needed for Chest Pain (Only if SBP Greater Than 100). Take no more than 3 doses in 15 minutes., Disp: 25 tablet, Rfl: 0    omeprazole (priLOSEC) 40 MG capsule, Take 1 capsule by mouth Daily., Disp: , Rfl:     prasugrel (EFFIENT) 10 MG tablet, Take 1 tablet by mouth Daily., Disp: , Rfl:     ranolazine (RANEXA) 1000 MG 12 hr tablet, Take 1 tablet by mouth 2 (Two) Times a Day., Disp: 180 tablet, Rfl: 3    rosuvastatin (CRESTOR) 40 MG tablet, Take 0.5 tablets by mouth Daily., Disp: , Rfl:     Problem List:  Patient Active Problem List   Diagnosis    Hypertension    Peptic ulcer    Asthma    Blepharitis    Chronic cholecystitis    Contact with and (suspected) exposure to covid-19    Corneal ulcer    Costochondritis    Cough    Disorder of thyroid    Gastroesophageal reflux disease    Hyperlipidemia    Migraine headache    Panic attack    Acute inferior myocardial infarction    CAD, multiple vessel    Bipolar II disorder    Hx of brief reactive psychosis    Generalized anxiety disorder with panic attacks    Rage attacks    Chest pain, unspecified type    S/P CABG x 3    Chest pain    Dyspnea     Past Medical History:  Past Medical History:   Diagnosis Date    Acute inferior myocardial infarction 06/14/2022    Allergic     Asthma 01/27/2022    CAD, multiple vessel 06/14/2022    Disorder of thyroid 01/27/2022    Gastroesophageal reflux disease 01/27/2022    Hx of complete eye exam 2016    Hyperlipidemia 01/27/2022    Hypertension     Migraine headache 01/27/2022    Panic attack 01/27/2022    Peptic ulcer 09/14/2017     Past Surgical History:  Past  Surgical History:   Procedure Laterality Date    CARDIAC CATHETERIZATION N/A 2022    Procedure: Left Heart Cath;  Surgeon: Matthieu Del Toro MD;  Location:  [facility]AS[facility];  Service: Cardiology;  Laterality: N/A;    CARDIAC CATHETERIZATION N/A 2022    Procedure: Percutaneous Coronary Intervention;  Surgeon: Matthieu Del Toro MD;  Location:  [facility];  Service: Cardiovascular;  Laterality: N/A;    CARDIAC CATHETERIZATION N/A 2022    Procedure: Left Heart Cath possible PCI, atherectomy, hemodynamic support;  Surgeon: Moises Haddad MD;  Location:  [facility];  Service: Cardiology;  Laterality: N/A;    CARDIAC CATHETERIZATION N/A 09/15/2022    Procedure: Left Heart Cath;  Surgeon: Moises Haddad MD;  Location:  [facility];  Service: Cardiology;  Laterality: N/A;    CARDIAC CATHETERIZATION  9/15/2022    CARDIAC CATHETERIZATION N/A 3/2/2023    Procedure: Left Heart Cath;  Surgeon: Moises Haddad MD;  Location: [facility] CATH INVASIVE LOCATION;  Service: Cardiology;  Laterality: N/A;    CHOLECYSTECTOMY  2019    CORONARY ARTERY BYPASS GRAFT N/A 2022    Procedure: CORONARY ARTERY BYPASS GRAFTING;  Surgeon: Pablo Ball MD;  Location: [facility] CVOR;  Service: Cardiothoracic;  Laterality: N/A;  CABG X 3(2 vein grafts, 1 LIMA graft)    CORONARY ARTERY BYPASS GRAFT  2022    CORONARY STENT PLACEMENT      MANDIBLE SURGERY       Social History:  Social History     Socioeconomic History    Marital status: Single   Tobacco Use    Smoking status: Former     Current packs/day: 0.00     Average packs/day: 1.5 packs/day for 26.5 years (39.7 ttl pk-yrs)     Types: Cigarettes     Start date: 1996     Quit date: 2022     Years since quittin.3    Smokeless tobacco: Never   Vaping Use    Vaping status: Never Used   Substance and Sexual Activity    Alcohol use: Not Currently    Drug use: Yes      Frequency: 7.0 times per week     Types: Marijuana    Sexual activity: Yes     Partners: Female     Allergies:  Allergies   Allergen Reactions    Shellfish-Derived Products Unknown - High Severity     Immunizations:    There is no immunization history on file for this patient.         In-Office Procedure(s):  No orders to display        ASCVD RIsk Score::  The ASCVD Risk score (Nigel BAKER, et al., 2019) failed to calculate for the following reasons:    Risk score cannot be calculated because patient has a medical history suggesting prior/existing ASCVD    Imaging:    Results for orders placed during the hospital encounter of 10/04/24    XR Chest 1 View    Narrative  XR CHEST 1 VW    Date of Exam: 10/4/2024 10:18 AM EDT    Indication: Chest pain with shortness of breath.    Comparison: 12/19/2023.    Findings:  The heart size is normal. The patient has had a previous CABG procedure. The pulmonary vascular markings are normal. The lungs and pleural spaces are clear of active disease. The bony thorax is normal for age.    Impression  Impression:  No active disease.      Electronically Signed: Kermit Mcwilliams MD  10/4/2024 10:34 AM EDT  Workstation ID: [id number]       Results for orders placed during the hospital encounter of 10/04/24    CT Chest Without Contrast Diagnostic    Narrative  CT CHEST WO CONTRAST DIAGNOSTIC    Date of Exam: 10/4/2024 5:57 PM EDT    Indication: pneumonia.    Comparison: 1/22/2024    Technique: Axial CT images were obtained of the chest without contrast administration.  Sagittal and coronal reconstructions were performed.  Automated exposure control and iterative reconstruction methods were used.      Findings:  Lower Neck: Unremarkable.    Hilum and Mediastinum: No pathologically enlarged lymph nodes.  Normal heart size.   No pericardial effusion.  Unremarkable thoracic aorta and pulmonary arteries.    Lung Parenchyma and Pleura: Mild patchy opacities and early consolidative changes noted within  the right middle lobe. Additionally scattered tree-in-bud nodularities are also noted within the right lower lobe. Right lower lobe pulmonary nodule measuring  0.6 cm, unchanged since 2022 and most likely benign.  The central airways are patent. No pleural effusions or pneumothorax.    Upper Abdomen: No acute process.    Soft tissues: Unremarkable.    Osseous structures: No acute osseous abnormality.    Impression  Impression:  Findings suggestive of pneumonia/pneumonitis involving the right lower lobe and to a lesser extent the right lower lobe.      Electronically Signed: Rui Roberts DO  10/4/2024 7:39 PM EDT  Workstation ID: [id number]      Results for orders placed during the hospital encounter of 10/04/24    CT Chest Without Contrast Diagnostic    Narrative  CT CHEST WO CONTRAST DIAGNOSTIC    Date of Exam: 10/4/2024 5:57 PM EDT    Indication: pneumonia.    Comparison: 1/22/2024    Technique: Axial CT images were obtained of the chest without contrast administration.  Sagittal and coronal reconstructions were performed.  Automated exposure control and iterative reconstruction methods were used.      Findings:  Lower Neck: Unremarkable.    Hilum and Mediastinum: No pathologically enlarged lymph nodes.  Normal heart size.   No pericardial effusion.  Unremarkable thoracic aorta and pulmonary arteries.    Lung Parenchyma and Pleura: Mild patchy opacities and early consolidative changes noted within the right middle lobe. Additionally scattered tree-in-bud nodularities are also noted within the right lower lobe. Right lower lobe pulmonary nodule measuring  0.6 cm, unchanged since 2022 and most likely benign.  The central airways are patent. No pleural effusions or pneumothorax.    Upper Abdomen: No acute process.    Soft tissues: Unremarkable.    Osseous structures: No acute osseous abnormality.    Impression  Impression:  Findings suggestive of pneumonia/pneumonitis involving the right lower lobe and to a  lesser extent the right lower lobe.      Electronically Signed: Rui DO Alejandra  10/4/2024 7:39 PM EDT  Workstation ID: [id number]      Lab Review:   No results displayed because visit has over 200 results.        Recent labs reviewed and interpreted for clinical significance and application            Level of Care:           Moises Hdadad MD  10/14/24  .    Electronically signed by Moises Haddad MD at 10/18/24 1051                 Moises Haddad MD at 11/13/24 1153          H&P reviewed. The patient was examined and there are no changes to the H&P.    Seen  HPI unchanged  Exam unchanged from prior 10/14/2024  Labs and vitals reviewed in EMR  No contraindications to contrast antiplatelets or anticoagulation  Recent benefits of cardiac catheterization for unstable angina discussed, death heart attack stroke pain bleeding infection need for vascular cardiovascular surgery were discussed and he wishes to proceed with escalating chest pain  Status post bypass surgery and failed grafts at young age  Poor candidate for redo    PCI and intervention SVG to PDA last year 2023 secondary to ISR, distal OM circumflex disease not amenable to intervention with tortuosity and small caliber vessel size and was treated medically at that time  Preserved EF    Moises Haddad implication    Electronically signed by Moises Haddad MD at 11/13/24 1154   Source Note: H&P (View-Only)          Cardiology Clinic Note  Moises Haddad MD, PhD    Subjective:     Encounter Date:10/14/2024      Patient ID: Tramaine Gates is a 39 y.o. male.    Chief Complaint:  Chief Complaint   Patient presents with    Hospital Follow Up Visit       HPI:      I the pleasure to see this 39-year-old gentleman in follow-up with history of multivessel bypass 2022, chest pain admission prompting intervention SVG to RCA with proximal balloon angioplasty given in-stent restenosis.  The RCA graft is essentially overlapping  stents all the way down to the anastomosis with the PDA with small vessel disease diffusely in the RCA natively not amenable to intervention given very small caliber size with significant in-stent restenosis.  Patient underwent multiple balloon angioplasty throughout the entirety of the stented segment from the distal back to proximal ostial SVG to the RCA improving antegrade runoff with in segment hazy appearing thrombotic lesion at that time.  LIMA to LAD is widely patent with small size distal LAD, circumflex natively was also widely patent with preserved LV systolic function.  He is back today with complaints of shortness of breath and chest discomfort, burning radiating to his left arm.  He has complicated cardiac history below with early CAD, he is regaining weight, still has poor diet, not exercising regularly.  He is wondering if he needs another heart cath.  After long discussion we decided for nuclear ischemic evaluation try to localize a least regional where he may be having issues.  In December 2023 he had a nuclear evaluation demonstrating normal stress and rest perfusion with no reversible ischemia.  He is agreeable to this as well as further lifestyle changes which are desperately needed with advanced CAD at a young age, refractory angina pectoris      Recent echo October 6, 2024  EF 55 to 60%  Normal atrial sizes, normal right left-sided pressures estimated noninvasively, normal valves with no dysfunction, RV size normal, RV function normal    Stress evaluation December 2023, normal stress and rest perfusion, no evidence of ischemia and preserved EF normal regional and global wall motion      Cardiac catheterization March 2023, balloon angioplasty 4.0 x 20 NC balloon to the distal portion of the SVG-RCA inside the stented segment with greater than 70% ISR with subocclusive atherothrombotic occlusion, ostial proximal 50% stenosis reduced to less than 10% distally about 20% proximally.,   Angiographically unchanged LIMA to LAD, native LAD circumflex and RCA, widely patent circumflex, tortuous small caliber obtuse marginal branches 80% angiographically unchanged small vessel disease native RCA, LIMA to LAD widely patent, distal LAD diffuse luminal irregularities small caliber less than 2 mm in diameter, overall normal EF     Review of systems otherwise negative x 14 point review of systems except as mentioned above     Historical data copied forward from previous encounters in EMR including the history, exam, and assessment/plan has been reviewed and is unchanged unless noted otherwise.     Cardiac medicines reviewed with risk, benefits, and necessity of each discussed.     Risk and benefit of cardiac testing reviewed including death heart attack stroke pain bleeding infection need for vascular /cardiovascular surgery were discussed and the patient      Objective:         Objective  Vitals reviewed below     Physical Exam    Regular rate and rhythm no rubs gallops heave or lift       Assessment:         Assessment  1.  coronary artery disease status post remote bypass bypass 2022, chronic stable angina  - [facility] 9/15/2022: three-vessel coronary disease, patent LIMA to LAD, closed SVG to OM, heavily diseased SVG to RCA with poor candidacy for native vessel intervention  - s/p Overlapping Xience drug-eluting stents 3.5 x 38 x 3 stents in overlapping fashion postdilated to 3.7 mm at 16 to 18 radha, reduction stenosis to 0% residual, improvement DARRYL-3 flow via the vein graft to the distal RCA and RPDA  - [facility] 3/2/2023: Culprit appeared to be SVG to RCA distal lesion 70 to 80% hazy possibly atherothrombotic as well as proximal ostial ISR.  Other vascular areas with LIMA to LAD, native LAD, circumflex and native RCA.  Unchanged angiographically compared to prior case 9 months ago  Continue aspirin and Effient uninterrupted  Presently on max tolerated antianginals including Imdur Ranexa beta-blockers calcium  "channel blockers  High intensity statin, goal-directed medical therapy for diffuse native and bypass graft CAD  - preserved LVEF         2. CAD  - s/p STEMI 6/14/2022: [facility] showed three vessel CAD- he underwent ASHLEE placement to the culprit lesion in the RCA.  LAD had 80-90% proximal stenosis and LCx had 40-50% in distal LCx/OM.  Mr. Gates underwent repeat cardiac cath on 6/16/2022 and received ASHLEE to LAD  - repeat admission 5 days later showing in stent thrombosis sent for CABG  - s/p 3V CABG 6/29/2022 (LIMA-LAD, SVG-OMofLCx, SVG-RPDA)  -2023 POBA to the distal portion of the SVG to RCA inside the stented segment secondary to what appears to be greater than 70% in-stent restenosis distally versus subocclusive thrombus, ostial proximal greater than 50% restenosis, reduced to less than 10% distally, remains about 20% proximally at the ostium, improved angiographic runoff which was DARRYL II preoperatively     Today  Continue beta-blocker with metoprolol, lisinopril, Imdur, cilostazol 50 twice daily  to help prevent in-stent restenosis and Effient, can stop aspirin if continues on cilostazol and Effient  Ranexa for chest pain relief and high intensity statin  He is on appropriate goal-directed medical therapy with secondary prevention goals    Recurrence of angina burning chest pain  Reorder Lexiscan stress ,  localized regional ischemia if able provide potential target for revascularization  Lifestyle changes extensively discussed today    Bariatric referral     3.  HTN controlled at home less than 140 systolic     4. HLD, high intensity statin aspirin and antiplatelets     5. Obesity bariatric referral made today     Continue present pharmacotherapy  Follow-up 6 months       Objective:         /84 (BP Location: Right arm, Patient Position: Sitting)   Pulse 100   Resp 18   Ht 177.8 cm (70\")   Wt (!) 139 kg (306 lb)   SpO2 96%   BMI 43.91 kg/m²     Physical Exam    Assessment:         There are no diagnoses " linked to this encounter.       Plan:              The pleasure to be involved in this patient's cardiovascular care.  Please call with any questions or concerns  Moises Haddad MD, PhD    Most recent EKG as reviewed and interpreted by me:  Procedures     Most recent echo as reviewed and interpreted by me:  Results for orders placed during the hospital encounter of 10/04/24    Adult Transthoracic Echo Complete W/ Cont if Necessary Per Protocol    Interpretation Summary    Left ventricular ejection fraction appears to be 56 - 60%.    Indications  Chest pain    Technically satisfactory study.  Mitral valve is structurally normal.  Tricuspid valve is structurally normal.  Aortic valve is structurally normal.  Pulmonic valve could not be well visualized.  No evidence for mitral tricuspid or aortic regurgitation is seen by Doppler study.  Left atrium is normal in size.  Right atrium is normal in size.  Left ventricle is normal in size and contractility with ejection fraction of 60%.  Normal left ventricular diastolic function.  Right ventricle is normal in size and contractility with TAPSE of 2.87 cm..  Atrial septum is intact.  Aorta is normal.  No pericardial effusion or intracardiac thrombus is seen.    Impression  Structurally and functionally normal cardiac valves.  Left ventricular size and contractility is normal with ejection fraction of 60%.  Normal left ventricular diastolic function.  Right ventricle is normal in size and contractility with TAPSE of 2.87 cm..      Most recent stress test as reviewed and interpreted by me:  Results for orders placed during the hospital encounter of 12/18/23    Stress Test With Myocardial Perfusion One Day    Interpretation Summary  1. Negative Regadenoson Electrocardiography: There is no ECG evidence of myocardial ischemia.  2.  Adequate blood-pressure response to regadenoson.  3. No regadenoson-induced arrhythmias  4. Normal SPECT Myocardial Perfusion Imaging: There is no  scintigraphic evidence of myocardial ischemia or scarring.  5. Overall left ventricular function is preserved, with a normal ejection fraction and no regional asynergy.      Most recent cardiac catheterization as reviewed interpreted by me:  Results for orders placed during the hospital encounter of 03/01/23    Cardiac Catheterization/Vascular Study    Conclusion   Moises Haddad MD, PhD  [facility] cardiology  Date of service 3-2-2023    Procedure  1.  Left heart catheterization coronary angiography left ventriculography In GALLO position, imaging of native and bypass grafts  2.,  Balloon angioplasty 4.0 x 20 NC balloon of the distal portion of the SVG to RCA inside the stented segment secondary to what appears to be greater than 70% in-stent restenosis distally versus subocclusive thrombus, ostial proximal greater than 50% restenosis, reduced to less than 10% distally, remains about 20% proximally at the ostium, improved angiographic runoff which was DARRYL II preoperatively, DARRYL-3 post procedurally    Indication  Unstable angina, unable for intervention to the native RCA    After informed consent patient brought to the Cath Lab sterilely prepped and draped in usual fashion with exposure of the right groin for right common femoral arterial access via micropuncture modified Seldinger technique with placement of a 6 [place] sheath.  035 guidewires advanced aortic valve followed by diagnostic JL 4 and JR4 catheters for selective left and right coronary angiography, JR4 was used to image the LIMA to LAD, multipurpose catheter was used to image SVG to RCA, SVG to diagonal was noted to be closed.  There appeared to be haziness at the ostial proximal portion of the stent in the SVG as well as what appeared to be slow flow distal to a bend in the distal portion of the graft also with haziness and what appeared to be minimum 60% stenosis possibly up to 80 and some angiographic views.  Decision was made  "given inability to revascularize native RCA with diffuse small vessel disease and small caliber vessels to intervene with balloon angioplasty in the stented segment of the SVG graft.  Patient was heparinized with 100 units/kg of heparin, he was on a background therapy of aspirin and Effient at baseline, ACT was greater than 250, multipurpose guide was used engage the vein graft followed by run-through wire to the distal portion of the graft, 4.0 x 20 NC balloon was used to dilate the proximal portion of the graft up to 16 radha for a minute and a half with prolonged inflation given significant stenosis at the ostial portion of the graft, there was great angiographic improvement of this portion of the graft with better stent expansion, angiography further revealed downstream stenosis in the distal portion of the graft, the balloon was then readvanced to the distal portion followed by 2 inflations up to 14 radha distal proximal fashion at the culprit portion reducing stenosis to 0% residual and improving distal runoff into again small vessel PDA and retrogradely into the RCA.  Final angiography revealed no distal wire trauma no edge dissection, DARRYL-3 flow in the graft as well as into the RPDA which did have some degree of competitive flow from the native RCA although small vessel and diffusely diseased.  All catheters\" removed.  6 [place] of [place] Angio-Seal was used to close the arteriotomy with immediate hemostasis and Meints of distal pulses.  He left Cath Lab chest pain-free hemodynamically electrically stable alert talkative staff neurologic grossly intact bilaterally    Complications none  Blood loss less than 10 cc  Contrast 160 cc  Sedation time of 1 hour    Findings  1.  Opening aortic pressure of  Closing pressure  LVEDP elevated 20-25  LV function normal 60%  Normal transaortic valve gradient on pullback    Angiography  1.  Left main normal takeoff, 10% luminal regularities leading into LAD that is subtotally occluded " in the midportion, circumflex widely patent  2.  LAD diffusely diseased 80 to 90% the proximal portion, widely patent LIMA to LAD in the midportion, distal portion of the LAD has diffuse luminal regularities but is very small caliber less than 2 mm in diameter, anastomosis is widely patent for the LIMA  Over 3 LIMA to LAD widely patent, no disease  3.  SVG to diagonal branch is closed  4.  SVG to RCA has ostial 60 to 70% but hazy in appearance, distally also had 70 to 80% hazy lesion suspicious for ISR versus subacute thrombus which was greatly improved by balloon angioplasty, runoff both retrograde into the PDA and anterograde with very small vessel anastomosis likely best for medical therapy  5.  RCA natively diffusely diseased with patent stents in the proximal midportion, the intervening portion between proximal and distal stents is much less than 1 mm in diameter, marginal branches open and otherwise distal portion of vessel not amenable to intervention secondary to severe small caliber and very poor long-term patency  6.  Circumflex widely patent, obtuse marginal branch has diffuse small vessel disease tandem 80% lesions and tortuous not amenable to intervention secondary to small caliber size poor distal runoff and very poor long-term patency, thus medical treatment at this point, unchanged angiographically from prior case 8 to 9 months ago at the time of SVG revascularization with overlapping stents    Conclusions recommendations  1.  Unstable angina  Culprit appeared to be SVG to RCA distal lesion 70 to 80% hazy possibly atherothrombotic as well as proximal ostial ISR.  Other vascular areas with LIMA to LAD, native LAD, circumflex and native RCA.  Unchanged angiographically compared to prior case 9 months ago  Continue aspirin and Effient uninterrupted  High intensity statin, goal-directed medical therapy for diffuse native and bypass graft CAD  We will advance her Ranexa and long-acting nitrates for small  vessel disease    further management per clinical course    Moises Haddad MD, PhD  .    The following portions of the patient's history were reviewed and updated as appropriate: allergies, current medications, past family history, past medical history, past social history, past surgical history, and problem list.      ROS:  14 point review of systems negative except as mentioned above    Current Outpatient Medications:     albuterol sulfate  (90 Base) MCG/ACT inhaler, Inhale 2 puffs by mouth as directed every four to six hours as needed for coughing, shortness of breath, wheezing, Disp: 8.5 g, Rfl: 0    amLODIPine (Norvasc) 5 MG tablet, Take 1 tablet by mouth Daily., Disp: 90 tablet, Rfl: 0    aspirin (Aspirin Low Dose) 81 MG EC tablet, Take 1 tablet by mouth Daily., Disp: 90 tablet, Rfl: 0    azithromycin (ZITHROMAX) 250 MG tablet, Take 2 tablets by mouth on day 1, then take 1 tablet by mouth once daily on days 2-5, Disp: 6 tablet, Rfl: 0    cilostazol (PLETAL) 100 MG tablet, Take 0.5 tablets by mouth 2 (Two) Times a Day., Disp: 90 tablet, Rfl: 3    Evolocumab (Repatha SureClick) solution auto-injector SureClick injection, Inject 140 mg total (1 mL) into the skin every 14 (fourteen) days., Disp: 2 mL, Rfl: 5    isosorbide mononitrate (IMDUR) 120 MG 24 hr tablet, Take 1 tablet by mouth Daily., Disp: 30 tablet, Rfl: 0    levothyroxine (SYNTHROID, LEVOTHROID) 50 MCG tablet, TAKE 1 TABLET BY MOUTH EVERY DAY, Disp: 90 tablet, Rfl: 0    losartan (Cozaar) 25 MG tablet, Take 1 tablet by mouth Daily., Disp: 30 tablet, Rfl: 0    metoprolol succinate XL (TOPROL-XL) 100 MG 24 hr tablet, Take 1 tablet by mouth Daily., Disp: 30 tablet, Rfl: 0    nitroglycerin (NITROSTAT) 0.4 MG SL tablet, Place 1 tablet under the tongue Every 5 (Five) Minutes As Needed for Chest Pain (Only if SBP Greater Than 100). Take no more than 3 doses in 15 minutes., Disp: 25 tablet, Rfl: 0    omeprazole (priLOSEC) 40 MG capsule, Take 1 capsule by  mouth Daily., Disp: , Rfl:     prasugrel (EFFIENT) 10 MG tablet, Take 1 tablet by mouth Daily., Disp: , Rfl:     ranolazine (RANEXA) 1000 MG 12 hr tablet, Take 1 tablet by mouth 2 (Two) Times a Day., Disp: 180 tablet, Rfl: 3    rosuvastatin (CRESTOR) 40 MG tablet, Take 0.5 tablets by mouth Daily., Disp: , Rfl:     Problem List:  Patient Active Problem List   Diagnosis    Hypertension    Peptic ulcer    Asthma    Blepharitis    Chronic cholecystitis    Contact with and (suspected) exposure to covid-19    Corneal ulcer    Costochondritis    Cough    Disorder of thyroid    Gastroesophageal reflux disease    Hyperlipidemia    Migraine headache    Panic attack    Acute inferior myocardial infarction    CAD, multiple vessel    Bipolar II disorder    Hx of brief reactive psychosis    Generalized anxiety disorder with panic attacks    Rage attacks    Chest pain, unspecified type    S/P CABG x 3    Chest pain    Dyspnea     Past Medical History:  Past Medical History:   Diagnosis Date    Acute inferior myocardial infarction 06/14/2022    Allergic     Asthma 01/27/2022    CAD, multiple vessel 06/14/2022    Disorder of thyroid 01/27/2022    Gastroesophageal reflux disease 01/27/2022    Hx of complete eye exam 2016    Hyperlipidemia 01/27/2022    Hypertension     Migraine headache 01/27/2022    Panic attack 01/27/2022    Peptic ulcer 09/14/2017     Past Surgical History:  Past Surgical History:   Procedure Laterality Date    CARDIAC CATHETERIZATION N/A 06/14/2022    Procedure: Left Heart Cath;  Surgeon: Matthieu Del Toro MD;  Location: [facility] CATH INVASIVE LOCATION;  Service: Cardiology;  Laterality: N/A;    CARDIAC CATHETERIZATION N/A 06/16/2022    Procedure: Percutaneous Coronary Intervention;  Surgeon: Matthieu Del Toro MD;  Location: [facility] CATH INVASIVE LOCATION;  Service: Cardiovascular;  Laterality: N/A;    CARDIAC CATHETERIZATION N/A 06/23/2022    Procedure: Left Heart Cath possible PCI, atherectomy, hemodynamic support;   Surgeon: Moises Haddad MD;  Location: [facility] CATH INVASIVE LOCATION;  Service: Cardiology;  Laterality: N/A;    CARDIAC CATHETERIZATION N/A 09/15/2022    Procedure: Left Heart Cath;  Surgeon: Moises Haddad MD;  Location:  [facility] CATH INVASIVE LOCATION;  Service: Cardiology;  Laterality: N/A;    CARDIAC CATHETERIZATION  9/15/2022    CARDIAC CATHETERIZATION N/A 3/2/2023    Procedure: Left Heart Cath;  Surgeon: Moises Haddad MD;  Location: [facility] CATH INVASIVE LOCATION;  Service: Cardiology;  Laterality: N/A;    CHOLECYSTECTOMY      CORONARY ARTERY BYPASS GRAFT N/A 2022    Procedure: CORONARY ARTERY BYPASS GRAFTING;  Surgeon: Pablo Ball MD;  Location: [facility] CVOR;  Service: Cardiothoracic;  Laterality: N/A;  CABG X 3(2 vein grafts, 1 LIMA graft)    CORONARY ARTERY BYPASS GRAFT  2022    CORONARY STENT PLACEMENT      MANDIBLE SURGERY       Social History:  Social History     Socioeconomic History    Marital status: Single   Tobacco Use    Smoking status: Former     Current packs/day: 0.00     Average packs/day: 1.5 packs/day for 26.5 years (39.7 ttl pk-yrs)     Types: Cigarettes     Start date: 1996     Quit date: 2022     Years since quittin.3    Smokeless tobacco: Never   Vaping Use    Vaping status: Never Used   Substance and Sexual Activity    Alcohol use: Not Currently    Drug use: Yes     Frequency: 7.0 times per week     Types: Marijuana    Sexual activity: Yes     Partners: Female     Allergies:  Allergies   Allergen Reactions    Shellfish-Derived Products Unknown - High Severity     Immunizations:    There is no immunization history on file for this patient.         In-Office Procedure(s):  No orders to display        ASCVD RIsk Score::  The ASCVD Risk score (Nigel BAKER, et al., 2019) failed to calculate for the following reasons:    Risk score cannot be calculated because patient has a medical history suggesting prior/existing  ASCVD    Imaging:    Results for orders placed during the hospital encounter of 10/04/24    XR Chest 1 View    Narrative  XR CHEST 1 VW    Date of Exam: 10/4/2024 10:18 AM EDT    Indication: Chest pain with shortness of breath.    Comparison: 12/19/2023.    Findings:  The heart size is normal. The patient has had a previous CABG procedure. The pulmonary vascular markings are normal. The lungs and pleural spaces are clear of active disease. The bony thorax is normal for age.    Impression  Impression:  No active disease.      Electronically Signed: Kermit Mcwilliams MD  10/4/2024 10:34 AM EDT  Workstation ID: [id number]       Results for orders placed during the hospital encounter of 10/04/24    CT Chest Without Contrast Diagnostic    Narrative  CT CHEST WO CONTRAST DIAGNOSTIC    Date of Exam: 10/4/2024 5:57 PM EDT    Indication: pneumonia.    Comparison: 1/22/2024    Technique: Axial CT images were obtained of the chest without contrast administration.  Sagittal and coronal reconstructions were performed.  Automated exposure control and iterative reconstruction methods were used.      Findings:  Lower Neck: Unremarkable.    Hilum and Mediastinum: No pathologically enlarged lymph nodes.  Normal heart size.   No pericardial effusion.  Unremarkable thoracic aorta and pulmonary arteries.    Lung Parenchyma and Pleura: Mild patchy opacities and early consolidative changes noted within the right middle lobe. Additionally scattered tree-in-bud nodularities are also noted within the right lower lobe. Right lower lobe pulmonary nodule measuring  0.6 cm, unchanged since 2022 and most likely benign.  The central airways are patent. No pleural effusions or pneumothorax.    Upper Abdomen: No acute process.    Soft tissues: Unremarkable.    Osseous structures: No acute osseous abnormality.    Impression  Impression:  Findings suggestive of pneumonia/pneumonitis involving the right lower lobe and to a lesser extent the right lower  lobe.      Electronically Signed: Rui Roberts,   10/4/2024 7:39 PM EDT  Workstation ID: [id number]      Results for orders placed during the hospital encounter of 10/04/24    CT Chest Without Contrast Diagnostic    Narrative  CT CHEST WO CONTRAST DIAGNOSTIC    Date of Exam: 10/4/2024 5:57 PM EDT    Indication: pneumonia.    Comparison: 1/22/2024    Technique: Axial CT images were obtained of the chest without contrast administration.  Sagittal and coronal reconstructions were performed.  Automated exposure control and iterative reconstruction methods were used.      Findings:  Lower Neck: Unremarkable.    Hilum and Mediastinum: No pathologically enlarged lymph nodes.  Normal heart size.   No pericardial effusion.  Unremarkable thoracic aorta and pulmonary arteries.    Lung Parenchyma and Pleura: Mild patchy opacities and early consolidative changes noted within the right middle lobe. Additionally scattered tree-in-bud nodularities are also noted within the right lower lobe. Right lower lobe pulmonary nodule measuring  0.6 cm, unchanged since 2022 and most likely benign.  The central airways are patent. No pleural effusions or pneumothorax.    Upper Abdomen: No acute process.    Soft tissues: Unremarkable.    Osseous structures: No acute osseous abnormality.    Impression  Impression:  Findings suggestive of pneumonia/pneumonitis involving the right lower lobe and to a lesser extent the right lower lobe.      Electronically Signed: Rui Roberts,   10/4/2024 7:39 PM EDT  Workstation ID: [id number]      Lab Review:   No results displayed because visit has over 200 results.        Recent labs reviewed and interpreted for clinical significance and application            Level of Care:           Moises Haddad MD  10/14/24  .    Electronically signed by Moises Haddad MD at 10/18/24 1051                 Moises Haddad MD at 10/14/24 1400          Cardiology Clinic Note  Moises  MD Boo, PhD    Subjective:     Encounter Date:10/14/2024      Patient ID: Tramaine Gates is a 39 y.o. male.    Chief Complaint:  Chief Complaint   Patient presents with    Hospital Follow Up Visit       HPI:      I the pleasure to see this 39-year-old gentleman in follow-up with history of multivessel bypass 2022, chest pain admission prompting intervention SVG to RCA with proximal balloon angioplasty given in-stent restenosis.  The RCA graft is essentially overlapping stents all the way down to the anastomosis with the PDA with small vessel disease diffusely in the RCA natively not amenable to intervention given very small caliber size with significant in-stent restenosis.  Patient underwent multiple balloon angioplasty throughout the entirety of the stented segment from the distal back to proximal ostial SVG to the RCA improving antegrade runoff with in segment hazy appearing thrombotic lesion at that time.  LIMA to LAD is widely patent with small size distal LAD, circumflex natively was also widely patent with preserved LV systolic function.  He is back today with complaints of shortness of breath and chest discomfort, burning radiating to his left arm.  He has complicated cardiac history below with early CAD, he is regaining weight, still has poor diet, not exercising regularly.  He is wondering if he needs another heart cath.  After long discussion we decided for nuclear ischemic evaluation try to localize a least regional where he may be having issues.  In December 2023 he had a nuclear evaluation demonstrating normal stress and rest perfusion with no reversible ischemia.  He is agreeable to this as well as further lifestyle changes which are desperately needed with advanced CAD at a young age, refractory angina pectoris      Recent echo October 6, 2024  EF 55 to 60%  Normal atrial sizes, normal right left-sided pressures estimated noninvasively, normal valves with no dysfunction, RV size normal, RV  function normal    Stress evaluation December 2023, normal stress and rest perfusion, no evidence of ischemia and preserved EF normal regional and global wall motion      Cardiac catheterization March 2023, balloon angioplasty 4.0 x 20 NC balloon to the distal portion of the SVG-RCA inside the stented segment with greater than 70% ISR with subocclusive atherothrombotic occlusion, ostial proximal 50% stenosis reduced to less than 10% distally about 20% proximally.,  Angiographically unchanged LIMA to LAD, native LAD circumflex and RCA, widely patent circumflex, tortuous small caliber obtuse marginal branches 80% angiographically unchanged small vessel disease native RCA, LIMA to LAD widely patent, distal LAD diffuse luminal irregularities small caliber less than 2 mm in diameter, overall normal EF     Review of systems otherwise negative x 14 point review of systems except as mentioned above     Historical data copied forward from previous encounters in EMR including the history, exam, and assessment/plan has been reviewed and is unchanged unless noted otherwise.     Cardiac medicines reviewed with risk, benefits, and necessity of each discussed.     Risk and benefit of cardiac testing reviewed including death heart attack stroke pain bleeding infection need for vascular /cardiovascular surgery were discussed and the patient      Objective:         Objective  Vitals reviewed below     Physical Exam    Regular rate and rhythm no rubs gallops heave or lift       Assessment:         Assessment  1.  coronary artery disease status post remote bypass bypass 2022, chronic stable angina  - [facility] 9/15/2022: three-vessel coronary disease, patent LIMA to LAD, closed SVG to OM, heavily diseased SVG to RCA with poor candidacy for native vessel intervention  - s/p Overlapping Xience drug-eluting stents 3.5 x 38 x 3 stents in overlapping fashion postdilated to 3.7 mm at 16 to 18 radha, reduction stenosis to 0% residual, improvement  DARRYL-3 flow via the vein graft to the distal RCA and RPDA  - C 3/2/2023: Culprit appeared to be SVG to RCA distal lesion 70 to 80% hazy possibly atherothrombotic as well as proximal ostial ISR.  Other vascular areas with LIMA to LAD, native LAD, circumflex and native RCA.  Unchanged angiographically compared to prior case 9 months ago  Continue aspirin and Effient uninterrupted  Presently on max tolerated antianginals including Imdur Ranexa beta-blockers calcium channel blockers  High intensity statin, goal-directed medical therapy for diffuse native and bypass graft CAD  - preserved LVEF         2. CAD  - s/p STEMI 6/14/2022: [facility] showed three vessel CAD- he underwent ASHLEE placement to the culprit lesion in the RCA.  LAD had 80-90% proximal stenosis and LCx had 40-50% in distal LCx/OM.  Mr. Gates underwent repeat cardiac cath on 6/16/2022 and received ASHLEE to LAD  - repeat admission 5 days later showing in stent thrombosis sent for CABG  - s/p 3V CABG 6/29/2022 (LIMA-LAD, SVG-OMofLCx, SVG-RPDA)  -2023 POBA to the distal portion of the SVG to RCA inside the stented segment secondary to what appears to be greater than 70% in-stent restenosis distally versus subocclusive thrombus, ostial proximal greater than 50% restenosis, reduced to less than 10% distally, remains about 20% proximally at the ostium, improved angiographic runoff which was DARRYL II preoperatively     Today  Continue beta-blocker with metoprolol, lisinopril, Imdur, cilostazol 50 twice daily  to help prevent in-stent restenosis and Effient, can stop aspirin if continues on cilostazol and Effient  Ranexa for chest pain relief and high intensity statin  He is on appropriate goal-directed medical therapy with secondary prevention goals    Recurrence of angina burning chest pain  Reorder Lexiscan stress ,  localized regional ischemia if able provide potential target for revascularization  Lifestyle changes extensively discussed today    Bariatric referral    "  3.  HTN controlled at home less than 140 systolic     4. HLD, high intensity statin aspirin and antiplatelets     5. Obesity bariatric referral made today     Continue present pharmacotherapy  Follow-up 6 months       Objective:         /84 (BP Location: Right arm, Patient Position: Sitting)   Pulse 100   Resp 18   Ht 177.8 cm (70\")   Wt (!) 139 kg (306 lb)   SpO2 96%   BMI 43.91 kg/m²     Physical Exam    Assessment:         There are no diagnoses linked to this encounter.       Plan:              The pleasure to be involved in this patient's cardiovascular care.  Please call with any questions or concerns  Moises Haddad MD, PhD    Most recent EKG as reviewed and interpreted by me:  Procedures     Most recent echo as reviewed and interpreted by me:  Results for orders placed during the hospital encounter of 10/04/24    Adult Transthoracic Echo Complete W/ Cont if Necessary Per Protocol    Interpretation Summary    Left ventricular ejection fraction appears to be 56 - 60%.    Indications  Chest pain    Technically satisfactory study.  Mitral valve is structurally normal.  Tricuspid valve is structurally normal.  Aortic valve is structurally normal.  Pulmonic valve could not be well visualized.  No evidence for mitral tricuspid or aortic regurgitation is seen by Doppler study.  Left atrium is normal in size.  Right atrium is normal in size.  Left ventricle is normal in size and contractility with ejection fraction of 60%.  Normal left ventricular diastolic function.  Right ventricle is normal in size and contractility with TAPSE of 2.87 cm..  Atrial septum is intact.  Aorta is normal.  No pericardial effusion or intracardiac thrombus is seen.    Impression  Structurally and functionally normal cardiac valves.  Left ventricular size and contractility is normal with ejection fraction of 60%.  Normal left ventricular diastolic function.  Right ventricle is normal in size and contractility with TAPSE of " 2.87 cm..      Most recent stress test as reviewed and interpreted by me:  Results for orders placed during the hospital encounter of 12/18/23    Stress Test With Myocardial Perfusion One Day    Interpretation Summary  1. Negative Regadenoson Electrocardiography: There is no ECG evidence of myocardial ischemia.  2.  Adequate blood-pressure response to regadenoson.  3. No regadenoson-induced arrhythmias  4. Normal SPECT Myocardial Perfusion Imaging: There is no scintigraphic evidence of myocardial ischemia or scarring.  5. Overall left ventricular function is preserved, with a normal ejection fraction and no regional asynergy.      Most recent cardiac catheterization as reviewed interpreted by me:  Results for orders placed during the hospital encounter of 03/01/23    Cardiac Catheterization/Vascular Study    Conclusion   Moises Haddad MD, PhD  [facility]  Date of service 3-2-2023    Procedure  1.  Left heart catheterization coronary angiography left ventriculography In GALLO position, imaging of native and bypass grafts  2.,  Balloon angioplasty 4.0 x 20 NC balloon of the distal portion of the SVG to RCA inside the stented segment secondary to what appears to be greater than 70% in-stent restenosis distally versus subocclusive thrombus, ostial proximal greater than 50% restenosis, reduced to less than 10% distally, remains about 20% proximally at the ostium, improved angiographic runoff which was DARRYL II preoperatively, DARRYL-3 post procedurally    Indication  Unstable angina, unable for intervention to the native RCA    After informed consent patient brought to the Cath Lab sterilely prepped and draped in usual fashion with exposure of the right groin for right common femoral arterial access via micropuncture modified Seldinger technique with placement of a 6 Polish sheath.  035 guidewires advanced aortic valve followed by diagnostic JL 4 and JR4 catheters for selective left and right  "coronary angiography, JR4 was used to image the LIMA to LAD, multipurpose catheter was used to image SVG to RCA, SVG to diagonal was noted to be closed.  There appeared to be haziness at the ostial proximal portion of the stent in the SVG as well as what appeared to be slow flow distal to a bend in the distal portion of the graft also with haziness and what appeared to be minimum 60% stenosis possibly up to 80 and some angiographic views.  Decision was made given inability to revascularize native RCA with diffuse small vessel disease and small caliber vessels to intervene with balloon angioplasty in the stented segment of the SVG graft.  Patient was heparinized with 100 units/kg of heparin, he was on a background therapy of aspirin and Effient at baseline, ACT was greater than 250, multipurpose guide was used engage the vein graft followed by run-through wire to the distal portion of the graft, 4.0 x 20 NC balloon was used to dilate the proximal portion of the graft up to 16 radha for a minute and a half with prolonged inflation given significant stenosis at the ostial portion of the graft, there was great angiographic improvement of this portion of the graft with better stent expansion, angiography further revealed downstream stenosis in the distal portion of the graft, the balloon was then readvanced to the distal portion followed by 2 inflations up to 14 radha distal proximal fashion at the culprit portion reducing stenosis to 0% residual and improving distal runoff into again small vessel PDA and retrogradely into the RCA.  Final angiography revealed no distal wire trauma no edge dissection, DARRYL-3 flow in the graft as well as into the RPDA which did have some degree of competitive flow from the native RCA although small vessel and diffusely diseased.  All catheters\" removed.  6 Urdu Angio-Seal was used to close the arteriotomy with immediate hemostasis and Meints of distal pulses.  He left Cath Lab chest " pain-free hemodynamically electrically stable alert talkative staff neurologic grossly intact bilaterally    Complications none  Blood loss less than 10 cc  Contrast 160 cc  Sedation time of 1 hour    Findings  1.  Opening aortic pressure of  Closing pressure  LVEDP elevated 20-25  LV function normal 60%  Normal transaortic valve gradient on pullback    Angiography  1.  Left main normal takeoff, 10% luminal regularities leading into LAD that is subtotally occluded in the midportion, circumflex widely patent  2.  LAD diffusely diseased 80 to 90% the proximal portion, widely patent LIMA to LAD in the midportion, distal portion of the LAD has diffuse luminal regularities but is very small caliber less than 2 mm in diameter, anastomosis is widely patent for the LIMA  Over 3 LIMA to LAD widely patent, no disease  3.  SVG to diagonal branch is closed  4.  SVG to RCA has ostial 60 to 70% but hazy in appearance, distally also had 70 to 80% hazy lesion suspicious for ISR versus subacute thrombus which was greatly improved by balloon angioplasty, runoff both retrograde into the PDA and anterograde with very small vessel anastomosis likely best for medical therapy  5.  RCA natively diffusely diseased with patent stents in the proximal midportion, the intervening portion between proximal and distal stents is much less than 1 mm in diameter, marginal branches open and otherwise distal portion of vessel not amenable to intervention secondary to severe small caliber and very poor long-term patency  6.  Circumflex widely patent, obtuse marginal branch has diffuse small vessel disease tandem 80% lesions and tortuous not amenable to intervention secondary to small caliber size poor distal runoff and very poor long-term patency, thus medical treatment at this point, unchanged angiographically from prior case 8 to 9 months ago at the time of SVG revascularization with overlapping stents    Conclusions recommendations  1.  Unstable  angina  Culprit appeared to be SVG to RCA distal lesion 70 to 80% hazy possibly atherothrombotic as well as proximal ostial ISR.  Other vascular areas with LIMA to LAD, native LAD, circumflex and native RCA.  Unchanged angiographically compared to prior case 9 months ago  Continue aspirin and Effient uninterrupted  High intensity statin, goal-directed medical therapy for diffuse native and bypass graft CAD  We will advance her Ranexa and long-acting nitrates for small vessel disease    further management per clinical course    Moises Haddad MD, PhD  .    The following portions of the patient's history were reviewed and updated as appropriate: allergies, current medications, past family history, past medical history, past social history, past surgical history, and problem list.      ROS:  14 point review of systems negative except as mentioned above    Current Outpatient Medications:     albuterol sulfate  (90 Base) MCG/ACT inhaler, Inhale 2 puffs by mouth as directed every four to six hours as needed for coughing, shortness of breath, wheezing, Disp: 8.5 g, Rfl: 0    amLODIPine (Norvasc) 5 MG tablet, Take 1 tablet by mouth Daily., Disp: 90 tablet, Rfl: 0    aspirin (Aspirin Low Dose) 81 MG EC tablet, Take 1 tablet by mouth Daily., Disp: 90 tablet, Rfl: 0    azithromycin (ZITHROMAX) 250 MG tablet, Take 2 tablets by mouth on day 1, then take 1 tablet by mouth once daily on days 2-5, Disp: 6 tablet, Rfl: 0    cilostazol (PLETAL) 100 MG tablet, Take 0.5 tablets by mouth 2 (Two) Times a Day., Disp: 90 tablet, Rfl: 3    Evolocumab (Repatha SureClick) solution auto-injector SureClick injection, Inject 140 mg total (1 mL) into the skin every 14 (fourteen) days., Disp: 2 mL, Rfl: 5    isosorbide mononitrate (IMDUR) 120 MG 24 hr tablet, Take 1 tablet by mouth Daily., Disp: 30 tablet, Rfl: 0    levothyroxine (SYNTHROID, LEVOTHROID) 50 MCG tablet, TAKE 1 TABLET BY MOUTH EVERY DAY, Disp: 90 tablet, Rfl: 0    losartan  (Cozaar) 25 MG tablet, Take 1 tablet by mouth Daily., Disp: 30 tablet, Rfl: 0    metoprolol succinate XL (TOPROL-XL) 100 MG 24 hr tablet, Take 1 tablet by mouth Daily., Disp: 30 tablet, Rfl: 0    nitroglycerin (NITROSTAT) 0.4 MG SL tablet, Place 1 tablet under the tongue Every 5 (Five) Minutes As Needed for Chest Pain (Only if SBP Greater Than 100). Take no more than 3 doses in 15 minutes., Disp: 25 tablet, Rfl: 0    omeprazole (priLOSEC) 40 MG capsule, Take 1 capsule by mouth Daily., Disp: , Rfl:     prasugrel (EFFIENT) 10 MG tablet, Take 1 tablet by mouth Daily., Disp: , Rfl:     ranolazine (RANEXA) 1000 MG 12 hr tablet, Take 1 tablet by mouth 2 (Two) Times a Day., Disp: 180 tablet, Rfl: 3    rosuvastatin (CRESTOR) 40 MG tablet, Take 0.5 tablets by mouth Daily., Disp: , Rfl:     Problem List:  Patient Active Problem List   Diagnosis    Hypertension    Peptic ulcer    Asthma    Blepharitis    Chronic cholecystitis    Contact with and (suspected) exposure to covid-19    Corneal ulcer    Costochondritis    Cough    Disorder of thyroid    Gastroesophageal reflux disease    Hyperlipidemia    Migraine headache    Panic attack    Acute inferior myocardial infarction    CAD, multiple vessel    Bipolar II disorder    Hx of brief reactive psychosis    Generalized anxiety disorder with panic attacks    Rage attacks    Chest pain, unspecified type    S/P CABG x 3    Chest pain    Dyspnea     Past Medical History:  Past Medical History:   Diagnosis Date    Acute inferior myocardial infarction 06/14/2022    Allergic     Asthma 01/27/2022    CAD, multiple vessel 06/14/2022    Disorder of thyroid 01/27/2022    Gastroesophageal reflux disease 01/27/2022    Hx of complete eye exam 2016    Hyperlipidemia 01/27/2022    Hypertension     Migraine headache 01/27/2022    Panic attack 01/27/2022    Peptic ulcer 09/14/2017     Past Surgical History:  Past Surgical History:   Procedure Laterality Date    CARDIAC CATHETERIZATION N/A  2022    Procedure: Left Heart Cath;  Surgeon: Matthieu Del Toro MD;  Location:  [facility];  Service: Cardiology;  Laterality: N/A;    CARDIAC CATHETERIZATION N/A 2022    Procedure: Percutaneous Coronary Intervention;  Surgeon: Matthieu Del Toro MD;  Location:  [facility];  Service: Cardiovascular;  Laterality: N/A;    CARDIAC CATHETERIZATION N/A 2022    Procedure: Left Heart Cath possible PCI, atherectomy, hemodynamic support;  Surgeon: Moises Haddad MD;  Location:  [facility];  Service: Cardiology;  Laterality: N/A;    CARDIAC CATHETERIZATION N/A 09/15/2022    Procedure: Left Heart Cath;  Surgeon: Moises Haddad MD;  Location:  [facility];  Service: Cardiology;  Laterality: N/A;    CARDIAC CATHETERIZATION  9/15/2022    CARDIAC CATHETERIZATION N/A 3/2/2023    Procedure: Left Heart Cath;  Surgeon: Moises Haddad MD;  Location: [facility] CATH INVASIVE LOC[facility];  Service: Cardiology;  Laterality: N/A;    CHOLECYSTECTOMY  2019    CORONARY ARTERY BYPASS GRAFT N/A 2022    Procedure: CORONARY ARTERY BYPASS GRAFTING;  Surgeon: Pablo Ball MD;  Location: [facility] CVOR;  Service: Cardiothoracic;  Laterality: N/A;  CABG X 3(2 vein grafts, 1 LIMA graft)    CORONARY ARTERY BYPASS GRAFT  2022    CORONARY STENT PLACEMENT      MANDIBLE SURGERY       Social History:  Social History     Socioeconomic History    Marital status: Single   Tobacco Use    Smoking status: Former     Current packs/day: 0.00     Average packs/day: 1.5 packs/day for 26.5 years (39.7 ttl pk-yrs)     Types: Cigarettes     Start date: 1996     Quit date: 2022     Years since quittin.3    Smokeless tobacco: Never   Vaping Use    Vaping status: Never Used   Substance and Sexual Activity    Alcohol use: Not Currently    Drug use: Yes     Frequency: 7.0 times per week     Types: Marijuana    Sexual activity: Yes      Partners: Female     Allergies:  Allergies   Allergen Reactions    Shellfish-Derived Products Unknown - High Severity     Immunizations:    There is no immunization history on file for this patient.         In-Office Procedure(s):  No orders to display        ASCVD RIsk Score::  The ASCVD Risk score (Nigel BAKER, et al., 2019) failed to calculate for the following reasons:    Risk score cannot be calculated because patient has a medical history suggesting prior/existing ASCVD    Imaging:    Results for orders placed during the hospital encounter of 10/04/24    XR Chest 1 View    Narrative  XR CHEST 1 VW    Date of Exam: 10/4/2024 10:18 AM EDT    Indication: Chest pain with shortness of breath.    Comparison: 12/19/2023.    Findings:  The heart size is normal. The patient has had a previous CABG procedure. The pulmonary vascular markings are normal. The lungs and pleural spaces are clear of active disease. The bony thorax is normal for age.    Impression  Impression:  No active disease.      Electronically Signed: Kermit Mcwilliams MD  10/4/2024 10:34 AM EDT  Workstation ID: [id number]       Results for orders placed during the hospital encounter of 10/04/24    CT Chest Without Contrast Diagnostic    Narrative  CT CHEST WO CONTRAST DIAGNOSTIC    Date of Exam: 10/4/2024 5:57 PM EDT    Indication: pneumonia.    Comparison: 1/22/2024    Technique: Axial CT images were obtained of the chest without contrast administration.  Sagittal and coronal reconstructions were performed.  Automated exposure control and iterative reconstruction methods were used.      Findings:  Lower Neck: Unremarkable.    Hilum and Mediastinum: No pathologically enlarged lymph nodes.  Normal heart size.   No pericardial effusion.  Unremarkable thoracic aorta and pulmonary arteries.    Lung Parenchyma and Pleura: Mild patchy opacities and early consolidative changes noted within the right middle lobe. Additionally scattered tree-in-bud nodularities are also  noted within the right lower lobe. Right lower lobe pulmonary nodule measuring  0.6 cm, unchanged since 2022 and most likely benign.  The central airways are patent. No pleural effusions or pneumothorax.    Upper Abdomen: No acute process.    Soft tissues: Unremarkable.    Osseous structures: No acute osseous abnormality.    Impression  Impression:  Findings suggestive of pneumonia/pneumonitis involving the right lower lobe and to a lesser extent the right lower lobe.      Electronically Signed: Rui Roberts DO  10/4/2024 7:39 PM EDT  Workstation ID: [id number]      Results for orders placed during the hospital encounter of 10/04/24    CT Chest Without Contrast Diagnostic    Narrative  CT CHEST WO CONTRAST DIAGNOSTIC    Date of Exam: 10/4/2024 5:57 PM EDT    Indication: pneumonia.    Comparison: 1/22/2024    Technique: Axial CT images were obtained of the chest without contrast administration.  Sagittal and coronal reconstructions were performed.  Automated exposure control and iterative reconstruction methods were used.      Findings:  Lower Neck: Unremarkable.    Hilum and Mediastinum: No pathologically enlarged lymph nodes.  Normal heart size.   No pericardial effusion.  Unremarkable thoracic aorta and pulmonary arteries.    Lung Parenchyma and Pleura: Mild patchy opacities and early consolidative changes noted within the right middle lobe. Additionally scattered tree-in-bud nodularities are also noted within the right lower lobe. Right lower lobe pulmonary nodule measuring  0.6 cm, unchanged since 2022 and most likely benign.  The central airways are patent. No pleural effusions or pneumothorax.    Upper Abdomen: No acute process.    Soft tissues: Unremarkable.    Osseous structures: No acute osseous abnormality.    Impression  Impression:  Findings suggestive of pneumonia/pneumonitis involving the right lower lobe and to a lesser extent the right lower lobe.      Electronically Signed: Rui Roberts  DO  10/4/2024 7:39 PM EDT  Workstation ID: [id number]      Lab Review:   No results displayed because visit has over 200 results.        Recent labs reviewed and interpreted for clinical significance and application            Level of Care:           Moises Haddad MD  10/14/24  .    Electronically signed by Moises Haddad MD at 10/18/24 1051       Emergency Department Notes    No notes of this type exist for this encounter.       Vital Signs (last 3 days)       Date/Time Temp Temp src Pulse Resp BP Patient Position SpO2    11/15/24 0741 97.8 (36.6) Oral 61 10 113/71 Lying 95    11/15/24 0600 -- -- 56 -- -- -- 100    11/15/24 0532 -- -- 67 -- -- -- 95    11/15/24 0527 -- -- 71 -- 117/59 -- 95    11/15/24 0524 97.8 (36.6) Oral 72 -- -- -- 95    11/15/24 0500 -- -- 55 -- -- -- 94    11/15/24 0400 -- -- 52 -- -- -- 97    11/15/24 0300 -- -- 55 -- -- -- 95    11/15/24 0207 -- -- 70 -- -- -- 97    11/15/24 0200 -- -- 49 -- -- -- 94    11/15/24 0150 -- -- 76 -- 102/74 -- 96    11/15/24 0100 -- -- 54 -- -- -- 91    11/15/24 0043 -- -- 52 12 97/59 Lying 91    11/15/24 0012 98 (36.7) Oral 62 -- -- -- 97    11/15/24 0000 -- -- 59 -- 97/59 -- 95    11/14/24 2300 -- -- 63 -- 100/55 -- 94    11/14/24 2200 -- -- 72 -- 110/60 -- 95    11/14/24 2100 -- -- 70 -- 112/66 -- 96    11/14/24 2008 98.3 (36.8) Oral 72 9 105/69 Lying 95    11/14/24 1900 -- -- 74 -- 123/77 -- 95    11/14/24 1800 -- -- 61 -- 92/61 -- 92    11/14/24 1554 97.8 (36.6) Axillary 77 15 104/60 Lying 97    11/14/24 1300 -- -- 64 -- -- -- 92    11/14/24 1209 98.1 (36.7) Oral 81 16 118/68 Lying 95    11/14/24 0748 98 (36.7) Oral 82 14 129/70 Lying 96    11/14/24 0700 -- -- 65 -- -- -- 96    11/14/24 0600 -- -- 79 -- -- -- 92    11/14/24 0500 -- -- 85 -- -- -- 94    11/14/24 0432 97.8 (36.6) Oral -- 14 120/90 Lying --    11/14/24 0400 -- -- 79 -- -- -- 92    11/14/24 0300 -- -- 72 -- 101/54 -- 88    11/14/24 0200 -- -- 68 -- 100/56 -- 91    11/14/24  0100 -- -- 78 -- 120/53 -- 92    11/14/24 0034 98.1 (36.7) Oral -- 12 -- Lying --    11/14/24 0000 -- -- 69 -- -- -- 92    11/13/24 2300 -- -- 67 -- 109/67 -- 95    11/13/24 2200 -- -- 71 -- 124/51 -- 95    11/13/24 2100 -- -- 75 -- 123/63 -- 95    11/13/24 2045 -- -- 71 -- 105/63 -- --    11/13/24 2000 -- -- 71 -- -- -- 96    11/13/24 1900 -- -- 69 -- 134/77 -- 96    11/13/24 1830 -- -- 67 -- 135/76 -- 95    11/13/24 1800 -- -- 69 -- 138/95 -- 96    11/13/24 1745 -- -- 70 -- 129/73 -- 95    11/13/24 1730 -- -- 68 -- 130/73 -- 96    11/13/24 1715 -- -- 60 -- 127/75 -- 95    11/13/24 1710 -- -- 61 -- 130/78 -- 95    11/13/24 1705 -- -- 64 -- 122/72 -- 95    11/13/24 1700 -- -- 67 -- 135/77 -- 96    11/13/24 1655 -- -- 64 -- 118/70 -- 96    11/13/24 1650 -- -- 66 -- 128/76 -- 96    11/13/24 1645 -- -- 64 -- -- -- 96    11/13/24 1640 -- -- 63 -- -- -- 96    11/13/24 1635 -- -- 69 -- 125/74 -- 95    11/13/24 1630 -- -- 67 -- -- -- 97    11/13/24 1620 -- -- 64 -- 132/88 -- 97    11/13/24 1615 -- -- 66 -- -- -- 97    11/13/24 1605 -- -- 61 -- 135/75 -- 95    11/13/24 1600 -- -- 66 -- -- -- 96    11/13/24 1550 -- -- 69 -- 128/81 -- 95    11/13/24 1545 -- -- 62 -- -- -- 97    11/13/24 1535 -- -- 66 -- 131/73 -- 96    11/13/24 1530 -- -- 60 -- -- -- 94    11/13/24 1520 -- -- 66 -- 124/72 -- 96    11/13/24 1515 -- -- 63 -- -- -- 97    11/13/24 1505 -- -- 64 -- 129/75 -- 95    11/13/24 1500 -- -- 67 -- -- -- 97    11/13/24 1445 -- -- 64 -- -- -- 97    11/13/24 1435 -- -- 64 -- 116/75 -- 97    11/13/24 1430 -- -- 66 -- -- -- 95    11/13/24 1420 -- -- 64 -- 132/63 -- 96    11/13/24 1415 -- -- 65 -- -- -- 95    11/13/24 1405 -- -- 67 -- 127/75 -- 96    11/13/24 1400 -- -- 68 -- -- -- 95    11/13/24 1355 -- -- 64 -- 124/73 -- 95    11/13/24 1350 -- -- 64 -- 124/73 -- 95    11/13/24 1345 -- -- 62 -- -- -- 95    11/13/24 1340 -- -- 66 -- -- -- 96    11/13/24 12:03:06 -- -- 71 16 142/97 -- 99    11/13/24 1016 98.1 (36.7) Oral 70 13  131/89 Lying 99             Operative/Procedure Notes (all)        Tong Blanco MD at 11/13/24 1201  Version 1 of 1         Date of Admission:  11/13/2024  Today's Date:  11/14/24  Tong Blanco MD  [facility]    Preoperative Diagnosis:   Pseudoaneurysm the Right  groin.    Postoperative Diagnosis:  Same    Procedure Performed:   Duplex guided thrombin injection of the Right groin pseudoaneurysm    Surgeon:   Tong Blanco MD    Assistant:    None    Anesthesia:   Local    Estimated Blood Loss:   Minimal    Findings:    Successful ultrasound-guided right femoral pseudoaneurysm injection with cessation of flow in the pseudoaneurysm.  Palpable dorsalis pedis pulse patient    Complications:   none    Dispo:   to PACU    Indication for procedure:   39 y.o. male with pseudoaneurysm following cardiac catheterization yesterday.  Risk benefits discussed    Description of procedure:   In the vascular lab suite the patient's right groin was prepped and draped.  With the assistance of the vascular ultrasonographer's the right groin was mapped.  Anatomy was identified.  Local was infiltrated around the planned trajectory of the microneedle and microneedle was passed under B-mode imaging into the base of the pseudoaneurysm sac and injected with a total of about 1 cc of thrombin.  This abruptly stopped flow in the pseudoaneurysm.  Patient remained with palpable pedal pulses.  Patient overall tolerated procedure well.    Tong Blanco MD  11/14/24     Active Hospital Problems    Diagnosis  POA    **Abnormal stress test [R94.39]  Unknown    CAD (coronary artery disease) [I25.10]  Yes    CAD, multiple vessel [I25.10]  Unknown      Resolved Hospital Problems   No resolved problems to display.            Electronically signed by Tong Blanco MD at 11/14/24 5779          Physician Progress Notes (all)        Pat Ruiz APRN at 11/15/24 1043          CARDIOLOGY FOLLOW-UP PROGRESS NOTE      Reason for  follow-up:    CAD  S/p PCI  Right groin pseudoaneurysm     Attending: Moises Haddad,*      Subjective .     Complaint of nausea/vomiting, right groin pain     ROS  Pertinent items are noted in HPI, all other systems reviewed and negative  Allergies: Shellfish-derived products    Scheduled Meds:amLODIPine, 5 mg, Oral, Daily  aspirin, 81 mg, Oral, Daily  cilostazol, 50 mg, Oral, BID  isosorbide mononitrate, 120 mg, Oral, Q24H  levothyroxine, 50 mcg, Oral, Daily  losartan, 25 mg, Oral, Daily  metoprolol succinate XL, 100 mg, Oral, Q24H  pantoprazole, 40 mg, Oral, Q AM  prasugrel, 10 mg, Oral, Daily  ranolazine, 1,000 mg, Oral, BID  rosuvastatin, 20 mg, Oral, Daily        Continuous Infusions:     PRN Meds:.  acetaminophen    albuterol    atropine    HYDROcodone-acetaminophen    HYDROmorphone    nitroglycerin    ondansetron    Objective     VITAL SIGNS  Patient Vitals for the past 24 hrs:   BP Temp Temp src Pulse Resp SpO2   11/15/24 0741 113/71 97.8 °F (36.6 °C) Oral 61 10 95 %   11/15/24 0600 -- -- -- 56 -- 100 %   11/15/24 0532 -- -- -- 67 -- 95 %   11/15/24 0527 117/59 -- -- 71 -- 95 %   11/15/24 0524 -- 97.8 °F (36.6 °C) Oral 72 -- 95 %   11/15/24 0500 -- -- -- 55 -- 94 %   11/15/24 0400 -- -- -- 52 -- 97 %   11/15/24 0300 -- -- -- 55 -- 95 %   11/15/24 0207 -- -- -- 70 -- 97 %   11/15/24 0200 -- -- -- (!) 49 -- 94 %   11/15/24 0150 102/74 -- -- 76 -- 96 %   11/15/24 0100 -- -- -- 54 -- 91 %   11/15/24 0043 97/59 -- -- 52 12 91 %   11/15/24 0012 -- 98 °F (36.7 °C) Oral 62 -- 97 %   11/15/24 0000 97/59 -- -- 59 -- 95 %   11/14/24 2300 100/55 -- -- 63 -- 94 %   11/14/24 2200 110/60 -- -- 72 -- 95 %   11/14/24 2100 112/66 -- -- 70 -- 96 %   11/14/24 2008 105/69 98.3 °F (36.8 °C) Oral 72 9 95 %   11/14/24 1900 123/77 -- -- 74 -- 95 %   11/14/24 1800 92/61 -- -- 61 -- 92 %   11/14/24 1554 104/60 97.8 °F (36.6 °C) Axillary 77 15 97 %   11/14/24 1300 -- -- -- 64 -- 92 %   11/14/24 1209 118/68 98.1 °F (36.7 °C)  "Oral 81 16 95 %        Flowsheet Rows      Flowsheet Row First Filed Value   Admission Height 177.8 cm (70\") Documented at 11/13/2024 1016   Admission Weight 141 kg (311 lb 8.2 oz) Documented at 11/13/2024 1016            Body mass index is 44.7 kg/m².      Intake/Output Summary (Last 24 hours) at 11/15/2024 1043  Last data filed at 11/14/2024 2345  Gross per 24 hour   Intake 840 ml   Output 400 ml   Net 440 ml        TELEMETRY:     SR    Physical Exam:  Vitals reviewed.   Constitutional:       General: Not in acute distress.     Appearance: Normal appearance. Well-developed.   Eyes:      Pupils: Pupils are equal, round, and reactive to light.   HENT:      Head: Normocephalic and atraumatic.   Neck:      Vascular: No JVD.   Pulmonary:      Effort: Pulmonary effort is normal.      Breath sounds: Normal breath sounds.   Cardiovascular:      Normal rate. Regular rhythm.      Comments: Right groin with dark purple bruising.  Tender to palpation  Edema:     Peripheral edema absent.   Abdominal:      General: There is no distension.      Palpations: Abdomen is soft.      Tenderness: There is no abdominal tenderness.   Musculoskeletal: Normal range of motion.      Cervical back: Normal range of motion and neck supple. Skin:     General: Skin is warm and dry.   Neurological:      Mental Status: Alert and oriented to person, place, and time.            Results Review:   I reviewed the patient's new clinical results.    CBC    Results from last 7 days   Lab Units 11/14/24  0540 11/13/24  1039   WBC 10*3/mm3 9.44 6.10   HEMOGLOBIN g/dL 13.0 13.8   PLATELETS 10*3/mm3 282 255     BMP   Results from last 7 days   Lab Units 11/14/24  0445 11/13/24  1039   SODIUM mmol/L 136 138   POTASSIUM mmol/L 4.3 3.8   CHLORIDE mmol/L 100 103   CO2 mmol/L 26.6 25.2   BUN mg/dL 11 12   CREATININE mg/dL 1.09 1.20   GLUCOSE mg/dL 139* 110*     Cr Clearance Estimated Creatinine Clearance: 126.1 mL/min (by C-G formula based on SCr of 1.09 " "mg/dL).  Coag   Results from last 7 days   Lab Units 11/13/24  1039   INR  1.00     HbA1C   Lab Results   Component Value Date    HGBA1C 5.57 11/14/2024    HGBA1C 5.50 03/03/2023    HGBA1C 5.3 09/16/2022     Blood Glucose No results found for: \"POCGLU\"  Infection     CMP   Results from last 7 days   Lab Units 11/14/24  0445 11/13/24  1039   SODIUM mmol/L 136 138   POTASSIUM mmol/L 4.3 3.8   CHLORIDE mmol/L 100 103   CO2 mmol/L 26.6 25.2   BUN mg/dL 11 12   CREATININE mg/dL 1.09 1.20   GLUCOSE mg/dL 139* 110*   ALBUMIN g/dL  --  4.2   BILIRUBIN mg/dL  --  0.3   ALK PHOS U/L  --  97   AST (SGOT) U/L  --  20   ALT (SGPT) U/L  --  19     ABG      UA      FABIOLA  No results found for: \"POCMETH\", \"POCAMPHET\", \"POCBARBITUR\", \"POCBENZO\", \"POCCOCAINE\", \"POCOPIATES\", \"POCOXYCODO\", \"POCPHENCYC\", \"POCPROPOXY\", \"POCTHC\", \"POCTRICYC\"  Lysis Labs   Results from last 7 days   Lab Units 11/14/24  0540 11/14/24  0445 11/13/24  1039   INR   --   --  1.00   HEMOGLOBIN g/dL 13.0  --  13.8   PLATELETS 10*3/mm3 282  --  255   CREATININE mg/dL  --  1.09 1.20     Radiology(recent) No radiology results for the last day    Imaging Results (Last 24 Hours)       ** No results found for the last 24 hours. **                  EKG           I personally viewed and interpreted the patient's EKG/Telemetry data:        ECHOCARDIOGRAM:     Results for orders placed during the hospital encounter of 10/04/24    Adult Transthoracic Echo Complete W/ Cont if Necessary Per Protocol    Interpretation Summary    Left ventricular ejection fraction appears to be 56 - 60%.    Indications  Chest pain    Technically satisfactory study.  Mitral valve is structurally normal.  Tricuspid valve is structurally normal.  Aortic valve is structurally normal.  Pulmonic valve could not be well visualized.  No evidence for mitral tricuspid or aortic regurgitation is seen by Doppler study.  Left atrium is normal in size.  Right atrium is normal in size.  Left ventricle is normal " in size and contractility with ejection fraction of 60%.  Normal left ventricular diastolic function.  Right ventricle is normal in size and contractility with TAPSE of 2.87 cm..  Atrial septum is intact.  Aorta is normal.  No pericardial effusion or intracardiac thrombus is seen.    Impression  Structurally and functionally normal cardiac valves.  Left ventricular size and contractility is normal with ejection fraction of 60%.  Normal left ventricular diastolic function.  Right ventricle is normal in size and contractility with TAPSE of 2.87 cm..        STRESS MYOVIEW:      CARDIAC CATHETERIZATION:      OTHER:         Assessment & Plan            Abnormal stress test    CAD, multiple vessel    CAD (coronary artery disease)        ASSESSMENT:    CAD/Prior STEMI PCI/CABG;   Cath s/p balloon angioplasty SVG RCA    Pseudoaneurysm right groin  S/p thrombin injection  Repeat duplex this am shows successful thrombosi of right femoral pseudoaneurysm  Vascular following    HTN  Stable on current Rx    Dyslipidemia  Continue statin    Obesity    Nausea/Vomiting        PLAN:    Right groin is bruised.    Patient is status post thrombin injection; repeat duplex this a.m. shows successful thrombosis of right femoral pseudoaneurysm    Patient is having ongoing nausea with some vomiting.  He has not been out of bed    Repeat labs to check CBC, CMP  Encouraged ambulation  Will consider GI evaluation if ongoing nausea and vomiting  Additional recommendations per Dr. Haddad        Additional recommendations per Dr. Haddad    I discussed the patient's findings and my recommendations with patient and RN                  Electronically signed by YOUSIF Reyes, 11/15/24, 10:43 AM EST.          YOUSIF Reyes  11/15/24  10:43 EST                Electronically signed by Pat Ruiz APRN at 11/15/24 1111       Viviane Lynn APRN at 11/15/24 1034       Attestation signed by Tong Blanco MD at 11/15/24 1624     I have reviewed this documentation and agree.                  [facility] Vascular Surgery Progress Note    Name: Tramaine Gates ADMIT: 2024   : 1985  PCP: Daniela Hernandez FNP    MRN: [id number] LOS: 0 days   AGE/SEX: 39 y.o. male  ROOM: [address] [place] [address]    CC: Postop; status post thrombin injection of right CFA pseudoaneurysm on     Subjective     Patient resting in bed.  Reports some soreness to his right groin.  Denies any ischemic symptoms to his right leg.    Objective     Scheduled Medications:   amLODIPine, 5 mg, Oral, Daily  aspirin, 81 mg, Oral, Daily  cilostazol, 50 mg, Oral, BID  isosorbide mononitrate, 120 mg, Oral, Q24H  levothyroxine, 50 mcg, Oral, Daily  losartan, 25 mg, Oral, Daily  metoprolol succinate XL, 100 mg, Oral, Q24H  pantoprazole, 40 mg, Oral, Q AM  prasugrel, 10 mg, Oral, Daily  ranolazine, 1,000 mg, Oral, BID  rosuvastatin, 20 mg, Oral, Daily        Active Infusions:       As Needed Medications:    acetaminophen    albuterol    atropine    HYDROcodone-acetaminophen    HYDROmorphone    nitroglycerin    ondansetron    Vital Signs  Vitals:    11/15/24 0741   BP: 113/71   Pulse: 61   Resp: 10   Temp: 97.8 °F (36.6 °C)   SpO2: 95%      Body mass index is 44.7 kg/m².     Physical Exam:  NAD, alert and oriented  RRR  Lungs clear  Abd soft, benign  Vascular: Palpable bilateral femoral and pedal pulses  Skin: Warm to touch, lower extremities appear well-perfused; extensive, soft ecchymosis to the right groin    Results Review:     CBC    Results from last 7 days   Lab Units 24  0540 24  1039   WBC 10*3/mm3 9.44 6.10   HEMOGLOBIN g/dL 13.0 13.8   PLATELETS 10*3/mm3 282 255     BMP   Results from last 7 days   Lab Units 24  0445 24  1039   SODIUM mmol/L 136 138   POTASSIUM mmol/L 4.3 3.8   CHLORIDE mmol/L 100 103   CO2 mmol/L 26.6 25.2   BUN mg/dL 11 12   CREATININE mg/dL 1.09 1.20   GLUCOSE mg/dL 139* 110*     Coag   Results from  last 7 days   Lab Units 11/13/24  1039   INR  1.00     HbA1C   Lab Results   Component Value Date    HGBA1C 5.57 11/14/2024    HGBA1C 5.50 03/03/2023    HGBA1C 5.3 09/16/2022     Infection     Radiology(recent) No radiology results for the last day    VTE Prophylaxis:  No VTE prophylaxis order currently exists.         Problems:    Active Hospital Problems:  Active Hospital Problems    Diagnosis  POA    **Abnormal stress test [R94.39]  Unknown    CAD (coronary artery disease) [I25.10]  Yes    CAD, multiple vessel [I25.10]  Unknown      Resolved Hospital Problems   No resolved problems to display.        Assessment & Plan   Assessment / Plan     Abnormal stress test    CAD, multiple vessel    CAD (coronary artery disease)    39-year-old male who presents for an elective heart cath  Duplex of the right groin following heart cath shows a right CFA pseudoaneurysm  Status post thrombin injection, repeat duplex this morning shows successful thrombosis of right femoral pseudoaneurysm  Okay to increase activity and ambulate  Apply ice to groin as needed  He stable okay for discharge from a vascular surgery standpoint  We will sign off  Please call for any questions or concerns       YOUSIF Sommers  [facility] [place] [facility]  11/15/24   O: [phone]  F: [phone]    Electronically signed by Tong Blanco MD at 11/15/24 1045       Moises Haddad MD at 11/14/24 0759          Cardiology progress note  Moises Haddad MD, PhD    Patient Care Team:  Daniela Hernandez FNP as PCP - General (Family Medicine)  Ollie Dunn MD as Consulting Physician (Gastroenterology)      SUBJECTIVE: Seen, groin pain, imaging returned this morning with small pseudoaneurysm less than 2 cm  No other fevers chest pain shortness of breath diaphoresis  Tolerating medicines    Discussed with patient will need to stay here for another day, consult vascular surgery Dr. Carlson, hopefully thrombin injection  possible    Further recommendations follow findings and clinical course  Medications optimized  DAPT on board  Rhythm stable  Blood pressure stable    REVIEW OF SYSTEMS: Some 14-point review of systems is negative except what is mentioned in the HPI relative to the current complaint.     PAST MEDICAL HISTORY:   Past Medical History:   Diagnosis Date    Acute inferior myocardial infarction 06/14/2022    Allergic     Asthma 01/27/2022    CAD, multiple vessel 06/14/2022    Disorder of thyroid 01/27/2022    Gastroesophageal reflux disease 01/27/2022    Hx of complete eye exam 2016    Hyperlipidemia 01/27/2022    Hypertension     Migraine headache 01/27/2022    Panic attack 01/27/2022    Peptic ulcer 09/14/2017       ALLERGIES:   Allergies   Allergen Reactions    Shellfish-Derived Products Unknown - High Severity         PAST SURGICAL HISTORY:   Past Surgical History:   Procedure Laterality Date    CARDIAC CATHETERIZATION N/A 06/14/2022    Procedure: Left Heart Cath;  Surgeon: Matthieu Del Toro MD;  Location: [facility] CATH INVASIVE LOCATION;  Service: Cardiology;  Laterality: N/A;    CARDIAC CATHETERIZATION N/A 06/16/2022    Procedure: Percutaneous Coronary Intervention;  Surgeon: Matthieu Del Toro MD;  Location: [facility] CATH INVASIVE LOCATION;  Service: Cardiovascular;  Laterality: N/A;    CARDIAC CATHETERIZATION N/A 06/23/2022    Procedure: Left Heart Cath possible PCI, atherectomy, hemodynamic support;  Surgeon: Moises Haddad MD;  Location:  [facility] CATH INVASIVE LOCATION;  Service: Cardiology;  Laterality: N/A;    CARDIAC CATHETERIZATION N/A 09/15/2022    Procedure: Left Heart Cath;  Surgeon: Moises Haddad MD;  Location:  [facility] CATH INVASIVE LOCATION;  Service: Cardiology;  Laterality: N/A;    CARDIAC CATHETERIZATION  9/15/2022    CARDIAC CATHETERIZATION N/A 3/2/2023    Procedure: Left Heart Cath;  Surgeon: Moises Haddad MD;  Location:  [facility] CATH INVASIVE LOCATION;  Service: Cardiology;   Laterality: N/A;    CHOLECYSTECTOMY  2019    CORONARY ARTERY BYPASS GRAFT N/A 2022    Procedure: CORONARY ARTERY BYPASS GRAFTING;  Surgeon: Pablo Ball MD;  Location: [facility];  Service: Cardiothoracic;  Laterality: N/A;  CABG X 3(2 vein grafts, 1 LIMA graft)    CORONARY ARTERY BYPASS GRAFT  2022    CORONARY STENT PLACEMENT      MANDIBLE SURGERY            FAMILY HISTORY:   Family History   Problem Relation Age of Onset    Heart disease Mother     Heart attack Mother     Hypertension Mother     Heart failure Father     Cirrhosis Maternal Grandfather     Heart attack Maternal Grandmother         a         SOCIAL HISTORY:   Social History     Socioeconomic History    Marital status: Single   Tobacco Use    Smoking status: Former     Current packs/day: 0.00     Average packs/day: 1.5 packs/day for 26.5 years (39.7 ttl pk-yrs)     Types: Cigarettes     Start date: 1996     Quit date: 2022     Years since quittin.4    Smokeless tobacco: Never   Vaping Use    Vaping status: Never Used   Substance and Sexual Activity    Alcohol use: Not Currently    Drug use: Yes     Frequency: 7.0 times per week     Types: Marijuana    Sexual activity: Yes     Partners: Female       CURRENT MEDICATIONS:     Current Facility-Administered Medications:     acetaminophen (TYLENOL) tablet 650 mg, 650 mg, Oral, Q4H PRN, Moises Haddad MD, 650 mg at 24 0515    albuterol (PROVENTIL) nebulizer solution 0.083% 2.5 mg/3mL, 2.5 mg, Nebulization, Q6H PRN, Moises Haddad MD    amLODIPine (NORVASC) tablet 5 mg, 5 mg, Oral, Daily, Moises Haddad MD, 5 mg at 24 1607    aspirin EC tablet 81 mg, 81 mg, Oral, Daily, Moises Haddad MD, 81 mg at 24 1607    atropine sulfate injection 0.5 mg, 0.5 mg, Intravenous, Q5 Min PRN, Moises Haddad MD    cilostazol (PLETAL) tablet 50 mg, 50 mg, Oral, BID, Moises Haddad MD, 50 mg at 24 3732     HYDROmorphone (DILAUDID) injection 0.5 mg, 0.5 mg, Intravenous, Q4H PRN, Coy Jimenes MD, 0.5 mg at 11/14/24 0727    isosorbide mononitrate (IMDUR) 24 hr tablet 120 mg, 120 mg, Oral, Q24H, Moises Haddad MD, 120 mg at 11/13/24 1607    levothyroxine (SYNTHROID, LEVOTHROID) tablet 50 mcg, 50 mcg, Oral, Daily, Moises Haddad MD    losartan (COZAAR) tablet 25 mg, 25 mg, Oral, Daily, Moises Haddad MD, 25 mg at 11/13/24 1607    metoprolol succinate XL (TOPROL-XL) 24 hr tablet 100 mg, 100 mg, Oral, Q24H, Moises Haddad MD, 100 mg at 11/13/24 1607    nitroglycerin (NITROSTAT) SL tablet 0.4 mg, 0.4 mg, Sublingual, Q5 Min PRN, Moises Haddad MD    ondansetron (ZOFRAN) injection 4 mg, 4 mg, Intravenous, Q4H PRN, Coy Jimenes MD, 4 mg at 11/14/24 0515    pantoprazole (PROTONIX) EC tablet 40 mg, 40 mg, Oral, Q AM, Moises Haddad MD, 40 mg at 11/14/24 0515    prasugrel (EFFIENT) tablet 10 mg, 10 mg, Oral, Daily, Moises Haddad MD, 10 mg at 11/13/24 1607    ranolazine (RANEXA) 12 hr tablet 1,000 mg, 1,000 mg, Oral, BID, Moises Haddad MD, 1,000 mg at 11/13/24 2045    rosuvastatin (CRESTOR) tablet 20 mg, 20 mg, Oral, Daily, Moises Haddad MD      DIAGNOSTIC DATA:     I reviewed the patient's new clinical results.    Lab Results (last 24 hours)       Procedure Component Value Units Date/Time    POC Activated Clotting Time [[id number]]  (Abnormal) Collected: 11/13/24 1302    Specimen: Arterial Blood Updated: 11/14/24 0726     Activated Clotting Time  337 Seconds      Comment: Serial Number: [id number]:  [id number]       Hemoglobin A1c [[id number]]  (Normal) Collected: 11/14/24 0540    Specimen: Blood Updated: 11/14/24 0622     Hemoglobin A1C 5.57 %     CBC & Differential [[id number]]  (Abnormal) Collected: 11/14/24 0540    Specimen: Blood Updated: 11/14/24 0604    Narrative:      The following orders were created for panel order CBC &  Differential.  Procedure                               Abnormality         Status                     ---------                               -----------         ------                     CBC Auto Differential[[id number]]        Abnormal            Final result                 Please view results for these tests on the individual orders.    CBC Auto Differential [[id number]]  (Abnormal) Collected: 11/14/24 0540    Specimen: Blood Updated: 11/14/24 0604     WBC 9.44 10*3/mm3      RBC 4.52 10*6/mm3      Hemoglobin 13.0 g/dL      Hematocrit 39.1 %      MCV 86.5 fL      MCH 28.8 pg      MCHC 33.2 g/dL      RDW 12.7 %      RDW-SD 40.2 fl      MPV 9.3 fL      Platelets 282 10*3/mm3      Neutrophil % 89.0 %      Lymphocyte % 8.6 %      Monocyte % 1.6 %      Eosinophil % 0.0 %      Basophil % 0.1 %      Immature Grans % 0.7 %      Neutrophils, Absolute 8.40 10*3/mm3      Lymphocytes, Absolute 0.81 10*3/mm3      Monocytes, Absolute 0.15 10*3/mm3      Eosinophils, Absolute 0.00 10*3/mm3      Basophils, Absolute 0.01 10*3/mm3      Immature Grans, Absolute 0.07 10*3/mm3      nRBC 0.0 /100 WBC     CBC (No Diff) [[id number]] Collected: 11/14/24 0449    Specimen: Blood Updated: 11/14/24 0531     WBC --     Comment: Spec. clotted  Corrected result. Previous result was 9.02 10*3/mm3 on 11/14/2024 at 0500 EST.        RBC --     Comment: Spec. clotted  Corrected result. Previous result was 4.34 10*6/mm3 on 11/14/2024 at 0500 EST.        Hemoglobin --     Comment: Spec. clotted  Corrected result. Previous result was 12.7 g/dL on 11/14/2024 at 0500 EST.        Hematocrit --     Comment: Spec. clotted  Corrected result. Previous result was 39.0 % on 11/14/2024 at 0500 EST.        MCV --     Comment: Spec. clotted  Corrected result. Previous result was 89.9 fL on 11/14/2024 at 0500 EST.        MCH --     Comment: Spec. clotted  Corrected result. Previous result was 29.3 pg on 11/14/2024 at 0500 EST.        MCHC --     Comment: Spec.  clotted  Corrected result. Previous result was 32.6 g/dL on 11/14/2024 at 0500 EST.        RDW --     Comment: Spec. clotted  Corrected result. Previous result was 12.8 % on 11/14/2024 at 0500 EST.        RDW-SD --     Comment: Spec. clotted  Corrected result. Previous result was 42.1 fl on 11/14/2024 at 0500 EST.        MPV --     Comment: Spec. clotted  Corrected result. Previous result was 9.1 fL on 11/14/2024 at 0500 EST.        Platelets --     Comment: Spec. clotted  Corrected result. Previous result was 160 10*3/mm3 on 11/14/2024 at 0500 EST.       Basic Metabolic Panel [[id number]]  (Abnormal) Collected: 11/14/24 0445    Specimen: Blood from Arm, Right Updated: 11/14/24 0527     Glucose 139 mg/dL      BUN 11 mg/dL      Creatinine 1.09 mg/dL      Sodium 136 mmol/L      Potassium 4.3 mmol/L      Comment: Specimen hemolyzed.  Result may be falsely elevated.        Chloride 100 mmol/L      CO2 26.6 mmol/L      Calcium 9.2 mg/dL      BUN/Creatinine Ratio 10.1     Anion Gap 9.4 mmol/L      eGFR 87.4 mL/min/1.73     Narrative:      GFR Normal >60  Chronic Kidney Disease <60  Kidney Failure <15      Lipid Panel [[id number]] Collected: 11/14/24 0445    Specimen: Blood from Arm, Right Updated: 11/14/24 0522     Total Cholesterol 146 mg/dL      Triglycerides 83 mg/dL      HDL Cholesterol 52 mg/dL      LDL Cholesterol  78 mg/dL      VLDL Cholesterol 16 mg/dL      LDL/HDL Ratio 1.49    Narrative:      Cholesterol Reference Ranges  (U.S. Department of Health and Human Services ATP III Classifications)    Desirable          <200 mg/dL  Borderline High    200-239 mg/dL  High Risk          >240 mg/dL      Triglyceride Reference Ranges  (U.S. Department of Health and Human Services ATP III Classifications)    Normal           <150 mg/dL  Borderline High  150-199 mg/dL  High             200-499 mg/dL  Very High        >500 mg/dL    HDL Reference Ranges  (U.S. Department of Health and Human Services ATP III  Classifications)    Low     <40 mg/dl (major risk factor for CHD)  High    >60 mg/dl ('negative' risk factor for CHD)        LDL Reference Ranges  (U.S. Department of Health and Human Services ATP III Classifications)    Optimal          <100 mg/dL  Near Optimal     100-129 mg/dL  Borderline High  130-159 mg/dL  High             160-189 mg/dL  Very High        >189 mg/dL    POC Activated Clotting Time [[id number]]  (Abnormal) Collected: 11/13/24 1647    Specimen: Blood Updated: 11/13/24 1651     Activated Clotting Time  147 Seconds      Comment: Serial Number: [id number]:  [id number]       POC Activated Clotting Time [[id number]]  (Abnormal) Collected: 11/13/24 1515    Specimen: Arterial Blood Updated: 11/13/24 1538     Activated Clotting Time  210 Seconds      Comment: Serial Number: [id number]:  [id number]       Comprehensive Metabolic Panel [[id number]]  (Abnormal) Collected: 11/13/24 1039    Specimen: Blood Updated: 11/13/24 1111     Glucose 110 mg/dL      BUN 12 mg/dL      Creatinine 1.20 mg/dL      Sodium 138 mmol/L      Potassium 3.8 mmol/L      Chloride 103 mmol/L      CO2 25.2 mmol/L      Calcium 9.4 mg/dL      Total Protein 7.1 g/dL      Albumin 4.2 g/dL      ALT (SGPT) 19 U/L      AST (SGOT) 20 U/L      Alkaline Phosphatase 97 U/L      Total Bilirubin 0.3 mg/dL      Globulin 2.9 gm/dL      A/G Ratio 1.4 g/dL      BUN/Creatinine Ratio 10.0     Anion Gap 9.8 mmol/L      eGFR 77.9 mL/min/1.73     Narrative:      GFR Normal >60  Chronic Kidney Disease <60  Kidney Failure <15      CBC (No Diff) [[id number]]  (Normal) Collected: 11/13/24 1039    Specimen: Blood Updated: 11/13/24 1102     WBC 6.10 10*3/mm3      RBC 4.76 10*6/mm3      Hemoglobin 13.8 g/dL      Hematocrit 41.5 %      MCV 87.2 fL      MCH 29.0 pg      MCHC 33.3 g/dL      RDW 12.9 %      RDW-SD 41.3 fl      MPV 9.1 fL      Platelets 255 10*3/mm3     Protime-INR [[id number]]  (Normal) Collected: 11/13/24 1039    Specimen: Blood Updated: 11/13/24 1103      "Protime 13.3 Seconds      INR 1.00            Imaging Results (Last 24 Hours)       ** No results found for the last 24 hours. **            Xray reviewed personally by physician.      ECG reviewed personally by physician  ECG/EMG Results (most recent)       Procedure Component Value Units Date/Time    ECG 12 Lead Chest Pain [[id number]] Collected: 11/13/24 1002     Updated: 11/13/24 1004     QT Interval 408 ms      QTC Interval 417 ms     Narrative:      HEART RATE=63  bpm  RR Bckaorpo=963  ms  IN Nldsahmk=460  ms  P Horizontal Axis=-6  deg  P Front Axis=38  deg  QRSD Interval=86  ms  QT Ztjumqwn=880  ms  HZzV=328  ms  QRS Axis=9  deg  T Wave Axis=55  deg  - BORDERLINE ECG -  Sinus rhythm  Low voltage, precordial leads  Consider anterior infarct  Date and Time of Study:2024-11-13 10:02:27              PHYSICAL EXAMINATION:  VITAL SIGNS: Temp:  [97.8 °F (36.6 °C)-98.1 °F (36.7 °C)] 98 °F (36.7 °C)  Heart Rate:  [60-85] 82  Resp:  [12-16] 14  BP: (100-142)/(51-97) 129/70   Flowsheet Rows      Flowsheet Row First Filed Value   Admission Height 177.8 cm (70\") Documented at 11/13/2024 1016   Admission Weight 141 kg (311 lb 8.2 oz) Documented at 11/13/2024 1016          GENERAL: Alert and oriented x3, afebrile. Vital signs stable, appeared comfortable, nondistressed, and appears stated age. Generalized weakness. Responds to questions appropriately.   HEENT: Normocephalic, atraumatic. Pupils equal, round and reactive to light. Extraocular movements intact bilaterally. Trachea midline.  Mucous membranes are moist.   NECK: Supple. No JVD. Trachea midline, no LAD  CHEST: Clear to auscultation bilaterally anteriorly; no rale, rhonchi, or wheezes  HEART: Regular rate and rhythm. No rubs, murmurs or gallops.   ABDOMEN: Soft, nontender, nondistended. Bowel sounds are otherwise positive.   EXTREMITIES: No clubbing, cyanosis, or edema. Moves all extremities  SKIN: Warm and dry. No ecchymoses, petechiae or rashes.   MUSCULOSKELETAL: " No bony abnormalities. Range of motion normal. No crepitus. No joint swelling or erythema.   NEUROLOGIC: Cranial nerves II-XII intact bilaterally. No focal neurologic deficits. Mini-Mental status exam was deferred.   LYMPHATICS: No lymphadenopathy.     ASSESSMENT AND PLAN:   CAD  Essential hypertension  Hyperlipidemia  Morbid obesity  Chronic stable angina  Recurrent ISR progressive for early disease    - s/p STEMI 6/14/2022: [facility] showed three vessel CAD- he underwent ASHLEE placement to the culprit lesion in the RCA.  LAD had 80-90% proximal stenosis and LCx had 40-50% in distal LCx/OM.  Mr. Gates underwent repeat cardiac cath on 6/16/2022 and received ASHLEE to LAD  - repeat admission 5 days later showing in stent thrombosis sent for CABG  - s/p 3V CABG 6/29/2022 (LIMA-LAD, SVG-OMofLCx, SVG-RPDA)    recurrent intervention and balloon angioplasty of SVG to RPDA status post initial stenting September 2022, stable but nonobstructive disease circumflex into obtuse marginal branch with close SVG to the obtuse marginal branch, patent LIMA to LAD, stable disease left main LAD into diagonal branch which is unbypassed,   - chronic stable angina  - [facility] 9/15/2022: three-vessel coronary disease, patent LIMA to LAD, closed SVG to OM, heavily diseased SVG to RCA with poor candidacy for native vessel intervention  - s/p Overlapping Xience drug-eluting stents 3.5 x 38 x 3 stents in overlapping fashion postdilated to 3.7 mm at 16 to 18 radha, reduction stenosis to 0% residual, improvement DARRYL-3 flow via the vein graft to the distal RCA and RPDA  - [facility] 3/2/2023: Culprit appeared to be SVG to RCA distal lesion 70 to 80% hazy possibly atherothrombotic as well as proximal ostial ISR.  Other vascular areas with LIMA to LAD, native LAD, circumflex and native RCA.  Unchanged angiographically compared to prior case 9 months ago  Left heart catheterization November 2024, SVG to RCA distal similar lesion 80 to 90%, mid to proximal 60 to 70%,  "successful balloon angioplasty 4.0 x 20 NC balloon recurrent inflations, resolution of all stenoses this admission  Continue aspirin and Effient uninterrupted  Presently on max tolerated antianginals including Imdur Ranexa beta-blockers calcium channel blockers  High intensity statin, goal-directed medical therapy for diffuse native and bypass graft CAD  - preserved LVEF     Continue aspirin Effient  Groin pain, pseudoaneurysm small less than 2 cm noted right groin  Consult vascular surgery for consideration of thrombin injection, no vascular closure device was utilized  Hemodynamics are stable  Chest pain-free  Blood pressure and heart rate are controlled     Further recommendations follow findings and clinical course  Moises Haddad MD  24  07:57 EST        Electronically signed by Moises Haddad MD at 24 0802          Consult Notes (all)        Johnna Borrero APRN at 11/15/24 1223        Consult Orders    1. Inpatient Hospitalist Consult [[id number]] ordered by Pat Ruiz APRN                     [facility] Medicine Services  Consult Note    Patient Name: Tramaine Gates  : 1985  MRN: [id number]  Primary Care Physician:  Daniela Hernandez FNP  Referring Physician: Moises Haddad,*  Date of admission: 2024  Date and Time of Care: 11/15/2024 at 1145    Inpatient Hospitalist Consult  Consult performed by: Johnna Borrero APRN  Consult ordered by: Pat Ruiz APRN          Reason for Consult/ Chief Complaint: Nausea vomiting    Consult Requested By: Pat DODD    Subjective:     History of Present Illness:   Per the documentation by Moises Haddad MD, dated 10-,  \"I the pleasure to see this 39-year-old gentleman in follow-up with history of multivessel bypass , chest pain admission prompting intervention SVG to RCA with proximal balloon angioplasty given in-stent restenosis.  The RCA graft is essentially overlapping " "stents all the way down to the anastomosis with the PDA with small vessel disease diffusely in the RCA natively not amenable to intervention given very small caliber size with significant in-stent restenosis.  Patient underwent multiple balloon angioplasty throughout the entirety of the stented segment from the distal back to proximal ostial SVG to the RCA improving antegrade runoff with in segment hazy appearing thrombotic lesion at that time.  LIMA to LAD is widely patent with small size distal LAD, circumflex natively was also widely patent with preserved LV systolic function.  He is back today with complaints of shortness of breath and chest discomfort, burning radiating to his left arm.  He has complicated cardiac history below with early CAD, he is regaining weight, still has poor diet, not exercising regularly.  He is wondering if he needs another heart cath.  After long discussion we decided for nuclear ischemic evaluation try to localize a least regional where he may be having issues.  In December 2023 he had a nuclear evaluation demonstrating normal stress and rest perfusion with no reversible ischemia.  He is agreeable to this as well as further lifestyle changes which are desperately needed with advanced CAD at a young age, refractory angina pectoris \".     Patient was seen and examined by this provider.  Per patient, he has smoked THC since he was 14.  Reports he is an everyday THC user.  States that nausea vomiting is an ongoing issue, not a new problem.  Reports his nausea vomiting has been more uncontrolled since prior to this procedure but progressively has been worsening.  Reports there are no aggravating factors.  Patient is extremely thirsty and desires frequent sips of water.    Review of Systems:   Review of Systems   Respiratory:  Negative for shortness of breath.    Cardiovascular:  Negative for chest pain.   Gastrointestinal:  Positive for nausea and vomiting.   Neurological:  Negative for " dizziness and headaches.       Personal History:     Past Medical History:   Diagnosis Date    Acute inferior myocardial infarction 06/14/2022    Allergic     Asthma 01/27/2022    CAD, multiple vessel 06/14/2022    Disorder of thyroid 01/27/2022    Gastroesophageal reflux disease 01/27/2022    Hx of complete eye exam 2016    Hyperlipidemia 01/27/2022    Hypertension     Migraine headache 01/27/2022    Panic attack 01/27/2022    Peptic ulcer 09/14/2017       Past Surgical History:   Procedure Laterality Date    CARDIAC CATHETERIZATION N/A 06/14/2022    Procedure: Left Heart Cath;  Surgeon: Matthieu Del Toro MD;  Location: [facility] CATH INVASIVE LOCATION;  Service: Cardiology;  Laterality: N/A;    CARDIAC CATHETERIZATION N/A 06/16/2022    Procedure: Percutaneous Coronary Intervention;  Surgeon: Matthieu Del Toro MD;  Location: [facility] CATH INVASIVE LOCATION;  Service: Cardiovascular;  Laterality: N/A;    CARDIAC CATHETERIZATION N/A 06/23/2022    Procedure: Left Heart Cath possible PCI, atherectomy, hemodynamic support;  Surgeon: Moises Haddad MD;  Location:  [facility] CATH INVASIVE LOCATION;  Service: Cardiology;  Laterality: N/A;    CARDIAC CATHETERIZATION N/A 09/15/2022    Procedure: Left Heart Cath;  Surgeon: Moises Haddad MD;  Location:  [facility] CATH INVASIVE LOCATION;  Service: Cardiology;  Laterality: N/A;    CARDIAC CATHETERIZATION  9/15/2022    CARDIAC CATHETERIZATION N/A 3/2/2023    Procedure: Left Heart Cath;  Surgeon: Moises Haddad MD;  Location: [facility] CATH INVASIVE LOCATION;  Service: Cardiology;  Laterality: N/A;    CHOLECYSTECTOMY  2019    CORONARY ARTERY BYPASS GRAFT N/A 06/29/2022    Procedure: CORONARY ARTERY BYPASS GRAFTING;  Surgeon: Pablo Ball MD;  Location: [facility] CVOR;  Service: Cardiothoracic;  Laterality: N/A;  CABG X 3(2 vein grafts, 1 LIMA graft)    CORONARY ARTERY BYPASS GRAFT  6/29/2022    CORONARY STENT PLACEMENT      MANDIBLE SURGERY         Family History:  family history includes Cirrhosis in his maternal grandfather; Heart attack in his maternal grandmother and mother; Heart disease in his mother; Heart failure in his father; Hypertension in his mother. Otherwise pertinent FHx was reviewed and not pertinent to current issue.    Social History:  reports that he quit smoking about 2 years ago. His smoking use included cigarettes. He started smoking about 28 years ago. He has a 39.7 pack-year smoking history. He has never used smokeless tobacco. He reports that he does not currently use alcohol. He reports current drug use. Frequency: 7.00 times per week. Drug: Marijuana.    Home Medications:   Evolocumab, albuterol sulfate HFA, amLODIPine, aspirin, cilostazol, dilTIAZem CD, isosorbide mononitrate, levothyroxine, losartan, metoprolol succinate XL, nitroglycerin, omeprazole, prasugrel, ranolazine, and rosuvastatin    Allergies:  Allergies   Allergen Reactions    Shellfish-Derived Products Unknown - High Severity         Objective:     Vital Signs  Temp:  [97.8 °F (36.6 °C)-98.3 °F (36.8 °C)] 97.8 °F (36.6 °C)  Heart Rate:  [49-77] 61  Resp:  [9-15] 10  BP: ()/(55-77) 113/71  Flow (L/min) (Oxygen Therapy):  [2] 2   Body mass index is 44.7 kg/m².    Physical Exam  Physical Exam  General: 38 yo male, Alert and oriented, morbidly obese, no acute distress.  HENT: Normocephalic, normal hearing, moist oral mucosa, no scleral icterus.  Neck: Supple, non-tender, no carotid bruits, no JVD, no LAD.  Lungs: Clear to auscultation, non-labored respiration.  Heart: RRR, no murmur, gallop or edema.  Abdomen: Soft, nontender, non-distended, + bowel sounds.  Musculoskeletal: Normal range of motion and strength, tenderness noted to the right groin.  Skin: Skin is warm, dry and pink, no rashes or lesions.  Bruising to right groin  Psychiatric: Cooperative, appropriate mood and affect.      Scheduled Meds   amLODIPine, 5 mg, Oral, Daily  aspirin, 81 mg, Oral, Daily  cilostazol, 50 mg,  Oral, BID  isosorbide mononitrate, 120 mg, Oral, Q24H  levothyroxine, 50 mcg, Oral, Daily  losartan, 25 mg, Oral, Daily  metoprolol succinate XL, 100 mg, Oral, Q24H  pantoprazole, 40 mg, Oral, Q AM  prasugrel, 10 mg, Oral, Daily  ranolazine, 1,000 mg, Oral, BID  rosuvastatin, 20 mg, Oral, Daily  Scopolamine, 1 patch, Transdermal, Q72H       PRN Meds     acetaminophen    albuterol    atropine    HYDROcodone-acetaminophen    HYDROmorphone    metoclopramide    nitroglycerin   Infusions         Diagnostic Data    Results from last 7 days   Lab Units 11/14/24  0540 11/14/24  0445 11/13/24  1039   WBC 10*3/mm3 9.44  --  6.10   HEMOGLOBIN g/dL 13.0  --  13.8   HEMATOCRIT % 39.1  --  41.5   PLATELETS 10*3/mm3 282  --  255   GLUCOSE mg/dL  --  139* 110*   CREATININE mg/dL  --  1.09 1.20   BUN mg/dL  --  11 12   SODIUM mmol/L  --  136 138   POTASSIUM mmol/L  --  4.3 3.8   AST (SGOT) U/L  --   --  20   ALT (SGPT) U/L  --   --  19   ALK PHOS U/L  --   --  97   BILIRUBIN mg/dL  --   --  0.3   ANION GAP mmol/L  --  9.4 9.8       No radiology results for the last day      I reviewed the patient's new clinical results.    Assessment/Plan:     Active and Resolved Problems  Active Hospital Problems    Diagnosis  POA    **Abnormal stress test [R94.39]  Unknown    CAD (coronary artery disease) [I25.10]  Yes    CAD, multiple vessel [I25.10]  Unknown      Resolved Hospital Problems   No resolved problems to display.       Nausea and Vomiting  Likely cannabis associated  Will try 2 mg haloperidol first  If haldol does not work, can do reglan/scopolamine combo  If reglan/scopolamine combo does not work, will trial rectal phenergan  CLD until nausea improved      VTE Prophylaxis:  No VTE prophylaxis order currently exists.         Code status is   Code Status and Medical Interventions: CPR (Attempt to Resuscitate); Full Support   Ordered at: 11/13/24 1302     Level Of Support Discussed With:    Patient     Code Status (Patient has no pulse  and is not breathing):    CPR (Attempt to Resuscitate)     Medical Interventions (Patient has pulse or is breathing):    Full Support       Plan for disposition: as per primary    Time: 30 minutes        Signature: Electronically signed by YOUSIF Cornelius, 11/15/24, 12:23 EST.  [facility] Hospitalist Team      Electronically signed by Johnna Borrero APRN at 11/15/24 1237       Viviane Lynn APRN at 24 1216        Consult Orders    1. Inpatient Vascular Surgery Consult [[id number]] ordered by Moises Haddad MD                   Name: Tramaine Gates ADMIT: 2024   : 1985  PCP: Daniela Hernandez FNP    MRN: [id number] LOS: 0 days   AGE/SEX: 39 y.o. male  ROOM: [address] [place] [address]      Patient Care Team:  Daniela Hernandez FNP as PCP - General (Family Medicine)  Ollie Dunn MD as Consulting Physician (Gastroenterology)  No chief complaint on file.      CC: Femoral pseudoaneurysm    Subjective     Inpatient Vascular Surgery Consult  Consult performed by: Viviane Lynn APRN  Consult ordered by: Moises Haddad MD          History of Present Illness  Tramaine Gates is a 39 y.o. male with a past medical history of Hypertension, GERD, hyperlipidemia, migraines, panic attack, CAD, and morbid obesity who presented to [facility] on 2024 for an elective left heart cath.  Patient was noted to have swelling of his right groin after the procedure and a duplex was performed.  Duplex shows a 1 to 2 cm pseudoaneurysm in the right common femoral artery.  Vascular surgery was consulted to evaluate.  Patient reports some soreness to his right groin, it is tender to touch.  He reports soreness along the entire right leg.  He remains neuromotor intact.    Review of Systems   Musculoskeletal:  Positive for myalgias.   Skin:         Swelling to the right groin       Past Medical History:   Diagnosis Date    Acute inferior myocardial  infarction 06/14/2022    Allergic     Asthma 01/27/2022    CAD, multiple vessel 06/14/2022    Disorder of thyroid 01/27/2022    Gastroesophageal reflux disease 01/27/2022    Hx of complete eye exam 2016    Hyperlipidemia 01/27/2022    Hypertension     Migraine headache 01/27/2022    Panic attack 01/27/2022    Peptic ulcer 09/14/2017     Past Surgical History:   Procedure Laterality Date    CARDIAC CATHETERIZATION N/A 06/14/2022    Procedure: Left Heart Cath;  Surgeon: Matthieu Del Toro MD;  Location:  [facility];  Service: Cardiology;  Laterality: N/A;    CARDIAC CATHETERIZATION N/A 06/16/2022    Procedure: Percutaneous Coronary Intervention;  Surgeon: Matthieu Del Toro MD;  Location:  [facility];  Service: Cardiovascular;  Laterality: N/A;    CARDIAC CATHETERIZATION N/A 06/23/2022    Procedure: Left Heart Cath possible PCI, atherectomy, hemodynamic support;  Surgeon: Moises Haddad MD;  Location:  [facility];  Service: Cardiology;  Laterality: N/A;    CARDIAC CATHETERIZATION N/A 09/15/2022    Procedure: Left Heart Cath;  Surgeon: Moises Haddad MD;  Location:  [facility];  Service: Cardiology;  Laterality: N/A;    CARDIAC CATHETERIZATION  9/15/2022    CARDIAC CATHETERIZATION N/A 3/2/2023    Procedure: Left Heart Cath;  Surgeon: Moises Haddad MD;  Location:  [facility];  Service: Cardiology;  Laterality: N/A;    CHOLECYSTECTOMY  2019    CORONARY ARTERY BYPASS GRAFT N/A 06/29/2022    Procedure: CORONARY ARTERY BYPASS GRAFTING;  Surgeon: Pablo Ball MD;  Location: [facility];  Service: Cardiothoracic;  Laterality: N/A;  CABG X 3(2 vein grafts, 1 LIMA graft)    CORONARY ARTERY BYPASS GRAFT  6/29/2022    CORONARY STENT PLACEMENT      MANDIBLE SURGERY       Family History   Problem Relation Age of Onset    Heart disease Mother     Heart attack Mother     Hypertension Mother     Heart failure  Father     Cirrhosis Maternal Grandfather     Heart attack Maternal Grandmother         a       Social History     Tobacco Use    Smoking status: Former     Current packs/day: 0.00     Average packs/day: 1.5 packs/day for 26.5 years (39.7 ttl pk-yrs)     Types: Cigarettes     Start date: 1996     Quit date: 2022     Years since quittin.4    Smokeless tobacco: Never   Vaping Use    Vaping status: Never Used   Substance Use Topics    Alcohol use: Not Currently    Drug use: Yes     Frequency: 7.0 times per week     Types: Marijuana     Medications Prior to Admission   Medication Sig Dispense Refill Last Dose/Taking    albuterol sulfate  (90 Base) MCG/ACT inhaler Inhale 2 puffs by mouth as directed every four to six hours as needed for coughing, shortness of breath, wheezing 8.5 g 0 Taking    amLODIPine (Norvasc) 5 MG tablet Take 1 tablet by mouth Daily. 90 tablet 0 2024 Noon    aspirin (Aspirin Low Dose) 81 MG EC tablet Take 1 tablet by mouth Daily. 90 tablet 0 2024 Noon    cilostazol (PLETAL) 100 MG tablet Take 0.5 tablets by mouth 2 (Two) Times a Day. 90 tablet 3 2024 at 12:00 AM    Evolocumab (REPATHA) solution auto-injector SureClick injection Inject 1 mL under the skin into the appropriate area as directed Every 14 (Fourteen) Days.   11/3/2024    isosorbide mononitrate (IMDUR) 120 MG 24 hr tablet Take 1 tablet by mouth Daily. 30 tablet 0 2024 Noon    levothyroxine (SYNTHROID, LEVOTHROID) 50 MCG tablet TAKE 1 TABLET BY MOUTH EVERY DAY 90 tablet 0 2024 Morning    losartan (Cozaar) 25 MG tablet Take 1 tablet by mouth Daily. 30 tablet 0 Past Week    metoprolol succinate XL (TOPROL-XL) 100 MG 24 hr tablet Take 1 tablet by mouth Daily. 30 tablet 0 2024 Noon    nitroglycerin (NITROSTAT) 0.4 MG SL tablet Place 1 tablet under the tongue Every 5 (Five) Minutes As Needed for Chest Pain (Only if SBP Greater Than 100). Take no more than 3 doses in 15 minutes. 25 tablet 0  Past Week    omeprazole (priLOSEC) 40 MG capsule Take 1 capsule by mouth Daily.   11/12/2024 Noon    prasugrel (EFFIENT) 10 MG tablet Take 1 tablet by mouth Daily. 90 tablet 3 11/12/2024 Noon    ranolazine (RANEXA) 1000 MG 12 hr tablet Take 1 tablet by mouth 2 (Two) Times a Day. 180 tablet 3 11/13/2024 at 12:00 AM    rosuvastatin (CRESTOR) 40 MG tablet Take 0.5 tablets by mouth Daily.   11/13/2024 at 12:00 AM     amLODIPine, 5 mg, Oral, Daily  aspirin, 81 mg, Oral, Daily  cilostazol, 50 mg, Oral, BID  isosorbide mononitrate, 120 mg, Oral, Q24H  levothyroxine, 50 mcg, Oral, Daily  losartan, 25 mg, Oral, Daily  metoprolol succinate XL, 100 mg, Oral, Q24H  pantoprazole, 40 mg, Oral, Q AM  prasugrel, 10 mg, Oral, Daily  ranolazine, 1,000 mg, Oral, BID  rosuvastatin, 20 mg, Oral, Daily           acetaminophen    albuterol    atropine    HYDROmorphone    nitroglycerin    ondansetron  Shellfish-derived products    Objective     Physical Exam:   NAD, alert and oriented  RRR  Lungs clear  Abd soft, benign  Vascular: Palpable bilateral femoral and pedal pulses  Skin: Soft swelling and ecchymosis to the right groin    Vital Signs and Labs:  Vital Signs Patient Vitals for the past 24 hrs:   BP Temp Temp src Pulse Resp SpO2   11/14/24 1209 118/68 98.1 °F (36.7 °C) Oral 81 16 95 %   11/14/24 0748 129/70 98 °F (36.7 °C) Oral 82 14 96 %   11/14/24 0700 -- -- -- 65 -- 96 %   11/14/24 0600 -- -- -- 79 -- 92 %   11/14/24 0500 -- -- -- 85 -- 94 %   11/14/24 0432 120/90 97.8 °F (36.6 °C) Oral -- 14 --   11/14/24 0400 -- -- -- 79 -- 92 %   11/14/24 0300 101/54 -- -- 72 -- (!) 88 %   11/14/24 0200 100/56 -- -- 68 -- 91 %   11/14/24 0100 120/53 -- -- 78 -- 92 %   11/14/24 0034 -- 98.1 °F (36.7 °C) Oral -- 12 --   11/14/24 0000 -- -- -- 69 -- 92 %   11/13/24 2300 109/67 -- -- 67 -- 95 %   11/13/24 2200 124/51 -- -- 71 -- 95 %   11/13/24 2100 123/63 -- -- 75 -- 95 %   11/13/24 2045 105/63 -- -- 71 -- --   11/13/24 2000 -- -- -- 71 -- 96 %    11/13/24 1900 134/77 -- -- 69 -- 96 %   11/13/24 1830 135/76 -- -- 67 -- 95 %   11/13/24 1800 138/95 -- -- 69 -- 96 %   11/13/24 1745 129/73 -- -- 70 -- 95 %   11/13/24 1730 130/73 -- -- 68 -- 96 %   11/13/24 1715 127/75 -- -- 60 -- 95 %   11/13/24 1710 130/78 -- -- 61 -- 95 %   11/13/24 1705 122/72 -- -- 64 -- 95 %   11/13/24 1700 135/77 -- -- 67 -- 96 %   11/13/24 1655 118/70 -- -- 64 -- 96 %   11/13/24 1650 128/76 -- -- 66 -- 96 %   11/13/24 1645 -- -- -- 64 -- 96 %   11/13/24 1640 -- -- -- 63 -- 96 %   11/13/24 1635 125/74 -- -- 69 -- 95 %   11/13/24 1630 -- -- -- 67 -- 97 %   11/13/24 1620 132/88 -- -- 64 -- 97 %   11/13/24 1615 -- -- -- 66 -- 97 %   11/13/24 1605 135/75 -- -- 61 -- 95 %   11/13/24 1600 -- -- -- 66 -- 96 %   11/13/24 1550 128/81 -- -- 69 -- 95 %   11/13/24 1545 -- -- -- 62 -- 97 %   11/13/24 1535 131/73 -- -- 66 -- 96 %   11/13/24 1530 -- -- -- 60 -- 94 %   11/13/24 1520 124/72 -- -- 66 -- 96 %   11/13/24 1515 -- -- -- 63 -- 97 %   11/13/24 1505 129/75 -- -- 64 -- 95 %   11/13/24 1500 -- -- -- 67 -- 97 %   11/13/24 1445 -- -- -- 64 -- 97 %   11/13/24 1435 116/75 -- -- 64 -- 97 %   11/13/24 1430 -- -- -- 66 -- 95 %   11/13/24 1420 132/63 -- -- 64 -- 96 %   11/13/24 1415 -- -- -- 65 -- 95 %   11/13/24 1405 127/75 -- -- 67 -- 96 %   11/13/24 1400 -- -- -- 68 -- 95 %   11/13/24 1355 124/73 -- -- 64 -- 95 %   11/13/24 1350 124/73 -- -- 64 -- 95 %   11/13/24 1345 -- -- -- 62 -- 95 %   11/13/24 1340 -- -- -- 66 -- 96 %     BMI:  Body mass index is 44.7 kg/m².    CBC    Results from last 7 days   Lab Units 11/14/24  0540 11/13/24  1039   WBC 10*3/mm3 9.44 6.10   HEMOGLOBIN g/dL 13.0 13.8   PLATELETS 10*3/mm3 282 255     BMP   Results from last 7 days   Lab Units 11/14/24  0445 11/13/24  1039   SODIUM mmol/L 136 138   POTASSIUM mmol/L 4.3 3.8   CHLORIDE mmol/L 100 103   CO2 mmol/L 26.6 25.2   BUN mg/dL 11 12   CREATININE mg/dL 1.09 1.20   GLUCOSE mg/dL 139* 110*     Coag   Results from last 7 days    Lab Units 11/13/24  1039   INR  1.00     HbA1C   Lab Results   Component Value Date    HGBA1C 5.57 11/14/2024    HGBA1C 5.50 03/03/2023    HGBA1C 5.3 09/16/2022     Infection     Radiology(recent) No radiology results for the last day    VTE Prophylaxis:  No VTE prophylaxis order currently exists.        Active Hospital Problems    Diagnosis  POA    **Abnormal stress test [R94.39]  Unknown    CAD (coronary artery disease) [I25.10]  Yes    CAD, multiple vessel [I25.10]  Unknown      Resolved Hospital Problems   No resolved problems to display.       Assessment & Plan   Assessment / Plan     Abnormal stress test    CAD, multiple vessel    CAD (coronary artery disease)      39 y.o. male who presents for an elective left heart cath.  Post procedurally he was noted to have swelling to the right groin and duplex shows 1 to 2 cm right common femoral artery pseudoaneurysm.  He has no signs or symptoms of acute limb ischemia.  Palpable pedal pulses and his feet are warm to touch.  We will plan for ultrasound-guided thrombin injection this afternoon with Dr. Blanco.  We discussed that if thrombin injection is unsuccessful he will need open surgical repair.  Patient verbalized understanding.    Thank you for this consultation.       YOUSIF Sommers  [facility]  11/14/24   O: [phone]  F: [phone]              Electronically signed by Viviane Lynn APRN at 11/14/24 2971

## 2024-11-16 ENCOUNTER — READMISSION MANAGEMENT (OUTPATIENT)
Dept: CALL CENTER | Facility: HOSPITAL | Age: 39
End: 2024-11-16
Payer: MEDICAID

## 2024-11-16 VITALS
DIASTOLIC BLOOD PRESSURE: 77 MMHG | SYSTOLIC BLOOD PRESSURE: 100 MMHG | HEIGHT: 70 IN | TEMPERATURE: 98.1 F | WEIGHT: 311.51 LBS | RESPIRATION RATE: 10 BRPM | BODY MASS INDEX: 44.6 KG/M2 | HEART RATE: 70 BPM | OXYGEN SATURATION: 96 %

## 2024-11-16 PROCEDURE — 25010000002 HYDROMORPHONE PER 4 MG: Performed by: INTERNAL MEDICINE

## 2024-11-16 RX ADMIN — ISOSORBIDE MONONITRATE 120 MG: 60 TABLET, EXTENDED RELEASE ORAL at 08:35

## 2024-11-16 RX ADMIN — ACETAMINOPHEN 650 MG: 325 TABLET, FILM COATED ORAL at 06:15

## 2024-11-16 RX ADMIN — AMLODIPINE BESYLATE 5 MG: 5 TABLET ORAL at 08:36

## 2024-11-16 RX ADMIN — ASPIRIN 81 MG: 81 TABLET, COATED ORAL at 08:35

## 2024-11-16 RX ADMIN — HYDROCODONE BITARTRATE AND ACETAMINOPHEN 1 TABLET: 7.5; 325 TABLET ORAL at 08:34

## 2024-11-16 RX ADMIN — ROSUVASTATIN 20 MG: 10 TABLET, FILM COATED ORAL at 08:36

## 2024-11-16 RX ADMIN — HYDROMORPHONE HYDROCHLORIDE 0.5 MG: 1 INJECTION, SOLUTION INTRAMUSCULAR; INTRAVENOUS; SUBCUTANEOUS at 01:55

## 2024-11-16 RX ADMIN — CILOSTAZOL 50 MG: 100 TABLET ORAL at 08:35

## 2024-11-16 RX ADMIN — PRASUGREL 10 MG: 10 TABLET, FILM COATED ORAL at 08:35

## 2024-11-16 RX ADMIN — HYDROCODONE BITARTRATE AND ACETAMINOPHEN 1 TABLET: 7.5; 325 TABLET ORAL at 12:48

## 2024-11-16 RX ADMIN — METOPROLOL SUCCINATE 100 MG: 50 TABLET, EXTENDED RELEASE ORAL at 08:35

## 2024-11-16 RX ADMIN — LOSARTAN POTASSIUM 25 MG: 25 TABLET, FILM COATED ORAL at 08:36

## 2024-11-16 RX ADMIN — PANTOPRAZOLE SODIUM 40 MG: 40 TABLET, DELAYED RELEASE ORAL at 06:15

## 2024-11-16 RX ADMIN — RANOLAZINE 1000 MG: 500 TABLET, FILM COATED, EXTENDED RELEASE ORAL at 08:36

## 2024-11-16 RX ADMIN — LEVOTHYROXINE SODIUM 50 MCG: 0.05 TABLET ORAL at 08:36

## 2024-11-16 RX ADMIN — HYDROCODONE BITARTRATE AND ACETAMINOPHEN 1 TABLET: 7.5; 325 TABLET ORAL at 04:22

## 2024-11-16 NOTE — PROGRESS NOTES
CARDIOLOGY FOLLOW-UP PROGRESS NOTE      Reason for follow-up:    CAD  S/p PCI  Right groin pseudoaneurysm     Attending: Moises Haddad,*      Subjective .     Stable and improved this morning  Still groin tenderness  Less nausea vomiting  No new fevers or chest pain    Wants to go home     ROS  Pertinent items are noted in HPI, all other systems reviewed and negative  Allergies: Shellfish-derived products    Scheduled Meds:amLODIPine, 5 mg, Oral, Daily  aspirin, 81 mg, Oral, Daily  cilostazol, 50 mg, Oral, BID  isosorbide mononitrate, 120 mg, Oral, Q24H  levothyroxine, 50 mcg, Oral, Daily  losartan, 25 mg, Oral, Daily  metoprolol succinate XL, 100 mg, Oral, Q24H  pantoprazole, 40 mg, Oral, Q AM  prasugrel, 10 mg, Oral, Daily  ranolazine, 1,000 mg, Oral, BID  rosuvastatin, 20 mg, Oral, Daily  Scopolamine, 1 patch, Transdermal, Q72H        Continuous Infusions:     PRN Meds:.  acetaminophen    albuterol    atropine    HYDROcodone-acetaminophen    HYDROmorphone    meclizine    nitroglycerin    Objective     VITAL SIGNS  Patient Vitals for the past 24 hrs:   BP Temp Temp src Pulse Resp SpO2   11/16/24 0600 -- -- -- 77 -- 96 %   11/16/24 0500 -- -- -- 56 -- 95 %   11/16/24 0451 -- -- -- 51 13 94 %   11/16/24 0300 103/68 -- -- 55 -- 94 %   11/16/24 0200 113/64 -- -- 72 -- 97 %   11/16/24 0100 -- -- -- 51 -- 94 %   11/16/24 0000 105/64 -- -- 52 -- 95 %   11/15/24 2335 121/77 98.1 °F (36.7 °C) Oral 65 19 96 %   11/15/24 2300 -- -- -- 52 -- 94 %   11/15/24 2200 109/48 -- -- 51 -- 93 %   11/15/24 2100 -- -- -- (!) 49 -- 97 %   11/15/24 2000 109/59 -- -- 56 -- 93 %   11/15/24 1958 109/59 96.7 °F (35.9 °C) Axillary (!) 48 21 94 %   11/15/24 1533 113/66 97.9 °F (36.6 °C) Axillary 72 10 97 %   11/15/24 1500 -- -- -- 59 -- 94 %   11/15/24 1400 -- -- -- 56 -- 95 %   11/15/24 1300 -- -- -- 63 -- --   11/15/24 1200 113/66 97.8 °F (36.6 °C) Axillary 65 10 99 %   11/15/24 1100 -- -- -- 65 -- 96 %   11/15/24 1000 -- -- -- 60  "-- 99 %   11/15/24 0900 -- -- -- 58 -- 94 %   11/15/24 0741 113/71 97.8 °F (36.6 °C) Oral 61 10 95 %        Flowsheet Rows      Flowsheet Row First Filed Value   Admission Height 177.8 cm (70\") Documented at 11/13/2024 1016   Admission Weight 141 kg (311 lb 8.2 oz) Documented at 11/13/2024 1016            Body mass index is 44.7 kg/m².      Intake/Output Summary (Last 24 hours) at 11/16/2024 0621  Last data filed at 11/16/2024 0600  Gross per 24 hour   Intake 855 ml   Output 550 ml   Net 305 ml        TELEMETRY:     SR    Physical Exam:  Vitals reviewed.   Constitutional:       General: Not in acute distress.     Appearance: Normal appearance. Well-developed.   Eyes:      Pupils: Pupils are equal, round, and reactive to light.   HENT:      Head: Normocephalic and atraumatic.   Neck:      Vascular: No JVD.   Pulmonary:      Effort: Pulmonary effort is normal.      Breath sounds: Normal breath sounds.   Cardiovascular:      Normal rate. Regular rhythm.      Comments: Right groin with dark purple bruising.  Tender to palpation  Edema:     Peripheral edema absent.   Abdominal:      General: There is no distension.      Palpations: Abdomen is soft.      Tenderness: There is no abdominal tenderness.   Musculoskeletal: Normal range of motion.      Cervical back: Normal range of motion and neck supple. Skin:     General: Skin is warm and dry.   Neurological:      Mental Status: Alert and oriented to person, place, and time.     Scribed findings above      Results Review:   I reviewed the patient's new clinical results.    CBC    Results from last 7 days   Lab Units 11/15/24  1303 11/14/24  0540 11/13/24  1039   WBC 10*3/mm3 7.92 9.44 6.10   HEMOGLOBIN g/dL 12.5* 13.0 13.8   PLATELETS 10*3/mm3 224 282 255     BMP   Results from last 7 days   Lab Units 11/15/24  1303 11/14/24  0445 11/13/24  1039   SODIUM mmol/L 137 136 138   POTASSIUM mmol/L 3.9 4.3 3.8   CHLORIDE mmol/L 103 100 103   CO2 mmol/L 23.3 26.6 25.2   BUN mg/dL 15 " "11 12   CREATININE mg/dL 1.09 1.09 1.20   GLUCOSE mg/dL 96 139* 110*     Cr Clearance Estimated Creatinine Clearance: 126.1 mL/min (by C-G formula based on SCr of 1.09 mg/dL).  Coag   Results from last 7 days   Lab Units 11/13/24  1039   INR  1.00     HbA1C   Lab Results   Component Value Date    HGBA1C 5.57 11/14/2024    HGBA1C 5.50 03/03/2023    HGBA1C 5.3 09/16/2022     Blood Glucose No results found for: \"POCGLU\"  Infection     CMP   Results from last 7 days   Lab Units 11/15/24  1303 11/14/24  0445 11/13/24  1039   SODIUM mmol/L 137 136 138   POTASSIUM mmol/L 3.9 4.3 3.8   CHLORIDE mmol/L 103 100 103   CO2 mmol/L 23.3 26.6 25.2   BUN mg/dL 15 11 12   CREATININE mg/dL 1.09 1.09 1.20   GLUCOSE mg/dL 96 139* 110*   ALBUMIN g/dL 4.0  --  4.2   BILIRUBIN mg/dL 0.2  --  0.3   ALK PHOS U/L 74  --  97   AST (SGOT) U/L 18  --  20   ALT (SGPT) U/L 15  --  19     ABG      UA      FABIOLA  No results found for: \"POCMETH\", \"POCAMPHET\", \"POCBARBITUR\", \"POCBENZO\", \"POCCOCAINE\", \"POCOPIATES\", \"POCOXYCODO\", \"POCPHENCYC\", \"POCPROPOXY\", \"POCTHC\", \"POCTRICYC\"  Lysis Labs   Results from last 7 days   Lab Units 11/15/24  1303 11/14/24  0540 11/14/24  0445 11/13/24  1039   INR   --   --   --  1.00   HEMOGLOBIN g/dL 12.5* 13.0  --  13.8   PLATELETS 10*3/mm3 224 282  --  255   CREATININE mg/dL 1.09  --  1.09 1.20     Radiology(recent) No radiology results for the last day    Imaging Results (Last 24 Hours)       ** No results found for the last 24 hours. **                  EKG           I personally viewed and interpreted the patient's EKG/Telemetry data:        ECHOCARDIOGRAM:     Results for orders placed during the hospital encounter of 10/04/24    Adult Transthoracic Echo Complete W/ Cont if Necessary Per Protocol    Interpretation Summary    Left ventricular ejection fraction appears to be 56 - 60%.    Indications  Chest pain    Technically satisfactory study.  Mitral valve is structurally normal.  Tricuspid valve is structurally " normal.  Aortic valve is structurally normal.  Pulmonic valve could not be well visualized.  No evidence for mitral tricuspid or aortic regurgitation is seen by Doppler study.  Left atrium is normal in size.  Right atrium is normal in size.  Left ventricle is normal in size and contractility with ejection fraction of 60%.  Normal left ventricular diastolic function.  Right ventricle is normal in size and contractility with TAPSE of 2.87 cm..  Atrial septum is intact.  Aorta is normal.  No pericardial effusion or intracardiac thrombus is seen.    Impression  Structurally and functionally normal cardiac valves.  Left ventricular size and contractility is normal with ejection fraction of 60%.  Normal left ventricular diastolic function.  Right ventricle is normal in size and contractility with TAPSE of 2.87 cm..        STRESS MYOVIEW:      CARDIAC CATHETERIZATION:      OTHER:         Assessment & Plan            Abnormal stress test    CAD (coronary artery disease)    CAD, multiple vessel        ASSESSMENT:    CAD/Prior STEMI PCI/CABG;   Cath s/p balloon angioplasty SVG RCA    Pseudoaneurysm right groin  S/p thrombin injection  Repeat duplex this am shows successful thrombosi of right femoral pseudoaneurysm  Vascular following    HTN  Stable on current Rx    Dyslipidemia  Continue statin    Obesity    Nausea/Vomiting as needed Zofran        PLAN:    Discharge today  Groin stable  Chest pain-free  Medicines reviewed, continue DAPT, beta-blocker antianginals high intensity statins  Prescriptions discussed along with outpatient follow-up    Moises Haddad MD, PhD      Moises Haddad MD  11/16/24  06:21 EST

## 2024-11-16 NOTE — PLAN OF CARE
Goal Outcome Evaluation:            Pt to D/c home, no hematoma noted upon morning admission.

## 2024-11-16 NOTE — DISCHARGE SUMMARY
Tramaine Gates  1985  [id number]        Discharge Summary    Date of Admission: 11/13/2024  Date of Discharge:  11/16/2024    Primary Discharge Diagnoses: Chest pain, coronary disease  ISR SVG-RCA        PCP  Patient Care Team:  Daniela Hernandez FNP as PCP - General (Family Medicine)  Ollie Dunn MD as Consulting Physician (Gastroenterology)    Consults:   Consults       Date and Time Order Name Status Description    11/15/2024 11:48 AM Inpatient Hospitalist Consult Completed     11/14/2024  7:57 AM Inpatient Vascular Surgery Consult Completed             Operations and Procedures Performed:  Procedure(s):  Left Heart Cath     Duplex Pseudoaneurysm CAR    Result Date: 11/15/2024  Narrative:   Persistent thrombosis of the right femoral pseudoaneurysm.     Duplex Pseudoaneurysm Thrombin Inj CAR (Upper/Lower)    Result Date: 11/14/2024  Narrative:   Successful ultrasound-guided right common femoral artery pseudoaneurysm injection with cessation of flow documented in the pseudoaneurysm sac.     Duplex Pseudoaneurysm CAR    Result Date: 11/14/2024  Narrative:   A 1-2 cm pseudoaneurysm is present in the right common femoral artery.     Cardiac Catheterization/Vascular Study    Result Date: 11/13/2024  Narrative: Primary operative Moises Haddad MD, PhD [facility] cardiology Date of service 11- Procedure 1.  Left heart catheterization with native and bypass graft angiography, LV gram in GALLO position, transaortic valve gradient assessment 2.  Balloon angioplasty from the distal portion of the SVG to PDA secondary to 90% ISR distally tandem 70% ISR diffusely and ostial proximal 50% ISR, 4.0 x 20 NC balloon at 12 to 14 radha and distal proximal fashion with prolonged inflations reduction of stenosis from 90% to less than 10% residual throughout the graft, improvement from 50% down to around 20% at the ostial portion of the graft again secondary recurrent ISR, similar ISR locations too early  2023 Indication Chest pain, angina refractory to medical therapy known severe early CAD of native and bypass grafts After informed consent patient brought to the Cath Lab sterilely prepped and draped in usual fashion with expose the right groin for right common femoral arterial access via micropuncture and modified Salinger technique with placement of a 6 Swazi sheath.  035 guidewire was advanced to eval for by diagnostic JL 4 and JR4 catheters for selective left and right coronary angiography respectively.  JR4 was used for native and bypass graft disease including LIMA to LAD, a multipurpose catheter was used for imaging SVG to RPDA.  There was ISR recurrent in the SVG to RPDA and patient was heparinized with 100 units/kg of heparin on a background therapy of DAPT, a 6 Swazi multipurpose guide used gauge the graft, run-through wire to the distal portion of the vessel, 4.0 x 20 NC balloon inside the stented segment up to 12 radha for 1 minute inflations x 3 distal to proximal, ostial proximal inflation 12 radha for 30 to 60 seconds.  Final angiography great angiographic improvement in distal runoff and ISR area, we avoided relining the stent with multiple overlapping stents already in this portion of the graft.  The distal portion of the graft was without significant disease, anastomosis appeared unchanged with both retrograde and anterograde limb disease but nonobstructive with DARRYL-3 flow poor candidate for PCI given small caliber and limited runoff and this was left to be treated medically.  Disease in the circumflex into obtuse marginal branch was unchanged and therefore will be treated medically, disease from the LAD into diagonal branch was unchanged will be treated medically, MORTON to LAD widely patent, SVG to obtuse marginal branch is  which is known, native RCA.  Angiographically improved with the proximal to mid stents in place with minimal ISR.  Preserved EF which was normal with normal transaortic valve  gradient, mildly elevated filling pressures at 16-18 similar to prior invasive evaluation last year He left Cath Lab chest pain-free hemodynamically electrically stable at tach to staff neurologically gross intact bilaterally Final angiography revealed no distal wire trauma edge dissection or complications. Complications none Blood loss less than 10 cc Contrast 180 cc Sedation time of 1 hour 10 minutes Findings 1.  Opening aortic pressure of 121/71 with a mean of 95, closing pressure was unchanged 2.  LV pressure 109/5 with an EDP of 16-18, normal EF 60% Normal transaortic gradient Angiography 1 left main normal takeoff mild diffuse disease 20% 2.  The LAD diffuse disease with stent in the proximal portion,  after the takeoff the diagonal branch, diagonal diffusely diseased 50 to 60% but with DARRYL-3 flow and tapering distal vessels not amenable to intervention with small vessel disease distally 3.  LIMA to LAD is widely patent, no anastomotic disease, appears angiographically unchanged from early 2023 angiogram, no flow-limiting stenosis of the graft anastomosis or native vessel 4.  Circumflex is widely patent, body of the circumflex with diffuse luminal regularities, the terminal obtuse marginal branch has tortuosity with 50% stenosis at the proximal portion the obtuse marginal as well as the midportion with tenting however this vein graft to this area is closed.  There is DARRYL-3 flow throughout with small vessel disease distally and this will less be treated medically.  More proximal close marginal branches are  and small caliber 5.  RCA small caliber vessel, diffuse disease,  in the midportion after the takeoff of the marginal branch, proximal and mid stents are patent with 10 to 20% ISR 6.  SVG to obtuse marginal branch  7.  SVG to midportion of the RPDA was patent with tandem 90 and 70% regions of ISR inside the stented segments in the mid to distal portion back to the midportion.  Ostial ISR 60 to  70% worse from prior balloon angioplasty the segment 2023 Conclusions recommendations 1 chest pain refractory to medical therapy recurrent after intervention 2023, similar ISR territories inside the SVG to RPDA, intervention as described with balloon angioplasty from the inside the stented portion distally back to the mid to proximal portion including ostial proximal portion with 4.0 x 20 NC balloon up to 12 to 14 radha with prolonged inflations with great improvement of angiographic runoff, stenosis to less than 10% residual in the body of the graft, ostial 30% residual.  Unchanged anastomosis and RPDA Continue DAPT Cilostazol is on board to try to prevent ISR If recurrent would refer to [facility] for brachytherapy with poor candidate for antegrade revascularization or  revascularization via native RCA Optimize goal-directed medical therapy Observe overnight and discharge once discharge criteria met Sheath will be pulled once ACT is less than 175 Further recommendations follow findings and clinical course Moises Haddad MD, PhD     Stress Test With Myocardial Perfusion One Day    Result Date: 10/22/2024  Narrative:   Left ventricular ejection fraction is hyperdynamic (Calculated EF > 70%).   There is no prior study available for comparison. Or overall imaging Tracking okay EF 83% with normal size ventricle with normal regional global wall motion Stress imaging with abnormal counts at the apex and periapical segments with normal basal counts Resting images are better Would indicate reversibility at the apex concerning for ischemia versus artifact Given suboptimal overall image quality correlate with clinical symptomatology, consider coronary CT for definitive evaluation.   Findings consistent with a normal ECG stress test. Intermediate to high risk  study Abnormal nuclear imaging with stress-induced decreased counts at the apex and periapical segments concerning for anterior apical ischemia Difficult image  quality, recommend coronary CT or invasive ischemic evaluation for further evaluation definitively Stress ECG did not restarted heart rate no changes at submaximal stress      Allergies:  is allergic to shellfish-derived products.        Discharge Medications:    amLODIPine, 5 mg, Oral, Daily  aspirin, 81 mg, Oral, Daily  cilostazol, 50 mg, Oral, BID  isosorbide mononitrate, 120 mg, Oral, Q24H  levothyroxine, 50 mcg, Oral, Daily  losartan, 25 mg, Oral, Daily  metoprolol succinate XL, 100 mg, Oral, Q24H  pantoprazole, 40 mg, Oral, Q AM  prasugrel, 10 mg, Oral, Daily  ranolazine, 1,000 mg, Oral, BID  rosuvastatin, 20 mg, Oral, Daily  Scopolamine, 1 patch, Transdermal, Q72H        History of Present Illness:  See H&P    Hospital Course  Admitted status post intervention, balloon angioplasty SVG to RCA with ISR significantly, complicated by right small pseudoaneurysm femoral site status post successful thrombin injection.  Patient was observed for couple of days to ensure stability, stable today, was ambulatory late yesterday     medication reviewed  Restrictions discussed  Concerning signs or symptoms that would prompt readmission were discussed      Last Lab Results:   Lab Results (most recent)       Procedure Component Value Units Date/Time    Comprehensive Metabolic Panel [[id number]] Collected: 11/15/24 1303    Specimen: Blood Updated: 11/15/24 1336     Glucose 96 mg/dL      BUN 15 mg/dL      Creatinine 1.09 mg/dL      Sodium 137 mmol/L      Potassium 3.9 mmol/L      Comment: Slight hemolysis detected by analyzer. Result may be falsely elevated.        Chloride 103 mmol/L      CO2 23.3 mmol/L      Calcium 8.7 mg/dL      Total Protein 6.5 g/dL      Albumin 4.0 g/dL      ALT (SGPT) 15 U/L      AST (SGOT) 18 U/L      Alkaline Phosphatase 74 U/L      Total Bilirubin 0.2 mg/dL      Globulin 2.5 gm/dL      A/G Ratio 1.6 g/dL      BUN/Creatinine Ratio 13.8     Anion Gap 10.7 mmol/L      eGFR 87.4 mL/min/1.73     Narrative:       GFR Normal >60  Chronic Kidney Disease <60  Kidney Failure <15      CBC & Differential [[id number]]  (Abnormal) Collected: 11/15/24 1303    Specimen: Blood Updated: 11/15/24 1313    Narrative:      The following orders were created for panel order CBC & Differential.  Procedure                               Abnormality         Status                     ---------                               -----------         ------                     CBC Auto Differential[[id number]]        Abnormal            Final result                 Please view results for these tests on the individual orders.    CBC Auto Differential [[id number]]  (Abnormal) Collected: 11/15/24 1303    Specimen: Blood Updated: 11/15/24 1313     WBC 7.92 10*3/mm3      RBC 4.41 10*6/mm3      Hemoglobin 12.5 g/dL      Hematocrit 40.2 %      MCV 91.2 fL      MCH 28.3 pg      MCHC 31.1 g/dL      RDW 13.1 %      RDW-SD 43.5 fl      MPV 9.0 fL      Platelets 224 10*3/mm3      Neutrophil % 67.0 %      Lymphocyte % 26.9 %      Monocyte % 5.2 %      Eosinophil % 0.1 %      Basophil % 0.3 %      Immature Grans % 0.5 %      Neutrophils, Absolute 5.31 10*3/mm3      Lymphocytes, Absolute 2.13 10*3/mm3      Monocytes, Absolute 0.41 10*3/mm3      Eosinophils, Absolute 0.01 10*3/mm3      Basophils, Absolute 0.02 10*3/mm3      Immature Grans, Absolute 0.04 10*3/mm3      nRBC 0.0 /100 WBC     POC Activated Clotting Time [[id number]]  (Abnormal) Collected: 11/13/24 1302    Specimen: Arterial Blood Updated: 11/14/24 0726     Activated Clotting Time  337 Seconds      Comment: Serial Number: [id number]:  [id number]       Hemoglobin A1c [[id number]]  (Normal) Collected: 11/14/24 0540    Specimen: Blood Updated: 11/14/24 0622     Hemoglobin A1C 5.57 %     CBC & Differential [[id number]]  (Abnormal) Collected: 11/14/24 0540    Specimen: Blood Updated: 11/14/24 0604    Narrative:      The following orders were created for panel order CBC & Differential.  Procedure                                Abnormality         Status                     ---------                               -----------         ------                     CBC Auto Differential[[id number]]        Abnormal            Final result                 Please view results for these tests on the individual orders.    CBC Auto Differential [[id number]]  (Abnormal) Collected: 11/14/24 0540    Specimen: Blood Updated: 11/14/24 0604     WBC 9.44 10*3/mm3      RBC 4.52 10*6/mm3      Hemoglobin 13.0 g/dL      Hematocrit 39.1 %      MCV 86.5 fL      MCH 28.8 pg      MCHC 33.2 g/dL      RDW 12.7 %      RDW-SD 40.2 fl      MPV 9.3 fL      Platelets 282 10*3/mm3      Neutrophil % 89.0 %      Lymphocyte % 8.6 %      Monocyte % 1.6 %      Eosinophil % 0.0 %      Basophil % 0.1 %      Immature Grans % 0.7 %      Neutrophils, Absolute 8.40 10*3/mm3      Lymphocytes, Absolute 0.81 10*3/mm3      Monocytes, Absolute 0.15 10*3/mm3      Eosinophils, Absolute 0.00 10*3/mm3      Basophils, Absolute 0.01 10*3/mm3      Immature Grans, Absolute 0.07 10*3/mm3      nRBC 0.0 /100 WBC     CBC (No Diff) [[id number]] Collected: 11/14/24 0449    Specimen: Blood Updated: 11/14/24 0531     WBC --     Comment: Spec. clotted  Corrected result. Previous result was 9.02 10*3/mm3 on 11/14/2024 at 0500 EST.        RBC --     Comment: Spec. clotted  Corrected result. Previous result was 4.34 10*6/mm3 on 11/14/2024 at 0500 EST.        Hemoglobin --     Comment: Spec. clotted  Corrected result. Previous result was 12.7 g/dL on 11/14/2024 at 0500 EST.        Hematocrit --     Comment: Spec. clotted  Corrected result. Previous result was 39.0 % on 11/14/2024 at 0500 EST.        MCV --     Comment: Spec. clotted  Corrected result. Previous result was 89.9 fL on 11/14/2024 at 0500 EST.        MCH --     Comment: Spec. clotted  Corrected result. Previous result was 29.3 pg on 11/14/2024 at 0500 EST.        MCHC --     Comment: Spec. clotted  Corrected result. Previous result was  32.6 g/dL on 11/14/2024 at 0500 EST.        RDW --     Comment: Spec. clotted  Corrected result. Previous result was 12.8 % on 11/14/2024 at 0500 EST.        RDW-SD --     Comment: Spec. clotted  Corrected result. Previous result was 42.1 fl on 11/14/2024 at 0500 EST.        MPV --     Comment: Spec. clotted  Corrected result. Previous result was 9.1 fL on 11/14/2024 at 0500 EST.        Platelets --     Comment: Spec. clotted  Corrected result. Previous result was 160 10*3/mm3 on 11/14/2024 at 0500 EST.       Basic Metabolic Panel [[id number]]  (Abnormal) Collected: 11/14/24 0445    Specimen: Blood from Arm, Right Updated: 11/14/24 0527     Glucose 139 mg/dL      BUN 11 mg/dL      Creatinine 1.09 mg/dL      Sodium 136 mmol/L      Potassium 4.3 mmol/L      Comment: Specimen hemolyzed.  Result may be falsely elevated.        Chloride 100 mmol/L      CO2 26.6 mmol/L      Calcium 9.2 mg/dL      BUN/Creatinine Ratio 10.1     Anion Gap 9.4 mmol/L      eGFR 87.4 mL/min/1.73     Narrative:      GFR Normal >60  Chronic Kidney Disease <60  Kidney Failure <15      Lipid Panel [[id number]] Collected: 11/14/24 0445    Specimen: Blood from Arm, Right Updated: 11/14/24 0522     Total Cholesterol 146 mg/dL      Triglycerides 83 mg/dL      HDL Cholesterol 52 mg/dL      LDL Cholesterol  78 mg/dL      VLDL Cholesterol 16 mg/dL      LDL/HDL Ratio 1.49    Narrative:      Cholesterol Reference Ranges  (U.S. Department of Health and Human Services ATP III Classifications)    Desirable          <200 mg/dL  Borderline High    200-239 mg/dL  High Risk          >240 mg/dL      Triglyceride Reference Ranges  (U.S. Department of Health and Human Services ATP III Classifications)    Normal           <150 mg/dL  Borderline High  150-199 mg/dL  High             200-499 mg/dL  Very High        >500 mg/dL    HDL Reference Ranges  (U.S. Department of Health and Human Services ATP III Classifications)    Low     <40 mg/dl (major risk factor for  CHD)  High    >60 mg/dl ('negative' risk factor for CHD)        LDL Reference Ranges  (U.S. Department of Health and Human Services ATP III Classifications)    Optimal          <100 mg/dL  Near Optimal     100-129 mg/dL  Borderline High  130-159 mg/dL  High             160-189 mg/dL  Very High        >189 mg/dL    POC Activated Clotting Time [[id number]]  (Abnormal) Collected: 11/13/24 1647    Specimen: Blood Updated: 11/13/24 1651     Activated Clotting Time  147 Seconds      Comment: Serial Number: [id number]:  [id number]       Comprehensive Metabolic Panel [[id number]]  (Abnormal) Collected: 11/13/24 1039    Specimen: Blood Updated: 11/13/24 1111     Glucose 110 mg/dL      BUN 12 mg/dL      Creatinine 1.20 mg/dL      Sodium 138 mmol/L      Potassium 3.8 mmol/L      Chloride 103 mmol/L      CO2 25.2 mmol/L      Calcium 9.4 mg/dL      Total Protein 7.1 g/dL      Albumin 4.2 g/dL      ALT (SGPT) 19 U/L      AST (SGOT) 20 U/L      Alkaline Phosphatase 97 U/L      Total Bilirubin 0.3 mg/dL      Globulin 2.9 gm/dL      A/G Ratio 1.4 g/dL      BUN/Creatinine Ratio 10.0     Anion Gap 9.8 mmol/L      eGFR 77.9 mL/min/1.73     Narrative:      GFR Normal >60  Chronic Kidney Disease <60  Kidney Failure <15      CBC (No Diff) [[id number]]  (Normal) Collected: 11/13/24 1039    Specimen: Blood Updated: 11/13/24 1102     WBC 6.10 10*3/mm3      RBC 4.76 10*6/mm3      Hemoglobin 13.8 g/dL      Hematocrit 41.5 %      MCV 87.2 fL      MCH 29.0 pg      MCHC 33.3 g/dL      RDW 12.9 %      RDW-SD 41.3 fl      MPV 9.1 fL      Platelets 255 10*3/mm3     Protime-INR [[id number]]  (Normal) Collected: 11/13/24 1039    Specimen: Blood Updated: 11/13/24 1101     Protime 13.3 Seconds      INR 1.00          Imaging Results (Most Recent)       None            PROCEDURES  Procedure(s):  Left Heart Cath    Condition on Discharge: Stable    Physical Exam at Discharge  Vital Signs  Temp:  [96.7 °F (35.9 °C)-98.1 °F (36.7 °C)] 98.1 °F (36.7 °C)  Heart  Rate:  [48-77] 77  Resp:  [10-21] 13  BP: (103-121)/(48-77) 103/68    Physical Exam:  Physical Exam   Constitutional: Patient appears well-developed and well-nourished and in no acute distress   HEENT:   Head: Normocephalic and atraumatic.   Eyes:  Pupils are equal, round, and reactive to light. EOM are intact. Sclerae are anicteric and noninjected.  Mouth and Throat: Patient has moist mucous membranes. Oropharynx is clear of any erythema or exudate.     Neck: Neck supple. No JVD present. No thyromegaly present. No lymphadenopathy present.  Cardiovascular: Regular rate, regular rhythm, S1 normal and S2 normal.  Exam reveals no gallop and no friction rub.  No murmur heard.  Pulmonary/Chest: Lungs are clear to auscultation bilaterally. No respiratory distress. No wheezes. No rhonchi. No rales.   Abdominal: Soft. Bowel sounds are normal. No distension and no mass. There is no hepatosplenomegaly. There is no tenderness.   Musculoskeletal: Normal muscle tone  Extremities: No edema. Pulses are palpable in all 4 extremities.  Neurological: Patient is alert and oriented to person, place, and time. Cranial nerves II-XII are grossly intact with no focal deficits.  Skin: Skin is warm. No rash noted. Nails show no clubbing.  No cyanosis or erythema.    Discharge Disposition  Home      Discharge Diet:      Dietary Orders (From admission, onward)       Start     Ordered    11/16/24 0525  Diet: Cardiac; Healthy Heart (2-3 Na+); Fluid Consistency: Thin (IDDSI 0)  Diet Effective Now        References:    Diet Order [facility]   Question Answer Comment   Diets: Cardiac    Cardiac Diet: Healthy Heart (2-3 Na+)    Fluid Consistency: Thin (IDDSI 0)        11/16/24 0524                    Activity at Discharge:  No heavy lifting for 7 days, rest relax    Pre-discharge education  Antiplatelets  Anticoagulation  Concerning signs or symptoms that would prompt readmission or call to MD  Med rec  Activities      Follow-up Appointments  Future  Appointments   Date Time Provider Department Center   4/14/2025  3:30 PM Moises Haddad MD [facility] NA P [facility] NA         Test Results Pending at Discharge  Pending Results       Procedure [Order ID] Specimen - Date/Time    ECG 12 Lead Pre-Op / Pre-Procedure [[id number]]              Moises Haddad MD  11/16/24  06:24 EST    Greater than 35 minutes on totality of care today

## 2024-11-16 NOTE — CASE MANAGEMENT/SOCIAL WORK
Social Work Assessment  [facility]     Patient Name: Tramaine Gates  MRN: [id number]  Today's Date: 11/16/2024    Admit Date: 11/13/2024         Discharge Plan       Row Name 11/16/24 1806       Plan    Plan Comments LSW recieved SC regarding high depression on SDOH. Pt discharged home. LSW called pt to follow up. Pt reported that he recently moved to [facility] but open to LSW sending resources to his email. Pt stated that his current PCP will not prescribe depression medications and open to finding a new provider locally. LSW put together resources for [place] and emailed to [email or web address].           CARLOS Hernandez, MSW    Phone: [phone]  Fax: [phone]  Email: [email or web address]

## 2024-11-17 LAB
QT INTERVAL: 408 MS
QTC INTERVAL: 417 MS

## 2024-11-17 NOTE — CASE MANAGEMENT/SOCIAL WORK
Case Management Discharge Note      Final Note: home with family    Transportation Services  Private: Car    Final Discharge Disposition Code: 01 - home or self-care

## 2024-11-18 ENCOUNTER — TELEPHONE (OUTPATIENT)
Dept: CARDIOLOGY | Facility: CLINIC | Age: 39
End: 2024-11-18
Payer: MEDICAID

## 2024-11-18 DIAGNOSIS — R10.31 RIGHT INGUINAL PAIN: Primary | ICD-10-CM

## 2024-11-18 RX ORDER — HYDROCODONE BITARTRATE AND ACETAMINOPHEN 5; 325 MG/1; MG/1
1 TABLET ORAL EVERY 6 HOURS PRN
Qty: 10 TABLET | Refills: 0 | Status: SHIPPED | OUTPATIENT
Start: 2024-11-18 | End: 2024-11-28

## 2024-11-18 NOTE — TELEPHONE ENCOUNTER
Caller: Tramaine Gates    Relationship: Self    Best call back number: [phone]     Pharmacy where request should be sent: [id number] - [facility] [place] - [address] AT [address] - [phone]  - [phone]      Additional details provided by patient: PT CALLING AS HE IS STILL HAVING DISCOMFORT DESPITE THE RECOMMENDATIONS PROVIDED PREVIOUSLY - HE HAS ALTERNATED HOT/HOLD AND TAKEN TYLENOL - PT IS STILL TOSSING AND NOT GETTING RELIEF - HE IS ASKING IF SOMETHING CAN BE SENT IN FOR HIM TO HELP -

## 2024-11-18 NOTE — PAYOR COMM NOTE
"This is discharge notification for Tramaine Gates  Reference/Auth # [id number]   Pt discharged on 11/16/24    Holley Wright, RN, BSN  Utilization Review Nurse  [facility]  Direct & confidential phone # [phone]  Fax # [phone]      Tramaine Gates (39 y.o. Male)       Date of Birth   1985    Social Security Number       Address   [address] [place] [address] [place] [address]    Home Phone   [phone]    MRN   [id number]       Mormonism   None    Marital Status   Single                            Admission Date   11/13/24    Admission Type   Elective    Admitting Provider   Moises Haddad MD    Attending Provider       Department, Room/Bed   [facility] CARDIOVASCULAR CARE UNIT, 3117/1       Discharge Date   11/16/2024    Discharge Disposition   Home or Self Care    Discharge Destination                                 Attending Provider: (none)   Allergies: Shellfish-derived Products    Isolation: None   Infection: None   Code Status: Prior    Ht: 177.8 cm (70\")   Wt: 141 kg (311 lb 8.2 oz)    Admission Cmt: None   Principal Problem: Abnormal stress test [R94.39]                   Active Insurance as of 11/13/2024       Primary Coverage       Payor Plan Insurance Group Employer/Plan Group    [id number]       Payor Plan Address Payor Plan Phone Number Payor Plan Fax Number Effective Dates    MAIL STOP:   3/1/2022 - None Entered    [id number] [place] [id number]         Subscriber Name Subscriber Birth Date Member ID       TRAMAINE GATES 1985 [id number]                     Emergency Contacts        (Rel.) Home Phone Work Phone Mobile Phone    CATRACHO SHARMA (Significant Other) -- -- [phone]    Magi Oliver (Mother) [phone] -- [phone]                 Discharge Summary        Moises Haddad MD at 11/16/24 0624            Tramaine HOPSON " Kody  1985  [id number]        Discharge Summary    Date of Admission: 11/13/2024  Date of Discharge:  11/16/2024    Primary Discharge Diagnoses: Chest pain, coronary disease  ISR SVG-RCA        PCP  Patient Care Team:  Daniela Hernandez FNP as PCP - General (Family Medicine)  Ollie Dunn MD as Consulting Physician (Gastroenterology)    Consults:   Consults       Date and Time Order Name Status Description    11/15/2024 11:48 AM Inpatient Hospitalist Consult Completed     11/14/2024  7:57 AM Inpatient Vascular Surgery Consult Completed             Operations and Procedures Performed:  Procedure(s):  Left Heart Cath     Duplex Pseudoaneurysm CAR    Result Date: 11/15/2024  Narrative:   Persistent thrombosis of the right femoral pseudoaneurysm.     Duplex Pseudoaneurysm Thrombin Inj CAR (Upper/Lower)    Result Date: 11/14/2024  Narrative:   Successful ultrasound-guided right common femoral artery pseudoaneurysm injection with cessation of flow documented in the pseudoaneurysm sac.     Duplex Pseudoaneurysm CAR    Result Date: 11/14/2024  Narrative:   A 1-2 cm pseudoaneurysm is present in the right common femoral artery.     Cardiac Catheterization/Vascular Study    Result Date: 11/13/2024  Narrative: Primary operative Moises Haddad MD, PhD [facility] cardiology Date of service 11- Procedure 1.  Left heart catheterization with native and bypass graft angiography, LV gram in GALLO position, transaortic valve gradient assessment 2.  Balloon angioplasty from the distal portion of the SVG to PDA secondary to 90% ISR distally tandem 70% ISR diffusely and ostial proximal 50% ISR, 4.0 x 20 NC balloon at 12 to 14 radha and distal proximal fashion with prolonged inflations reduction of stenosis from 90% to less than 10% residual throughout the graft, improvement from 50% down to around 20% at the ostial portion of the graft again secondary recurrent ISR, similar ISR locations too early 2023  Indication Chest pain, angina refractory to medical therapy known severe early CAD of native and bypass grafts After informed consent patient brought to the Cath Lab sterilely prepped and draped in usual fashion with expose the right groin for right common femoral arterial access via micropuncture and modified Salinger technique with placement of a 6 Iranian sheath.  035 guidewire was advanced to eval for by diagnostic JL 4 and JR4 catheters for selective left and right coronary angiography respectively.  JR4 was used for native and bypass graft disease including LIMA to LAD, a multipurpose catheter was used for imaging SVG to RPDA.  There was ISR recurrent in the SVG to RPDA and patient was heparinized with 100 units/kg of heparin on a background therapy of DAPT, a 6 Iranian multipurpose guide used gauge the graft, run-through wire to the distal portion of the vessel, 4.0 x 20 NC balloon inside the stented segment up to 12 radha for 1 minute inflations x 3 distal to proximal, ostial proximal inflation 12 radha for 30 to 60 seconds.  Final angiography great angiographic improvement in distal runoff and ISR area, we avoided relining the stent with multiple overlapping stents already in this portion of the graft.  The distal portion of the graft was without significant disease, anastomosis appeared unchanged with both retrograde and anterograde limb disease but nonobstructive with DARRYL-3 flow poor candidate for PCI given small caliber and limited runoff and this was left to be treated medically.  Disease in the circumflex into obtuse marginal branch was unchanged and therefore will be treated medically, disease from the LAD into diagonal branch was unchanged will be treated medically, MORTON to LAD widely patent, SVG to obtuse marginal branch is  which is known, native RCA.  Angiographically improved with the proximal to mid stents in place with minimal ISR.  Preserved EF which was normal with normal transaortic valve  gradient, mildly elevated filling pressures at 16-18 similar to prior invasive evaluation last year He left Cath Lab chest pain-free hemodynamically electrically stable at tach to staff neurologically gross intact bilaterally Final angiography revealed no distal wire trauma edge dissection or complications. Complications none Blood loss less than 10 cc Contrast 180 cc Sedation time of 1 hour 10 minutes Findings 1.  Opening aortic pressure of 121/71 with a mean of 95, closing pressure was unchanged 2.  LV pressure 109/5 with an EDP of 16-18, normal EF 60% Normal transaortic gradient Angiography 1 left main normal takeoff mild diffuse disease 20% 2.  The LAD diffuse disease with stent in the proximal portion,  after the takeoff the diagonal branch, diagonal diffusely diseased 50 to 60% but with DARRYL-3 flow and tapering distal vessels not amenable to intervention with small vessel disease distally 3.  LIMA to LAD is widely patent, no anastomotic disease, appears angiographically unchanged from early 2023 angiogram, no flow-limiting stenosis of the graft anastomosis or native vessel 4.  Circumflex is widely patent, body of the circumflex with diffuse luminal regularities, the terminal obtuse marginal branch has tortuosity with 50% stenosis at the proximal portion the obtuse marginal as well as the midportion with tenting however this vein graft to this area is closed.  There is DARRYL-3 flow throughout with small vessel disease distally and this will less be treated medically.  More proximal close marginal branches are  and small caliber 5.  RCA small caliber vessel, diffuse disease,  in the midportion after the takeoff of the marginal branch, proximal and mid stents are patent with 10 to 20% ISR 6.  SVG to obtuse marginal branch  7.  SVG to midportion of the RPDA was patent with tandem 90 and 70% regions of ISR inside the stented segments in the mid to distal portion back to the midportion.  Ostial ISR 60 to  70% worse from prior balloon angioplasty the segment 2023 Conclusions recommendations 1 chest pain refractory to medical therapy recurrent after intervention 2023, similar ISR territories inside the SVG to RPDA, intervention as described with balloon angioplasty from the inside the stented portion distally back to the mid to proximal portion including ostial proximal portion with 4.0 x 20 NC balloon up to 12 to 14 radha with prolonged inflations with great improvement of angiographic runoff, stenosis to less than 10% residual in the body of the graft, ostial 30% residual.  Unchanged anastomosis and RPDA Continue DAPT Cilostazol is on board to try to prevent ISR If recurrent would refer to [facility] for brachytherapy with poor candidate for antegrade revascularization or  revascularization via native RCA Optimize goal-directed medical therapy Observe overnight and discharge once discharge criteria met Sheath will be pulled once ACT is less than 175 Further recommendations follow findings and clinical course Moises Haddad MD, PhD     Stress Test With Myocardial Perfusion One Day    Result Date: 10/22/2024  Narrative:   Left ventricular ejection fraction is hyperdynamic (Calculated EF > 70%).   There is no prior study available for comparison. Or overall imaging Tracking okay EF 83% with normal size ventricle with normal regional global wall motion Stress imaging with abnormal counts at the apex and periapical segments with normal basal counts Resting images are better Would indicate reversibility at the apex concerning for ischemia versus artifact Given suboptimal overall image quality correlate with clinical symptomatology, consider coronary CT for definitive evaluation.   Findings consistent with a normal ECG stress test. Intermediate to high risk  study Abnormal nuclear imaging with stress-induced decreased counts at the apex and periapical segments concerning for anterior apical ischemia Difficult image  quality, recommend coronary CT or invasive ischemic evaluation for further evaluation definitively Stress ECG did not restarted heart rate no changes at submaximal stress      Allergies:  is allergic to shellfish-derived products.        Discharge Medications:    amLODIPine, 5 mg, Oral, Daily  aspirin, 81 mg, Oral, Daily  cilostazol, 50 mg, Oral, BID  isosorbide mononitrate, 120 mg, Oral, Q24H  levothyroxine, 50 mcg, Oral, Daily  losartan, 25 mg, Oral, Daily  metoprolol succinate XL, 100 mg, Oral, Q24H  pantoprazole, 40 mg, Oral, Q AM  prasugrel, 10 mg, Oral, Daily  ranolazine, 1,000 mg, Oral, BID  rosuvastatin, 20 mg, Oral, Daily  Scopolamine, 1 patch, Transdermal, Q72H        History of Present Illness:  See H&P    Hospital Course  Admitted status post intervention, balloon angioplasty SVG to RCA with ISR significantly, complicated by right small pseudoaneurysm femoral site status post successful thrombin injection.  Patient was observed for couple of days to ensure stability, stable today, was ambulatory late yesterday     medication reviewed  Restrictions discussed  Concerning signs or symptoms that would prompt readmission were discussed      Last Lab Results:   Lab Results (most recent)       Procedure Component Value Units Date/Time    Comprehensive Metabolic Panel [[id number]] Collected: 11/15/24 1303    Specimen: Blood Updated: 11/15/24 1336     Glucose 96 mg/dL      BUN 15 mg/dL      Creatinine 1.09 mg/dL      Sodium 137 mmol/L      Potassium 3.9 mmol/L      Comment: Slight hemolysis detected by analyzer. Result may be falsely elevated.        Chloride 103 mmol/L      CO2 23.3 mmol/L      Calcium 8.7 mg/dL      Total Protein 6.5 g/dL      Albumin 4.0 g/dL      ALT (SGPT) 15 U/L      AST (SGOT) 18 U/L      Alkaline Phosphatase 74 U/L      Total Bilirubin 0.2 mg/dL      Globulin 2.5 gm/dL      A/G Ratio 1.6 g/dL      BUN/Creatinine Ratio 13.8     Anion Gap 10.7 mmol/L      eGFR 87.4 mL/min/1.73     Narrative:       GFR Normal >60  Chronic Kidney Disease <60  Kidney Failure <15      CBC & Differential [[id number]]  (Abnormal) Collected: 11/15/24 1303    Specimen: Blood Updated: 11/15/24 1313    Narrative:      The following orders were created for panel order CBC & Differential.  Procedure                               Abnormality         Status                     ---------                               -----------         ------                     CBC Auto Differential[[id number]]        Abnormal            Final result                 Please view results for these tests on the individual orders.    CBC Auto Differential [[id number]]  (Abnormal) Collected: 11/15/24 1303    Specimen: Blood Updated: 11/15/24 1313     WBC 7.92 10*3/mm3      RBC 4.41 10*6/mm3      Hemoglobin 12.5 g/dL      Hematocrit 40.2 %      MCV 91.2 fL      MCH 28.3 pg      MCHC 31.1 g/dL      RDW 13.1 %      RDW-SD 43.5 fl      MPV 9.0 fL      Platelets 224 10*3/mm3      Neutrophil % 67.0 %      Lymphocyte % 26.9 %      Monocyte % 5.2 %      Eosinophil % 0.1 %      Basophil % 0.3 %      Immature Grans % 0.5 %      Neutrophils, Absolute 5.31 10*3/mm3      Lymphocytes, Absolute 2.13 10*3/mm3      Monocytes, Absolute 0.41 10*3/mm3      Eosinophils, Absolute 0.01 10*3/mm3      Basophils, Absolute 0.02 10*3/mm3      Immature Grans, Absolute 0.04 10*3/mm3      nRBC 0.0 /100 WBC     POC Activated Clotting Time [[id number]]  (Abnormal) Collected: 11/13/24 1302    Specimen: Arterial Blood Updated: 11/14/24 0726     Activated Clotting Time  337 Seconds      Comment: Serial Number: [id number]:  [id number]       Hemoglobin A1c [[id number]]  (Normal) Collected: 11/14/24 0540    Specimen: Blood Updated: 11/14/24 0622     Hemoglobin A1C 5.57 %     CBC & Differential [[id number]]  (Abnormal) Collected: 11/14/24 0540    Specimen: Blood Updated: 11/14/24 0604    Narrative:      The following orders were created for panel order CBC & Differential.  Procedure                                Abnormality         Status                     ---------                               -----------         ------                     CBC Auto Differential[[id number]]        Abnormal            Final result                 Please view results for these tests on the individual orders.    CBC Auto Differential [[id number]]  (Abnormal) Collected: 11/14/24 0540    Specimen: Blood Updated: 11/14/24 0604     WBC 9.44 10*3/mm3      RBC 4.52 10*6/mm3      Hemoglobin 13.0 g/dL      Hematocrit 39.1 %      MCV 86.5 fL      MCH 28.8 pg      MCHC 33.2 g/dL      RDW 12.7 %      RDW-SD 40.2 fl      MPV 9.3 fL      Platelets 282 10*3/mm3      Neutrophil % 89.0 %      Lymphocyte % 8.6 %      Monocyte % 1.6 %      Eosinophil % 0.0 %      Basophil % 0.1 %      Immature Grans % 0.7 %      Neutrophils, Absolute 8.40 10*3/mm3      Lymphocytes, Absolute 0.81 10*3/mm3      Monocytes, Absolute 0.15 10*3/mm3      Eosinophils, Absolute 0.00 10*3/mm3      Basophils, Absolute 0.01 10*3/mm3      Immature Grans, Absolute 0.07 10*3/mm3      nRBC 0.0 /100 WBC     CBC (No Diff) [[id number]] Collected: 11/14/24 0449    Specimen: Blood Updated: 11/14/24 0531     WBC --     Comment: Spec. clotted  Corrected result. Previous result was 9.02 10*3/mm3 on 11/14/2024 at 0500 EST.        RBC --     Comment: Spec. clotted  Corrected result. Previous result was 4.34 10*6/mm3 on 11/14/2024 at 0500 EST.        Hemoglobin --     Comment: Spec. clotted  Corrected result. Previous result was 12.7 g/dL on 11/14/2024 at 0500 EST.        Hematocrit --     Comment: Spec. clotted  Corrected result. Previous result was 39.0 % on 11/14/2024 at 0500 EST.        MCV --     Comment: Spec. clotted  Corrected result. Previous result was 89.9 fL on 11/14/2024 at 0500 EST.        MCH --     Comment: Spec. clotted  Corrected result. Previous result was 29.3 pg on 11/14/2024 at 0500 EST.        MCHC --     Comment: Spec. clotted  Corrected result. Previous result was  32.6 g/dL on 11/14/2024 at 0500 EST.        RDW --     Comment: Spec. clotted  Corrected result. Previous result was 12.8 % on 11/14/2024 at 0500 EST.        RDW-SD --     Comment: Spec. clotted  Corrected result. Previous result was 42.1 fl on 11/14/2024 at 0500 EST.        MPV --     Comment: Spec. clotted  Corrected result. Previous result was 9.1 fL on 11/14/2024 at 0500 EST.        Platelets --     Comment: Spec. clotted  Corrected result. Previous result was 160 10*3/mm3 on 11/14/2024 at 0500 EST.       Basic Metabolic Panel [[id number]]  (Abnormal) Collected: 11/14/24 0445    Specimen: Blood from Arm, Right Updated: 11/14/24 0527     Glucose 139 mg/dL      BUN 11 mg/dL      Creatinine 1.09 mg/dL      Sodium 136 mmol/L      Potassium 4.3 mmol/L      Comment: Specimen hemolyzed.  Result may be falsely elevated.        Chloride 100 mmol/L      CO2 26.6 mmol/L      Calcium 9.2 mg/dL      BUN/Creatinine Ratio 10.1     Anion Gap 9.4 mmol/L      eGFR 87.4 mL/min/1.73     Narrative:      GFR Normal >60  Chronic Kidney Disease <60  Kidney Failure <15      Lipid Panel [[id number]] Collected: 11/14/24 0445    Specimen: Blood from Arm, Right Updated: 11/14/24 0522     Total Cholesterol 146 mg/dL      Triglycerides 83 mg/dL      HDL Cholesterol 52 mg/dL      LDL Cholesterol  78 mg/dL      VLDL Cholesterol 16 mg/dL      LDL/HDL Ratio 1.49    Narrative:      Cholesterol Reference Ranges  (U.S. Department of Health and Human Services ATP III Classifications)    Desirable          <200 mg/dL  Borderline High    200-239 mg/dL  High Risk          >240 mg/dL      Triglyceride Reference Ranges  (U.S. Department of Health and Human Services ATP III Classifications)    Normal           <150 mg/dL  Borderline High  150-199 mg/dL  High             200-499 mg/dL  Very High        >500 mg/dL    HDL Reference Ranges  (U.S. Department of Health and Human Services ATP III Classifications)    Low     <40 mg/dl (major risk factor for  CHD)  High    >60 mg/dl ('negative' risk factor for CHD)        LDL Reference Ranges  (U.S. Department of Health and Human Services ATP III Classifications)    Optimal          <100 mg/dL  Near Optimal     100-129 mg/dL  Borderline High  130-159 mg/dL  High             160-189 mg/dL  Very High        >189 mg/dL    POC Activated Clotting Time [[id number]]  (Abnormal) Collected: 11/13/24 1647    Specimen: Blood Updated: 11/13/24 1651     Activated Clotting Time  147 Seconds      Comment: Serial Number: [id number]:  [id number]       Comprehensive Metabolic Panel [[id number]]  (Abnormal) Collected: 11/13/24 1039    Specimen: Blood Updated: 11/13/24 1111     Glucose 110 mg/dL      BUN 12 mg/dL      Creatinine 1.20 mg/dL      Sodium 138 mmol/L      Potassium 3.8 mmol/L      Chloride 103 mmol/L      CO2 25.2 mmol/L      Calcium 9.4 mg/dL      Total Protein 7.1 g/dL      Albumin 4.2 g/dL      ALT (SGPT) 19 U/L      AST (SGOT) 20 U/L      Alkaline Phosphatase 97 U/L      Total Bilirubin 0.3 mg/dL      Globulin 2.9 gm/dL      A/G Ratio 1.4 g/dL      BUN/Creatinine Ratio 10.0     Anion Gap 9.8 mmol/L      eGFR 77.9 mL/min/1.73     Narrative:      GFR Normal >60  Chronic Kidney Disease <60  Kidney Failure <15      CBC (No Diff) [[id number]]  (Normal) Collected: 11/13/24 1039    Specimen: Blood Updated: 11/13/24 1102     WBC 6.10 10*3/mm3      RBC 4.76 10*6/mm3      Hemoglobin 13.8 g/dL      Hematocrit 41.5 %      MCV 87.2 fL      MCH 29.0 pg      MCHC 33.3 g/dL      RDW 12.9 %      RDW-SD 41.3 fl      MPV 9.1 fL      Platelets 255 10*3/mm3     Protime-INR [[id number]]  (Normal) Collected: 11/13/24 1039    Specimen: Blood Updated: 11/13/24 1101     Protime 13.3 Seconds      INR 1.00          Imaging Results (Most Recent)       None            PROCEDURES  Procedure(s):  Left Heart Cath    Condition on Discharge: Stable    Physical Exam at Discharge  Vital Signs  Temp:  [96.7 °F (35.9 °C)-98.1 °F (36.7 °C)] 98.1 °F (36.7 °C)  Heart  Rate:  [48-77] 77  Resp:  [10-21] 13  BP: (103-121)/(48-77) 103/68    Physical Exam:  Physical Exam   Constitutional: Patient appears well-developed and well-nourished and in no acute distress   HEENT:   Head: Normocephalic and atraumatic.   Eyes:  Pupils are equal, round, and reactive to light. EOM are intact. Sclerae are anicteric and noninjected.  Mouth and Throat: Patient has moist mucous membranes. Oropharynx is clear of any erythema or exudate.     Neck: Neck supple. No JVD present. No thyromegaly present. No lymphadenopathy present.  Cardiovascular: Regular rate, regular rhythm, S1 normal and S2 normal.  Exam reveals no gallop and no friction rub.  No murmur heard.  Pulmonary/Chest: Lungs are clear to auscultation bilaterally. No respiratory distress. No wheezes. No rhonchi. No rales.   Abdominal: Soft. Bowel sounds are normal. No distension and no mass. There is no hepatosplenomegaly. There is no tenderness.   Musculoskeletal: Normal muscle tone  Extremities: No edema. Pulses are palpable in all 4 extremities.  Neurological: Patient is alert and oriented to person, place, and time. Cranial nerves II-XII are grossly intact with no focal deficits.  Skin: Skin is warm. No rash noted. Nails show no clubbing.  No cyanosis or erythema.    Discharge Disposition  Home      Discharge Diet:      Dietary Orders (From admission, onward)       Start     Ordered    11/16/24 0525  Diet: Cardiac; Healthy Heart (2-3 Na+); Fluid Consistency: Thin (IDDSI 0)  Diet Effective Now        References:    Diet Order Crosswalk   Question Answer Comment   Diets: Cardiac    Cardiac Diet: Healthy Heart (2-3 Na+)    Fluid Consistency: Thin (IDDSI 0)        11/16/24 0524                    Activity at Discharge:  No heavy lifting for 7 days, rest relax    Pre-discharge education  Antiplatelets  Anticoagulation  Concerning signs or symptoms that would prompt readmission or call to MD  Med rec  Activities      Follow-up Appointments  Future  Appointments   Date Time Provider Department Center   4/14/2025  3:30 PM Moises Haddad MD [facility]         Test Results Pending at Discharge  Pending Results       Procedure [Order ID] Specimen - Date/Time    ECG 12 Lead Pre-Op / Pre-Procedure [[id number]]              Moises Haddad MD  11/16/24  06:24 EST    Greater than 35 minutes on totality of care today             Electronically signed by Moises Haddad MD at 11/16/24 0613

## 2024-11-18 NOTE — PROGRESS NOTES
Right groin pain reported to office.  Patient discharged from hospital after cardiac catheterization and PCI and had pseudoaneurysm of the right groin requiring thrombin injection.    Discussed with Dr. Haddad.  Lortab 5 mg/325 1 every 6 hours as needed for pain with quantity 10 dispense 10 with no refills sent at patient's request to [facility]

## 2024-11-18 NOTE — TELEPHONE ENCOUNTER
Spoke with patient advised he follow up with PCP . If experiencing chest pain advised he go to ED . States he is having leg pain per Roc directed to ED to rule out DVT and stroke risk.

## 2024-11-20 ENCOUNTER — READMISSION MANAGEMENT (OUTPATIENT)
Dept: CALL CENTER | Facility: HOSPITAL | Age: 39
End: 2024-11-20
Payer: MEDICAID

## 2024-11-20 NOTE — OUTREACH NOTE
Medical Week 1 Survey      Flowsheet Row Responses   [facility] facility patient discharged from? [facility]   Does the patient have one of the following disease processes/diagnoses(primary or secondary)? Other   Week 1 attempt successful? No   Unsuccessful attempts Attempt 1            Gretchen STRANGE - Registered Nurse

## 2024-11-22 ENCOUNTER — TELEPHONE (OUTPATIENT)
Dept: CARDIAC REHAB | Facility: HOSPITAL | Age: 39
End: 2024-11-22
Payer: MEDICAID

## 2024-11-22 DIAGNOSIS — R10.31 RIGHT INGUINAL PAIN: ICD-10-CM

## 2024-11-22 RX ORDER — HYDROCODONE BITARTRATE AND ACETAMINOPHEN 5; 325 MG/1; MG/1
1 TABLET ORAL EVERY 6 HOURS PRN
Qty: 10 TABLET | Refills: 0 | OUTPATIENT
Start: 2024-11-22 | End: 2024-12-02

## 2024-11-26 ENCOUNTER — TELEPHONE (OUTPATIENT)
Dept: CARDIAC REHAB | Facility: HOSPITAL | Age: 39
End: 2024-11-26
Payer: MEDICAID

## 2024-11-26 ENCOUNTER — READMISSION MANAGEMENT (OUTPATIENT)
Dept: CALL CENTER | Facility: HOSPITAL | Age: 39
End: 2024-11-26
Payer: MEDICAID

## 2024-11-26 NOTE — OUTREACH NOTE
Medical Week 2 Survey      Flowsheet Row Responses   [facility] patient discharged from? [facility]   Does the patient have one of the following disease processes/diagnoses(primary or secondary)? Other   Week 2 attempt successful? Yes   Call start time 1628   Discharge diagnosis Abnormal stress test   Call end time 1639   Does the patient have a primary care provider?  Yes   Does the patient have an appointment with their PCP within 7 days of discharge? No   What is preventing the patient from scheduling follow up appointments within 7 days of discharge? Waiting on return call   Nursing Interventions Advised patient to make appointment, Educated patient on importance of making appointment   Comments Patient reports R. femoral LHC site remains with bruising that is fading and discomfort. Pt denies redness, firmness, numbness or tingling or s/sx of infection. Pt denies fever/chills. Patient and nurse discussed using ice/heat to soothe the R. groin discomfort, pt reports using OTC Tylenol. Patient is not monitoring BP at home, reports his BP is WNL. Pt denies chest pain, dizziness, SOA or other needs at this time.   What is the patient's perception of their health status since discharge? Improving   Is the patient/caregiver able to teach back signs and symptoms related to disease process for when to call PCP? Yes   Is the patient/caregiver able to teach back signs and symptoms related to disease process for when to call 911? Yes   Is the patient/caregiver able to teach back the hierarchy of who to call/visit for symptoms/problems? PCP, Specialist, Home health nurse, Urgent Care, ED, 911 Yes   Week 2 Call Completed? Yes   Revoked No further contact(revokes)-requires comment   Call end time 1639            Yolanda BENTON - Registered Nurse

## 2024-11-30 RX ORDER — METOPROLOL SUCCINATE 100 MG/1
100 TABLET, EXTENDED RELEASE ORAL
Qty: 30 TABLET | Refills: 0 | Status: CANCELLED | OUTPATIENT
Start: 2024-11-30

## 2024-11-30 RX ORDER — NITROGLYCERIN 0.4 MG/1
0.4 TABLET SUBLINGUAL
Qty: 25 TABLET | Refills: 0 | Status: CANCELLED | OUTPATIENT
Start: 2024-11-30

## 2024-11-30 RX ORDER — ISOSORBIDE MONONITRATE 120 MG/1
120 TABLET, EXTENDED RELEASE ORAL
Qty: 30 TABLET | Refills: 0 | Status: CANCELLED | OUTPATIENT
Start: 2024-11-30

## 2024-11-30 RX ORDER — AMLODIPINE BESYLATE 5 MG/1
5 TABLET ORAL DAILY
Qty: 90 TABLET | Refills: 0 | Status: CANCELLED | OUTPATIENT
Start: 2024-11-30

## 2024-11-30 RX ORDER — RANOLAZINE 1000 MG/1
1000 TABLET, EXTENDED RELEASE ORAL 2 TIMES DAILY
Qty: 180 TABLET | Refills: 3 | Status: CANCELLED | OUTPATIENT
Start: 2024-11-30

## 2024-12-02 RX ORDER — NITROGLYCERIN 0.4 MG/1
0.4 TABLET SUBLINGUAL
Qty: 25 TABLET | Refills: 0 | Status: SHIPPED | OUTPATIENT
Start: 2024-12-02

## 2024-12-02 RX ORDER — ASPIRIN 81 MG/1
81 TABLET ORAL DAILY
Qty: 90 TABLET | Refills: 0 | Status: SHIPPED | OUTPATIENT
Start: 2024-12-02

## 2024-12-02 RX ORDER — ISOSORBIDE MONONITRATE 120 MG/1
120 TABLET, EXTENDED RELEASE ORAL
Qty: 30 TABLET | Refills: 0 | Status: SHIPPED | OUTPATIENT
Start: 2024-12-02

## 2024-12-02 RX ORDER — AMLODIPINE BESYLATE 5 MG/1
5 TABLET ORAL DAILY
Qty: 90 TABLET | Refills: 0 | Status: SHIPPED | OUTPATIENT
Start: 2024-12-02

## 2024-12-02 RX ORDER — RANOLAZINE 1000 MG/1
1000 TABLET, EXTENDED RELEASE ORAL 2 TIMES DAILY
Qty: 180 TABLET | Refills: 3 | Status: SHIPPED | OUTPATIENT
Start: 2024-12-02

## 2024-12-02 RX ORDER — METOPROLOL SUCCINATE 100 MG/1
100 TABLET, EXTENDED RELEASE ORAL
Qty: 30 TABLET | Refills: 0 | Status: SHIPPED | OUTPATIENT
Start: 2024-12-02

## 2024-12-17 RX ORDER — LOSARTAN POTASSIUM 25 MG/1
25 TABLET ORAL DAILY
Qty: 30 TABLET | Refills: 0 | Status: SHIPPED | OUTPATIENT
Start: 2024-12-17

## 2024-12-31 RX ORDER — METOPROLOL SUCCINATE 100 MG/1
100 TABLET, EXTENDED RELEASE ORAL
Qty: 90 TABLET | Refills: 0 | Status: SHIPPED | OUTPATIENT
Start: 2024-12-31

## 2024-12-31 RX ORDER — ISOSORBIDE MONONITRATE 120 MG/1
120 TABLET, EXTENDED RELEASE ORAL
Qty: 90 TABLET | Refills: 0 | Status: SHIPPED | OUTPATIENT
Start: 2024-12-31

## 2025-01-14 ENCOUNTER — TELEPHONE (OUTPATIENT)
Dept: CARDIOLOGY | Facility: CLINIC | Age: 40
End: 2025-01-14
Payer: MEDICAID

## 2025-01-14 RX ORDER — LOSARTAN POTASSIUM 25 MG/1
25 TABLET ORAL DAILY
Qty: 30 TABLET | Refills: 0 | Status: SHIPPED | OUTPATIENT
Start: 2025-01-14

## 2025-01-14 NOTE — TELEPHONE ENCOUNTER
Caller: Gates Tramaine A    Relationship: Self    Best call back number: [phone]    Requested Prescriptions:   Requested Prescriptions     Pending Prescriptions Disp Refills    losartan (Cozaar) 25 MG tablet 30 tablet 0     Sig: Take 1 tablet by mouth Daily.        Pharmacy where request should be sent: [facility]     Last office visit with prescribing clinician: 10/14/2024   Last telemedicine visit with prescribing clinician: Visit date not found   Next office visit with prescribing clinician: 4/14/2025     Additional details provided by patient: PT HAS ONLY 3 DAYS LEFT, PT WOULD LIKE A 90 DAY PRESCRIPTION    Does the patient have less than a 3 day supply:  [x] Yes  [] No    Would you like a call back once the refill request has been completed: [] Yes [x] No    If the office needs to give you a call back, can they leave a voicemail: [] Yes [x] No    Jonh Dos Santos, [facility] Rep   01/14/25 12:02 EST

## 2025-01-16 RX ORDER — METOPROLOL SUCCINATE 100 MG/1
100 TABLET, EXTENDED RELEASE ORAL
Qty: 90 TABLET | Refills: 0 | Status: SHIPPED | OUTPATIENT
Start: 2025-01-16

## 2025-01-16 RX ORDER — AMLODIPINE BESYLATE 5 MG/1
5 TABLET ORAL DAILY
Qty: 90 TABLET | Refills: 0 | Status: SHIPPED | OUTPATIENT
Start: 2025-01-16

## 2025-01-16 RX ORDER — ISOSORBIDE MONONITRATE 120 MG/1
120 TABLET, EXTENDED RELEASE ORAL
Qty: 90 TABLET | Refills: 0 | Status: SHIPPED | OUTPATIENT
Start: 2025-01-16

## 2025-01-16 SDOH — ECONOMIC STABILITY: TRANSPORTATION INSECURITY
IN THE PAST 12 MONTHS, HAS THE LACK OF TRANSPORTATION KEPT YOU FROM MEDICAL APPOINTMENTS OR FROM GETTING MEDICATIONS?: NO

## 2025-01-16 SDOH — ECONOMIC STABILITY: TRANSPORTATION INSECURITY
IN THE PAST 12 MONTHS, HAS LACK OF TRANSPORTATION KEPT YOU FROM MEETINGS, WORK, OR FROM GETTING THINGS NEEDED FOR DAILY LIVING?: NO

## 2025-01-16 SDOH — ECONOMIC STABILITY: INCOME INSECURITY: IN THE LAST 12 MONTHS, WAS THERE A TIME WHEN YOU WERE NOT ABLE TO PAY THE MORTGAGE OR RENT ON TIME?: NO

## 2025-01-16 SDOH — ECONOMIC STABILITY: FOOD INSECURITY: WITHIN THE PAST 12 MONTHS, THE FOOD YOU BOUGHT JUST DIDN'T LAST AND YOU DIDN'T HAVE MONEY TO GET MORE.: NEVER TRUE

## 2025-01-16 SDOH — ECONOMIC STABILITY: FOOD INSECURITY: WITHIN THE PAST 12 MONTHS, YOU WORRIED THAT YOUR FOOD WOULD RUN OUT BEFORE YOU GOT MONEY TO BUY MORE.: NEVER TRUE

## 2025-01-17 ENCOUNTER — TELEMEDICINE (OUTPATIENT)
Dept: FAMILY MEDICINE CLINIC | Facility: CLINIC | Age: 40
End: 2025-01-17
Payer: COMMERCIAL

## 2025-01-17 DIAGNOSIS — M54.31 RIGHT SIDED SCIATICA: Primary | ICD-10-CM

## 2025-01-17 PROCEDURE — 99213 OFFICE O/P EST LOW 20 MIN: CPT | Performed by: FAMILY MEDICINE

## 2025-01-17 RX ORDER — PREDNISONE 20 MG/1
TABLET ORAL
Qty: 18 TABLET | Refills: 0 | Status: SHIPPED | OUTPATIENT
Start: 2025-01-17

## 2025-01-17 ASSESSMENT — ENCOUNTER SYMPTOMS
BLOOD IN STOOL: 0
SHORTNESS OF BREATH: 0
BACK PAIN: 1
ABDOMINAL PAIN: 0
CHEST TIGHTNESS: 0

## 2025-01-17 NOTE — PROGRESS NOTES
Guille Piña, was evaluated through a synchronous (real-time) audio-video encounter. The patient (or guardian if applicable) is aware that this is a billable service, which includes applicable co-pays. This Virtual Visit was conducted with patient's (and/or legal guardian's) consent. Patient identification was verified, and a caregiver was present when appropriate.   The patient was located at Home: [address]  [facility] [address]  Provider was located at Home (Appt Dept State): [place]  Confirm you are appropriately licensed, registered, or certified to deliver care in the state where the patient is located as indicated above. If you are not or unsure, please re-schedule the visit: Yes, I confirm.     Guille Piña (:  1985) is a Established patient, presenting virtually for evaluation of the following:    Chief Complaint   Patient presents with    Back Pain       Below is the assessment and plan developed based on review of pertinent history, physical exam, labs, studies, and medications.     1. Right sided sciatica  All his complaints are textbook R L5 neuritis. Will do prednisone taper, ICE 20min QH to the low back. Activity as tolerated. Out of work until , if he still can't go at that point, get lumbar XR and set up for PT or ortho/spine based on the XR results.   - XR LUMBAR SPINE (2-3 VIEWS); Future  - predniSONE (DELTASONE) 20 MG tablet; Take by mouth: 3 pills a day for 3 days, then 2 pills a day for 3 days, then 1 pill a day for 2 days, then 1/2 pill a day for 2 days.  Dispense: 18 tablet; Refill: 0      Return if symptoms worsen or fail to improve.       Subjective     Started having low back pain in the low back and radiating to the R thigh and foot middle 3 toes. Never had sciatica before. No known injury. Came on suddenly when getting out of work. Has been out of work Tue/Wed/Thu and today. 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     Review of Systems

## 2025-02-07 ENCOUNTER — TELEPHONE (OUTPATIENT)
Dept: CARDIOLOGY | Facility: CLINIC | Age: 40
End: 2025-02-07

## 2025-02-07 NOTE — TELEPHONE ENCOUNTER
Caller: Tramaine Gates    Relationship: Self    Best call back number: [phone]        PATIENT CALLED IN ADVISING HE WANTS TO BE SEEN SOONER THEN APRIL FOLLOW UP IF POSSIBLE.    HE HAS STILL BEEN EXPERIENCING CHEST PAIN AS DISCUSSED IN PAST APPTS.     HE STATED IT IT NOT SOMETHING THAT HE NEEDS TO BE SEEN FOR AT THE ER.

## 2025-02-10 ENCOUNTER — TELEPHONE (OUTPATIENT)
Dept: CARDIOLOGY | Facility: CLINIC | Age: 40
End: 2025-02-10
Payer: MEDICAID

## 2025-02-12 NOTE — TELEPHONE ENCOUNTER
SCHEDULED PT FOR 2.25.25 WITH YOUSIF URIOSTEGUI PER TOMAS'S INSTRUCTIONS FROM 2.10.25 TE. IF THIS APPT DATE AND TIME IS NOT OKAY, PLS CALL AND ADVISE.

## 2025-02-15 ENCOUNTER — HOSPITAL ENCOUNTER (EMERGENCY)
Age: 40
Discharge: HOME | End: 2025-02-15

## 2025-02-15 ENCOUNTER — APPOINTMENT (OUTPATIENT)
Dept: GENERAL RADIOLOGY | Age: 40
End: 2025-02-15

## 2025-02-15 VITALS
BODY MASS INDEX: 34.46 KG/M2 | RESPIRATION RATE: 16 BRPM | DIASTOLIC BLOOD PRESSURE: 89 MMHG | HEIGHT: 73 IN | SYSTOLIC BLOOD PRESSURE: 163 MMHG | WEIGHT: 260 LBS | TEMPERATURE: 98.1 F | HEART RATE: 84 BPM | OXYGEN SATURATION: 100 %

## 2025-02-15 DIAGNOSIS — M25.512 ACUTE PAIN OF LEFT SHOULDER: Primary | ICD-10-CM

## 2025-02-15 PROCEDURE — 73030 X-RAY EXAM OF SHOULDER: CPT

## 2025-02-15 PROCEDURE — 99283 EMERGENCY DEPT VISIT LOW MDM: CPT

## 2025-02-15 ASSESSMENT — PAIN DESCRIPTION - DESCRIPTORS: DESCRIPTORS: SHARP

## 2025-02-15 ASSESSMENT — PAIN SCALES - GENERAL: PAINLEVEL_OUTOF10: 10

## 2025-02-15 ASSESSMENT — PAIN DESCRIPTION - ORIENTATION: ORIENTATION: LEFT

## 2025-02-15 ASSESSMENT — PAIN DESCRIPTION - LOCATION: LOCATION: SHOULDER

## 2025-02-15 ASSESSMENT — LIFESTYLE VARIABLES: HOW MANY STANDARD DRINKS CONTAINING ALCOHOL DO YOU HAVE ON A TYPICAL DAY: PATIENT DOES NOT DRINK

## 2025-02-15 NOTE — ED NOTES
Pt presents to the ED for c/o left shoulder and upper left arm pain.  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  Has been rotating ice and heat and tylenol and Motrin without relief

## 2025-02-15 NOTE — ED PROVIDER NOTES
Emergency Department Provider Note       PCP: Jimenez Singh MD   Age: 39 y.o.   Sex: male     DISPOSITION Decision To Discharge 02/15/2025 08:03:24 AM    ICD-10-CM    1. Acute pain of left shoulder  M25.512 [facility]          Medical Decision Making     Differential diagnosis includes fracture, dislocation, muscle avulsion, muscular strain or sprain/strain, partial list.    Likely injured his triceps muscle, possibly sprain/strain.  X-ray does not show any obvious large fractures or avulsion fragments.    Will send patient follow-up with Ortho, otherwise no further evaluation needed in the emergency department     1 acute, uncomplicated illness or injury.  Over the counter drug management performed.  Prescription drug management performed.  I independently ordered and reviewed each unique test.           I interpreted the X-rays no obvious fracture.              History     39-year-old male presents ED with complaints of posterior shoulder/left upper arm pain that started about a day ago.  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  Does not recall any specific injury but states he woke up yesterday with some mild left shoulder pain that is getting progressively worse throughout the day.  Now he has pain whenever he tries to push out his left arm and this is interfering with his sleep.  Took Tylenol at 4 AM this morning    The history is provided by the patient.     Physical Exam     Vitals signs and nursing note reviewed:  Vitals:    02/15/25 0700   BP: (!) 163/89   Pulse: 84   Resp: 16   Temp: 98.1 °F (36.7 °C)   TempSrc: Oral   Weight: 117.9 kg (260 lb)   Height: 1.844 m (6' 0.6\")      Physical Exam  Vitals and nursing note reviewed.   Constitutional:       Appearance: He is well-developed.   HENT:      Head: Normocephalic and atraumatic.   Eyes:      Extraocular Movements: Extraocular  Comment: Same female partner x 20 years     Social Determinants of Health     Financial Resource Strain: Low Risk  (5/19/2023)    Overall Financial Resource Strain (CARDIA)     Difficulty of Paying Living Expenses: Not hard at all   Transportation Needs: Unknown (5/19/2023)    PRAPARE - Transportation     Lack of Transportation (Non-Medical): No   Social Connections: Unknown (3/20/2021)    Received from [employer]    Social Connections     Frequency of Communication with Friends and Family: Not asked     Frequency of Social Gatherings with Friends and Family: Not asked   Intimate Partner Violence: Unknown (3/20/2021)    Received from [employer]    Intimate Partner Violence     Fear of Current or Ex-Partner: Not asked     Emotionally Abused: Not asked     Physically Abused: Not asked     Sexually Abused: Not asked   Housing Stability: Unknown (1/16/2025)    Housing Stability Vital Sign     Number of Times Moved in the Last Year: 0     Homeless in the Last Year: No        Previous Medications    No medications on file        Results from this emergency department visit:      Results for orders placed or performed during the hospital encounter of 02/15/25   XR SHOULDER LEFT (MIN 2 VIEWS)    Narrative    Left Shoulder    INDICATION: upper humerus / tricep injury    COMPARISON: 11/6/2022    TECHNIQUE: Three views of the left shoulder were obtained    FINDINGS: There is no evident fracture or dislocation. Joint spaces are  maintained. There is no suggested avascular necrosis or intra-articular loose  body. 3 mm humeral head bone island is again seen.      Impression    Negative left shoulder      Electronically signed by Aric Mejia         XR SHOULDER LEFT (MIN 2 VIEWS)   Final Result   Negative left shoulder         Electronically signed by Aric Mejia                   No results for input(s): \"COVID19\" in the last 72 hours.     Voice dictation software was used during the making of this

## 2025-02-15 NOTE — DISCHARGE INSTRUCTIONS
Your x-rays do not show anything is broken.  This likely is sprained or strained muscle.  This should get better on its own in the next few days.  If he does not improve over the next week or 2 I would like you to follow-up with Ortho.  Otherwise please follow-up with your primary care physician.  Return if you have any worsening symptoms or any new concerns peer

## 2025-02-18 RX ORDER — NITROGLYCERIN 0.4 MG/1
0.4 TABLET SUBLINGUAL
Qty: 25 TABLET | Refills: 0 | Status: SHIPPED | OUTPATIENT
Start: 2025-02-18

## 2025-02-18 RX ORDER — LOSARTAN POTASSIUM 25 MG/1
25 TABLET ORAL DAILY
Qty: 30 TABLET | Refills: 0 | Status: SHIPPED | OUTPATIENT
Start: 2025-02-18

## 2025-02-18 RX ORDER — ASPIRIN 81 MG/1
81 TABLET ORAL DAILY
Qty: 90 TABLET | Refills: 0 | Status: SHIPPED | OUTPATIENT
Start: 2025-02-18

## 2025-03-04 ENCOUNTER — OFFICE VISIT (OUTPATIENT)
Dept: CARDIOLOGY | Facility: CLINIC | Age: 40
End: 2025-03-04
Payer: MEDICAID

## 2025-03-04 VITALS
WEIGHT: 315 LBS | HEIGHT: 70 IN | DIASTOLIC BLOOD PRESSURE: 64 MMHG | OXYGEN SATURATION: 99 % | RESPIRATION RATE: 20 BRPM | BODY MASS INDEX: 45.1 KG/M2 | HEART RATE: 90 BPM | SYSTOLIC BLOOD PRESSURE: 100 MMHG

## 2025-03-04 DIAGNOSIS — E78.2 MIXED HYPERLIPIDEMIA: ICD-10-CM

## 2025-03-04 DIAGNOSIS — I10 PRIMARY HYPERTENSION: ICD-10-CM

## 2025-03-04 DIAGNOSIS — I25.10 CAD, MULTIPLE VESSEL: Primary | ICD-10-CM

## 2025-03-04 PROCEDURE — 1160F RVW MEDS BY RX/DR IN RCRD: CPT

## 2025-03-04 PROCEDURE — 3078F DIAST BP <80 MM HG: CPT

## 2025-03-04 PROCEDURE — 1159F MED LIST DOCD IN RCRD: CPT

## 2025-03-04 PROCEDURE — 99214 OFFICE O/P EST MOD 30 MIN: CPT

## 2025-03-04 PROCEDURE — 3074F SYST BP LT 130 MM HG: CPT

## 2025-03-04 PROCEDURE — 93000 ELECTROCARDIOGRAM COMPLETE: CPT

## 2025-03-04 RX ORDER — CILOSTAZOL 50 MG/1
50 TABLET ORAL 2 TIMES DAILY
Qty: 180 TABLET | Refills: 3 | Status: SHIPPED | OUTPATIENT
Start: 2025-03-04

## 2025-03-04 RX ORDER — LOSARTAN POTASSIUM 25 MG/1
25 TABLET ORAL DAILY
Qty: 90 TABLET | Refills: 3 | Status: SHIPPED | OUTPATIENT
Start: 2025-03-04

## 2025-03-04 NOTE — PROGRESS NOTES
"Cardiology Office Follow Up Visit      Primary Care Provider:  Daniela Hernandez APRN    Reason for f/u:     Routine F/U      Subjective     CC:    Pt states he is here for routine F/U    History of Present Illness       Tramaine Gates is a 39 y.o. male Dr. Haddad.  He has a history of hypertension, hyperlipidemia, CAD requiring multivessel bypass in 2022, asthma, thyroid disease and bipolar disorder/psychosis/anxiety/rage disorder.     In June 2022 the patient had a ST segment elevation MI.  He had drug-eluting stent placement to the RCA.  There was 80 to 90% stenosis in the LAD, left circumflex had 40 to 50% in the distal left circumflex/OM.  He had repeat admission 5 days later showing in-stent thrombosis and he was sent for CABG.  On June 29, 2022 he underwent three-vessel CABG with LIMA to the LAD, saphenous vein graft to the OM branch of the circumflex and saphenous vein graft to the RPDA.    Patient had previous cardiac catheterization in September 2022 and had PCI via the vein graft to the distal RCA and RPDA.     In March 2023 he presented with complaints of chest pain worrisome for unstable angina.  He underwent cardiac catheterization and had balloon angioplasty of the distal portion of the saphenous vein graft to the RCA inside the stented segment other vascular areas the LIMA to the LAD, native LAD, circumflex and native RCA were unchanged angiographically compared to his previous cath.    Patient saw [facility] Dr Kammerling in April and August 2023. He had stress test which revealed \"small sized mild severity partially reversible perfusion defect of the apical inferior wall consistent with apical thinning and a small area of minimal ischemia\", and his Ranexa was increased to 1000mg bid.    He returned to our office Sept 2023 stating he had ran out of Effient, refills were provided.    Patient had admission to [facility] for chest pain with subsequent stress test in December 2023 which " "revealed no ECG evidence of ischemia, normal myocardial perfusion with no evidence of ischemia. Echo at that time with normal systolic and diastolic function. He followed up with Dr Haddad in office in Jan 2024.    Mr Gates then returned to Dr Kammerling in Feb 2024. Current treatment was continued, pt had difficulty obtaining Repatha and was referred to lipid clinic.    In Oct 2024, he returned to see Dr Haddad. He had continued c/o of chest pain, so stress test was ordered. Echo October 6, 2024  EF 55 to 60%, Normal atrial sizes, normal right left-sided pressures estimated noninvasively, normal valves with no dysfunction, RV size normal, RV function normal. Stress test interpretation \"Intermediate to high risk  study. Abnormal nuclear imaging with stress-induced decreased counts at the apex and periapical segments concerning for anterior apical ischemia. Difficult image quality, recommend coronary CT or invasive ischemic evaluation for further evaluation definitively. Stress ECG did not restarted heart rate no changes at submaximal stress\" Patient underwent heart cath 11/13/24 balloon angioplasty (see full report below).      Mr Gates returns today. He states he continues with chest pain, at baseline. I was able to review his case, including most recent heart cath, with Dr Haddad. Unfortunately, Dr Haddad states we have reached the limits of treatment available at [facility], and [facility]. I informed patient I will send referral to [facility], and advised him he can get online and self-refer to [facility] for second opinion. He also states he has N/T in arms, typically in am upon awakening, and also with severe coughing. I advised him I would F/U with PCP to r/o neck concerns, this does not seem to be cardiac related.      ASSESSMENT/PLAN:      Diagnoses and all orders for this visit:    1. CAD, multiple vessel (Primary)    2. Primary hypertension    3. Mixed hyperlipidemia    Other " "orders  -     cilostazol (PLETAL) 50 MG tablet; Take 1 tablet by mouth 2 (Two) Times a Day.  Dispense: 180 tablet; Refill: 3  -     losartan (Cozaar) 25 MG tablet; Take 1 tablet by mouth Daily.  Dispense: 90 tablet; Refill: 3  -     ECG 12 Lead    4. Obesity affecting all aspects of care      MEDICAL DECISION MAKING:    Continue beta-blocker with metoprolol, losartan, Imdur, cilostazol 50 twice daily  to help prevent in-stent restenosis and Effient, stop aspirin while on cilostazol and Effient. Ranexa, Imdur, prn NTG for chest pain. Crestor and Repatha.Norvasc for B/    1.  coronary artery disease status post remote bypass bypass 2022, chronic stable angina  \"- [facility] 9/15/2022: three-vessel coronary disease, patent LIMA to LAD, closed SVG to OM, heavily diseased SVG to RCA with poor candidacy for native vessel intervention  - s/p Overlapping Xience drug-eluting stents 3.5 x 38 x 3 stents in overlapping fashion postdilated to 3.7 mm at 16 to 18 radha, reduction stenosis to 0% residual, improvement DARRYL-3 flow via the vein graft to the distal RCA and RPDA  - [facility] 3/2/2023: Culprit appeared to be SVG to RCA distal lesion 70 to 80% hazy possibly atherothrombotic as well as proximal ostial ISR.  Other vascular areas with LIMA to LAD, native LAD, circumflex and native RCA.  Unchanged angiographically compared to prior case 9 months ago, preserved LVEF  -[facility] 11/2024:chest pain refractory to medical therapy recurrent after intervention 2023, similar ISR territories inside the SVG to RPDA, intervention as described with balloon angioplasty from the inside the stented portion distally back to the mid to proximal portion including ostial proximal portion with 4.0 x 20 NC balloon up to 12 to 14 radha with prolonged inflations with great improvement of angiographic runoff, stenosis to less than 10% residual in the body of the graft, ostial 30% residual.  Unchanged anastomosis and RPDA  Continue DAPT  Cilostazol is on board to try to " "prevent ISR  If recurrent would refer to [facility] for brachytherapy with poor candidate for antegrade revascularization or  revascularization via native RCA\"  Referral sent to [facility]  Patient advised to self-refer to [facility]  Refills sent today     2. HTN  -B/P on low side, similar to last visit in November. 100/64, HR 90, no c/o dizziness, fatigue or other with lower BP    3. HLD  -LDL 78 in November, Crestor and Repatha    4. Obesity  -previous referral to Bariatrics, unsure the status     Continue present pharmacotherapy  Follow-up with [facility]  Refer to [facility]  F/U with Dr Haddad in 1 year or sooner as needed        Past Medical History:   Diagnosis Date    Acute inferior myocardial infarction 06/14/2022    Allergic     Asthma 01/27/2022    CAD, multiple vessel 06/14/2022    Clotting disorder     COPD (chronic obstructive pulmonary disease)     Disorder of thyroid 01/27/2022    Gastroesophageal reflux disease 01/27/2022    Hx of complete eye exam 2016    Hyperlipidemia 01/27/2022    Hypertension     Migraine headache 01/27/2022    Panic attack 01/27/2022    Peptic ulcer 09/14/2017    Sleep apnea        Past Surgical History:   Procedure Laterality Date    CARDIAC CATHETERIZATION N/A 06/14/2022    Procedure: Left Heart Cath;  Surgeon: Matthieu Del Toro MD;  Location:  [facility]VASIVE [facility];  Service: Cardiology;  Laterality: N/A;    CARDIAC CATHETERIZATION N/A 06/16/2022    Procedure: Percutaneous Coronary Intervention;  Surgeon: Matthieu Del Toro MD;  Location:  [facility];  Service: Cardiovascular;  Laterality: N/A;    CARDIAC CATHETERIZATION N/A 06/23/2022    Procedure: Left Heart Cath possible PCI, atherectomy, hemodynamic support;  Surgeon: Moises Haddad MD;  Location: [facility] CATH INVASIVE LOCATION;  Service: Cardiology;  Laterality: N/A;    CARDIAC CATHETERIZATION N/A 09/15/2022    Procedure: Left Heart Cath;  Surgeon: Moises Haddad MD;  " Location: [facility] CATH INVASIVE LOCATION;  Service: Cardiology;  Laterality: N/A;    CARDIAC CATHETERIZATION  9/15/2022    CARDIAC CATHETERIZATION N/A 03/02/2023    Procedure: Left Heart Cath;  Surgeon: Moises Haddad MD;  Location: [facility] CATH INVASIVE LOCATION;  Service: Cardiology;  Laterality: N/A;    CARDIAC CATHETERIZATION N/A 11/13/2024    Procedure: Left Heart Cath;  Surgeon: Moises Haddad MD;  Location: [facility] CATH INVASIVE LOCATION;  Service: Cardiology;  Laterality: N/A;    CAROTID STENT      CHOLECYSTECTOMY  2019    CORONARY ANGIOPLASTY      CORONARY ARTERY BYPASS GRAFT N/A 06/29/2022    Procedure: CORONARY ARTERY BYPASS GRAFTING;  Surgeon: Pablo Ball MD;  Location: [facility] CVOR;  Service: Cardiothoracic;  Laterality: N/A;  CABG X 3(2 vein grafts, 1 LIMA graft)    CORONARY ARTERY BYPASS GRAFT  6/29/2022    CORONARY STENT PLACEMENT      MANDIBLE SURGERY      VEIN SURGERY           Current Outpatient Medications:     albuterol sulfate  (90 Base) MCG/ACT inhaler, Inhale 2 puffs by mouth as directed every four to six hours as needed for coughing, shortness of breath, wheezing, Disp: 8.5 g, Rfl: 0    albuterol sulfate  (90 Base) MCG/ACT inhaler, Inhale 2 puffs by mouth as directed every four to six hours as needed for coughing, shortness of breath, wheezing, Disp: 18 g, Rfl: 0    amLODIPine (Norvasc) 5 MG tablet, Take 1 tablet by mouth Daily., Disp: 90 tablet, Rfl: 0    aspirin (Aspirin Low Dose) 81 MG EC tablet, Take 1 tablet by mouth Daily., Disp: 90 tablet, Rfl: 0    Evolocumab (REPATHA) solution auto-injector SureClick injection, Inject 1 mL under the skin into the appropriate area as directed Every 14 (Fourteen) Days., Disp: , Rfl:     isosorbide mononitrate (IMDUR) 120 MG 24 hr tablet, Take 1 tablet by mouth Daily., Disp: 90 tablet, Rfl: 0    levothyroxine (SYNTHROID, LEVOTHROID) 50 MCG tablet, TAKE 1 TABLET BY MOUTH EVERY DAY, Disp: 90 tablet, Rfl: 0     losartan (Cozaar) 25 MG tablet, Take 1 tablet by mouth Daily., Disp: 90 tablet, Rfl: 3    metoprolol succinate XL (TOPROL-XL) 100 MG 24 hr tablet, Take 1 tablet by mouth Daily., Disp: 90 tablet, Rfl: 0    nitroglycerin (NITROSTAT) 0.4 MG SL tablet, Place 1 tablet under the tongue Every 5 (Five) Minutes As Needed for Chest Pain (Only if SBP Greater Than 100). Take no more than 3 doses in 15 minutes., Disp: 25 tablet, Rfl: 0    omeprazole (priLOSEC) 40 MG capsule, Take 1 capsule by mouth Daily., Disp: , Rfl:     prasugrel (EFFIENT) 10 MG tablet, Take 1 tablet by mouth Daily., Disp: 90 tablet, Rfl: 3    ranolazine (RANEXA) 1000 MG 12 hr tablet, Take 1 tablet by mouth 2 (Two) Times a Day., Disp: 180 tablet, Rfl: 3    rosuvastatin (CRESTOR) 40 MG tablet, Take 0.5 tablets by mouth Daily., Disp: , Rfl:     cilostazol (PLETAL) 50 MG tablet, Take 1 tablet by mouth 2 (Two) Times a Day., Disp: 180 tablet, Rfl: 3    Social History     Socioeconomic History    Marital status: Single   Tobacco Use    Smoking status: Former     Current packs/day: 0.00     Average packs/day: 1.5 packs/day for 26.5 years (39.7 ttl pk-yrs)     Types: Cigarettes     Start date: 1996     Quit date: 2022     Years since quittin.7     Passive exposure: Past    Smokeless tobacco: Never   Vaping Use    Vaping status: Never Used   Substance and Sexual Activity    Alcohol use: Not Currently    Drug use: Yes     Frequency: 7.0 times per week     Types: Marijuana    Sexual activity: Yes     Partners: Female       Family History   Problem Relation Age of Onset    Heart disease Mother     Heart attack Mother     Hypertension Mother     Arrhythmia Mother     Heart failure Father     Hypertension Father     Cirrhosis Maternal Grandfather     Heart attack Maternal Grandfather     Heart attack Maternal Grandmother         a    Asthma Brother     Hypertension Brother        The following portions of the patient's history were reviewed and updated as  "appropriate: allergies, current medications, past family history, past medical history, past social history, past surgical history and problem list.    Review of Systems   Constitutional: Negative for diaphoresis.   Cardiovascular:  Positive for chest pain and dyspnea on exertion. Negative for syncope.   Respiratory:  Positive for shortness of breath.    Hematologic/Lymphatic: Negative for bleeding problem.   Neurological:  Positive for paresthesias. Negative for focal weakness.       Pertinent items are noted in HPI, all other systems reviewed and negative    /64 (BP Location: Right arm, Patient Position: Sitting, Cuff Size: Large Adult)   Pulse 90   Resp 20   Ht 177.8 cm (70\")   Wt (!) 146 kg (320 lb 12.8 oz)   SpO2 99%   BMI 46.03 kg/m² .  Objective     Vitals reviewed.   Constitutional:       Appearance: Not in distress. Obese. Not diaphoretic.   Eyes:      Extraocular Movements: Extraocular movements intact.      Conjunctiva/sclera: Conjunctivae normal.      Pupils: Pupils are equal, round, and reactive to light.   HENT:      Head: Normocephalic and atraumatic.   Neck:      Vascular: No JVD.   Pulmonary:      Effort: No tachypnea or accessory muscle usage.      Breath sounds: Normal breath sounds. No stridor.   Cardiovascular:      PMI at left midclavicular line. Normal rate. Regular rhythm. Normal S1. Normal S2.       No gallop.  No click. No rub.   Pulses:     Intact distal pulses.   Edema:     Peripheral edema absent.   Abdominal:      General: There is no distension.      Palpations: Abdomen is soft.      Tenderness: There is no abdominal tenderness.   Skin:     General: Skin is warm and dry.      Coloration: Skin is not cyanotic.      Findings: No erythema.   Neurological:      General: No focal deficit present.      Mental Status: Alert, oriented to person, place, and time and oriented to person, place and time.   Psychiatric:         Mood and Affect: Mood and affect normal.         Behavior: " Behavior normal.         Cognition and Memory: Cognition normal.         Judgment: Judgment normal.             ECG 12 Lead    Date/Time: 3/4/2025 8:21 PM  Performed by: Hope Dunn APRN    Authorized by: Hope Dunn APRN  Comparison: compared with previous ECG from 11/15/2024  Similar to previous ECG  Comparison to previous ECG: Previous was bradycardic  Rhythm: sinus rhythm  Rate: normal  BPM: 90  Conduction: conduction normal  QRS axis: normal          EKG ordered by and reviewed by me in office    Results for orders placed during the hospital encounter of 11/13/24    Cardiac Catheterization/Vascular Study    Conclusion  Primary operative Moises Haddad MD, PhD  [facility] cardiology  Date of service 11-    Procedure  1.  Left heart catheterization with native and bypass graft angiography, LV gram in GALLO position, transaortic valve gradient assessment  2.  Balloon angioplasty from the distal portion of the SVG to PDA secondary to 90% ISR distally tandem 70% ISR diffusely and ostial proximal 50% ISR, 4.0 x 20 NC balloon at 12 to 14 radha and distal proximal fashion with prolonged inflations reduction of stenosis from 90% to less than 10% residual throughout the graft, improvement from 50% down to around 20% at the ostial portion of the graft again secondary recurrent ISR, similar ISR locations too early 2023    Indication  Chest pain, angina refractory to medical therapy known severe early CAD of native and bypass grafts    After informed consent patient brought to the Cath Lab sterilely prepped and draped in usual fashion with expose the right groin for right common femoral arterial access via micropuncture and modified Salinger technique with placement of a 6 Uzbek sheath.  035 guidewire was advanced to eval for by diagnostic JL 4 and JR4 catheters for selective left and right coronary angiography respectively.  JR4 was used for native and bypass graft disease including LIMA to LAD, a  multipurpose catheter was used for imaging SVG to RPDA.  There was ISR recurrent in the SVG to RPDA and patient was heparinized with 100 units/kg of heparin on a background therapy of DAPT, a 6 Turkmen multipurpose guide used gauge the graft, run-through wire to the distal portion of the vessel, 4.0 x 20 NC balloon inside the stented segment up to 12 radha for 1 minute inflations x 3 distal to proximal, ostial proximal inflation 12 radha for 30 to 60 seconds.  Final angiography great angiographic improvement in distal runoff and ISR area, we avoided relining the stent with multiple overlapping stents already in this portion of the graft.  The distal portion of the graft was without significant disease, anastomosis appeared unchanged with both retrograde and anterograde limb disease but nonobstructive with DARRYL-3 flow poor candidate for PCI given small caliber and limited runoff and this was left to be treated medically.  Disease in the circumflex into obtuse marginal branch was unchanged and therefore will be treated medically, disease from the LAD into diagonal branch was unchanged will be treated medically, MORTON to LAD widely patent, SVG to obtuse marginal branch is  which is known, native RCA.  Angiographically improved with the proximal to mid stents in place with minimal ISR.  Preserved EF which was normal with normal transaortic valve gradient, mildly elevated filling pressures at 16-18 similar to prior invasive evaluation last year    He left Cath Lab chest pain-free hemodynamically electrically stable at tach to staff neurologically gross intact bilaterally  Final angiography revealed no distal wire trauma edge dissection or complications.    Complications none  Blood loss less than 10 cc  Contrast 180 cc  Sedation time of 1 hour 10 minutes    Findings  1.  Opening aortic pressure of 121/71 with a mean of 95, closing pressure was unchanged  2.  LV pressure 109/5 with an EDP of 16-18, normal EF 60%  Normal  transaortic gradient    Angiography  1 left main normal takeoff mild diffuse disease 20%  2.  The LAD diffuse disease with stent in the proximal portion,  after the takeoff the diagonal branch, diagonal diffusely diseased 50 to 60% but with DARRYL-3 flow and tapering distal vessels not amenable to intervention with small vessel disease distally  3.  LIMA to LAD is widely patent, no anastomotic disease, appears angiographically unchanged from early 2023 angiogram, no flow-limiting stenosis of the graft anastomosis or native vessel  4.  Circumflex is widely patent, body of the circumflex with diffuse luminal regularities, the terminal obtuse marginal branch has tortuosity with 50% stenosis at the proximal portion the obtuse marginal as well as the midportion with tenting however this vein graft to this area is closed.  There is DARRYL-3 flow throughout with small vessel disease distally and this will less be treated medically.  More proximal close marginal branches are  and small caliber  5.  RCA small caliber vessel, diffuse disease,  in the midportion after the takeoff of the marginal branch, proximal and mid stents are patent with 10 to 20% ISR  6.  SVG to obtuse marginal branch   7.  SVG to midportion of the RPDA was patent with tandem 90 and 70% regions of ISR inside the stented segments in the mid to distal portion back to the midportion.  Ostial ISR 60 to 70% worse from prior balloon angioplasty the segment 2023    Conclusions recommendations  1 chest pain refractory to medical therapy recurrent after intervention 2023, similar ISR territories inside the SVG to RPDA, intervention as described with balloon angioplasty from the inside the stented portion distally back to the mid to proximal portion including ostial proximal portion with 4.0 x 20 NC balloon up to 12 to 14 radha with prolonged inflations with great improvement of angiographic runoff, stenosis to less than 10% residual in the body of the  graft, ostial 30% residual.  Unchanged anastomosis and RPDA    Continue DAPT  Cilostazol is on board to try to prevent ISR  If recurrent would refer to [facility] for brachytherapy with poor candidate for antegrade revascularization or  revascularization via native RCA    Optimize goal-directed medical therapy  Observe overnight and discharge once discharge criteria met  Sheath will be pulled once ACT is less than 175    Further recommendations follow findings and clinical course  Moises Haddad MD, PhD          Tramaine EMILIANA Kody  reports that he quit smoking about 2 years ago. His smoking use included cigarettes. He started smoking about 29 years ago. He has a 39.7 pack-year smoking history. He has been exposed to tobacco smoke. He has never used smokeless tobacco.

## 2025-03-21 RX ORDER — NITROGLYCERIN 0.4 MG/1
0.4 TABLET SUBLINGUAL
Qty: 25 TABLET | Refills: 0 | Status: SHIPPED | OUTPATIENT
Start: 2025-03-21 | End: 2025-03-23 | Stop reason: SDUPTHER

## 2025-03-24 RX ORDER — AMLODIPINE BESYLATE 5 MG/1
5 TABLET ORAL DAILY
Qty: 90 TABLET | Refills: 0 | Status: SHIPPED | OUTPATIENT
Start: 2025-03-24

## 2025-03-24 RX ORDER — NITROGLYCERIN 0.4 MG/1
0.4 TABLET SUBLINGUAL
Qty: 25 TABLET | Refills: 0 | Status: SHIPPED | OUTPATIENT
Start: 2025-03-24

## (undated) DEVICE — KT PK ANGIOPLASTY ACC 9 MERIT

## (undated) DEVICE — BLD SCLPL BEAVR MINI STR 2BVL 180D LF

## (undated) DEVICE — SUT PROLN 7/0 BV1 D/A 30IN 8703H

## (undated) DEVICE — SYS PERFUS SEP PLATLT W TIPS CUST

## (undated) DEVICE — GUIDE CATHETER: Brand: MACH1™

## (undated) DEVICE — GW PTFE EMERALD HEPCOAT FC J TIP STD .035 3MM 150CM

## (undated) DEVICE — ELECTRD DEFIB M/FUNC PROPADZ RADIOL 2PK

## (undated) DEVICE — DEV INFL COMPAK W/ACCESSPLUS IN4530

## (undated) DEVICE — SUT SILK 4/0 TIES 18IN A183H

## (undated) DEVICE — SOL IRR NACL 0.9PCT BT 1000ML

## (undated) DEVICE — NC TREK CORONARY DILATATION CATHETER 2.0 MM X 15 MM / RAPID-EXCHANGE: Brand: NC TREK

## (undated) DEVICE — SUT SILK 0 CT1 CR8 18IN C021D

## (undated) DEVICE — ANGIO-SEAL VIP VASCULAR CLOSURE DEVICE: Brand: ANGIO-SEAL

## (undated) DEVICE — SUT PROLN 4/0 V5 36IN 8935H

## (undated) DEVICE — CONNECT Y INTERSEPT W/LL 3/8 X 3/8 X 3/8IN

## (undated) DEVICE — SYR LL TP 10ML STRL

## (undated) DEVICE — PK TRY HEART CATH 50

## (undated) DEVICE — CATH DIAG IMPULSE FL4 6F 100CM

## (undated) DEVICE — PRESSURE TUBING: Brand: TRUWAVE

## (undated) DEVICE — DGW .035 MC J3MM 150CM T H AMP: Brand: EMERALD

## (undated) DEVICE — SPNG GZ AVANT 6PLY 4X4IN STRL PK/2

## (undated) DEVICE — ST PERFUS M/

## (undated) DEVICE — PK PERFUS CUST W/CARDIOPLEGIA

## (undated) DEVICE — DRN WND CH RND FUL/FLUT NO/TROC 3/8IN 28F

## (undated) DEVICE — BLOWER MISTER CLEARVIEW W/TBG

## (undated) DEVICE — DRAPE SHEET ULTRAGARD: Brand: MEDLINE

## (undated) DEVICE — SUT PROLN 5/0 V5 36IN 8934H

## (undated) DEVICE — HI-TORQUE BALANCE MIDDLEWEIGHT UNIVERSAL II GUIDE WIRE STRAIGHT TIP PAK  190 CM: Brand: HI-TORQUE BALANCE MIDDLEWEIGHT UNIVERSAL II

## (undated) DEVICE — CATH DIAG IMPULSE FR4 6F 100CM

## (undated) DEVICE — SUT PROLN 4/0 V7 36IN 8975H

## (undated) DEVICE — CATH DIAG IMPULSE PIG .056 6F 110CM

## (undated) DEVICE — MEDICINE CUP, GRADUATED, STER: Brand: MEDLINE

## (undated) DEVICE — ANTIBACTERIAL UNDYED BRAIDED (POLYGLACTIN 910), SYNTHETIC ABSORBABLE SUTURE: Brand: COATED VICRYL

## (undated) DEVICE — BLOOD TRANSFUSION FILTER: Brand: HAEMONETICS

## (undated) DEVICE — SUT SILK 2 SUTUPAK TIE 60IN SA8H 2STRAND

## (undated) DEVICE — SUT PDS 0 CT-1 Z340H

## (undated) DEVICE — GW RUNTHROUGH NS HYPERCOAT .014 3X180CM

## (undated) DEVICE — CORONARY ARTERY BYPASS GRAFT MARKERS, STAINLESS STEEL, DISTAL, WITHOUT HOLDER: Brand: ANASTOMARK CORONARY ARTERY BYPASS GRAFT MARKERS, STAINLESS STEEL, DISTAL

## (undated) DEVICE — Device

## (undated) DEVICE — ST ACC MICROPUNCTURE STFF/CANN PLAT/TP 4F 21G 40CM

## (undated) DEVICE — CATH DIAG EXPO .052 MPA2 6F 100CM

## (undated) DEVICE — SCANLAN® VASCU-STATT® II SINGLE-USE BULLDOG CLAMP W/FIRMER CLAMPING PRESS - MIDI ANGLED 45° (YELLOW), CLAMPING PRESSURE 75-80 G (2/STERILE PKG): Brand: SCANLAN® VASCU-STATT® II SINGLE-USE BULLDOG CLAMP W/FIRMER CLAMPING PRESS

## (undated) DEVICE — SUT SILK 2/0 SH CR8 18IN CR8 C012D

## (undated) DEVICE — CONTRST ISOVUE300 61PCT 50ML

## (undated) DEVICE — TEMP PACING WIRE: Brand: MYO/WIRE

## (undated) DEVICE — ELECTRD BLD EZ CLN STD 6.5IN

## (undated) DEVICE — TBG NAMIC PRESS MONTR A/ F/M 12IN

## (undated) DEVICE — GLV SURG BIOGEL LTX PF 7 1/2

## (undated) DEVICE — STPCK 3WY HP ROT

## (undated) DEVICE — TUBING, SUCTION, 1/4" X 12', STRAIGHT: Brand: MEDLINE

## (undated) DEVICE — NC TREK NEO™ CORONARY DILATATION CATHETER 4.00 MM X 20 MM / RAPID-EXCHANGE: Brand: NC TREK NEO™

## (undated) DEVICE — Device: Brand: RETRACT-O-TAPE 18G X 30.5CM BLUNT NEEDLE

## (undated) DEVICE — BOWL PLSTC MD 16OZ BLU STRL

## (undated) DEVICE — BG BLD SYS

## (undated) DEVICE — SUT PROLN 3/0 V7 D/A 36IN 8976H

## (undated) DEVICE — 6F .070 XB LAD 3.5 100CM: Brand: VISTA BRITE TIP

## (undated) DEVICE — SUT PROLN 6/0 C1 D/A 30IN 8706H

## (undated) DEVICE — CATH MPAC STP 6F: Brand: SUPER TORQUE

## (undated) DEVICE — PINNACLE INTRODUCER SHEATH: Brand: PINNACLE

## (undated) DEVICE — TREK CORONARY DILATATION CATHETER 2.25 MM X 12 MM / RAPID-EXCHANGE: Brand: TREK

## (undated) DEVICE — NC TREK CORONARY DILATATION CATHETER 4.0 MM X 20 MM / RAPID-EXCHANGE: Brand: NC TREK

## (undated) DEVICE — NC TREK CORONARY DILATATION CATHETER 2.5 MM X 25 MM / RAPID-EXCHANGE: Brand: NC TREK

## (undated) DEVICE — 28 FR RIGHT ANGLE – SOFT PVC CATHETER: Brand: PVC THORACIC CATHETERS

## (undated) DEVICE — ROTATING SURGICAL PUNCHES, 1 PER POUCH: Brand: A&E MEDICAL / ROTATING SURGICAL PUNCHES

## (undated) DEVICE — SYS VASOVIEW HEMOPRO ENDOSCOPIC HARVST VESL

## (undated) DEVICE — SOL IRR H2O BTL 1000ML STRL

## (undated) DEVICE — CABL BIPOL W/ALLGTR CLIP/SM 12FT

## (undated) DEVICE — CANN AORT ROOT DLP VNT/8IN 14G 7F

## (undated) DEVICE — PK ATS CUST W CARDIOTOMY RESEVOIR

## (undated) DEVICE — MINI TREK CORONARY DILATATION CATHETER 2.0 MM X 12 MM / RAPID-EXCHANGE: Brand: MINI TREK

## (undated) DEVICE — ELECTRD DEFIB M/FUNC PROPADZ STRL 2PK

## (undated) DEVICE — SOL NACL 0.9PCT 1000ML

## (undated) DEVICE — HI-TORQUE WHISPER MS GUIDE WIRE .014 STRAIGHT TIP 3.0 CM X 190 CM: Brand: HI-TORQUE WHISPER

## (undated) DEVICE — CANN RETRGR STYLET RSCP 15F

## (undated) DEVICE — TBG INSUFF MALE L/L W 12MM CON: Brand: MEDLINE INDUSTRIES, INC.

## (undated) DEVICE — HEMOCONCENTRATOR PERFUS LPS06

## (undated) DEVICE — NC TREK CORONARY DILATATION CATHETER 3.0 MM X 15 MM / RAPID-EXCHANGE: Brand: NC TREK

## (undated) DEVICE — BNDG ELAS ELITE V/CLOSE 6IN 5YD LF STRL

## (undated) DEVICE — BNDG ELAS ELITE V/CLOSE 4IN 5YD LF STRL

## (undated) DEVICE — CATH DIAG IMPULSE MPA 6F 100CM

## (undated) DEVICE — PK OPN HEART WHT WRP 50

## (undated) DEVICE — SUT SILK 2/0 TIES 18IN A185H

## (undated) DEVICE — CANN ART EOPA 3D NV W/CONN 20F